# Patient Record
Sex: FEMALE | Race: BLACK OR AFRICAN AMERICAN | NOT HISPANIC OR LATINO | Employment: FULL TIME | URBAN - METROPOLITAN AREA
[De-identification: names, ages, dates, MRNs, and addresses within clinical notes are randomized per-mention and may not be internally consistent; named-entity substitution may affect disease eponyms.]

---

## 2023-05-05 LAB
ALANINE AMINOTRANSFERASE (SGPT) (U/L) IN SER/PLAS: 13 U/L (ref 7–45)
ALBUMIN (G/DL) IN SER/PLAS: 3.9 G/DL (ref 3.4–5)
ALBUMIN (MG/L) IN URINE: <7 MG/L
ALBUMIN/CREATININE (UG/MG) IN URINE: ABNORMAL UG/MG CRT (ref 0–30)
ALKALINE PHOSPHATASE (U/L) IN SER/PLAS: 66 U/L (ref 33–110)
ANION GAP IN SER/PLAS: 16 MMOL/L (ref 10–20)
ASPARTATE AMINOTRANSFERASE (SGOT) (U/L) IN SER/PLAS: 13 U/L (ref 9–39)
BILIRUBIN TOTAL (MG/DL) IN SER/PLAS: 0.4 MG/DL (ref 0–1.2)
CALCIUM (MG/DL) IN SER/PLAS: 9.3 MG/DL (ref 8.6–10.6)
CARBON DIOXIDE, TOTAL (MMOL/L) IN SER/PLAS: 25 MMOL/L (ref 21–32)
CHLORIDE (MMOL/L) IN SER/PLAS: 97 MMOL/L (ref 98–107)
CHOLESTEROL (MG/DL) IN SER/PLAS: 232 MG/DL (ref 0–199)
CHOLESTEROL IN HDL (MG/DL) IN SER/PLAS: 80.1 MG/DL
CHOLESTEROL/HDL RATIO: 2.9
CREATININE (MG/DL) IN SER/PLAS: 0.72 MG/DL (ref 0.5–1.05)
CREATININE (MG/DL) IN URINE: 15 MG/DL (ref 20–320)
ESTIMATED AVERAGE GLUCOSE FOR HBA1C: 309 MG/DL
GFR FEMALE: >90 ML/MIN/1.73M2
GLUCOSE (MG/DL) IN SER/PLAS: 690 MG/DL (ref 74–99)
HEMOGLOBIN A1C/HEMOGLOBIN TOTAL IN BLOOD: 12.4 %
LDL: 135 MG/DL (ref 0–119)
NON HDL CHOLESTEROL: 152 MG/DL (ref 0–149)
POTASSIUM (MMOL/L) IN SER/PLAS: 4.7 MMOL/L (ref 3.5–5.3)
PROTEIN TOTAL: 6.3 G/DL (ref 6.4–8.2)
SODIUM (MMOL/L) IN SER/PLAS: 133 MMOL/L (ref 136–145)
THYROTROPIN (MIU/L) IN SER/PLAS BY DETECTION LIMIT <= 0.05 MIU/L: 2.04 MIU/L (ref 0.44–3.98)
TRIGLYCERIDE (MG/DL) IN SER/PLAS: 87 MG/DL (ref 0–149)
UREA NITROGEN (MG/DL) IN SER/PLAS: 12 MG/DL (ref 6–23)
VLDL: 17 MG/DL (ref 0–40)

## 2023-05-07 LAB
ALBUMIN (MG/L) IN URINE: NORMAL
ALBUMIN/CREATININE (UG/MG) IN URINE: NORMAL
CREATININE (MG/DL) IN URINE: NORMAL

## 2023-05-26 ENCOUNTER — PATIENT OUTREACH (OUTPATIENT)
Dept: CARE COORDINATION | Facility: CLINIC | Age: 23
End: 2023-05-26
Payer: COMMERCIAL

## 2023-05-31 LAB
CHLAMYDIA TRACH., AMPLIFIED: NEGATIVE
N. GONORRHEA, AMPLIFIED: NEGATIVE
TRICHOMONAS VAGINALIS: NEGATIVE

## 2023-07-07 LAB — URINE CULTURE: NORMAL

## 2023-09-12 ENCOUNTER — HOSPITAL ENCOUNTER (OUTPATIENT)
Dept: DATA CONVERSION | Facility: HOSPITAL | Age: 23
End: 2023-09-13
Attending: OBSTETRICS & GYNECOLOGY
Payer: COMMERCIAL

## 2023-09-12 DIAGNOSIS — R10.9 UNSPECIFIED ABDOMINAL PAIN: ICD-10-CM

## 2023-09-12 DIAGNOSIS — Z79.4 LONG TERM (CURRENT) USE OF INSULIN (MULTI): ICD-10-CM

## 2023-09-12 DIAGNOSIS — O99.512 DISEASES OF THE RESPIRATORY SYSTEM COMPLICATING PREGNANCY, SECOND TRIMESTER (HHS-HCC): ICD-10-CM

## 2023-09-12 DIAGNOSIS — O26.892 OTHER SPECIFIED PREGNANCY RELATED CONDITIONS, SECOND TRIMESTER (HHS-HCC): ICD-10-CM

## 2023-09-12 DIAGNOSIS — Z3A.23 23 WEEKS GESTATION OF PREGNANCY (HHS-HCC): ICD-10-CM

## 2023-09-12 DIAGNOSIS — M54.9 DORSALGIA, UNSPECIFIED: ICD-10-CM

## 2023-09-12 DIAGNOSIS — Z34.80 ENCOUNTER FOR SUPERVISION OF OTHER NORMAL PREGNANCY, UNSPECIFIED TRIMESTER (HHS-HCC): ICD-10-CM

## 2023-09-12 DIAGNOSIS — J45.909 UNSPECIFIED ASTHMA, UNCOMPLICATED (HHS-HCC): ICD-10-CM

## 2023-09-12 DIAGNOSIS — O10.912 UNSPECIFIED PRE-EXISTING HYPERTENSION COMPLICATING PREGNANCY, SECOND TRIMESTER (HHS-HCC): ICD-10-CM

## 2023-09-12 DIAGNOSIS — E10.9 TYPE 1 DIABETES MELLITUS WITHOUT COMPLICATIONS (MULTI): ICD-10-CM

## 2023-09-12 DIAGNOSIS — O24.012: ICD-10-CM

## 2023-09-12 LAB
ALANINE AMINOTRANSFERASE (SGPT) (U/L) IN SER/PLAS: 11 U/L (ref 7–45)
ALBUMIN (G/DL) IN SER/PLAS: 3.5 G/DL (ref 3.4–5)
ALKALINE PHOSPHATASE (U/L) IN SER/PLAS: 35 U/L (ref 33–110)
ANION GAP IN SER/PLAS: 14 MMOL/L (ref 10–20)
APPEARANCE, URINE: CLEAR
ASPARTATE AMINOTRANSFERASE (SGOT) (U/L) IN SER/PLAS: 12 U/L (ref 9–39)
BASOPHILS (10*3/UL) IN BLOOD BY AUTOMATED COUNT: 0.03 X10E9/L (ref 0–0.1)
BASOPHILS/100 LEUKOCYTES IN BLOOD BY AUTOMATED COUNT: 0.4 % (ref 0–2)
BETA HYDROXYBUTYRATE (MMOL/L) IN SER/PLAS: 0.27 MMOL/L (ref 0.02–0.27)
BILIRUBIN TOTAL (MG/DL) IN SER/PLAS: 0.3 MG/DL (ref 0–1.2)
BILIRUBIN, URINE: NEGATIVE
BLOOD, URINE: NEGATIVE
CALCIUM (MG/DL) IN SER/PLAS: 8.9 MG/DL (ref 8.6–10.6)
CARBON DIOXIDE, TOTAL (MMOL/L) IN SER/PLAS: 23 MMOL/L (ref 21–32)
CHLORIDE (MMOL/L) IN SER/PLAS: 104 MMOL/L (ref 98–107)
COLOR, URINE: YELLOW
CREATININE (MG/DL) IN SER/PLAS: 0.39 MG/DL (ref 0.5–1.05)
EOSINOPHILS (10*3/UL) IN BLOOD BY AUTOMATED COUNT: 0.13 X10E9/L (ref 0–0.7)
EOSINOPHILS/100 LEUKOCYTES IN BLOOD BY AUTOMATED COUNT: 1.6 % (ref 0–6)
ERYTHROCYTE DISTRIBUTION WIDTH (RATIO) BY AUTOMATED COUNT: 12.5 % (ref 11.5–14.5)
ERYTHROCYTE MEAN CORPUSCULAR HEMOGLOBIN CONCENTRATION (G/DL) BY AUTOMATED: 33.6 G/DL (ref 32–36)
ERYTHROCYTE MEAN CORPUSCULAR VOLUME (FL) BY AUTOMATED COUNT: 90 FL (ref 80–100)
ERYTHROCYTES (10*6/UL) IN BLOOD BY AUTOMATED COUNT: 3.65 X10E12/L (ref 4–5.2)
GFR FEMALE: >90 ML/MIN/1.73M2
GLUCOSE (MG/DL) IN SER/PLAS: 141 MG/DL (ref 74–99)
GLUCOSE, URINE: ABNORMAL MG/DL
HEMATOCRIT (%) IN BLOOD BY AUTOMATED COUNT: 33 % (ref 36–46)
HEMOGLOBIN (G/DL) IN BLOOD: 11.1 G/DL (ref 12–16)
IMMATURE GRANULOCYTES/100 LEUKOCYTES IN BLOOD BY AUTOMATED COUNT: 0.4 % (ref 0–0.9)
KETONES, URINE: ABNORMAL MG/DL
LEUKOCYTE ESTERASE, URINE: NEGATIVE
LEUKOCYTES (10*3/UL) IN BLOOD BY AUTOMATED COUNT: 8.2 X10E9/L (ref 4.4–11.3)
LYMPHOCYTES (10*3/UL) IN BLOOD BY AUTOMATED COUNT: 2.44 X10E9/L (ref 1.2–4.8)
LYMPHOCYTES/100 LEUKOCYTES IN BLOOD BY AUTOMATED COUNT: 29.8 % (ref 13–44)
MONOCYTES (10*3/UL) IN BLOOD BY AUTOMATED COUNT: 0.44 X10E9/L (ref 0.1–1)
MONOCYTES/100 LEUKOCYTES IN BLOOD BY AUTOMATED COUNT: 5.4 % (ref 2–10)
NEUTROPHILS (10*3/UL) IN BLOOD BY AUTOMATED COUNT: 5.13 X10E9/L (ref 1.2–7.7)
NEUTROPHILS/100 LEUKOCYTES IN BLOOD BY AUTOMATED COUNT: 62.4 % (ref 40–80)
NITRITE, URINE: NEGATIVE
NRBC (PER 100 WBCS) BY AUTOMATED COUNT: 0 /100 WBC (ref 0–0)
PATIENT TEMPERATURE: 37 DEGREES C
PH, URINE: 7 (ref 5–8)
PLATELETS (10*3/UL) IN BLOOD AUTOMATED COUNT: 413 X10E9/L (ref 150–450)
POCT ANION GAP, VENOUS: ABNORMAL MMOL/L (ref 10–25)
POCT BASE EXCESS, VENOUS: -10.8 MMOL/L (ref -2–3)
POCT CHLORIDE, VENOUS: 101 MMOL/L (ref 98–107)
POCT GLUCOSE, VENOUS: 143 MG/DL (ref 74–99)
POCT GLUCOSE: 200 MG/DL (ref 74–99)
POCT HCO3, VENOUS: 20.9 MMOL/L (ref 22–26)
POCT HEMATOCRIT, VENOUS: 35 % (ref 36–46)
POCT HEMOGLOBIN, VENOUS: 11.7 G/DL (ref 12–16)
POCT IONIZED CALCIUM, VENOUS: ABNORMAL MMOL/L (ref 1.1–1.33)
POCT LACTATE, VENOUS: 1.4 MMOL/L (ref 0.4–2)
POCT OXY HEMOGLOBIN, VENOUS: 40.5 % (ref 45–75)
POCT PCO2, VENOUS: 79 MMHG (ref 41–51)
POCT PH, VENOUS: 7.03 (ref 7.33–7.43)
POCT PO2, VENOUS: 36 MMHG (ref 35–45)
POCT POTASSIUM, VENOUS: >10 MMOL/L (ref 3.5–5.3)
POCT SO2, VENOUS: 41 % (ref 45–75)
POCT SODIUM, VENOUS: 128 MMOL/L (ref 136–145)
POTASSIUM (MMOL/L) IN SER/PLAS: 3.7 MMOL/L (ref 3.5–5.3)
PROTEIN TOTAL: 6.4 G/DL (ref 6.4–8.2)
PROTEIN, URINE: NEGATIVE MG/DL
SODIUM (MMOL/L) IN SER/PLAS: 137 MMOL/L (ref 136–145)
SPECIFIC GRAVITY, URINE: 1.03 (ref 1–1.03)
UREA NITROGEN (MG/DL) IN SER/PLAS: 4 MG/DL (ref 6–23)
UROBILINOGEN, URINE: <2 MG/DL (ref 0–1.9)

## 2023-09-28 LAB
ALANINE AMINOTRANSFERASE (SGPT) (U/L) IN SER/PLAS: 12 U/L (ref 7–45)
ALBUMIN (G/DL) IN SER/PLAS: 3.7 G/DL (ref 3.4–5)
ALKALINE PHOSPHATASE (U/L) IN SER/PLAS: 41 U/L (ref 33–110)
ANION GAP IN SER/PLAS: 17 MMOL/L (ref 10–20)
ASPARTATE AMINOTRANSFERASE (SGOT) (U/L) IN SER/PLAS: 10 U/L (ref 9–39)
BASOPHILS (10*3/UL) IN BLOOD BY AUTOMATED COUNT: 0.06 X10E9/L (ref 0–0.1)
BASOPHILS/100 LEUKOCYTES IN BLOOD BY AUTOMATED COUNT: 0.7 % (ref 0–2)
BETA HYDROXYBUTYRATE (MMOL/L) IN SER/PLAS: 1.35 MMOL/L (ref 0.02–0.27)
BILIRUBIN TOTAL (MG/DL) IN SER/PLAS: 0.3 MG/DL (ref 0–1.2)
CALCIUM (MG/DL) IN SER/PLAS: 9.2 MG/DL (ref 8.6–10.6)
CARBON DIOXIDE, TOTAL (MMOL/L) IN SER/PLAS: 20 MMOL/L (ref 21–32)
CHLORIDE (MMOL/L) IN SER/PLAS: 102 MMOL/L (ref 98–107)
CREATININE (MG/DL) IN SER/PLAS: 0.44 MG/DL (ref 0.5–1.05)
EOSINOPHILS (10*3/UL) IN BLOOD BY AUTOMATED COUNT: 0.09 X10E9/L (ref 0–0.7)
EOSINOPHILS/100 LEUKOCYTES IN BLOOD BY AUTOMATED COUNT: 1 % (ref 0–6)
ERYTHROCYTE DISTRIBUTION WIDTH (RATIO) BY AUTOMATED COUNT: 12.6 % (ref 11.5–14.5)
ERYTHROCYTE MEAN CORPUSCULAR HEMOGLOBIN CONCENTRATION (G/DL) BY AUTOMATED: 33.1 G/DL (ref 32–36)
ERYTHROCYTE MEAN CORPUSCULAR VOLUME (FL) BY AUTOMATED COUNT: 95 FL (ref 80–100)
ERYTHROCYTES (10*6/UL) IN BLOOD BY AUTOMATED COUNT: 3.72 X10E12/L (ref 4–5.2)
GFR FEMALE: >90 ML/MIN/1.73M2
GLUCOSE (MG/DL) IN SER/PLAS: 201 MG/DL (ref 74–99)
HEMATOCRIT (%) IN BLOOD BY AUTOMATED COUNT: 35.3 % (ref 36–46)
HEMOGLOBIN (G/DL) IN BLOOD: 11.7 G/DL (ref 12–16)
IMMATURE GRANULOCYTES/100 LEUKOCYTES IN BLOOD BY AUTOMATED COUNT: 0.5 % (ref 0–0.9)
LEUKOCYTES (10*3/UL) IN BLOOD BY AUTOMATED COUNT: 8.6 X10E9/L (ref 4.4–11.3)
LYMPHOCYTES (10*3/UL) IN BLOOD BY AUTOMATED COUNT: 2.48 X10E9/L (ref 1.2–4.8)
LYMPHOCYTES/100 LEUKOCYTES IN BLOOD BY AUTOMATED COUNT: 28.8 % (ref 13–44)
MONOCYTES (10*3/UL) IN BLOOD BY AUTOMATED COUNT: 0.4 X10E9/L (ref 0.1–1)
MONOCYTES/100 LEUKOCYTES IN BLOOD BY AUTOMATED COUNT: 4.6 % (ref 2–10)
NEUTROPHILS (10*3/UL) IN BLOOD BY AUTOMATED COUNT: 5.55 X10E9/L (ref 1.2–7.7)
NEUTROPHILS/100 LEUKOCYTES IN BLOOD BY AUTOMATED COUNT: 64.4 % (ref 40–80)
NRBC (PER 100 WBCS) BY AUTOMATED COUNT: 0 /100 WBC (ref 0–0)
PATIENT TEMPERATURE: 37 DEGREES C
PLATELETS (10*3/UL) IN BLOOD AUTOMATED COUNT: 384 X10E9/L (ref 150–450)
POCT ANION GAP, VENOUS: 13 MMOL/L (ref 10–25)
POCT BASE EXCESS, VENOUS: -4.7 MMOL/L (ref -2–3)
POCT CHLORIDE, VENOUS: 103 MMOL/L (ref 98–107)
POCT GLUCOSE, VENOUS: 222 MG/DL (ref 74–99)
POCT GLUCOSE: 176 MG/DL (ref 74–99)
POCT HCO3, VENOUS: 21.1 MMOL/L (ref 22–26)
POCT HEMATOCRIT, VENOUS: 36 % (ref 36–46)
POCT HEMOGLOBIN, VENOUS: 12 G/DL (ref 12–16)
POCT IONIZED CALCIUM, VENOUS: 1.16 MMOL/L (ref 1.1–1.33)
POCT LACTATE, VENOUS: 2.3 MMOL/L (ref 0.4–2)
POCT OXY HEMOGLOBIN, VENOUS: 47.6 % (ref 45–75)
POCT PCO2, VENOUS: 41 MMHG (ref 41–51)
POCT PH, VENOUS: 7.32 (ref 7.33–7.43)
POCT PO2, VENOUS: 35 MMHG (ref 35–45)
POCT POTASSIUM, VENOUS: 3.7 MMOL/L (ref 3.5–5.3)
POCT SO2, VENOUS: 48 % (ref 45–75)
POCT SODIUM, VENOUS: 133 MMOL/L (ref 136–145)
POTASSIUM (MMOL/L) IN SER/PLAS: 3.8 MMOL/L (ref 3.5–5.3)
PROTEIN TOTAL: 6.7 G/DL (ref 6.4–8.2)
SODIUM (MMOL/L) IN SER/PLAS: 135 MMOL/L (ref 136–145)
UREA NITROGEN (MG/DL) IN SER/PLAS: 5 MG/DL (ref 6–23)

## 2023-09-28 PROCEDURE — 84295 ASSAY OF SERUM SODIUM: CPT | Mod: 91

## 2023-09-28 PROCEDURE — 82435 ASSAY OF BLOOD CHLORIDE: CPT | Mod: 91

## 2023-09-28 PROCEDURE — 82947 ASSAY GLUCOSE BLOOD QUANT: CPT | Mod: 91

## 2023-09-28 PROCEDURE — 83605 ASSAY OF LACTIC ACID: CPT

## 2023-09-28 PROCEDURE — 82330 ASSAY OF CALCIUM: CPT

## 2023-09-28 PROCEDURE — 82010 KETONE BODYS QUAN: CPT

## 2023-09-28 PROCEDURE — 85018 HEMOGLOBIN: CPT | Mod: 91

## 2023-09-28 PROCEDURE — 80053 COMPREHEN METABOLIC PANEL: CPT

## 2023-09-28 PROCEDURE — 9990 CHARGE CONVERSION: Mod: 91

## 2023-09-28 PROCEDURE — 85025 COMPLETE CBC W/AUTO DIFF WBC: CPT

## 2023-09-28 PROCEDURE — 82805 BLOOD GASES W/O2 SATURATION: CPT

## 2023-09-28 PROCEDURE — 84132 ASSAY OF SERUM POTASSIUM: CPT | Mod: 91

## 2023-09-29 ENCOUNTER — HOSPITAL ENCOUNTER (OUTPATIENT)
Dept: DATA CONVERSION | Facility: HOSPITAL | Age: 23
Setting detail: OBSERVATION
Discharge: HOME | End: 2023-09-30
Attending: OBSTETRICS & GYNECOLOGY | Admitting: OBSTETRICS & GYNECOLOGY
Payer: COMMERCIAL

## 2023-09-29 VITALS
SYSTOLIC BLOOD PRESSURE: 107 MMHG | HEIGHT: 60 IN | TEMPERATURE: 97.9 F | WEIGHT: 130.07 LBS | OXYGEN SATURATION: 99 % | DIASTOLIC BLOOD PRESSURE: 65 MMHG | HEART RATE: 104 BPM | BODY MASS INDEX: 25.54 KG/M2 | RESPIRATION RATE: 18 BRPM

## 2023-09-29 DIAGNOSIS — R10.9 UNSPECIFIED ABDOMINAL PAIN: ICD-10-CM

## 2023-09-29 DIAGNOSIS — E13.10 OTHER SPECIFIED DIABETES MELLITUS WITH KETOACIDOSIS WITHOUT COMA (MULTI): ICD-10-CM

## 2023-09-29 LAB
ABO GROUP (TYPE) IN BLOOD: NORMAL
ALANINE AMINOTRANSFERASE (SGPT) (U/L) IN SER/PLAS: 8 U/L (ref 7–45)
ALBUMIN (G/DL) IN SER/PLAS: 3.2 G/DL (ref 3.4–5)
ALKALINE PHOSPHATASE (U/L) IN SER/PLAS: 31 U/L (ref 33–110)
ANION GAP IN SER/PLAS: 13 MMOL/L (ref 10–20)
ANTIBODY SCREEN: NORMAL
ASPARTATE AMINOTRANSFERASE (SGOT) (U/L) IN SER/PLAS: 9 U/L (ref 9–39)
BETA HYDROXYBUTYRATE (MMOL/L) IN SER/PLAS: 0.19 MMOL/L (ref 0.02–0.27)
BETA HYDROXYBUTYRATE (MMOL/L) IN SER/PLAS: 0.46 MMOL/L (ref 0.02–0.27)
BILIRUBIN TOTAL (MG/DL) IN SER/PLAS: 0.3 MG/DL (ref 0–1.2)
CALCIUM (MG/DL) IN SER/PLAS: 8.5 MG/DL (ref 8.6–10.6)
CALCIUM (MG/DL) IN SER/PLAS: 8.9 MG/DL (ref 8.6–10.6)
CALCIUM (MG/DL) IN SER/PLAS: 8.9 MG/DL (ref 8.6–10.6)
CARBON DIOXIDE, TOTAL (MMOL/L) IN SER/PLAS: 20 MMOL/L (ref 21–32)
CARBON DIOXIDE, TOTAL (MMOL/L) IN SER/PLAS: 20 MMOL/L (ref 21–32)
CARBON DIOXIDE, TOTAL (MMOL/L) IN SER/PLAS: 22 MMOL/L (ref 21–32)
CHLORIDE (MMOL/L) IN SER/PLAS: 106 MMOL/L (ref 98–107)
CHLORIDE (MMOL/L) IN SER/PLAS: 106 MMOL/L (ref 98–107)
CHLORIDE (MMOL/L) IN SER/PLAS: 107 MMOL/L (ref 98–107)
CREATININE (MG/DL) IN SER/PLAS: 0.31 MG/DL (ref 0.5–1.05)
CREATININE (MG/DL) IN SER/PLAS: 0.33 MG/DL (ref 0.5–1.05)
CREATININE (MG/DL) IN SER/PLAS: 0.37 MG/DL (ref 0.5–1.05)
GFR FEMALE: >90 ML/MIN/1.73M2
GLUCOSE (MG/DL) IN SER/PLAS: 100 MG/DL (ref 74–99)
GLUCOSE (MG/DL) IN SER/PLAS: 88 MG/DL (ref 74–99)
GLUCOSE (MG/DL) IN SER/PLAS: 94 MG/DL (ref 74–99)
LIPASE (U/L) IN SER/PLAS: 14 U/L (ref 9–82)
PATIENT TEMPERATURE: 37 DEGREES C
PATIENT TEMPERATURE: 37 DEGREES C
POCT ANION GAP, VENOUS: 10 MMOL/L (ref 10–25)
POCT BASE EXCESS, VENOUS: -2.6 MMOL/L (ref -2–3)
POCT BASE EXCESS, VENOUS: -3.6 MMOL/L (ref -2–3)
POCT CHLORIDE, VENOUS: 106 MMOL/L (ref 98–107)
POCT GLUCOSE, VENOUS: 99 MG/DL (ref 74–99)
POCT GLUCOSE: 108 MG/DL (ref 74–99)
POCT GLUCOSE: 114 MG/DL (ref 74–99)
POCT GLUCOSE: 115 MG/DL (ref 74–99)
POCT GLUCOSE: 116 MG/DL (ref 74–99)
POCT GLUCOSE: 145 MG/DL (ref 74–99)
POCT GLUCOSE: 91 MG/DL (ref 74–99)
POCT HCO3, VENOUS: 20.4 MMOL/L (ref 22–26)
POCT HCO3, VENOUS: 21.6 MMOL/L (ref 22–26)
POCT HEMATOCRIT, VENOUS: 31 % (ref 36–46)
POCT HEMOGLOBIN, VENOUS: 10.2 G/DL (ref 12–16)
POCT IONIZED CALCIUM, VENOUS: 1.15 MMOL/L (ref 1.1–1.33)
POCT LACTATE, VENOUS: 0.8 MMOL/L (ref 0.4–2)
POCT OXY HEMOGLOBIN, VENOUS: 90.1 % (ref 45–75)
POCT OXY HEMOGLOBIN, VENOUS: 97 % (ref 45–75)
POCT PCO2, VENOUS: 33 MMHG (ref 41–51)
POCT PCO2, VENOUS: 34 MMHG (ref 41–51)
POCT PH, VENOUS: 7.4 (ref 7.33–7.43)
POCT PH, VENOUS: 7.41 (ref 7.33–7.43)
POCT PO2, VENOUS: 108 MMHG (ref 35–45)
POCT PO2, VENOUS: 64 MMHG (ref 35–45)
POCT POTASSIUM, VENOUS: 3.3 MMOL/L (ref 3.5–5.3)
POCT SO2, VENOUS: 92 % (ref 45–75)
POCT SO2, VENOUS: 99 % (ref 45–75)
POCT SODIUM, VENOUS: 134 MMOL/L (ref 136–145)
POTASSIUM (MMOL/L) IN SER/PLAS: 3.6 MMOL/L (ref 3.5–5.3)
POTASSIUM (MMOL/L) IN SER/PLAS: 3.7 MMOL/L (ref 3.5–5.3)
POTASSIUM (MMOL/L) IN SER/PLAS: 3.9 MMOL/L (ref 3.5–5.3)
PROTEIN TOTAL: 5.5 G/DL (ref 6.4–8.2)
RH FACTOR: NORMAL
SODIUM (MMOL/L) IN SER/PLAS: 135 MMOL/L (ref 136–145)
SODIUM (MMOL/L) IN SER/PLAS: 136 MMOL/L (ref 136–145)
SODIUM (MMOL/L) IN SER/PLAS: 137 MMOL/L (ref 136–145)
UREA NITROGEN (MG/DL) IN SER/PLAS: 4 MG/DL (ref 6–23)
UREA NITROGEN (MG/DL) IN SER/PLAS: 5 MG/DL (ref 6–23)
UREA NITROGEN (MG/DL) IN SER/PLAS: 5 MG/DL (ref 6–23)

## 2023-09-29 PROCEDURE — 99215 OFFICE O/P EST HI 40 MIN: CPT

## 2023-09-29 PROCEDURE — 82010 KETONE BODYS QUAN: CPT | Mod: 91

## 2023-09-29 PROCEDURE — 80053 COMPREHEN METABOLIC PANEL: CPT

## 2023-09-29 PROCEDURE — 85025 COMPLETE CBC W/AUTO DIFF WBC: CPT

## 2023-09-29 PROCEDURE — 76816 OB US FOLLOW-UP PER FETUS: CPT

## 2023-09-29 PROCEDURE — 86900 BLOOD TYPING SEROLOGIC ABO: CPT

## 2023-09-29 PROCEDURE — 96361 HYDRATE IV INFUSION ADD-ON: CPT | Mod: 59

## 2023-09-29 PROCEDURE — 84132 ASSAY OF SERUM POTASSIUM: CPT | Mod: 91

## 2023-09-29 PROCEDURE — 96365 THER/PROPH/DIAG IV INF INIT: CPT | Mod: 59

## 2023-09-29 PROCEDURE — 76815 OB US LIMITED FETUS(S): CPT

## 2023-09-29 PROCEDURE — 82435 ASSAY OF BLOOD CHLORIDE: CPT | Mod: 91

## 2023-09-29 PROCEDURE — 82805 BLOOD GASES W/O2 SATURATION: CPT

## 2023-09-29 PROCEDURE — 83690 ASSAY OF LIPASE: CPT

## 2023-09-29 PROCEDURE — 86850 RBC ANTIBODY SCREEN: CPT

## 2023-09-29 PROCEDURE — 9990 CHARGE CONVERSION: Mod: 91

## 2023-09-29 PROCEDURE — 59025 FETAL NON-STRESS TEST: CPT

## 2023-09-29 PROCEDURE — 84295 ASSAY OF SERUM SODIUM: CPT | Mod: 91

## 2023-09-29 PROCEDURE — 85018 HEMOGLOBIN: CPT

## 2023-09-29 PROCEDURE — 36415 COLL VENOUS BLD VENIPUNCTURE: CPT

## 2023-09-29 PROCEDURE — 82947 ASSAY GLUCOSE BLOOD QUANT: CPT | Mod: 91

## 2023-09-29 PROCEDURE — 83605 ASSAY OF LACTIC ACID: CPT

## 2023-09-29 PROCEDURE — 96360 HYDRATION IV INFUSION INIT: CPT

## 2023-09-29 PROCEDURE — 86901 BLOOD TYPING SEROLOGIC RH(D): CPT

## 2023-09-29 PROCEDURE — 82330 ASSAY OF CALCIUM: CPT

## 2023-09-29 RX ORDER — POLYETHYLENE GLYCOL 3350 17 G/17G
17 POWDER, FOR SOLUTION ORAL 2 TIMES DAILY PRN
Status: DISCONTINUED | OUTPATIENT
Start: 2023-09-30 | End: 2023-09-30 | Stop reason: HOSPADM

## 2023-09-29 RX ORDER — DEXTROSE 50 % IN WATER (D50W) INTRAVENOUS SYRINGE
25
Status: DISCONTINUED | OUTPATIENT
Start: 2023-09-30 | End: 2023-09-30 | Stop reason: HOSPADM

## 2023-09-29 RX ORDER — DEXTROSE MONOHYDRATE 100 MG/ML
60 INJECTION, SOLUTION INTRAVENOUS CONTINUOUS
Status: DISCONTINUED | OUTPATIENT
Start: 2023-09-30 | End: 2023-09-30 | Stop reason: HOSPADM

## 2023-09-29 RX ORDER — SODIUM CHLORIDE, SODIUM LACTATE, POTASSIUM CHLORIDE, CALCIUM CHLORIDE 600; 310; 30; 20 MG/100ML; MG/100ML; MG/100ML; MG/100ML
40 INJECTION, SOLUTION INTRAVENOUS CONTINUOUS
Status: DISCONTINUED | OUTPATIENT
Start: 2023-09-30 | End: 2023-09-30 | Stop reason: HOSPADM

## 2023-09-29 RX ORDER — ALBUTEROL SULFATE 90 UG/1
1 AEROSOL, METERED RESPIRATORY (INHALATION) EVERY 4 HOURS PRN
Status: DISCONTINUED | OUTPATIENT
Start: 2023-09-30 | End: 2023-09-30 | Stop reason: HOSPADM

## 2023-09-29 RX ORDER — ADHESIVE BANDAGE
10 BANDAGE TOPICAL EVERY 12 HOURS PRN
Status: DISCONTINUED | OUTPATIENT
Start: 2023-09-30 | End: 2023-09-30 | Stop reason: HOSPADM

## 2023-09-29 RX ORDER — DEXTROSE 40 %
30 GEL (GRAM) ORAL
Status: DISCONTINUED | OUTPATIENT
Start: 2023-09-30 | End: 2023-09-30 | Stop reason: HOSPADM

## 2023-09-29 RX ORDER — POTASSIUM CHLORIDE 14.9 MG/ML
20 INJECTION INTRAVENOUS
Status: ACTIVE | OUTPATIENT
Start: 2023-09-30 | End: 2023-09-30

## 2023-09-29 RX ORDER — DEXTROSE 40 %
15 GEL (GRAM) ORAL
Status: DISCONTINUED | OUTPATIENT
Start: 2023-09-30 | End: 2023-09-30 | Stop reason: HOSPADM

## 2023-09-29 RX ORDER — ALUMINUM HYDROXIDE, MAGNESIUM HYDROXIDE, AND SIMETHICONE 1200; 120; 1200 MG/30ML; MG/30ML; MG/30ML
30 SUSPENSION ORAL EVERY 6 HOURS PRN
Status: DISCONTINUED | OUTPATIENT
Start: 2023-09-30 | End: 2023-09-30 | Stop reason: HOSPADM

## 2023-09-29 RX ORDER — NIFEDIPINE 10 MG/1
10 CAPSULE ORAL ONCE AS NEEDED
Status: DISCONTINUED | OUTPATIENT
Start: 2023-09-30 | End: 2023-09-30 | Stop reason: HOSPADM

## 2023-09-29 RX ORDER — SODIUM CHLORIDE 9 MG/ML
125 INJECTION, SOLUTION INTRAVENOUS CONTINUOUS
Status: DISCONTINUED | OUTPATIENT
Start: 2023-09-30 | End: 2023-09-30 | Stop reason: HOSPADM

## 2023-09-29 RX ORDER — LABETALOL HYDROCHLORIDE 5 MG/ML
20 INJECTION, SOLUTION INTRAVENOUS ONCE AS NEEDED
Status: DISCONTINUED | OUTPATIENT
Start: 2023-09-30 | End: 2023-09-30 | Stop reason: HOSPADM

## 2023-09-29 RX ORDER — METOCLOPRAMIDE HYDROCHLORIDE 5 MG/ML
10 INJECTION INTRAMUSCULAR; INTRAVENOUS EVERY 6 HOURS PRN
Status: DISCONTINUED | OUTPATIENT
Start: 2023-09-30 | End: 2023-09-30 | Stop reason: HOSPADM

## 2023-09-29 RX ORDER — HYDRALAZINE HYDROCHLORIDE 20 MG/ML
5 INJECTION INTRAMUSCULAR; INTRAVENOUS ONCE AS NEEDED
Status: DISCONTINUED | OUTPATIENT
Start: 2023-09-30 | End: 2023-09-30 | Stop reason: HOSPADM

## 2023-09-29 RX ORDER — ONDANSETRON HYDROCHLORIDE 2 MG/ML
4 INJECTION, SOLUTION INTRAVENOUS EVERY 6 HOURS PRN
Status: DISCONTINUED | OUTPATIENT
Start: 2023-09-30 | End: 2023-09-30 | Stop reason: HOSPADM

## 2023-09-30 VITALS
OXYGEN SATURATION: 99 % | BODY MASS INDEX: 24.63 KG/M2 | RESPIRATION RATE: 18 BRPM | SYSTOLIC BLOOD PRESSURE: 105 MMHG | WEIGHT: 125.44 LBS | TEMPERATURE: 98.1 F | DIASTOLIC BLOOD PRESSURE: 68 MMHG | HEIGHT: 60 IN | HEART RATE: 87 BPM

## 2023-09-30 LAB — GLUCOSE, ONE HOUR EXTERNAL: 120 MG/DL

## 2023-09-30 PROCEDURE — 9990 CHARGE CONVERSION

## 2023-09-30 PROCEDURE — 59025 FETAL NON-STRESS TEST: CPT | Performed by: STUDENT IN AN ORGANIZED HEALTH CARE EDUCATION/TRAINING PROGRAM

## 2023-09-30 PROCEDURE — 84132 ASSAY OF SERUM POTASSIUM: CPT | Mod: 91

## 2023-09-30 PROCEDURE — G0378 HOSPITAL OBSERVATION PER HR: HCPCS

## 2023-09-30 PROCEDURE — 86901 BLOOD TYPING SEROLOGIC RH(D): CPT

## 2023-09-30 PROCEDURE — 59025 FETAL NON-STRESS TEST: CPT | Mod: GC | Performed by: STUDENT IN AN ORGANIZED HEALTH CARE EDUCATION/TRAINING PROGRAM

## 2023-09-30 PROCEDURE — 84295 ASSAY OF SERUM SODIUM: CPT | Mod: 91

## 2023-09-30 PROCEDURE — 82805 BLOOD GASES W/O2 SATURATION: CPT | Mod: 91

## 2023-09-30 PROCEDURE — 83605 ASSAY OF LACTIC ACID: CPT

## 2023-09-30 PROCEDURE — 2500000001 HC RX 250 WO HCPCS SELF ADMINISTERED DRUGS (ALT 637 FOR MEDICARE OP): Performed by: OBSTETRICS & GYNECOLOGY

## 2023-09-30 PROCEDURE — 99238 HOSP IP/OBS DSCHRG MGMT 30/<: CPT | Performed by: STUDENT IN AN ORGANIZED HEALTH CARE EDUCATION/TRAINING PROGRAM

## 2023-09-30 PROCEDURE — 80053 COMPREHEN METABOLIC PANEL: CPT

## 2023-09-30 PROCEDURE — 82435 ASSAY OF BLOOD CHLORIDE: CPT | Mod: 91

## 2023-09-30 PROCEDURE — 85018 HEMOGLOBIN: CPT

## 2023-09-30 PROCEDURE — 86850 RBC ANTIBODY SCREEN: CPT

## 2023-09-30 PROCEDURE — 82947 ASSAY GLUCOSE BLOOD QUANT: CPT | Mod: 91

## 2023-09-30 PROCEDURE — 86900 BLOOD TYPING SEROLOGIC ABO: CPT

## 2023-09-30 PROCEDURE — 76816 OB US FOLLOW-UP PER FETUS: CPT

## 2023-09-30 PROCEDURE — 82330 ASSAY OF CALCIUM: CPT

## 2023-09-30 RX ADMIN — ALUMINUM HYDROXIDE, MAGNESIUM HYDROXIDE, AND SIMETHICONE 30 ML: 200; 200; 20 SUSPENSION ORAL at 00:43

## 2023-09-30 RX ADMIN — PRENATAL VIT W/ FE FUMARATE-FA TAB 27-0.8 MG 1 TABLET: 27-0.8 TAB at 08:52

## 2023-09-30 ASSESSMENT — COLUMBIA-SUICIDE SEVERITY RATING SCALE - C-SSRS
2. HAVE YOU ACTUALLY HAD ANY THOUGHTS OF KILLING YOURSELF?: NO
6. HAVE YOU EVER DONE ANYTHING, STARTED TO DO ANYTHING, OR PREPARED TO DO ANYTHING TO END YOUR LIFE?: NO
1. IN THE PAST MONTH, HAVE YOU WISHED YOU WERE DEAD OR WISHED YOU COULD GO TO SLEEP AND NOT WAKE UP?: NO

## 2023-09-30 ASSESSMENT — PAIN - FUNCTIONAL ASSESSMENT: PAIN_FUNCTIONAL_ASSESSMENT: 0-10

## 2023-09-30 ASSESSMENT — PAIN SCALES - GENERAL: PAINLEVEL_OUTOF10: 0 - NO PAIN

## 2023-09-30 NOTE — PROGRESS NOTES
Current Stage:   Stage: Triage     Subjective Data:   Antepartum:  Antepartum:    22 yo G1 at 23.3 wks GA by LMP c/w 7.5 wk us presents with abdominal and back pain.    Pt reports lower abdominal cramping and lower back ache started last night. She denies VB, LOF, vaginal itching, irritation, or odor. She reports good FM.   Pt is T1DM, has CGM and Tandem pump in place. She last ate early this am but has had decreased appetite in setting of constipation. Her last BM earlier today with hard stool.    Pregnancy notable for:  -T1DM diagnosed at age 5, multiple admissions for DKA, last hgbA1c 7.0 % (8/8), previously 12.8% (5/12/23), admitted to [facility] at 6wga and 10w GA for poorly controlled BGs, current regimen - Tandem pump in place Control IQ   - cHTN, on no meds, baseline labs wnl, P:C   -Intermittent asthma, never hospitalized or intubated, albuterol prn   -HSV for third trimester ppx   -GBS bacteriuria, s/p amoxicillin, neg THU   -Anxiety/depression, no meds     OBHx: G1  GynHx: HSV as above, h/o CT and trich, denies abnormal paps  PMHx: as above  SurgHx: none  Meds: insulin as above, PNV, to start sertraline 25mg daily  All: none  Social: denies t/e/i   FHx: reviewed and non-contributory to presenting complaint        Objective Information:    Objective Information:      T   P  R  BP   MAP  SpO2   Value  36.6  76  18  108/70   83  100%  Date/Time 9/12 18:27 9/12 19:42 9/12 18:27 9/12 19:42  9/12 19:42 9/12 19:42  Range  (36.6C - 36.6C )  (76 - 104 )  (18 - 18 )  (107 - 108 )/ (65 - 70 )  (83 - 83 )  (99% - 100% )      Physical Exam:   Constitutional: alert, oriented     Assessment and Plan:   Assessment:    22 yo G1 at 23.3 wks GA by LMP c/w 7.5 wk us presents with abdominal and back pain.    Abdominal pain   SVE c/l/h, no ctx on toco, lower suspicion for labor.  UA notable for 2+ ketones, BG POCT 200, and SG 1.030 c/f dehydration vs starvation ketoacidosis. LR bolus and self administered insulin bolus given ->  repeat POCT low 100's-> 60's  DKA labs unremarkable, lower suspicion for DKA     Constipation  Will send home Miralax     Fetal Wellbeing   NST reactive and AGA     Dispo: patient is stable for discharge home, patient to be scheduled for a follow up appt     D/w Dr. Ngo,  Ana Spann MD, PGY-2     Attestation:   Note Completion:  I am a:  Resident/Fellow   Attending Attestation I saw and evaluated the patient.  I personally obtained the key and critical portions of the history and physical exam or was physically present for key and  critical portions performed by the resident/fellow. I reviewed the resident/fellow?s documentation and discussed the patient with the resident/fellow.  I agree with the resident/fellow?s medical decision making as documented in the note.     I personally evaluated the patient on 12-Sep-2023         Electronic Signatures:  Stacy Ngo)  (Signed 13-Sep-2023 01:01)   Authored: Objective Data, Note Completion   Co-Signer: Current Stage, Subjective Data, Objective Data, Assessment and Plan, Note Completion  Ana Spann (Resident))  (Signed 13-Sep-2023 00:18)   Authored: Current Stage, Subjective Data, Objective Data,  Assessment and Plan, Note Completion      Last Updated: 13-Sep-2023 01:01 by Stacy Ngo)

## 2023-09-30 NOTE — PROGRESS NOTES
Antepartum Progress Note    Assessment/Plan   Maria Isabel Cutler is a 23 y.o.  at 25w2d admitted for diabetic ketoacidosis in the setting of type 1 DM.     DKA, resolved  - Patient with T1DM and presented in DKA in setting of likely viral gastroenteritis  - DKA now resolved, BHB and pH normalized, s/p insulin gtt, now on insulin pump  - Patient now eating, will discontinue IV fluids this AM  - Fasting this   - Currently on insulin pump and euglycemic. Insulin pump settings as below:  - Using tandem pump with control IQ:   --> Basal [id number] 0.90   --> Bolus [id number] 1:11.0--:1:10   --> CF: 1:40, Target:110 DOA 5 hrs  - If continued clinical improvement, possible discharge this evening    cHTN  - Not on meds  - Normotensive, asymptomatic  - HELLP labs neg     FWB  - Tracing currently reactive, AGA  - Continue EFM while on drip    Maternal wellbeing  - Intermittent asthma, never hospitalized or intubated, albuterol prn   - HSV for third trimester ppx   - GBS bacteriuria, s/p amoxicillin, neg THU   - Anxiety/depression, no meds       Active Problems:  There are no active Hospital Problems.    Pregnancy Problems (from 24 to present)       No problems associated with this episode.            Subjective   Feeling well this AM. Tolerating regular diet without nausea or vomiting. Euglycemic. Feeling FM, denies contractions, VB, LOF.    Objective   Allergies:   Patient has no known allergies.    Last Vitals:  Temp Pulse Resp BP MAP Pulse Ox   36.7 °C (98.1 °F) 87 18 105/68   99 %     Vitals Min/Max Last 24 Hours:  Temp  Min: 36.1 °C (97 °F)  Max: 36.7 °C (98.1 °F)  Pulse  Min: 82  Max: 90  Resp  Min: 18  Max: 18  BP  Min: 103/69  Max: 105/68    Intake/Output:     Intake/Output Summary (Last 24 hours) at 2023 1423  Last data filed at 2023 1300  Gross per 24 hour   Intake 120 ml   Output --   Net 120 ml       Physical Exam:  General: no acute distress  HEENT: normocephalic, atraumatic  Heart: warm  and well perfused  Lungs: clear to auscultation bilaterally  Abdomen: gravid  Extremities: moving all extremities  Neuro: awake and conversant  Psych: appropriate mood and affect    NST  140/mod/+accel/-decel    Lab Data:  Lab Results   Component Value Date    WBC 8.6 09/28/2023    HGB 11.7 (L) 09/28/2023    HCT 35.3 (L) 09/28/2023     09/28/2023     Lab Results   Component Value Date    GLUCOSE 88 09/29/2023     09/29/2023    K 3.9 09/29/2023     09/29/2023    CO2 22 09/29/2023    ANIONGAP 13 09/29/2023    BUN 5 (L) 09/29/2023    CREATININE 0.33 (L) 09/29/2023    CALCIUM 8.9 09/29/2023    ALBUMIN 3.2 (L) 09/29/2023    PROT 5.5 (L) 09/29/2023    ALKPHOS 31 (L) 09/29/2023    ALT 8 09/29/2023    AST 9 09/29/2023    BILITOT 0.3 09/29/2023

## 2023-09-30 NOTE — DISCHARGE SUMMARY
"Discharge Summary    Admission Date: 2023  Discharge Date: 2023     Discharge Diagnosis  DKA    Hospital Course   23 y.o.  at 25w2d admitted for diabetic ketoacidosis in the setting of type 1 DM. DKA trigger thought to be due to viral gastroenteritis, patient unable to tolerate PO. She was placed on an insulin gtt, given IV fluids, and electrolytes were repleted. Her DKA resolved and she was transitioned back to her insulin pump at her previous settings. On day of discharge, she was euglycemic, tolerating PO intake without nausea or vomiting, and had follow up within one week of discharge. She was discharged home with close return precautions in place.     - Using tandem pump with control IQ:   --> Basal [id number] 0.90   --> Bolus [id number] 1:11.0--:1:10   --> CF: 1:40, Target:110 DOA 5 hrs    Pertinent Physical Exam At Time of Discharge  General: no acute distress  HEENT: normocephalic, atraumatic  Heart: warm and well perfused  Lungs: clear to auscultation bilaterally  Abdomen: gravid  Extremities: moving all extremities  Neuro: awake and conversant  Psych: appropriate mood and affect       Discharge Meds     Your medication list        CONTINUE taking these medications        Instructions Last Dose Given Next Dose Due   Classic Prenatal 28 mg iron-800 mcg folic acid tablet tablet  Generic drug: prenatal vitamin      TAKE 1 TABLET DAILY.       Ketostix strip  Generic drug: acetone (urine) test      DIP URINE TO CHECK FOR KETONES IF NAUSEA/VOMITING OR IF CONCERN FOR DKA OR DEHYDRATION       TechLITE Pen Needle 32 gauge x \" needle  Generic drug: pen needle, diabetic      USE AS DIRECTED WITH INSULIN USE AS DIRECTED TO INJECT INSULIN 4-6 TIMES DAILY              STOP taking these medications      Lantus Solostar U-100 Insulin 100 unit/mL (3 mL) pen  Generic drug: insulin glargine        Prenatal 27 mg iron-800 mcg folic acid tablet  Generic drug: prenatal vitamin (iron-folic)              "     Complications Requiring Follow-Up  T1DM    Test Results Pending At Discharge  Pending Labs       No current pending labs.            Outpatient Follow-Up  Future Appointments   Date Time Provider Department Center   10/13/2023  9:00 AM Hussain Ocasio MD PhD [id number] [facility]   10/20/2023  9:15 AM [facility] [id number] OBGYNIMG ULTRASOUND 2 [id number] [facility] [place]   11/29/2023  2:00 PM Chito Zafar MD PhD [id number] [facility]           Tanika Block MD

## 2023-10-01 PROCEDURE — 83690 ASSAY OF LIPASE: CPT

## 2023-10-01 PROCEDURE — 9990 CHARGE CONVERSION

## 2023-10-01 PROCEDURE — 82010 KETONE BODYS QUAN: CPT

## 2023-10-02 NOTE — SIGNIFICANT EVENT
Follow-up Phone Call Note:   Interview:  Care Type: Women's Health   Phone Number Call  [phone]   Call Outcome: mailbox is full, unable to leave a message      Date/Time Of Call: 10/2/23 at 1731   Call Back Done By: care coordinator   Callback Complete: yes

## 2023-10-04 ENCOUNTER — HOSPITAL ENCOUNTER (OUTPATIENT)
Facility: HOSPITAL | Age: 23
Setting detail: OBSERVATION
LOS: 1 days | Discharge: HOME | End: 2023-10-06
Attending: OBSTETRICS & GYNECOLOGY | Admitting: OBSTETRICS & GYNECOLOGY
Payer: COMMERCIAL

## 2023-10-04 ENCOUNTER — HOSPITAL ENCOUNTER (EMERGENCY)
Facility: HOSPITAL | Age: 23
Discharge: STILL ADMITTED OR ADMINISTRATIVE | End: 2023-10-04
Payer: COMMERCIAL

## 2023-10-04 DIAGNOSIS — O99.013 ANEMIA AFFECTING PREGNANCY IN THIRD TRIMESTER (HHS-HCC): ICD-10-CM

## 2023-10-04 LAB
GLUCOSE: 151
POC APPEARANCE, URINE: CLEAR
POC BILIRUBIN, URINE: NEGATIVE
POC BLOOD, URINE: NEGATIVE
POC COLOR, URINE: YELLOW
POC GLUCOSE, URINE: ABNORMAL MG/DL
POC KETONES, URINE: NEGATIVE MG/DL
POC LEUKOCYTES, URINE: NEGATIVE
POC NITRITE,URINE: NEGATIVE
POC PH, URINE: 7 PH
POC PROTEIN, URINE: ABNORMAL MG/DL
POC SPECIFIC GRAVITY, URINE: 1.02
POC UROBILINOGEN, URINE: 0.2 EU/DL

## 2023-10-04 PROCEDURE — 86901 BLOOD TYPING SEROLOGIC RH(D): CPT | Performed by: STUDENT IN AN ORGANIZED HEALTH CARE EDUCATION/TRAINING PROGRAM

## 2023-10-04 PROCEDURE — 85610 PROTHROMBIN TIME: CPT | Performed by: STUDENT IN AN ORGANIZED HEALTH CARE EDUCATION/TRAINING PROGRAM

## 2023-10-04 PROCEDURE — 81002 URINALYSIS NONAUTO W/O SCOPE: CPT | Performed by: STUDENT IN AN ORGANIZED HEALTH CARE EDUCATION/TRAINING PROGRAM

## 2023-10-04 PROCEDURE — 99283 EMERGENCY DEPT VISIT LOW MDM: CPT

## 2023-10-04 PROCEDURE — 85027 COMPLETE CBC AUTOMATED: CPT | Performed by: STUDENT IN AN ORGANIZED HEALTH CARE EDUCATION/TRAINING PROGRAM

## 2023-10-04 PROCEDURE — 36415 COLL VENOUS BLD VENIPUNCTURE: CPT | Performed by: STUDENT IN AN ORGANIZED HEALTH CARE EDUCATION/TRAINING PROGRAM

## 2023-10-04 PROCEDURE — 2500000001 HC RX 250 WO HCPCS SELF ADMINISTERED DRUGS (ALT 637 FOR MEDICARE OP): Performed by: STUDENT IN AN ORGANIZED HEALTH CARE EDUCATION/TRAINING PROGRAM

## 2023-10-04 PROCEDURE — 85384 FIBRINOGEN ACTIVITY: CPT | Performed by: STUDENT IN AN ORGANIZED HEALTH CARE EDUCATION/TRAINING PROGRAM

## 2023-10-04 RX ORDER — ONDANSETRON 4 MG/1
4 TABLET, FILM COATED ORAL EVERY 6 HOURS PRN
Status: DISCONTINUED | OUTPATIENT
Start: 2023-10-04 | End: 2023-10-05

## 2023-10-04 RX ORDER — ONDANSETRON HYDROCHLORIDE 2 MG/ML
4 INJECTION, SOLUTION INTRAVENOUS EVERY 6 HOURS PRN
Status: DISCONTINUED | OUTPATIENT
Start: 2023-10-04 | End: 2023-10-05

## 2023-10-04 RX ORDER — CYCLOBENZAPRINE HCL 10 MG
10 TABLET ORAL ONCE
Status: COMPLETED | OUTPATIENT
Start: 2023-10-04 | End: 2023-10-04

## 2023-10-04 RX ORDER — LIDOCAINE HYDROCHLORIDE 10 MG/ML
0.5 INJECTION INFILTRATION; PERINEURAL ONCE AS NEEDED
Status: DISCONTINUED | OUTPATIENT
Start: 2023-10-04 | End: 2023-10-05

## 2023-10-04 RX ORDER — ACETAMINOPHEN 325 MG/1
975 TABLET ORAL ONCE
Status: COMPLETED | OUTPATIENT
Start: 2023-10-04 | End: 2023-10-04

## 2023-10-04 RX ADMIN — CYCLOBENZAPRINE 10 MG: 10 TABLET, FILM COATED ORAL at 23:17

## 2023-10-04 RX ADMIN — ACETAMINOPHEN 975 MG: 325 TABLET, FILM COATED ORAL at 23:16

## 2023-10-04 SDOH — HEALTH STABILITY: MENTAL HEALTH: HAVE YOU USED ANY SUBSTANCES (CANABIS, COCAINE, HEROIN, HALLUCINOGENS, INHALANTS, ETC.) IN THE PAST 12 MONTHS?: NO

## 2023-10-04 SDOH — SOCIAL STABILITY: SOCIAL INSECURITY: ARE YOU OR HAVE YOU BEEN THREATENED OR ABUSED PHYSICALLY, EMOTIONALLY, OR SEXUALLY BY ANYONE?: NO

## 2023-10-04 SDOH — SOCIAL STABILITY: SOCIAL INSECURITY: DOES ANYONE TRY TO KEEP YOU FROM HAVING/CONTACTING OTHER FRIENDS OR DOING THINGS OUTSIDE YOUR HOME?: NO

## 2023-10-04 SDOH — SOCIAL STABILITY: SOCIAL INSECURITY: HAVE YOU HAD THOUGHTS OF HARMING ANYONE ELSE?: NO

## 2023-10-04 SDOH — SOCIAL STABILITY: SOCIAL INSECURITY: HAS ANYONE EVER THREATENED TO HURT YOUR FAMILY OR YOUR PETS?: NO

## 2023-10-04 SDOH — HEALTH STABILITY: MENTAL HEALTH: HAVE YOU USED ANY PRESCRIPTION DRUGS OTHER THAN PRESCRIBED IN THE PAST 12 MONTHS?: NO

## 2023-10-04 SDOH — SOCIAL STABILITY: SOCIAL INSECURITY: PHYSICAL ABUSE: DENIES

## 2023-10-04 SDOH — SOCIAL STABILITY: SOCIAL INSECURITY: ARE THERE ANY APPARENT SIGNS OF INJURIES/BEHAVIORS THAT COULD BE RELATED TO ABUSE/NEGLECT?: NO

## 2023-10-04 SDOH — HEALTH STABILITY: MENTAL HEALTH: SUICIDAL BEHAVIOR (LIFETIME): NO

## 2023-10-04 SDOH — HEALTH STABILITY: MENTAL HEALTH: WISH TO BE DEAD (PAST 1 MONTH): NO

## 2023-10-04 SDOH — SOCIAL STABILITY: SOCIAL INSECURITY: VERBAL ABUSE: DENIES

## 2023-10-04 SDOH — SOCIAL STABILITY: SOCIAL INSECURITY: ABUSE SCREEN: ADULT

## 2023-10-04 SDOH — ECONOMIC STABILITY: HOUSING INSECURITY: DO YOU FEEL UNSAFE GOING BACK TO THE PLACE WHERE YOU ARE LIVING?: NO

## 2023-10-04 SDOH — SOCIAL STABILITY: SOCIAL INSECURITY: DO YOU FEEL ANYONE HAS EXPLOITED OR TAKEN ADVANTAGE OF YOU FINANCIALLY OR OF YOUR PERSONAL PROPERTY?: NO

## 2023-10-04 SDOH — HEALTH STABILITY: MENTAL HEALTH: NON-SPECIFIC ACTIVE SUICIDAL THOUGHTS (PAST 1 MONTH): NO

## 2023-10-04 ASSESSMENT — PATIENT HEALTH QUESTIONNAIRE - PHQ9
2. FEELING DOWN, DEPRESSED OR HOPELESS: NOT AT ALL
1. LITTLE INTEREST OR PLEASURE IN DOING THINGS: NOT AT ALL
SUM OF ALL RESPONSES TO PHQ9 QUESTIONS 1 & 2: 0

## 2023-10-04 ASSESSMENT — LIFESTYLE VARIABLES
HOW MANY STANDARD DRINKS CONTAINING ALCOHOL DO YOU HAVE ON A TYPICAL DAY: PATIENT DOES NOT DRINK
AUDIT-C TOTAL SCORE: 0
HOW OFTEN DO YOU HAVE 6 OR MORE DRINKS ON ONE OCCASION: NEVER
HOW OFTEN DO YOU HAVE A DRINK CONTAINING ALCOHOL: NEVER
AUDIT-C TOTAL SCORE: 0
SKIP TO QUESTIONS 9-10: 1

## 2023-10-04 ASSESSMENT — PAIN SCALES - GENERAL
PAINLEVEL_OUTOF10: 5 - MODERATE PAIN
PAINLEVEL: 8

## 2023-10-05 PROBLEM — F32.A DEPRESSION: Status: ACTIVE | Noted: 2023-10-05

## 2023-10-05 PROBLEM — R83.8 OLIGOCLONAL BANDS IN CEREBROSPINAL FLUID: Status: ACTIVE | Noted: 2023-10-05

## 2023-10-05 PROBLEM — Q15.9: Status: ACTIVE | Noted: 2023-10-05

## 2023-10-05 PROBLEM — O35.BXX0 FETAL CARDIAC ANOMALY AFFECTING PREGNANCY, ANTEPARTUM (HHS-HCC): Status: ACTIVE | Noted: 2023-10-05

## 2023-10-05 PROBLEM — O10.919 CHRONIC HYPERTENSION AFFECTING PREGNANCY (HHS-HCC): Status: ACTIVE | Noted: 2023-10-05

## 2023-10-05 PROBLEM — O24.019: Status: ACTIVE | Noted: 2023-10-05

## 2023-10-05 PROBLEM — S39.91XD: Status: ACTIVE | Noted: 2023-10-05

## 2023-10-05 PROBLEM — B00.9 HERPES SIMPLEX: Status: ACTIVE | Noted: 2023-10-05

## 2023-10-05 PROBLEM — B95.1 GROUP BETA STREP POSITIVE: Status: ACTIVE | Noted: 2023-10-05

## 2023-10-05 LAB
ABO GROUP (TYPE) IN BLOOD: NORMAL
ANTIBODY SCREEN: NORMAL
APTT PPP: 28 SECONDS (ref 27–38)
APTT PPP: 28 SECONDS (ref 27–38)
ERYTHROCYTE [DISTWIDTH] IN BLOOD BY AUTOMATED COUNT: 12.4 % (ref 11.5–14.5)
ERYTHROCYTE [DISTWIDTH] IN BLOOD BY AUTOMATED COUNT: 12.6 % (ref 11.5–14.5)
FIBRINOGEN PPP-MCNC: 267 MG/DL (ref 200–400)
FIBRINOGEN PPP-MCNC: 295 MG/DL (ref 200–400)
GLUCOSE, ONE HOUR EXTERNAL: 155 MG/DL
GLUCOSE, ONE HOUR EXTERNAL: 165 MG/DL
HCT VFR BLD AUTO: 30.2 % (ref 36–46)
HCT VFR BLD AUTO: 30.8 % (ref 36–46)
HGB BLD-MCNC: 10 G/DL (ref 12–16)
HGB BLD-MCNC: 9.9 G/DL (ref 12–16)
INR PPP: 1 (ref 0.9–1.1)
INR PPP: 1 (ref 0.9–1.1)
MCH RBC QN AUTO: 30.2 PG (ref 26–34)
MCH RBC QN AUTO: 30.6 PG (ref 26–34)
MCHC RBC AUTO-ENTMCNC: 32.5 G/DL (ref 32–36)
MCHC RBC AUTO-ENTMCNC: 32.8 G/DL (ref 32–36)
MCV RBC AUTO: 92 FL (ref 80–100)
MCV RBC AUTO: 94 FL (ref 80–100)
NRBC BLD-RTO: 0 /100 WBCS (ref 0–0)
NRBC BLD-RTO: 0 /100 WBCS (ref 0–0)
PLATELET # BLD AUTO: 351 X10*3/UL (ref 150–450)
PLATELET # BLD AUTO: 396 X10*3/UL (ref 150–450)
PMV BLD AUTO: 9.6 FL (ref 7.5–11.5)
PMV BLD AUTO: 9.8 FL (ref 7.5–11.5)
PROTHROMBIN TIME: 11 SECONDS (ref 9.8–12.8)
PROTHROMBIN TIME: 11.5 SECONDS (ref 9.8–12.8)
RBC # BLD AUTO: 3.27 X10*6/UL (ref 4–5.2)
RBC # BLD AUTO: 3.28 X10*6/UL (ref 4–5.2)
RH FACTOR (ANTIGEN D): NORMAL
T PALLIDUM AB SER QL: NONREACTIVE
WBC # BLD AUTO: 6.8 X10*3/UL (ref 4.4–11.3)
WBC # BLD AUTO: 7.1 X10*3/UL (ref 4.4–11.3)

## 2023-10-05 PROCEDURE — 2500000001 HC RX 250 WO HCPCS SELF ADMINISTERED DRUGS (ALT 637 FOR MEDICARE OP): Performed by: STUDENT IN AN ORGANIZED HEALTH CARE EDUCATION/TRAINING PROGRAM

## 2023-10-05 PROCEDURE — 85027 COMPLETE CBC AUTOMATED: CPT | Performed by: STUDENT IN AN ORGANIZED HEALTH CARE EDUCATION/TRAINING PROGRAM

## 2023-10-05 PROCEDURE — 85730 THROMBOPLASTIN TIME PARTIAL: CPT | Performed by: STUDENT IN AN ORGANIZED HEALTH CARE EDUCATION/TRAINING PROGRAM

## 2023-10-05 PROCEDURE — 86780 TREPONEMA PALLIDUM: CPT | Performed by: STUDENT IN AN ORGANIZED HEALTH CARE EDUCATION/TRAINING PROGRAM

## 2023-10-05 PROCEDURE — 36415 COLL VENOUS BLD VENIPUNCTURE: CPT | Performed by: STUDENT IN AN ORGANIZED HEALTH CARE EDUCATION/TRAINING PROGRAM

## 2023-10-05 PROCEDURE — 82728 ASSAY OF FERRITIN: CPT | Performed by: STUDENT IN AN ORGANIZED HEALTH CARE EDUCATION/TRAINING PROGRAM

## 2023-10-05 PROCEDURE — 85384 FIBRINOGEN ACTIVITY: CPT | Performed by: STUDENT IN AN ORGANIZED HEALTH CARE EDUCATION/TRAINING PROGRAM

## 2023-10-05 PROCEDURE — G0378 HOSPITAL OBSERVATION PER HR: HCPCS

## 2023-10-05 PROCEDURE — 99222 1ST HOSP IP/OBS MODERATE 55: CPT | Performed by: STUDENT IN AN ORGANIZED HEALTH CARE EDUCATION/TRAINING PROGRAM

## 2023-10-05 PROCEDURE — 1210000001 HC SEMI-PRIVATE ROOM DAILY

## 2023-10-05 RX ORDER — DEXTROSE 50 % IN WATER (D50W) INTRAVENOUS SYRINGE
25
Status: DISCONTINUED | OUTPATIENT
Start: 2023-10-05 | End: 2023-10-06

## 2023-10-05 RX ORDER — LIDOCAINE HYDROCHLORIDE 10 MG/ML
30 INJECTION INFILTRATION; PERINEURAL ONCE AS NEEDED
Status: DISCONTINUED | OUTPATIENT
Start: 2023-10-05 | End: 2023-10-06 | Stop reason: HOSPADM

## 2023-10-05 RX ORDER — OXYTOCIN 10 [USP'U]/ML
10 INJECTION, SOLUTION INTRAMUSCULAR; INTRAVENOUS ONCE AS NEEDED
Status: DISCONTINUED | OUTPATIENT
Start: 2023-10-05 | End: 2023-10-06 | Stop reason: HOSPADM

## 2023-10-05 RX ORDER — CARBOPROST TROMETHAMINE 250 UG/ML
250 INJECTION, SOLUTION INTRAMUSCULAR ONCE AS NEEDED
Status: DISCONTINUED | OUTPATIENT
Start: 2023-10-05 | End: 2023-10-06 | Stop reason: HOSPADM

## 2023-10-05 RX ORDER — ONDANSETRON HYDROCHLORIDE 2 MG/ML
4 INJECTION, SOLUTION INTRAVENOUS EVERY 6 HOURS PRN
Status: DISCONTINUED | OUTPATIENT
Start: 2023-10-05 | End: 2023-10-06 | Stop reason: HOSPADM

## 2023-10-05 RX ORDER — CYCLOBENZAPRINE HCL 10 MG
10 TABLET ORAL 3 TIMES DAILY PRN
Status: DISCONTINUED | OUTPATIENT
Start: 2023-10-05 | End: 2023-10-06 | Stop reason: HOSPADM

## 2023-10-05 RX ORDER — SODIUM CHLORIDE, SODIUM LACTATE, POTASSIUM CHLORIDE, CALCIUM CHLORIDE 600; 310; 30; 20 MG/100ML; MG/100ML; MG/100ML; MG/100ML
125 INJECTION, SOLUTION INTRAVENOUS CONTINUOUS
Status: DISCONTINUED | OUTPATIENT
Start: 2023-10-05 | End: 2023-10-06 | Stop reason: HOSPADM

## 2023-10-05 RX ORDER — LOPERAMIDE HYDROCHLORIDE 2 MG/1
4 CAPSULE ORAL EVERY 2 HOUR PRN
Status: DISCONTINUED | OUTPATIENT
Start: 2023-10-05 | End: 2023-10-06 | Stop reason: HOSPADM

## 2023-10-05 RX ORDER — INSULIN LISPRO 100 [IU]/ML
3 INJECTION, SOLUTION INTRAVENOUS; SUBCUTANEOUS AS NEEDED
Status: DISCONTINUED | OUTPATIENT
Start: 2023-10-05 | End: 2023-10-06 | Stop reason: HOSPADM

## 2023-10-05 RX ORDER — HYDRALAZINE HYDROCHLORIDE 20 MG/ML
5 INJECTION INTRAMUSCULAR; INTRAVENOUS ONCE AS NEEDED
Status: DISCONTINUED | OUTPATIENT
Start: 2023-10-05 | End: 2023-10-06 | Stop reason: HOSPADM

## 2023-10-05 RX ORDER — LABETALOL HYDROCHLORIDE 5 MG/ML
20 INJECTION, SOLUTION INTRAVENOUS ONCE AS NEEDED
Status: DISCONTINUED | OUTPATIENT
Start: 2023-10-05 | End: 2023-10-06 | Stop reason: HOSPADM

## 2023-10-05 RX ORDER — SIMETHICONE 80 MG
80 TABLET,CHEWABLE ORAL 4 TIMES DAILY PRN
Status: DISCONTINUED | OUTPATIENT
Start: 2023-10-05 | End: 2023-10-06 | Stop reason: HOSPADM

## 2023-10-05 RX ORDER — BISACODYL 10 MG/1
10 SUPPOSITORY RECTAL DAILY PRN
Status: DISCONTINUED | OUTPATIENT
Start: 2023-10-05 | End: 2023-10-06 | Stop reason: HOSPADM

## 2023-10-05 RX ORDER — ACETAMINOPHEN 325 MG/1
975 TABLET ORAL EVERY 6 HOURS PRN
Status: DISCONTINUED | OUTPATIENT
Start: 2023-10-05 | End: 2023-10-06 | Stop reason: HOSPADM

## 2023-10-05 RX ORDER — TRANEXAMIC ACID 100 MG/ML
1000 INJECTION, SOLUTION INTRAVENOUS ONCE AS NEEDED
Status: DISCONTINUED | OUTPATIENT
Start: 2023-10-05 | End: 2023-10-06 | Stop reason: HOSPADM

## 2023-10-05 RX ORDER — DEXTROSE 40 %
15 GEL (GRAM) ORAL
Status: DISCONTINUED | OUTPATIENT
Start: 2023-10-05 | End: 2023-10-06 | Stop reason: SDUPTHER

## 2023-10-05 RX ORDER — ONDANSETRON 4 MG/1
4 TABLET, FILM COATED ORAL EVERY 6 HOURS PRN
Status: DISCONTINUED | OUTPATIENT
Start: 2023-10-05 | End: 2023-10-06 | Stop reason: HOSPADM

## 2023-10-05 RX ORDER — METHYLERGONOVINE MALEATE 0.2 MG/ML
0.2 INJECTION INTRAVENOUS ONCE AS NEEDED
Status: DISCONTINUED | OUTPATIENT
Start: 2023-10-05 | End: 2023-10-06 | Stop reason: HOSPADM

## 2023-10-05 RX ORDER — ADHESIVE BANDAGE
10 BANDAGE TOPICAL
Status: DISCONTINUED | OUTPATIENT
Start: 2023-10-05 | End: 2023-10-06 | Stop reason: HOSPADM

## 2023-10-05 RX ORDER — NIFEDIPINE 10 MG/1
10 CAPSULE ORAL ONCE AS NEEDED
Status: DISCONTINUED | OUTPATIENT
Start: 2023-10-05 | End: 2023-10-06 | Stop reason: HOSPADM

## 2023-10-05 RX ORDER — METOCLOPRAMIDE HYDROCHLORIDE 5 MG/ML
10 INJECTION INTRAMUSCULAR; INTRAVENOUS EVERY 6 HOURS PRN
Status: DISCONTINUED | OUTPATIENT
Start: 2023-10-05 | End: 2023-10-06 | Stop reason: HOSPADM

## 2023-10-05 RX ORDER — TERBUTALINE SULFATE 1 MG/ML
0.25 INJECTION SUBCUTANEOUS ONCE AS NEEDED
Status: DISCONTINUED | OUTPATIENT
Start: 2023-10-05 | End: 2023-10-06 | Stop reason: HOSPADM

## 2023-10-05 RX ORDER — POLYETHYLENE GLYCOL 3350 17 G/17G
17 POWDER, FOR SOLUTION ORAL 2 TIMES DAILY PRN
Status: DISCONTINUED | OUTPATIENT
Start: 2023-10-05 | End: 2023-10-06 | Stop reason: HOSPADM

## 2023-10-05 RX ORDER — OXYTOCIN/0.9 % SODIUM CHLORIDE 30/500 ML
60 PLASTIC BAG, INJECTION (ML) INTRAVENOUS
Status: DISCONTINUED | OUTPATIENT
Start: 2023-10-05 | End: 2023-10-06 | Stop reason: HOSPADM

## 2023-10-05 RX ORDER — METOCLOPRAMIDE 10 MG/1
10 TABLET ORAL EVERY 6 HOURS PRN
Status: DISCONTINUED | OUTPATIENT
Start: 2023-10-05 | End: 2023-10-06 | Stop reason: HOSPADM

## 2023-10-05 RX ORDER — DEXTROSE 40 %
30 GEL (GRAM) ORAL
Status: DISCONTINUED | OUTPATIENT
Start: 2023-10-05 | End: 2023-10-06 | Stop reason: SDUPTHER

## 2023-10-05 RX ORDER — MISOPROSTOL 200 UG/1
800 TABLET ORAL ONCE AS NEEDED
Status: DISCONTINUED | OUTPATIENT
Start: 2023-10-05 | End: 2023-10-06 | Stop reason: HOSPADM

## 2023-10-05 RX ADMIN — PRENATAL VIT W/ FE FUMARATE-FA TAB 27-0.8 MG 1 TABLET: 27-0.8 TAB at 15:30

## 2023-10-05 RX ADMIN — ACETAMINOPHEN 975 MG: 325 TABLET, FILM COATED ORAL at 22:17

## 2023-10-05 RX ADMIN — CYCLOBENZAPRINE 10 MG: 10 TABLET, FILM COATED ORAL at 22:18

## 2023-10-05 ASSESSMENT — PAIN SCALES - GENERAL
PAINLEVEL_OUTOF10: 1
PAINLEVEL_OUTOF10: 0 - NO PAIN
PAINLEVEL_OUTOF10: 0 - NO PAIN
PAINLEVEL_OUTOF10: 3
PAINLEVEL_OUTOF10: 0 - NO PAIN
PAINLEVEL_OUTOF10: 4
PAINLEVEL_OUTOF10: 0 - NO PAIN
PAINLEVEL_OUTOF10: 1
PAINLEVEL_OUTOF10: 0 - NO PAIN
PAINLEVEL_OUTOF10: 1
PAINLEVEL_OUTOF10: 0 - NO PAIN
PAINLEVEL_OUTOF10: 3
PAINLEVEL_OUTOF10: 0 - NO PAIN

## 2023-10-05 ASSESSMENT — PAIN - FUNCTIONAL ASSESSMENT: PAIN_FUNCTIONAL_ASSESSMENT: 0-10

## 2023-10-05 NOTE — NURSING NOTE
0838- Dr. Parra At bedside performing US    0854- Maternal repositioning in bed. ,CFM picking up maternal heart beat RN at bedside assessing FHR.   0906- Rn at bedside adjusting CFM  0930- Blood glucose meter read 109 Dr. Bashir notified  1122- Pt removed from CFM per Dr. Thakur. Plan for BPP and transfer to [facility]4 overnight
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
## 2023-10-05 NOTE — SIGNIFICANT EVENT
Patient reported she had a bp cuff at home during last admission on 9/29.  Patient meets criteria for home monitoring of blood pressure post discharge.  Met with patient to assess for availability of home BP monitor.  Patient stated she owns home BP monitor. Patient educated on importance of continuing to monitor BP at home, recording BP on home monitoring log and s/sx of when to call her provider.  Pt verbalized understanding the above information.

## 2023-10-05 NOTE — CARE PLAN
Problem: Antepartum  Goal: FHR remains reassuring  Outcome: Progressing  Goal: Minimize anxiety/maximize coping  Outcome: Progressing     Problem: Pain - Adult  Goal: Verbalizes/displays adequate comfort level or baseline comfort level  Outcome: Progressing     Problem: Chronic Conditions and Co-morbidities  Goal: Patient's chronic conditions and co-morbidity symptoms are monitored and maintained or improved  Outcome: Progressing     Problem: Fall/Injury  Goal: Not fall by end of shift  Outcome: Progressing  Goal: Be free from injury by end of the shift  Outcome: Progressing  Goal: Pace activities to prevent fatigue by end of the shift  Outcome: Progressing   The patient's goals for the shift include      The patient was stable this shift. Her vital signs remained stable. She is managing her Diabetes with her dexicomp and insulin pump.

## 2023-10-05 NOTE — NURSING NOTE
Dr. Block at bedside. Blood sugar assessed to compare with patient CGM. Blood sugar result via finger stick 151. Blood sugar result via . Per Dr. Mckayla monae to use blood sugar results from patient CGM.
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
## 2023-10-06 ENCOUNTER — APPOINTMENT (OUTPATIENT)
Dept: RADIOLOGY | Facility: HOSPITAL | Age: 23
End: 2023-10-06
Payer: COMMERCIAL

## 2023-10-06 VITALS
RESPIRATION RATE: 16 BRPM | SYSTOLIC BLOOD PRESSURE: 109 MMHG | HEIGHT: 60 IN | HEART RATE: 88 BPM | WEIGHT: 126.76 LBS | DIASTOLIC BLOOD PRESSURE: 70 MMHG | OXYGEN SATURATION: 99 % | TEMPERATURE: 97.9 F | BODY MASS INDEX: 24.89 KG/M2

## 2023-10-06 LAB
APTT PPP: 29 SECONDS (ref 27–38)
ERYTHROCYTE [DISTWIDTH] IN BLOOD BY AUTOMATED COUNT: 12.3 % (ref 11.5–14.5)
FIBRINOGEN PPP-MCNC: 284 MG/DL (ref 200–400)
HCT VFR BLD AUTO: 32.2 % (ref 36–46)
HGB BLD-MCNC: 10.5 G/DL (ref 12–16)
INR PPP: 1 (ref 0.9–1.1)
MCH RBC QN AUTO: 30.8 PG (ref 26–34)
MCHC RBC AUTO-ENTMCNC: 32.6 G/DL (ref 32–36)
MCV RBC AUTO: 94 FL (ref 80–100)
NRBC BLD-RTO: 0 /100 WBCS (ref 0–0)
PLATELET # BLD AUTO: 385 X10*3/UL (ref 150–450)
PMV BLD AUTO: 9.6 FL (ref 7.5–11.5)
PROTHROMBIN TIME: 11.4 SECONDS (ref 9.8–12.8)
RBC # BLD AUTO: 3.41 X10*6/UL (ref 4–5.2)
WBC # BLD AUTO: 6.3 X10*3/UL (ref 4.4–11.3)

## 2023-10-06 PROCEDURE — 76818 FETAL BIOPHYS PROFILE W/NST: CPT

## 2023-10-06 PROCEDURE — 85730 THROMBOPLASTIN TIME PARTIAL: CPT | Performed by: STUDENT IN AN ORGANIZED HEALTH CARE EDUCATION/TRAINING PROGRAM

## 2023-10-06 PROCEDURE — 2500000002 HC RX 250 W HCPCS SELF ADMINISTERED DRUGS (ALT 637 FOR MEDICARE OP, ALT 636 FOR OP/ED): Performed by: STUDENT IN AN ORGANIZED HEALTH CARE EDUCATION/TRAINING PROGRAM

## 2023-10-06 PROCEDURE — G0378 HOSPITAL OBSERVATION PER HR: HCPCS

## 2023-10-06 PROCEDURE — 76818 FETAL BIOPHYS PROFILE W/NST: CPT | Performed by: OBSTETRICS & GYNECOLOGY

## 2023-10-06 PROCEDURE — 99253 IP/OBS CNSLTJ NEW/EST LOW 45: CPT | Performed by: STUDENT IN AN ORGANIZED HEALTH CARE EDUCATION/TRAINING PROGRAM

## 2023-10-06 PROCEDURE — 96372 THER/PROPH/DIAG INJ SC/IM: CPT | Performed by: STUDENT IN AN ORGANIZED HEALTH CARE EDUCATION/TRAINING PROGRAM

## 2023-10-06 PROCEDURE — 2500000001 HC RX 250 WO HCPCS SELF ADMINISTERED DRUGS (ALT 637 FOR MEDICARE OP): Performed by: STUDENT IN AN ORGANIZED HEALTH CARE EDUCATION/TRAINING PROGRAM

## 2023-10-06 PROCEDURE — 36415 COLL VENOUS BLD VENIPUNCTURE: CPT | Performed by: STUDENT IN AN ORGANIZED HEALTH CARE EDUCATION/TRAINING PROGRAM

## 2023-10-06 PROCEDURE — 85384 FIBRINOGEN ACTIVITY: CPT | Performed by: STUDENT IN AN ORGANIZED HEALTH CARE EDUCATION/TRAINING PROGRAM

## 2023-10-06 PROCEDURE — 85027 COMPLETE CBC AUTOMATED: CPT | Performed by: STUDENT IN AN ORGANIZED HEALTH CARE EDUCATION/TRAINING PROGRAM

## 2023-10-06 RX ORDER — DEXTROSE MONOHYDRATE 100 MG/ML
0.3 INJECTION, SOLUTION INTRAVENOUS ONCE AS NEEDED
Status: DISCONTINUED | OUTPATIENT
Start: 2023-10-06 | End: 2023-10-06 | Stop reason: HOSPADM

## 2023-10-06 RX ORDER — DEXTROSE 50 % IN WATER (D50W) INTRAVENOUS SYRINGE
25
Status: DISCONTINUED | OUTPATIENT
Start: 2023-10-06 | End: 2023-10-06 | Stop reason: HOSPADM

## 2023-10-06 RX ORDER — DEXTROSE 40 %
30 GEL (GRAM) ORAL
Status: DISCONTINUED | OUTPATIENT
Start: 2023-10-06 | End: 2023-10-06 | Stop reason: HOSPADM

## 2023-10-06 RX ORDER — INSULIN LISPRO 100 [IU]/ML
0-10 INJECTION, SOLUTION INTRAVENOUS; SUBCUTANEOUS
Status: DISCONTINUED | OUTPATIENT
Start: 2023-10-06 | End: 2023-10-06 | Stop reason: HOSPADM

## 2023-10-06 RX ORDER — DEXTROSE 40 %
15 GEL (GRAM) ORAL
Status: DISCONTINUED | OUTPATIENT
Start: 2023-10-06 | End: 2023-10-06 | Stop reason: HOSPADM

## 2023-10-06 RX ADMIN — INSULIN LISPRO 4 UNITS: 100 INJECTION, SOLUTION INTRAVENOUS; SUBCUTANEOUS at 14:15

## 2023-10-06 RX ADMIN — INSULIN HUMAN 6 UNITS: 100 INJECTION, SUSPENSION SUBCUTANEOUS at 13:03

## 2023-10-06 RX ADMIN — PRENATAL VIT W/ FE FUMARATE-FA TAB 27-0.8 MG 1 TABLET: 27-0.8 TAB at 09:00

## 2023-10-06 ASSESSMENT — PAIN - FUNCTIONAL ASSESSMENT
PAIN_FUNCTIONAL_ASSESSMENT: 0-10
PAIN_FUNCTIONAL_ASSESSMENT: 0-10

## 2023-10-06 ASSESSMENT — PAIN SCALES - GENERAL
PAINLEVEL_OUTOF10: 0 - NO PAIN
PAINLEVEL_OUTOF10: 0 - NO PAIN
PAINLEVEL_OUTOF10: 1
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
## 2023-10-08 PROBLEM — J45.909 ASTHMA AFFECTING PREGNANCY IN THIRD TRIMESTER (HHS-HCC): Status: ACTIVE | Noted: 2023-10-08

## 2023-10-08 PROBLEM — O99.513 ASTHMA AFFECTING PREGNANCY IN THIRD TRIMESTER (HHS-HCC): Status: ACTIVE | Noted: 2023-10-08

## 2023-10-08 PROBLEM — Z3A.27 27 WEEKS GESTATION OF PREGNANCY (HHS-HCC): Status: ACTIVE | Noted: 2023-10-08

## 2023-10-09 PROBLEM — N76.0 BACTERIAL VAGINOSIS: Status: ACTIVE | Noted: 2023-10-09

## 2023-10-09 PROBLEM — R45.851 SUICIDAL IDEATION: Status: ACTIVE | Noted: 2022-12-12

## 2023-10-09 PROBLEM — R52 BURNING PAIN: Status: ACTIVE | Noted: 2023-10-09

## 2023-10-09 PROBLEM — F17.200 NICOTINE USE DISORDER: Status: ACTIVE | Noted: 2022-09-02

## 2023-10-09 PROBLEM — E55.9 VITAMIN D DEFICIENCY: Status: ACTIVE | Noted: 2023-10-09

## 2023-10-09 PROBLEM — R62.52 SHORT STATURE: Status: ACTIVE | Noted: 2023-10-09

## 2023-10-09 PROBLEM — O21.9 VOMITING OF PREGNANCY (HHS-HCC): Status: ACTIVE | Noted: 2023-09-14

## 2023-10-09 PROBLEM — K59.00 CONSTIPATION DURING PREGNANCY IN SECOND TRIMESTER (HHS-HCC): Status: ACTIVE | Noted: 2023-10-09

## 2023-10-09 PROBLEM — N91.2 AMENORRHEA: Status: ACTIVE | Noted: 2023-10-09

## 2023-10-09 PROBLEM — B96.89 BACTERIAL VAGINOSIS: Status: ACTIVE | Noted: 2023-10-09

## 2023-10-09 PROBLEM — O35.9XX0 SUSPECTED FETAL ANOMALY, ANTEPARTUM (HHS-HCC): Status: ACTIVE | Noted: 2023-10-09

## 2023-10-09 PROBLEM — A59.9 TRICHOMONAS INFECTION: Status: ACTIVE | Noted: 2023-10-09

## 2023-10-09 PROBLEM — Z96.41 INSULIN PUMP IN PLACE: Status: ACTIVE | Noted: 2023-09-14

## 2023-10-09 PROBLEM — O26.92: Status: ACTIVE | Noted: 2023-10-09

## 2023-10-09 PROBLEM — N94.6 DYSMENORRHEA: Status: ACTIVE | Noted: 2023-10-09

## 2023-10-09 PROBLEM — N91.5 OLIGOMENORRHEA: Status: ACTIVE | Noted: 2023-10-09

## 2023-10-09 PROBLEM — O99.612 CONSTIPATION DURING PREGNANCY IN SECOND TRIMESTER (HHS-HCC): Status: ACTIVE | Noted: 2023-10-09

## 2023-10-09 PROBLEM — H47.10 OPTIC DISC EDEMA: Status: ACTIVE | Noted: 2023-10-09

## 2023-10-09 PROBLEM — H55.09 DOWNBEAT NYSTAGMUS: Status: ACTIVE | Noted: 2023-10-09

## 2023-10-09 PROBLEM — E44.1 MALNUTRITION OF MILD DEGREE (MULTI): Status: ACTIVE | Noted: 2022-12-12

## 2023-10-09 PROBLEM — O99.810: Status: ACTIVE | Noted: 2023-09-14

## 2023-10-09 RX ORDER — ALBUTEROL SULFATE 90 UG/1
1-2 AEROSOL, METERED RESPIRATORY (INHALATION) EVERY 4 HOURS PRN
COMMUNITY
Start: 2014-09-03

## 2023-10-09 RX ORDER — ACYCLOVIR 400 MG/1
1 TABLET ORAL DAILY
COMMUNITY
Start: 2020-07-02 | End: 2023-11-25 | Stop reason: HOSPADM

## 2023-10-09 RX ORDER — DEXTROMETHORPHAN HYDROBROMIDE, GUAIFENESIN 5; 100 MG/5ML; MG/5ML
650 LIQUID ORAL 3 TIMES DAILY
COMMUNITY
Start: 2023-09-26 | End: 2023-10-16 | Stop reason: HOSPADM

## 2023-10-09 RX ORDER — FLUCONAZOLE 150 MG/1
150 TABLET ORAL ONCE
COMMUNITY
Start: 2022-05-26 | End: 2023-10-16 | Stop reason: HOSPADM

## 2023-10-09 RX ORDER — POTASSIUM CHLORIDE 750 MG/1
10 TABLET, EXTENDED RELEASE ORAL 2 TIMES DAILY
COMMUNITY
Start: 2023-07-24 | End: 2023-10-16 | Stop reason: HOSPADM

## 2023-10-09 RX ORDER — DESOGESTREL AND ETHINYL ESTRADIOL 0.15-0.03
1 KIT ORAL DAILY
COMMUNITY
Start: 2020-10-08 | End: 2023-10-16 | Stop reason: HOSPADM

## 2023-10-09 RX ORDER — IBUPROFEN 800 MG/1
800 TABLET ORAL 3 TIMES DAILY PRN
COMMUNITY
Start: 2022-11-17 | End: 2023-10-16 | Stop reason: HOSPADM

## 2023-10-09 RX ORDER — INSULIN LISPRO 100 [IU]/ML
150 INJECTION, SOLUTION INTRAVENOUS; SUBCUTANEOUS
COMMUNITY
End: 2023-12-06 | Stop reason: HOSPADM

## 2023-10-09 RX ORDER — IBUPROFEN 200 MG
TABLET ORAL
COMMUNITY
Start: 2015-04-16

## 2023-10-09 RX ORDER — ONDANSETRON 4 MG/1
TABLET, ORALLY DISINTEGRATING ORAL
COMMUNITY
Start: 2022-12-19 | End: 2023-10-16 | Stop reason: HOSPADM

## 2023-10-09 RX ORDER — INSULIN LISPRO 100 [IU]/ML
10 INJECTION, SOLUTION INTRAVENOUS; SUBCUTANEOUS
COMMUNITY
Start: 2022-12-12 | End: 2023-10-16 | Stop reason: HOSPADM

## 2023-10-09 RX ORDER — INSULIN GLARGINE 100 [IU]/ML
18 INJECTION, SOLUTION SUBCUTANEOUS NIGHTLY
COMMUNITY
Start: 2020-12-14 | End: 2023-10-16 | Stop reason: HOSPADM

## 2023-10-09 RX ORDER — CHLORHEXIDINE GLUCONATE ORAL RINSE 1.2 MG/ML
15 SOLUTION DENTAL 2 TIMES DAILY
COMMUNITY
Start: 2023-01-16 | End: 2023-10-16 | Stop reason: HOSPADM

## 2023-10-09 RX ORDER — METOCLOPRAMIDE 10 MG/1
10 TABLET ORAL EVERY 6 HOURS PRN
Status: ON HOLD | COMMUNITY
Start: 2023-07-27 | End: 2023-11-04

## 2023-10-09 RX ORDER — INSULIN GLARGINE 100 [IU]/ML
25 INJECTION, SOLUTION SUBCUTANEOUS DAILY
COMMUNITY
Start: 2020-01-27 | End: 2023-10-16 | Stop reason: HOSPADM

## 2023-10-09 RX ORDER — POLYETHYLENE GLYCOL 3350 17 G/17G
17 POWDER, FOR SOLUTION ORAL 2 TIMES DAILY
Status: ON HOLD | COMMUNITY
Start: 2023-09-14 | End: 2023-11-04

## 2023-10-09 RX ORDER — INSULIN GLARGINE 100 [IU]/ML
15 INJECTION, SOLUTION SUBCUTANEOUS EVERY MORNING
COMMUNITY
Start: 2022-10-19 | End: 2023-10-16 | Stop reason: HOSPADM

## 2023-10-09 RX ORDER — BLOOD-GLUCOSE METER
EACH MISCELLANEOUS
Status: ON HOLD | COMMUNITY
End: 2023-12-06 | Stop reason: SDUPTHER

## 2023-10-09 RX ORDER — UBIQUINOL 100 MG
CAPSULE ORAL
COMMUNITY
Start: 2023-05-19

## 2023-10-09 RX ORDER — CAPSAICIN 0 G/G
CREAM TOPICAL 3 TIMES DAILY
Status: ON HOLD | COMMUNITY
Start: 2023-09-27 | End: 2023-11-04

## 2023-10-09 RX ORDER — TRAMADOL HYDROCHLORIDE 50 MG/1
50 TABLET ORAL EVERY 8 HOURS
COMMUNITY
Start: 2023-01-12 | End: 2023-10-16 | Stop reason: HOSPADM

## 2023-10-09 RX ORDER — SERTRALINE HYDROCHLORIDE 50 MG/1
50 TABLET, FILM COATED ORAL
Status: ON HOLD | COMMUNITY
Start: 2022-12-15 | End: 2023-11-04

## 2023-10-09 RX ORDER — DOCUSATE SODIUM 100 MG/1
100 CAPSULE, LIQUID FILLED ORAL 2 TIMES DAILY
Status: ON HOLD | COMMUNITY
Start: 2023-09-19 | End: 2023-11-04

## 2023-10-09 RX ORDER — ALBUTEROL SULFATE 0.83 MG/ML
2.5 SOLUTION RESPIRATORY (INHALATION) EVERY 6 HOURS PRN
COMMUNITY
Start: 2016-12-15

## 2023-10-09 RX ORDER — PEAK FLOW METER
EACH MISCELLANEOUS
COMMUNITY
Start: 2023-05-16

## 2023-10-09 RX ORDER — ACETAMINOPHEN 325 MG/1
1-2 TABLET ORAL EVERY 4 HOURS PRN
Status: ON HOLD | COMMUNITY
Start: 2023-07-07 | End: 2023-10-14 | Stop reason: WASHOUT

## 2023-10-09 RX ORDER — BISACODYL 10 MG/1
SUPPOSITORY RECTAL ONCE
Status: ON HOLD | COMMUNITY
End: 2023-11-04

## 2023-10-09 RX ORDER — NAPROXEN SODIUM 220 MG/1
81 TABLET, FILM COATED ORAL DAILY
COMMUNITY
Start: 2023-07-27 | End: 2023-12-06 | Stop reason: HOSPADM

## 2023-10-09 RX ORDER — INSULIN LISPRO 100 [IU]/ML
5 INJECTION, SOLUTION INTRAVENOUS; SUBCUTANEOUS
COMMUNITY
Start: 2020-01-27 | End: 2023-10-16 | Stop reason: HOSPADM

## 2023-10-09 NOTE — SIGNIFICANT EVENT
Follow-up Phone Call Note:   Interview:  Care Type: Women's Health   Phone Number Call  [phone]   Call Outcome: left a message      Date/Time Of Call: 10/09/2023 at 1305   Call Back Done By: care coordinator   Callback Complete: yes

## 2023-10-10 ENCOUNTER — PHARMACY VISIT (OUTPATIENT)
Dept: PHARMACY | Facility: CLINIC | Age: 23
End: 2023-10-10
Payer: MEDICAID

## 2023-10-10 ENCOUNTER — OFFICE VISIT (OUTPATIENT)
Dept: MATERNAL FETAL MEDICINE | Facility: CLINIC | Age: 23
End: 2023-10-10
Payer: COMMERCIAL

## 2023-10-10 VITALS
HEART RATE: 90 BPM | BODY MASS INDEX: 25 KG/M2 | SYSTOLIC BLOOD PRESSURE: 115 MMHG | DIASTOLIC BLOOD PRESSURE: 71 MMHG | WEIGHT: 128 LBS

## 2023-10-10 DIAGNOSIS — Z3A.27 27 WEEKS GESTATION OF PREGNANCY (HHS-HCC): ICD-10-CM

## 2023-10-10 DIAGNOSIS — B95.1 GROUP BETA STREP POSITIVE: ICD-10-CM

## 2023-10-10 DIAGNOSIS — M54.31 BILATERAL SCIATICA: ICD-10-CM

## 2023-10-10 DIAGNOSIS — O99.013 ANEMIA AFFECTING PREGNANCY IN THIRD TRIMESTER (HHS-HCC): ICD-10-CM

## 2023-10-10 DIAGNOSIS — O24.019: Primary | ICD-10-CM

## 2023-10-10 DIAGNOSIS — M54.32 BILATERAL SCIATICA: ICD-10-CM

## 2023-10-10 DIAGNOSIS — B00.9 HERPES SIMPLEX: ICD-10-CM

## 2023-10-10 DIAGNOSIS — O99.513 ASTHMA AFFECTING PREGNANCY IN THIRD TRIMESTER (HHS-HCC): ICD-10-CM

## 2023-10-10 DIAGNOSIS — R83.8 OLIGOCLONAL BANDS IN CEREBROSPINAL FLUID: ICD-10-CM

## 2023-10-10 DIAGNOSIS — J45.909 ASTHMA AFFECTING PREGNANCY IN THIRD TRIMESTER (HHS-HCC): ICD-10-CM

## 2023-10-10 DIAGNOSIS — O10.919 CHRONIC HYPERTENSION AFFECTING PREGNANCY (HHS-HCC): ICD-10-CM

## 2023-10-10 PROBLEM — O99.612 CONSTIPATION DURING PREGNANCY IN SECOND TRIMESTER (HHS-HCC): Status: RESOLVED | Noted: 2023-10-09 | Resolved: 2023-10-10

## 2023-10-10 PROBLEM — O35.9XX0 SUSPECTED FETAL ANOMALY, ANTEPARTUM (HHS-HCC): Status: RESOLVED | Noted: 2023-10-09 | Resolved: 2023-10-10

## 2023-10-10 PROBLEM — O21.9 VOMITING OF PREGNANCY (HHS-HCC): Status: RESOLVED | Noted: 2023-09-14 | Resolved: 2023-10-10

## 2023-10-10 PROBLEM — O99.810: Status: RESOLVED | Noted: 2023-09-14 | Resolved: 2023-10-10

## 2023-10-10 PROBLEM — O26.92: Status: RESOLVED | Noted: 2023-10-09 | Resolved: 2023-10-10

## 2023-10-10 PROBLEM — K59.00 CONSTIPATION DURING PREGNANCY IN SECOND TRIMESTER (HHS-HCC): Status: RESOLVED | Noted: 2023-10-09 | Resolved: 2023-10-10

## 2023-10-10 LAB — FERRITIN SERPL-MCNC: 13 NG/ML (ref 8–150)

## 2023-10-10 PROCEDURE — 99215 OFFICE O/P EST HI 40 MIN: CPT | Performed by: OBSTETRICS & GYNECOLOGY

## 2023-10-10 PROCEDURE — 99215 OFFICE O/P EST HI 40 MIN: CPT | Mod: GC | Performed by: OBSTETRICS & GYNECOLOGY

## 2023-10-10 PROCEDURE — 3074F SYST BP LT 130 MM HG: CPT | Performed by: OBSTETRICS & GYNECOLOGY

## 2023-10-10 PROCEDURE — 3078F DIAST BP <80 MM HG: CPT | Performed by: OBSTETRICS & GYNECOLOGY

## 2023-10-10 PROCEDURE — 90471 IMMUNIZATION ADMIN: CPT | Performed by: OBSTETRICS & GYNECOLOGY

## 2023-10-10 PROCEDURE — 1036F TOBACCO NON-USER: CPT | Performed by: OBSTETRICS & GYNECOLOGY

## 2023-10-10 PROCEDURE — 3051F HG A1C>EQUAL 7.0%<8.0%: CPT | Performed by: OBSTETRICS & GYNECOLOGY

## 2023-10-10 RX ORDER — FERROUS SULFATE 325(65) MG
65 TABLET ORAL
Qty: 30 TABLET | Refills: 3 | Status: ON HOLD | OUTPATIENT
Start: 2023-10-10 | End: 2023-12-06 | Stop reason: SDUPTHER

## 2023-10-10 ASSESSMENT — PAIN SCALES - GENERAL: PAINLEVEL: 0-NO PAIN

## 2023-10-10 NOTE — PROGRESS NOTES
MFM Follow-up  10/11/2023         SUBJECTIVE    HPI: Maria Isabel Cutler is a 23 y.o.  at 27w3d here for RPNV. Denies contractions, bleeding, or LOF. Reports normal fetal movement. She states that her CGM was  and due to moving addressess has had a lapse in CGM which is on its way.    Pregnancy Problems (from 24 to present)      OBJECTIVE    Visit Vitals  /71   Pulse 90   Wt 58.1 kg (128 lb) Comment: HR 90   LMP 2023   BMI 25.00 kg/m²   OB Status Pregnant   Smoking Status Unknown   BSA 1.57 m²        FHT: 149    ASSESSMENT & PLAN    Maria Isabel Cutler is a 23 y.o.  at 27w3d here for the following concerns we addressed today:    27 weeks gestation of pregnancy  Tdap today, 2nd trimester labs already collected inpatient. Ferritin added on due to hg 9.9 to assess for JESUS and resulted as 13. Will rx iron. 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 She states she received laser eye surgery with improvement in eye sight. For neurology visit 10/13 to discuss oligoclonal bands in CSF.     Type 1 diabetes mellitus complicating pregnancy, antepartum  CGM reviewed ():  vHigh 2%  High 34%  TIR 62%  Low 1%  Vlow <1%    Next growth 10/20.     New Regimen:   Basal  [id number] 0.90  [id number] 1.00  [id number] 0.90    Bolus  [id number] 1:9    CF: 1:40  Target:110  DOA 5 hrs    Bilateral sciatica  PT referral       Orders Placed This Encounter   Procedures    Tdap vaccine, age 7 years and older  (BOOSTRIX)    Ferritin     Standing Status:   Future     Number of Occurrences:   1     Standing Expiration Date:   10/10/2024     Order Specific Question:   Release result to [facility]      Answer:   Immediate [1]    Referral to Physical Therapy     Standing Status:   Future     Standing Expiration Date:   4/10/2024     Referral Priority:   Routine     Referral Type:   Consultation     Referral Reason:   Specialty Services Required     Requested Specialty:   Physical Therapy     Number of Visits Requested:   1        RTC in 2  weeks      Stuart Munoz MD

## 2023-10-10 NOTE — ASSESSMENT & PLAN NOTE
CGM reviewed ():  vHigh 2%  High 34%  TIR 62%  Low 1%  Vlow <1%    Next growth 10/20.     New Regimen:   Basal  [id number] 0.90  [id number] 1.00  [id number] 0.90    Bolus  [id number] 1:9    CF: 1:40  Target:110  DOA 5 hrs

## 2023-10-10 NOTE — ASSESSMENT & PLAN NOTE
Tdap today, 2nd trimester labs already collected inpatient. Ferritin added on due to hg 9.9 to assess for JESUS and resulted as 13. Will rx iron. Next growth US 10/20

## 2023-10-10 NOTE — ASSESSMENT & PLAN NOTE
Reviewed history with patient. She states she received laser eye surgery with improvement in eye sight. For neurology visit 10/13 to discuss oligoclonal bands in CSF.

## 2023-10-11 PROCEDURE — 9990 CHARGE CONVERSION

## 2023-10-12 NOTE — CONSULTS
Service:   Service: Maternal - Fetal Medicine     Consult:  Consult requested by (Attending Name): Faisal   Reason: DKA     History of Present Illness:   Admission Reason: DKA   HPI:    Patient is a 22 yo  at 25w1d admitted for DKA in the setting of T1DM.     Patient presented overnight with complaints into right eye one day duration abdominal pain. States she had vomiting and poor appetite and PO intake over that same timeframe. She had a recent sllight adjustment in her pump settings, but feels her pump  is functioning well. Her blood sugar was in the 200s for the majority o the day prior to presentation. She feels she likely has a GI etiology as the precipitating factor for this episode of DKA, has had similar experiences in the past.     This morning, patient states pain is improved, she is not nauseous. She is tired after not sleeping well overnight and she reports she is hungry and would like to try breakfast. Denies vaginal bleeding, leaking of fluid, contractions, or decreased fetal  movement.    Pregnancy notable for:  - T1DM diagnosed at age 5, multiple admissions for DKA, last hgbA1c 7.0 % (), previously 12.8% (23), admitted to [facility] at 6wga and 10w GA for poorly controlled BGs, current regimen - Tandem pump in place Control IQ   -  cHTN, on no meds, baseline labs wnl, P:C   - Intermittent asthma, never hospitalized or intubated, albuterol prn   - HSV for third trimester ppx   - GBS bacteriuria, s/p amoxicillin, neg THU   - Anxiety/depression, no meds     OBHx: G1  GynHx: HSV as above, h/o CT and trich, denies abnormal paps  PMHx: as above  SurgHx: none  Meds: insulin as above, PNV, bASA  All: none  Social: denies t/e/i   FHx: reviewed and non-contributory to presenting complaint      Review Family/Social History and ROS:   Social History:    Smoking Status: never smoker  (1)     Constitutional: NEGATIVE: Fever, Chills     Eyes: NEGATIVE: Blurry Vision     ENMT: NEGATIVE: Throat Pain      Respiratory: NEGATIVE: Dry Cough     Cardiac: NEGATIVE: Chest Pain     Gastrointestinal: NEGATIVE: Nausea, Vomiting, Abdominal  Pain     Genitourinary: NEGATIVE: Discharge, Dysuria     Musculoskeletal: NEGATIVE: Decreased ROM     Neurological: NEGATIVE: Headache     Psychiatric: NEGATIVE: Mood Changes              Allergies:  ·  No Known Allergies :     Objective:   Physical Exam by System:    Constitutional: No acute distress, sleeping soundly  prior to interaction, sleeping intermittently thoughout interaction, easily arousable and alert and oriented when awake   ENMT: mucous membranes moist, no apparent injury,  no lesions seen   Head/Neck: No visibly enlarged thyroid   Respiratory/Thorax: Non labored breathing on room  air   Cardiovascular: Warm and well perfused   Gastrointestinal: Non distended, non tender   Musculoskeletal: ROM intact   Neurological: Alert and oriented   Psychological: Normal affect   Skin: Warm and dry, no lesions, no rashes     Medications:    Medications:          Continuous Medications       --------------------------------    1. Dextrose 10% in Water Infusion:  500  mL  IntraVenous  <Continuous>    2. Insulin Regular 100 units/ NaCL 0.9% 100 mL Premix Infusion..:  4  units/hr  IntraVenous  <Continuous>    3. Lactated Ringers Infusion:  1000  mL  IntraVenous  <Continuous>    4. Sodium Chloride 0.9% Infusion:  1000  mL  IntraVenous  <Continuous>         Scheduled Medications       --------------------------------    1. Prenatal with Folic Acid:  1  tablet(s)  Oral  Daily         PRN Medications       --------------------------------    1. Albuterol 90 micrograms/ Inhalation MDI:  1  inhalation  Inhalation  Every 4 Hours    2. Dextrose 50% in Water Injectable:  25  gram(s)  IntraVenous Push  Every 20 Minutes    3. Glucagon Injectable:  1  mg  IntraMuscular  Every 20 Minutes    4. Glucose 40% Oral Gel:  15  gram(s)  Oral  Every 15 Minutes    5. Glucose 40% Oral Gel:  30  gram(s)  Oral   Every 15 Minutes    6. hydrALAZINE (APRESOLINE) Injectable:  5  mg  IntraVenous Push  Once    7. Labetalol Injectable:  20  mg  IntraVenous Push  Once    8. Lidocaine 1% Injectable:  0.5  mL  SubCutaneous  Once    9. Magnesium Hydroxide -Al Hydrox -Simethicone Oral Liquid:  30  mL  Oral  Every 6 Hours    10. Magnesium Hydroxide Oral Liquid:  10  mL  Oral  Every 12 Hours    11. Metoclopramide Injectable:  10  mg  IntraVenous Push  Every 6 Hours    12. NIFEdipine (PROCARDIA) Immediate Release:  10  mg  Oral  Once    13. Ondansetron Injectable:  4  mg  IntraVenous Push  Every 6 Hours    14. Polyethylene Glycol:  17  gram(s)  Oral  2 Times a Day    15. Psyllium Packet:  1  packet(s)  Oral  Daily    16. Sodium Chloride 0.9% Injectable Flush:  10  mL  IntraVenous Flush  Every 12 Hours and as Needed         Conditional Medication Orders       --------------------------------    1. Dextrose 10% in Water Infusion:  500  mL  IntraVenous  <Continuous>      Recent Lab Results:    Results:        I have reviewed these laboratory results:    Glucose_POCT  29-Sep-2023 09:36:00      Result Value    Glucose-POCT  108   H     Venous Full Panel  29-Sep-2023 06:52:00      Result Value    pH, Venous  7.41    pCO2, Venous  34   L   pO2, Venous  108   H   SO2, Venous  99   H   Bicarbonate, Calculated, Venous  21.6   L   HGB, Venous  10.2   L   Anion Gap Level, Venous  10    Base Excess, Venous  -2.6   L   Calcium, Ionized Venous  1.15    Chloride Level  106    Glucose Level, Venous  99    HCT CALCULATED, Venous  31.0   L   Lactate Level, Venous  0.8    OXY HGB, Venous  97.0   H   Patient Temperature, Venous  37.0    Potassium Level, Venous  3.3   L   Sodium Level, Venous  134   L     Basic Metabolic Panel  29-Sep-2023 06:39:00      Result Value    Glucose, Serum  100   H   NA  136    K  3.6    CL  107    Bicarbonate, Serum  20   L   Anion Gap, Serum  13    BUN  4   L   CREAT  0.37   L   GFR Female  >90    Calcium, Serum  8.5   L     Beta  Hydroxybutyrate, Serum  29-Sep-2023 06:39:00      Result Value    Beta Hydroxybutyrate, Serum  0.19          Assessment:    Patient is a 22 yo  at 25w1d admitted for diabetic ketoacidosis in the setting of type 1 DM.     DKA  - Venous pH on admission 7.32, repeat this morning 7.41  - BHB 1.35 on admission, normalized this morning 0.19  - Anion gap 13 this morning  - +4 ketones on urine on admit, for repeat  - Currently on insulin drip per protocol, currently euglycemic on LR and D10  - CGM concordant with POCT on admission, recent slight titration in outpatient regimen  - Nausea and intermittent vomiting with little PO intake prior to admission, etiology of DKA likely infectious  - S/p adequate IVF overnight, continue current mIVF  Plan  - Given serum labs have normalized, plan to trial PO this morning  - Keep drip on for trial of breakfast, okay to bolus for meal through drip per patient's baseline settings as above  - If meal tolerated, okay to restart pump at previous settings, plan for overlap with drip of an hour prior to d/c drip    Abdominal pain  - Resolved, denies contractions/cramping    cHTN  - Not on meds  - Normotensive, asymptomatic  - HELLP labs neg     FWB  - Tracing currently reactive, AGA  - Continue EFM while on drip    Maternal wellbeing  - Intermittent asthma, never hospitalized or intubated, albuterol prn   - HSV for third trimester ppx   - GBS bacteriuria, s/p amoxicillin, neg THU   - Anxiety/depression, no meds     IUP  - GBS pos UTI  - Growth US completed this AM, results pending    Dispo: To remain on L&D for trial of PO, if tolerated okay to restart pump and overlap with drip 1 hour prior to drip being d/c    Patient seen and to be discussed with Dr. Fany Jon MD  M Fellow    Consult Status:  Consult Status    (select all that apply): initial  consult complete, will follow     Attestation:   Note Completion:  I am a:  Resident/Fellow   Attending Attestation I  "saw and evaluated the patient.  I personally obtained the key and critical portions of the history and physical exam or was physically present for key and  critical portions performed by the resident/fellow. I reviewed the resident/fellow?s documentation and discussed the patient with the resident/fellow.  I agree with the resident/fellow?s medical decision making as documented in the note.     I personally evaluated the patient on 29-Sep-2023   Comments/ Additional Findings    G1 at 25w1d with T1DM, admitted with DKA apparently triggered by gastrointestinal illness - no localizing signs concerning for other infectious process.  Since admission she has improved with fluid resuscitation and insulin gtt; VBG, anion gap and blood glucose have normalized. She is feeling well enough to trial PO food this morning. If she tolerates PO then will transition off insulin gtt back to her  previous pump settings. If she does not tolerate PO then can continue insulin gtt until her GI symptoms improve. May de-escalate fetal monitoring to daily NST. Continue close monitoring of BG (AC/HS) to determine whether pump settings are appropriate.           Electronic Signatures:  [place] Soares)  (Signed 29-Sep-2023 13:10)   Authored: Note Completion   Co-Signer: History of Present Illness, Review Family/Social History and ROS, Objective, Assessment/Recommendations, Note Completion  Magalie Jon (Fellow))  (Signed 29-Sep-2023 10:56)   Authored: Service, History of Present Illness, Review  Family/Social History and ROS, Allergies, Objective, Assessment/Recommendations, Note Completion      Last Updated: 29-Sep-2023 13:10 by [place] Soares)    References:  1.  Data Referenced From \"History and Physical - OB\" 29-Sep-2023 01:39   "

## 2023-10-13 ENCOUNTER — APPOINTMENT (OUTPATIENT)
Dept: NEUROLOGY | Facility: HOSPITAL | Age: 23
End: 2023-10-13
Payer: COMMERCIAL

## 2023-10-14 ENCOUNTER — HOSPITAL ENCOUNTER (OUTPATIENT)
Facility: HOSPITAL | Age: 23
Setting detail: OBSERVATION
LOS: 1 days | Discharge: HOME | DRG: 831 | End: 2023-10-16
Attending: STUDENT IN AN ORGANIZED HEALTH CARE EDUCATION/TRAINING PROGRAM | Admitting: STUDENT IN AN ORGANIZED HEALTH CARE EDUCATION/TRAINING PROGRAM
Payer: COMMERCIAL

## 2023-10-14 DIAGNOSIS — R83.8 OLIGOCLONAL BANDS IN CEREBROSPINAL FLUID: Primary | ICD-10-CM

## 2023-10-14 PROBLEM — E13.10: Status: ACTIVE | Noted: 2023-10-14

## 2023-10-14 PROBLEM — E11.10 DIABETIC KETOSIS: Status: ACTIVE | Noted: 2023-10-14

## 2023-10-14 LAB
ALBUMIN SERPL BCP-MCNC: 3.3 G/DL (ref 3.4–5)
ALBUMIN SERPL BCP-MCNC: 3.8 G/DL (ref 3.4–5)
ALP SERPL-CCNC: 45 U/L (ref 33–110)
ALP SERPL-CCNC: 48 U/L (ref 33–110)
ALT SERPL W P-5'-P-CCNC: 11 U/L (ref 7–45)
ALT SERPL W P-5'-P-CCNC: 14 U/L (ref 7–45)
ANION GAP BLDV CALCULATED.4IONS-SCNC: 12 MMOL/L (ref 10–25)
ANION GAP BLDV CALCULATED.4IONS-SCNC: 13 MMOL/L (ref 10–25)
ANION GAP SERPL CALC-SCNC: 14 MMOL/L (ref 10–20)
ANION GAP SERPL CALC-SCNC: 18 MMOL/L (ref 10–20)
AST SERPL W P-5'-P-CCNC: 11 U/L (ref 9–39)
AST SERPL W P-5'-P-CCNC: 14 U/L (ref 9–39)
B-OH-BUTYR SERPL-SCNC: 0.37 MMOL/L (ref 0.02–0.27)
B-OH-BUTYR SERPL-SCNC: 2.73 MMOL/L (ref 0.02–0.27)
BASE EXCESS BLDV CALC-SCNC: -1.6 MMOL/L (ref -2–3)
BASE EXCESS BLDV CALC-SCNC: -4.4 MMOL/L (ref -2–3)
BASE EXCESS BLDV CALC-SCNC: -6.8 MMOL/L (ref -2–3)
BASOPHILS # BLD AUTO: 0.04 X10*3/UL (ref 0–0.1)
BASOPHILS NFR BLD AUTO: 0.6 %
BILIRUB SERPL-MCNC: 0.4 MG/DL (ref 0–1.2)
BILIRUB SERPL-MCNC: 0.5 MG/DL (ref 0–1.2)
BODY TEMPERATURE: 37 DEGREES CELSIUS
BUN SERPL-MCNC: 6 MG/DL (ref 6–23)
BUN SERPL-MCNC: 6 MG/DL (ref 6–23)
CA-I BLDV-SCNC: 1 MMOL/L (ref 1.1–1.33)
CA-I BLDV-SCNC: 1.13 MMOL/L (ref 1.1–1.33)
CALCIUM SERPL-MCNC: 8.9 MG/DL (ref 8.6–10.6)
CALCIUM SERPL-MCNC: 9.1 MG/DL (ref 8.6–10.6)
CHLORIDE BLDV-SCNC: 105 MMOL/L (ref 98–107)
CHLORIDE BLDV-SCNC: 105 MMOL/L (ref 98–107)
CHLORIDE SERPL-SCNC: 101 MMOL/L (ref 98–107)
CHLORIDE SERPL-SCNC: 103 MMOL/L (ref 98–107)
CO2 SERPL-SCNC: 20 MMOL/L (ref 21–32)
CO2 SERPL-SCNC: 21 MMOL/L (ref 21–32)
CREAT SERPL-MCNC: 0.35 MG/DL (ref 0.5–1.05)
CREAT SERPL-MCNC: 0.44 MG/DL (ref 0.5–1.05)
EOSINOPHIL # BLD AUTO: 0.2 X10*3/UL (ref 0–0.7)
EOSINOPHIL NFR BLD AUTO: 3 %
ERYTHROCYTE [DISTWIDTH] IN BLOOD BY AUTOMATED COUNT: 12.3 % (ref 11.5–14.5)
ERYTHROCYTE [DISTWIDTH] IN BLOOD BY AUTOMATED COUNT: 12.4 % (ref 11.5–14.5)
ERYTHROCYTE [DISTWIDTH] IN BLOOD BY AUTOMATED COUNT: 12.4 % (ref 11.5–14.5)
GFR SERPL CREATININE-BSD FRML MDRD: >90 ML/MIN/1.73M*2
GFR SERPL CREATININE-BSD FRML MDRD: >90 ML/MIN/1.73M*2
GLUCOSE BLD MANUAL STRIP-MCNC: 115 MG/DL (ref 74–99)
GLUCOSE BLD MANUAL STRIP-MCNC: 146 MG/DL (ref 74–99)
GLUCOSE BLD MANUAL STRIP-MCNC: 178 MG/DL (ref 74–99)
GLUCOSE BLD MANUAL STRIP-MCNC: 187 MG/DL (ref 74–99)
GLUCOSE BLDV-MCNC: 163 MG/DL (ref 74–99)
GLUCOSE BLDV-MCNC: 225 MG/DL (ref 74–99)
GLUCOSE SERPL-MCNC: 173 MG/DL (ref 74–99)
GLUCOSE SERPL-MCNC: 208 MG/DL (ref 74–99)
HCO3 BLDV-SCNC: 17.5 MMOL/L (ref 22–26)
HCO3 BLDV-SCNC: 20.1 MMOL/L (ref 22–26)
HCO3 BLDV-SCNC: 21.7 MMOL/L (ref 22–26)
HCT VFR BLD AUTO: 27.8 % (ref 36–46)
HCT VFR BLD AUTO: 31.2 % (ref 36–46)
HCT VFR BLD AUTO: 36.8 % (ref 36–46)
HCT VFR BLD EST: 32 % (ref 36–46)
HCT VFR BLD EST: 38 % (ref 36–46)
HGB BLD-MCNC: 10.2 G/DL (ref 12–16)
HGB BLD-MCNC: 11.9 G/DL (ref 12–16)
HGB BLD-MCNC: 9.3 G/DL (ref 12–16)
HGB BLDV-MCNC: 10.7 G/DL (ref 12–16)
HGB BLDV-MCNC: 12.8 G/DL (ref 12–16)
IMM GRANULOCYTES # BLD AUTO: 0.03 X10*3/UL (ref 0–0.7)
IMM GRANULOCYTES NFR BLD AUTO: 0.4 % (ref 0–0.9)
INHALED O2 CONCENTRATION: 21 %
LACTATE BLDV-SCNC: 1.5 MMOL/L (ref 0.4–2)
LACTATE BLDV-SCNC: 1.8 MMOL/L (ref 0.4–2)
LYMPHOCYTES # BLD AUTO: 2.22 X10*3/UL (ref 1.2–4.8)
LYMPHOCYTES NFR BLD AUTO: 32.8 %
MCH RBC QN AUTO: 29.7 PG (ref 26–34)
MCH RBC QN AUTO: 30.1 PG (ref 26–34)
MCH RBC QN AUTO: 30.3 PG (ref 26–34)
MCHC RBC AUTO-ENTMCNC: 32.3 G/DL (ref 32–36)
MCHC RBC AUTO-ENTMCNC: 32.7 G/DL (ref 32–36)
MCHC RBC AUTO-ENTMCNC: 33.5 G/DL (ref 32–36)
MCV RBC AUTO: 91 FL (ref 80–100)
MCV RBC AUTO: 91 FL (ref 80–100)
MCV RBC AUTO: 93 FL (ref 80–100)
MONOCYTES # BLD AUTO: 0.52 X10*3/UL (ref 0.1–1)
MONOCYTES NFR BLD AUTO: 7.7 %
NEUTROPHILS # BLD AUTO: 3.76 X10*3/UL (ref 1.2–7.7)
NEUTROPHILS NFR BLD AUTO: 55.5 %
NRBC BLD-RTO: 0 /100 WBCS (ref 0–0)
OSMOLALITY SERPL: 276 MOSM/KG (ref 280–300)
OXYHGB MFR BLDV: 85.8 % (ref 45–75)
OXYHGB MFR BLDV: 90.9 % (ref 45–75)
OXYHGB MFR BLDV: 97.1 % (ref 45–75)
PCO2 BLDV: 31 MM HG (ref 41–51)
PCO2 BLDV: 32 MM HG (ref 41–51)
PCO2 BLDV: 34 MM HG (ref 41–51)
PH BLDV: 7.36 PH (ref 7.33–7.43)
PH BLDV: 7.38 PH (ref 7.33–7.43)
PH BLDV: 7.44 PH (ref 7.33–7.43)
PLATELET # BLD AUTO: 345 X10*3/UL (ref 150–450)
PLATELET # BLD AUTO: 380 X10*3/UL (ref 150–450)
PLATELET # BLD AUTO: 412 X10*3/UL (ref 150–450)
PMV BLD AUTO: 10.2 FL (ref 7.5–11.5)
PMV BLD AUTO: 10.2 FL (ref 7.5–11.5)
PMV BLD AUTO: 9.7 FL (ref 7.5–11.5)
PO2 BLDV: 178 MM HG (ref 35–45)
PO2 BLDV: 62 MM HG (ref 35–45)
PO2 BLDV: 65 MM HG (ref 35–45)
POTASSIUM BLDV-SCNC: 3.2 MMOL/L (ref 3.5–5.3)
POTASSIUM BLDV-SCNC: 3.9 MMOL/L (ref 3.5–5.3)
POTASSIUM SERPL-SCNC: 3.6 MMOL/L (ref 3.5–5.3)
POTASSIUM SERPL-SCNC: 4 MMOL/L (ref 3.5–5.3)
PROT SERPL-MCNC: 5.8 G/DL (ref 6.4–8.2)
PROT SERPL-MCNC: 6.7 G/DL (ref 6.4–8.2)
RBC # BLD AUTO: 3.07 X10*6/UL (ref 4–5.2)
RBC # BLD AUTO: 3.43 X10*6/UL (ref 4–5.2)
RBC # BLD AUTO: 3.96 X10*6/UL (ref 4–5.2)
SAO2 % BLDV: 100 % (ref 45–75)
SAO2 % BLDV: 91 % (ref 45–75)
SAO2 % BLDV: 93 % (ref 45–75)
SODIUM BLDV-SCNC: 131 MMOL/L (ref 136–145)
SODIUM BLDV-SCNC: 135 MMOL/L (ref 136–145)
SODIUM SERPL-SCNC: 134 MMOL/L (ref 136–145)
SODIUM SERPL-SCNC: 135 MMOL/L (ref 136–145)
TEST COMMENT: ABNORMAL
WBC # BLD AUTO: 6.8 X10*3/UL (ref 4.4–11.3)
WBC # BLD AUTO: 7.7 X10*3/UL (ref 4.4–11.3)
WBC # BLD AUTO: 8.9 X10*3/UL (ref 4.4–11.3)

## 2023-10-14 PROCEDURE — 2500000004 HC RX 250 GENERAL PHARMACY W/ HCPCS (ALT 636 FOR OP/ED): Performed by: STUDENT IN AN ORGANIZED HEALTH CARE EDUCATION/TRAINING PROGRAM

## 2023-10-14 PROCEDURE — 99222 1ST HOSP IP/OBS MODERATE 55: CPT

## 2023-10-14 PROCEDURE — 82947 ASSAY GLUCOSE BLOOD QUANT: CPT | Mod: 59,91

## 2023-10-14 PROCEDURE — 85027 COMPLETE CBC AUTOMATED: CPT | Mod: CMCLAB | Performed by: STUDENT IN AN ORGANIZED HEALTH CARE EDUCATION/TRAINING PROGRAM

## 2023-10-14 PROCEDURE — 82010 KETONE BODYS QUAN: CPT | Performed by: STUDENT IN AN ORGANIZED HEALTH CARE EDUCATION/TRAINING PROGRAM

## 2023-10-14 PROCEDURE — 36415 COLL VENOUS BLD VENIPUNCTURE: CPT | Mod: CMCLAB

## 2023-10-14 PROCEDURE — 82947 ASSAY GLUCOSE BLOOD QUANT: CPT | Mod: 59

## 2023-10-14 PROCEDURE — 82330 ASSAY OF CALCIUM: CPT | Performed by: STUDENT IN AN ORGANIZED HEALTH CARE EDUCATION/TRAINING PROGRAM

## 2023-10-14 PROCEDURE — 2500000001 HC RX 250 WO HCPCS SELF ADMINISTERED DRUGS (ALT 637 FOR MEDICARE OP): Performed by: STUDENT IN AN ORGANIZED HEALTH CARE EDUCATION/TRAINING PROGRAM

## 2023-10-14 PROCEDURE — 2580000001 HC RX 258 IV SOLUTIONS

## 2023-10-14 PROCEDURE — 86850 RBC ANTIBODY SCREEN: CPT

## 2023-10-14 PROCEDURE — 84132 ASSAY OF SERUM POTASSIUM: CPT

## 2023-10-14 PROCEDURE — 82010 KETONE BODYS QUAN: CPT

## 2023-10-14 PROCEDURE — 2500000004 HC RX 250 GENERAL PHARMACY W/ HCPCS (ALT 636 FOR OP/ED)

## 2023-10-14 PROCEDURE — 82435 ASSAY OF BLOOD CHLORIDE: CPT | Mod: CMCLAB

## 2023-10-14 PROCEDURE — 85027 COMPLETE CBC AUTOMATED: CPT

## 2023-10-14 PROCEDURE — 82805 BLOOD GASES W/O2 SATURATION: CPT | Mod: CMCLAB

## 2023-10-14 PROCEDURE — G0378 HOSPITAL OBSERVATION PER HR: HCPCS

## 2023-10-14 PROCEDURE — 82947 ASSAY GLUCOSE BLOOD QUANT: CPT

## 2023-10-14 PROCEDURE — 82947 ASSAY GLUCOSE BLOOD QUANT: CPT | Mod: 59,91 | Performed by: STUDENT IN AN ORGANIZED HEALTH CARE EDUCATION/TRAINING PROGRAM

## 2023-10-14 PROCEDURE — 59025 FETAL NON-STRESS TEST: CPT

## 2023-10-14 PROCEDURE — 80053 COMPREHEN METABOLIC PANEL: CPT

## 2023-10-14 PROCEDURE — 93010 ELECTROCARDIOGRAM REPORT: CPT | Performed by: INTERNAL MEDICINE

## 2023-10-14 PROCEDURE — 1120000001 HC OB PRIVATE ROOM DAILY

## 2023-10-14 PROCEDURE — 83930 ASSAY OF BLOOD OSMOLALITY: CPT

## 2023-10-14 PROCEDURE — 85025 COMPLETE CBC W/AUTO DIFF WBC: CPT

## 2023-10-14 PROCEDURE — 82805 BLOOD GASES W/O2 SATURATION: CPT | Mod: 59,CMCLAB | Performed by: STUDENT IN AN ORGANIZED HEALTH CARE EDUCATION/TRAINING PROGRAM

## 2023-10-14 PROCEDURE — 2500000001 HC RX 250 WO HCPCS SELF ADMINISTERED DRUGS (ALT 637 FOR MEDICARE OP)

## 2023-10-14 PROCEDURE — 82805 BLOOD GASES W/O2 SATURATION: CPT | Mod: 59,CMCLAB

## 2023-10-14 PROCEDURE — 82947 ASSAY GLUCOSE BLOOD QUANT: CPT | Mod: CMCLAB

## 2023-10-14 PROCEDURE — 80053 COMPREHEN METABOLIC PANEL: CPT | Mod: 59 | Performed by: STUDENT IN AN ORGANIZED HEALTH CARE EDUCATION/TRAINING PROGRAM

## 2023-10-14 PROCEDURE — 86901 BLOOD TYPING SEROLOGIC RH(D): CPT

## 2023-10-14 PROCEDURE — 99233 SBSQ HOSP IP/OBS HIGH 50: CPT

## 2023-10-14 RX ORDER — SODIUM CHLORIDE 9 MG/ML
25 INJECTION, SOLUTION INTRAVENOUS CONTINUOUS PRN
Status: DISCONTINUED | OUTPATIENT
Start: 2023-10-14 | End: 2023-10-16 | Stop reason: HOSPADM

## 2023-10-14 RX ORDER — ADHESIVE BANDAGE
10 BANDAGE TOPICAL
Status: DISCONTINUED | OUTPATIENT
Start: 2023-10-14 | End: 2023-10-16 | Stop reason: HOSPADM

## 2023-10-14 RX ORDER — FERROUS SULFATE 325(65) MG
65 TABLET ORAL
Status: DISCONTINUED | OUTPATIENT
Start: 2023-10-15 | End: 2023-10-16 | Stop reason: HOSPADM

## 2023-10-14 RX ORDER — ACETAMINOPHEN 325 MG/1
975 TABLET ORAL ONCE
Status: COMPLETED | OUTPATIENT
Start: 2023-10-14 | End: 2023-10-14

## 2023-10-14 RX ORDER — NAPROXEN SODIUM 220 MG/1
81 TABLET, FILM COATED ORAL DAILY
Status: DISCONTINUED | OUTPATIENT
Start: 2023-10-14 | End: 2023-10-16 | Stop reason: HOSPADM

## 2023-10-14 RX ORDER — DEXTROSE 40 %
15 GEL (GRAM) ORAL
Status: DISCONTINUED | OUTPATIENT
Start: 2023-10-14 | End: 2023-10-14

## 2023-10-14 RX ORDER — DEXTROSE 40 %
30 GEL (GRAM) ORAL
Status: DISCONTINUED | OUTPATIENT
Start: 2023-10-14 | End: 2023-10-14

## 2023-10-14 RX ORDER — LABETALOL HYDROCHLORIDE 5 MG/ML
20 INJECTION, SOLUTION INTRAVENOUS ONCE AS NEEDED
Status: DISCONTINUED | OUTPATIENT
Start: 2023-10-14 | End: 2023-10-16 | Stop reason: HOSPADM

## 2023-10-14 RX ORDER — DEXTROSE MONOHYDRATE 100 MG/ML
100 INJECTION, SOLUTION INTRAVENOUS AS NEEDED
Status: DISCONTINUED | OUTPATIENT
Start: 2023-10-14 | End: 2023-10-16 | Stop reason: HOSPADM

## 2023-10-14 RX ORDER — DEXTROSE 50 % IN WATER (D50W) INTRAVENOUS SYRINGE
25
Status: DISCONTINUED | OUTPATIENT
Start: 2023-10-14 | End: 2023-10-16 | Stop reason: HOSPADM

## 2023-10-14 RX ORDER — METOCLOPRAMIDE 10 MG/1
10 TABLET ORAL EVERY 6 HOURS PRN
Status: DISCONTINUED | OUTPATIENT
Start: 2023-10-14 | End: 2023-10-16 | Stop reason: HOSPADM

## 2023-10-14 RX ORDER — HYDRALAZINE HYDROCHLORIDE 20 MG/ML
5 INJECTION INTRAMUSCULAR; INTRAVENOUS ONCE AS NEEDED
Status: DISCONTINUED | OUTPATIENT
Start: 2023-10-14 | End: 2023-10-16 | Stop reason: HOSPADM

## 2023-10-14 RX ORDER — SODIUM CHLORIDE, SODIUM LACTATE, POTASSIUM CHLORIDE, CALCIUM CHLORIDE 600; 310; 30; 20 MG/100ML; MG/100ML; MG/100ML; MG/100ML
125 INJECTION, SOLUTION INTRAVENOUS CONTINUOUS
Status: DISCONTINUED | OUTPATIENT
Start: 2023-10-14 | End: 2023-10-14

## 2023-10-14 RX ORDER — DEXTROSE MONOHYDRATE 100 MG/ML
100 INJECTION, SOLUTION INTRAVENOUS AS NEEDED
Status: DISCONTINUED | OUTPATIENT
Start: 2023-10-14 | End: 2023-10-14

## 2023-10-14 RX ORDER — DEXTROSE 40 %
15 GEL (GRAM) ORAL
Status: DISCONTINUED | OUTPATIENT
Start: 2023-10-14 | End: 2023-10-16 | Stop reason: HOSPADM

## 2023-10-14 RX ORDER — POTASSIUM CHLORIDE 1.5 G/1.58G
20 POWDER, FOR SOLUTION ORAL ONCE
Status: COMPLETED | OUTPATIENT
Start: 2023-10-14 | End: 2023-10-14

## 2023-10-14 RX ORDER — METOCLOPRAMIDE HYDROCHLORIDE 5 MG/ML
10 INJECTION INTRAMUSCULAR; INTRAVENOUS EVERY 6 HOURS PRN
Status: DISCONTINUED | OUTPATIENT
Start: 2023-10-14 | End: 2023-10-16 | Stop reason: HOSPADM

## 2023-10-14 RX ORDER — SODIUM CHLORIDE 9 MG/ML
25 INJECTION, SOLUTION INTRAVENOUS CONTINUOUS PRN
Status: DISCONTINUED | OUTPATIENT
Start: 2023-10-14 | End: 2023-10-14

## 2023-10-14 RX ORDER — DEXTROSE 40 %
30 GEL (GRAM) ORAL
Status: DISCONTINUED | OUTPATIENT
Start: 2023-10-14 | End: 2023-10-16 | Stop reason: HOSPADM

## 2023-10-14 RX ORDER — LIDOCAINE HYDROCHLORIDE 10 MG/ML
0.5 INJECTION INFILTRATION; PERINEURAL ONCE AS NEEDED
Status: DISCONTINUED | OUTPATIENT
Start: 2023-10-14 | End: 2023-10-16 | Stop reason: HOSPADM

## 2023-10-14 RX ORDER — ONDANSETRON HYDROCHLORIDE 2 MG/ML
4 INJECTION, SOLUTION INTRAVENOUS EVERY 6 HOURS PRN
Status: DISCONTINUED | OUTPATIENT
Start: 2023-10-14 | End: 2023-10-16 | Stop reason: HOSPADM

## 2023-10-14 RX ORDER — SIMETHICONE 80 MG
80 TABLET,CHEWABLE ORAL 4 TIMES DAILY PRN
Status: DISCONTINUED | OUTPATIENT
Start: 2023-10-14 | End: 2023-10-16 | Stop reason: HOSPADM

## 2023-10-14 RX ORDER — DEXTROSE MONOHYDRATE 100 MG/ML
50 INJECTION, SOLUTION INTRAVENOUS CONTINUOUS PRN
Status: DISCONTINUED | OUTPATIENT
Start: 2023-10-14 | End: 2023-10-14

## 2023-10-14 RX ORDER — POLYETHYLENE GLYCOL 3350 17 G/17G
17 POWDER, FOR SOLUTION ORAL 2 TIMES DAILY PRN
Status: DISCONTINUED | OUTPATIENT
Start: 2023-10-14 | End: 2023-10-16 | Stop reason: HOSPADM

## 2023-10-14 RX ORDER — NIFEDIPINE 10 MG/1
10 CAPSULE ORAL ONCE AS NEEDED
Status: DISCONTINUED | OUTPATIENT
Start: 2023-10-14 | End: 2023-10-16 | Stop reason: HOSPADM

## 2023-10-14 RX ORDER — BISACODYL 10 MG/1
10 SUPPOSITORY RECTAL DAILY PRN
Status: DISCONTINUED | OUTPATIENT
Start: 2023-10-14 | End: 2023-10-16 | Stop reason: HOSPADM

## 2023-10-14 RX ORDER — DEXTROSE 50 % IN WATER (D50W) INTRAVENOUS SYRINGE
25
Status: DISCONTINUED | OUTPATIENT
Start: 2023-10-14 | End: 2023-10-14

## 2023-10-14 RX ORDER — DEXTROSE, SODIUM CHLORIDE, SODIUM LACTATE, POTASSIUM CHLORIDE, AND CALCIUM CHLORIDE 5; .6; .31; .03; .02 G/100ML; G/100ML; G/100ML; G/100ML; G/100ML
250 INJECTION, SOLUTION INTRAVENOUS CONTINUOUS
Status: DISCONTINUED | OUTPATIENT
Start: 2023-10-14 | End: 2023-10-16 | Stop reason: HOSPADM

## 2023-10-14 RX ORDER — CYCLOBENZAPRINE HCL 10 MG
10 TABLET ORAL ONCE
Status: COMPLETED | OUTPATIENT
Start: 2023-10-14 | End: 2023-10-14

## 2023-10-14 RX ORDER — DEXTROSE MONOHYDRATE 100 MG/ML
50 INJECTION, SOLUTION INTRAVENOUS CONTINUOUS PRN
Status: DISCONTINUED | OUTPATIENT
Start: 2023-10-14 | End: 2023-10-16 | Stop reason: HOSPADM

## 2023-10-14 RX ORDER — ONDANSETRON 4 MG/1
4 TABLET, FILM COATED ORAL EVERY 6 HOURS PRN
Status: DISCONTINUED | OUTPATIENT
Start: 2023-10-14 | End: 2023-10-16 | Stop reason: HOSPADM

## 2023-10-14 RX ORDER — ONDANSETRON HYDROCHLORIDE 2 MG/ML
4 INJECTION, SOLUTION INTRAVENOUS EVERY 4 HOURS PRN
Status: DISCONTINUED | OUTPATIENT
Start: 2023-10-14 | End: 2023-10-14 | Stop reason: SDUPTHER

## 2023-10-14 RX ADMIN — DEXTROSE MONOHYDRATE 50 ML/HR: 100 INJECTION, SOLUTION INTRAVENOUS at 22:50

## 2023-10-14 RX ADMIN — DEXTROSE MONOHYDRATE 50 ML/HR: 100 INJECTION, SOLUTION INTRAVENOUS at 17:21

## 2023-10-14 RX ADMIN — SODIUM CHLORIDE, POTASSIUM CHLORIDE, SODIUM LACTATE AND CALCIUM CHLORIDE 125 ML/HR: 600; 310; 30; 20 INJECTION, SOLUTION INTRAVENOUS at 13:23

## 2023-10-14 RX ADMIN — SODIUM CHLORIDE, SODIUM LACTATE, POTASSIUM CHLORIDE, CALCIUM CHLORIDE AND DEXTROSE MONOHYDRATE 250 ML/HR: 5; 600; 310; 30; 20 INJECTION, SOLUTION INTRAVENOUS at 23:01

## 2023-10-14 RX ADMIN — ACETAMINOPHEN 975 MG: 325 TABLET ORAL at 16:37

## 2023-10-14 RX ADMIN — POTASSIUM CHLORIDE 20 MEQ: 1.5 POWDER, FOR SOLUTION ORAL at 23:45

## 2023-10-14 RX ADMIN — CYCLOBENZAPRINE 10 MG: 10 TABLET, FILM COATED ORAL at 16:37

## 2023-10-14 RX ADMIN — SODIUM CHLORIDE, SODIUM LACTATE, POTASSIUM CHLORIDE, CALCIUM CHLORIDE AND DEXTROSE MONOHYDRATE 250 ML/HR: 5; 600; 310; 30; 20 INJECTION, SOLUTION INTRAVENOUS at 17:06

## 2023-10-14 SDOH — SOCIAL STABILITY: SOCIAL INSECURITY: HAVE YOU HAD THOUGHTS OF HARMING ANYONE ELSE?: NO

## 2023-10-14 SDOH — HEALTH STABILITY: MENTAL HEALTH: HAVE YOU USED ANY PRESCRIPTION DRUGS OTHER THAN PRESCRIBED IN THE PAST 12 MONTHS?: NO

## 2023-10-14 SDOH — HEALTH STABILITY: MENTAL HEALTH: HAVE YOU USED ANY SUBSTANCES (CANABIS, COCAINE, HEROIN, HALLUCINOGENS, INHALANTS, ETC.) IN THE PAST 12 MONTHS?: NO

## 2023-10-14 SDOH — SOCIAL STABILITY: SOCIAL INSECURITY: ABUSE SCREEN: ADULT

## 2023-10-14 SDOH — HEALTH STABILITY: MENTAL HEALTH: WISH TO BE DEAD (PAST 1 MONTH): NO

## 2023-10-14 SDOH — ECONOMIC STABILITY: HOUSING INSECURITY: DO YOU FEEL UNSAFE GOING BACK TO THE PLACE WHERE YOU ARE LIVING?: NO

## 2023-10-14 SDOH — SOCIAL STABILITY: SOCIAL INSECURITY: HAS ANYONE EVER THREATENED TO HURT YOUR FAMILY OR YOUR PETS?: NO

## 2023-10-14 SDOH — SOCIAL STABILITY: SOCIAL INSECURITY: ARE THERE ANY APPARENT SIGNS OF INJURIES/BEHAVIORS THAT COULD BE RELATED TO ABUSE/NEGLECT?: NO

## 2023-10-14 SDOH — HEALTH STABILITY: MENTAL HEALTH: ACTIVE SUICIDAL IDEATION WITH SPECIFIC PLAN AND INTENT (PAST 1 MONTH): NO

## 2023-10-14 SDOH — SOCIAL STABILITY: SOCIAL INSECURITY: PHYSICAL ABUSE: DENIES

## 2023-10-14 SDOH — SOCIAL STABILITY: SOCIAL INSECURITY: ARE YOU OR HAVE YOU BEEN THREATENED OR ABUSED PHYSICALLY, EMOTIONALLY, OR SEXUALLY BY ANYONE?: NO

## 2023-10-14 SDOH — SOCIAL STABILITY: SOCIAL INSECURITY: VERBAL ABUSE: DENIES

## 2023-10-14 SDOH — SOCIAL STABILITY: SOCIAL INSECURITY: DO YOU FEEL ANYONE HAS EXPLOITED OR TAKEN ADVANTAGE OF YOU FINANCIALLY OR OF YOUR PERSONAL PROPERTY?: NO

## 2023-10-14 SDOH — HEALTH STABILITY: MENTAL HEALTH: NON-SPECIFIC ACTIVE SUICIDAL THOUGHTS (PAST 1 MONTH): NO

## 2023-10-14 SDOH — HEALTH STABILITY: MENTAL HEALTH: SUICIDAL BEHAVIOR (LIFETIME): NO

## 2023-10-14 SDOH — HEALTH STABILITY: MENTAL HEALTH: ACTIVE SUICIDAL IDEATION WITH SOME INTENT TO ACT, WITHOUT SPECIFIC PLAN (PAST 1 MONTH): NO

## 2023-10-14 SDOH — SOCIAL STABILITY: SOCIAL INSECURITY: DOES ANYONE TRY TO KEEP YOU FROM HAVING/CONTACTING OTHER FRIENDS OR DOING THINGS OUTSIDE YOUR HOME?: NO

## 2023-10-14 SDOH — HEALTH STABILITY: MENTAL HEALTH: WERE YOU ABLE TO COMPLETE ALL THE BEHAVIORAL HEALTH SCREENINGS?: YES

## 2023-10-14 ASSESSMENT — PAIN SCALES - GENERAL
PAINLEVEL_OUTOF10: 0 - NO PAIN
PAINLEVEL_OUTOF10: 0 - NO PAIN
PAINLEVEL_OUTOF10: 6
PAINLEVEL_OUTOF10: 2
PAINLEVEL_OUTOF10: 6
PAINLEVEL_OUTOF10: 5 - MODERATE PAIN
PAINLEVEL_OUTOF10: 3
PAINLEVEL_OUTOF10: 10 - WORST POSSIBLE PAIN
PAINLEVEL_OUTOF10: 5 - MODERATE PAIN
PAINLEVEL_OUTOF10: 0 - NO PAIN
PAINLEVEL_OUTOF10: 3
PAINLEVEL_OUTOF10: 5 - MODERATE PAIN
PAINLEVEL_OUTOF10: 0 - NO PAIN
PAINLEVEL_OUTOF10: 3

## 2023-10-14 ASSESSMENT — LIFESTYLE VARIABLES
HOW OFTEN DO YOU HAVE 6 OR MORE DRINKS ON ONE OCCASION: NEVER
AUDIT-C TOTAL SCORE: 0
HOW MANY STANDARD DRINKS CONTAINING ALCOHOL DO YOU HAVE ON A TYPICAL DAY: PATIENT DOES NOT DRINK
HOW OFTEN DO YOU HAVE A DRINK CONTAINING ALCOHOL: NEVER
HOW MANY STANDARD DRINKS CONTAINING ALCOHOL DO YOU HAVE ON A TYPICAL DAY: PATIENT DOES NOT DRINK
AUDIT-C TOTAL SCORE: 0
SKIP TO QUESTIONS 9-10: 1

## 2023-10-14 ASSESSMENT — PATIENT HEALTH QUESTIONNAIRE - PHQ9
2. FEELING DOWN, DEPRESSED OR HOPELESS: NOT AT ALL
SUM OF ALL RESPONSES TO PHQ9 QUESTIONS 1 & 2: 0
2. FEELING DOWN, DEPRESSED OR HOPELESS: NOT AT ALL
SUM OF ALL RESPONSES TO PHQ9 QUESTIONS 1 & 2: 0
1. LITTLE INTEREST OR PLEASURE IN DOING THINGS: NOT AT ALL
1. LITTLE INTEREST OR PLEASURE IN DOING THINGS: NOT AT ALL

## 2023-10-14 NOTE — H&P
Obstetrical Admission History and Physical     Maria Isabelmariel Cutler is a 23 y.o. . 28w0d. Admit for Diabetic Ketosis in setting of T1DM    Chief Complaint: Nausea/Vomiting In Pregnancy    Assessment/Plan    Diabetic Ketosis  - T1DM, pump settings in handoff  - Patient forgot to wear pump overnight, woke up feeling nauseous and fatigued   - POCT  on arrival, serum    - LR @125/hr in triage   - CBC, CMP unremarkable   - VBG: pH 7.36, CO2 low, HCO3 low, anion gap wnl   - Beta HBU 2.73  - Patient is non-acidotic, however in diabetic ketotic state   - Admit for BG control / insulin gtt   - Start IVFs and insulin gtt per protocol   - Needs consented by L&D team    cHTN  - not on meds  - baseline HELLP labs wnl   - normotensive on arrival     IUP at 28.0  - NST AGA  - Good fetal movement  - Vertex presentation   - Last growth 10/6: AC 46%ile, EFW 776g, 40%ile     Maternal Well-being  - Vital signs stable and WNL  - Emotional support and reassurance provided  - All questions and concerns addressed      D/w Dr. Larose   L&D team aware of admission.    [facility]     Principal Problem:    Diabetic ketosis (CMS/HCC)      Pregnancy Problems (from 24 to present)       Problem Noted Resolved    Bilateral sciatica 10/10/2023 by Stuart Munoz MD No    Priority:  Medium      Overview Signed 10/10/2023  2:17 PM by Stuart Munoz MD     Intermittent pain shooting down posteriolateral thighs bilaterally         27 weeks gestation of pregnancy 10/8/2023 by Stuart Munoz MD No    Priority:  Medium      Overview Addendum 10/10/2023  2:24 PM by Stuart Munoz MD     [x] PNLs reviewed wnl, rubella nonimmune  [x] Pap ASCUS 2022  [x] NT wnl 2023  [x] cell-free DNA, CF/SMA screening low risk  [x] Anatomy US  [x] 2nd trimester labs, Hg 9.9  [x] tdap  [ ] flu/covid  [/] GBS  [ ] Delivery Planning  [ ] PPBC           Asthma affecting pregnancy in third trimester 10/8/2023 by Stuart Munoz MD No     Priority:  Medium      Overview Signed 10/8/2023  3:29 PM by Stuart Munoz MD     Albuterol PRN         Type 1 diabetes mellitus complicating pregnancy, antepartum 10/5/2023 by Indiana Parra MD No    Priority:  Medium      Overview Addendum 10/11/2023  9:52 AM by Yunior Thakur MD     [x] Baseline HbA1c 12.8 2023  [x] Baseline TSH 2.38 2023  [x] Baseline HELLP Labs wnl, 0.11 2023  [x] bbASA  [x] EKG  wnl  [x] Fetal Echo wnl  [x] Ophthalmology  /podiatry    [ ]  Testing    Serial Growths   EFW 776g, (46%)    Current Regimen changed 10/10/2023  Basal  [id number] 0.90  4689-0886 1.00  7605-9815 0.90     Bolus  7007-7840 1:9     CF: 1:40  Target:110  DOA 5 hrs           Herpes simplex 10/5/2023 by Indiana Parra MD No    Priority:  Medium      Overview Signed 10/5/2023  8:55 AM by Indiana Parra MD     On acyclovir prophylaxis          Chronic hypertension affecting pregnancy 10/5/2023 by Indiana Parra MD No    Priority:  Medium      Overview Addendum 10/8/2023  3:16 PM by Stuart Munoz MD     [x] Baseline HELLP Labs, P:C 0.11 2023  [x] bbASA  [ ]  Testing  [x] Baseline EKG inpatient      Serial Growths    Med Regimen  No meds         Group beta Strep positive 10/5/2023 by Indiana Parra MD No    Priority:  Medium      Overview Signed 10/5/2023  9:00 AM by Indiana Parra MD     GBS UTI in pregnancy          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 Ninfa Monroe during pregnancy in second trimester 10/9/2023 by Melody Osorio 10/10/2023 by Stuart  MD Alexander    Abnormal pregnancy in second trimester 10/9/2023 by Melody Osorio 10/10/2023 by Stuart Munoz MD    Suspected fetal anomaly, antepartum 10/9/2023 by Melody Osorio 10/10/2023 by Stuart Munoz MD    Hyperglycemia in pregnancy 2023 by Melody Osorio 10/10/2023 by Stuart Munoz MD    Vomiting of pregnancy 2023 by Melody Osorio 10/10/2023 by Stuart Munoz MD          Subjective   Maria Isabel is here complaining of n/v, forgot to wear insulin pump overnight. She felt nauseous and fatigued when she awoke this morning, no episodes of emesis. Good fetal movement. Denies vaginal bleeding., Denies contractions., Denies leaking of fluid.         Obstetrical History   OB History    Para Term  AB Living   1 0 0 0 0 0   SAB IAB Ectopic Multiple Live Births                  # Outcome Date GA Lbr Jesu/2nd Weight Sex Delivery Anes PTL Lv   1 Current                Past Medical History  Past Medical History:   Diagnosis Date    Asthma     Chlamydia     Chronic hypertension     CSF oligoclonal bands     Depression     Herpes     Type 1 diabetes mellitus with hyperglycemia, with long-term current use of insulin (CMS/[facility])     Urinary tract infection         Past Surgical History   History reviewed. No pertinent surgical history.    Social History  Social History     Tobacco Use    Smoking status: Former     Types: Cigarettes     Passive exposure: Past    Smokeless tobacco: Never   Substance Use Topics    Alcohol use: Not Currently     Substance and Sexual Activity   Drug Use Not Currently    Types: Marijuana       Allergies  Patient has no known allergies.     Medications  Medications Prior to Admission   Medication Sig Dispense Refill Last Dose    acetaminophen (Tylenol 8 HOUR) 650 mg ER tablet Take 1 tablet (650 mg) by mouth 3 times a day.   10/13/2023 at 1600    acetone, urine, test strip DIP URINE TO CHECK FOR KETONES IF NAUSEA/VOMITING OR IF CONCERN FOR DKA OR DEHYDRATION 100 each 0     acyclovir  (Zovirax) 400 mg tablet Take 1 tablet (400 mg) by mouth once daily.       albuterol 2.5 mg /3 mL (0.083 %) nebulizer solution Inhale 3 mL (2.5 mg) every 6 hours if needed for wheezing.       Alcohol Prep Pads pads, medicated        aspirin 81 mg chewable tablet Chew 1 tablet (81 mg) once daily.       bisacodyl (Dulcolax) 10 mg suppository Insert into the rectum 1 time.       capsaicin  cream (Capzasin-HP) 0.1 % cream cream Apply topically 3 times a day.       chlorhexidine (Peridex) 0.12 % solution Use 15 mL in the mouth or throat 2 times a day.  FOR 30 SECONDS IN THE MORNING AND EVENING       desogestreL-ethinyl estradioL (Apri) 0.15-0.03 mg tablet Take 1 tablet by mouth once daily.       docusate sodium (Colace) 100 mg capsule Take 1 capsule (100 mg) by mouth 2 times a day.       ferrous sulfate (iron) 325 (65 Fe) MG tablet Take 1 tablet (65 mg of iron) by mouth once daily with a meal. 30 tablet 3     fluconazole (Diflucan) 150 mg tablet Take 1 tablet (150 mg) by mouth 1 time.       glucagon (Glucagen) 1 mg injection 1 mg 1 time if needed for low blood sugar - see comments.       glucose 4 gram chewable tablet Chew. USE AS DIRECTED TO TREAT HYPOGLYCEMIA       HumaLOG U-100 Insulin 100 unit/mL injection 1.5 mL (150 Units). VIA INSULIN PUMP DAILY       ibuprofen 800 mg tablet Take 1 tablet (800 mg) by mouth 3 times a day as needed.       insulin glargine (Lantus Solostar U-100 Insulin) 100 unit/mL (3 mL) pen Inject 25 Units under the skin once daily.       insulin glargine (Lantus) 100 unit/mL (3 mL) pen Inject 15 Units under the skin once daily in the morning.       insulin glargine (Lantus) 100 unit/mL injection Inject 18 Units under the skin once daily at bedtime.       insulin lispro (HumaLOG KwikPen Insulin) 100 unit/mL injection Inject 5 Units under the skin 3 times a day before meals. ) as well as sliding scale depending on carb intake       insulin lispro (HumaLOG) 100 unit/mL injection Inject 10 Units under  "the skin 3 times a day before meals. plus sliding scale       metoclopramide (Reglan) 10 mg tablet Take 1 tablet (10 mg) by mouth every 6 hours if needed.       ondansetron ODT (Zofran-ODT) 4 mg disintegrating tablet        OneTouch Verio test strips strip TEST 6-7 TIMES DAILY       Peak Air Peak Flow Meter device        pen needle, diabetic 32 gauge x 5/32\" needle USE AS DIRECTED WITH INSULIN USE AS DIRECTED TO INJECT INSULIN 4-6 TIMES DAILY 200 each 10     polyethylene glycol (Glycolax, Miralax) 17 gram/dose powder Take 17 g by mouth 2 times a day.       potassium chloride CR 10 mEq ER tablet Take 1 tablet (10 mEq) by mouth 2 times a day.       prenatal vitamin 28 mg iron-800 mcg folic acid tablet tablet TAKE 1 TABLET DAILY. 100 tablet 2     sertraline (Zoloft) 50 mg tablet Take 1 tablet (50 mg) by mouth once daily.       traMADol (Ultram) 50 mg tablet Take 1 tablet (50 mg) by mouth every 8 hours.       Ventolin HFA 90 mcg/actuation inhaler Inhale 1-2 puffs every 4 hours if needed.          Objective    Last Vitals  Temp Pulse Resp BP MAP O2 Sat   36.5 °C (97.7 °F) 84 18 100/58   100 %     Physical Examination  GENERAL: Examination reveals a well developed, well nourished, gravid female in no acute distress. She is alert and cooperative.  FHR is  , with Accelerations, and a   tracing.    [place] reading:    The fetus is in a vertex presentation, determined by ultrasound  Current Estimated Fetal Weight 996g established by ultrasound  NEUROLOGICAL: alert, oriented, normal speech, no focal findings or movement disorder noted  PSYCHOLOGICAL: awake and alert; oriented to person, place, and time    Lab Review  Labs in chart were reviewed.  Lab Results   Component Value Date    WBC 8.9 10/14/2023    HGB 11.9 (L) 10/14/2023    HCT 36.8 10/14/2023     10/14/2023     Lab Results   Component Value Date    GLUCOSE 208 (H) 10/14/2023     (L) 10/14/2023    K 4.0 10/14/2023     10/14/2023    CO2 20 (L) 10/14/2023 "    ANIONGAP 18 10/14/2023    BUN 6 10/14/2023    CREATININE 0.44 (L) 10/14/2023    EGFR >90 10/14/2023    CALCIUM 9.1 10/14/2023    ALBUMIN 3.8 10/14/2023    PROT 6.7 10/14/2023    ALKPHOS 48 10/14/2023    ALT 14 10/14/2023    AST 14 10/14/2023    BILITOT 0.5 10/14/2023

## 2023-10-14 NOTE — PROGRESS NOTES
Antepartum Progress Note    Assessment/Plan   Maria Isabel Cutler is a 23 y.o.  at 28w0d. SHIN: 2024, by Last Menstrual Period.     Diabetic Ketosis  - T1DM, pump settings in handoff  - POCT  on arrival, serum  -> currently on Insulin gtt and oral potassium. Titrate per protocol   - D5LR @ 250cc/hr   - CBC, CMP unremarkable   - VBG: pH 7.36, CO2 low, HCO3 low, anion gap wnl   - Beta HBU 2.73, for q2hr labs   - Patient is non-acidotic, however in diabetic ketotic state     cHTN  - not on meds  - baseline HELLP labs wnl   - normotensive on arrival      IUP at 28.0  - NST AGA  - Good fetal movement  - Vertex presentation   - Last growth 10/6: AC 46%ile, EFW 776g, 40%ile      Maternal Well-being  - Vital signs stable and WNL  - Emotional support and reassurance provided  - All questions and concerns addressed    To d/w Dr. Longoria,  Ana Spann MD, PGY-2     Principal Problem:    Diabetic ketosis (CMS/HCC)    Pregnancy Problems (from 24 to present)       Problem Noted Resolved    Bilateral sciatica 10/10/2023 by Stuart Munoz MD No    Priority:  Medium      Overview Signed 10/10/2023  2:17 PM by Stuart Munoz MD     Intermittent pain shooting down posteriolateral thighs bilaterally         27 weeks gestation of pregnancy 10/8/2023 by Stuart Munoz MD No    Priority:  Medium      Overview Addendum 10/10/2023  2:24 PM by Stuart Munoz MD     [x] PNLs reviewed wnl, rubella nonimmune  [x] Pap ASCUS 2022  [x] NT wnl 2023  [x] cell-free DNA, CF/SMA screening low risk  [x] Anatomy US  [x] 2nd trimester labs, Hg 9.9  [x] tdap  [ ] flu/covid  [/] GBS  [ ] Delivery Planning  [ ] PPBC           Asthma affecting pregnancy in third trimester 10/8/2023 by Stuart Munoz MD No    Priority:  Medium      Overview Signed 10/8/2023  3:29 PM by Stuart Munoz MD     Albuterol PRN         Type 1 diabetes mellitus complicating pregnancy, antepartum 10/5/2023 by Indiana Parra MD No    Priority:   Medium      Overview Addendum 10/11/2023  9:52 AM by Yunior Thakur MD     [x] Baseline HbA1c 12.8 2023  [x] Baseline TSH 2.38 2023  [x] Baseline HELLP Labs wnl, 0.11 2023  [x] bbASA  [x] EKG  wnl  [x] Fetal Echo wnl  [x] Ophthalmology  /podiatry    [ ]  Testing    Serial Growths   EFW 776g, (46%)    Current Regimen changed 10/10/2023  Basal  7126-1698 0.90  8880-2041 1.00  3793-3851 0.90     Bolus  7492-2248 1:9     CF: 1:40  Target:110  DOA 5 hrs           Herpes simplex 10/5/2023 by Indiana Parra MD No    Priority:  Medium      Overview Signed 10/5/2023  8:55 AM by Indiana Parra MD     On acyclovir prophylaxis          Chronic hypertension affecting pregnancy 10/5/2023 by Indiana Parra MD No    Priority:  Medium      Overview Addendum 10/8/2023  3:16 PM by Stuart Munoz MD     [x] Baseline HELLP Labs, P:C 0.11 2023  [x] bbASA  [ ]  Testing  [x] Baseline EKG inpatient      Serial Growths    Med Regimen  No meds         Group beta Strep positive 10/5/2023 by Indiana Parra MD No    Priority:  Medium      Overview Signed 10/5/2023  9:00 AM by Indiana Parra MD     GBS UTI in pregnancy          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 Ninfa Monroe during pregnancy in second trimester 10/9/2023 by Melody Osorio 10/10/2023 by Stuart Munoz MD    Abnormal pregnancy in second trimester 10/9/2023 by Melody Osorio 10/10/2023 by Stuart Munoz MD    Suspected fetal anomaly, antepartum 10/9/2023 by Melody Osorio 10/10/2023 by Stuart Munoz MD     "Hyperglycemia in pregnancy 9/14/2023 by Melody Osorio 10/10/2023 by Stuart Munoz MD    Vomiting of pregnancy 9/14/2023 by Melody Osorio 10/10/2023 by Stuart Munoz MD            Subjective   Pt is doing okay. Currently eating dinner. Denies acute changes. Denies ctx, LOF, VB. Feels good fetal movement. Denies feeling jittery, headaches, recent N/V      Objective   Allergies:   Patient has no known allergies.    Last Vitals:  Temp Pulse Resp BP MAP Pulse Ox   36.8 °C (98.2 °F) 88 16 115/69 86 mmHg 98 %     Vitals Min/Max Last 24 Hours:  Temp  Min: 36.5 °C (97.7 °F)  Max: 36.8 °C (98.2 °F)  Pulse  Min: 84  Max: 102  Resp  Min: 16  Max: 18  BP  Min: 100/58  Max: 115/69  MAP  Min: 86 mmHg  Max: 86 mmHg    Intake/Output:   No intake or output data in the 24 hours ending 10/14/23 1843    Physical Exam:  GENERAL: Examination reveals a well developed, well nourished, gravid female in no acute distress. She is alert and cooperative.  LUNGS: breathing well on room air   ABDOMEN: gravid, no tenderness endorsed   FHR is 140, moderate variability, +accel, -decel  [place] reading: uterine irritability   NEUROLOGICAL: alert, oriented, normal speech, no focal findings or movement disorder noted  PSYCHOLOGICAL: awake and alert; oriented to person, place, and time    Lab Data:  Lab Results   Component Value Date    WBC 7.7 10/14/2023    HGB 10.2 (L) 10/14/2023    HCT 31.2 (L) 10/14/2023     10/14/2023     Lab Results   Component Value Date    GLUCOSE 173 (H) 10/14/2023     (L) 10/14/2023    K 3.6 10/14/2023     10/14/2023    CO2 21 10/14/2023    ANIONGAP 14 10/14/2023    BUN 6 10/14/2023    CREATININE 0.35 (L) 10/14/2023    EGFR >90 10/14/2023    CALCIUM 8.9 10/14/2023    ALBUMIN 3.3 (L) 10/14/2023    PROT 5.8 (L) 10/14/2023    ALKPHOS 45 10/14/2023    ALT 11 10/14/2023    AST 11 10/14/2023    BILITOT 0.4 10/14/2023     No results found for: \"LACTATE\"  "

## 2023-10-15 LAB
ABO GROUP (TYPE) IN BLOOD: NORMAL
ALBUMIN SERPL BCP-MCNC: 2.9 G/DL (ref 3.4–5)
ALBUMIN SERPL BCP-MCNC: 3.1 G/DL (ref 3.4–5)
ALP SERPL-CCNC: 36 U/L (ref 33–110)
ALP SERPL-CCNC: 38 U/L (ref 33–110)
ALT SERPL W P-5'-P-CCNC: 11 U/L (ref 7–45)
ALT SERPL W P-5'-P-CCNC: 9 U/L (ref 7–45)
ANION GAP SERPL CALC-SCNC: 12 MMOL/L (ref 10–20)
ANION GAP SERPL CALC-SCNC: 12 MMOL/L (ref 10–20)
ANTIBODY SCREEN: NORMAL
AST SERPL W P-5'-P-CCNC: 9 U/L (ref 9–39)
AST SERPL W P-5'-P-CCNC: 9 U/L (ref 9–39)
B-OH-BUTYR SERPL-SCNC: 0.07 MMOL/L (ref 0.02–0.27)
B-OH-BUTYR SERPL-SCNC: 0.1 MMOL/L (ref 0.02–0.27)
BASE EXCESS BLDV CALC-SCNC: -3.8 MMOL/L (ref -2–3)
BASOPHILS # BLD AUTO: 0.03 X10*3/UL (ref 0–0.1)
BASOPHILS NFR BLD AUTO: 0.5 %
BILIRUB SERPL-MCNC: 0.3 MG/DL (ref 0–1.2)
BILIRUB SERPL-MCNC: 0.3 MG/DL (ref 0–1.2)
BILIRUBIN, POC: NORMAL
BLOOD URINE, POC: NEGATIVE
BODY TEMPERATURE: 37 DEGREES CELSIUS
BUN SERPL-MCNC: 6 MG/DL (ref 6–23)
BUN SERPL-MCNC: 6 MG/DL (ref 6–23)
CALCIUM SERPL-MCNC: 8.6 MG/DL (ref 8.6–10.6)
CALCIUM SERPL-MCNC: 8.6 MG/DL (ref 8.6–10.6)
CHLORIDE SERPL-SCNC: 105 MMOL/L (ref 98–107)
CHLORIDE SERPL-SCNC: 107 MMOL/L (ref 98–107)
CLARITY, POC: CLEAR
CO2 SERPL-SCNC: 21 MMOL/L (ref 21–32)
CO2 SERPL-SCNC: 22 MMOL/L (ref 21–32)
COLOR, POC: YELLOW
CREAT SERPL-MCNC: 0.39 MG/DL (ref 0.5–1.05)
CREAT SERPL-MCNC: 0.39 MG/DL (ref 0.5–1.05)
EOSINOPHIL # BLD AUTO: 0.23 X10*3/UL (ref 0–0.7)
EOSINOPHIL NFR BLD AUTO: 3.8 %
ERYTHROCYTE [DISTWIDTH] IN BLOOD BY AUTOMATED COUNT: 12.7 % (ref 11.5–14.5)
GFR SERPL CREATININE-BSD FRML MDRD: >90 ML/MIN/1.73M*2
GFR SERPL CREATININE-BSD FRML MDRD: >90 ML/MIN/1.73M*2
GLUCOSE BLD MANUAL STRIP-MCNC: 100 MG/DL (ref 74–99)
GLUCOSE BLD MANUAL STRIP-MCNC: 104 MG/DL (ref 74–99)
GLUCOSE BLD MANUAL STRIP-MCNC: 109 MG/DL (ref 74–99)
GLUCOSE BLD MANUAL STRIP-MCNC: 113 MG/DL (ref 74–99)
GLUCOSE BLD MANUAL STRIP-MCNC: 127 MG/DL (ref 74–99)
GLUCOSE BLD MANUAL STRIP-MCNC: 127 MG/DL (ref 74–99)
GLUCOSE BLD MANUAL STRIP-MCNC: 128 MG/DL (ref 74–99)
GLUCOSE BLD MANUAL STRIP-MCNC: 147 MG/DL (ref 74–99)
GLUCOSE BLD MANUAL STRIP-MCNC: 152 MG/DL (ref 74–99)
GLUCOSE BLD MANUAL STRIP-MCNC: 152 MG/DL (ref 74–99)
GLUCOSE BLD MANUAL STRIP-MCNC: 158 MG/DL (ref 74–99)
GLUCOSE BLD MANUAL STRIP-MCNC: 168 MG/DL (ref 74–99)
GLUCOSE BLD MANUAL STRIP-MCNC: 174 MG/DL (ref 74–99)
GLUCOSE BLD MANUAL STRIP-MCNC: 179 MG/DL (ref 74–99)
GLUCOSE BLD MANUAL STRIP-MCNC: 234 MG/DL (ref 74–99)
GLUCOSE BLD MANUAL STRIP-MCNC: 57 MG/DL (ref 74–99)
GLUCOSE BLD MANUAL STRIP-MCNC: 80 MG/DL (ref 74–99)
GLUCOSE BLD MANUAL STRIP-MCNC: 89 MG/DL (ref 74–99)
GLUCOSE SERPL-MCNC: 136 MG/DL (ref 74–99)
GLUCOSE SERPL-MCNC: 95 MG/DL (ref 74–99)
GLUCOSE URINE, POC: NORMAL
HCO3 BLDV-SCNC: 20.7 MMOL/L (ref 22–26)
HCT VFR BLD AUTO: 28.1 % (ref 36–46)
HGB BLD-MCNC: 9.3 G/DL (ref 12–16)
IMM GRANULOCYTES # BLD AUTO: 0.01 X10*3/UL (ref 0–0.7)
IMM GRANULOCYTES NFR BLD AUTO: 0.2 % (ref 0–0.9)
INHALED O2 CONCENTRATION: 21 %
KETONES, POC: POSITIVE
LEUKOCYTE EST, POC: NEGATIVE
LYMPHOCYTES # BLD AUTO: 2.25 X10*3/UL (ref 1.2–4.8)
LYMPHOCYTES NFR BLD AUTO: 37.6 %
MCH RBC QN AUTO: 30.6 PG (ref 26–34)
MCHC RBC AUTO-ENTMCNC: 33.1 G/DL (ref 32–36)
MCV RBC AUTO: 92 FL (ref 80–100)
MONOCYTES # BLD AUTO: 0.52 X10*3/UL (ref 0.1–1)
MONOCYTES NFR BLD AUTO: 8.7 %
NEUTROPHILS # BLD AUTO: 2.95 X10*3/UL (ref 1.2–7.7)
NEUTROPHILS NFR BLD AUTO: 49.2 %
NITRITE, POC: NEGATIVE
NRBC BLD-RTO: 0 /100 WBCS (ref 0–0)
OXYHGB MFR BLDV: 98 % (ref 45–75)
PCO2 BLDV: 35 MM HG (ref 41–51)
PH BLDV: 7.38 PH (ref 7.33–7.43)
PH, POC: 7
PLATELET # BLD AUTO: 331 X10*3/UL (ref 150–450)
PMV BLD AUTO: 9.4 FL (ref 7.5–11.5)
PO2 BLDV: 148 MM HG (ref 35–45)
POC APPEARANCE OF BODY FLUID: NORMAL
POTASSIUM SERPL-SCNC: 3.7 MMOL/L (ref 3.5–5.3)
POTASSIUM SERPL-SCNC: 3.8 MMOL/L (ref 3.5–5.3)
PROT SERPL-MCNC: 5 G/DL (ref 6.4–8.2)
PROT SERPL-MCNC: 5.2 G/DL (ref 6.4–8.2)
RBC # BLD AUTO: 3.04 X10*6/UL (ref 4–5.2)
RH FACTOR (ANTIGEN D): NORMAL
SAO2 % BLDV: 100 % (ref 45–75)
SODIUM SERPL-SCNC: 135 MMOL/L (ref 136–145)
SODIUM SERPL-SCNC: 136 MMOL/L (ref 136–145)
SPECIFIC GRAVITY, POC: 1.02
TEST COMMENT: ABNORMAL
URINE PROTEIN, POC: NEGATIVE
UROBILINOGEN, POC: 2
WBC # BLD AUTO: 6 X10*3/UL (ref 4.4–11.3)

## 2023-10-15 PROCEDURE — 85025 COMPLETE CBC W/AUTO DIFF WBC: CPT

## 2023-10-15 PROCEDURE — 2500000004 HC RX 250 GENERAL PHARMACY W/ HCPCS (ALT 636 FOR OP/ED): Performed by: STUDENT IN AN ORGANIZED HEALTH CARE EDUCATION/TRAINING PROGRAM

## 2023-10-15 PROCEDURE — 2500000004 HC RX 250 GENERAL PHARMACY W/ HCPCS (ALT 636 FOR OP/ED)

## 2023-10-15 PROCEDURE — 84075 ASSAY ALKALINE PHOSPHATASE: CPT | Mod: CMCLAB

## 2023-10-15 PROCEDURE — 2500000001 HC RX 250 WO HCPCS SELF ADMINISTERED DRUGS (ALT 637 FOR MEDICARE OP)

## 2023-10-15 PROCEDURE — 1210000001 HC SEMI-PRIVATE ROOM DAILY

## 2023-10-15 PROCEDURE — G0378 HOSPITAL OBSERVATION PER HR: HCPCS

## 2023-10-15 PROCEDURE — 2500000001 HC RX 250 WO HCPCS SELF ADMINISTERED DRUGS (ALT 637 FOR MEDICARE OP): Performed by: STUDENT IN AN ORGANIZED HEALTH CARE EDUCATION/TRAINING PROGRAM

## 2023-10-15 PROCEDURE — 82805 BLOOD GASES W/O2 SATURATION: CPT | Mod: CMCLAB

## 2023-10-15 PROCEDURE — 36415 COLL VENOUS BLD VENIPUNCTURE: CPT | Mod: CMCLAB | Performed by: STUDENT IN AN ORGANIZED HEALTH CARE EDUCATION/TRAINING PROGRAM

## 2023-10-15 PROCEDURE — 82947 ASSAY GLUCOSE BLOOD QUANT: CPT | Mod: 59,91

## 2023-10-15 PROCEDURE — 82010 KETONE BODYS QUAN: CPT

## 2023-10-15 PROCEDURE — 36415 COLL VENOUS BLD VENIPUNCTURE: CPT | Mod: CMCLAB

## 2023-10-15 PROCEDURE — 82947 ASSAY GLUCOSE BLOOD QUANT: CPT | Mod: CMCLAB

## 2023-10-15 RX ORDER — CYCLOBENZAPRINE HCL 10 MG
10 TABLET ORAL 3 TIMES DAILY PRN
Status: DISCONTINUED | OUTPATIENT
Start: 2023-10-15 | End: 2023-10-16 | Stop reason: HOSPADM

## 2023-10-15 RX ORDER — ACETAMINOPHEN 325 MG/1
975 TABLET ORAL ONCE
Status: COMPLETED | OUTPATIENT
Start: 2023-10-15 | End: 2023-10-15

## 2023-10-15 RX ORDER — POTASSIUM CHLORIDE 1.5 G/1.58G
20 POWDER, FOR SOLUTION ORAL ONCE
Status: COMPLETED | OUTPATIENT
Start: 2023-10-15 | End: 2023-10-15

## 2023-10-15 RX ORDER — DEXTROSE MONOHYDRATE 100 MG/ML
0.3 INJECTION, SOLUTION INTRAVENOUS ONCE AS NEEDED
Status: DISCONTINUED | OUTPATIENT
Start: 2023-10-15 | End: 2023-10-16 | Stop reason: HOSPADM

## 2023-10-15 RX ORDER — FAMOTIDINE 10 MG/ML
20 INJECTION INTRAVENOUS EVERY 12 HOURS SCHEDULED
Status: DISCONTINUED | OUTPATIENT
Start: 2023-10-15 | End: 2023-10-15

## 2023-10-15 RX ORDER — ACETAMINOPHEN 325 MG/1
975 TABLET ORAL EVERY 6 HOURS PRN
Status: DISCONTINUED | OUTPATIENT
Start: 2023-10-15 | End: 2023-10-16 | Stop reason: HOSPADM

## 2023-10-15 RX ORDER — FAMOTIDINE 10 MG/ML
20 INJECTION INTRAVENOUS ONCE
Status: COMPLETED | OUTPATIENT
Start: 2023-10-15 | End: 2023-10-15

## 2023-10-15 RX ORDER — DEXTROSE 50 % IN WATER (D50W) INTRAVENOUS SYRINGE
25
Status: DISCONTINUED | OUTPATIENT
Start: 2023-10-15 | End: 2023-10-16 | Stop reason: HOSPADM

## 2023-10-15 RX ADMIN — FAMOTIDINE 20 MG: 10 INJECTION INTRAVENOUS at 03:45

## 2023-10-15 RX ADMIN — PRENATAL VIT W/ FE FUMARATE-FA TAB 27-0.8 MG 1 TABLET: 27-0.8 TAB at 09:00

## 2023-10-15 RX ADMIN — FERROUS SULFATE TAB 325 MG (65 MG ELEMENTAL FE) 65 MG OF IRON: 325 (65 FE) TAB at 08:54

## 2023-10-15 RX ADMIN — CYCLOBENZAPRINE 10 MG: 10 TABLET, FILM COATED ORAL at 18:33

## 2023-10-15 RX ADMIN — ACETAMINOPHEN 975 MG: 325 TABLET ORAL at 21:51

## 2023-10-15 RX ADMIN — POTASSIUM CHLORIDE 20 MEQ: 1.5 POWDER, FOR SOLUTION ORAL at 08:54

## 2023-10-15 RX ADMIN — SODIUM CHLORIDE, SODIUM LACTATE, POTASSIUM CHLORIDE, CALCIUM CHLORIDE AND DEXTROSE MONOHYDRATE 250 ML/HR: 5; 600; 310; 30; 20 INJECTION, SOLUTION INTRAVENOUS at 03:58

## 2023-10-15 RX ADMIN — ASPIRIN 81 MG CHEWABLE TABLET 81 MG: 81 TABLET CHEWABLE at 08:54

## 2023-10-15 RX ADMIN — CYCLOBENZAPRINE 10 MG: 10 TABLET, FILM COATED ORAL at 09:26

## 2023-10-15 RX ADMIN — ACETAMINOPHEN 975 MG: 325 TABLET ORAL at 03:44

## 2023-10-15 ASSESSMENT — PAIN DESCRIPTION - DESCRIPTORS
DESCRIPTORS: CRAMPING
DESCRIPTORS: ACHING

## 2023-10-15 ASSESSMENT — PAIN SCALES - GENERAL
PAINLEVEL_OUTOF10: 0 - NO PAIN
PAINLEVEL_OUTOF10: 2
PAINLEVEL_OUTOF10: 3
PAINLEVEL_OUTOF10: 5 - MODERATE PAIN
PAINLEVEL_OUTOF10: 0 - NO PAIN
PAINLEVEL_OUTOF10: 0 - NO PAIN
PAINLEVEL_OUTOF10: 2

## 2023-10-15 ASSESSMENT — PAIN - FUNCTIONAL ASSESSMENT
PAIN_FUNCTIONAL_ASSESSMENT: 0-10

## 2023-10-15 ASSESSMENT — ACTIVITIES OF DAILY LIVING (ADL): LACK_OF_TRANSPORTATION: NO

## 2023-10-15 NOTE — PROGRESS NOTES
Antepartum Progress Note    Assessment/Plan   Maria Isabel Cutler is a 23 y.o.  at 28w1d. SHIN: 2024, by Last Menstrual Period.     Diabetic Ketosis- resolved   - T1DM, had pump off overnight unintentionally before presentation. pump settings in handoff  - POCT  on arrival, serum  -> s/p Insulin gtt and PO K, transitioned back to pump this am with home settings   - CBC, CMP unremarkable   - VBG: pH 7.36, CO2 low, HCO3 low, anion gap wnl   - Beta HBU 2.73, >> 0.07      cHTN  - not on meds  - baseline HELLP labs wnl   - normotensive on arrival      Anemia   - hgb 9.3 on admission  - on PO Fe, rx'ed 10/10     Asthma  - albuterol prn     HSV  -cont home acyclovir     Oligoclonal bands in CSF  - NSH to neuroimmunology apt 10/13 with Dr. Ninfa heath to reschedule for discharge     IUP at 28.1   - NST AGA  - Good fetal movement  - Vertex presentation   - Last growth 10/6: AC 46%ile, EFW 776g, 40%ile     Pt dw Dr Conor Parra MD  OB/GYN PGY3     Principal Problem:    Diabetic ketosis (CMS/HCC)    Pregnancy Problems (from 24 to present)       Problem Noted Resolved    Bilateral sciatica 10/10/2023 by Stuart Munoz MD No    Priority:  Medium      Overview Signed 10/10/2023  2:17 PM by Stuart Munoz MD     Intermittent pain shooting down posteriolateral thighs bilaterally         27 weeks gestation of pregnancy 10/8/2023 by Stuart Munoz MD No    Priority:  Medium      Overview Addendum 10/10/2023  2:24 PM by Stuart Munoz MD     [x] PNLs reviewed wnl, rubella nonimmune  [x] Pap ASCUS 2022  [x] NT wnl 2023  [x] cell-free DNA, CF/SMA screening low risk  [x] Anatomy US  [x] 2nd trimester labs, Hg 9.9  [x] tdap  [ ] flu/covid  [/] GBS  [ ] Delivery Planning  [ ] PPBC           Asthma affecting pregnancy in third trimester 10/8/2023 by Stuart Munoz MD No    Priority:  Medium      Overview Signed 10/8/2023  3:29 PM by Stuart Munoz MD     Albuterol PRN         Type 1 diabetes  mellitus complicating pregnancy, antepartum 10/5/2023 by Indiana Parra MD No    Priority:  Medium      Overview Addendum 10/11/2023  9:52 AM by Yunior Thakur MD     [x] Baseline HbA1c 12.8 2023  [x] Baseline TSH 2.38 2023  [x] Baseline HELLP Labs wnl, 0.11 2023  [x] bbASA  [x] EKG  wnl  [x] Fetal Echo wnl  [x] Ophthalmology  /podiatry    [ ]  Testing    Serial Growths   EFW 776g, (46%)    Current Regimen changed 10/10/2023  Basal  9738-8988 0.90  3904-3858 1.00  4560-6509 0.90     Bolus  6047-0336 1:9     CF: 1:40  Target:110  DOA 5 hrs           Herpes simplex 10/5/2023 by Indiana Parra MD No    Priority:  Medium      Overview Signed 10/5/2023  8:55 AM by Indiana Parra MD     On acyclovir prophylaxis          Chronic hypertension affecting pregnancy 10/5/2023 by Indiana Parra MD No    Priority:  Medium      Overview Addendum 10/8/2023  3:16 PM by Stuart Munoz MD     [x] Baseline HELLP Labs, P:C 0.11 2023  [x] bbASA  [ ]  Testing  [x] Baseline EKG inpatient      Serial Growths    Med Regimen  No meds         Group beta Strep positive 10/5/2023 by Indiana Parra MD No    Priority:  Medium      Overview Signed 10/5/2023  9:00 AM by Indiana Parra MD     GBS UTI in pregnancy          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 Ninfa Monroe during pregnancy in second trimester 10/9/2023 by Melody Osorio 10/10/2023 by Stuart Munoz MD    Abnormal pregnancy in second trimester 10/9/2023 by Melody Osorio 10/10/2023 by Stuart Munoz MD    Suspected  fetal anomaly, antepartum 10/9/2023 by Melody Osorio 10/10/2023 by Stuart Munoz MD    Hyperglycemia in pregnancy 9/14/2023 by Melody Osorio 10/10/2023 by Stuart Munoz MD    Vomiting of pregnancy 9/14/2023 by Melody Osorio 10/10/2023 by Stuart Munoz MD            Subjective   Feeling some mild cramping but otherwise no complaints, good fetal movement, no bleeding or loss of fluid     Objective   Allergies:   Patient has no known allergies.    Last Vitals:  Temp Pulse Resp BP MAP Pulse Ox   36.6 °C (97.9 °F) 90 16 103/63 69 mmHg 100 %     Vitals Min/Max Last 24 Hours:  Temp  Min: 36.5 °C (97.7 °F)  Max: 36.8 °C (98.2 °F)  Pulse  Min: 84  Max: 102  Resp  Min: 16  Max: 18  BP  Min: 97/54  Max: 115/75  MAP  Min: 69 mmHg  Max: 86 mmHg    Intake/Output:     Intake/Output Summary (Last 24 hours) at 10/15/2023 1002  Last data filed at 10/15/2023 0630  Gross per 24 hour   Intake 3745.91 ml   Output 350 ml   Net 3395.91 ml       Physical Exam:  GENERAL: Examination reveals a well developed, well nourished, gravid female in no acute distress. She is alert and cooperative.  HEENT: PERRLA. External ears normal. Nose normal, no erythema or discharge. Mouth and throat clear.  ABDOMEN: soft, gravid, nontender, nondistended, no abnormal masses, no epigastric pain  FHR is 140 BPM, with moderate variability Accelerations, no decels, and an AGA tracing   [place] reading:  quiet   SKIN: normal coloration and turgor, no rashes  NEUROLOGICAL: alert, oriented, normal speech, no focal findings or movement disorder noted  PSYCHOLOGICAL: awake and alert; oriented to person, place, and time    Lab Data:  Labs in chart were reviewed.

## 2023-10-15 NOTE — SIGNIFICANT EVENT
Beta hydroxybutyrate remains wnl, last 0.07. Glucose POCT 109. Plan to restart Insulin pump, overlap for 1hr while still on insulin gtt. Will recheck glucose POCT in 1hr. Potassium most recently 3.8, will give another 20mEq Potassium replacement     Ana Spann MD, Pgy-2

## 2023-10-15 NOTE — CARE PLAN
Problem: Diabetes  Goal: Achieve decreasing blood glucose levels by end of shift  Outcome: Progressing  Goal: Increase stability of blood glucose readings by end of shift  Outcome: Progressing  Goal: Decrease in ketones present in urine by end of shift  Outcome: Progressing  Goal: Maintain electrolyte levels within acceptable range throughout shift  Outcome: Progressing  Goal: Maintain glucose levels >70mg/dl to <250mg/dl throughout shift  Outcome: Progressing  Goal: No changes in neurological exam by end of shift  Outcome: Progressing  Goal: Learn about and adhere to nutrition recommendations by end of shift  Outcome: Progressing  Goal: Vital signs within normal range for age by end of shift  Outcome: Progressing  Goal: Increase self care and/or family involovement by end of shift  Outcome: Progressing  Goal: Receive DSME education by end of shift  Outcome: Progressing   The patient's goals for the shift include      The clinical goals for the shift include blood sugars will be less than 150 throughout shift    Pt is resting in bed throughout shift. Blood glucose has been steadily decreasing. No new orders at this time.

## 2023-10-16 VITALS
WEIGHT: 124.56 LBS | TEMPERATURE: 98.1 F | BODY MASS INDEX: 24.45 KG/M2 | SYSTOLIC BLOOD PRESSURE: 108 MMHG | DIASTOLIC BLOOD PRESSURE: 70 MMHG | HEIGHT: 60 IN | RESPIRATION RATE: 18 BRPM | OXYGEN SATURATION: 98 % | HEART RATE: 99 BPM

## 2023-10-16 LAB
ALBUMIN SERPL BCP-MCNC: 3 G/DL (ref 3.4–5)
ALP SERPL-CCNC: 42 U/L (ref 33–110)
ALT SERPL W P-5'-P-CCNC: 9 U/L (ref 7–45)
ANION GAP SERPL CALC-SCNC: 14 MMOL/L (ref 10–20)
AST SERPL W P-5'-P-CCNC: 8 U/L (ref 9–39)
ATRIAL RATE: 75 BPM
BILIRUB SERPL-MCNC: 0.2 MG/DL (ref 0–1.2)
BUN SERPL-MCNC: 5 MG/DL (ref 6–23)
CALCIUM SERPL-MCNC: 8.6 MG/DL (ref 8.6–10.6)
CHLORIDE SERPL-SCNC: 107 MMOL/L (ref 98–107)
CO2 SERPL-SCNC: 22 MMOL/L (ref 21–32)
CREAT SERPL-MCNC: 0.38 MG/DL (ref 0.5–1.05)
ERYTHROCYTE [DISTWIDTH] IN BLOOD BY AUTOMATED COUNT: 12.7 % (ref 11.5–14.5)
GFR SERPL CREATININE-BSD FRML MDRD: >90 ML/MIN/1.73M*2
GLUCOSE BLD MANUAL STRIP-MCNC: 107 MG/DL (ref 74–99)
GLUCOSE BLD MANUAL STRIP-MCNC: 64 MG/DL (ref 74–99)
GLUCOSE BLD MANUAL STRIP-MCNC: 75 MG/DL (ref 74–99)
GLUCOSE SERPL-MCNC: 78 MG/DL (ref 74–99)
HCT VFR BLD AUTO: 30.9 % (ref 36–46)
HGB BLD-MCNC: 10.2 G/DL (ref 12–16)
MCH RBC QN AUTO: 30.4 PG (ref 26–34)
MCHC RBC AUTO-ENTMCNC: 33 G/DL (ref 32–36)
MCV RBC AUTO: 92 FL (ref 80–100)
NRBC BLD-RTO: 0 /100 WBCS (ref 0–0)
P AXIS: 55 DEGREES
P OFFSET: 183 MS
P ONSET: 141 MS
PLATELET # BLD AUTO: 352 X10*3/UL (ref 150–450)
PMV BLD AUTO: 9.4 FL (ref 7.5–11.5)
POTASSIUM SERPL-SCNC: 3.8 MMOL/L (ref 3.5–5.3)
PR INTERVAL: 160 MS
PROT SERPL-MCNC: 5.3 G/DL (ref 6.4–8.2)
Q ONSET: 221 MS
QRS COUNT: 12 BEATS
QRS DURATION: 74 MS
QT INTERVAL: 372 MS
QTC CALCULATION(BAZETT): 415 MS
QTC FREDERICIA: 400 MS
R AXIS: 4 DEGREES
RBC # BLD AUTO: 3.35 X10*6/UL (ref 4–5.2)
SODIUM SERPL-SCNC: 139 MMOL/L (ref 136–145)
T AXIS: 30 DEGREES
T OFFSET: 407 MS
VENTRICULAR RATE: 75 BPM
WBC # BLD AUTO: 7.2 X10*3/UL (ref 4.4–11.3)

## 2023-10-16 PROCEDURE — 85027 COMPLETE CBC AUTOMATED: CPT | Performed by: STUDENT IN AN ORGANIZED HEALTH CARE EDUCATION/TRAINING PROGRAM

## 2023-10-16 PROCEDURE — 59025 FETAL NON-STRESS TEST: CPT | Mod: GC | Performed by: STUDENT IN AN ORGANIZED HEALTH CARE EDUCATION/TRAINING PROGRAM

## 2023-10-16 PROCEDURE — 80053 COMPREHEN METABOLIC PANEL: CPT | Performed by: STUDENT IN AN ORGANIZED HEALTH CARE EDUCATION/TRAINING PROGRAM

## 2023-10-16 PROCEDURE — 99238 HOSP IP/OBS DSCHRG MGMT 30/<: CPT | Performed by: STUDENT IN AN ORGANIZED HEALTH CARE EDUCATION/TRAINING PROGRAM

## 2023-10-16 PROCEDURE — 2500000001 HC RX 250 WO HCPCS SELF ADMINISTERED DRUGS (ALT 637 FOR MEDICARE OP)

## 2023-10-16 PROCEDURE — 59025 FETAL NON-STRESS TEST: CPT | Performed by: STUDENT IN AN ORGANIZED HEALTH CARE EDUCATION/TRAINING PROGRAM

## 2023-10-16 PROCEDURE — 36415 COLL VENOUS BLD VENIPUNCTURE: CPT | Performed by: STUDENT IN AN ORGANIZED HEALTH CARE EDUCATION/TRAINING PROGRAM

## 2023-10-16 PROCEDURE — 36415 COLL VENOUS BLD VENIPUNCTURE: CPT | Mod: CMCLAB | Performed by: STUDENT IN AN ORGANIZED HEALTH CARE EDUCATION/TRAINING PROGRAM

## 2023-10-16 PROCEDURE — 82947 ASSAY GLUCOSE BLOOD QUANT: CPT

## 2023-10-16 PROCEDURE — G0378 HOSPITAL OBSERVATION PER HR: HCPCS

## 2023-10-16 RX ADMIN — ASPIRIN 81 MG CHEWABLE TABLET 81 MG: 81 TABLET CHEWABLE at 08:39

## 2023-10-16 RX ADMIN — PRENATAL VIT W/ FE FUMARATE-FA TAB 27-0.8 MG 1 TABLET: 27-0.8 TAB at 09:00

## 2023-10-16 RX ADMIN — FERROUS SULFATE TAB 325 MG (65 MG ELEMENTAL FE) 65 MG OF IRON: 325 (65 FE) TAB at 08:39

## 2023-10-16 ASSESSMENT — PAIN SCALES - GENERAL: PAINLEVEL_OUTOF10: 0 - NO PAIN

## 2023-10-16 ASSESSMENT — PAIN - FUNCTIONAL ASSESSMENT: PAIN_FUNCTIONAL_ASSESSMENT: 0-10

## 2023-10-16 NOTE — HOSPITAL COURSE
Patient presented on 10/14 for nausea/vomiting after not wearing insulin pump overnight. She was found to be in ketosis without acidosis. Her labs improved with fluids and insulin gtt, and by hospital day 3, her symptoms had resoled and her BG were within goal.     She was discharged on her pump with her same pump settings as below:   New Regimen as of 10/10:   Basal  [id number] 0.90  2714-2632 1.00  7778-7694 0.90    She has follow up scheduled for 10/19 with M. A follow up appointment with neurology was also requested for evaluation of monoclonal bands in CSF during prior admission, as patient missed her appt on 10/13.

## 2023-10-16 NOTE — NURSING NOTE
Pt was instructed to tell RN or UAP when BG would be due 1 hour after meal. Pt did not inform staff when BG was due.

## 2023-10-16 NOTE — PROGRESS NOTES
Antepartum Progress Note    Assessment/Plan   Maria Isabel Cutler is a 23 y.o.  at 28w2d. HSIN: 2024, by Last Menstrual Period.     Diabetic Ketosis- resolved   - T1DM, had pump off overnight unintentionally before presentation. pump settings in handoff  - CBC, CMP unremarkable   - VBG: pH 7.36, CO2 low, HCO3 low, anion gap wnl   - Beta HBU 2.73, >> 0.07   - POCT  on arrival, s/p Insulin gtt and PO K, transitioned back to pump 10/15 am with home settings   Pump Settings-  New Regimen as of 10/10:   Basal  3550-6857 0.90  9371-7741 1.00  7825-0843 0.90     Bolus  [id number] 1:9     CF: 1:40  Target:110  DOA 5 hrs    cHTN  - not on meds  - baseline HELLP labs wnl   - normotensive on arrival      Anemia   - hgb 9.3 on admission  - on PO Fe, rx'ed 10/10     Asthma  - albuterol prn     HSV  -cont home acyclovir     Oligoclonal bands in CSF  - NSH to neuroimmunology apt 10/13 with Dr. Ninfa heath to reschedule for discharge     IUP at 28.1   - NST AGA  - Good fetal movement  - Vertex presentation   - Last growth 10/6: AC 46%ile, EFW 776g, 40%ile     Pt dw Blaze  Stuart Munoz MD  OB/GYN PGY3     Principal Problem:    Diabetic ketosis (CMS/HCC)    Subjective   no complaints, good fetal movement, no bleeding or loss of fluid, no n/v, no f/c.     Objective   Allergies:   Patient has no known allergies.    Last Vitals:  Temp Pulse Resp BP MAP Pulse Ox   37 °C (98.6 °F) 99 14 98/60 73 mmHg 98 %     Vitals Min/Max Last 24 Hours:  Temp  Min: 36.5 °C (97.7 °F)  Max: 37.1 °C (98.8 °F)  Pulse  Min: 86  Max: 105  Resp  Min: 14  Max: 19  BP  Min: 98/60  Max: 119/76  MAP  Min: 73 mmHg  Max: 73 mmHg    Intake/Output:   No intake or output data in the 24 hours ending 10/16/23 0718      Physical Exam:  GENERAL: Examination reveals a well developed, well nourished, gravid female in no acute distress. She is alert and cooperative.  HEENT: PERRLA. External ears normal. Nose normal, no erythema or discharge. Mouth and  throat clear.  ABDOMEN: soft, gravid, nontender, nondistended, no abnormal masses, no epigastric pain  FHR is 145, with moderate variability accels, no decels, and an AGA tracing   [place] reading:  quiet   SKIN: normal coloration and turgor, no rashes  NEUROLOGICAL: alert, oriented, normal speech, no focal findings or movement disorder noted  PSYCHOLOGICAL: awake and alert; oriented to person, place, and time    Lab Data:  Labs in chart were reviewed.

## 2023-10-16 NOTE — DISCHARGE SUMMARY
Discharge Summary    Admission Date: 10/14/2023  Discharge Date: 10/16    Discharge Diagnosis  Diabetic ketosis (CMS/[facility])    Hospital Course  Patient presented on 10/14 for nausea/vomiting after not wearing insulin pump overnight. She was found to be in ketosis without acidosis. Her labs improved with fluids and insulin gtt, and by hospital day 3, her symptoms had resoled and her BG were within goal.     She was discharged on her pump with her same pump settings as below:   New Regimen as of 10/10:   Basal  4880-4985 0.90  3728-6925 1.00  5512-2801 0.90    She has follow up scheduled for 10/19 with [facility]. A follow up appointment with neurology was also requested for evaluation of monoclonal bands in CSF during prior admission, as patient missed her appt on 10/13.       Pertinent Physical Exam At Time of Discharge  General: Well appearing, alert  HEENT: normocephalic, EOMI, clear sclera  Cardio: Warm and well perfused  Resp: breathing comfortably on room air  Abd: gravid  Neuro: grossly intact, no focal deficits  Extremities: full ROM, no calf tenderness  Psych: A&O x3, appropriate mood and affect    Discharge Meds     Your medication list        CHANGE how you take these medications        Instructions Last Dose Given Next Dose Due   HumaLOG U-100 Insulin 100 unit/mL injection  Generic drug: insulin lispro  What changed: Another medication with the same name was removed. Continue taking this medication, and follow the directions you see here.                  CONTINUE taking these medications        Instructions Last Dose Given Next Dose Due   acyclovir 400 mg tablet  Commonly known as: Zovirax           Ventolin HFA 90 mcg/actuation inhaler  Generic drug: albuterol           albuterol 2.5 mg /3 mL (0.083 %) nebulizer solution           Alcohol Prep Pads pads, medicated  Generic drug: alcohol swabs           aspirin 81 mg chewable tablet           bisacodyl 10 mg suppository  Commonly known as: Dulcolax          "  capsaicin  cream 0.1 % cream cream  Commonly known as: Capzasin-HP           Classic Prenatal 28 mg iron-800 mcg folic acid tablet tablet  Generic drug: prenatal vitamin      TAKE 1 TABLET DAILY.       docusate sodium 100 mg capsule  Commonly known as: Colace           ferrous sulfate 325 (65 Fe) MG tablet  Commonly known as: iron      Take 1 tablet (65 mg of iron) by mouth once daily with a meal.       glucagon 1 mg injection  Commonly known as: Glucagen           glucose 4 gram chewable tablet           Ketostix strip  Generic drug: acetone (urine) test      DIP URINE TO CHECK FOR KETONES IF NAUSEA/VOMITING OR IF CONCERN FOR DKA OR DEHYDRATION       metoclopramide 10 mg tablet  Commonly known as: Reglan           OneTouch Verio test strips strip  Generic drug: blood sugar diagnostic           Peak Air Peak Flow Meter device  Generic drug: peak flow meter           polyethylene glycol 17 gram/dose powder  Commonly known as: Glycolax, Miralax           sertraline 50 mg tablet  Commonly known as: Zoloft           TechLITE Pen Needle 32 gauge x 5/32\" needle  Generic drug: pen needle, diabetic      USE AS DIRECTED WITH INSULIN USE AS DIRECTED TO INJECT INSULIN 4-6 TIMES DAILY              STOP taking these medications      acetaminophen 650 mg ER tablet  Commonly known as: Tylenol 8 HOUR        Apri 0.15-0.03 mg tablet  Generic drug: desogestreL-ethinyl estradioL        chlorhexidine 0.12 % solution  Commonly known as: Peridex        fluconazole 150 mg tablet  Commonly known as: Diflucan        ibuprofen 800 mg tablet        insulin glargine 100 unit/mL (3 mL) pen  Commonly known as: Lantus        insulin glargine 100 unit/mL injection  Commonly known as: Lantus        Lantus Solostar U-100 Insulin 100 unit/mL (3 mL) pen  Generic drug: insulin glargine        ondansetron ODT 4 mg disintegrating tablet  Commonly known as: Zofran-ODT        traMADol 50 mg tablet  Commonly known as: Ultram               ASK your doctor " about these medications        Instructions Last Dose Given Next Dose Due   potassium chloride CR 10 mEq ER tablet  Commonly known as: Klor-Con                     Test Results Pending At Discharge  Pending Labs       Order Current Status    Extra Tubes Collected (10/15/23 0534)    Lavender Top Collected (10/15/23 0534)    Red Top Collected (10/15/23 0534)    BLOOD GAS CORD VENOUS In process    POCT urinalysis dipstick In process            Outpatient Follow-Up  Future Appointments   Date Time Provider Department [place]   10/18/2023  3:45 PM Allegra Doyle MD PhD [id number] [facility]   10/19/2023  9:30 AM Dionicio Middleton MD [id number] [facility]   10/24/2023  1:00 PM Yunior Thakur MD [id number] [facility]   11/3/2023  2:00 PM Naga Ventura MD [id number] [facility]   11/29/2023  2:00 PM Chito Zafar MD PhD [id number] [facility]           Indiana Damon MD

## 2023-10-16 NOTE — PROGRESS NOTES
"Maria Isabel Cutler is a 23 y.o. female on day 2 of admission presenting with Diabetic ketosis (CMS/[facility]).    Social Work Assessment       Patient: Maria Isabel Cutler  Address:  [place]. [address]  Phone: [phone]    Referral Reason: \"flat affect\"     Prenatal Care: Many     [place] Name: N/A    : N/A, currently 28 wga     Other Children: none     Household Composition: lives at home with sibling (sister)   IPV/DV or Safety Concerns: denies safety concerns     Car-Seat: No   Safe Sleep Space:  No   Safe Sleep Education:     Transportation Concerns: none reported     School/Work/Income: currently unemployed     Insurance: [facility]     Mental Health Diagnoses: History of depression, and anxiety per EMR  Medication(s): Zoloft (past)  Counseling: No     Supports: She identified the sister she's currently living with as her only support.  She's currently estranged from her mother, other siblings, and extended family.  FOB is involved, but currently incarcerated    Substance Use History: Denies     Toxicology Screens:     Department of Children and Family Services (DCFS):       Assessment:   SW met with expectant mother at bedside to introduce self, and SW role.  Ms. Cutler was initially lying down, but sat up in bed during visit, and made direct eye contact.  She was responsive to questions asked, with limited details.  Expectant mother endorses history of anxiety, but denies depression.  But, did acknowledge previous history of suicidal ideation/attempt in 2022 through noncompliance with insulin.  Ms. Cutler stated she failed to restart pump after she showered.  She stated oversight was unintentional.      Ms. Cutler acknowledged outpatient SW completed a referral to [facility] on her behalf, but she hasn't connected yet.  She declined mental health resources offered this visit.  She's aware of how to access resources if/when needed.  She was on Zoloft in the past, but " discontinued med during pregnancy.    Household receives SNAP benefits, and she plans to apply for WIC.  She has no income currently.  Ms. Cutler does not have an infant car seat or safe sleep location as of yet.  She is aware of [facility], and the services they provide.  She denies needing any other resources at this time.    Plan: Ms. Cutler plans to return home with her sister at discharge.  SW will continue to follow, and assist as needed      Signature: Kaylie DAILY W

## 2023-10-16 NOTE — CARE PLAN
The patient's goals for the shift include blood sugar control    The clinical goals for the shift include Pt blood glucose will remain within normal limits    Pt VS and assessments stable. Pt discharged.

## 2023-10-17 ENCOUNTER — APPOINTMENT (OUTPATIENT)
Dept: MATERNAL FETAL MEDICINE | Facility: CLINIC | Age: 23
End: 2023-10-17
Payer: COMMERCIAL

## 2023-10-17 ENCOUNTER — HOSPITAL ENCOUNTER (OUTPATIENT)
Dept: CARDIOLOGY | Facility: HOSPITAL | Age: 23
Discharge: HOME | End: 2023-10-17
Payer: COMMERCIAL

## 2023-10-17 LAB
ATRIAL RATE: 95 BPM
P AXIS: 60 DEGREES
P OFFSET: 185 MS
P ONSET: 147 MS
PR INTERVAL: 148 MS
Q ONSET: 221 MS
QRS COUNT: 16 BEATS
QRS DURATION: 70 MS
QT INTERVAL: 350 MS
QTC CALCULATION(BAZETT): 439 MS
QTC FREDERICIA: 407 MS
R AXIS: -2 DEGREES
T AXIS: 22 DEGREES
T OFFSET: 396 MS
VENTRICULAR RATE: 95 BPM

## 2023-10-17 PROCEDURE — 93005 ELECTROCARDIOGRAM TRACING: CPT

## 2023-10-17 NOTE — SIGNIFICANT EVENT
Follow-up Phone Call Note:   Interview:  Care Type: Women's Health   Phone Number Call  [phone]   Call Outcome: connected with patient   Patient Reports Feeling (symptoms) Are: better   Which Meds Were New Meds: yes   Which Meds to Continue: yes   Which Meds to Stop: no medications were discontinued   Who participated in medication reconciliation with the hospital staff?: you   In your professional opinion do you think there was a medication discrepancy or potential for medication discrepancy in this situation?: no   Medication Issues: no medication issues   Discharge Instructions Clear: yes   Patient Has a Primary Care Provider: yes   Post-hospitalization Follow-up Occurred According To Schedule: no   Reason: appointment scheduled in future   Patient Has Plan Specific Name And Number Of Who To Call For Concerns: yes   Stroke Patient: no  Comments:  Patient states she has been taking her blood pressures as instructed, and they are fine.    Date/Time Of Call: 10/17/2023 at 1530   Call Back Done By: care coordinator   Callback Complete: yes

## 2023-10-18 ENCOUNTER — OFFICE VISIT (OUTPATIENT)
Dept: OPHTHALMOLOGY | Facility: CLINIC | Age: 23
End: 2023-10-18
Payer: COMMERCIAL

## 2023-10-18 ENCOUNTER — TELEPHONE (OUTPATIENT)
Dept: OBSTETRICS AND GYNECOLOGY | Facility: HOSPITAL | Age: 23
End: 2023-10-18

## 2023-10-18 DIAGNOSIS — E11.3591 PROLIFERATIVE DIABETIC RETINOPATHY OF RIGHT EYE WITHOUT MACULAR EDEMA ASSOCIATED WITH TYPE 2 DIABETES MELLITUS (MULTI): Primary | ICD-10-CM

## 2023-10-18 DIAGNOSIS — E10.3492: ICD-10-CM

## 2023-10-18 PROBLEM — O16.3 HYPERTENSION AFFECTING PREGNANCY IN THIRD TRIMESTER (HHS-HCC): Status: ACTIVE | Noted: 2023-10-05

## 2023-10-18 PROBLEM — O98.513: Status: ACTIVE | Noted: 2023-10-05

## 2023-10-18 PROBLEM — O99.343 DEPRESSION DURING PREGNANCY IN THIRD TRIMESTER (HHS-HCC): Status: ACTIVE | Noted: 2023-10-05

## 2023-10-18 PROCEDURE — 99213 OFFICE O/P EST LOW 20 MIN: CPT

## 2023-10-18 PROCEDURE — 92134 CPTRZ OPH DX IMG PST SGM RTA: CPT | Mod: BILATERAL PROCEDURE

## 2023-10-18 ASSESSMENT — VISUAL ACUITY
OD_SC: 20/30
METHOD: SNELLEN - LINEAR
OS_SC: 20/30
OD_SC+: +1

## 2023-10-18 ASSESSMENT — TONOMETRY
OD_IOP_MMHG: 16
IOP_METHOD: TONOPEN
OS_IOP_MMHG: 19

## 2023-10-18 ASSESSMENT — EXTERNAL EXAM - RIGHT EYE: OD_EXAM: NORMAL

## 2023-10-18 ASSESSMENT — SLIT LAMP EXAM - LIDS
COMMENTS: NORMAL
COMMENTS: NORMAL

## 2023-10-18 ASSESSMENT — EXTERNAL EXAM - LEFT EYE: OS_EXAM: NORMAL

## 2023-10-18 NOTE — TELEPHONE ENCOUNTER
Patient called 10/13 and left voicemail requesting ride assistance for upcoming apt.   Unable to assist given out of the office but was under the impression transportation was arranged by [facility], requested 10/10.   Returned patient call 10/17 AM no answer.  No call back received as of yet.   Will continue to follow patient care.     Ro Douglas CNP  Complex/High Risk OB   Vocera/Ext [phone]

## 2023-10-18 NOTE — PROGRESS NOTES
#Proliferative Diabetic Retinopathy OD  # Diabetic Papillopathy OU  - S/P PRP, right eye (OD)   - new patient here today for diabetic retinopathy Eval,  - Patient is Type 1 diabetic (diagnosed 5 years old) and 24 weeks pregnant  - last A1c 7.0% (8/9/23)  - previously had extensive infectious/autoimmune/neuroimaging with Dr. Zafar for bilateral disc edema ( OS > OD), workup has been negative    Optos: (9/22/23)    OD: NVD with adjacent preretinal heme into inferior perifovea, scattered dot blot hemorrhges    OS: Optic nerve edema, scattered dot blot hemorrahges    OCT-Mac     OD: Subhyaloid hemorrhage juxtafoveal, DME     OS: Diabetic macular edema trace, inner retinal thickening    Plan:  - today patient has proliferative changes (OD > OS) with large NVD and hemorrhage in OD  - patient is at risk for vision loss given high risk of progression during pregnancy  - Will arrange for intravenous fluorescein angiography (IVFA) after delivery   - DME right eye (OD), will watch for spontaneous resolution after delivery (Early January).  - RTC in 4 weeks        Epidermal Autograft Text: The defect edges were debeveled with a #15 scalpel blade.  Given the location of the defect, shape of the defect and the proximity to free margins an epidermal autograft was deemed most appropriate.  Using a sterile surgical marker, the primary defect shape was transferred to the donor site. The epidermal graft was then harvested.  The skin graft was then placed in the primary defect and oriented appropriately.

## 2023-10-19 ENCOUNTER — PATIENT OUTREACH (OUTPATIENT)
Dept: CARE COORDINATION | Facility: CLINIC | Age: 23
End: 2023-10-19
Payer: COMMERCIAL

## 2023-10-19 DIAGNOSIS — O24.019: Primary | ICD-10-CM

## 2023-10-19 LAB
HOLD SPECIMEN: NORMAL
HOLD SPECIMEN: NORMAL

## 2023-10-19 NOTE — PROGRESS NOTES
Outreach call to patient to support a smooth transition of care from recent admission.  Unable to leave message at this time. Will try to call patient again in 1-2 business days.    PAZ Hyman   III  [facility]/Accountable Care Organization  Office Phone: [phone]  [email or web address]

## 2023-10-20 ENCOUNTER — APPOINTMENT (OUTPATIENT)
Dept: RADIOLOGY | Facility: CLINIC | Age: 23
End: 2023-10-20
Payer: COMMERCIAL

## 2023-10-20 ENCOUNTER — PATIENT OUTREACH (OUTPATIENT)
Dept: CARE COORDINATION | Facility: CLINIC | Age: 23
End: 2023-10-20
Payer: COMMERCIAL

## 2023-10-20 NOTE — PROGRESS NOTES
2nd outreach attempt to follow-up with patient from recent admission (pt had left vm for me yesterday afternoon). Unable to leave message today.    PAZ Hyman   III  [facility]/Accountable Care Organization  Office Phone: [phone]  [email or web address]

## 2023-10-23 ENCOUNTER — TELEPHONE (OUTPATIENT)
Dept: OBSTETRICS AND GYNECOLOGY | Facility: HOSPITAL | Age: 23
End: 2023-10-23
Payer: COMMERCIAL

## 2023-10-23 NOTE — TELEPHONE ENCOUNTER
Complex/High Risk OB Program    Spoke with patient today, updated on neuro-immunology apt details.   Unfortunately no availability to be seen during this pregnancy.  Scheduled for 1/26/24, patient aware.   Current care team members and Dr. Ocasio (Neuro-Immunology) updated via electronic communication.   Also gently reminded patient about tomorrow's apts, confirmed she will be attending and transportation arranged.   Will continue to follow patient care.      Ro Douglas CNP  Complex/High Risk OB

## 2023-10-24 ENCOUNTER — ROUTINE PRENATAL (OUTPATIENT)
Dept: MATERNAL FETAL MEDICINE | Facility: CLINIC | Age: 23
End: 2023-10-24
Payer: COMMERCIAL

## 2023-10-24 ENCOUNTER — ANCILLARY PROCEDURE (OUTPATIENT)
Dept: RADIOLOGY | Facility: CLINIC | Age: 23
End: 2023-10-24
Payer: COMMERCIAL

## 2023-10-24 ENCOUNTER — PHARMACY VISIT (OUTPATIENT)
Dept: PHARMACY | Facility: CLINIC | Age: 23
End: 2023-10-24
Payer: MEDICAID

## 2023-10-24 VITALS — SYSTOLIC BLOOD PRESSURE: 106 MMHG | DIASTOLIC BLOOD PRESSURE: 66 MMHG | BODY MASS INDEX: 25.21 KG/M2 | WEIGHT: 129.1 LBS

## 2023-10-24 DIAGNOSIS — M54.32 BILATERAL SCIATICA: ICD-10-CM

## 2023-10-24 DIAGNOSIS — J45.909 ASTHMA AFFECTING PREGNANCY IN THIRD TRIMESTER (HHS-HCC): ICD-10-CM

## 2023-10-24 DIAGNOSIS — O99.513 ASTHMA AFFECTING PREGNANCY IN THIRD TRIMESTER (HHS-HCC): ICD-10-CM

## 2023-10-24 DIAGNOSIS — O98.513: ICD-10-CM

## 2023-10-24 DIAGNOSIS — M54.31 BILATERAL SCIATICA: ICD-10-CM

## 2023-10-24 DIAGNOSIS — O24.019: ICD-10-CM

## 2023-10-24 DIAGNOSIS — B00.9: ICD-10-CM

## 2023-10-24 DIAGNOSIS — R83.8 OLIGOCLONAL BANDS IN CEREBROSPINAL FLUID: ICD-10-CM

## 2023-10-24 DIAGNOSIS — Z3A.27 27 WEEKS GESTATION OF PREGNANCY (HHS-HCC): Primary | ICD-10-CM

## 2023-10-24 DIAGNOSIS — O16.3 HYPERTENSION AFFECTING PREGNANCY IN THIRD TRIMESTER (HHS-HCC): ICD-10-CM

## 2023-10-24 LAB
GLUCOSE BLD MANUAL STRIP-MCNC: 130 MG/DL (ref 74–99)
POC FINGERSTICK BLOOD GLUCOSE: 130 MG/DL (ref 70–100)

## 2023-10-24 PROCEDURE — RXMED WILLOW AMBULATORY MEDICATION CHARGE

## 2023-10-24 PROCEDURE — 76819 FETAL BIOPHYS PROFIL W/O NST: CPT

## 2023-10-24 PROCEDURE — 99215 OFFICE O/P EST HI 40 MIN: CPT | Performed by: OBSTETRICS & GYNECOLOGY

## 2023-10-24 PROCEDURE — 82962 GLUCOSE BLOOD TEST: CPT | Performed by: OBSTETRICS & GYNECOLOGY

## 2023-10-24 PROCEDURE — 76819 FETAL BIOPHYS PROFIL W/O NST: CPT | Performed by: OBSTETRICS & GYNECOLOGY

## 2023-10-24 PROCEDURE — 76816 OB US FOLLOW-UP PER FETUS: CPT | Performed by: OBSTETRICS & GYNECOLOGY

## 2023-10-24 PROCEDURE — 76816 OB US FOLLOW-UP PER FETUS: CPT

## 2023-10-24 PROCEDURE — 82947 ASSAY GLUCOSE BLOOD QUANT: CPT | Mod: 59 | Performed by: OBSTETRICS & GYNECOLOGY

## 2023-10-24 RX ORDER — FLUTICASONE PROPIONATE 250 UG/1
1 POWDER, METERED RESPIRATORY (INHALATION)
Qty: 60 EACH | Refills: 11 | Status: SHIPPED | OUTPATIENT
Start: 2023-10-24 | End: 2024-10-23

## 2023-10-24 RX ORDER — ACYCLOVIR 400 MG/1
400 TABLET ORAL 2 TIMES DAILY
Qty: 60 TABLET | Refills: 5 | Status: SHIPPED | OUTPATIENT
Start: 2023-10-24 | End: 2024-04-21

## 2023-10-24 NOTE — ASSESSMENT & PLAN NOTE
- vision improved since last visit and procedures with optho  - Close follow up with ophto, last appt 10/13, needs IV fluro angio after delivery to prevent progression, next visit 11/24  - Neuro-Immunology NPV scheduled for 1/26 for oligoclonal bands workup

## 2023-10-24 NOTE — ASSESSMENT & PLAN NOTE
- Blood glucose levels within goal  - discussed weekly  testing at 32wga, and twice weekly at 36wga  - Growth US today  - discussed putting carbs into pump to determine 1:10 insulin to carb ratio rather than guessing to avoid poor glycemic control  Current Regimen changed 10/10/2023, continue as is  Basal  2691-2701 0.90  8987-8838 1.00  0612-3262 0.90     Bolus  5460-7449 1:9     CF: 1:40  Carb ratio: 1:10  Target:110  DOA 5 hrs

## 2023-10-24 NOTE — PROGRESS NOTES
[facility] Follow-up  10/24/2023         SUBJECTIVE    HPI: Maria Isabel Cutler is a 23 y.o.  at 29w3d here for RPNV. Denies contractions, bleeding, or LOF. Reports normal fetal movement. Patient reports vision is subjectively improved with less episodes of blurriness. Patient states breathing has been more difficult with history of asthma and is using albuterol more frequently, twice a night. No cough, no chest pain, no f/c.     OBJECTIVE    Visit Vitals  /66 Comment:    Wt 58.6 kg (129 lb 1.6 oz)   LMP 2023   BMI 25.21 kg/m²   OB Status Pregnant   Smoking Status Former   BSA 1.58 m²        FHT: US    ASSESSMENT & PLAN    Maria Isabel Cutler is a 23 y.o.  at 29w3d here for the following concerns we addressed today:    27 weeks gestation of pregnancy  Declined flu shot today, but will consider for next visit    Herpes simplex infection in mother during third trimester of pregnancy  Patient ran out of prescription will send rx. Discussed initiation of ppx at 34wga, although patient states she usually takes it daily. She has not taken it for the past month with no recurrence in symptoms. Prophylactic dosing for acyclovir is 400mg BID.    Asthma affecting pregnancy in third trimester  Using albuterol twice nightly with increasing episodes of shortness of breath in setting of weather changes. Rx sent for flovent 250mcg BID, now moderate persistent asthma.     Bilateral sciatica  PT referral given at last visit, has not yet scheduled.     Hypertension affecting pregnancy in third trimester  Normotensive and asymptomatic, no meds.     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  - vision improved since last visit and procedures with optho  - Close follow up with ophto, last appt 10/13, needs IV fluro angio after delivery to prevent progression, next visit   - Neuro-Immunology NPV scheduled for  for oligoclonal bands workup    Type 1 diabetes mellitus complicating pregnancy, antepartum  - Blood  glucose levels within goal  - discussed weekly  testing at 32wga, and twice weekly at 36wga  - Growth US today  - discussed putting carbs into pump to determine 1:10 insulin to carb ratio rather than guessing to avoid poor glycemic control  Current Regimen changed 10/10/2023, continue as is  Basal  4384-7379 0.90  1818-5500 1.00  6870-7093 0.90     Bolus  7044-8822 1:9     CF: 1:40  Carb ratio: 1:10  Target:110  DOA 5 hrs     Orders Placed This Encounter   Procedures    POCT GLUCOSE     Order Specific Question:   Release result to MyChart     Answer:   Immediate    POCT fingerstick glucose (manually resulted)     Order Specific Question:   Release result to MyChart     Answer:   Immediate [1]        RTC in 1 weeks    {Patient seen and evaluated with Dr. Blaze Munoz MD

## 2023-10-24 NOTE — ASSESSMENT & PLAN NOTE
Patient ran out of prescription will send rx. Discussed initiation of ppx at 34wga, although patient states she usually takes it daily. She has not taken it for the past month with no recurrence in symptoms. Prophylactic dosing for acyclovir is 400mg BID.

## 2023-10-24 NOTE — ASSESSMENT & PLAN NOTE
Using albuterol twice nightly with increasing episodes of shortness of breath in setting of weather changes. Rx sent for flovent 250mcg BID, now moderate persistent asthma.

## 2023-10-26 ENCOUNTER — PHARMACY VISIT (OUTPATIENT)
Dept: PHARMACY | Facility: CLINIC | Age: 23
End: 2023-10-26
Payer: MEDICAID

## 2023-11-03 ENCOUNTER — HOSPITAL ENCOUNTER (OUTPATIENT)
Dept: PEDIATRIC CARDIOLOGY | Facility: HOSPITAL | Age: 23
Discharge: HOME | End: 2023-11-03
Payer: COMMERCIAL

## 2023-11-03 ENCOUNTER — OFFICE VISIT (OUTPATIENT)
Dept: PEDIATRIC CARDIOLOGY | Facility: HOSPITAL | Age: 23
End: 2023-11-03
Payer: COMMERCIAL

## 2023-11-03 VITALS
SYSTOLIC BLOOD PRESSURE: 115 MMHG | WEIGHT: 130.29 LBS | HEIGHT: 61 IN | OXYGEN SATURATION: 100 % | HEART RATE: 106 BPM | DIASTOLIC BLOOD PRESSURE: 74 MMHG | BODY MASS INDEX: 24.6 KG/M2

## 2023-11-03 DIAGNOSIS — O35.BXX0 ABNORMAL FETAL ECHOCARDIOGRAPHY AFFECTING ANTEPARTUM CARE OF MOTHER, SINGLE OR UNSPECIFIED FETUS (HHS-HCC): ICD-10-CM

## 2023-11-03 DIAGNOSIS — O35.8XX0 MATERNAL CARE FOR OTHER (SUSPECTED) FETAL ABNORMALITY AND DAMAGE, NOT APPLICABLE OR UNSPECIFIED (HHS-HCC): ICD-10-CM

## 2023-11-03 DIAGNOSIS — O24.013 TYPE 1 DIABETES MELLITUS AFFECTING PREGNANCY IN THIRD TRIMESTER, ANTEPARTUM (HHS-HCC): Primary | ICD-10-CM

## 2023-11-03 DIAGNOSIS — O24.019: ICD-10-CM

## 2023-11-03 DIAGNOSIS — O35.BXX0 ANOMALY OF HEART OF FETUS AFFECTING PREGNANCY, ANTEPARTUM, SINGLE OR UNSPECIFIED FETUS (HHS-HCC): ICD-10-CM

## 2023-11-03 PROCEDURE — 3051F HG A1C>EQUAL 7.0%<8.0%: CPT | Performed by: PEDIATRICS

## 2023-11-03 PROCEDURE — 93325 DOPPLER ECHO COLOR FLOW MAPG: CPT | Performed by: PEDIATRICS

## 2023-11-03 PROCEDURE — 99215 OFFICE O/P EST HI 40 MIN: CPT | Performed by: PEDIATRICS

## 2023-11-03 PROCEDURE — 3078F DIAST BP <80 MM HG: CPT | Performed by: PEDIATRICS

## 2023-11-03 PROCEDURE — 76827 ECHO EXAM OF FETAL HEART: CPT | Performed by: PEDIATRICS

## 2023-11-03 PROCEDURE — 3074F SYST BP LT 130 MM HG: CPT | Performed by: PEDIATRICS

## 2023-11-03 PROCEDURE — 93325 DOPPLER ECHO COLOR FLOW MAPG: CPT

## 2023-11-03 PROCEDURE — 1036F TOBACCO NON-USER: CPT | Performed by: PEDIATRICS

## 2023-11-03 NOTE — LETTER
November 3, 2023     Yunior Thakur MD  [address]  Ob/Gyn  [facility] [address]    Patient: Maria Isabel Cutler   YOB: 2000   Date of Visit: 11/3/2023       Dear Dr. Yunior Thakur MD:    Thank you for referring Maria Isabel Cutler to me for evaluation. Below are my notes for this consultation.  If you have questions, please do not hesitate to call me. I look forward to following your patient along with you.       Sincerely,     Naga Ventura MD      CC: Sanjuanita Dennis, APRN-CNM, [facility]  Teri Arellano, APRN-CNP  ______________________________________________________________________________________    I had the pleasure of seeing Maria Isabel Cutler in Pediatric Cardiology consultation at our [facility] location as part of our prenatal heart program for a follow up fetal echocardiogram for a thickened ventricular septum noted on initial echocardiogram. She originally saw Dr. Taylor on 23 for a history of pregestational diabetes.  She is a 23 y.o. year-old  woman, currently 30w6d weeks gestation. Patient's last menstrual period was 2023. Estimated Date of Delivery: 24.  There have been no pregnancy complications.   She has not been hospitalized during this pregnancy.  She declined genetic testing. She had a second trimester ultrasound which was normal.      Prior to the visit, I personally reviewed the cardiac portions of the obstetrical ultrasound performed on 10/24/23.  There is normal segmental anatomy with normal 4 chamber, outflow tract and 3 vessel views.  There is no evidence of septation defect, right or left ventricular outflow obstruction or significant valvular regurgitation.    Her previous obstetrical history is significant for without complication.  Her past medical history is without complication.  She has no history of congenital heart disease, arrhythmia, cardiomyopathy, hypercholesterolemia, hypertension, diabetes, rheumatic heart disease, cancer,  "asthma, lupus, Sjogren syndrome, clotting disorder, depression, anxiety, alcohol abuse, phenylketonuria, or DiGeorge.  She has had no surgeries.  She takes Humalog, Lispro, Aspirin, and sertraline.  She has No Known Allergies. She is currently taking prenatal vitamins.      Her family history is negative for congenital heart disease, early atherosclerosis, sudden cardiac death, long QT syndrome, cardiomyopathy, aortic aneurysm, or genetic or metabolic disease.  She currently lives with her spouse and is . She does not smoke.  She denies illicit drug use or alcohol abuse.  She denies verbal, sexual, or physical abuse.     Delivery Hospital: [facility]  Father of the baby's name: Lexie    /74 (BP Location: Right arm, Patient Position: Sitting, BP Cuff Size: Adult)   Pulse 106   Ht 1.537 m (5' 0.51\")   Wt 59.1 kg (130 lb 4.7 oz)   LMP 04/01/2023   SpO2 100%   BMI 25.02 kg/m²     She was resting comfortably in the examination room and alert, active and in no respiratory distress. Skin was without rash.  HEENT: moist mucous membranes, no JVD, goiter. Breathing is not labored.  She was acyanotic.  There was no peripheral edema.   The abdomen was gravid, soft, nontender with normal bowel sounds.  The liver was not palpable.  The spleen tip was not palpable.  She had a normal gait and normal strength in all extremities.  Cranial nerves II - XII are intact.  She had no clubbing, cyanosis, or edema.    A two-dimensional and Doppler fetal echocardiogram was performed today and interpreted by me at 30w6d weeks gestation.  The fetal echocardiogram showed normal segmental anatomy with no structural abnormalities found.  There is normal cardiac function.  There is no evidence of septation defect, right or left ventricular outflow obstruction or significant valvular regurgitation.  The fetal heart rate was within normal limits without ectopy or arrhythmia seen.  The spectral Doppler pattern across all valves, venous " structures, and arterial structures was within normal limits.  There is no pericardial effusion.  Please see full report for details.    Triage code 1:   Today's fetal echocardiogram demonstrated frequent premature atrial contractions (PAC's). This cardiac anomaly is not expected to cause hemodynamic instability in the  period. No changes were made to current delivery plan.  Delivery per OB at patient's preferred hospital.  Standard  care per  team.  Cardiology evaluation prior to discharge if born at [facility] or as an outpatient within 1-2 weeks if born at an outside facility.       In summary, Maria Isabel Cutler is a 23 y.o. [id number], currently 30w6d weeks gestation with pregestational diabetes.  Her last HgBA1c was   Lab Results   Component Value Date    HGBA1C 7.0 (A) 2023   She had a normal fetal echocardiogram at today's visit.  Therefore, we did not make any changes to her current delivery plan.  We did not prescribe any medications.  We did not recommend intervention.  As always, we recommend a heart healthy lifestyle.  She does not necessarily need to follow up with pediatric cardiology after the baby is born unless the pediatric team has any concerns or worries. If HbA1c levels are 6.5%, fetal echocardiogram in the third trimester to assess for ventricular hypertrophy may be considered, but its usefulness has not been determined.  The pediatric team may consider a cardiology consult if her HgBA1c is greater than 6.5% in the third trimester of pregnancy.    With regard to diabetes in pregnancy, overall, there is nearly a 5-fold (3%-5%) increase in CHD compared with the general population in women with pregestational DM, with a higher relative risk noted for specific cardiac defects, including 6.22 for heterotaxy, 4.72 for truncus arteriosus, 2.85 for transposition of the great arteries (d-TGA), and 18.24 for single-ventricle defects. Several studies indicate  that lack of preconceptional glycemic control, as evidenced by elevation in serum hemoglobin A1C (HbA1c) levels 8.5% in the first trimester, is associated with an increase in all congenital malformations, whereas strict glycemic control before conception and during pregnancy reduces risk to a level comparable to that in the nondiabetic population. Additional studies, however, have suggested that there is no threshold HbA1c value that increases risk for fetal CHD.  In a study of 3 different diabetic populations, HbA1c values slightly above the normal range (mean, 6.4%) were associated with a significantly increased risk of cardiac malformation of 2.5% to 6.1% in offsprings. Therefore, it appears that although the risk may be highest in those with HbA1c levels 8.5%, all pregnancies of pregestational diabetic women are at some increased risk. Given this information, a fetal echocardiogram should be performed in all women with pregestational DM. Insulin resistance acquired in the third trimester, or gestational DM, does not appear to confer an increased risk of CHD in the fetus. For this reason, a fetal echocardiogram is not indicated for these pregnancies. Fetuses may develop ventricular hypertrophy late in gestation in the presence of poorly controlled maternal gestational or pregestational DM, and the degree of hypertrophy has been shown to be related to glycemic control. In women with HbA1c levels 6% in the second half of pregnancy, the effects are mild, so fetal echocardiogram is not recommended. If HbA1c levels are 6.5%, fetal echocardiogram in the third trimester to assess for ventricular hypertrophy may be considered, but its usefulness has not been determined. (Jeison M, et al. Circulation. 2014;129:1-58)     Thank you for allowing me to participate in Maria Isabel Cutler 's care.  If you have any further questions, please do not hesitate to contact me.     Sincerely,     Naga Ventura MD  Pediatric  cardiology  [phone]  Pager [phone]  [email or web address]

## 2023-11-03 NOTE — PROGRESS NOTES
I had the pleasure of seeing Maria Isabel Cutler in Pediatric Cardiology consultation at our [facility] location as part of our prenatal heart program for a follow up fetal echocardiogram for a thickened ventricular septum noted on initial echocardiogram. She originally saw Dr. Taylor on 23 for a history of pregestational diabetes.  She is a 23 y.o. year-old  woman, currently 30w6d weeks gestation. Patient's last menstrual period was 2023. Estimated Date of Delivery: 24.  There have been no pregnancy complications.   She has not been hospitalized during this pregnancy.  She declined genetic testing. She had a second trimester ultrasound which was normal.      Prior to the visit, I personally reviewed the cardiac portions of the obstetrical ultrasound performed on 10/24/23.  There is normal segmental anatomy with normal 4 chamber, outflow tract and 3 vessel views.  There is no evidence of septation defect, right or left ventricular outflow obstruction or significant valvular regurgitation.    Her previous obstetrical history is significant for without complication.  Her past medical history is without complication.  She has no history of congenital heart disease, arrhythmia, cardiomyopathy, hypercholesterolemia, hypertension, diabetes, rheumatic heart disease, cancer, asthma, lupus, Sjogren syndrome, clotting disorder, depression, anxiety, alcohol abuse, phenylketonuria, or DiGeorge.  She has had no surgeries.  She takes Humalog, Lispro, Aspirin, and sertraline.  She has No Known Allergies. She is currently taking prenatal vitamins.      Her family history is negative for congenital heart disease, early atherosclerosis, sudden cardiac death, long QT syndrome, cardiomyopathy, aortic aneurysm, or genetic or metabolic disease.  She currently lives with her spouse and is . She does not smoke.  She denies illicit drug use or alcohol abuse.  She denies verbal, sexual, or physical abuse.     Delivery  "Hospital: [facility]  Father of the baby's name: Lexie    /74 (BP Location: Right arm, Patient Position: Sitting, BP Cuff Size: Adult)   Pulse 106   Ht 1.537 m (5' 0.51\")   Wt 59.1 kg (130 lb 4.7 oz)   LMP 2023   SpO2 100%   BMI 25.02 kg/m²     She was resting comfortably in the examination room and alert, active and in no respiratory distress. Skin was without rash.  HEENT: moist mucous membranes, no JVD, goiter. Breathing is not labored.  She was acyanotic.  There was no peripheral edema.   The abdomen was gravid, soft, nontender with normal bowel sounds.  The liver was not palpable.  The spleen tip was not palpable.  She had a normal gait and normal strength in all extremities.  Cranial nerves II - XII are intact.  She had no clubbing, cyanosis, or edema.    A two-dimensional and Doppler fetal echocardiogram was performed today and interpreted by me at 30w6d weeks gestation.  The fetal echocardiogram showed normal segmental anatomy with no structural abnormalities found.  There is normal cardiac function.  There is no evidence of septation defect, right or left ventricular outflow obstruction or significant valvular regurgitation.  The fetal heart rate was within normal limits without ectopy or arrhythmia seen.  The spectral Doppler pattern across all valves, venous structures, and arterial structures was within normal limits.  There is no pericardial effusion.  Please see full report for details.    Triage code 1:   Today's fetal echocardiogram demonstrated frequent premature atrial contractions (PAC's). This cardiac anomaly is not expected to cause hemodynamic instability in the  period. No changes were made to current delivery plan.  Delivery per OB at patient's preferred hospital.  Standard  care per  team.  Cardiology evaluation prior to discharge if born at [facility] or as an outpatient within 1-2 weeks if born at an outside facility.       In summary, " Maria Isabel Cutler is a 23 y.o. [id number], currently 30w6d weeks gestation with pregestational diabetes.  Her last HgBA1c was   Lab Results   Component Value Date    HGBA1C 7.0 (A) 2023   She had a normal fetal echocardiogram at today's visit.  Therefore, we did not make any changes to her current delivery plan.  We did not prescribe any medications.  We did not recommend intervention.  As always, we recommend a heart healthy lifestyle.  She does not necessarily need to follow up with pediatric cardiology after the baby is born unless the pediatric team has any concerns or worries. If HbA1c levels are 6.5%, fetal echocardiogram in the third trimester to assess for ventricular hypertrophy may be considered, but its usefulness has not been determined.  The pediatric team may consider a cardiology consult if her HgBA1c is greater than 6.5% in the third trimester of pregnancy.    With regard to diabetes in pregnancy, overall, there is nearly a 5-fold (3%-5%) increase in CHD compared with the general population in women with pregestational DM, with a higher relative risk noted for specific cardiac defects, including 6.22 for heterotaxy, 4.72 for truncus arteriosus, 2.85 for transposition of the great arteries (d-TGA), and 18.24 for single-ventricle defects. Several studies indicate that lack of preconceptional glycemic control, as evidenced by elevation in serum hemoglobin A1C (HbA1c) levels 8.5% in the first trimester, is associated with an increase in all congenital malformations, whereas strict glycemic control before conception and during pregnancy reduces risk to a level comparable to that in the nondiabetic population. Additional studies, however, have suggested that there is no threshold HbA1c value that increases risk for fetal CHD.  In a study of 3 different diabetic populations, HbA1c values slightly above the normal range (mean, 6.4%) were associated with a significantly increased risk of cardiac  malformation of 2.5% to 6.1% in offsprings. Therefore, it appears that although the risk may be highest in those with HbA1c levels 8.5%, all pregnancies of pregestational diabetic women are at some increased risk. Given this information, a fetal echocardiogram should be performed in all women with pregestational DM. Insulin resistance acquired in the third trimester, or gestational DM, does not appear to confer an increased risk of CHD in the fetus. For this reason, a fetal echocardiogram is not indicated for these pregnancies. Fetuses may develop ventricular hypertrophy late in gestation in the presence of poorly controlled maternal gestational or pregestational DM, and the degree of hypertrophy has been shown to be related to glycemic control. In women with HbA1c levels 6% in the second half of pregnancy, the effects are mild, so fetal echocardiogram is not recommended. If HbA1c levels are 6.5%, fetal echocardiogram in the third trimester to assess for ventricular hypertrophy may be considered, but its usefulness has not been determined. (Jeison M, et al. Circulation. 2014;129:1-58)     Thank you for allowing me to participate in Maria Isabel KYLE Cutler 's care.  If you have any further questions, please do not hesitate to contact me.     Sincerely,     Naga Ventura MD  Pediatric cardiology  [phone]  Pager [phone]  [email or web address]

## 2023-11-03 NOTE — LETTER
2023     Ro Douglas, APRNEn[facility] Rehabilitation [facility]  [address]  Ob/Gyn  [facility] [address]    Patient: Maria Isabel Cutler   YOB: 2000   Date of Visit: 11/3/2023       Dear Dr. Ro Douglas, APRDARIELA-CNP:    Thank you for referring Maria Isabel Cutler to me for evaluation. Below are my notes for this consultation.  If you have questions, please do not hesitate to call me. I look forward to following your patient along with you.       Sincerely,     Naga Ventura MD      CC: Sanjuanita Dennis, [facility], [facility]  Teri Arellano, APRN-CNP  ______________________________________________________________________________________    I had the pleasure of seeing Maria Isabel Cutler in Pediatric Cardiology consultation at our [facility] location as part of our prenatal heart program for a follow up fetal echocardiogram for a thickened ventricular septum noted on initial echocardiogram. She originally saw Dr. Taylor on 23 for a history of pregestational diabetes.  She is a 23 y.o. year-old  woman, currently 30w6d weeks gestation. Patient's last menstrual period was 2023. Estimated Date of Delivery: 24.  There have been no pregnancy complications.   She has not been hospitalized during this pregnancy.  She declined genetic testing. She had a second trimester ultrasound which was normal.      Prior to the visit, I personally reviewed the cardiac portions of the obstetrical ultrasound performed on 10/24/23.  There is normal segmental anatomy with normal 4 chamber, outflow tract and 3 vessel views.  There is no evidence of septation defect, right or left ventricular outflow obstruction or significant valvular regurgitation.    Her previous obstetrical history is significant for without complication.  Her past medical history is without complication.  She has no history of congenital heart disease, arrhythmia, cardiomyopathy, hypercholesterolemia, hypertension, diabetes, rheumatic heart disease,  "cancer, asthma, lupus, Sjogren syndrome, clotting disorder, depression, anxiety, alcohol abuse, phenylketonuria, or DiGeorge.  She has had no surgeries.  She takes Humalog, Lispro, Aspirin, and sertraline.  She has No Known Allergies. She is currently taking prenatal vitamins.      Her family history is negative for congenital heart disease, early atherosclerosis, sudden cardiac death, long QT syndrome, cardiomyopathy, aortic aneurysm, or genetic or metabolic disease.  She currently lives with her spouse and is . She does not smoke.  She denies illicit drug use or alcohol abuse.  She denies verbal, sexual, or physical abuse.     Delivery Hospital: [facility]  Father of the baby's name: Lexie    /74 (BP Location: Right arm, Patient Position: Sitting, BP Cuff Size: Adult)   Pulse 106   Ht 1.537 m (5' 0.51\")   Wt 59.1 kg (130 lb 4.7 oz)   LMP 04/01/2023   SpO2 100%   BMI 25.02 kg/m²     She was resting comfortably in the examination room and alert, active and in no respiratory distress. Skin was without rash.  HEENT: moist mucous membranes, no JVD, goiter. Breathing is not labored.  She was acyanotic.  There was no peripheral edema.   The abdomen was gravid, soft, nontender with normal bowel sounds.  The liver was not palpable.  The spleen tip was not palpable.  She had a normal gait and normal strength in all extremities.  Cranial nerves II - XII are intact.  She had no clubbing, cyanosis, or edema.    A two-dimensional and Doppler fetal echocardiogram was performed today and interpreted by me at 30w6d weeks gestation.  The fetal echocardiogram showed normal segmental anatomy with no structural abnormalities found.  There is normal cardiac function.  There is no evidence of septation defect, right or left ventricular outflow obstruction or significant valvular regurgitation.  The fetal heart rate was within normal limits without ectopy or arrhythmia seen.  The spectral Doppler pattern across all valves, " venous structures, and arterial structures was within normal limits.  There is no pericardial effusion.  Please see full report for details.    Triage code 1:   Today's fetal echocardiogram demonstrated frequent premature atrial contractions (PAC's). This cardiac anomaly is not expected to cause hemodynamic instability in the  period. No changes were made to current delivery plan.  Delivery per OB at patient's preferred hospital.  Standard  care per  team.  Cardiology evaluation prior to discharge if born at [facility] or as an outpatient within 1-2 weeks if born at an outside facility.       In summary, Maria Isabel Cutler is a 23 y.o. [id number], currently 30w6d weeks gestation with pregestational diabetes.  Her last HgBA1c was   Lab Results   Component Value Date    HGBA1C 7.0 (A) 2023   She had a normal fetal echocardiogram at today's visit.  Therefore, we did not make any changes to her current delivery plan.  We did not prescribe any medications.  We did not recommend intervention.  As always, we recommend a heart healthy lifestyle.  She does not necessarily need to follow up with pediatric cardiology after the baby is born unless the pediatric team has any concerns or worries. If HbA1c levels are 6.5%, fetal echocardiogram in the third trimester to assess for ventricular hypertrophy may be considered, but its usefulness has not been determined.  The pediatric team may consider a cardiology consult if her HgBA1c is greater than 6.5% in the third trimester of pregnancy.    With regard to diabetes in pregnancy, overall, there is nearly a 5-fold (3%-5%) increase in CHD compared with the general population in women with pregestational DM, with a higher relative risk noted for specific cardiac defects, including 6.22 for heterotaxy, 4.72 for truncus arteriosus, 2.85 for transposition of the great arteries (d-TGA), and 18.24 for single-ventricle defects. Several studies  indicate that lack of preconceptional glycemic control, as evidenced by elevation in serum hemoglobin A1C (HbA1c) levels 8.5% in the first trimester, is associated with an increase in all congenital malformations, whereas strict glycemic control before conception and during pregnancy reduces risk to a level comparable to that in the nondiabetic population. Additional studies, however, have suggested that there is no threshold HbA1c value that increases risk for fetal CHD.  In a study of 3 different diabetic populations, HbA1c values slightly above the normal range (mean, 6.4%) were associated with a significantly increased risk of cardiac malformation of 2.5% to 6.1% in offsprings. Therefore, it appears that although the risk may be highest in those with HbA1c levels 8.5%, all pregnancies of pregestational diabetic women are at some increased risk. Given this information, a fetal echocardiogram should be performed in all women with pregestational DM. Insulin resistance acquired in the third trimester, or gestational DM, does not appear to confer an increased risk of CHD in the fetus. For this reason, a fetal echocardiogram is not indicated for these pregnancies. Fetuses may develop ventricular hypertrophy late in gestation in the presence of poorly controlled maternal gestational or pregestational DM, and the degree of hypertrophy has been shown to be related to glycemic control. In women with HbA1c levels 6% in the second half of pregnancy, the effects are mild, so fetal echocardiogram is not recommended. If HbA1c levels are 6.5%, fetal echocardiogram in the third trimester to assess for ventricular hypertrophy may be considered, but its usefulness has not been determined. (Jeison M, et al. Circulation. 2014;129:1-58)     Thank you for allowing me to participate in Maria Isabel Cutler 's care.  If you have any further questions, please do not hesitate to contact me.     Sincerely,     Naga Ventura MD  Pediatric  cardiology  [phone]  Pager [phone]  [email or web address]

## 2023-11-03 NOTE — PATIENT INSTRUCTIONS
Your fetus has skipped heart beats.  We call these premature atrial contractions, or PACs for short.  They are common and happen in almost 1-3% of pregnancies.  The fetus can tolerate this and is not in any danger.  We do not treat fetal PACs.  There is a small risk that the fetus will have too fast of a heart beat.  We call this tachycardia.  Your OB will “listen” to the fetal heart beat with the Doppler wand at your routine visits.  They will send you back for a repeat visit if they have a concern.  If your pediatrician hears PACs after delivery, they can order an ECG and schedule a referral with a pediatric cardiologist.  There is no need to change your delivery plan at this time.      The baby's heart is built normally.  The function of the heart is normal.  You can deliver at the hospital of your choosing.  The baby does not need to see a cardiologist after birth unless the pediatric team has any concerns.   I recommend that you discuss appropriate level of aerobic activity with your OB.  I recommend a heart healthy lifestyle including routine aerobic activity (30 minutes; five days per week), a heart healthy diet and avoiding smoking.      Diabetes in pregnancy is associated with an increase risk of heart defects.  The cause of this is unknown.  The risk appears higher if your hemoglobin A1c is higher than 8.5% prior to becoming pregnant, however, heart defects can be seen even with good control of your sugar levels.  Ideally, your hemoglobin A1c should be less than 6% prior to and during pregnancy.  Later in pregnancy, having a hemoglobin A1c greater than 6% can also be associated with thickening of the heart muscle between the pumping chambers.  Sometimes, we will either do a repeat fetal echocardiogram or an echocardiogram on your baby if your hemoglobin A1c is high during your third trimester of pregnancy.    Sometimes it is not possible to see all the heart structures because of the position or size  of the fetus. This does not mean they are not there, but may mean that for technical reasons they cannot be assessed. Sometimes this information may not be important; while in some cases it means that definite answers are not possible. The fetal heart doctor will discuss this with you if necessary, and repeat studies are frequently performed later in the pregnancy.     Certain congenital heart abnormalities are also hard to detect by fetal echocardiograms, but often these are simple abnormalities. We do not mention this to concern you, but rather that you understand that there are technical limitations to such studies. It remains important that your pediatrician provides a normal careful medical examination of the baby (including the heart) takes place after birth, and if there were any suspicious cardiac findings, that these are evaluated in the usual way irrespective of the findings at fetal study. Certain communications between the two sides of the circulation are normally present in all developing babies and normally close after birth. We are not able to tell in advance whether this will occur, however there is only a tiny chance that they will not. Persistence of these structures is generally not a difficult problem to deal with.     We direct our attention only to the heart, where we have special expertise. This is not the same as your general obstetric ultrasound scan. Other ultrasound information about the fetus can be obtained from an obstetric ultrasonographer or your obstetrician.

## 2023-11-04 ENCOUNTER — HOSPITAL ENCOUNTER (OUTPATIENT)
Facility: HOSPITAL | Age: 23
Discharge: HOME | End: 2023-11-04
Attending: STUDENT IN AN ORGANIZED HEALTH CARE EDUCATION/TRAINING PROGRAM | Admitting: STUDENT IN AN ORGANIZED HEALTH CARE EDUCATION/TRAINING PROGRAM
Payer: COMMERCIAL

## 2023-11-04 VITALS
OXYGEN SATURATION: 99 % | TEMPERATURE: 97.9 F | SYSTOLIC BLOOD PRESSURE: 122 MMHG | HEIGHT: 60 IN | WEIGHT: 130.95 LBS | HEART RATE: 82 BPM | BODY MASS INDEX: 25.71 KG/M2 | DIASTOLIC BLOOD PRESSURE: 76 MMHG | RESPIRATION RATE: 16 BRPM

## 2023-11-04 LAB
ALBUMIN SERPL BCP-MCNC: 3.6 G/DL (ref 3.4–5)
ALP SERPL-CCNC: 57 U/L (ref 33–110)
ALT SERPL W P-5'-P-CCNC: 12 U/L (ref 7–45)
ANION GAP BLDV CALCULATED.4IONS-SCNC: 10 MMOL/L (ref 10–25)
ANION GAP SERPL CALC-SCNC: 14 MMOL/L (ref 10–20)
AST SERPL W P-5'-P-CCNC: 10 U/L (ref 9–39)
B-OH-BUTYR SERPL-SCNC: 0.14 MMOL/L (ref 0.02–0.27)
BASE EXCESS BLDV CALC-SCNC: -3.6 MMOL/L (ref -2–3)
BILIRUB SERPL-MCNC: 0.2 MG/DL (ref 0–1.2)
BILIRUBIN, POC: NEGATIVE
BLOOD URINE, POC: NEGATIVE
BODY TEMPERATURE: 37 DEGREES CELSIUS
BUN SERPL-MCNC: 6 MG/DL (ref 6–23)
CA-I BLDV-SCNC: 1.07 MMOL/L (ref 1.1–1.33)
CALCIUM SERPL-MCNC: 9 MG/DL (ref 8.6–10.6)
CHLORIDE BLDV-SCNC: 109 MMOL/L (ref 98–107)
CHLORIDE SERPL-SCNC: 107 MMOL/L (ref 98–107)
CLARITY, POC: CLEAR
CO2 SERPL-SCNC: 22 MMOL/L (ref 21–32)
COLOR, POC: YELLOW
CREAT SERPL-MCNC: 0.36 MG/DL (ref 0.5–1.05)
ERYTHROCYTE [DISTWIDTH] IN BLOOD BY AUTOMATED COUNT: 12.6 % (ref 11.5–14.5)
GFR SERPL CREATININE-BSD FRML MDRD: >90 ML/MIN/1.73M*2
GLUCOSE BLD MANUAL STRIP-MCNC: 110 MG/DL (ref 74–99)
GLUCOSE BLDV-MCNC: 115 MG/DL (ref 74–99)
GLUCOSE SERPL-MCNC: 116 MG/DL (ref 74–99)
GLUCOSE URINE, POC: NORMAL
HCO3 BLDV-SCNC: 22.7 MMOL/L (ref 22–26)
HCT VFR BLD AUTO: 33.3 % (ref 36–46)
HCT VFR BLD EST: 33 % (ref 36–46)
HGB BLD-MCNC: 10.7 G/DL (ref 12–16)
HGB BLDV-MCNC: 10.9 G/DL (ref 12–16)
INHALED O2 CONCENTRATION: 21 %
KETONES, POC: POSITIVE
LACTATE BLDV-SCNC: 1.4 MMOL/L (ref 0.4–2)
LEUKOCYTE EST, POC: NEGATIVE
MCH RBC QN AUTO: 29.8 PG (ref 26–34)
MCHC RBC AUTO-ENTMCNC: 32.1 G/DL (ref 32–36)
MCV RBC AUTO: 93 FL (ref 80–100)
NITRITE, POC: NEGATIVE
NRBC BLD-RTO: 0 /100 WBCS (ref 0–0)
OXYHGB MFR BLDV: 97.5 % (ref 45–75)
PCO2 BLDV: 45 MM HG (ref 41–51)
PH BLDV: 7.31 PH (ref 7.33–7.43)
PH, POC: 6.5
PLATELET # BLD AUTO: 421 X10*3/UL (ref 150–450)
PO2 BLDV: 112 MM HG (ref 35–45)
POC APPEARANCE OF BODY FLUID: NORMAL
POTASSIUM BLDV-SCNC: 4 MMOL/L (ref 3.5–5.3)
POTASSIUM SERPL-SCNC: 4 MMOL/L (ref 3.5–5.3)
PROT SERPL-MCNC: 6.3 G/DL (ref 6.4–8.2)
RBC # BLD AUTO: 3.59 X10*6/UL (ref 4–5.2)
SAO2 % BLDV: 99 % (ref 45–75)
SODIUM BLDV-SCNC: 138 MMOL/L (ref 136–145)
SODIUM SERPL-SCNC: 139 MMOL/L (ref 136–145)
SPECIFIC GRAVITY, POC: 1.03
URINE PROTEIN, POC: NORMAL
UROBILINOGEN, POC: 1
WBC # BLD AUTO: 7.9 X10*3/UL (ref 4.4–11.3)

## 2023-11-04 PROCEDURE — 85027 COMPLETE CBC AUTOMATED: CPT | Performed by: STUDENT IN AN ORGANIZED HEALTH CARE EDUCATION/TRAINING PROGRAM

## 2023-11-04 PROCEDURE — 82947 ASSAY GLUCOSE BLOOD QUANT: CPT | Mod: 91

## 2023-11-04 PROCEDURE — 36415 COLL VENOUS BLD VENIPUNCTURE: CPT | Performed by: STUDENT IN AN ORGANIZED HEALTH CARE EDUCATION/TRAINING PROGRAM

## 2023-11-04 PROCEDURE — 82010 KETONE BODYS QUAN: CPT | Performed by: STUDENT IN AN ORGANIZED HEALTH CARE EDUCATION/TRAINING PROGRAM

## 2023-11-04 PROCEDURE — 2500000004 HC RX 250 GENERAL PHARMACY W/ HCPCS (ALT 636 FOR OP/ED): Performed by: STUDENT IN AN ORGANIZED HEALTH CARE EDUCATION/TRAINING PROGRAM

## 2023-11-04 PROCEDURE — 84075 ASSAY ALKALINE PHOSPHATASE: CPT | Performed by: STUDENT IN AN ORGANIZED HEALTH CARE EDUCATION/TRAINING PROGRAM

## 2023-11-04 PROCEDURE — 83605 ASSAY OF LACTIC ACID: CPT | Performed by: STUDENT IN AN ORGANIZED HEALTH CARE EDUCATION/TRAINING PROGRAM

## 2023-11-04 PROCEDURE — 84132 ASSAY OF SERUM POTASSIUM: CPT | Performed by: STUDENT IN AN ORGANIZED HEALTH CARE EDUCATION/TRAINING PROGRAM

## 2023-11-04 PROCEDURE — 82805 BLOOD GASES W/O2 SATURATION: CPT | Performed by: STUDENT IN AN ORGANIZED HEALTH CARE EDUCATION/TRAINING PROGRAM

## 2023-11-04 PROCEDURE — 85018 HEMOGLOBIN: CPT | Performed by: STUDENT IN AN ORGANIZED HEALTH CARE EDUCATION/TRAINING PROGRAM

## 2023-11-04 PROCEDURE — 99213 OFFICE O/P EST LOW 20 MIN: CPT | Performed by: STUDENT IN AN ORGANIZED HEALTH CARE EDUCATION/TRAINING PROGRAM

## 2023-11-04 PROCEDURE — 99215 OFFICE O/P EST HI 40 MIN: CPT | Mod: 25

## 2023-11-04 PROCEDURE — 84295 ASSAY OF SERUM SODIUM: CPT | Performed by: STUDENT IN AN ORGANIZED HEALTH CARE EDUCATION/TRAINING PROGRAM

## 2023-11-04 RX ORDER — ONDANSETRON HYDROCHLORIDE 2 MG/ML
4 INJECTION, SOLUTION INTRAVENOUS EVERY 6 HOURS PRN
Status: DISCONTINUED | OUTPATIENT
Start: 2023-11-04 | End: 2023-11-04 | Stop reason: HOSPADM

## 2023-11-04 RX ORDER — ONDANSETRON 4 MG/1
4 TABLET, FILM COATED ORAL EVERY 6 HOURS PRN
Status: DISCONTINUED | OUTPATIENT
Start: 2023-11-04 | End: 2023-11-04 | Stop reason: HOSPADM

## 2023-11-04 RX ORDER — NIFEDIPINE 10 MG/1
10 CAPSULE ORAL ONCE AS NEEDED
Status: DISCONTINUED | OUTPATIENT
Start: 2023-11-04 | End: 2023-11-04 | Stop reason: HOSPADM

## 2023-11-04 RX ORDER — ACETAMINOPHEN 325 MG/1
975 TABLET ORAL ONCE
Status: COMPLETED | OUTPATIENT
Start: 2023-11-04 | End: 2023-11-04

## 2023-11-04 RX ORDER — LIDOCAINE HYDROCHLORIDE 10 MG/ML
0.5 INJECTION INFILTRATION; PERINEURAL ONCE AS NEEDED
Status: DISCONTINUED | OUTPATIENT
Start: 2023-11-04 | End: 2023-11-04 | Stop reason: HOSPADM

## 2023-11-04 RX ORDER — LABETALOL HYDROCHLORIDE 5 MG/ML
20 INJECTION, SOLUTION INTRAVENOUS ONCE AS NEEDED
Status: DISCONTINUED | OUTPATIENT
Start: 2023-11-04 | End: 2023-11-04 | Stop reason: HOSPADM

## 2023-11-04 RX ORDER — HYDRALAZINE HYDROCHLORIDE 20 MG/ML
5 INJECTION INTRAMUSCULAR; INTRAVENOUS ONCE AS NEEDED
Status: DISCONTINUED | OUTPATIENT
Start: 2023-11-04 | End: 2023-11-04 | Stop reason: HOSPADM

## 2023-11-04 RX ADMIN — ACETAMINOPHEN 975 MG: 325 TABLET ORAL at 03:27

## 2023-11-04 RX ADMIN — SODIUM CHLORIDE, POTASSIUM CHLORIDE, SODIUM LACTATE AND CALCIUM CHLORIDE 2000 ML: 600; 310; 30; 20 INJECTION, SOLUTION INTRAVENOUS at 03:34

## 2023-11-04 SDOH — HEALTH STABILITY: MENTAL HEALTH: WISH TO BE DEAD (PAST 1 MONTH): NO

## 2023-11-04 SDOH — HEALTH STABILITY: MENTAL HEALTH: HAVE YOU USED ANY PRESCRIPTION DRUGS OTHER THAN PRESCRIBED IN THE PAST 12 MONTHS?: NO

## 2023-11-04 SDOH — SOCIAL STABILITY: SOCIAL INSECURITY: VERBAL ABUSE: DENIES

## 2023-11-04 SDOH — HEALTH STABILITY: MENTAL HEALTH: NON-SPECIFIC ACTIVE SUICIDAL THOUGHTS (PAST 1 MONTH): NO

## 2023-11-04 SDOH — HEALTH STABILITY: MENTAL HEALTH: SUICIDAL BEHAVIOR (LIFETIME): NO

## 2023-11-04 SDOH — SOCIAL STABILITY: SOCIAL INSECURITY: HAS ANYONE EVER THREATENED TO HURT YOUR FAMILY OR YOUR PETS?: NO

## 2023-11-04 SDOH — HEALTH STABILITY: MENTAL HEALTH: ACTIVE SUICIDAL IDEATION WITH SOME INTENT TO ACT, WITHOUT SPECIFIC PLAN (PAST 1 MONTH): NO

## 2023-11-04 SDOH — SOCIAL STABILITY: SOCIAL INSECURITY: ABUSE SCREEN: ADULT

## 2023-11-04 SDOH — HEALTH STABILITY: MENTAL HEALTH: ACTIVE SUICIDAL IDEATION WITH SPECIFIC PLAN AND INTENT (PAST 1 MONTH): NO

## 2023-11-04 SDOH — SOCIAL STABILITY: SOCIAL INSECURITY: ARE THERE ANY APPARENT SIGNS OF INJURIES/BEHAVIORS THAT COULD BE RELATED TO ABUSE/NEGLECT?: NO

## 2023-11-04 SDOH — HEALTH STABILITY: MENTAL HEALTH: HAVE YOU USED ANY SUBSTANCES (CANABIS, COCAINE, HEROIN, HALLUCINOGENS, INHALANTS, ETC.) IN THE PAST 12 MONTHS?: NO

## 2023-11-04 SDOH — SOCIAL STABILITY: SOCIAL INSECURITY: DOES ANYONE TRY TO KEEP YOU FROM HAVING/CONTACTING OTHER FRIENDS OR DOING THINGS OUTSIDE YOUR HOME?: NO

## 2023-11-04 SDOH — SOCIAL STABILITY: SOCIAL INSECURITY: ARE YOU OR HAVE YOU BEEN THREATENED OR ABUSED PHYSICALLY, EMOTIONALLY, OR SEXUALLY BY ANYONE?: NO

## 2023-11-04 SDOH — SOCIAL STABILITY: SOCIAL INSECURITY: DO YOU FEEL ANYONE HAS EXPLOITED OR TAKEN ADVANTAGE OF YOU FINANCIALLY OR OF YOUR PERSONAL PROPERTY?: NO

## 2023-11-04 SDOH — SOCIAL STABILITY: SOCIAL INSECURITY: HAVE YOU HAD THOUGHTS OF HARMING ANYONE ELSE?: NO

## 2023-11-04 SDOH — SOCIAL STABILITY: SOCIAL INSECURITY: PHYSICAL ABUSE: DENIES

## 2023-11-04 SDOH — HEALTH STABILITY: MENTAL HEALTH: WERE YOU ABLE TO COMPLETE ALL THE BEHAVIORAL HEALTH SCREENINGS?: YES

## 2023-11-04 SDOH — ECONOMIC STABILITY: HOUSING INSECURITY: DO YOU FEEL UNSAFE GOING BACK TO THE PLACE WHERE YOU ARE LIVING?: NO

## 2023-11-04 ASSESSMENT — LIFESTYLE VARIABLES
HOW OFTEN DO YOU HAVE 6 OR MORE DRINKS ON ONE OCCASION: NEVER
AUDIT-C TOTAL SCORE: 0
AUDIT-C TOTAL SCORE: 0
HOW MANY STANDARD DRINKS CONTAINING ALCOHOL DO YOU HAVE ON A TYPICAL DAY: PATIENT DOES NOT DRINK
HOW OFTEN DO YOU HAVE A DRINK CONTAINING ALCOHOL: NEVER
SKIP TO QUESTIONS 9-10: 1

## 2023-11-04 ASSESSMENT — PAIN SCALES - GENERAL
PAINLEVEL_OUTOF10: 10 - WORST POSSIBLE PAIN
PAINLEVEL: 8
PAINLEVEL_OUTOF10: 8

## 2023-11-04 ASSESSMENT — PATIENT HEALTH QUESTIONNAIRE - PHQ9
2. FEELING DOWN, DEPRESSED OR HOPELESS: NOT AT ALL
SUM OF ALL RESPONSES TO PHQ9 QUESTIONS 1 & 2: 0
1. LITTLE INTEREST OR PLEASURE IN DOING THINGS: NOT AT ALL

## 2023-11-04 NOTE — H&P
Obstetrical Admission History and Physical - Triage    Assessment/Plan   24 yo G1 @ 31.0 wga presents for abdominal pain.    Abdominal pain  - Likely round ligament pain  - SVE cl/l/h    r/o DKA  - VBG, BHB, CBC, CMP wnl  - BG normalized with bolus insulin  - s/p 2 L LR    Dispo: discharge home with routine OB follow-up    Discussed with Dr. Swati Bashir MD  Labor and Delivery    Subjective   24 yo G1 @ 31.0 wga presents for abdominal pain. Reports pain sharp and shoots in right side of vagina with changes in position. Denies contractions, vaginal bleeding, decreased fetal movement, leakage of fluid.    Of note, pt T1DM with insulin pump. POCT glucose 211 in ED. Pt reports starting bolus with pump and glucose decreased to 110. Denies nausea/vomiting. Pt reports this does not feel like her usual DKA episodes.    Obstetrical History   OB History    Para Term  AB Living   1 0 0 0 0 0   SAB IAB Ectopic Multiple Live Births                  # Outcome Date GA Lbr Jesu/2nd Weight Sex Delivery Anes PTL Lv   1 Current                Past Medical History  Past Medical History:   Diagnosis Date    Asthma     Chlamydia     Chronic hypertension     CSF oligoclonal bands     Depression     Herpes     Type 1 diabetes mellitus with hyperglycemia, with long-term current use of insulin (CMS/[facility])     Urinary tract infection         Past Surgical History   No past surgical history on file.    Social History  Social History     Tobacco Use    Smoking status: Former     Types: Cigarettes     Passive exposure: Past    Smokeless tobacco: Never   Substance Use Topics    Alcohol use: Not Currently     Substance and Sexual Activity   Drug Use Not Currently    Types: Marijuana       Allergies  Patient has no known allergies.     Medications  Medications Prior to Admission   Medication Sig Dispense Refill Last Dose    acetone, urine, test strip DIP URINE TO CHECK FOR KETONES IF NAUSEA/VOMITING OR IF CONCERN FOR  "DKA OR DEHYDRATION 100 each 0     acyclovir (Zovirax) 400 mg tablet Take 1 tablet (400 mg) by mouth once daily.   Unknown    acyclovir (Zovirax) 400 mg tablet Take 1 tablet (400 mg) by mouth 2 times a day. 60 tablet 5 Unknown    albuterol 2.5 mg /3 mL (0.083 %) nebulizer solution Inhale 3 mL (2.5 mg) every 6 hours if needed for wheezing.   11/3/2023    Alcohol Prep Pads pads, medicated        aspirin 81 mg chewable tablet Chew 1 tablet (81 mg) once daily.   11/3/2023    blood-glucose sensor device APPLY NEW DEVICE EVERY 10 DAYS 3 each 0 11/4/2023    ferrous sulfate (iron) 325 (65 Fe) MG tablet Take 1 tablet (65 mg of iron) by mouth once daily with a meal. 30 tablet 3     fluticasone (Flovent Diskus) 250 mcg/actuation diskus inhaler Inhale 1 puff 2 times a day. Rinse mouth with water after use to reduce aftertaste and incidence of candidiasis. Do not swallow. 60 each 11     glucagon (Glucagen) 1 mg injection 1 mg 1 time if needed for low blood sugar - see comments.       glucose 4 gram chewable tablet Chew. USE AS DIRECTED TO TREAT HYPOGLYCEMIA       HumaLOG U-100 Insulin 100 unit/mL injection 1.5 mL (150 Units). VIA INSULIN PUMP DAILY   11/4/2023    OneTouch Verio test strips strip TEST 6-7 TIMES DAILY   11/4/2023    Peak Air Peak Flow Meter device        pen needle, diabetic 32 gauge x 5/32\" needle USE AS DIRECTED WITH INSULIN USE AS DIRECTED TO INJECT INSULIN 4-6 TIMES DAILY 200 each 10 11/4/2023    prenatal vitamin 28 mg iron-800 mcg folic acid tablet tablet TAKE 1 TABLET DAILY. 100 tablet 2 11/2/2023    Ventolin HFA 90 mcg/actuation inhaler Inhale 1-2 puffs every 4 hours if needed.   11/3/2023       Objective    Last Vitals  Temp Pulse Resp BP MAP O2 Sat   36.4 °C (97.5 °F) 82 18 122/76   99 %     Physical Examination  GENERAL: Examination reveals a well developed, well nourished, gravid female in no acute distress. She is alert and cooperative.  HEENT: External ears normal. Nose normal, no erythema or " discharge. Mouth and throat clear.  NECK: supple, no significant adenopathy  LUNGS: unlabored breathing  ABDOMEN: gravid  EXTREMITIES: no limitation in range of motion  SKIN: warm and dry  NEUROLOGICAL: alert, oriented, normal speech, no focal findings or movement disorder noted  PSYCHOLOGICAL: awake and alert; oriented to person, place, and time    SVE cl/l/h    Fetal Assessment  Movement: Present  Mode: External US  Baseline Fetal Heart Rate (bpm): 140 bpm   Fetal Decelerations: No  Contraction Frequency: 3-4    Lab Review  Labs in chart were reviewed.

## 2023-11-07 PROBLEM — Z3A.31 31 WEEKS GESTATION OF PREGNANCY (HHS-HCC): Status: ACTIVE | Noted: 2023-10-08

## 2023-11-10 ENCOUNTER — PATIENT OUTREACH (OUTPATIENT)
Dept: CARE COORDINATION | Facility: CLINIC | Age: 23
End: 2023-11-10
Payer: COMMERCIAL

## 2023-11-10 NOTE — PROGRESS NOTES
3rd outreach call to patient to support a smooth transition of care from recent admission.  Left voicemail message for patient with my contact information.    PAZ Hyman   III  [facility]  Office Phone: [phone]  [email or web address]

## 2023-11-13 ENCOUNTER — HOSPITAL ENCOUNTER (OUTPATIENT)
Facility: HOSPITAL | Age: 23
Setting detail: OBSERVATION
LOS: 1 days | Discharge: HOME | End: 2023-11-14
Attending: EMERGENCY MEDICINE | Admitting: EMERGENCY MEDICINE
Payer: COMMERCIAL

## 2023-11-13 ENCOUNTER — PHARMACY VISIT (OUTPATIENT)
Dept: PHARMACY | Facility: CLINIC | Age: 23
End: 2023-11-13
Payer: MEDICAID

## 2023-11-13 ENCOUNTER — APPOINTMENT (OUTPATIENT)
Dept: OPHTHALMOLOGY | Facility: CLINIC | Age: 23
End: 2023-11-13
Payer: COMMERCIAL

## 2023-11-13 DIAGNOSIS — R73.9 HYPERGLYCEMIA: Primary | ICD-10-CM

## 2023-11-13 PROBLEM — E10.10 DIABETIC KETOACIDOSIS WITHOUT COMA ASSOCIATED WITH TYPE 1 DIABETES MELLITUS (MULTI): Status: ACTIVE | Noted: 2023-11-13

## 2023-11-13 PROBLEM — Z3A.32 32 WEEKS GESTATION OF PREGNANCY (HHS-HCC): Status: ACTIVE | Noted: 2023-10-08

## 2023-11-13 LAB
ALBUMIN SERPL BCP-MCNC: 3.9 G/DL (ref 3.4–5)
ALP SERPL-CCNC: 63 U/L (ref 33–110)
ALT SERPL W P-5'-P-CCNC: 13 U/L (ref 7–45)
ANION GAP BLDV CALCULATED.4IONS-SCNC: 29 MMOL/L (ref 10–25)
ANION GAP SERPL CALC-SCNC: 28 MMOL/L (ref 10–20)
ANION GAP SERPL CALC-SCNC: 30 MMOL/L (ref 10–20)
APPEARANCE UR: CLEAR
AST SERPL W P-5'-P-CCNC: 20 U/L (ref 9–39)
B-OH-BUTYR SERPL-SCNC: 7.75 MMOL/L (ref 0.02–0.27)
BASE EXCESS BLDV CALC-SCNC: -14.4 MMOL/L (ref -2–3)
BASOPHILS # BLD AUTO: 0.12 X10*3/UL (ref 0–0.1)
BASOPHILS NFR BLD AUTO: 1 %
BILIRUB SERPL-MCNC: 0.5 MG/DL (ref 0–1.2)
BILIRUB UR STRIP.AUTO-MCNC: NEGATIVE MG/DL
BODY TEMPERATURE: 37 DEGREES CELSIUS
BUN SERPL-MCNC: 14 MG/DL (ref 6–23)
BUN SERPL-MCNC: 14 MG/DL (ref 6–23)
CA-I BLDV-SCNC: 1.18 MMOL/L (ref 1.1–1.33)
CALCIUM SERPL-MCNC: 8.4 MG/DL (ref 8.6–10.6)
CALCIUM SERPL-MCNC: 8.8 MG/DL (ref 8.6–10.6)
CHLORIDE BLDV-SCNC: 94 MMOL/L (ref 98–107)
CHLORIDE SERPL-SCNC: 102 MMOL/L (ref 98–107)
CHLORIDE SERPL-SCNC: 93 MMOL/L (ref 98–107)
CO2 SERPL-SCNC: 10 MMOL/L (ref 21–32)
CO2 SERPL-SCNC: 12 MMOL/L (ref 21–32)
COLOR UR: COLORLESS
CREAT SERPL-MCNC: 0.77 MG/DL (ref 0.5–1.05)
CREAT SERPL-MCNC: 0.89 MG/DL (ref 0.5–1.05)
EOSINOPHIL # BLD AUTO: 0.03 X10*3/UL (ref 0–0.7)
EOSINOPHIL NFR BLD AUTO: 0.2 %
ERYTHROCYTE [DISTWIDTH] IN BLOOD BY AUTOMATED COUNT: 11.9 % (ref 11.5–14.5)
GFR SERPL CREATININE-BSD FRML MDRD: >90 ML/MIN/1.73M*2
GFR SERPL CREATININE-BSD FRML MDRD: >90 ML/MIN/1.73M*2
GLUCOSE BLD MANUAL STRIP-MCNC: 189 MG/DL (ref 74–99)
GLUCOSE BLD MANUAL STRIP-MCNC: 241 MG/DL (ref 74–99)
GLUCOSE BLD MANUAL STRIP-MCNC: 248 MG/DL (ref 74–99)
GLUCOSE BLD MANUAL STRIP-MCNC: 305 MG/DL (ref 74–99)
GLUCOSE BLDV-MCNC: >685 MG/DL (ref 74–99)
GLUCOSE SERPL-MCNC: 471 MG/DL (ref 74–99)
GLUCOSE SERPL-MCNC: 655 MG/DL (ref 74–99)
GLUCOSE UR STRIP.AUTO-MCNC: ABNORMAL MG/DL
HCO3 BLDV-SCNC: 11 MMOL/L (ref 22–26)
HCT VFR BLD AUTO: 42.9 % (ref 36–46)
HCT VFR BLD EST: 44 % (ref 36–46)
HGB BLD-MCNC: 13.8 G/DL (ref 12–16)
HGB BLDV-MCNC: 14.7 G/DL (ref 12–16)
HOLD SPECIMEN: NORMAL
IMM GRANULOCYTES # BLD AUTO: 0.06 X10*3/UL (ref 0–0.7)
IMM GRANULOCYTES NFR BLD AUTO: 0.5 % (ref 0–0.9)
KETONES UR STRIP.AUTO-MCNC: ABNORMAL MG/DL
LACTATE BLDV-SCNC: 1.9 MMOL/L (ref 0.4–2)
LEUKOCYTE ESTERASE UR QL STRIP.AUTO: NEGATIVE
LYMPHOCYTES # BLD AUTO: 2.13 X10*3/UL (ref 1.2–4.8)
LYMPHOCYTES NFR BLD AUTO: 17.6 %
MAGNESIUM SERPL-MCNC: 2.01 MG/DL (ref 1.6–2.4)
MCH RBC QN AUTO: 29.6 PG (ref 26–34)
MCHC RBC AUTO-ENTMCNC: 32.2 G/DL (ref 32–36)
MCV RBC AUTO: 92 FL (ref 80–100)
MONOCYTES # BLD AUTO: 0.44 X10*3/UL (ref 0.1–1)
MONOCYTES NFR BLD AUTO: 3.6 %
NEUTROPHILS # BLD AUTO: 9.32 X10*3/UL (ref 1.2–7.7)
NEUTROPHILS NFR BLD AUTO: 77.1 %
NITRITE UR QL STRIP.AUTO: NEGATIVE
NRBC BLD-RTO: 0 /100 WBCS (ref 0–0)
OXYHGB MFR BLDV: 88.9 % (ref 45–75)
PCO2 BLDV: 25 MM HG (ref 41–51)
PH BLDV: 7.25 PH (ref 7.33–7.43)
PH UR STRIP.AUTO: 5 [PH]
PLATELET # BLD AUTO: 587 X10*3/UL (ref 150–450)
PO2 BLDV: 64 MM HG (ref 35–45)
POTASSIUM BLDV-SCNC: 5.4 MMOL/L (ref 3.5–5.3)
POTASSIUM SERPL-SCNC: 4.4 MMOL/L (ref 3.5–5.3)
POTASSIUM SERPL-SCNC: 5.5 MMOL/L (ref 3.5–5.3)
PROT SERPL-MCNC: 6.8 G/DL (ref 6.4–8.2)
PROT UR STRIP.AUTO-MCNC: NEGATIVE MG/DL
RBC # BLD AUTO: 4.66 X10*6/UL (ref 4–5.2)
RBC # UR STRIP.AUTO: NEGATIVE /UL
SAO2 % BLDV: 91 % (ref 45–75)
SODIUM BLDV-SCNC: 129 MMOL/L (ref 136–145)
SODIUM SERPL-SCNC: 129 MMOL/L (ref 136–145)
SODIUM SERPL-SCNC: 136 MMOL/L (ref 136–145)
SP GR UR STRIP.AUTO: 1.02
UROBILINOGEN UR STRIP.AUTO-MCNC: <2 MG/DL
WBC # BLD AUTO: 12.1 X10*3/UL (ref 4.4–11.3)

## 2023-11-13 PROCEDURE — 82435 ASSAY OF BLOOD CHLORIDE: CPT

## 2023-11-13 PROCEDURE — 82435 ASSAY OF BLOOD CHLORIDE: CPT | Performed by: EMERGENCY MEDICINE

## 2023-11-13 PROCEDURE — 85025 COMPLETE CBC W/AUTO DIFF WBC: CPT | Performed by: EMERGENCY MEDICINE

## 2023-11-13 PROCEDURE — 87636 SARSCOV2 & INF A&B AMP PRB: CPT | Performed by: EMERGENCY MEDICINE

## 2023-11-13 PROCEDURE — 96374 THER/PROPH/DIAG INJ IV PUSH: CPT

## 2023-11-13 PROCEDURE — 2500000004 HC RX 250 GENERAL PHARMACY W/ HCPCS (ALT 636 FOR OP/ED): Mod: SE

## 2023-11-13 PROCEDURE — 82947 ASSAY GLUCOSE BLOOD QUANT: CPT

## 2023-11-13 PROCEDURE — 80048 BASIC METABOLIC PNL TOTAL CA: CPT | Mod: 59,CCI | Performed by: EMERGENCY MEDICINE

## 2023-11-13 PROCEDURE — 99285 EMERGENCY DEPT VISIT HI MDM: CPT | Performed by: EMERGENCY MEDICINE

## 2023-11-13 PROCEDURE — 99285 EMERGENCY DEPT VISIT HI MDM: CPT | Mod: 25 | Performed by: EMERGENCY MEDICINE

## 2023-11-13 PROCEDURE — 96366 THER/PROPH/DIAG IV INF ADDON: CPT

## 2023-11-13 PROCEDURE — 81003 URINALYSIS AUTO W/O SCOPE: CPT | Performed by: STUDENT IN AN ORGANIZED HEALTH CARE EDUCATION/TRAINING PROGRAM

## 2023-11-13 PROCEDURE — 36415 COLL VENOUS BLD VENIPUNCTURE: CPT | Performed by: EMERGENCY MEDICINE

## 2023-11-13 PROCEDURE — 83735 ASSAY OF MAGNESIUM: CPT | Performed by: EMERGENCY MEDICINE

## 2023-11-13 PROCEDURE — 96365 THER/PROPH/DIAG IV INF INIT: CPT

## 2023-11-13 PROCEDURE — 96375 TX/PRO/DX INJ NEW DRUG ADDON: CPT

## 2023-11-13 PROCEDURE — 2500000004 HC RX 250 GENERAL PHARMACY W/ HCPCS (ALT 636 FOR OP/ED): Mod: SE | Performed by: STUDENT IN AN ORGANIZED HEALTH CARE EDUCATION/TRAINING PROGRAM

## 2023-11-13 PROCEDURE — 2060000001 HC INTERMEDIATE ICU ROOM DAILY

## 2023-11-13 PROCEDURE — 81025 URINE PREGNANCY TEST: CPT | Performed by: PHYSICIAN ASSISTANT

## 2023-11-13 PROCEDURE — 82947 ASSAY GLUCOSE BLOOD QUANT: CPT | Performed by: STUDENT IN AN ORGANIZED HEALTH CARE EDUCATION/TRAINING PROGRAM

## 2023-11-13 PROCEDURE — 82010 KETONE BODYS QUAN: CPT | Performed by: EMERGENCY MEDICINE

## 2023-11-13 PROCEDURE — 96361 HYDRATE IV INFUSION ADD-ON: CPT

## 2023-11-13 RX ORDER — DEXTROSE MONOHYDRATE 100 MG/ML
150 INJECTION, SOLUTION INTRAVENOUS CONTINUOUS
Status: DISCONTINUED | OUTPATIENT
Start: 2023-11-13 | End: 2023-11-13 | Stop reason: WASHOUT

## 2023-11-13 RX ORDER — ACETAMINOPHEN 325 MG/1
975 TABLET ORAL ONCE
Status: COMPLETED | OUTPATIENT
Start: 2023-11-13 | End: 2023-11-13

## 2023-11-13 RX ORDER — DEXTROSE 50 % IN WATER (D50W) INTRAVENOUS SYRINGE
50
Status: DISCONTINUED | OUTPATIENT
Start: 2023-11-13 | End: 2023-11-14 | Stop reason: HOSPADM

## 2023-11-13 RX ORDER — ONDANSETRON HYDROCHLORIDE 2 MG/ML
INJECTION, SOLUTION INTRAVENOUS
Status: COMPLETED
Start: 2023-11-13 | End: 2023-11-13

## 2023-11-13 RX ORDER — DEXTROSE MONOHYDRATE AND SODIUM CHLORIDE 5; .45 G/100ML; G/100ML
150 INJECTION, SOLUTION INTRAVENOUS CONTINUOUS
Status: DISCONTINUED | OUTPATIENT
Start: 2023-11-13 | End: 2023-11-14

## 2023-11-13 RX ORDER — ONDANSETRON HYDROCHLORIDE 2 MG/ML
4 INJECTION, SOLUTION INTRAVENOUS ONCE
Status: COMPLETED | OUTPATIENT
Start: 2023-11-13 | End: 2023-11-13

## 2023-11-13 RX ORDER — SODIUM CHLORIDE, SODIUM LACTATE, POTASSIUM CHLORIDE, CALCIUM CHLORIDE 600; 310; 30; 20 MG/100ML; MG/100ML; MG/100ML; MG/100ML
250 INJECTION, SOLUTION INTRAVENOUS CONTINUOUS
Status: DISCONTINUED | OUTPATIENT
Start: 2023-11-13 | End: 2023-11-14 | Stop reason: HOSPADM

## 2023-11-13 RX ADMIN — SODIUM CHLORIDE 1000 ML: 9 INJECTION, SOLUTION INTRAVENOUS at 19:18

## 2023-11-13 RX ADMIN — ONDANSETRON HYDROCHLORIDE 4 MG: 2 INJECTION, SOLUTION INTRAVENOUS at 18:35

## 2023-11-13 RX ADMIN — SODIUM CHLORIDE, POTASSIUM CHLORIDE, SODIUM LACTATE AND CALCIUM CHLORIDE 250 ML/HR: 600; 310; 30; 20 INJECTION, SOLUTION INTRAVENOUS at 21:00

## 2023-11-13 RX ADMIN — DEXTROSE AND SODIUM CHLORIDE 150 ML/HR: 5; 450 INJECTION, SOLUTION INTRAVENOUS at 21:39

## 2023-11-13 RX ADMIN — SODIUM CHLORIDE, POTASSIUM CHLORIDE, SODIUM LACTATE AND CALCIUM CHLORIDE 1000 ML: 600; 310; 30; 20 INJECTION, SOLUTION INTRAVENOUS at 18:32

## 2023-11-13 RX ADMIN — INSULIN HUMAN 8 UNITS/HR: 1 INJECTION, SOLUTION INTRAVENOUS at 19:21

## 2023-11-13 RX ADMIN — ACETAMINOPHEN 975 MG: 325 TABLET ORAL at 19:30

## 2023-11-13 RX ADMIN — ONDANSETRON 4 MG: 2 INJECTION, SOLUTION INTRAMUSCULAR; INTRAVENOUS at 18:35

## 2023-11-13 RX ADMIN — INSULIN HUMAN 4 UNITS/HR: 1 INJECTION, SOLUTION INTRAVENOUS at 21:15

## 2023-11-13 ASSESSMENT — PAIN - FUNCTIONAL ASSESSMENT: PAIN_FUNCTIONAL_ASSESSMENT: 0-10

## 2023-11-13 ASSESSMENT — LIFESTYLE VARIABLES
EVER HAD A DRINK FIRST THING IN THE MORNING TO STEADY YOUR NERVES TO GET RID OF A HANGOVER: NO
EVER FELT BAD OR GUILTY ABOUT YOUR DRINKING: NO
HAVE YOU EVER FELT YOU SHOULD CUT DOWN ON YOUR DRINKING: NO
HAVE PEOPLE ANNOYED YOU BY CRITICIZING YOUR DRINKING: NO
REASON UNABLE TO ASSESS: YES

## 2023-11-13 ASSESSMENT — PAIN SCALES - GENERAL
PAINLEVEL_OUTOF10: 7
PAINLEVEL_OUTOF10: 0 - NO PAIN

## 2023-11-13 NOTE — ED PROVIDER NOTES
HPI   Chief Complaint   Patient presents with    Hyperglycemia       23-year-old female presented to the emergency department for evaluation of nausea, vomiting, abdominal pain.  She also reports hyperglycemia at home.  She reports that she forgot to take her Lantus yesterday evening.  Normally takes 22 units nightly.  Denies any fever, chills, dysuria.  She denies any hematemesis, bilious emesis.  She denies any diarrhea or constipation.  Does endorse polyuria and polydipsia throughout the day today.  States her last episode of DKA was 3 years ago.                            No data recorded                Patient History   Past Medical History:   Diagnosis Date    21 weeks gestation of pregnancy 09/13/2019    21 weeks gestation of pregnancy     No past surgical history on file.  No family history on file.  Social History     Tobacco Use    Smoking status: Not on file    Smokeless tobacco: Not on file   Substance Use Topics    Alcohol use: Not on file    Drug use: Not on file       Physical Exam   ED Triage Vitals   Temp Heart Rate Resp BP   11/13/23 1704 11/13/23 1701 11/13/23 1701 11/13/23 1701   36.7 °C (98 °F) (!) 121 16 102/59      SpO2 Temp src Heart Rate Source Patient Position   11/13/23 1701 -- -- --   100 %         BP Location FiO2 (%)     -- --             Physical Exam  Vitals and nursing note reviewed.   Constitutional:       General: She is not in acute distress.     Appearance: She is well-developed. She is not ill-appearing.   HENT:      Head: Normocephalic and atraumatic.      Nose: No congestion or rhinorrhea.      Mouth/Throat:      Mouth: Mucous membranes are moist.      Pharynx: No oropharyngeal exudate or posterior oropharyngeal erythema.   Eyes:      Conjunctiva/sclera: Conjunctivae normal.   Cardiovascular:      Rate and Rhythm: Regular rhythm. Tachycardia present.      Pulses: Normal pulses.      Heart sounds: No murmur heard.     No gallop.   Pulmonary:      Effort: Pulmonary effort is  normal. No respiratory distress.      Breath sounds: Normal breath sounds. No stridor. No wheezing, rhonchi or rales.   Abdominal:      General: Bowel sounds are normal. There is no distension.      Palpations: Abdomen is soft.      Tenderness: There is no abdominal tenderness. There is no guarding or rebound.   Musculoskeletal:         General: No swelling.      Cervical back: Neck supple.   Skin:     General: Skin is warm and dry.      Capillary Refill: Capillary refill takes less than 2 seconds.      Findings: No rash.   Neurological:      General: No focal deficit present.      Mental Status: She is alert and oriented to person, place, and time.      Cranial Nerves: No cranial nerve deficit.      Sensory: No sensory deficit.      Gait: Gait normal.   Psychiatric:         Mood and Affect: Mood normal.         Behavior: Behavior normal.         Thought Content: Thought content normal.         ED Course & MDM   ED Course as of 11/13/23 2254 Mon Nov 13, 2023 1833 Made aware at this time patient's blood gas which I reviewed.  Spoke to charge nurse will find room in the emergency department for the patient.  1 L LR, EKG, Zofran ordered for the patient. [TL]   2101 Repeat BMP reveals hyperglycemia of 471 and bicarb of 10.  This is obtained approximately 1 hour after patient been started on insulin drip.  Nursing instructed to obtain repeat at midnight. [TL]   2202 Decrease insulin infusion after discussion with ED pharmacist.  Patient noted to have point-of-care glucose of 248 on last recheck and patient switch to D5 half-normal saline.  Patient accepted by Dr. Daugherty to the ICU stepdown unit. [TL]      ED Course User Index  [TL] Kemar Parra DO         Diagnoses as of 11/13/23 2254   Diabetic ketoacidosis without coma associated with type 1 diabetes mellitus (CMS/[facility])       Medical Decision Making  Overall well-appearing 23-year-old female present to the emergency [facility] for evaluation of hyperglycemia, nausea,  vomiting abdominal pain.  Venous blood gas obtained out in triage to confirm diagnosis of DKA.  Patient immediately brought back to hallway bed and communication with charge nurse that patient should obtain the first open available ED room.  Patient is in DKA with potassium greater than 5.  Based on this insulin infusion to be started, 1 L IV fluid LR to be bolused and Zofran given for supportive care.  Abdomen is soft and nontender and I do not suspect intra-abdominal surgical pathology at this time.  Patient is tachycardic and will be started on  cc/h maintenance infusion following IV fluid bolusing.  Electrolyte/metabolic panel.  Every 4 hours to be obtained.  Every hour point-of-care glucose to be obtained.  Serum ketones, urinalysis, pregnancy test and based laboratory studies to be obtained.        Procedure  Procedures     Keamr Parra DO  Resident  11/13/23 0764

## 2023-11-13 NOTE — Clinical Note
Charline Tovar was seen and treated in our emergency department on 11/13/2023.  She may return to work on 11/15/2023.       If you have any questions or concerns, please don't hesitate to call.      Asa Chou PA-C

## 2023-11-13 NOTE — Clinical Note
Is the patient on isolation?: No  Do you expect the patient to require telemetry (informational-only for bed management): Yes  Do you expect the patient to require observation or inpt? (informational-only for bed management): Inpatient

## 2023-11-13 NOTE — ED TRIAGE NOTES
Patient had high BS and she has been vomiting at home since 2am. Patient takes insulin as prescribed. She is concerned she is in DKA

## 2023-11-14 ENCOUNTER — CLINICAL SUPPORT (OUTPATIENT)
Dept: EMERGENCY MEDICINE | Facility: HOSPITAL | Age: 23
End: 2023-11-14
Payer: COMMERCIAL

## 2023-11-14 VITALS
WEIGHT: 130.07 LBS | HEART RATE: 106 BPM | DIASTOLIC BLOOD PRESSURE: 68 MMHG | SYSTOLIC BLOOD PRESSURE: 103 MMHG | RESPIRATION RATE: 16 BRPM | OXYGEN SATURATION: 100 % | BODY MASS INDEX: 26.22 KG/M2 | HEIGHT: 59 IN | TEMPERATURE: 97.9 F

## 2023-11-14 PROBLEM — R73.9 HYPERGLYCEMIA: Status: ACTIVE | Noted: 2023-11-14

## 2023-11-14 LAB
ANION GAP BLDV CALCULATED.4IONS-SCNC: 12 MMOL/L (ref 10–25)
ANION GAP BLDV CALCULATED.4IONS-SCNC: 8 MMOL/L (ref 10–25)
ANION GAP SERPL CALC-SCNC: 13 MMOL/L (ref 10–20)
ANION GAP SERPL CALC-SCNC: 17 MMOL/L (ref 10–20)
ATRIAL RATE: 117 BPM
BASE EXCESS BLDV CALC-SCNC: -2.7 MMOL/L (ref -2–3)
BASE EXCESS BLDV CALC-SCNC: -7.1 MMOL/L (ref -2–3)
BODY TEMPERATURE: 37 DEGREES CELSIUS
BODY TEMPERATURE: 37 DEGREES CELSIUS
BUN SERPL-MCNC: 11 MG/DL (ref 6–23)
BUN SERPL-MCNC: 9 MG/DL (ref 6–23)
CA-I BLDV-SCNC: 1.16 MMOL/L (ref 1.1–1.33)
CA-I BLDV-SCNC: 1.17 MMOL/L (ref 1.1–1.33)
CALCIUM SERPL-MCNC: 7.7 MG/DL (ref 8.6–10.6)
CALCIUM SERPL-MCNC: 8 MG/DL (ref 8.6–10.6)
CHLORIDE BLDV-SCNC: 108 MMOL/L (ref 98–107)
CHLORIDE BLDV-SCNC: 108 MMOL/L (ref 98–107)
CHLORIDE SERPL-SCNC: 105 MMOL/L (ref 98–107)
CHLORIDE SERPL-SCNC: 108 MMOL/L (ref 98–107)
CO2 SERPL-SCNC: 18 MMOL/L (ref 21–32)
CO2 SERPL-SCNC: 19 MMOL/L (ref 21–32)
CREAT SERPL-MCNC: 0.58 MG/DL (ref 0.5–1.05)
CREAT SERPL-MCNC: 0.6 MG/DL (ref 0.5–1.05)
FLUAV RNA RESP QL NAA+PROBE: NOT DETECTED
FLUBV RNA RESP QL NAA+PROBE: NOT DETECTED
GFR SERPL CREATININE-BSD FRML MDRD: >90 ML/MIN/1.73M*2
GFR SERPL CREATININE-BSD FRML MDRD: >90 ML/MIN/1.73M*2
GLUCOSE BLD MANUAL STRIP-MCNC: 166 MG/DL (ref 74–99)
GLUCOSE BLD MANUAL STRIP-MCNC: 168 MG/DL (ref 74–99)
GLUCOSE BLD MANUAL STRIP-MCNC: 229 MG/DL (ref 74–99)
GLUCOSE BLD MANUAL STRIP-MCNC: 245 MG/DL (ref 74–99)
GLUCOSE BLD MANUAL STRIP-MCNC: 260 MG/DL (ref 74–99)
GLUCOSE BLD MANUAL STRIP-MCNC: 302 MG/DL (ref 74–99)
GLUCOSE BLD MANUAL STRIP-MCNC: 307 MG/DL (ref 74–99)
GLUCOSE BLDV-MCNC: 108 MG/DL (ref 74–99)
GLUCOSE BLDV-MCNC: 161 MG/DL (ref 74–99)
GLUCOSE SERPL-MCNC: 159 MG/DL (ref 74–99)
GLUCOSE SERPL-MCNC: 178 MG/DL (ref 74–99)
HCG UR QL IA.RAPID: NEGATIVE
HCO3 BLDV-SCNC: 18.7 MMOL/L (ref 22–26)
HCO3 BLDV-SCNC: 22.6 MMOL/L (ref 22–26)
HCT VFR BLD EST: 37 % (ref 36–46)
HCT VFR BLD EST: 38 % (ref 36–46)
HGB BLDV-MCNC: 12.4 G/DL (ref 12–16)
HGB BLDV-MCNC: 12.6 G/DL (ref 12–16)
HOLD SPECIMEN: NORMAL
INHALED O2 CONCENTRATION: 21 %
INHALED O2 CONCENTRATION: 21 %
LACTATE BLDV-SCNC: 0.5 MMOL/L (ref 0.4–2)
LACTATE BLDV-SCNC: 0.8 MMOL/L (ref 0.4–2)
OXYHGB MFR BLDV: 92.5 % (ref 45–75)
OXYHGB MFR BLDV: 93.1 % (ref 45–75)
P AXIS: 77 DEGREES
P OFFSET: 206 MS
P ONSET: 159 MS
PCO2 BLDV: 38 MM HG (ref 41–51)
PCO2 BLDV: 40 MM HG (ref 41–51)
PH BLDV: 7.3 PH (ref 7.33–7.43)
PH BLDV: 7.36 PH (ref 7.33–7.43)
PO2 BLDV: 68 MM HG (ref 35–45)
PO2 BLDV: 68 MM HG (ref 35–45)
POTASSIUM BLDV-SCNC: 4.2 MMOL/L (ref 3.5–5.3)
POTASSIUM BLDV-SCNC: 4.2 MMOL/L (ref 3.5–5.3)
POTASSIUM SERPL-SCNC: 4.1 MMOL/L (ref 3.5–5.3)
POTASSIUM SERPL-SCNC: 4.7 MMOL/L (ref 3.5–5.3)
PR INTERVAL: 126 MS
PREGNANCY TEST URINE, POC: NEGATIVE
Q ONSET: 222 MS
QRS COUNT: 19 BEATS
QRS DURATION: 64 MS
QT INTERVAL: 306 MS
QTC CALCULATION(BAZETT): 426 MS
QTC FREDERICIA: 382 MS
R AXIS: 25 DEGREES
SAO2 % BLDV: 95 % (ref 45–75)
SAO2 % BLDV: 95 % (ref 45–75)
SARS-COV-2 RNA RESP QL NAA+PROBE: NOT DETECTED
SODIUM BLDV-SCNC: 134 MMOL/L (ref 136–145)
SODIUM BLDV-SCNC: 134 MMOL/L (ref 136–145)
SODIUM SERPL-SCNC: 135 MMOL/L (ref 136–145)
SODIUM SERPL-SCNC: 136 MMOL/L (ref 136–145)
T AXIS: 43 DEGREES
T OFFSET: 375 MS
VENTRICULAR RATE: 117 BPM

## 2023-11-14 PROCEDURE — 82947 ASSAY GLUCOSE BLOOD QUANT: CPT

## 2023-11-14 PROCEDURE — 93005 ELECTROCARDIOGRAM TRACING: CPT

## 2023-11-14 PROCEDURE — 36415 COLL VENOUS BLD VENIPUNCTURE: CPT | Performed by: EMERGENCY MEDICINE

## 2023-11-14 PROCEDURE — 82805 BLOOD GASES W/O2 SATURATION: CPT | Performed by: EMERGENCY MEDICINE

## 2023-11-14 PROCEDURE — 80048 BASIC METABOLIC PNL TOTAL CA: CPT | Performed by: STUDENT IN AN ORGANIZED HEALTH CARE EDUCATION/TRAINING PROGRAM

## 2023-11-14 PROCEDURE — 2500000002 HC RX 250 W HCPCS SELF ADMINISTERED DRUGS (ALT 637 FOR MEDICARE OP, ALT 636 FOR OP/ED)

## 2023-11-14 PROCEDURE — 85018 HEMOGLOBIN: CPT | Performed by: EMERGENCY MEDICINE

## 2023-11-14 PROCEDURE — G0378 HOSPITAL OBSERVATION PER HR: HCPCS

## 2023-11-14 PROCEDURE — 82947 ASSAY GLUCOSE BLOOD QUANT: CPT | Mod: 59

## 2023-11-14 PROCEDURE — 2500000002 HC RX 250 W HCPCS SELF ADMINISTERED DRUGS (ALT 637 FOR MEDICARE OP, ALT 636 FOR OP/ED): Mod: SE | Performed by: PHYSICIAN ASSISTANT

## 2023-11-14 RX ORDER — INSULIN GLARGINE 100 [IU]/ML
22 INJECTION, SOLUTION SUBCUTANEOUS NIGHTLY
Status: DISCONTINUED | OUTPATIENT
Start: 2023-11-14 | End: 2023-11-14 | Stop reason: HOSPADM

## 2023-11-14 RX ORDER — INSULIN LISPRO 100 [IU]/ML
INJECTION, SOLUTION INTRAVENOUS; SUBCUTANEOUS
COMMUNITY
End: 2024-03-28 | Stop reason: SDUPTHER

## 2023-11-14 RX ORDER — INSULIN GLARGINE 100 [IU]/ML
10 INJECTION, SOLUTION SUBCUTANEOUS ONCE
Status: COMPLETED | OUTPATIENT
Start: 2023-11-14 | End: 2023-11-14

## 2023-11-14 RX ORDER — INSULIN LISPRO 100 [IU]/ML
4 INJECTION, SOLUTION INTRAVENOUS; SUBCUTANEOUS ONCE
Status: COMPLETED | OUTPATIENT
Start: 2023-11-14 | End: 2023-11-14

## 2023-11-14 RX ORDER — INSULIN GLARGINE 100 [IU]/ML
INJECTION, SOLUTION SUBCUTANEOUS
COMMUNITY
End: 2024-03-28 | Stop reason: SDUPTHER

## 2023-11-14 RX ORDER — INSULIN LISPRO 100 [IU]/ML
0-10 INJECTION, SOLUTION INTRAVENOUS; SUBCUTANEOUS
Status: DISCONTINUED | OUTPATIENT
Start: 2023-11-14 | End: 2023-11-14 | Stop reason: HOSPADM

## 2023-11-14 RX ORDER — INSULIN LISPRO 100 [IU]/ML
0-5 INJECTION, SOLUTION INTRAVENOUS; SUBCUTANEOUS
Status: DISCONTINUED | OUTPATIENT
Start: 2023-11-14 | End: 2023-11-14

## 2023-11-14 RX ORDER — DEXTROSE 50 % IN WATER (D50W) INTRAVENOUS SYRINGE
25
Status: DISCONTINUED | OUTPATIENT
Start: 2023-11-14 | End: 2023-11-14 | Stop reason: HOSPADM

## 2023-11-14 RX ORDER — DEXTROSE MONOHYDRATE 100 MG/ML
0.3 INJECTION, SOLUTION INTRAVENOUS ONCE AS NEEDED
Status: DISCONTINUED | OUTPATIENT
Start: 2023-11-14 | End: 2023-11-14 | Stop reason: HOSPADM

## 2023-11-14 RX ADMIN — INSULIN LISPRO 4 UNITS: 100 INJECTION, SOLUTION INTRAVENOUS; SUBCUTANEOUS at 13:51

## 2023-11-14 RX ADMIN — INSULIN LISPRO 4 UNITS: 100 INJECTION, SOLUTION INTRAVENOUS; SUBCUTANEOUS at 11:53

## 2023-11-14 RX ADMIN — INSULIN LISPRO 4 UNITS: 100 INJECTION, SOLUTION INTRAVENOUS; SUBCUTANEOUS at 07:26

## 2023-11-14 RX ADMIN — INSULIN GLARGINE 10 UNITS: 100 INJECTION, SOLUTION SUBCUTANEOUS at 02:40

## 2023-11-14 ASSESSMENT — PAIN SCALES - GENERAL: PAINLEVEL_OUTOF10: 0 - NO PAIN

## 2023-11-14 NOTE — PROGRESS NOTES
"Charline Tovar is a 23 y.o. female on day 1 of admission presenting with Diabetic ketoacidosis without coma associated with type 1 diabetes mellitus (CMS/HCC).    Subjective   I received sign out from off going team. Pleas refer to previous notes for details. In short, patient was admitted and found to be in DKA. Her gap has been closed and she is to be transitioned to an insulin sliding scale and is to be admitted to medicine.     Patient is well-appearing and nontoxic.  She is tolerating p.o. without difficulty.  Patient is no longer requiring higher level of care and I feel that she can be admitted for observation.  Considering this, I feel that she is stable for CDU.  I spoke with the CDU team and she has been excepted for admission.  No additional complaints at this time.  She is medically stable at this time.       Objective     Physical Exam  General/Constitutional: No apparent distress. Well-nourished and well developed.  Head: Normocephalic, Atraumatic.   Eyes: EOMI.  Vascular: No edema, swelling or tenderness, except as noted in detailed exam.  Respiratory: Non-labored breathing.  Integumentary: No impressive skin lesions present, except as noted in detailed exam.  Neurological: Alert and oriented.  Psychological:  Normal mood and affect.  Musculoskeletal: Normal, except as noted in detailed exam.    Last Recorded Vitals  Blood pressure 98/51, pulse 89, temperature 36.6 °C (97.9 °F), temperature source Oral, resp. rate 18, height 1.499 m (4' 11\"), weight 59 kg (130 lb 1.1 oz), SpO2 99 %.  Intake/Output last 3 Shifts:  I/O last 3 completed shifts:  In: 2016.1 (34.2 mL/kg) [I.V.:16.1 (0.3 mL/kg); IV Piggyback:2000]  Out: - (0 mL/kg)   Weight: 59 kg     Relevant Results                             Assessment/Plan   Principal Problem:    Diabetic ketoacidosis without coma associated with type 1 diabetes mellitus (CMS/HCC)               Lucius Rhodes, DO      "

## 2023-11-14 NOTE — H&P
CDU  History & Physical    Date of Placement in CDU: 11/14  Time Placed in Observation: 1000    Patient History  Mrs. Tovar is a 23-year-old female with history of IDDM 2 who presented to the emergency department with complaints of dizziness, polyuria, nausea, vomiting.  States she is compliant with her short acting lispro however missed her long-acting Lantus last night.  She states she did not have a good explanation for missing her Lantus.  That she was admitted for DKA once in the past however generally is good with her insulins.  States she carb counts and is generally good with her diet. The acute evaluation included laboratory work. With initial signs of DKA with blood sugar at 655, anion gap of 30, pH 7.25.  She was started on insulin drip and fluid resuscitation.  She was to be admitted to MICU.  Blood sugar did incrementally improve as well as pH and closing of her anion gap.  Her insulin drip was discontinued and disposition was changed for CDU for observation.    Upon admission to the Clinical Decision Unit, states there is no precipitous including infectious or pain related symptoms stating she feels it is related to missing her long-acting lesson last night.  States she no longer feels nauseous or dizzy and feels eager to eat.    Past Medical History: Reviewed, see EMR and HPI  Past Surgical History: Reviewed, see EMR  Social History: Denies regular alcohol tobacco or drug use      Medications  Reviewed, see EMR      Review of Systems  Denies any fevers, chills, night sweats rigors, abdominal pain, current nausea or vomiting, chest pain, headache, lightheadedness, current dizziness, syncope, near syncope,      Physical Exam:   GENERAL.: Vitals noted. No distress.  Normocephalic atraumatic.   EYES:  PERRLA, EOMs intact  OROPHARYNX:  No erythema or exudate.  Mucosa moist.  NECK: Supple. No adenopathy.  CARDIAC: Regular rate rhythm. No murmur noted.  PULMONARY: Equal breath sounds bilaterally.  No  wheezes rales or rhonchi  ABDOMEN: Soft, nontender, nonsurgical. No guarding. Normoactive bowel sounds. No bruits, no masses  EXTREMITIES: Full ROM, no pitting edema,   SKIN: Intact, warm and dry  NEURO: Alert and oriented x 3, speech is clear, no obvious deficits noted.       Diagnostic Evaluation  Diagnostic studies and ED interventions germane to this period of clinical observation will include:   Regular blood sugar checks, diabetic diet, continue her regular insulin regimen    Impression and Plan  In summary, Mrs. Tovar is admitted to the [facility] Clinical Decision Unit for hyperglycemia. Dr. Rhodes is the CDU admission attending.    This patient has been risk-stratified based on available history, physical exam, and related study findings. Admission to the observation status for further diagnosis/treatment/monitoring of this patient is warranted clinically. This extended period of observation is specifically required to determine the need for hospitalization.     The goals of this admission based on the patient´s clinical problem list are:  1) hyperglycemia  - Presented in DKA over DKA resolved currently with hyperglycemia requiring observation  - POC BG as needed as well as with meals  - Diabetic diet  - Moderate sliding scale insulin  - Continue her nightly Lantus if she is still in CDU    When met, appropriate disposition will be arranged  Asa Chou PA-C

## 2023-11-14 NOTE — DISCHARGE INSTRUCTIONS
Follow up with your PCP/endocrinologist   It is important to stay on top of your insulin  Maintain an adequate Diabetic Diet  Follow up with OB/GYN at [phone]

## 2023-11-14 NOTE — DISCHARGE SUMMARY
CDU   Disposition Note    Date of Placement in CDU: 11/14  Time of Disposition: 1620    Subjective  Mrs. Tovar has undergone comprehensive diagnostic evaluation and therapeutic management in accordance with the CDU guidelines for hyperglycemia. Based on her clinical response and diagnostic information during this period of observation, it has been determined that the patient will be discharged home.    ED/CDU course:  Mrs. Tovar is a 23-year-old female with history of IDDM 2 who presented to the emergency department with complaints of dizziness, polyuria, nausea, vomiting.  States she is compliant with her short acting lispro however missed her long-acting Lantus last night.  She states she did not have a good explanation for missing her Lantus.  That she was admitted for DKA once in the past however generally is good with her insulins.  States she carb counts and is generally good with her diet. The acute evaluation included laboratory work. With initial signs of DKA with blood sugar at 655, anion gap of 30, pH 7.25.  She was started on insulin drip and fluid resuscitation.  She was to be admitted to MICU.  Blood sugar did incrementally improve as well as pH and closing of her anion gap.  Her insulin drip was discontinued and disposition was changed for CDU for observation.  Throughout stay in CDU she maintained adequate blood sugar.  Tolerated diet well without any difficulty.  She had no return of nausea.  She states she does have adequate supplies of her long-acting and short acting insulin at home.  She is recommended to follow-up as soon as possible with her PCP/endocrinologist.  She is given the phone number for OB/GYN per her request for her abnormal uterine bleeding.  Return precautions were reviewed.      Physical Exam:   GENERAL.: Vitals noted. No distress.  Normocephalic atraumatic.   EYES:  PERRLA, EOMs intact  OROPHARYNX:  No erythema or exudate.  Mucosa moist.  NECK: Supple. No  adenopathy.  CARDIAC: Regular rate rhythm. No murmur noted.  PULMONARY: Equal breath sounds bilaterally.  No wheezes rales or rhonchi  ABDOMEN: Soft, nontender, nonsurgical. No guarding. Normoactive bowel sounds. No bruits, no masses  EXTREMITIES: Full ROM, no pitting edema,   SKIN: Intact, warm and dry  NEURO: Alert and oriented x 3, speech is clear, no obvious deficits noted.       Diagnostic Evaluation  Diagnostic studies germane to this period of clinical observation include:   1) point-of-care blood sugar with maintaining adequate level.    Impression and Plan  Mrs. Tovar has been cared for according to the standard [facility] Clinical Decision Unit observation protocol for hyperglycemia. This extended period of observation was specifically required to determine the need for hospitalization. Prior to discharge from observation, the final physical exam is documented above.     Significant events during the course of observation based on the goals of the clinical problem list include:   1) hyperglycemia  - Initially presented to ED in DKA, DKA resolved with closure of anion gap and resolution of acidosis with insulin infusion and fluid resuscitation  - Currently symptomatic with maintaining adequate blood sugars  - Discharged with instructions to administer her long-acting and short acting insulin as previously prescribed    Based on the patient's condition and test results, the patient will be discharged home    Total length of observation was 6 hours. Dr. Alaniz is the CDU disposition attending.    The patient will follow up with:  1) her PCP, endocrinologist  2) OB/GYN per her request  As appropriate, please see the Exit Care module for comprehensive discharge instructions.

## 2023-11-14 NOTE — PROGRESS NOTES
Pharmacy Medication History Review    Charline Tovar is a 23 y.o. female admitted for Diabetic ketoacidosis without coma associated with type 1 diabetes mellitus (CMS/[facility]). Pharmacy reviewed the patient's [id number] medications and allergies for accuracy.    The list below reflects the updated PTA list. Comments regarding how patient may be taking medications differently can be found in the Admit Orders Activity  Prior to Admission Medications   Prescriptions Last Dose Informant Patient Reported?   Lantus Solostar U-100 Insulin 100 unit/mL (3 mL) pen patient says 22 units Self Yes   Sig: Give 20 units daily as directed   insulin lispro (HumaLOG) 100 unit/mL injection  Self Yes   Sig: INJECT UP TO 50 UNITS SUBCUTANEOUSLY DAILY PER SLIDING SCALE      Facility-Administered Medications: None        The list below reflects the updated allergy list. Please review each documented allergy for additional clarification and justification.  Allergies  Reviewed by Babita Marroquin RN on 11/13/2023   No Known Allergies         Patient accepts M2B at discharge.     Sources used to complete the med history include out patient fill history, OARRS, and patient interview and ov note from [facility] ed visit 12/16/22.     Below are additional concerns with the patient's PTA list.      Anirudh Alonzo [facility]  Transitions of Care Clinical Pharmacist  Please reach out via Epic Chat for questions, if no response call  English Helper or Fliqz  [facility]

## 2023-11-15 ENCOUNTER — TELEPHONE (OUTPATIENT)
Dept: MATERNAL FETAL MEDICINE | Facility: CLINIC | Age: 23
End: 2023-11-15
Payer: COMMERCIAL

## 2023-11-15 NOTE — TELEPHONE ENCOUNTER
Called patient, identified by name and   Rescheduled M appointment for    Patient reports missing appointments due to bilateral sciatica pain, patient attempted to scheduled with PT but was unable to get an appointment last month, encouraged patient to still get scheduled with PT. RN phone number given for any other questions or concerns.     DEMETRA Lawrence RN

## 2023-11-16 ENCOUNTER — OFFICE VISIT (OUTPATIENT)
Dept: OPHTHALMOLOGY | Facility: CLINIC | Age: 23
End: 2023-11-16
Payer: COMMERCIAL

## 2023-11-16 DIAGNOSIS — R83.8 OLIGOCLONAL BANDS IN CEREBROSPINAL FLUID: ICD-10-CM

## 2023-11-16 DIAGNOSIS — H47.10 OPTIC DISC EDEMA: Primary | ICD-10-CM

## 2023-11-16 PROCEDURE — 1036F TOBACCO NON-USER: CPT | Performed by: PSYCHIATRY & NEUROLOGY

## 2023-11-16 PROCEDURE — 92133 CPTRZD OPH DX IMG PST SGM ON: CPT | Performed by: PSYCHIATRY & NEUROLOGY

## 2023-11-16 PROCEDURE — 99214 OFFICE O/P EST MOD 30 MIN: CPT | Performed by: PSYCHIATRY & NEUROLOGY

## 2023-11-16 PROCEDURE — 3051F HG A1C>EQUAL 7.0%<8.0%: CPT | Performed by: PSYCHIATRY & NEUROLOGY

## 2023-11-16 PROCEDURE — 92083 EXTENDED VISUAL FIELD XM: CPT | Performed by: PSYCHIATRY & NEUROLOGY

## 2023-11-16 ASSESSMENT — CUP TO DISC RATIO
OS_RATIO: OBSCURED
OD_RATIO: OBSCURED

## 2023-11-16 ASSESSMENT — ENCOUNTER SYMPTOMS
RESPIRATORY NEGATIVE: 0
CONSTITUTIONAL NEGATIVE: 0
ALLERGIC/IMMUNOLOGIC NEGATIVE: 0
ENDOCRINE NEGATIVE: 0
GASTROINTESTINAL NEGATIVE: 0
NEUROLOGICAL NEGATIVE: 0
PSYCHIATRIC NEGATIVE: 0
HEMATOLOGIC/LYMPHATIC NEGATIVE: 0
CARDIOVASCULAR NEGATIVE: 0
EYES NEGATIVE: 1
MUSCULOSKELETAL NEGATIVE: 0

## 2023-11-16 ASSESSMENT — TONOMETRY
OS_IOP_MMHG: 16
IOP_METHOD: TONOPEN
OD_IOP_MMHG: 15

## 2023-11-16 ASSESSMENT — VISUAL ACUITY
OS_SC: 20/30-2
OD_SC: 20/30-2
METHOD: SNELLEN - LINEAR

## 2023-11-16 ASSESSMENT — SLIT LAMP EXAM - LIDS
COMMENTS: NORMAL
COMMENTS: NORMAL

## 2023-11-16 ASSESSMENT — EXTERNAL EXAM - RIGHT EYE: OD_EXAM: NORMAL

## 2023-11-16 ASSESSMENT — EXTERNAL EXAM - LEFT EYE: OS_EXAM: NORMAL

## 2023-11-16 NOTE — PROGRESS NOTES
Assessment and Plan    08/24/2023 MRI brain without contrast, MRI orbits without contrast & MRV head, which I personally reviewed previously, show no lesion.  Prior head imaging.    08/26/2023 lumbar puncture opening pressure 13 cm water, RBC 2, WBC 1, protein 34 & glucose 51. Oligoclonal bands POSITIVE 3 bands. IgG studies with lower serum IgG & albumin, but elevated IgG index.    10/05/2023 syphilis non-reactive (NR).  08/26/2023 B12 339. T-SPOT TB, NMO/AQP4 ab, MOG ab, Lyme DNA negative. RPR NR.  08/24/2023 thiamine wnl.  08/08/2023 syphilis NR.    11/16/2023 OCT RNFL  & . (Cirrus) (worsened edema)  Subsequent OCT macula studies.  09/06/2023 OCT RNFL OD 58 with S, I & borderline T thinning & OS 60 with S, I & borderline N thinning.  OCT macula OD cystic inner retinal changes with loss of foveal contour 423 & OS inner retinal cystic changes with loss of foveal contour 448.    11/16/2023 HVF 24-2 OD fovea 32, poor positioning with low blind spot MD -8.51 & OS fovea 36, poor positioning with low blind spot MD -16.51.  09/06/2023 HVF 24-2 OD fovea <0, wnl MD -2.55 & OS fovea 33, generalized reudction MD -6.42.    This 23 year-old woman with a history of DM1, asthma presents in follow up for evaluation of vision loss with optic disc edema and nystagmus.    Her examination again shows optic disc edema along with changes of diabetic retinopathy. 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 I recommend continued efforts and blood sugar control and follow up with retina as well as neuro-immunology referral.    The downbeat nystagmus is also likely related to autoimmunity of DM1, but I will look at magnesium as well. I still do not recommend repeat MRI given that prior MRI was negative and gadolinium is contraindicated in pregnancy.    Plan    Check magnesium, folate & JIM-65 antibodies.  Retina referral.  Neuro-immunology referral.    Follow up in  1-2 months with HVF & OCT. (dilated 09/06/2023)

## 2023-11-20 ENCOUNTER — APPOINTMENT (OUTPATIENT)
Dept: OBSTETRICS AND GYNECOLOGY | Facility: CLINIC | Age: 23
End: 2023-11-20
Payer: COMMERCIAL

## 2023-11-20 ENCOUNTER — HOSPITAL ENCOUNTER (INPATIENT)
Facility: HOSPITAL | Age: 23
LOS: 5 days | Discharge: HOME | End: 2023-11-25
Attending: STUDENT IN AN ORGANIZED HEALTH CARE EDUCATION/TRAINING PROGRAM | Admitting: STUDENT IN AN ORGANIZED HEALTH CARE EDUCATION/TRAINING PROGRAM
Payer: COMMERCIAL

## 2023-11-20 ENCOUNTER — TELEPHONE (OUTPATIENT)
Dept: OBSTETRICS AND GYNECOLOGY | Facility: HOSPITAL | Age: 23
End: 2023-11-20
Payer: COMMERCIAL

## 2023-11-20 ENCOUNTER — HOSPITAL ENCOUNTER (OUTPATIENT)
Dept: CARDIOLOGY | Facility: HOSPITAL | Age: 23
Discharge: HOME | End: 2023-11-20
Payer: COMMERCIAL

## 2023-11-20 DIAGNOSIS — Z3A.33 33 WEEKS GESTATION OF PREGNANCY (HHS-HCC): ICD-10-CM

## 2023-11-20 DIAGNOSIS — O24.019: Primary | ICD-10-CM

## 2023-11-20 LAB
ALBUMIN SERPL BCP-MCNC: 4.3 G/DL (ref 3.4–5)
ALP SERPL-CCNC: 81 U/L (ref 33–110)
ALT SERPL W P-5'-P-CCNC: 15 U/L (ref 7–45)
ANION GAP BLDV CALCULATED.4IONS-SCNC: 18 MMOL/L (ref 10–25)
ANION GAP SERPL CALC-SCNC: 21 MMOL/L (ref 10–20)
APTT PPP: 25 SECONDS (ref 27–38)
AST SERPL W P-5'-P-CCNC: 22 U/L (ref 9–39)
B-OH-BUTYR SERPL-SCNC: 3.23 MMOL/L (ref 0.02–0.27)
BASE EXCESS BLDV CALC-SCNC: -6.6 MMOL/L (ref -2–3)
BILIRUB SERPL-MCNC: 0.7 MG/DL (ref 0–1.2)
BILIRUBIN, POC: NEGATIVE
BLOOD URINE, POC: NEGATIVE
BNP SERPL-MCNC: 9 PG/ML (ref 0–99)
BODY TEMPERATURE: 37 DEGREES CELSIUS
BUN SERPL-MCNC: 6 MG/DL (ref 6–23)
CA-I BLDV-SCNC: 1.19 MMOL/L (ref 1.1–1.33)
CALCIUM SERPL-MCNC: 9.9 MG/DL (ref 8.6–10.6)
CARDIAC TROPONIN I PNL SERPL HS: <3 NG/L (ref 0–34)
CHLORIDE BLDV-SCNC: 102 MMOL/L (ref 98–107)
CHLORIDE SERPL-SCNC: 99 MMOL/L (ref 98–107)
CLARITY, POC: CLEAR
CO2 SERPL-SCNC: 19 MMOL/L (ref 21–32)
COLOR, POC: YELLOW
CREAT SERPL-MCNC: 0.45 MG/DL (ref 0.5–1.05)
CREAT UR-MCNC: 53.6 MG/DL (ref 20–320)
ERYTHROCYTE [DISTWIDTH] IN BLOOD BY AUTOMATED COUNT: 12.8 % (ref 11.5–14.5)
FIBRINOGEN PPP-MCNC: 346 MG/DL (ref 200–400)
GFR SERPL CREATININE-BSD FRML MDRD: >90 ML/MIN/1.73M*2
GLUCOSE BLDV-MCNC: 240 MG/DL (ref 74–99)
GLUCOSE SERPL-MCNC: 217 MG/DL (ref 74–99)
GLUCOSE URINE, POC: NORMAL
HCO3 BLDV-SCNC: 18.5 MMOL/L (ref 22–26)
HCT VFR BLD AUTO: 37.9 % (ref 36–46)
HCT VFR BLD EST: 40 % (ref 36–46)
HGB BLD-MCNC: 12.2 G/DL (ref 12–16)
HGB BLDV-MCNC: 13.4 G/DL (ref 12–16)
INHALED O2 CONCENTRATION: 21 %
INR PPP: 1 (ref 0.9–1.1)
KETONES, POC: POSITIVE
LACTATE BLDV-SCNC: 1.8 MMOL/L (ref 0.4–2)
LDH SERPL L TO P-CCNC: 250 U/L (ref 84–246)
LEUKOCYTE EST, POC: NEGATIVE
MCH RBC QN AUTO: 30.4 PG (ref 26–34)
MCHC RBC AUTO-ENTMCNC: 32.2 G/DL (ref 32–36)
MCV RBC AUTO: 95 FL (ref 80–100)
NITRITE, POC: NEGATIVE
NRBC BLD-RTO: 0 /100 WBCS (ref 0–0)
OXYHGB MFR BLDV: 40 % (ref 45–75)
PCO2 BLDV: 35 MM HG (ref 41–51)
PH BLDV: 7.33 PH (ref 7.33–7.43)
PH, POC: 6
PLATELET # BLD AUTO: 406 X10*3/UL (ref 150–450)
PO2 BLDV: 29 MM HG (ref 35–45)
POC APPEARANCE OF BODY FLUID: NORMAL
POTASSIUM BLDV-SCNC: 4.2 MMOL/L (ref 3.5–5.3)
POTASSIUM SERPL-SCNC: 5 MMOL/L (ref 3.5–5.3)
PROT SERPL-MCNC: 7.6 G/DL (ref 6.4–8.2)
PROT UR-ACNC: 13 MG/DL (ref 5–24)
PROT/CREAT UR: 0.24 MG/MG CREAT (ref 0–0.17)
PROTHROMBIN TIME: 10.8 SECONDS (ref 9.8–12.8)
RBC # BLD AUTO: 4.01 X10*6/UL (ref 4–5.2)
SAO2 % BLDV: 41 % (ref 45–75)
SODIUM BLDV-SCNC: 134 MMOL/L (ref 136–145)
SODIUM SERPL-SCNC: 134 MMOL/L (ref 136–145)
SPECIFIC GRAVITY, POC: 1.02
URATE SERPL-MCNC: 2.8 MG/DL (ref 2.3–6.7)
URINE PROTEIN, POC: NEGATIVE
UROBILINOGEN, POC: 0.2
WBC # BLD AUTO: 7.6 X10*3/UL (ref 4.4–11.3)

## 2023-11-20 PROCEDURE — 84132 ASSAY OF SERUM POTASSIUM: CPT

## 2023-11-20 PROCEDURE — 2500000004 HC RX 250 GENERAL PHARMACY W/ HCPCS (ALT 636 FOR OP/ED)

## 2023-11-20 PROCEDURE — 84100 ASSAY OF PHOSPHORUS: CPT | Performed by: STUDENT IN AN ORGANIZED HEALTH CARE EDUCATION/TRAINING PROGRAM

## 2023-11-20 PROCEDURE — 84550 ASSAY OF BLOOD/URIC ACID: CPT

## 2023-11-20 PROCEDURE — 36415 COLL VENOUS BLD VENIPUNCTURE: CPT

## 2023-11-20 PROCEDURE — 93010 ELECTROCARDIOGRAM REPORT: CPT | Performed by: INTERNAL MEDICINE

## 2023-11-20 PROCEDURE — 2500000001 HC RX 250 WO HCPCS SELF ADMINISTERED DRUGS (ALT 637 FOR MEDICARE OP)

## 2023-11-20 PROCEDURE — 85027 COMPLETE CBC AUTOMATED: CPT

## 2023-11-20 PROCEDURE — 87081 CULTURE SCREEN ONLY: CPT

## 2023-11-20 PROCEDURE — 82805 BLOOD GASES W/O2 SATURATION: CPT

## 2023-11-20 PROCEDURE — 85610 PROTHROMBIN TIME: CPT

## 2023-11-20 PROCEDURE — 1210000001 HC SEMI-PRIVATE ROOM DAILY

## 2023-11-20 PROCEDURE — 83880 ASSAY OF NATRIURETIC PEPTIDE: CPT

## 2023-11-20 PROCEDURE — 2500000005 HC RX 250 GENERAL PHARMACY W/O HCPCS

## 2023-11-20 PROCEDURE — 84484 ASSAY OF TROPONIN QUANT: CPT

## 2023-11-20 PROCEDURE — 82570 ASSAY OF URINE CREATININE: CPT

## 2023-11-20 PROCEDURE — 83735 ASSAY OF MAGNESIUM: CPT | Performed by: STUDENT IN AN ORGANIZED HEALTH CARE EDUCATION/TRAINING PROGRAM

## 2023-11-20 PROCEDURE — 2500000002 HC RX 250 W HCPCS SELF ADMINISTERED DRUGS (ALT 637 FOR MEDICARE OP, ALT 636 FOR OP/ED)

## 2023-11-20 PROCEDURE — 93005 ELECTROCARDIOGRAM TRACING: CPT

## 2023-11-20 PROCEDURE — 83615 LACTATE (LD) (LDH) ENZYME: CPT

## 2023-11-20 PROCEDURE — 85384 FIBRINOGEN ACTIVITY: CPT

## 2023-11-20 PROCEDURE — 82010 KETONE BODYS QUAN: CPT | Performed by: STUDENT IN AN ORGANIZED HEALTH CARE EDUCATION/TRAINING PROGRAM

## 2023-11-20 RX ORDER — LIDOCAINE HYDROCHLORIDE 10 MG/ML
0.5 INJECTION INFILTRATION; PERINEURAL ONCE AS NEEDED
Status: DISCONTINUED | OUTPATIENT
Start: 2023-11-20 | End: 2023-11-21

## 2023-11-20 RX ORDER — ACETAMINOPHEN 325 MG/1
975 TABLET ORAL ONCE
Status: COMPLETED | OUTPATIENT
Start: 2023-11-20 | End: 2023-11-20

## 2023-11-20 RX ORDER — DEXTROSE MONOHYDRATE 100 MG/ML
0.3 INJECTION, SOLUTION INTRAVENOUS ONCE AS NEEDED
Status: DISCONTINUED | OUTPATIENT
Start: 2023-11-20 | End: 2023-11-21

## 2023-11-20 RX ORDER — DEXTROSE 50 % IN WATER (D50W) INTRAVENOUS SYRINGE
25
Status: DISCONTINUED | OUTPATIENT
Start: 2023-11-20 | End: 2023-11-21

## 2023-11-20 RX ORDER — INSULIN LISPRO 100 [IU]/ML
6 INJECTION, SOLUTION INTRAVENOUS; SUBCUTANEOUS ONCE
Status: COMPLETED | OUTPATIENT
Start: 2023-11-20 | End: 2023-11-20

## 2023-11-20 RX ORDER — ONDANSETRON 4 MG/1
4 TABLET, FILM COATED ORAL EVERY 6 HOURS PRN
Status: DISCONTINUED | OUTPATIENT
Start: 2023-11-20 | End: 2023-11-21

## 2023-11-20 RX ORDER — HYDRALAZINE HYDROCHLORIDE 20 MG/ML
5 INJECTION INTRAMUSCULAR; INTRAVENOUS ONCE AS NEEDED
Status: DISCONTINUED | OUTPATIENT
Start: 2023-11-20 | End: 2023-11-21

## 2023-11-20 RX ORDER — NIFEDIPINE 10 MG/1
10 CAPSULE ORAL ONCE AS NEEDED
Status: DISCONTINUED | OUTPATIENT
Start: 2023-11-20 | End: 2023-11-21

## 2023-11-20 RX ORDER — ONDANSETRON HYDROCHLORIDE 2 MG/ML
4 INJECTION, SOLUTION INTRAVENOUS EVERY 6 HOURS PRN
Status: DISCONTINUED | OUTPATIENT
Start: 2023-11-20 | End: 2023-11-21

## 2023-11-20 RX ORDER — CYCLOBENZAPRINE HCL 10 MG
10 TABLET ORAL ONCE
Status: COMPLETED | OUTPATIENT
Start: 2023-11-20 | End: 2023-11-20

## 2023-11-20 RX ADMIN — DIPHENHYDRAMINE HYDROCHLORIDE AND LIDOCAINE HYDROCHLORIDE AND ALUMINUM HYDROXIDE AND MAGNESIUM HYDRO 10 ML: KIT at 22:46

## 2023-11-20 RX ADMIN — ACETAMINOPHEN 975 MG: 325 TABLET ORAL at 22:44

## 2023-11-20 RX ADMIN — CYCLOBENZAPRINE 10 MG: 10 TABLET, FILM COATED ORAL at 22:46

## 2023-11-20 RX ADMIN — SODIUM CHLORIDE, POTASSIUM CHLORIDE, SODIUM LACTATE AND CALCIUM CHLORIDE 1000 ML: 600; 310; 30; 20 INJECTION, SOLUTION INTRAVENOUS at 22:31

## 2023-11-20 RX ADMIN — INSULIN LISPRO 6 UNITS: 100 INJECTION, SOLUTION INTRAVENOUS; SUBCUTANEOUS at 23:44

## 2023-11-20 ASSESSMENT — PAIN DESCRIPTION - LOCATION: LOCATION: UMBILICUS

## 2023-11-20 NOTE — TELEPHONE ENCOUNTER
Complex/High Risk OB Program    Pt called complaining of worsening blurry vision, RUQ pain, and back pain x 1 wk.   Pt has hx of cHTN, home BP cuff is not accessible at this time, but office Blood Pressures have been normotensive on no meds.   Encouraged to present to triage for further evaluation, pt agrees.   Triage MARLENY notified.   Will continue to follow patient care.     Ro Douglas CNP  Complex/High Risk OB   Felix/Dain/Ext [phone]

## 2023-11-21 ENCOUNTER — APPOINTMENT (OUTPATIENT)
Dept: MATERNAL FETAL MEDICINE | Facility: CLINIC | Age: 23
End: 2023-11-21
Payer: COMMERCIAL

## 2023-11-21 ENCOUNTER — APPOINTMENT (OUTPATIENT)
Dept: RADIOLOGY | Facility: HOSPITAL | Age: 23
End: 2023-11-21
Payer: COMMERCIAL

## 2023-11-21 LAB
ABO GROUP (TYPE) IN BLOOD: NORMAL
ALBUMIN SERPL BCP-MCNC: 3.1 G/DL (ref 3.4–5)
ALBUMIN SERPL BCP-MCNC: 3.2 G/DL (ref 3.4–5)
ALBUMIN SERPL BCP-MCNC: 3.3 G/DL (ref 3.4–5)
ALBUMIN SERPL BCP-MCNC: 3.5 G/DL (ref 3.4–5)
ALP SERPL-CCNC: 63 U/L (ref 33–110)
ALP SERPL-CCNC: 63 U/L (ref 33–110)
ALP SERPL-CCNC: 64 U/L (ref 33–110)
ALP SERPL-CCNC: 69 U/L (ref 33–110)
ALT SERPL W P-5'-P-CCNC: 10 U/L (ref 7–45)
ALT SERPL W P-5'-P-CCNC: 11 U/L (ref 7–45)
ALT SERPL W P-5'-P-CCNC: 12 U/L (ref 7–45)
ALT SERPL W P-5'-P-CCNC: 9 U/L (ref 7–45)
ANION GAP BLDV CALCULATED.4IONS-SCNC: 11 MMOL/L (ref 10–25)
ANION GAP BLDV CALCULATED.4IONS-SCNC: 15 MMOL/L (ref 10–25)
ANION GAP SERPL CALC-SCNC: 14 MMOL/L (ref 10–20)
ANION GAP SERPL CALC-SCNC: 15 MMOL/L (ref 10–20)
ANION GAP SERPL CALC-SCNC: 17 MMOL/L (ref 10–20)
ANION GAP SERPL CALC-SCNC: 19 MMOL/L (ref 10–20)
ANTIBODY SCREEN: NORMAL
AST SERPL W P-5'-P-CCNC: 12 U/L (ref 9–39)
AST SERPL W P-5'-P-CCNC: 12 U/L (ref 9–39)
AST SERPL W P-5'-P-CCNC: 22 U/L (ref 9–39)
AST SERPL W P-5'-P-CCNC: 9 U/L (ref 9–39)
B-OH-BUTYR SERPL-SCNC: 1.06 MMOL/L (ref 0.02–0.27)
B-OH-BUTYR SERPL-SCNC: 1.23 MMOL/L (ref 0.02–0.27)
B-OH-BUTYR SERPL-SCNC: 1.54 MMOL/L (ref 0.02–0.27)
B-OH-BUTYR SERPL-SCNC: 3.47 MMOL/L (ref 0.02–0.27)
BASE EXCESS BLDV CALC-SCNC: -4.8 MMOL/L (ref -2–3)
BASE EXCESS BLDV CALC-SCNC: -5.5 MMOL/L (ref -2–3)
BASE EXCESS BLDV CALC-SCNC: -6.3 MMOL/L (ref -2–3)
BILIRUB SERPL-MCNC: 0.4 MG/DL (ref 0–1.2)
BILIRUB SERPL-MCNC: 0.4 MG/DL (ref 0–1.2)
BILIRUB SERPL-MCNC: 0.5 MG/DL (ref 0–1.2)
BILIRUB SERPL-MCNC: 0.5 MG/DL (ref 0–1.2)
BODY TEMPERATURE: 37 DEGREES CELSIUS
BUN SERPL-MCNC: 6 MG/DL (ref 6–23)
BUN SERPL-MCNC: 6 MG/DL (ref 6–23)
BUN SERPL-MCNC: 7 MG/DL (ref 6–23)
BUN SERPL-MCNC: 7 MG/DL (ref 6–23)
CA-I BLDV-SCNC: 1.19 MMOL/L (ref 1.1–1.33)
CA-I BLDV-SCNC: 1.22 MMOL/L (ref 1.1–1.33)
CALCIUM SERPL-MCNC: 8.5 MG/DL (ref 8.6–10.6)
CALCIUM SERPL-MCNC: 8.8 MG/DL (ref 8.6–10.6)
CHLORIDE BLDV-SCNC: 105 MMOL/L (ref 98–107)
CHLORIDE BLDV-SCNC: 106 MMOL/L (ref 98–107)
CHLORIDE SERPL-SCNC: 104 MMOL/L (ref 98–107)
CHLORIDE SERPL-SCNC: 105 MMOL/L (ref 98–107)
CHLORIDE SERPL-SCNC: 106 MMOL/L (ref 98–107)
CHLORIDE SERPL-SCNC: 106 MMOL/L (ref 98–107)
CO2 SERPL-SCNC: 17 MMOL/L (ref 21–32)
CO2 SERPL-SCNC: 18 MMOL/L (ref 21–32)
CO2 SERPL-SCNC: 18 MMOL/L (ref 21–32)
CO2 SERPL-SCNC: 19 MMOL/L (ref 21–32)
CREAT SERPL-MCNC: 0.34 MG/DL (ref 0.5–1.05)
CREAT SERPL-MCNC: 0.35 MG/DL (ref 0.5–1.05)
CREAT SERPL-MCNC: 0.36 MG/DL (ref 0.5–1.05)
CREAT SERPL-MCNC: 0.5 MG/DL (ref 0.5–1.05)
GFR SERPL CREATININE-BSD FRML MDRD: >90 ML/MIN/1.73M*2
GLUCOSE BLD MANUAL STRIP-MCNC: 121 MG/DL (ref 74–99)
GLUCOSE BLD MANUAL STRIP-MCNC: 133 MG/DL (ref 74–99)
GLUCOSE BLD MANUAL STRIP-MCNC: 134 MG/DL (ref 74–99)
GLUCOSE BLD MANUAL STRIP-MCNC: 135 MG/DL (ref 74–99)
GLUCOSE BLD MANUAL STRIP-MCNC: 139 MG/DL (ref 74–99)
GLUCOSE BLD MANUAL STRIP-MCNC: 143 MG/DL (ref 74–99)
GLUCOSE BLD MANUAL STRIP-MCNC: 143 MG/DL (ref 74–99)
GLUCOSE BLD MANUAL STRIP-MCNC: 144 MG/DL (ref 74–99)
GLUCOSE BLD MANUAL STRIP-MCNC: 158 MG/DL (ref 74–99)
GLUCOSE BLD MANUAL STRIP-MCNC: 161 MG/DL (ref 74–99)
GLUCOSE BLD MANUAL STRIP-MCNC: 164 MG/DL (ref 74–99)
GLUCOSE BLD MANUAL STRIP-MCNC: 165 MG/DL (ref 74–99)
GLUCOSE BLD MANUAL STRIP-MCNC: 170 MG/DL (ref 74–99)
GLUCOSE BLD MANUAL STRIP-MCNC: 175 MG/DL (ref 74–99)
GLUCOSE BLD MANUAL STRIP-MCNC: 178 MG/DL (ref 74–99)
GLUCOSE BLD MANUAL STRIP-MCNC: 218 MG/DL (ref 74–99)
GLUCOSE BLDV-MCNC: 134 MG/DL (ref 74–99)
GLUCOSE BLDV-MCNC: 154 MG/DL (ref 74–99)
GLUCOSE SERPL-MCNC: 149 MG/DL (ref 74–99)
GLUCOSE SERPL-MCNC: 155 MG/DL (ref 74–99)
GLUCOSE SERPL-MCNC: 170 MG/DL (ref 74–99)
GLUCOSE SERPL-MCNC: 181 MG/DL (ref 74–99)
HCO3 BLDV-SCNC: 17.3 MMOL/L (ref 22–26)
HCO3 BLDV-SCNC: 18 MMOL/L (ref 22–26)
HCO3 BLDV-SCNC: 19.4 MMOL/L (ref 22–26)
HCT VFR BLD EST: 32 % (ref 36–46)
HCT VFR BLD EST: 33 % (ref 36–46)
HGB BLDV-MCNC: 10.8 G/DL (ref 12–16)
HGB BLDV-MCNC: 11 G/DL (ref 12–16)
HOLD SPECIMEN: NORMAL
INHALED O2 CONCENTRATION: 21 %
KETONES, POC: POSITIVE
LACTATE BLDV-SCNC: 1 MMOL/L (ref 0.4–2)
LACTATE BLDV-SCNC: 1.1 MMOL/L (ref 0.4–2)
MAGNESIUM SERPL-MCNC: 1.76 MG/DL (ref 1.6–2.4)
OXYHGB MFR BLDV: 89.3 % (ref 45–75)
OXYHGB MFR BLDV: 96.2 % (ref 45–75)
OXYHGB MFR BLDV: 96.7 % (ref 45–75)
PCO2 BLDV: 28 MM HG (ref 41–51)
PCO2 BLDV: 29 MM HG (ref 41–51)
PCO2 BLDV: 32 MM HG (ref 41–51)
PH BLDV: 7.39 PH (ref 7.33–7.43)
PH BLDV: 7.4 PH (ref 7.33–7.43)
PH BLDV: 7.4 PH (ref 7.33–7.43)
PHOSPHATE SERPL-MCNC: 3.5 MG/DL (ref 2.5–4.9)
PO2 BLDV: 113 MM HG (ref 35–45)
PO2 BLDV: 62 MM HG (ref 35–45)
PO2 BLDV: 85 MM HG (ref 35–45)
POTASSIUM BLDV-SCNC: 3.7 MMOL/L (ref 3.5–5.3)
POTASSIUM BLDV-SCNC: 3.8 MMOL/L (ref 3.5–5.3)
POTASSIUM SERPL-SCNC: 3.5 MMOL/L (ref 3.5–5.3)
POTASSIUM SERPL-SCNC: 3.9 MMOL/L (ref 3.5–5.3)
POTASSIUM SERPL-SCNC: 3.9 MMOL/L (ref 3.5–5.3)
POTASSIUM SERPL-SCNC: 4.2 MMOL/L (ref 3.5–5.3)
PROT SERPL-MCNC: 5.4 G/DL (ref 6.4–8.2)
PROT SERPL-MCNC: 5.7 G/DL (ref 6.4–8.2)
PROT SERPL-MCNC: 6.1 G/DL (ref 6.4–8.2)
PROT SERPL-MCNC: 6.3 G/DL (ref 6.4–8.2)
RH FACTOR (ANTIGEN D): NORMAL
SAO2 % BLDV: 91 % (ref 45–75)
SAO2 % BLDV: 98 % (ref 45–75)
SAO2 % BLDV: 99 % (ref 45–75)
SODIUM BLDV-SCNC: 132 MMOL/L (ref 136–145)
SODIUM BLDV-SCNC: 135 MMOL/L (ref 136–145)
SODIUM SERPL-SCNC: 134 MMOL/L (ref 136–145)
SODIUM SERPL-SCNC: 135 MMOL/L (ref 136–145)
SODIUM SERPL-SCNC: 136 MMOL/L (ref 136–145)
SODIUM SERPL-SCNC: 137 MMOL/L (ref 136–145)
TEST COMMENT: ABNORMAL

## 2023-11-21 PROCEDURE — 2500000001 HC RX 250 WO HCPCS SELF ADMINISTERED DRUGS (ALT 637 FOR MEDICARE OP): Performed by: STUDENT IN AN ORGANIZED HEALTH CARE EDUCATION/TRAINING PROGRAM

## 2023-11-21 PROCEDURE — 2500000002 HC RX 250 W HCPCS SELF ADMINISTERED DRUGS (ALT 637 FOR MEDICARE OP, ALT 636 FOR OP/ED): Performed by: STUDENT IN AN ORGANIZED HEALTH CARE EDUCATION/TRAINING PROGRAM

## 2023-11-21 PROCEDURE — 1210000001 HC SEMI-PRIVATE ROOM DAILY

## 2023-11-21 PROCEDURE — 82947 ASSAY GLUCOSE BLOOD QUANT: CPT

## 2023-11-21 PROCEDURE — 2500000001 HC RX 250 WO HCPCS SELF ADMINISTERED DRUGS (ALT 637 FOR MEDICARE OP)

## 2023-11-21 PROCEDURE — 82805 BLOOD GASES W/O2 SATURATION: CPT | Performed by: STUDENT IN AN ORGANIZED HEALTH CARE EDUCATION/TRAINING PROGRAM

## 2023-11-21 PROCEDURE — 2500000004 HC RX 250 GENERAL PHARMACY W/ HCPCS (ALT 636 FOR OP/ED): Performed by: STUDENT IN AN ORGANIZED HEALTH CARE EDUCATION/TRAINING PROGRAM

## 2023-11-21 PROCEDURE — 99199 UNLISTED SPECIAL SVC PX/RPRT: CPT

## 2023-11-21 PROCEDURE — 84295 ASSAY OF SERUM SODIUM: CPT | Performed by: STUDENT IN AN ORGANIZED HEALTH CARE EDUCATION/TRAINING PROGRAM

## 2023-11-21 PROCEDURE — 84132 ASSAY OF SERUM POTASSIUM: CPT | Performed by: STUDENT IN AN ORGANIZED HEALTH CARE EDUCATION/TRAINING PROGRAM

## 2023-11-21 PROCEDURE — 82010 KETONE BODYS QUAN: CPT

## 2023-11-21 PROCEDURE — 86850 RBC ANTIBODY SCREEN: CPT | Performed by: STUDENT IN AN ORGANIZED HEALTH CARE EDUCATION/TRAINING PROGRAM

## 2023-11-21 PROCEDURE — 36415 COLL VENOUS BLD VENIPUNCTURE: CPT | Performed by: STUDENT IN AN ORGANIZED HEALTH CARE EDUCATION/TRAINING PROGRAM

## 2023-11-21 PROCEDURE — 84295 ASSAY OF SERUM SODIUM: CPT

## 2023-11-21 PROCEDURE — 81003 URINALYSIS AUTO W/O SCOPE: CPT | Performed by: STUDENT IN AN ORGANIZED HEALTH CARE EDUCATION/TRAINING PROGRAM

## 2023-11-21 PROCEDURE — 76819 FETAL BIOPHYS PROFIL W/O NST: CPT | Performed by: STUDENT IN AN ORGANIZED HEALTH CARE EDUCATION/TRAINING PROGRAM

## 2023-11-21 PROCEDURE — 99232 SBSQ HOSP IP/OBS MODERATE 35: CPT | Performed by: STUDENT IN AN ORGANIZED HEALTH CARE EDUCATION/TRAINING PROGRAM

## 2023-11-21 PROCEDURE — 82010 KETONE BODYS QUAN: CPT | Performed by: STUDENT IN AN ORGANIZED HEALTH CARE EDUCATION/TRAINING PROGRAM

## 2023-11-21 PROCEDURE — 99215 OFFICE O/P EST HI 40 MIN: CPT | Mod: 25

## 2023-11-21 PROCEDURE — 82435 ASSAY OF BLOOD CHLORIDE: CPT | Performed by: STUDENT IN AN ORGANIZED HEALTH CARE EDUCATION/TRAINING PROGRAM

## 2023-11-21 PROCEDURE — 76816 OB US FOLLOW-UP PER FETUS: CPT

## 2023-11-21 PROCEDURE — 76818 FETAL BIOPHYS PROFILE W/NST: CPT

## 2023-11-21 PROCEDURE — 76816 OB US FOLLOW-UP PER FETUS: CPT | Performed by: STUDENT IN AN ORGANIZED HEALTH CARE EDUCATION/TRAINING PROGRAM

## 2023-11-21 RX ORDER — DEXTROSE 40 %
15 GEL (GRAM) ORAL
Status: DISCONTINUED | OUTPATIENT
Start: 2023-11-21 | End: 2023-11-22

## 2023-11-21 RX ORDER — POTASSIUM CHLORIDE 20 MEQ/1
40 TABLET, EXTENDED RELEASE ORAL ONCE
Status: COMPLETED | OUTPATIENT
Start: 2023-11-21 | End: 2023-11-21

## 2023-11-21 RX ORDER — ALBUTEROL SULFATE 0.83 MG/ML
2.5 SOLUTION RESPIRATORY (INHALATION) EVERY 6 HOURS PRN
Status: DISCONTINUED | OUTPATIENT
Start: 2023-11-21 | End: 2023-11-25 | Stop reason: HOSPADM

## 2023-11-21 RX ORDER — METOCLOPRAMIDE HYDROCHLORIDE 5 MG/ML
10 INJECTION INTRAMUSCULAR; INTRAVENOUS EVERY 6 HOURS PRN
Status: DISCONTINUED | OUTPATIENT
Start: 2023-11-21 | End: 2023-11-25 | Stop reason: HOSPADM

## 2023-11-21 RX ORDER — DEXTROSE MONOHYDRATE 100 MG/ML
0.3 INJECTION, SOLUTION INTRAVENOUS ONCE AS NEEDED
Status: DISCONTINUED | OUTPATIENT
Start: 2023-11-21 | End: 2023-11-21

## 2023-11-21 RX ORDER — LABETALOL HYDROCHLORIDE 5 MG/ML
20 INJECTION, SOLUTION INTRAVENOUS ONCE AS NEEDED
Status: DISCONTINUED | OUTPATIENT
Start: 2023-11-21 | End: 2023-11-21

## 2023-11-21 RX ORDER — NIFEDIPINE 10 MG/1
10 CAPSULE ORAL ONCE AS NEEDED
Status: DISCONTINUED | OUTPATIENT
Start: 2023-11-21 | End: 2023-11-25 | Stop reason: HOSPADM

## 2023-11-21 RX ORDER — POTASSIUM CHLORIDE 1.5 G/1.58G
20 POWDER, FOR SOLUTION ORAL ONCE
Status: COMPLETED | OUTPATIENT
Start: 2023-11-21 | End: 2023-11-21

## 2023-11-21 RX ORDER — NIFEDIPINE 10 MG/1
10 CAPSULE ORAL ONCE AS NEEDED
Status: DISCONTINUED | OUTPATIENT
Start: 2023-11-21 | End: 2023-11-21

## 2023-11-21 RX ORDER — HYDRALAZINE HYDROCHLORIDE 20 MG/ML
5 INJECTION INTRAMUSCULAR; INTRAVENOUS ONCE AS NEEDED
Status: DISCONTINUED | OUTPATIENT
Start: 2023-11-21 | End: 2023-11-25 | Stop reason: HOSPADM

## 2023-11-21 RX ORDER — ONDANSETRON HYDROCHLORIDE 2 MG/ML
4 INJECTION, SOLUTION INTRAVENOUS EVERY 6 HOURS PRN
Status: DISCONTINUED | OUTPATIENT
Start: 2023-11-21 | End: 2023-11-25 | Stop reason: HOSPADM

## 2023-11-21 RX ORDER — OXYTOCIN 10 [USP'U]/ML
10 INJECTION, SOLUTION INTRAMUSCULAR; INTRAVENOUS ONCE AS NEEDED
Status: DISCONTINUED | OUTPATIENT
Start: 2023-11-21 | End: 2023-11-21

## 2023-11-21 RX ORDER — DEXTROSE 50 % IN WATER (D50W) INTRAVENOUS SYRINGE
50
Status: DISCONTINUED | OUTPATIENT
Start: 2023-11-20 | End: 2023-11-21

## 2023-11-21 RX ORDER — POLYETHYLENE GLYCOL 3350 17 G/17G
17 POWDER, FOR SOLUTION ORAL 2 TIMES DAILY PRN
Status: DISCONTINUED | OUTPATIENT
Start: 2023-11-21 | End: 2023-11-25 | Stop reason: HOSPADM

## 2023-11-21 RX ORDER — CYCLOBENZAPRINE HCL 10 MG
10 TABLET ORAL ONCE
Status: COMPLETED | OUTPATIENT
Start: 2023-11-21 | End: 2023-11-21

## 2023-11-21 RX ORDER — SIMETHICONE 80 MG
80 TABLET,CHEWABLE ORAL 4 TIMES DAILY PRN
Status: DISCONTINUED | OUTPATIENT
Start: 2023-11-21 | End: 2023-11-25 | Stop reason: HOSPADM

## 2023-11-21 RX ORDER — DEXTROSE MONOHYDRATE 100 MG/ML
0.3 INJECTION, SOLUTION INTRAVENOUS ONCE AS NEEDED
Status: DISCONTINUED | OUTPATIENT
Start: 2023-11-21 | End: 2023-11-22

## 2023-11-21 RX ORDER — LIDOCAINE HYDROCHLORIDE 10 MG/ML
0.5 INJECTION INFILTRATION; PERINEURAL ONCE AS NEEDED
Status: DISCONTINUED | OUTPATIENT
Start: 2023-11-21 | End: 2023-11-25 | Stop reason: HOSPADM

## 2023-11-21 RX ORDER — INSULIN LISPRO 100 [IU]/ML
4 INJECTION, SOLUTION INTRAVENOUS; SUBCUTANEOUS ONCE
Status: COMPLETED | OUTPATIENT
Start: 2023-11-21 | End: 2023-11-21

## 2023-11-21 RX ORDER — ONDANSETRON 4 MG/1
4 TABLET, FILM COATED ORAL EVERY 6 HOURS PRN
Status: DISCONTINUED | OUTPATIENT
Start: 2023-11-21 | End: 2023-11-21

## 2023-11-21 RX ORDER — DEXTROSE MONOHYDRATE 100 MG/ML
150 INJECTION, SOLUTION INTRAVENOUS CONTINUOUS
Status: DISCONTINUED | OUTPATIENT
Start: 2023-11-21 | End: 2023-11-21

## 2023-11-21 RX ORDER — METHYLERGONOVINE MALEATE 0.2 MG/ML
0.2 INJECTION INTRAVENOUS ONCE AS NEEDED
Status: DISCONTINUED | OUTPATIENT
Start: 2023-11-21 | End: 2023-11-21

## 2023-11-21 RX ORDER — INSULIN LISPRO 100 [IU]/ML
3 INJECTION, SOLUTION INTRAVENOUS; SUBCUTANEOUS AS NEEDED
Status: DISCONTINUED | OUTPATIENT
Start: 2023-11-21 | End: 2023-11-22

## 2023-11-21 RX ORDER — ACETAMINOPHEN 325 MG/1
975 TABLET ORAL ONCE
Status: COMPLETED | OUTPATIENT
Start: 2023-11-21 | End: 2023-11-21

## 2023-11-21 RX ORDER — TRANEXAMIC ACID 100 MG/ML
1000 INJECTION, SOLUTION INTRAVENOUS ONCE AS NEEDED
Status: DISCONTINUED | OUTPATIENT
Start: 2023-11-21 | End: 2023-11-21

## 2023-11-21 RX ORDER — DEXTROSE MONOHYDRATE AND SODIUM CHLORIDE 5; .9 G/100ML; G/100ML
150 INJECTION, SOLUTION INTRAVENOUS CONTINUOUS
Status: DISCONTINUED | OUTPATIENT
Start: 2023-11-21 | End: 2023-11-21

## 2023-11-21 RX ORDER — ONDANSETRON 4 MG/1
4 TABLET, FILM COATED ORAL EVERY 6 HOURS PRN
Status: DISCONTINUED | OUTPATIENT
Start: 2023-11-21 | End: 2023-11-25 | Stop reason: HOSPADM

## 2023-11-21 RX ORDER — METOCLOPRAMIDE 10 MG/1
10 TABLET ORAL EVERY 6 HOURS PRN
Status: DISCONTINUED | OUTPATIENT
Start: 2023-11-21 | End: 2023-11-21

## 2023-11-21 RX ORDER — BISACODYL 10 MG/1
10 SUPPOSITORY RECTAL DAILY PRN
Status: DISCONTINUED | OUTPATIENT
Start: 2023-11-21 | End: 2023-11-25 | Stop reason: HOSPADM

## 2023-11-21 RX ORDER — SODIUM CHLORIDE, SODIUM LACTATE, POTASSIUM CHLORIDE, CALCIUM CHLORIDE 600; 310; 30; 20 MG/100ML; MG/100ML; MG/100ML; MG/100ML
125 INJECTION, SOLUTION INTRAVENOUS CONTINUOUS
Status: DISCONTINUED | OUTPATIENT
Start: 2023-11-21 | End: 2023-11-21

## 2023-11-21 RX ORDER — TERBUTALINE SULFATE 1 MG/ML
0.25 INJECTION SUBCUTANEOUS ONCE AS NEEDED
Status: DISCONTINUED | OUTPATIENT
Start: 2023-11-21 | End: 2023-11-21

## 2023-11-21 RX ORDER — ONDANSETRON HYDROCHLORIDE 2 MG/ML
4 INJECTION, SOLUTION INTRAVENOUS EVERY 6 HOURS PRN
Status: DISCONTINUED | OUTPATIENT
Start: 2023-11-21 | End: 2023-11-21

## 2023-11-21 RX ORDER — LOPERAMIDE HYDROCHLORIDE 2 MG/1
4 CAPSULE ORAL EVERY 2 HOUR PRN
Status: DISCONTINUED | OUTPATIENT
Start: 2023-11-21 | End: 2023-11-21

## 2023-11-21 RX ORDER — DEXTROSE 50 % IN WATER (D50W) INTRAVENOUS SYRINGE
25
Status: DISCONTINUED | OUTPATIENT
Start: 2023-11-21 | End: 2023-11-22

## 2023-11-21 RX ORDER — METOCLOPRAMIDE 10 MG/1
10 TABLET ORAL EVERY 6 HOURS PRN
Status: DISCONTINUED | OUTPATIENT
Start: 2023-11-21 | End: 2023-11-25 | Stop reason: HOSPADM

## 2023-11-21 RX ORDER — POTASSIUM CHLORIDE 14.9 MG/ML
20 INJECTION INTRAVENOUS ONCE
Status: DISCONTINUED | OUTPATIENT
Start: 2023-11-21 | End: 2023-11-21

## 2023-11-21 RX ORDER — ADHESIVE BANDAGE
10 BANDAGE TOPICAL
Status: DISCONTINUED | OUTPATIENT
Start: 2023-11-21 | End: 2023-11-25 | Stop reason: HOSPADM

## 2023-11-21 RX ORDER — VALACYCLOVIR HYDROCHLORIDE 500 MG/1
500 TABLET, FILM COATED ORAL 2 TIMES DAILY
Status: DISCONTINUED | OUTPATIENT
Start: 2023-11-21 | End: 2023-11-25 | Stop reason: HOSPADM

## 2023-11-21 RX ORDER — DEXTROSE 50 % IN WATER (D50W) INTRAVENOUS SYRINGE
25
Status: DISCONTINUED | OUTPATIENT
Start: 2023-11-21 | End: 2023-11-21

## 2023-11-21 RX ORDER — HYDRALAZINE HYDROCHLORIDE 20 MG/ML
5 INJECTION INTRAMUSCULAR; INTRAVENOUS ONCE AS NEEDED
Status: DISCONTINUED | OUTPATIENT
Start: 2023-11-21 | End: 2023-11-21

## 2023-11-21 RX ORDER — METOCLOPRAMIDE HYDROCHLORIDE 5 MG/ML
10 INJECTION INTRAMUSCULAR; INTRAVENOUS EVERY 6 HOURS PRN
Status: DISCONTINUED | OUTPATIENT
Start: 2023-11-21 | End: 2023-11-21

## 2023-11-21 RX ORDER — OXYTOCIN/0.9 % SODIUM CHLORIDE 30/500 ML
60 PLASTIC BAG, INJECTION (ML) INTRAVENOUS ONCE AS NEEDED
Status: DISCONTINUED | OUTPATIENT
Start: 2023-11-21 | End: 2023-11-21

## 2023-11-21 RX ORDER — MISOPROSTOL 200 UG/1
800 TABLET ORAL ONCE AS NEEDED
Status: DISCONTINUED | OUTPATIENT
Start: 2023-11-21 | End: 2023-11-21

## 2023-11-21 RX ORDER — LIDOCAINE HYDROCHLORIDE 10 MG/ML
30 INJECTION INFILTRATION; PERINEURAL ONCE AS NEEDED
Status: DISCONTINUED | OUTPATIENT
Start: 2023-11-21 | End: 2023-11-21

## 2023-11-21 RX ORDER — LABETALOL HYDROCHLORIDE 5 MG/ML
20 INJECTION, SOLUTION INTRAVENOUS ONCE AS NEEDED
Status: DISCONTINUED | OUTPATIENT
Start: 2023-11-21 | End: 2023-11-25 | Stop reason: HOSPADM

## 2023-11-21 RX ORDER — ACYCLOVIR 400 MG/1
400 TABLET ORAL 2 TIMES DAILY
Status: DISCONTINUED | OUTPATIENT
Start: 2023-11-21 | End: 2023-11-21

## 2023-11-21 RX ORDER — CARBOPROST TROMETHAMINE 250 UG/ML
250 INJECTION, SOLUTION INTRAMUSCULAR ONCE AS NEEDED
Status: DISCONTINUED | OUTPATIENT
Start: 2023-11-21 | End: 2023-11-21

## 2023-11-21 RX ORDER — DEXTROSE 40 %
30 GEL (GRAM) ORAL
Status: DISCONTINUED | OUTPATIENT
Start: 2023-11-21 | End: 2023-11-22

## 2023-11-21 RX ADMIN — INSULIN LISPRO 4 UNITS: 100 INJECTION, SOLUTION INTRAVENOUS; SUBCUTANEOUS at 18:30

## 2023-11-21 RX ADMIN — DEXTROSE AND SODIUM CHLORIDE 150 ML/HR: 5; 900 INJECTION, SOLUTION INTRAVENOUS at 10:06

## 2023-11-21 RX ADMIN — VALACYCLOVIR 500 MG: 500 TABLET, FILM COATED ORAL at 20:58

## 2023-11-21 RX ADMIN — DEXTROSE MONOHYDRATE 150 ML/HR: 100 INJECTION, SOLUTION INTRAVENOUS at 02:52

## 2023-11-21 RX ADMIN — SODIUM CHLORIDE, POTASSIUM CHLORIDE, SODIUM LACTATE AND CALCIUM CHLORIDE 1000 ML: 600; 310; 30; 20 INJECTION, SOLUTION INTRAVENOUS at 00:55

## 2023-11-21 RX ADMIN — ACETAMINOPHEN 975 MG: 325 TABLET ORAL at 11:15

## 2023-11-21 RX ADMIN — CYCLOBENZAPRINE 10 MG: 10 TABLET, FILM COATED ORAL at 11:15

## 2023-11-21 RX ADMIN — INSULIN HUMAN 3 UNITS/HR: 1 INJECTION, SOLUTION INTRAVENOUS at 02:52

## 2023-11-21 RX ADMIN — POTASSIUM CHLORIDE 40 MEQ: 1500 TABLET, EXTENDED RELEASE ORAL at 06:10

## 2023-11-21 RX ADMIN — SODIUM CHLORIDE, POTASSIUM CHLORIDE, SODIUM LACTATE AND CALCIUM CHLORIDE 500 ML: 600; 310; 30; 20 INJECTION, SOLUTION INTRAVENOUS at 02:20

## 2023-11-21 RX ADMIN — DEXTROSE AND SODIUM CHLORIDE 150 ML/HR: 5; 900 INJECTION, SOLUTION INTRAVENOUS at 01:27

## 2023-11-21 RX ADMIN — ACYCLOVIR 400 MG: 400 TABLET ORAL at 09:08

## 2023-11-21 RX ADMIN — POTASSIUM CHLORIDE 20 MEQ: 1.5 POWDER, FOR SOLUTION ORAL at 06:10

## 2023-11-21 RX ADMIN — DEXTROSE MONOHYDRATE 150 ML/HR: 100 INJECTION, SOLUTION INTRAVENOUS at 07:47

## 2023-11-21 RX ADMIN — DEXTROSE MONOHYDRATE 150 ML/HR: 100 INJECTION, SOLUTION INTRAVENOUS at 09:10

## 2023-11-21 SDOH — HEALTH STABILITY: MENTAL HEALTH: ACTIVE SUICIDAL IDEATION WITH SPECIFIC PLAN AND INTENT (PAST 1 MONTH): NO

## 2023-11-21 SDOH — HEALTH STABILITY: MENTAL HEALTH: WERE YOU ABLE TO COMPLETE ALL THE BEHAVIORAL HEALTH SCREENINGS?: YES

## 2023-11-21 SDOH — SOCIAL STABILITY: SOCIAL INSECURITY: DO YOU FEEL ANYONE HAS EXPLOITED OR TAKEN ADVANTAGE OF YOU FINANCIALLY OR OF YOUR PERSONAL PROPERTY?: NO

## 2023-11-21 SDOH — HEALTH STABILITY: MENTAL HEALTH: SUICIDAL BEHAVIOR (LIFETIME): NO

## 2023-11-21 SDOH — SOCIAL STABILITY: SOCIAL INSECURITY: ABUSE SCREEN: ADULT

## 2023-11-21 SDOH — SOCIAL STABILITY: SOCIAL INSECURITY: DOES ANYONE TRY TO KEEP YOU FROM HAVING/CONTACTING OTHER FRIENDS OR DOING THINGS OUTSIDE YOUR HOME?: NO

## 2023-11-21 SDOH — SOCIAL STABILITY: SOCIAL INSECURITY: HAVE YOU HAD THOUGHTS OF HARMING ANYONE ELSE?: NO

## 2023-11-21 SDOH — HEALTH STABILITY: MENTAL HEALTH: WISH TO BE DEAD (PAST 1 MONTH): NO

## 2023-11-21 SDOH — SOCIAL STABILITY: SOCIAL INSECURITY: ARE THERE ANY APPARENT SIGNS OF INJURIES/BEHAVIORS THAT COULD BE RELATED TO ABUSE/NEGLECT?: NO

## 2023-11-21 SDOH — SOCIAL STABILITY: SOCIAL INSECURITY: ARE YOU OR HAVE YOU BEEN THREATENED OR ABUSED PHYSICALLY, EMOTIONALLY, OR SEXUALLY BY ANYONE?: NO

## 2023-11-21 SDOH — SOCIAL STABILITY: SOCIAL INSECURITY: PHYSICAL ABUSE: DENIES

## 2023-11-21 SDOH — SOCIAL STABILITY: SOCIAL INSECURITY: HAS ANYONE EVER THREATENED TO HURT YOUR FAMILY OR YOUR PETS?: NO

## 2023-11-21 SDOH — HEALTH STABILITY: MENTAL HEALTH: NON-SPECIFIC ACTIVE SUICIDAL THOUGHTS (PAST 1 MONTH): NO

## 2023-11-21 SDOH — HEALTH STABILITY: MENTAL HEALTH: ACTIVE SUICIDAL IDEATION WITH SOME INTENT TO ACT, WITHOUT SPECIFIC PLAN (PAST 1 MONTH): NO

## 2023-11-21 SDOH — SOCIAL STABILITY: SOCIAL INSECURITY: VERBAL ABUSE: DENIES

## 2023-11-21 SDOH — ECONOMIC STABILITY: HOUSING INSECURITY: DO YOU FEEL UNSAFE GOING BACK TO THE PLACE WHERE YOU ARE LIVING?: NO

## 2023-11-21 ASSESSMENT — PAIN SCALES - GENERAL
PAINLEVEL_OUTOF10: 0 - NO PAIN
PAINLEVEL_OUTOF10: 0 - NO PAIN
PAINLEVEL_OUTOF10: 5 - MODERATE PAIN
PAINLEVEL_OUTOF10: 10 - WORST POSSIBLE PAIN
PAINLEVEL_OUTOF10: 0 - NO PAIN
PAINLEVEL_OUTOF10: 0 - NO PAIN
PAINLEVEL_OUTOF10: 3
PAINLEVEL_OUTOF10: 10 - WORST POSSIBLE PAIN
PAINLEVEL_OUTOF10: 0 - NO PAIN
PAINLEVEL_OUTOF10: 5 - MODERATE PAIN
PAINLEVEL_OUTOF10: 0 - NO PAIN
PAINLEVEL_OUTOF10: 0 - NO PAIN
PAINLEVEL_OUTOF10: 2
PAINLEVEL_OUTOF10: 0 - NO PAIN

## 2023-11-21 ASSESSMENT — PAIN - FUNCTIONAL ASSESSMENT
PAIN_FUNCTIONAL_ASSESSMENT: 0-10

## 2023-11-21 ASSESSMENT — PAIN DESCRIPTION - DESCRIPTORS
DESCRIPTORS: SHARP
DESCRIPTORS: TIGHTNESS

## 2023-11-21 ASSESSMENT — LIFESTYLE VARIABLES
SKIP TO QUESTIONS 9-10: 1
HOW MANY STANDARD DRINKS CONTAINING ALCOHOL DO YOU HAVE ON A TYPICAL DAY: PATIENT DOES NOT DRINK
AUDIT-C TOTAL SCORE: 0
AUDIT-C TOTAL SCORE: 0
HOW OFTEN DO YOU HAVE 6 OR MORE DRINKS ON ONE OCCASION: NEVER
HOW OFTEN DO YOU HAVE A DRINK CONTAINING ALCOHOL: NEVER

## 2023-11-21 ASSESSMENT — ACTIVITIES OF DAILY LIVING (ADL): LACK_OF_TRANSPORTATION: NO

## 2023-11-21 NOTE — INDIVIDUALIZED OVERALL PLAN OF CARE NOTE
House officer to bedside to reassess patient. She now reports near resolution of her contractions. Abdominal pain completely resolved. Denies nausea, but still with little appetite. Not interested in having cervix rechecked now that cx resolved.     FHT baseline 140s, mod yessenia, + accels, - decels  [place] irregular cx, q10 mins    R/o PTL  - Low concern for PTL at this time given cervix close on admission, and no longer feeling cx   - CEFM while managing DKA, cat 1 currently, cx less frequent on toco     DKA  - Concern for DKA based on admission BG of 230, BHB 3.47, AG 16, pH 7.33   - Admission labs notable for:     - pH 7.33 --> 7.40      - K+ 5.0 -> 4.2 -> 20meq K repleted      - BHB 3.23 -> 3.47      - calculated anion gap: 16 -> 15   - repeat CMP, BHB, VBG to be repeated at 0400, continue to trend q2-4 hrs until DKA resolved   - continue insulin gtt, currently at 3u/hr   - Received 2L LR bolus -> transitioned to D5LR @150cc/hr for BG>150, or D10 water @150cc/hr for BG<150. Once DKA resolves, plan to switch to LR and D5LR.   - antiemetics PRN   - strict I/Os   - last A1c 7.0 (8/9/23)   - was using insulin pump prior to admission (patient removed in triage), last settings as below. Plan for MFM consult in AM.   Basal  6505-0329 0.90  7058-6164 1.00  7453-3194 0.90     Bolus  [id number] 1:9     CF: 1:40  Carb ratio: 1:9  Target:110  DOA 5 hrs     Marni De La Cruz MD PGY-4  Obstetrics and Gynecology

## 2023-11-21 NOTE — CONSULTS
Obstetrical Consult    Reason for Consult: Concern for DKA     Assessment/Plan    Maria Isabel Cutler is a 22 yo G1 at 33w3d admitted with resolving DKA.  She has been on an insulin drip overnight with improvement in BHB and pH.     Subjective   Wants to sleep this morning.  Does not want to be disturbed, but denies contractions, N/V.     Obstetrical History   G1    Past Medical History:  T1DM  Essential HTN  HSV  GBS+  Asthma  Depression  +Oligoclonal bands in CSF      Past Surgical History   None documented in chart    Social History  Past tobacco use  Otherwise denies contributory       Allergies  Patient has no known allergies.     Medications  Medications Prior to Admission   Medication Sig Dispense Refill Last Dose    acetone, urine, test strip DIP URINE TO CHECK FOR KETONES IF NAUSEA/VOMITING OR IF CONCERN FOR DKA OR DEHYDRATION 100 each 0     acyclovir (Zovirax) 400 mg tablet Take 1 tablet (400 mg) by mouth once daily.   Past Week    acyclovir (Zovirax) 400 mg tablet Take 1 tablet (400 mg) by mouth 2 times a day. 60 tablet 5 Unknown    albuterol 2.5 mg /3 mL (0.083 %) nebulizer solution Inhale 3 mL (2.5 mg) every 6 hours if needed for wheezing.   11/20/2023    Alcohol Prep Pads pads, medicated        aspirin 81 mg chewable tablet Chew 1 tablet (81 mg) once daily.   11/20/2023    blood-glucose sensor device APPLY NEW DEVICE EVERY 10 DAYS 3 each 0     ferrous sulfate (iron) 325 (65 Fe) MG tablet Take 1 tablet (65 mg of iron) by mouth once daily with a meal. 30 tablet 3 11/20/2023    fluticasone (Flovent Diskus) 250 mcg/actuation diskus inhaler Inhale 1 puff 2 times a day. Rinse mouth with water after use to reduce aftertaste and incidence of candidiasis. Do not swallow. 60 each 11 Unknown    glucagon (Glucagen) 1 mg injection 1 mg 1 time if needed for low blood sugar - see comments.       glucose 4 gram chewable tablet Chew. USE AS DIRECTED TO TREAT HYPOGLYCEMIA       HumaLOG U-100 Insulin 100 unit/mL injection  "1.5 mL (150 Units). VIA INSULIN PUMP DAILY   2023    OneTouch Verio test strips strip TEST 6-7 TIMES DAILY       Peak Air Peak Flow Meter device        pen needle, diabetic 32 gauge x \" needle USE AS DIRECTED WITH INSULIN USE AS DIRECTED TO INJECT INSULIN 4-6 TIMES DAILY 200 each 10     prenatal vitamin 28 mg iron-800 mcg folic acid tablet tablet TAKE 1 TABLET DAILY. 100 tablet 2 2023    Ventolin HFA 90 mcg/actuation inhaler Inhale 1-2 puffs every 4 hours if needed.   2023       Objective    Last Vitals  Temp Pulse Resp BP MAP O2 Sat   37 °C (98.6 °F) 96 16 105/59   98 %     Physical Examination  In no acute distress, sleeping  Unlabored breathing & speech  Soft, NT, ND, gravid  No LE edema     Lab Review  Most recent   pH 7.40  BHB 1.23    Maria Isabel Cutler is a 22 yo G1 at 33w3d admitted with resolving DKA.  She has been on an insulin drip overnight with improvement in BHB and pH-- improving     T1DM, resolved DKA  -Admission , BHB peak 3.47, AG 16, pH 7.33  -Insulin gtt initiated on admission with appropriate fluid resuscitation, continue mIVF  -Most recent K, 3.5, will continue repletion  -Continue repeating labs q4 hours until normalization of BHB  -Patient not yet desiring to eat  -Insulin pump settings prior to admission:   Basal  1832-7828 0.90  7880-5129 1.00  2686-3515 0.90    Bolus  [id number] 1:9    CF: 1:40  Carb ratio: 1:9  Target:110    --Will need adjustments as she transitions back to her pump     SIUP at 33w3d  -Last growth & BPP performed 10/24, no growth performed on admission  -For SSUS this morning   -GBS+ for PCN in labor  -Known fetal cardiac anomaly--> VS slightly thickened needs, non urgent peds cards prior to discharge ( discharge)  -Needs  testing scheduled as outpatient     Depression with recent passive SI  -Mood appropriate this morning  -No medications currently  -Reportedly following with psych, will need to ensure followup prior to " discharge    cHTN  -Normotensive  -Not on medications  - testing & growth per above    Asthma  -Home flovent ordered  -Albuterol PRN, currently asymptomatic    +Oligoclonal bands  -Diagnosed after admission for blurry vision  -S/p optho (dx macular disorder, for retinal procedure)  -Neuro immunology follow up as scheduled     HSV  -On valtrex  -Asymptomatic    Contraception  -Needs to be addressed prior to discharge    To be d/w Dr. Fany Hurt MD

## 2023-11-21 NOTE — H&P
"TRIAGE History and Physical    Assessment/Plan    Maria Isabel Cutler is a 23 y.o.  at 33w2d presenting with abdominal pain.     R/o PTL   - increased pelvic pressure and contractions began 1 hr ago   - cervix: closed/thick --> recheck  - toco: initially orlin q1 minute, 500cc IVF bolus given  - Abruptions labs pending given painful contractions w/o cervical change  - GBS collected   - tylenol, flexeril given for pain     Chest Pain  - describes as retro-sternal and on right sided ribs radiating to back x1 wk   - non-reproducible on exam  - VSS  - EKG pending  - BMX given   - BNP/trops pending  - HELLP labs pending   - C/f HELLP based on right sided rib pain vs cardiac etiology     cHTN  - not on medication  - normotensive on arrival      T1DM  -  in ED  - DKA labs pending     IUP at 33w2d   - NST, not yet completed  - Good fetal movement    Maternal Well-being  - Vital signs stable and WNL  - Emotional support and reassurance provided  - All questions and concerns addressed     D/w Madyson Lacy MD. Signed out to night team.  Angie Russo PA-C     Attending attestation:   I saw and evaluated the patient. I personally obtained the key and critical portions of the history and physical exam or was physically present for key and critical portions performed by the resident/MARLENY. I reviewed the resident/MARLENY's documentation and discussed the patient with the resident/MARLENY. I agree with the resident/MARLENY's medical decision making as documented in the note.     Madyson Lacy MD  OBGYN Attending     Subjective   Maria Isabel is here complaining of contractions and chest pain. Good fetal movement. Denies vaginal bleeding., Denies leaking of fluid.    She reports right sided rib pain that radiates to her right back x1 week. She says that pain is constant. She reports as of 1 hour ago she started having constant painful contractions. She reports her blood sugars have been \"good\" recently but was 230 in " the ED on arrival. She ate prior to coming in but admits to being dehydrated. She has had headaches and worsened blurred vision intermittently but denies headache at time of arrival to triage.     Pregnancy Problems (from 23 to present)       Problem Noted Resolved    Bilateral sciatica 10/10/2023 by Stuart Munoz MD No    Priority:  Medium      Overview Signed 10/10/2023  2:17 PM by Stuart Munoz MD     Intermittent pain shooting down posteriolateral thighs bilaterally         33 weeks gestation of pregnancy 10/8/2023 by Stuart Munoz MD No    Priority:  Medium      Overview Addendum 10/10/2023  2:24 PM by Stuart Munoz MD     [x] PNLs reviewed wnl, rubella nonimmune  [x] Pap ASCUS 2022  [x] NT wnl 2023  [x] cell-free DNA, CF/SMA screening low risk  [x] Anatomy US  [x] 2nd trimester labs, Hg 9.9  [x] tdap  [ ] flu/covid  [/] GBS  [ ] Delivery Planning  [ ] PPBC           Asthma affecting pregnancy in third trimester 10/8/2023 by Stuart Munoz MD No    Priority:  Medium      Overview Addendum 10/24/2023  2:08 PM by Stuart Munoz MD     Albuterol PRN  10/24 Regimen: Flovent 250mcg BID puff, Albuterol PRN         Type 1 diabetes mellitus complicating pregnancy, antepartum 10/5/2023 by Indiana Parra MD No    Priority:  Medium      Overview Addendum 2023  8:04 PM by Angeline Boudreaux MD     [x] Baseline HbA1c 12.8 2023  [x] Baseline TSH 2.38 2023  [x] Baseline HELLP Labs wnl, 0.11 2023  [x] bbASA  [x] EKG  wnl  [x] Fetal Echo wnl  [x] Ophthalmology  /podiatry    [ ]  Testing 2x weekly until del    Serial Growths  10/24 29.0 wga EFW 1294g 42%, AC 48%    Current Regimen changed 10/10/2023  Basal  6265-3102 0.90  5274-0539 1.00  2568-1333 0.90     Bolus  7764-7051 1:9     CF: 1:40  Carb ratio: 1:10  Target:110  DOA 5 hrs           Herpes simplex infection in mother during third trimester of pregnancy 10/5/2023 by Indiana Parra MD No    Priority:  Medium      Overview  Signed 10/5/2023  8:55 AM by Indiana Parra MD     On acyclovir prophylaxis          Chronic hypertension affecting pregnancy 10/5/2023 by Indiana Parra MD No    Priority:  Medium      Overview Addendum 10/8/2023  3:16 PM by Stuart Munoz MD     [x] Baseline HELLP Labs, P:C 0.11 2023  [x] bbASA  [ ]  Testing  [x] Baseline EKG inpatient      Serial Growths    Med Regimen  No meds         Group beta Strep positive 10/5/2023 by Indiana Parra MD No    Priority:  Medium      Overview Signed 10/5/2023  9:00 AM by Indiana Parra MD     GBS UTI in pregnancy          Depression during pregnancy in third trimester 10/5/2023 by Indiana Parra MD No    Priority:  Medium      Fetal cardiac anomaly affecting pregnancy, antepartum 10/5/2023 by Indiana Parra MD No    Priority:  Medium      Overview Addendum 2023  6:00 PM by Luz Maria Abebe MD     [facility] Plan of Care: fetal ventricular septum slightly thickened --> code:1: non-urgent peds cardiology consult prior to discharge or within 1-2 weeks if delivered at an outside hospital  No structural abnormalities on repeat fetal echo --> peds cards recommendation for routine delivery and  care with non-urgent peds cardiology consult prior to discharge or within 1-2 weeks if delivered at an outside hospital         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 10/5/2023 by Indiana Parra MD No    Priority:  Medium      Overview Addendum 10/24/2023  2:09 PM by Stuart Munoz MD     - Admitted for blurry vision on , d/c ed with improvement, oligoclonal bands seen on LP, neg MRI head  - New optic disc edema, diabetic edema    - Close follow up with ophto, last appt 10/13, needs IV fluro angio after delivery to prevent progression, next visit   - Neuro-Immunology NPV scheduled for          Ophthalmologic abnormality 10/5/2023 by Indiana Parra MD No    Priority:  Medium      Overview Signed 10/5/2023  9:14 AM by Indiana Parra MD     Diabetic  macular edema, both eyes - for panretinal photocoagulation with Optho          Constipation during pregnancy in second trimester 10/9/2023 by Melody Osorio 10/10/2023 by Stuart Munoz MD    Priority:  Medium      Abnormal pregnancy in second trimester 10/9/2023 by Melody Osorio 10/10/2023 by Stuart Munoz MD    Priority:  Medium      Suspected fetal anomaly, antepartum 10/9/2023 by Melody Osorio 10/10/2023 by Stuart Munoz MD    Priority:  Medium      Hyperglycemia in pregnancy 2023 by Melody Osorio 10/10/2023 by Stuart Munoz MD    Priority:  Medium      Vomiting of pregnancy 2023 by Melody Osorio 10/10/2023 by Stuart Munoz MD    Priority:  Medium              Obstetrical History   OB History    Para Term  AB Living   1 0 0 0 0 0   SAB IAB Ectopic Multiple Live Births                  # Outcome Date GA Lbr Jesu/2nd Weight Sex Delivery Anes PTL Lv   1 Current                Past Medical History  Past Medical History:   Diagnosis Date    Asthma     Chlamydia     Chronic hypertension     CSF oligoclonal bands     Depression     Herpes     Type 1 diabetes mellitus with hyperglycemia, with long-term current use of insulin (CMS/[facility])     Urinary tract infection         Past Surgical History   No past surgical history on file.    Social History  Social History     Tobacco Use    Smoking status: Former     Types: Cigarettes     Passive exposure: Past    Smokeless tobacco: Never   Substance Use Topics    Alcohol use: Not Currently     Substance and Sexual Activity   Drug Use Not Currently    Types: Marijuana       Allergies  Patient has no allergy information on record.     Medications  Medications Prior to Admission   Medication Sig Dispense Refill Last Dose    acetone, urine, test strip DIP URINE TO CHECK FOR KETONES IF NAUSEA/VOMITING OR IF CONCERN FOR DKA OR DEHYDRATION 100 each 0     acyclovir (Zovirax) 400 mg tablet Take 1 tablet (400 mg) by mouth once daily.       acyclovir (Zovirax) 400 mg  "tablet Take 1 tablet (400 mg) by mouth 2 times a day. 60 tablet 5     albuterol 2.5 mg /3 mL (0.083 %) nebulizer solution Inhale 3 mL (2.5 mg) every 6 hours if needed for wheezing.       Alcohol Prep Pads pads, medicated        aspirin 81 mg chewable tablet Chew 1 tablet (81 mg) once daily.       blood-glucose sensor device APPLY NEW DEVICE EVERY 10 DAYS 3 each 0     ferrous sulfate (iron) 325 (65 Fe) MG tablet Take 1 tablet (65 mg of iron) by mouth once daily with a meal. 30 tablet 3     fluticasone (Flovent Diskus) 250 mcg/actuation diskus inhaler Inhale 1 puff 2 times a day. Rinse mouth with water after use to reduce aftertaste and incidence of candidiasis. Do not swallow. 60 each 11     glucagon (Glucagen) 1 mg injection 1 mg 1 time if needed for low blood sugar - see comments.       glucose 4 gram chewable tablet Chew. USE AS DIRECTED TO TREAT HYPOGLYCEMIA       HumaLOG U-100 Insulin 100 unit/mL injection 1.5 mL (150 Units). VIA INSULIN PUMP DAILY       OneTouch Verio test strips strip TEST 6-7 TIMES DAILY       Peak Air Peak Flow Meter device        pen needle, diabetic 32 gauge x 5/32\" needle USE AS DIRECTED WITH INSULIN USE AS DIRECTED TO INJECT INSULIN 4-6 TIMES DAILY 200 each 10     prenatal vitamin 28 mg iron-800 mcg folic acid tablet tablet TAKE 1 TABLET DAILY. 100 tablet 2     Ventolin HFA 90 mcg/actuation inhaler Inhale 1-2 puffs every 4 hours if needed.          Objective    Last Vitals  Temp Pulse Resp BP MAP O2 Sat   37.2 °C (99 °F) (!) 111 16 133/75   100 %     Physical Examination  GENERAL: Examination reveals a well developed, well nourished, gravid female  who is having painful contractions on arrival . She is alert and cooperative.  ABDOMEN: FHT present, contractions palpating moderately, sternal and RUQ pain non-reproducible   FHR is 150 baseline, NST not completed prior to signing patient out   [place] reading:  ctx q1 min  CERVIX: closed and thick  EXTREMITIES: no redness or tenderness in the " calves or thighs, no edema  SKIN: normal coloration and turgor, no rashes  NEUROLOGICAL: alert, oriented, normal speech, no focal findings or movement disorder noted  PSYCHOLOGICAL: awake and alert; oriented to person, place, and time    Lab Review  Labs in chart were reviewed.

## 2023-11-21 NOTE — INDIVIDUALIZED OVERALL PLAN OF CARE NOTE
TRANSFER TO [facility]    SUBJECTIVE  Patient doing well with no HA, no vision changes, no RUQ pain. She has no CP, no SOB. She has no vb, no lof, no ctx, and feels good FM.    OBJECTIVE  Visit Vitals  /71   Pulse 83   Temp 37 °C (98.6 °F)   Resp 16        Cervical Exam  Dilation: Fingertip  Effacement (%): 0  Fetal Station: -3  Presentation: Vertex  Method: Manual  OB Examiner: Alexander JHA  Fetal Assessment  Movement: Present  Mode: External US  Doppler/Fetoscope Rate: 140 BPM  Baseline Fetal Heart Rate (bpm): 145 bpm  Baseline Classification: Normal  Variability: Moderate (Between 6 and 25 BPM)  Pattern: Accelerations  FHR Category: Category I  Multiple Births: No   Fetal Decelerations: No  Contraction Frequency: 4-4.5  Abdomen: nontender to palpation along uterus, no guarding, no rebound    A&P    R/o Labor  - Cervical exam fingertip, patient comfortable  - contractions spaced out  - continue to monitor  - okay for transfer to [facility]    T1DM, DKA resolved  - Admitted with hyperglycemia & metabolic acidosis with BG of 230, BHB 3.47, AG 10, pH 7.33   - Now with resolution of acidosis and improving BHB - pH 7.39/bicar 18, BHB 1.06  - Tolerating PO, transitioned to home basal pump settings  - strict I/Os   - last A1c 7.0 (8/9/23)      Basal  [id number] 0.90  4219-0504 1.00  2195-0543 0.90     Bolus  [id number] 1:9     CF: 1:40  Carb ratio: 1:9  Target:110  DOA 5 hrs     cHTN  - normotensive, asx  - no meds    Asthma  - albuterol PRN, flovent continued, asx    +Oligoclonal bands  -Diagnosed after admission for blurry vision  -S/p optho (dx macular disorder, for retinal procedure)  -Neuro immunology follow up as scheduled      HSV  -On valtrex  -Asymptomatic     Amine Alexander  Seen and discussed with Dr. Larose

## 2023-11-21 NOTE — H&P
Obstetrical Admission History and Physical    Chief Complaint: Headache and Abdominal Pain (Severe pain and pressure)    Assessment/Plan    23y G1 at 33.3wga by LMP c/w 8w US with T1DM presenting to triage for abdominal pain, admitted for management of DKA.     DKA  - Concern for DKA based on admission BG of 230, BHB 3.47, AG 16, pH 7.33   - Will admit to labor and delivery for insulin drip and aggressive IVF resuscitation  - NPO until resolution of DKA  - Admission labs notable for:     - pH 7.33 --> 7.40     - K+ 5.0 -> 4.2 -> 20meq K repleted      - BHB 3.23 -> 3.47      - calculated anion gap: 16 -> 15   - will continue to trend labs (CMP, BHB, VBG) every 2-4 hours  - initially given 6u lispro on admission for . Given concern for DKA, insulin GTT started. will titrate based on hourly blood sugar monitoring  - Received 2L LR bolus initially -> transitioned to D5LR @150cc/hr for BG>150, or D10 water @150cc/hr for BG<150. Once DKA resolves, plan to switch to LR and D5LR.   - Continue to monitor potassium every 2 hours and will continue potassium supplementation for K+ 3.3-5.3  - strict I/Os   - last A1c 7.0 (8/9/23)   - was using insulin pump prior to admission (patient removed in triage), last settings as below. Plan for MFM consult in AM.   Basal  5646-3776 0.90  2024-4120 1.00  9658-5080 0.90     Bolus  2206-3250 1:9     CF: 1:40  Carb ratio: 1:9  Target:110  DOA 5 hrs     R/o PTL  - Initially orlin uncomfortably, cx q1 min on toco  - Cervix closed  - CBC, coags, fibrinogen wnl   - Contractions now resolved     cHTN  - no meds  - normotensive    Fetal well being  - CEFM, cat 1 currently     IUP at 33w   - GBS bacteruria, for PCN in labor  - cephalic 11/21  - T&S collected 11/21  - last growth 10/24 EFW 1294g (42%), plan for SSUS in AM     D/w Dr. Regino De La Cruz MD PGY-4  Obstetrics and Gynecology     Principal Problem:    Labor and delivery indication for care or intervention       Pregnancy Problems (from 24 to present)       Problem Noted Resolved    Labor and delivery indication for care or intervention 2023 by Marni De La Cruz MD No    Priority:  Medium      Bilateral sciatica 10/10/2023 by Stuart Munoz MD No    Priority:  Medium      Overview Signed 10/10/2023  2:17 PM by Stuart Munoz MD     Intermittent pain shooting down posteriolateral thighs bilaterally         33 weeks gestation of pregnancy 10/8/2023 by Stuart Munoz MD No    Priority:  Medium      Overview Addendum 10/10/2023  2:24 PM by Stuart Munoz MD     [x] PNLs reviewed wnl, rubella nonimmune  [x] Pap ASCUS 2022  [x] NT wnl 2023  [x] cell-free DNA, CF/SMA screening low risk  [x] Anatomy US  [x] 2nd trimester labs, Hg 9.9  [x] tdap  [ ] flu/covid  [/] GBS  [ ] Delivery Planning  [ ] PPBC           Asthma affecting pregnancy in third trimester 10/8/2023 by Stuart Munoz MD No    Priority:  Medium      Overview Addendum 10/24/2023  2:08 PM by Stuart uMnoz MD     Albuterol PRN  10/24 Regimen: Flovent 250mcg BID puff, Albuterol PRN         Type 1 diabetes mellitus complicating pregnancy, antepartum 10/5/2023 by Indiana Parra MD No    Priority:  Medium      Overview Addendum 2023  8:04 PM by Angeline Boudreaux MD     [x] Baseline HbA1c 12.8 2023  [x] Baseline TSH 2.38 2023  [x] Baseline HELLP Labs wnl, 0.11 2023  [x] bbASA  [x] EKG  wnl  [x] Fetal Echo wnl  [x] Ophthalmology  /podiatry    [ ]  Testing 2x weekly until del    Serial Growths  10/24 29.0 wga EFW 1294g 42%, AC 48%    Current Regimen changed 10/10/2023  Basal  7502-8525 0.90  8187-6946 1.00  1945-0763 0.90     Bolus  6968-4398 1:9     CF: 1:40  Carb ratio: 1:10  Target:110  DOA 5 hrs           Herpes simplex infection in mother during third trimester of pregnancy 10/5/2023 by Indiana Parra MD No    Priority:  Medium      Overview Signed 10/5/2023  8:55 AM by Indiana Parra MD     On acyclovir prophylaxis           Chronic hypertension affecting pregnancy 10/5/2023 by Indiana Parra MD No    Priority:  Medium      Overview Addendum 10/8/2023  3:16 PM by Stuart Munoz MD     [x] Baseline HELLP Labs, P:C 0.11 2023  [x] bbASA  [ ]  Testing  [x] Baseline EKG inpatient      Serial Growths    Med Regimen  No meds         Group beta Strep positive 10/5/2023 by Indiana Parra MD No    Priority:  Medium      Overview Signed 10/5/2023  9:00 AM by Indiana Parra MD     GBS UTI in pregnancy          Depression during pregnancy in third trimester 10/5/2023 by Indiana Parra MD No    Priority:  Medium      Fetal cardiac anomaly affecting pregnancy, antepartum 10/5/2023 by Indiana Parra MD No    Priority:  Medium      Overview Addendum 2023  6:00 PM by Luz Maria Abebe MD     [facility] Plan of Care: fetal ventricular septum slightly thickened --> code:1: non-urgent peds cardiology consult prior to discharge or within 1-2 weeks if delivered at an outside hospital  No structural abnormalities on repeat fetal echo --> peds cards recommendation for routine delivery and  care with non-urgent peds cardiology consult prior to discharge or within 1-2 weeks if delivered at an outside hospital         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 10/5/2023 by Indiana Parra MD No    Priority:  Medium      Overview Addendum 10/24/2023  2:09 PM by Stuart Munoz MD     - Admitted for blurry vision on , d/c ed with improvement, oligoclonal bands seen on LP, neg MRI head  - New optic disc edema, diabetic edema    - Close follow up with ophto, last appt 10/13, needs IV fluro angio after delivery to prevent progression, next visit   - Neuro-Immunology NPV scheduled for          Ophthalmologic abnormality 10/5/2023 by Indiana Parra MD No    Priority:  Medium      Overview Signed 10/5/2023  9:14 AM by Indiana Parra MD     Diabetic macular edema, both eyes - for panretinal photocoagulation with Optho           Constipation during pregnancy in second trimester 10/9/2023 by Melody Osorio 10/10/2023 by Stuart Munoz MD    Abnormal pregnancy in second trimester 10/9/2023 by Melody Osorio 10/10/2023 by Stuart Munoz MD    Suspected fetal anomaly, antepartum 10/9/2023 by Melody Osorio 10/10/2023 by Stuart Munoz MD    Hyperglycemia in pregnancy 2023 by Melody Osorio 10/10/2023 by Stuart Munoz MD    Vomiting of pregnancy 2023 by Melody Osorio 10/10/2023 by Stuart Munoz MD          The patient's pregnancy complications have been discussed with the patient in detail.  Admit to inpatient status. I anticipate that this patient will require a stay exceeding 2 midnights.  Active management of the pregnancy complications.     Subjective   23y G1 at 33.3wga by LMP c/w 8w US with T1DM presenting to triage for abdominal pain and elevated blood sugars.     Reports feeling painful contractions every minute over past few hours, very intense abdominal pain. Denies VB, LOF. Good FM. Also with elevated blood sugar to 230 in the ED. Endorses nausea + vomiting over the past few days. Threw up when she tried to eat yesterday. Nausea now improved but had no appetite to eat today. Tandem pump in place, patient self removed in triage after hearing her sugars were high.     Pregnancy c/b:  - T1DM diagnosed at age 5, multiple admissions for DKA, last hgbA1c 7.0 % (2023), previously 12.8% (23), admitted to [facility] at 6wga and 10w GA for poorly controlled BGs, current regimen - Tandem pump in place  - cHTN, on no meds, baseline labs wnl, P:C   - Intermittent asthma, never hospitalized or intubated, albuterol prn   - HSV for third trimester ppx   - GBS bacteriuria, s/p amoxicillin, neg THU   - Anxiety/depression, no meds        Obstetrical History   OB History    Para Term  AB Living   1 0 0 0 0 0   SAB IAB Ectopic Multiple Live Births                  # Outcome Date GA Lbr Jesu/2nd Weight Sex Delivery Anes PTL Lv   1 Current    "             Past Medical History  Past Medical History:   Diagnosis Date    Asthma     Chlamydia     Chronic hypertension     CSF oligoclonal bands     Depression     Herpes     Type 1 diabetes mellitus with hyperglycemia, with long-term current use of insulin (CMS/[facility])     Urinary tract infection         Past Surgical History   No past surgical history on file.    Medications  Medications Prior to Admission   Medication Sig Dispense Refill Last Dose    acetone, urine, test strip DIP URINE TO CHECK FOR KETONES IF NAUSEA/VOMITING OR IF CONCERN FOR DKA OR DEHYDRATION 100 each 0     acyclovir (Zovirax) 400 mg tablet Take 1 tablet (400 mg) by mouth once daily.   Past Week    acyclovir (Zovirax) 400 mg tablet Take 1 tablet (400 mg) by mouth 2 times a day. 60 tablet 5 Unknown    albuterol 2.5 mg /3 mL (0.083 %) nebulizer solution Inhale 3 mL (2.5 mg) every 6 hours if needed for wheezing.   11/20/2023    Alcohol Prep Pads pads, medicated        aspirin 81 mg chewable tablet Chew 1 tablet (81 mg) once daily.   11/20/2023    blood-glucose sensor device APPLY NEW DEVICE EVERY 10 DAYS 3 each 0     ferrous sulfate (iron) 325 (65 Fe) MG tablet Take 1 tablet (65 mg of iron) by mouth once daily with a meal. 30 tablet 3 11/20/2023    fluticasone (Flovent Diskus) 250 mcg/actuation diskus inhaler Inhale 1 puff 2 times a day. Rinse mouth with water after use to reduce aftertaste and incidence of candidiasis. Do not swallow. 60 each 11 Unknown    glucagon (Glucagen) 1 mg injection 1 mg 1 time if needed for low blood sugar - see comments.       glucose 4 gram chewable tablet Chew. USE AS DIRECTED TO TREAT HYPOGLYCEMIA       HumaLOG U-100 Insulin 100 unit/mL injection 1.5 mL (150 Units). VIA INSULIN PUMP DAILY   11/20/2023    OneTouch Verio test strips strip TEST 6-7 TIMES DAILY       Peak Air Peak Flow Meter device        pen needle, diabetic 32 gauge x 5/32\" needle USE AS DIRECTED WITH INSULIN USE AS DIRECTED TO INJECT INSULIN 4-6 " TIMES DAILY 200 each 10     prenatal vitamin 28 mg iron-800 mcg folic acid tablet tablet TAKE 1 TABLET DAILY. 100 tablet 2 11/19/2023    Ventolin HFA 90 mcg/actuation inhaler Inhale 1-2 puffs every 4 hours if needed.   11/20/2023       Allergies  Patient has no known allergies.    FamHx  Family History   Problem Relation Name Age of Onset    Hypertension Maternal Grandmother      Asthma Child         Social History  Social History     Tobacco Use    Smoking status: Former     Types: Cigarettes     Passive exposure: Past    Smokeless tobacco: Never   Substance Use Topics    Alcohol use: Not Currently     Substance and Sexual Activity   Drug Use Not Currently    Types: Marijuana       Objective    Last Vitals  Temp Pulse Resp BP MAP O2 Sat   37.2 °C (99 °F) 99 22 131/74   100 %     Physical Examination  GENERAL: Examination reveals a well developed, well nourished, gravid female in no acute distress. She is alert and cooperative.  HEENT: PERRLA. External ears normal. Nose normal, no erythema or discharge. Mouth and throat clear.  LUNGS: clear to auscultation bilaterally  HEART:  wwp  ABDOMEN:  gravid, nontender   FHR is 140 BPM, with Accelerations, Variable decelerations, and a   tracing.    The fetus is in a vertex presentation, determined by ultrasound  CERVIX: Closed cm dilated, 0 % effaced, -3 station; MEMBRANES are Intact  SKIN: normal coloration and turgor, no rashes  NEUROLOGICAL: alert, oriented, normal speech, no focal findings or movement disorder noted  PSYCHOLOGICAL: awake and alert; oriented to person, place, and time    Lab Review  Lab Results   Component Value Date    WBC 7.6 11/20/2023    HGB 12.2 11/20/2023    HCT 37.9 11/20/2023     11/20/2023     Lab Results   Component Value Date    GLUCOSE 155 (H) 11/21/2023     11/21/2023    K 4.2 11/21/2023     11/21/2023    CO2 17 (L) 11/21/2023    ANIONGAP 19 11/21/2023    BUN 7 11/21/2023    CREATININE 0.50 11/21/2023    EGFR >90 11/21/2023     CALCIUM 8.8 11/21/2023    ALBUMIN 3.5 11/21/2023    PROT 6.3 (L) 11/21/2023    ALKPHOS 63 11/21/2023    ALT 12 11/21/2023    AST 22 11/21/2023    BILITOT 0.5 11/21/2023     Lab Results   Component Value Date    APTT 25 (L) 11/20/2023    PROTIME 10.8 11/20/2023    INR 1.0 11/20/2023     Lab Results   Component Value Date    FIBRINOGEN 346 11/20/2023       Fetal assessment   FHT baseline 140s, mod yessenia, + accels, - decels  [place] q1-2 mins

## 2023-11-21 NOTE — PROGRESS NOTES
Obstetrical Admission History and Physical     Chief Complaint: Headache and Abdominal Pain (Severe pain and pressure)        Assessment/Plan   23y G1 at 33.3wga by LMP c/w 8w US with T1DM presenting to triage for abdominal pain, admitted for management of DKA, overall improving.     DKA  - Admitted with hyperglycemia & metabolic acidosis with BG of 230, BHB 3.47, AG 10, pH 7.33   - Now with resolution of acidosis and improving BHB - pH 7.39/bicar 18, BHB 1.06  - Tolerating PO, transitioned to home basal pump settings  - strict I/Os   - last A1c 7.0 (8/9/23)   - MFM consulted, will monitor BG following insulin gtt being turned off and anticipate transfer to anterpartum shortly thereafter    Basal  8119-6689 0.90  9846-5302 1.00  1954-1431 0.90     Bolus  1989-2305 1:9     CF: 1:40  Carb ratio: 1:9  Target:110  DOA 5 hrs      cHTN  - no meds  - normotensive    Asthma  -Home flovent ordered  -Albuterol PRN, currently asymptomatic     +Oligoclonal bands  -Diagnosed after admission for blurry vision  -S/p optho (dx macular disorder, for retinal procedure)  -Neuro immunology follow up as scheduled      HSV  -On valtrex  -Asymptomatic     Fetal well being  - CEFM, cat 1 currently      IUP at 33w   - GBS bacteruria, for PCN in labor  - cephalic 11/21  - T&S collected 11/21  - last growth 10/24 EFW 1294g (42%), plan for SSUS this AM     D/w Dr. Kenji Nixon MD     Subjective   23y G1 at 33.3wga by LMP c/w 8w US with T1DM presenting to triage for abdominal pain and elevated blood sugars.      Reports feeling painful contractions every minute over past few hours, very intense abdominal pain. Denies VB, LOF. Good FM. Also with elevated blood sugar to 230 in the ED. Endorses nausea + vomiting over the past few days. Threw up when she tried to eat yesterday. Nausea now improved but had no appetite to eat today. Tandem pump in place, patient self removed in triage after hearing her sugars were high.       Pregnancy c/b:  - T1DM diagnosed at age 5, multiple admissions for DKA, last hgbA1c 7.0 % (2023), previously 12.8% (23), admitted to [facility] at 6wga and 10w GA for poorly controlled BGs, current regimen - Tandem pump in place  - cHTN, on no meds, baseline labs wnl, P:C   - Intermittent asthma, never hospitalized or intubated, albuterol prn   - HSV for third trimester ppx   - GBS bacteriuria, s/p amoxicillin, neg THU   - Anxiety/depression, no meds            Obstetrical History                    OB History    Para Term  AB Living   1 0 0 0 0 0   SAB IAB Ectopic Multiple Live Births                         # Outcome Date GA Lbr Jesu/2nd Weight Sex Delivery Anes PTL Lv   1 Current                           Past Medical History  Medical History        Past Medical History:   Diagnosis Date    Asthma      Chlamydia      Chronic hypertension      CSF oligoclonal bands      Depression      Herpes      Type 1 diabetes mellitus with hyperglycemia, with long-term current use of insulin (CMS/[facility])      Urinary tract infection              Past Surgical History   Surgical History   No past surgical history on file.        Medications  Prescriptions Prior to Admission           Medications Prior to Admission   Medication Sig Dispense Refill Last Dose    acetone, urine, test strip DIP URINE TO CHECK FOR KETONES IF NAUSEA/VOMITING OR IF CONCERN FOR DKA OR DEHYDRATION 100 each 0      acyclovir (Zovirax) 400 mg tablet Take 1 tablet (400 mg) by mouth once daily.     Past Week    acyclovir (Zovirax) 400 mg tablet Take 1 tablet (400 mg) by mouth 2 times a day. 60 tablet 5 Unknown    albuterol 2.5 mg /3 mL (0.083 %) nebulizer solution Inhale 3 mL (2.5 mg) every 6 hours if needed for wheezing.     2023    Alcohol Prep Pads pads, medicated            aspirin 81 mg chewable tablet Chew 1 tablet (81 mg) once daily.     2023    blood-glucose sensor device APPLY NEW DEVICE EVERY 10 DAYS 3 each 0      ferrous  "sulfate (iron) 325 (65 Fe) MG tablet Take 1 tablet (65 mg of iron) by mouth once daily with a meal. 30 tablet 3 11/20/2023    fluticasone (Flovent Diskus) 250 mcg/actuation diskus inhaler Inhale 1 puff 2 times a day. Rinse mouth with water after use to reduce aftertaste and incidence of candidiasis. Do not swallow. 60 each 11 Unknown    glucagon (Glucagen) 1 mg injection 1 mg 1 time if needed for low blood sugar - see comments.          glucose 4 gram chewable tablet Chew. USE AS DIRECTED TO TREAT HYPOGLYCEMIA          HumaLOG U-100 Insulin 100 unit/mL injection 1.5 mL (150 Units). VIA INSULIN PUMP DAILY     11/20/2023    OneTouch Verio test strips strip TEST 6-7 TIMES DAILY          Peak Air Peak Flow Meter device            pen needle, diabetic 32 gauge x 5/32\" needle USE AS DIRECTED WITH INSULIN USE AS DIRECTED TO INJECT INSULIN 4-6 TIMES DAILY 200 each 10      prenatal vitamin 28 mg iron-800 mcg folic acid tablet tablet TAKE 1 TABLET DAILY. 100 tablet 2 11/19/2023    Ventolin HFA 90 mcg/actuation inhaler Inhale 1-2 puffs every 4 hours if needed.     11/20/2023            Allergies  Patient has no known allergies.     FamHx  Family History          Family History   Problem Relation Name Age of Onset    Hypertension Maternal Grandmother        Asthma Child                Social History  Social History            Tobacco Use    Smoking status: Former       Types: Cigarettes       Passive exposure: Past    Smokeless tobacco: Never   Substance Use Topics    Alcohol use: Not Currently           Substance and Sexual Activity   Drug Use Not Currently    Types: Marijuana            Objective   Last Vitals  Visit Vitals  /72   Pulse 90   Temp 36.8 °C (98.2 °F)   Resp 16      Physical Examination  GENERAL: Examination reveals a well developed, well nourished, gravid female in no acute distress. She is alert and cooperative.  HEENT: PERRLA. External ears normal. Nose normal, no erythema or discharge. Mouth and throat " clear.  LUNGS: clear to auscultation bilaterally  HEART:  wwp  ABDOMEN:  gravid, nontender   SKIN: normal coloration and turgor, no rashes  NEUROLOGICAL: alert, oriented, normal speech, no focal findings or movement disorder noted  PSYCHOLOGICAL: awake and alert; oriented to person, place, and time     Lab Review  Lab Results   Component Value Date    WBC 7.6 11/20/2023    HGB 12.2 11/20/2023    HCT 37.9 11/20/2023     11/20/2023     Lab Results   Component Value Date    GLUCOSE 170 (H) 11/21/2023     (L) 11/21/2023    K 3.9 11/21/2023     11/21/2023    CO2 18 (L) 11/21/2023    ANIONGAP 15 11/21/2023    BUN 6 11/21/2023    CREATININE 0.36 (L) 11/21/2023    EGFR >90 11/21/2023    CALCIUM 8.8 11/21/2023    ALBUMIN 3.3 (L) 11/21/2023    PROT 6.1 (L) 11/21/2023    ALKPHOS 69 11/21/2023    ALT 11 11/21/2023    AST 12 11/21/2023    BILITOT 0.5 11/21/2023     BHB 1.06   pH 7.39, pCO2 32, bicarb 18     Fetal assessment   FHT baseline 135, mod yessenia, + accels, - decels  flat

## 2023-11-22 ENCOUNTER — PHARMACY VISIT (OUTPATIENT)
Dept: PHARMACY | Facility: CLINIC | Age: 23
End: 2023-11-22
Payer: MEDICAID

## 2023-11-22 LAB
ALBUMIN SERPL BCP-MCNC: 2.9 G/DL (ref 3.4–5)
ALBUMIN SERPL BCP-MCNC: 3.1 G/DL (ref 3.4–5)
ALBUMIN SERPL BCP-MCNC: 3.2 G/DL (ref 3.4–5)
ALBUMIN SERPL BCP-MCNC: 3.2 G/DL (ref 3.4–5)
ALBUMIN SERPL BCP-MCNC: 3.4 G/DL (ref 3.4–5)
ALP SERPL-CCNC: 62 U/L (ref 33–110)
ALP SERPL-CCNC: 63 U/L (ref 33–110)
ALP SERPL-CCNC: 64 U/L (ref 33–110)
ALP SERPL-CCNC: 65 U/L (ref 33–110)
ALP SERPL-CCNC: 69 U/L (ref 33–110)
ALT SERPL W P-5'-P-CCNC: 10 U/L (ref 7–45)
ALT SERPL W P-5'-P-CCNC: 9 U/L (ref 7–45)
ALT SERPL W P-5'-P-CCNC: 9 U/L (ref 7–45)
ANION GAP BLDV CALCULATED.4IONS-SCNC: 11 MMOL/L (ref 10–25)
ANION GAP BLDV CALCULATED.4IONS-SCNC: 13 MMOL/L (ref 10–25)
ANION GAP SERPL CALC-SCNC: 13 MMOL/L (ref 10–20)
ANION GAP SERPL CALC-SCNC: 13 MMOL/L (ref 10–20)
ANION GAP SERPL CALC-SCNC: 15 MMOL/L (ref 10–20)
ANION GAP SERPL CALC-SCNC: 17 MMOL/L (ref 10–20)
ANION GAP SERPL CALC-SCNC: 21 MMOL/L (ref 10–20)
AST SERPL W P-5'-P-CCNC: 10 U/L (ref 9–39)
AST SERPL W P-5'-P-CCNC: 11 U/L (ref 9–39)
AST SERPL W P-5'-P-CCNC: 14 U/L (ref 9–39)
AST SERPL W P-5'-P-CCNC: 7 U/L (ref 9–39)
AST SERPL W P-5'-P-CCNC: 8 U/L (ref 9–39)
B-OH-BUTYR SERPL-SCNC: 0.15 MMOL/L (ref 0.02–0.27)
B-OH-BUTYR SERPL-SCNC: 0.16 MMOL/L (ref 0.02–0.27)
B-OH-BUTYR SERPL-SCNC: 0.45 MMOL/L (ref 0.02–0.27)
B-OH-BUTYR SERPL-SCNC: 2.17 MMOL/L (ref 0.02–0.27)
B-OH-BUTYR SERPL-SCNC: 2.19 MMOL/L (ref 0.02–0.27)
B-OH-BUTYR SERPL-SCNC: 3.97 MMOL/L (ref 0.02–0.27)
BASE EXCESS BLDV CALC-SCNC: -4.5 MMOL/L (ref -2–3)
BASE EXCESS BLDV CALC-SCNC: -5.1 MMOL/L (ref -2–3)
BASE EXCESS BLDV CALC-SCNC: -5.2 MMOL/L (ref -2–3)
BASE EXCESS BLDV CALC-SCNC: -7.4 MMOL/L (ref -2–3)
BASE EXCESS BLDV CALC-SCNC: -9.4 MMOL/L (ref -2–3)
BILIRUB SERPL-MCNC: 0.3 MG/DL (ref 0–1.2)
BILIRUB SERPL-MCNC: 0.4 MG/DL (ref 0–1.2)
BODY TEMPERATURE: 37 DEGREES CELSIUS
BODY TEMPERATURE: ABNORMAL
BUN SERPL-MCNC: 4 MG/DL (ref 6–23)
BUN SERPL-MCNC: 5 MG/DL (ref 6–23)
BUN SERPL-MCNC: 5 MG/DL (ref 6–23)
BUN SERPL-MCNC: 6 MG/DL (ref 6–23)
BUN SERPL-MCNC: 6 MG/DL (ref 6–23)
CA-I BLDV-SCNC: 1.22 MMOL/L (ref 1.1–1.33)
CA-I BLDV-SCNC: 1.24 MMOL/L (ref 1.1–1.33)
CALCIUM SERPL-MCNC: 8.5 MG/DL (ref 8.6–10.6)
CALCIUM SERPL-MCNC: 8.7 MG/DL (ref 8.6–10.6)
CALCIUM SERPL-MCNC: 8.8 MG/DL (ref 8.6–10.6)
CALCIUM SERPL-MCNC: 8.8 MG/DL (ref 8.6–10.6)
CALCIUM SERPL-MCNC: 8.9 MG/DL (ref 8.6–10.6)
CHLORIDE BLDV-SCNC: 107 MMOL/L (ref 98–107)
CHLORIDE BLDV-SCNC: 108 MMOL/L (ref 98–107)
CHLORIDE SERPL-SCNC: 104 MMOL/L (ref 98–107)
CHLORIDE SERPL-SCNC: 106 MMOL/L (ref 98–107)
CHLORIDE SERPL-SCNC: 107 MMOL/L (ref 98–107)
CHLORIDE SERPL-SCNC: 108 MMOL/L (ref 98–107)
CHLORIDE SERPL-SCNC: 108 MMOL/L (ref 98–107)
CO2 SERPL-SCNC: 15 MMOL/L (ref 21–32)
CO2 SERPL-SCNC: 17 MMOL/L (ref 21–32)
CO2 SERPL-SCNC: 19 MMOL/L (ref 21–32)
CO2 SERPL-SCNC: 20 MMOL/L (ref 21–32)
CO2 SERPL-SCNC: 20 MMOL/L (ref 21–32)
CREAT SERPL-MCNC: 0.34 MG/DL (ref 0.5–1.05)
CREAT SERPL-MCNC: 0.38 MG/DL (ref 0.5–1.05)
CREAT SERPL-MCNC: 0.39 MG/DL (ref 0.5–1.05)
CREAT SERPL-MCNC: 0.39 MG/DL (ref 0.5–1.05)
CREAT SERPL-MCNC: 0.42 MG/DL (ref 0.5–1.05)
ERYTHROCYTE [DISTWIDTH] IN BLOOD BY AUTOMATED COUNT: 12.5 % (ref 11.5–14.5)
GFR SERPL CREATININE-BSD FRML MDRD: >90 ML/MIN/1.73M*2
GLUCOSE BLD MANUAL STRIP-MCNC: 113 MG/DL (ref 74–99)
GLUCOSE BLD MANUAL STRIP-MCNC: 138 MG/DL (ref 74–99)
GLUCOSE BLD MANUAL STRIP-MCNC: 139 MG/DL (ref 74–99)
GLUCOSE BLD MANUAL STRIP-MCNC: 139 MG/DL (ref 74–99)
GLUCOSE BLD MANUAL STRIP-MCNC: 144 MG/DL (ref 74–99)
GLUCOSE BLD MANUAL STRIP-MCNC: 149 MG/DL (ref 74–99)
GLUCOSE BLD MANUAL STRIP-MCNC: 152 MG/DL (ref 74–99)
GLUCOSE BLD MANUAL STRIP-MCNC: 154 MG/DL (ref 74–99)
GLUCOSE BLD MANUAL STRIP-MCNC: 157 MG/DL (ref 74–99)
GLUCOSE BLD MANUAL STRIP-MCNC: 162 MG/DL (ref 74–99)
GLUCOSE BLD MANUAL STRIP-MCNC: 165 MG/DL (ref 74–99)
GLUCOSE BLD MANUAL STRIP-MCNC: 170 MG/DL (ref 74–99)
GLUCOSE BLD MANUAL STRIP-MCNC: 175 MG/DL (ref 74–99)
GLUCOSE BLD MANUAL STRIP-MCNC: 182 MG/DL (ref 74–99)
GLUCOSE BLD MANUAL STRIP-MCNC: 186 MG/DL (ref 74–99)
GLUCOSE BLD MANUAL STRIP-MCNC: 190 MG/DL (ref 74–99)
GLUCOSE BLD MANUAL STRIP-MCNC: 192 MG/DL (ref 74–99)
GLUCOSE BLD MANUAL STRIP-MCNC: 198 MG/DL (ref 74–99)
GLUCOSE BLD MANUAL STRIP-MCNC: 199 MG/DL (ref 74–99)
GLUCOSE BLD MANUAL STRIP-MCNC: 210 MG/DL (ref 74–99)
GLUCOSE BLD MANUAL STRIP-MCNC: 217 MG/DL (ref 74–99)
GLUCOSE BLD MANUAL STRIP-MCNC: 257 MG/DL (ref 74–99)
GLUCOSE BLD MANUAL STRIP-MCNC: 265 MG/DL (ref 74–99)
GLUCOSE BLD MANUAL STRIP-MCNC: 95 MG/DL (ref 74–99)
GLUCOSE BLDV-MCNC: 159 MG/DL (ref 74–99)
GLUCOSE BLDV-MCNC: 163 MG/DL (ref 74–99)
GLUCOSE SERPL-MCNC: 152 MG/DL (ref 74–99)
GLUCOSE SERPL-MCNC: 154 MG/DL (ref 74–99)
GLUCOSE SERPL-MCNC: 159 MG/DL (ref 74–99)
GLUCOSE SERPL-MCNC: 173 MG/DL (ref 74–99)
GLUCOSE SERPL-MCNC: 246 MG/DL (ref 74–99)
GP B STREP GENITAL QL CULT: ABNORMAL
HCO3 BLDV-SCNC: 15.1 MMOL/L (ref 22–26)
HCO3 BLDV-SCNC: 18 MMOL/L (ref 22–26)
HCO3 BLDV-SCNC: 18.9 MMOL/L (ref 22–26)
HCO3 BLDV-SCNC: 19.9 MMOL/L (ref 22–26)
HCO3 BLDV-SCNC: 20.3 MMOL/L (ref 22–26)
HCT VFR BLD AUTO: 31 % (ref 36–46)
HCT VFR BLD EST: 31 % (ref 36–46)
HCT VFR BLD EST: 35 % (ref 36–46)
HGB BLD-MCNC: 10 G/DL (ref 12–16)
HGB BLDV-MCNC: 10.2 G/DL (ref 12–16)
HGB BLDV-MCNC: 11.7 G/DL (ref 12–16)
INHALED O2 CONCENTRATION: 21 %
KETONES, POC: POSITIVE
LACTATE BLDV-SCNC: 1.5 MMOL/L (ref 0.4–2)
LACTATE BLDV-SCNC: 1.9 MMOL/L (ref 0.4–2)
LACTATE BLDV-SCNC: 2.8 MMOL/L (ref 0.4–2)
MAGNESIUM SERPL-MCNC: 1.43 MG/DL (ref 1.6–2.4)
MCH RBC QN AUTO: 30.9 PG (ref 26–34)
MCHC RBC AUTO-ENTMCNC: 32.3 G/DL (ref 32–36)
MCV RBC AUTO: 96 FL (ref 80–100)
NRBC BLD-RTO: 0 /100 WBCS (ref 0–0)
OXYHGB MFR BLDV: 53 % (ref 45–75)
OXYHGB MFR BLDV: 57.1 % (ref 45–75)
OXYHGB MFR BLDV: 72.9 % (ref 45–75)
OXYHGB MFR BLDV: 96.9 % (ref 45–75)
OXYHGB MFR BLDV: 97 % (ref 45–75)
PCO2 BLDV: 28 MM HG (ref 41–51)
PCO2 BLDV: 32 MM HG (ref 41–51)
PCO2 BLDV: 35 MM HG (ref 41–51)
PCO2 BLDV: 36 MM HG (ref 41–51)
PCO2 BLDV: 36 MM HG (ref 41–51)
PH BLDV: 7.32 PH (ref 7.33–7.43)
PH BLDV: 7.34 PH (ref 7.33–7.43)
PH BLDV: 7.35 PH (ref 7.33–7.43)
PH BLDV: 7.36 PH (ref 7.33–7.43)
PH BLDV: 7.38 PH (ref 7.33–7.43)
PHOSPHATE SERPL-MCNC: 3.1 MG/DL (ref 2.5–4.9)
PLATELET # BLD AUTO: 334 X10*3/UL (ref 150–450)
PO2 BLDV: 38 MM HG (ref 35–45)
PO2 BLDV: 41 MM HG (ref 35–45)
PO2 BLDV: 48 MM HG (ref 35–45)
PO2 BLDV: 95 MM HG (ref 35–45)
PO2 BLDV: 99 MM HG (ref 35–45)
POTASSIUM BLDV-SCNC: 3.6 MMOL/L (ref 3.5–5.3)
POTASSIUM BLDV-SCNC: 3.6 MMOL/L (ref 3.5–5.3)
POTASSIUM SERPL-SCNC: 3.8 MMOL/L (ref 3.5–5.3)
POTASSIUM SERPL-SCNC: 3.9 MMOL/L (ref 3.5–5.3)
POTASSIUM SERPL-SCNC: 4 MMOL/L (ref 3.5–5.3)
PROT SERPL-MCNC: 5.2 G/DL (ref 6.4–8.2)
PROT SERPL-MCNC: 5.5 G/DL (ref 6.4–8.2)
PROT SERPL-MCNC: 5.6 G/DL (ref 6.4–8.2)
PROT SERPL-MCNC: 5.8 G/DL (ref 6.4–8.2)
PROT SERPL-MCNC: 6 G/DL (ref 6.4–8.2)
RBC # BLD AUTO: 3.24 X10*6/UL (ref 4–5.2)
SAO2 % BLDV: 54 % (ref 45–75)
SAO2 % BLDV: 58 % (ref 45–75)
SAO2 % BLDV: 75 % (ref 45–75)
SAO2 % BLDV: 99 % (ref 45–75)
SAO2 % BLDV: 99 % (ref 45–75)
SODIUM BLDV-SCNC: 134 MMOL/L (ref 136–145)
SODIUM BLDV-SCNC: 135 MMOL/L (ref 136–145)
SODIUM SERPL-SCNC: 136 MMOL/L (ref 136–145)
SODIUM SERPL-SCNC: 136 MMOL/L (ref 136–145)
SODIUM SERPL-SCNC: 137 MMOL/L (ref 136–145)
TEST COMMENT: ABNORMAL
TEST COMMENT: ABNORMAL
WBC # BLD AUTO: 7.3 X10*3/UL (ref 4.4–11.3)

## 2023-11-22 PROCEDURE — 82010 KETONE BODYS QUAN: CPT

## 2023-11-22 PROCEDURE — 2500000004 HC RX 250 GENERAL PHARMACY W/ HCPCS (ALT 636 FOR OP/ED): Performed by: STUDENT IN AN ORGANIZED HEALTH CARE EDUCATION/TRAINING PROGRAM

## 2023-11-22 PROCEDURE — 83605 ASSAY OF LACTIC ACID: CPT

## 2023-11-22 PROCEDURE — 2500000004 HC RX 250 GENERAL PHARMACY W/ HCPCS (ALT 636 FOR OP/ED)

## 2023-11-22 PROCEDURE — 82805 BLOOD GASES W/O2 SATURATION: CPT

## 2023-11-22 PROCEDURE — 84100 ASSAY OF PHOSPHORUS: CPT

## 2023-11-22 PROCEDURE — 80053 COMPREHEN METABOLIC PANEL: CPT

## 2023-11-22 PROCEDURE — RXMED WILLOW AMBULATORY MEDICATION CHARGE

## 2023-11-22 PROCEDURE — 36415 COLL VENOUS BLD VENIPUNCTURE: CPT

## 2023-11-22 PROCEDURE — 84132 ASSAY OF SERUM POTASSIUM: CPT

## 2023-11-22 PROCEDURE — 2500000001 HC RX 250 WO HCPCS SELF ADMINISTERED DRUGS (ALT 637 FOR MEDICARE OP): Performed by: STUDENT IN AN ORGANIZED HEALTH CARE EDUCATION/TRAINING PROGRAM

## 2023-11-22 PROCEDURE — 59025 FETAL NON-STRESS TEST: CPT | Mod: GC | Performed by: STUDENT IN AN ORGANIZED HEALTH CARE EDUCATION/TRAINING PROGRAM

## 2023-11-22 PROCEDURE — 80053 COMPREHEN METABOLIC PANEL: CPT | Performed by: STUDENT IN AN ORGANIZED HEALTH CARE EDUCATION/TRAINING PROGRAM

## 2023-11-22 PROCEDURE — 1100000001 HC PRIVATE ROOM DAILY

## 2023-11-22 PROCEDURE — 84132 ASSAY OF SERUM POTASSIUM: CPT | Performed by: STUDENT IN AN ORGANIZED HEALTH CARE EDUCATION/TRAINING PROGRAM

## 2023-11-22 PROCEDURE — 82010 KETONE BODYS QUAN: CPT | Performed by: STUDENT IN AN ORGANIZED HEALTH CARE EDUCATION/TRAINING PROGRAM

## 2023-11-22 PROCEDURE — 83735 ASSAY OF MAGNESIUM: CPT

## 2023-11-22 PROCEDURE — 82947 ASSAY GLUCOSE BLOOD QUANT: CPT

## 2023-11-22 PROCEDURE — 82805 BLOOD GASES W/O2 SATURATION: CPT | Performed by: STUDENT IN AN ORGANIZED HEALTH CARE EDUCATION/TRAINING PROGRAM

## 2023-11-22 PROCEDURE — 82435 ASSAY OF BLOOD CHLORIDE: CPT

## 2023-11-22 PROCEDURE — 99233 SBSQ HOSP IP/OBS HIGH 50: CPT

## 2023-11-22 PROCEDURE — 85027 COMPLETE CBC AUTOMATED: CPT

## 2023-11-22 RX ORDER — DEXTROSE 40 %
15 GEL (GRAM) ORAL
Status: DISCONTINUED | OUTPATIENT
Start: 2023-11-22 | End: 2023-11-25 | Stop reason: HOSPADM

## 2023-11-22 RX ORDER — DEXTROSE 40 %
15 GEL (GRAM) ORAL
Status: DISCONTINUED | OUTPATIENT
Start: 2023-11-22 | End: 2023-11-22

## 2023-11-22 RX ORDER — BLOOD-GLUCOSE SENSOR
1 EACH MISCELLANEOUS CONTINUOUS
Qty: 3 EACH | Refills: 0 | Status: SHIPPED | OUTPATIENT
Start: 2023-11-22

## 2023-11-22 RX ORDER — SODIUM CHLORIDE 9 MG/ML
25 INJECTION, SOLUTION INTRAVENOUS CONTINUOUS PRN
Status: DISCONTINUED | OUTPATIENT
Start: 2023-11-22 | End: 2023-11-23

## 2023-11-22 RX ORDER — SODIUM CHLORIDE 9 MG/ML
25 INJECTION, SOLUTION INTRAVENOUS CONTINUOUS PRN
Status: DISCONTINUED | OUTPATIENT
Start: 2023-11-22 | End: 2023-11-22

## 2023-11-22 RX ORDER — DEXTROSE MONOHYDRATE 100 MG/ML
100 INJECTION, SOLUTION INTRAVENOUS AS NEEDED
Status: DISCONTINUED | OUTPATIENT
Start: 2023-11-22 | End: 2023-11-22

## 2023-11-22 RX ORDER — DEXTROSE MONOHYDRATE 100 MG/ML
50 INJECTION, SOLUTION INTRAVENOUS CONTINUOUS PRN
Status: DISCONTINUED | OUTPATIENT
Start: 2023-11-22 | End: 2023-11-23

## 2023-11-22 RX ORDER — DEXTROSE MONOHYDRATE 100 MG/ML
150 INJECTION, SOLUTION INTRAVENOUS CONTINUOUS
Status: DISCONTINUED | OUTPATIENT
Start: 2023-11-22 | End: 2023-11-22

## 2023-11-22 RX ORDER — DEXTROSE MONOHYDRATE 100 MG/ML
50 INJECTION, SOLUTION INTRAVENOUS CONTINUOUS PRN
Status: DISCONTINUED | OUTPATIENT
Start: 2023-11-22 | End: 2023-11-22

## 2023-11-22 RX ORDER — DEXTROSE 50 % IN WATER (D50W) INTRAVENOUS SYRINGE
25
Status: DISCONTINUED | OUTPATIENT
Start: 2023-11-22 | End: 2023-11-22

## 2023-11-22 RX ORDER — INSULIN LISPRO 100 [IU]/ML
3 INJECTION, SOLUTION INTRAVENOUS; SUBCUTANEOUS ONCE
Status: DISCONTINUED | OUTPATIENT
Start: 2023-11-22 | End: 2023-11-22

## 2023-11-22 RX ORDER — DEXTROSE MONOHYDRATE AND SODIUM CHLORIDE 5; .9 G/100ML; G/100ML
150 INJECTION, SOLUTION INTRAVENOUS CONTINUOUS
Status: DISCONTINUED | OUTPATIENT
Start: 2023-11-22 | End: 2023-11-22

## 2023-11-22 RX ORDER — POTASSIUM CHLORIDE 20 MEQ/1
40 TABLET, EXTENDED RELEASE ORAL ONCE
Status: COMPLETED | OUTPATIENT
Start: 2023-11-22 | End: 2023-11-22

## 2023-11-22 RX ORDER — SODIUM CHLORIDE, SODIUM LACTATE, POTASSIUM CHLORIDE, CALCIUM CHLORIDE 600; 310; 30; 20 MG/100ML; MG/100ML; MG/100ML; MG/100ML
125 INJECTION, SOLUTION INTRAVENOUS CONTINUOUS
Status: DISCONTINUED | OUTPATIENT
Start: 2023-11-23 | End: 2023-11-24

## 2023-11-22 RX ORDER — DEXTROSE 50 % IN WATER (D50W) INTRAVENOUS SYRINGE
50
Status: DISCONTINUED | OUTPATIENT
Start: 2023-11-22 | End: 2023-11-22

## 2023-11-22 RX ORDER — DEXTROSE 40 %
30 GEL (GRAM) ORAL
Status: DISCONTINUED | OUTPATIENT
Start: 2023-11-22 | End: 2023-11-25 | Stop reason: HOSPADM

## 2023-11-22 RX ORDER — DEXTROSE MONOHYDRATE 100 MG/ML
100 INJECTION, SOLUTION INTRAVENOUS AS NEEDED
Status: DISCONTINUED | OUTPATIENT
Start: 2023-11-22 | End: 2023-11-23

## 2023-11-22 RX ORDER — DEXTROSE 50 % IN WATER (D50W) INTRAVENOUS SYRINGE
25
Status: DISCONTINUED | OUTPATIENT
Start: 2023-11-22 | End: 2023-11-25 | Stop reason: HOSPADM

## 2023-11-22 RX ORDER — DEXTROSE 40 %
30 GEL (GRAM) ORAL
Status: DISCONTINUED | OUTPATIENT
Start: 2023-11-22 | End: 2023-11-22

## 2023-11-22 RX ADMIN — POTASSIUM CHLORIDE 40 MEQ: 1500 TABLET, EXTENDED RELEASE ORAL at 04:17

## 2023-11-22 RX ADMIN — DEXTROSE MONOHYDRATE 50 ML/HR: 100 INJECTION, SOLUTION INTRAVENOUS at 23:47

## 2023-11-22 RX ADMIN — VALACYCLOVIR 500 MG: 500 TABLET, FILM COATED ORAL at 22:58

## 2023-11-22 RX ADMIN — INSULIN HUMAN 1.2 UNITS/HR: 1 INJECTION, SOLUTION INTRAVENOUS at 19:59

## 2023-11-22 RX ADMIN — DEXTROSE AND SODIUM CHLORIDE 150 ML/HR: 5; 900 INJECTION, SOLUTION INTRAVENOUS at 18:40

## 2023-11-22 RX ADMIN — DEXTROSE MONOHYDRATE 150 ML/HR: 100 INJECTION, SOLUTION INTRAVENOUS at 05:29

## 2023-11-22 RX ADMIN — PRENATAL VIT W/ FE FUMARATE-FA TAB 27-0.8 MG 1 TABLET: 27-0.8 TAB at 09:52

## 2023-11-22 RX ADMIN — INSULIN HUMAN 6 UNITS/HR: 1 INJECTION, SOLUTION INTRAVENOUS at 04:03

## 2023-11-22 RX ADMIN — DEXTROSE MONOHYDRATE 150 ML/HR: 100 INJECTION, SOLUTION INTRAVENOUS at 17:37

## 2023-11-22 RX ADMIN — DEXTROSE AND SODIUM CHLORIDE 150 ML/HR: 5; 900 INJECTION, SOLUTION INTRAVENOUS at 14:32

## 2023-11-22 RX ADMIN — INSULIN HUMAN 2 UNITS/HR: 1 INJECTION, SOLUTION INTRAVENOUS at 20:36

## 2023-11-22 RX ADMIN — SODIUM CHLORIDE, POTASSIUM CHLORIDE, SODIUM LACTATE AND CALCIUM CHLORIDE 2000 ML: 600; 310; 30; 20 INJECTION, SOLUTION INTRAVENOUS at 03:58

## 2023-11-22 RX ADMIN — VALACYCLOVIR 500 MG: 500 TABLET, FILM COATED ORAL at 09:52

## 2023-11-22 RX ADMIN — DEXTROSE AND SODIUM CHLORIDE 150 ML/HR: 5; 900 INJECTION, SOLUTION INTRAVENOUS at 04:08

## 2023-11-22 ASSESSMENT — PAIN SCALES - GENERAL

## 2023-11-22 NOTE — PROGRESS NOTES
ANTEPARTUM PROGRESS NOTE   11/22/2023, 7:37 AM     SUBJECTIVE: Pt brought down to L&D for DKA. Feeling sleepy this AM. Denies VB, LOF, CTX. Good FM.     OBJECTIVE:   /72   Pulse 91   Temp 36.6 °C (97.9 °F) (Temporal)   Resp 18   Ht 1.524 m (5')   Wt 60.9 kg (134 lb 4.2 oz)   LMP 04/01/2023   SpO2 99%   BMI 26.22 kg/m²    Temp  Min: 36.6 °C (97.9 °F)  Max: 37.1 °C (98.8 °F)  Pulse  Min: 80  Max: 108  BP  Min: 103/65  Max: 123/70    General:  AAOx3, No acute distress  Cardiovascular: Warm and well perfused  Respiratory: Normal respiratory effort   Abdominal:  Soft, gravid, non-tender, no rebound or guarding      NST: Baseline 130, accelerations +, no decelerations, mod variability   [place]: no CTXs     Labs:   Lab Results   Component Value Date    WBC 7.3 11/22/2023    HGB 10.0 (L) 11/22/2023    HCT 31.0 (L) 11/22/2023     11/22/2023     Lab Results   Component Value Date    GLUCOSE 152 (H) 11/22/2023     11/22/2023    K 3.8 11/22/2023     (H) 11/22/2023    CO2 19 (L) 11/22/2023    ANIONGAP 13 11/22/2023    BUN 5 (L) 11/22/2023    CREATININE 0.34 (L) 11/22/2023    EGFR >90 11/22/2023    CALCIUM 8.5 (L) 11/22/2023    ALBUMIN 2.9 (L) 11/22/2023    PROT 5.2 (L) 11/22/2023    ALKPHOS 62 11/22/2023    ALT 9 11/22/2023    AST 7 (L) 11/22/2023    BILITOT 0.3 11/22/2023     Beta-Hydroxybutyrate   Date/Time Value Ref Range Status   11/22/2023 06:52 AM 0.45 (H) 0.02 - 0.27 mmol/L Final   11/22/2023 04:38 AM 2.19 (H) 0.02 - 0.27 mmol/L Final   11/22/2023 04:38 AM 2.17 (H) 0.02 - 0.27 mmol/L Final   11/22/2023 01:35 AM 3.97 (H) 0.02 - 0.27 mmol/L Final   11/21/2023 09:19 PM 1.54 (H) 0.02 - 0.27 mmol/L Final     POCT pH, Venous   Date/Time Value Ref Range Status   11/22/2023 06:52 AM 7.38 7.33 - 7.43 pH Final   11/22/2023 04:38 AM 7.32 (L) 7.33 - 7.43 pH Final   11/22/2023 01:35 AM 7.34 7.33 - 7.43 pH Final   11/21/2023 08:20 AM 7.39 7.33 - 7.43 pH Final   11/21/2023 04:23 AM 7.40 7.33 - 7.43 pH  Final     ASSESSMENT AND PLAN:     23 y.o.  at 33w4d with T1DM and DKA.     T1DM, resolving  -Admission , BHB peak 3.47, AG 16, pH 7.33 -> resolved on , however overnight patient's insulin pump ran our of insulin  - overnight labs: BG of 246, BHB 3.97, AG 17, pH 7.34   - restarted on insulin gtt with appropriate fluid resuscitation, continue mIVF  -Most recent K, 3.8  -Continue repeating labs q4 hours until normalization of BHB  -Patient not yet desiring to eat  -Insulin pump settings to transition to:   Basal  0473-0018 0.90  9041-5049 1.00  6940-3298 0.90     Bolus  6750-0247 1:9     CF: 1:40  Carb ratio: 1:9  Target:110     --Will need adjustments as she transitions back to her pump      IUP  -Last growth & BPP performed 10/24  -GBS+ for PCN in labor  -Known fetal cardiac anomaly--> VS slightly thickened needs, non urgent peds cards prior to discharge ( discharge)  -Needs  testing scheduled as outpatient      Depression with recent passive SI  -Mood appropriate this morning  -No medications currently  -Reportedly following with psych, will need to ensure followup prior to discharge     cHTN  -Normotensive  -Not on medications  - testing & growth per above     Asthma  -Home flovent ordered  -Albuterol PRN, currently asymptomatic     +Oligoclonal bands  -Diagnosed after admission for blurry vision  -S/p optho (dx macular disorder, for retinal procedure)  -Neuro immunology follow up as scheduled      HSV  -On valtrex  -Asymptomatic     Contraception  -Needs to be addressed prior to disch     Dispo: cEFM on mac2 while on insulin gtt until DKA resolves and transitioned back to insulin pump    Pt seen and discussed with MFM Attending, Dr. Soares.     MD TRACEY Bird  Pager [phone]     Principal Problem:    Labor and delivery indication for care or intervention

## 2023-11-22 NOTE — SIGNIFICANT EVENT
Hyperglycemia , DKA   (Late entry due to patient care)    22yo  at 33.4wga currently admitted s/p DKA stabilization, transferred to Mac 4 at shift change  ~0500 following transition to insulin pump. Repeat DKA labs ordered for 2100 by day team to continue trending CMP, BHB.     Labs reviewed around midnight this evening and elevated BG noted to be documented at 18:00 218 --> 19:56  178, other labs at 21:19 notable for BHB 1.54, AG 13. Nursing contacted to recheck blood sugar with POCT to evaluate for if additional coverage needed. POCT on recheck 257 (00:51).     Met with nursing to better understand hyperglycemia trend, was informed that patient has not had insulin connected to pump since it ran out earlier in the night. Uncertain at what time patient's insulin pump ran out, but per nursing, believes insulin has been out since around the time pt arrived to the floor . MD team not made aware via page or Epic chat about insulin running out. Nursing reports pt's insulin had just arrived to floor from pharmacy. Instructed nursing to connect pt's pump ASAP and draw repeat CMP, BHB, VBG. Pt self-administered 3.25 units through pump per ISF. (Conversation held around 0100)    Lab results significant as below, concern for DKA based on BG of 246, BHB 3.97, AG 17, pH 7.34.  Pt transferred to Mac 2 for insulin gtt in s/o DKA.     DKA   - suspicious for DKA based on BG of 246, BHB 3.97, AG 17, pH 7.34  - NPO until resolution of DKA   - labs notable for:    - pH 7.34   - K 3.9 --> 40 mEq repleted PO    - BHB 3.97   - calculated gap: 17  - trend labs q2-4 hrs , next due at 0400  - administer 2L LR bolus on arrival to Mac 2, will transition to D5LR @150cc/hr for BG >150, D10W@150cc/hr for BG <150.  - pump disconnected, POCT after transfer 175  - will start insulin gtt at 6 units/hr  - for POCT checks q1hr   - continue to monitor K q2hrs, continue supplementation for K+ 3.3-5.3  - strict Is/Os  - pump settings as below,  will transition once DKA resolves     Basal  6874-3120 0.90  4678-8329 1.00  9529-7887 0.90     Bolus  7059-6497 1:9     CF: 1:40  Carb ratio: 1:9  Target:110  DOA 5 hrs        Latest Reference Range & Units 11/22/23 01:35   GLUCOSE 74 - 99 mg/dL 246 (H)   SODIUM 136 - 145 mmol/L 136   POTASSIUM 3.5 - 5.3 mmol/L 3.9   CHLORIDE 98 - 107 mmol/L 104   Bicarbonate 21 - 32 mmol/L 15 (L)   Anion Gap 10 - 20 mmol/L 21 (H)   Blood Urea Nitrogen 6 - 23 mg/dL 6   Creatinine 0.50 - 1.05 mg/dL 0.38 (L)   EGFR >60 mL/min/1.73m*2 >90   Calcium 8.6 - 10.6 mg/dL 8.9   Albumin 3.4 - 5.0 g/dL 3.4   Alkaline Phosphatase 33 - 110 U/L 69   ALT 7 - 45 U/L 10   AST 9 - 39 U/L 10   Bilirubin Total 0.0 - 1.2 mg/dL 0.4   Total Protein 6.4 - 8.2 g/dL 6.0 (L)   Beta-Hydroxybutyrate 0.02 - 0.27 mmol/L 3.97 (H)

## 2023-11-22 NOTE — CARE PLAN
Problem: Antepartum  Goal: Maintain pregnancy as long as maternal and/or fetal condition is stable  11/22/2023 0636 by Indiana Guevara RN  Outcome: Progressing  11/22/2023 0635 by Indiana Guevara RN  Outcome: Progressing  Goal: Avoid/minimize constipation  11/22/2023 0636 by Indiana Guevara RN  Outcome: Progressing  11/22/2023 0635 by Indiana Guevara RN  Outcome: Progressing  Goal: No decrease in circulation/VTE  11/22/2023 0636 by Indiana Guevara, RN  Outcome: Progressing  11/22/2023 0635 by Indiana Guevara RN  Outcome: Progressing  Goal: FHR remains reassuring  11/22/2023 0636 by Indiana Guevara, RN  Outcome: Progressing  11/22/2023 0635 by Indiana Guevara RN  Outcome: Progressing  Goal: Minimize anxiety/maximize coping  11/22/2023 0636 by Indiana Guevara RN  Outcome: Progressing  11/22/2023 0635 by Indiana Guevara RN  Outcome: Progressing   The patient's goals for the shift include maintain BS    The clinical goals for the shift include maintain BS    Patient down from Mac 4, now on insulin gtt. Labs and blood sugars trending in right direction. VS stable and FHR category I. To maintain insulin gtt until DKA resolves.

## 2023-11-22 NOTE — PROGRESS NOTES
Antepartum Progress Note    Assessment/Plan   Maria Isabel Cutler is a 23 y.o.  at 33w4d. SHIN: 2024, by Last Menstrual Period.     23 y.o.  at 33w4d with T1DM and DKA.      T1DM, resolving  -Admission , BHB peak 3.47, AG 16, pH 7.33 -> resolved on , however overnight patient's insulin pump ran our of insulin  - overnight labs: BG of 246, BHB 3.97, AG 17, pH 7.34   - This AM: -160s, BHB 0.45, AG 9, pH 7.38  - restarted on insulin gtt with appropriate fluid resuscitation, continue mIVF  - Most recent K, 3.8  - Continue repeating labs q4 hours until normalization of BHB with plan to transition to insulin pump when appropriate       IUP  - CEFM on L&D, FHT cat 1  -Last growth & BPP performed 10/24  -GBS+ for PCN in labor  -Known fetal cardiac anomaly--> VS slightly thickened needs, non urgent peds cards prior to discharge ( discharge)  -Needs  testing scheduled as outpatient      cHTN  -Normotensive  -Not on medications  - testing & growth per above     Asthma  -Home flovent ordered  -Albuterol PRN, currently asymptomatic     +Oligoclonal bands  -Diagnosed after admission for blurry vision  -S/p optho (dx macular disorder, for retinal procedure)  -Neuro immunology follow up as scheduled      HSV  -On valtrex  -Asymptomatic     Contraception  -Needs to be addressed prior to disch      Dispo: cEFM on mac2 while on insulin gtt until DKA resolves and transitioned back to insulin pump    Seen and discussed with Dr. Marlene Mcintosh MD PGY-2       Principal Problem:    Labor and delivery indication for care or intervention    Pregnancy Problems (from 24 to present)       Problem Noted Resolved    Labor and delivery indication for care or intervention 2023 by Marni De La Cruz MD No    Priority:  Medium      Bilateral sciatica 10/10/2023 by Stuart Munoz MD No    Priority:  Medium      Overview Signed 10/10/2023  2:17 PM by Stuart Munoz MD      Intermittent pain shooting down posteriolateral thighs bilaterally         33 weeks gestation of pregnancy 10/8/2023 by Stuart Munoz MD No    Priority:  Medium      Overview Addendum 10/10/2023  2:24 PM by Stuart Munoz MD     [x] PNLs reviewed wnl, rubella nonimmune  [x] Pap ASCUS 2022  [x] NT wnl 2023  [x] cell-free DNA, CF/SMA screening low risk  [x] Anatomy US  [x] 2nd trimester labs, Hg 9.9  [x] tdap  [ ] flu/covid  [/] GBS  [ ] Delivery Planning  [ ] PPBC           Asthma affecting pregnancy in third trimester 10/8/2023 by Stuart Munoz MD No    Priority:  Medium      Overview Addendum 10/24/2023  2:08 PM by Stuart Munoz MD     Albuterol PRN  10/24 Regimen: Flovent 250mcg BID puff, Albuterol PRN         Type 1 diabetes mellitus complicating pregnancy, antepartum 10/5/2023 by Indiana Parra MD No    Priority:  Medium      Overview Addendum 2023  8:04 PM by Angeline Boudreaux MD     [x] Baseline HbA1c 12.8 2023  [x] Baseline TSH 2.38 2023  [x] Baseline HELLP Labs wnl, 0.11 2023  [x] bbASA  [x] EKG  wnl  [x] Fetal Echo wnl  [x] Ophthalmology  /podiatry    [ ]  Testing 2x weekly until del    Serial Growths  10/24 29.0 wga EFW 1294g 42%, AC 48%    Current Regimen changed 10/10/2023  Basal  3675-2387 0.90  2969-9374 1.00  9374-4862 0.90     Bolus  7512-7642 1:9     CF: 1:40  Carb ratio: 1:10  Target:110  DOA 5 hrs           Herpes simplex infection in mother during third trimester of pregnancy 10/5/2023 by Indiana Parra MD No    Priority:  Medium      Overview Signed 10/5/2023  8:55 AM by Indiana Parra MD     On acyclovir prophylaxis          Chronic hypertension affecting pregnancy 10/5/2023 by Indiana Parra MD No    Priority:  Medium      Overview Addendum 10/8/2023  3:16 PM by Stuart Munoz MD     [x] Baseline HELLP Labs, P:C 0.11 2023  [x] bbASA  [ ]  Testing  [x] Baseline EKG inpatient      Serial Growths    Med Regimen  No meds         Group  beta Strep positive 10/5/2023 by Indiana Parra MD No    Priority:  Medium      Overview Signed 10/5/2023  9:00 AM by Indiana Parra MD     GBS UTI in pregnancy          Depression during pregnancy in third trimester 10/5/2023 by Indiana Parra MD No    Priority:  Medium      Fetal cardiac anomaly affecting pregnancy, antepartum 10/5/2023 by Indiana Parra MD No    Priority:  Medium      Overview Addendum 2023  6:00 PM by Luz Maria Abebe MD     [facility] Plan of Care: fetal ventricular septum slightly thickened --> code:1: non-urgent peds cardiology consult prior to discharge or within 1-2 weeks if delivered at an outside hospital  No structural abnormalities on repeat fetal echo --> peds cards recommendation for routine delivery and  care with non-urgent peds cardiology consult prior to discharge or within 1-2 weeks if delivered at an outside hospital         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 10/5/2023 by Indiana Parra MD No    Priority:  Medium      Overview Addendum 10/24/2023  2:09 PM by Stuart Munoz MD     - Admitted for blurry vision on , d/c ed with improvement, oligoclonal bands seen on LP, neg MRI head  - New optic disc edema, diabetic edema    - Close follow up with ophto, last appt 10/13, needs IV fluro angio after delivery to prevent progression, next visit   - Neuro-Immunology NPV scheduled for          Ophthalmologic abnormality 10/5/2023 by Indiana Parra MD No    Priority:  Medium      Overview Signed 10/5/2023  9:14 AM by Indiana Parra MD     Diabetic macular edema, both eyes - for panretinal photocoagulation with Optho          Constipation during pregnancy in second trimester 10/9/2023 by Melody Osorio 10/10/2023 by Stuart Munoz MD    Abnormal pregnancy in second trimester 10/9/2023 by Melody Osorio 10/10/2023 by Stuart Munoz MD    Suspected fetal anomaly, antepartum 10/9/2023 by Melody Osorio 10/10/2023 by Stuart Munoz MD    Hyperglycemia in pregnancy  9/14/2023 by Melody Osorio 10/10/2023 by Stuart Munoz MD    Vomiting of pregnancy 9/14/2023 by Melody Osorio 10/10/2023 by Stuart Munoz MD            Subjective   Patient denies any nausea or vomiting. Good fetal movement, denies any ctx, vaginal bleeding or LOF.    Objective   Allergies:   Patient has no known allergies.    Last Vitals:  Temp Pulse Resp BP MAP Pulse Ox   36.5 °C (97.7 °F) 81 18 108/71   99 %     Vitals Min/Max Last 24 Hours:  Temp  Min: 36.5 °C (97.7 °F)  Max: 37.1 °C (98.8 °F)  Pulse  Min: 80  Max: 108  Resp  Min: 16  Max: 18  BP  Min: 103/65  Max: 123/70    Intake/Output:     Intake/Output Summary (Last 24 hours) at 11/22/2023 0851  Last data filed at 11/22/2023 0628  Gross per 24 hour   Intake 2570.71 ml   Output --   Net 2570.71 ml       Physical Exam:  General: no acute distress  HEENT: normocephalic, atraumatic  Heart: warm and well perfused  Lungs: breathing comfortably on room air  Abdomen: gravid, soft, nontender to palpation  Extremities: moving all extremities  Neuro: awake and conversant  Psych: appropriate mood and affect    Baseline Fetal Heart Rate (bpm): 135 bpm  Baseline Classification: Normal  Variability: Moderate (Between 6 and 25 BPM)  Pattern: Accelerations  FHR Category: Category I  Multiple Births: No   Fetal Decelerations: No  Contraction Frequency: none            Lab Data:  Lab Results   Component Value Date    GLUCOSE 152 (H) 11/22/2023     11/22/2023    K 3.8 11/22/2023     (H) 11/22/2023    CO2 19 (L) 11/22/2023    ANIONGAP 13 11/22/2023    BUN 5 (L) 11/22/2023    CREATININE 0.34 (L) 11/22/2023    EGFR >90 11/22/2023    CALCIUM 8.5 (L) 11/22/2023    ALBUMIN 2.9 (L) 11/22/2023    PROT 5.2 (L) 11/22/2023    ALKPHOS 62 11/22/2023    ALT 9 11/22/2023    AST 7 (L) 11/22/2023    BILITOT 0.3 11/22/2023     .  Beta-Hydroxybutyrate   Date/Time Value Ref Range Status   11/22/2023 06:52 AM 0.45 (H) 0.02 - 0.27 mmol/L Final   11/22/2023 04:38 AM 2.19 (H) 0.02 - 0.27  mmol/L Final   11/22/2023 04:38 AM 2.17 (H) 0.02 - 0.27 mmol/L Final

## 2023-11-22 NOTE — HOSPITAL COURSE
Maria Isabel Cutler is a 24 yo G1 at 33.6 wga who presented for abdominal pain on 11/21. She has a history of T1DM. She was admitted for management of DKA after admission BG in the 200s, elevated BHB to 3.47, an anion gap of 16, and pH of 7.33. On presentation, she reported painful contractions every few minutes for over an hour, and intense abdominal pain. Her cervix was closed on admission. She endorsed nausea and vomiting for a few days prior to presentation. Tandem pump was in place, and patient self removed in triage after hearing her sugars were high. She was started on an insulin gtt, then transferred to the antepartum service on her insulin pump with pre-and post-prandial BG checks. Overnight, her pump ran out of insulin and she went into DKA on Hospital day with a BG of 257. BHB 3.97, pH 7.34, AG 17. Her pump was started, bolused with 3.25 unit, and she was transferred to L&D for insulin gtt.     Her DKA subsequently resolved after fluid resuscitation and insulin gtt. Her time on insulin gtt was prolonged despite resolution of her DKA due to difficulties obtaining pump supplies, however she was successfully transitioned back to her pump on 11/23.     Pump settings at discharge:  Basal  9314-9024 0.90  6548-0612 1.00  4778-7593 0.90     Bolus  2885-8270 1:9     CF: 1:40  Carb ratio: 1:9  Target:110  DOA 5 hrs     Her cervix was checked on day of discharge for some additional cramping, and it remained closed. She was discharged on 11/25 with [facility] follow up to be scheduled.

## 2023-11-22 NOTE — PROGRESS NOTES
Antepartum Progress Note    Assessment/Plan   Maria Isabel Cutler is a 23 y.o.  at 33w4d. SHIN: 2024, by Last Menstrual Period.     23 y.o.  at 33w4d with T1DM admitted for DKA     T1DM, resolving DKA  -DKA diagnosed on admission -> resolved on , however overnight  patient's insulin pump ran our of insulin -> labs subsequently notable for BHB 3.97, AG 17, pH 7.34  - currently on insulin gtt with appropriate fluid resuscitation, continue mIVF  - most recent labs with mild acidosis, ketosis resolved, and AG closed.   - most recent , pt recently transitioned off D10 LR to D5, continue to monitor.   - Will plan to repeat labs at 10pm given stability   - CGM replaced, however pt missing sensor. Plan to transition off insulin gtt and back to pump once patient has sensor.      IUP  - CEFM on L&D, FHT cat 1  -Last growth & BPP performed 10/24, EFW 1294g (42%), AC (48%); SSUS done  read pending  -GBS+ for PCN in labor  -Known fetal cardiac anomaly--> VS slightly thickened needs, non urgent peds cards prior to discharge ( discharge)  -Needs  testing scheduled as outpatient      cHTN  -Normotensive  -Not on medications     Asthma  -Home flovent ordered  -Albuterol PRN, currently asymptomatic     +Oligoclonal bands  -Diagnosed after admission for blurry vision  -S/p optho (dx macular disorder, for retinal procedure)  -Neuro immunology follow up scheduled outpatient      HSV  -On valtrex  -Asymptomatic     Contraception  -Needs to be addressed prior to disch      Dispo: cEFM on mac2 while on insulin gtt until transitioned back to insulin pump    Discussed with Dr. Sammie Damon MD PGY-2       Principal Problem:    Labor and delivery indication for care or intervention    Pregnancy Problems (from 24 to present)       Problem Noted Resolved    Labor and delivery indication for care or intervention 2023 by Marni De La Cruz MD No    Priority:  Medium       Bilateral sciatica 10/10/2023 by Stuart Munoz MD No    Priority:  Medium      Overview Signed 10/10/2023  2:17 PM by Stuart Munoz MD     Intermittent pain shooting down posteriolateral thighs bilaterally         33 weeks gestation of pregnancy 10/8/2023 by Stuart Munoz MD No    Priority:  Medium      Overview Addendum 10/10/2023  2:24 PM by Stuart Munoz MD     [x] PNLs reviewed wnl, rubella nonimmune  [x] Pap ASCUS 2022  [x] NT wnl 2023  [x] cell-free DNA, CF/SMA screening low risk  [x] Anatomy US  [x] 2nd trimester labs, Hg 9.9  [x] tdap  [ ] flu/covid  [/] GBS  [ ] Delivery Planning  [ ] PPBC           Asthma affecting pregnancy in third trimester 10/8/2023 by Stuart Munoz MD No    Priority:  Medium      Overview Addendum 10/24/2023  2:08 PM by Stuart Munoz MD     Albuterol PRN  10/24 Regimen: Flovent 250mcg BID puff, Albuterol PRN         Type 1 diabetes mellitus complicating pregnancy, antepartum 10/5/2023 by Indiana Parra MD No    Priority:  Medium      Overview Addendum 2023  8:04 PM by Angeline Boudreaux MD     [x] Baseline HbA1c 12.8 2023  [x] Baseline TSH 2.38 2023  [x] Baseline HELLP Labs wnl, 0.11 2023  [x] bbASA  [x] EKG  wnl  [x] Fetal Echo wnl  [x] Ophthalmology  /podiatry    [ ]  Testing 2x weekly until del    Serial Growths  10/24 29.0 wga EFW 1294g 42%, AC 48%    Current Regimen changed 10/10/2023  Basal  7491-7793 0.90  6197-4577 1.00  9667-4151 0.90     Bolus  6207-8520 1:9     CF: 1:40  Carb ratio: 1:10  Target:110  DOA 5 hrs           Herpes simplex infection in mother during third trimester of pregnancy 10/5/2023 by Indiana Parra MD No    Priority:  Medium      Overview Signed 10/5/2023  8:55 AM by Indiana Parra MD     On acyclovir prophylaxis          Chronic hypertension affecting pregnancy 10/5/2023 by Indiana Parra MD No    Priority:  Medium      Overview Addendum 10/8/2023  3:16 PM by Stuart Munoz MD     [x] Baseline HELLP Labs,  P:C 0.11 2023  [x] bbASA  [ ]  Testing  [x] Baseline EKG inpatient      Serial Growths    Med Regimen  No meds         Group beta Strep positive 10/5/2023 by Indiana Parra MD No    Priority:  Medium      Overview Signed 10/5/2023  9:00 AM by Indiana Parra MD     GBS UTI in pregnancy          Depression during pregnancy in third trimester 10/5/2023 by Indiana Parra MD No    Priority:  Medium      Fetal cardiac anomaly affecting pregnancy, antepartum 10/5/2023 by Indiana Parra MD No    Priority:  Medium      Overview Addendum 2023  6:00 PM by Luz Maria Abebe MD     [facility] Plan of Care: fetal ventricular septum slightly thickened --> code:1: non-urgent peds cardiology consult prior to discharge or within 1-2 weeks if delivered at an outside hospital  No structural abnormalities on repeat fetal echo --> peds cards recommendation for routine delivery and  care with non-urgent peds cardiology consult prior to discharge or within 1-2 weeks if delivered at an outside hospital         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 10/5/2023 by Indiana Parra MD No    Priority:  Medium      Overview Addendum 10/24/2023  2:09 PM by Stuart Munoz MD     - Admitted for blurry vision on , d/c ed with improvement, oligoclonal bands seen on LP, neg MRI head  - New optic disc edema, diabetic edema    - Close follow up with ophto, last appt 10/13, needs IV fluro angio after delivery to prevent progression, next visit   - Neuro-Immunology NPV scheduled for          Ophthalmologic abnormality 10/5/2023 by Indiana Parra MD No    Priority:  Medium      Overview Signed 10/5/2023  9:14 AM by Indiana Parra MD     Diabetic macular edema, both eyes - for panretinal photocoagulation with Optho          Constipation during pregnancy in second trimester 10/9/2023 by Melody Osorio 10/10/2023 by Stuart Munoz MD    Abnormal pregnancy in second trimester 10/9/2023 by Melody Osorio 10/10/2023 by Stuart  MD Alexander    Suspected fetal anomaly, antepartum 10/9/2023 by Melody Osorio 10/10/2023 by Stuart Munoz MD    Hyperglycemia in pregnancy 9/14/2023 by Melody Osorio 10/10/2023 by Stuart Munoz MD    Vomiting of pregnancy 9/14/2023 by Melody Osorio 10/10/2023 by Stuart Munoz MD            Subjective   Patient denies any nausea or vomiting. About to eat dinner. Feeling some intermittent ctx, declines SVE at this time.  Good fetal movement, denies vaginal bleeding or LOF.     Objective   Allergies:   Patient has no known allergies.    Last Vitals:  Temp Pulse Resp BP MAP Pulse Ox   36.7 °C (98.1 °F) 85 16 113/74   100 %     Vitals Min/Max Last 24 Hours:  Temp  Min: 36.5 °C (97.7 °F)  Max: 36.7 °C (98.1 °F)  Pulse  Min: 81  Max: 108  Resp  Min: 15  Max: 18  BP  Min: 103/65  Max: 135/82    Intake/Output:     Intake/Output Summary (Last 24 hours) at 11/22/2023 1814  Last data filed at 11/22/2023 1740  Gross per 24 hour   Intake 3939.16 ml   Output 800 ml   Net 3139.16 ml       Physical Exam:  General: no acute distress  HEENT: normocephalic, atraumatic  Heart: warm and well perfused  Lungs: breathing comfortably on room air  Abdomen: gravid, soft, nontender to palpation  Extremities: moving all extremities  Neuro: awake and conversant  Psych: appropriate mood and affect    Baseline Fetal Heart Rate (bpm): discontinous with patient sitting up, overall baseline 135 bpm  Baseline Classification: Normal  Variability: Moderate (Between 6 and 25 BPM)  Pattern: Accelerations  FHR Category: Category I  Multiple Births: No   Fetal Decelerations: No  Contraction Frequency: uterine irritability      Lab Data:  Lab Results   Component Value Date    GLUCOSE 173 (H) 11/22/2023     11/22/2023    K 3.8 11/22/2023     11/22/2023    CO2 20 (L) 11/22/2023    ANIONGAP 15 11/22/2023    BUN 4 (L) 11/22/2023    CREATININE 0.39 (L) 11/22/2023    EGFR >90 11/22/2023    CALCIUM 8.8 11/22/2023    ALBUMIN 3.2 (L) 11/22/2023    PROT 5.6  (L) 11/22/2023    ALKPHOS 63 11/22/2023    ALT 10 11/22/2023    AST 11 11/22/2023    BILITOT 0.3 11/22/2023     .  Beta-Hydroxybutyrate   Date/Time Value Ref Range Status   11/22/2023 03:59 PM 0.15 0.02 - 0.27 mmol/L Final   11/22/2023 06:52 AM 0.45 (H) 0.02 - 0.27 mmol/L Final

## 2023-11-22 NOTE — CARE PLAN
The patient's goals for the shift include maintain BS    The clinical goals for the shift include maintain blood sugars    Over the shift, the patient made progress toward the following goals. Report given to Heidi STEVENS.    Problem: Antepartum  Goal: Maintain pregnancy as long as maternal and/or fetal condition is stable  Outcome: Progressing  Goal: Avoid/minimize constipation  Outcome: Progressing  Goal: No decrease in circulation/VTE  Outcome: Progressing  Goal: FHR remains reassuring  Outcome: Progressing  Goal: Minimize anxiety/maximize coping  Outcome: Progressing     Problem: Safety - Adult  Goal: Free from fall injury  Outcome: Progressing     Problem: Diabetes  Goal: Achieve decreasing blood glucose levels by end of shift  Outcome: Progressing  Goal: Increase stability of blood glucose readings by end of shift  Outcome: Progressing  Goal: Decrease in ketones present in urine by end of shift  Outcome: Progressing  Goal: Maintain electrolyte levels within acceptable range throughout shift  Outcome: Progressing  Goal: Maintain glucose levels >70mg/dl to <250mg/dl throughout shift  Outcome: Progressing

## 2023-11-23 LAB
ALBUMIN SERPL BCP-MCNC: 3.1 G/DL (ref 3.4–5)
ALP SERPL-CCNC: 65 U/L (ref 33–110)
ALT SERPL W P-5'-P-CCNC: 8 U/L (ref 7–45)
ANION GAP SERPL CALC-SCNC: 11 MMOL/L (ref 10–20)
AST SERPL W P-5'-P-CCNC: 8 U/L (ref 9–39)
B-OH-BUTYR SERPL-SCNC: 0.15 MMOL/L (ref 0.02–0.27)
BASE EXCESS BLDV CALC-SCNC: -3.9 MMOL/L (ref -2–3)
BILIRUB SERPL-MCNC: 0.2 MG/DL (ref 0–1.2)
BODY TEMPERATURE: 37 DEGREES CELSIUS
BUN SERPL-MCNC: 5 MG/DL (ref 6–23)
CALCIUM SERPL-MCNC: 8.5 MG/DL (ref 8.6–10.6)
CHLORIDE SERPL-SCNC: 108 MMOL/L (ref 98–107)
CO2 SERPL-SCNC: 22 MMOL/L (ref 21–32)
CREAT SERPL-MCNC: 0.34 MG/DL (ref 0.5–1.05)
GFR SERPL CREATININE-BSD FRML MDRD: >90 ML/MIN/1.73M*2
GLUCOSE BLD MANUAL STRIP-MCNC: 101 MG/DL (ref 74–99)
GLUCOSE BLD MANUAL STRIP-MCNC: 102 MG/DL (ref 74–99)
GLUCOSE BLD MANUAL STRIP-MCNC: 104 MG/DL (ref 74–99)
GLUCOSE BLD MANUAL STRIP-MCNC: 109 MG/DL (ref 74–99)
GLUCOSE BLD MANUAL STRIP-MCNC: 113 MG/DL (ref 74–99)
GLUCOSE BLD MANUAL STRIP-MCNC: 121 MG/DL (ref 74–99)
GLUCOSE BLD MANUAL STRIP-MCNC: 130 MG/DL (ref 74–99)
GLUCOSE BLD MANUAL STRIP-MCNC: 131 MG/DL (ref 74–99)
GLUCOSE BLD MANUAL STRIP-MCNC: 168 MG/DL (ref 74–99)
GLUCOSE BLD MANUAL STRIP-MCNC: 63 MG/DL (ref 74–99)
GLUCOSE BLD MANUAL STRIP-MCNC: 69 MG/DL (ref 74–99)
GLUCOSE BLD MANUAL STRIP-MCNC: 72 MG/DL (ref 74–99)
GLUCOSE BLD MANUAL STRIP-MCNC: 73 MG/DL (ref 74–99)
GLUCOSE BLD MANUAL STRIP-MCNC: 77 MG/DL (ref 74–99)
GLUCOSE BLD MANUAL STRIP-MCNC: 80 MG/DL (ref 74–99)
GLUCOSE BLD MANUAL STRIP-MCNC: 80 MG/DL (ref 74–99)
GLUCOSE BLD MANUAL STRIP-MCNC: 91 MG/DL (ref 74–99)
GLUCOSE BLD MANUAL STRIP-MCNC: 92 MG/DL (ref 74–99)
GLUCOSE BLD MANUAL STRIP-MCNC: 92 MG/DL (ref 74–99)
GLUCOSE BLD MANUAL STRIP-MCNC: 95 MG/DL (ref 74–99)
GLUCOSE BLD MANUAL STRIP-MCNC: 97 MG/DL (ref 74–99)
GLUCOSE BLD MANUAL STRIP-MCNC: 99 MG/DL (ref 74–99)
GLUCOSE BLD MANUAL STRIP-MCNC: 99 MG/DL (ref 74–99)
GLUCOSE SERPL-MCNC: 68 MG/DL (ref 74–99)
HCO3 BLDV-SCNC: 20.8 MMOL/L (ref 22–26)
INHALED O2 CONCENTRATION: 21 %
OXYHGB MFR BLDV: 84 % (ref 45–75)
PCO2 BLDV: 36 MM HG (ref 41–51)
PH BLDV: 7.37 PH (ref 7.33–7.43)
PO2 BLDV: 56 MM HG (ref 35–45)
POTASSIUM SERPL-SCNC: 3.4 MMOL/L (ref 3.5–5.3)
PROT SERPL-MCNC: 5.6 G/DL (ref 6.4–8.2)
SAO2 % BLDV: 86 % (ref 45–75)
SODIUM SERPL-SCNC: 138 MMOL/L (ref 136–145)
TEST COMMENT: ABNORMAL

## 2023-11-23 PROCEDURE — 2500000004 HC RX 250 GENERAL PHARMACY W/ HCPCS (ALT 636 FOR OP/ED)

## 2023-11-23 PROCEDURE — 82947 ASSAY GLUCOSE BLOOD QUANT: CPT

## 2023-11-23 PROCEDURE — 2500000004 HC RX 250 GENERAL PHARMACY W/ HCPCS (ALT 636 FOR OP/ED): Performed by: STUDENT IN AN ORGANIZED HEALTH CARE EDUCATION/TRAINING PROGRAM

## 2023-11-23 PROCEDURE — 59025 FETAL NON-STRESS TEST: CPT | Mod: GC

## 2023-11-23 PROCEDURE — 2500000001 HC RX 250 WO HCPCS SELF ADMINISTERED DRUGS (ALT 637 FOR MEDICARE OP): Performed by: STUDENT IN AN ORGANIZED HEALTH CARE EDUCATION/TRAINING PROGRAM

## 2023-11-23 PROCEDURE — 82805 BLOOD GASES W/O2 SATURATION: CPT

## 2023-11-23 PROCEDURE — 2500000001 HC RX 250 WO HCPCS SELF ADMINISTERED DRUGS (ALT 637 FOR MEDICARE OP)

## 2023-11-23 PROCEDURE — 1100000001 HC PRIVATE ROOM DAILY

## 2023-11-23 PROCEDURE — 83735 ASSAY OF MAGNESIUM: CPT

## 2023-11-23 PROCEDURE — 84100 ASSAY OF PHOSPHORUS: CPT

## 2023-11-23 PROCEDURE — 36415 COLL VENOUS BLD VENIPUNCTURE: CPT

## 2023-11-23 PROCEDURE — 2500000005 HC RX 250 GENERAL PHARMACY W/O HCPCS

## 2023-11-23 PROCEDURE — 82010 KETONE BODYS QUAN: CPT

## 2023-11-23 PROCEDURE — 80053 COMPREHEN METABOLIC PANEL: CPT

## 2023-11-23 RX ORDER — SODIUM CHLORIDE 9 MG/ML
25 INJECTION, SOLUTION INTRAVENOUS CONTINUOUS PRN
Status: DISCONTINUED | OUTPATIENT
Start: 2023-11-23 | End: 2023-11-24

## 2023-11-23 RX ORDER — LIDOCAINE 560 MG/1
1 PATCH PERCUTANEOUS; TOPICAL; TRANSDERMAL DAILY
Status: DISCONTINUED | OUTPATIENT
Start: 2023-11-23 | End: 2023-11-25 | Stop reason: HOSPADM

## 2023-11-23 RX ORDER — DEXTROSE MONOHYDRATE 100 MG/ML
50 INJECTION, SOLUTION INTRAVENOUS CONTINUOUS PRN
Status: DISCONTINUED | OUTPATIENT
Start: 2023-11-23 | End: 2023-11-24

## 2023-11-23 RX ORDER — DEXTROSE MONOHYDRATE 100 MG/ML
100 INJECTION, SOLUTION INTRAVENOUS AS NEEDED
Status: DISCONTINUED | OUTPATIENT
Start: 2023-11-23 | End: 2023-11-24

## 2023-11-23 RX ORDER — DEXTROSE 50 % IN WATER (D50W) INTRAVENOUS SYRINGE
25
Status: DISCONTINUED | OUTPATIENT
Start: 2023-11-23 | End: 2023-11-23

## 2023-11-23 RX ORDER — DEXTROSE 40 %
15 GEL (GRAM) ORAL
Status: DISCONTINUED | OUTPATIENT
Start: 2023-11-23 | End: 2023-11-23

## 2023-11-23 RX ORDER — POTASSIUM CHLORIDE 14.9 MG/ML
20 INJECTION INTRAVENOUS ONCE
Status: DISCONTINUED | OUTPATIENT
Start: 2023-11-23 | End: 2023-11-23

## 2023-11-23 RX ORDER — POTASSIUM CHLORIDE 20 MEQ/1
20 TABLET, EXTENDED RELEASE ORAL ONCE
Status: COMPLETED | OUTPATIENT
Start: 2023-11-23 | End: 2023-11-23

## 2023-11-23 RX ORDER — CYCLOBENZAPRINE HCL 10 MG
10 TABLET ORAL ONCE
Status: COMPLETED | OUTPATIENT
Start: 2023-11-23 | End: 2023-11-23

## 2023-11-23 RX ORDER — ACETAMINOPHEN 325 MG/1
975 TABLET ORAL EVERY 6 HOURS PRN
Status: DISCONTINUED | OUTPATIENT
Start: 2023-11-23 | End: 2023-11-25 | Stop reason: HOSPADM

## 2023-11-23 RX ORDER — DEXTROSE 40 %
30 GEL (GRAM) ORAL
Status: DISCONTINUED | OUTPATIENT
Start: 2023-11-23 | End: 2023-11-23

## 2023-11-23 RX ADMIN — INSULIN HUMAN 1 UNITS/HR: 1 INJECTION, SOLUTION INTRAVENOUS at 18:37

## 2023-11-23 RX ADMIN — INSULIN HUMAN 2.3 UNITS/HR: 1 INJECTION, SOLUTION INTRAVENOUS at 16:39

## 2023-11-23 RX ADMIN — ACETAMINOPHEN 975 MG: 325 TABLET ORAL at 05:06

## 2023-11-23 RX ADMIN — INSULIN HUMAN 2 UNITS/HR: 1 INJECTION, SOLUTION INTRAVENOUS at 17:32

## 2023-11-23 RX ADMIN — SODIUM CHLORIDE, POTASSIUM CHLORIDE, SODIUM LACTATE AND CALCIUM CHLORIDE 500 ML: 600; 310; 30; 20 INJECTION, SOLUTION INTRAVENOUS at 00:20

## 2023-11-23 RX ADMIN — INSULIN HUMAN 0.7 UNITS/HR: 1 INJECTION, SOLUTION INTRAVENOUS at 04:30

## 2023-11-23 RX ADMIN — LIDOCAINE 1 PATCH: 4 PATCH TOPICAL at 05:27

## 2023-11-23 RX ADMIN — INSULIN HUMAN 0.8 UNITS/HR: 1 INJECTION, SOLUTION INTRAVENOUS at 13:35

## 2023-11-23 RX ADMIN — INSULIN HUMAN 1.3 UNITS/HR: 1 INJECTION, SOLUTION INTRAVENOUS at 14:56

## 2023-11-23 RX ADMIN — CYCLOBENZAPRINE 10 MG: 10 TABLET, FILM COATED ORAL at 05:28

## 2023-11-23 RX ADMIN — DEXTROSE MONOHYDRATE 50 ML/HR: 100 INJECTION, SOLUTION INTRAVENOUS at 14:30

## 2023-11-23 RX ADMIN — SODIUM CHLORIDE, POTASSIUM CHLORIDE, SODIUM LACTATE AND CALCIUM CHLORIDE 125 ML/HR: 600; 310; 30; 20 INJECTION, SOLUTION INTRAVENOUS at 05:25

## 2023-11-23 RX ADMIN — POTASSIUM CHLORIDE 20 MEQ: 1500 TABLET, EXTENDED RELEASE ORAL at 05:07

## 2023-11-23 RX ADMIN — VALACYCLOVIR 500 MG: 500 TABLET, FILM COATED ORAL at 08:46

## 2023-11-23 RX ADMIN — INSULIN HUMAN 2.1 UNITS/HR: 1 INJECTION, SOLUTION INTRAVENOUS at 01:34

## 2023-11-23 RX ADMIN — INSULIN HUMAN 1.8 UNITS/HR: 1 INJECTION, SOLUTION INTRAVENOUS at 15:38

## 2023-11-23 RX ADMIN — PRENATAL VIT W/ FE FUMARATE-FA TAB 27-0.8 MG 1 TABLET: 27-0.8 TAB at 08:45

## 2023-11-23 RX ADMIN — INSULIN HUMAN 2.4 UNITS/HR: 1 INJECTION, SOLUTION INTRAVENOUS at 00:42

## 2023-11-23 RX ADMIN — VALACYCLOVIR 500 MG: 500 TABLET, FILM COATED ORAL at 20:55

## 2023-11-23 RX ADMIN — INSULIN HUMAN 1.5 UNITS/HR: 1 INJECTION, SOLUTION INTRAVENOUS at 18:31

## 2023-11-23 RX ADMIN — INSULIN HUMAN 1.1 UNITS/HR: 1 INJECTION, SOLUTION INTRAVENOUS at 12:35

## 2023-11-23 RX ADMIN — DEXTROSE MONOHYDRATE 100 ML: 100 INJECTION, SOLUTION INTRAVENOUS at 02:30

## 2023-11-23 ASSESSMENT — PAIN SCALES - GENERAL
PAINLEVEL_OUTOF10: 0 - NO PAIN
PAINLEVEL_OUTOF10: 10 - WORST POSSIBLE PAIN
PAINLEVEL_OUTOF10: 0 - NO PAIN

## 2023-11-23 NOTE — CARE PLAN
The patient's goals for the shift include maintain BS    The clinical goals for the shift include maintain blood sugars      Problem: Antepartum  Goal: Maintain pregnancy as long as maternal and/or fetal condition is stable  Outcome: Progressing  Goal: Avoid/minimize constipation  Outcome: Progressing  Goal: No decrease in circulation/VTE  Outcome: Progressing  Goal: FHR remains reassuring  Outcome: Progressing  Goal: Minimize anxiety/maximize coping  Outcome: Progressing     Problem: Safety - Adult  Goal: Free from fall injury  Outcome: Progressing     Problem: Diabetes  Goal: Achieve decreasing blood glucose levels by end of shift  Outcome: Progressing  Goal: Increase stability of blood glucose readings by end of shift  Outcome: Progressing  Goal: Decrease in ketones present in urine by end of shift  Outcome: Progressing  Goal: Maintain electrolyte levels within acceptable range throughout shift  Outcome: Progressing  Goal: Maintain glucose levels >70mg/dl to <250mg/dl throughout shift  Outcome: Progressing

## 2023-11-23 NOTE — SIGNIFICANT EVENT
Multiple conversations throughout the day with patient about ETA of sister with CGM censors. Patient has made multiple phone calls to family without success. Last phone call with mom stating that she will bring censors by the end of the day. Currently on insulin gtt. BGs ranging from  throughout the day. Tolerating PO w/o subsequent N/V. No complaints at this time.     D/w Dr. Fany Jones MD

## 2023-11-23 NOTE — PROGRESS NOTES
Reviewed blood glucoses and insulin drip settings. Change insulin drip to 1U per hour given current hypoglycemia.    Sherlyn Lockett MD

## 2023-11-23 NOTE — PROGRESS NOTES
Antepartum Progress Note    Assessment/Plan   Maria Isabel Cutler is a 23 y.o.  at 33w5d. SHIN: 2024, by Last Menstrual Period.     23 y.o.  at 33w5d with T1DM admitted for DKA     T1DM, DKA resolved  - DKA diagnosed on admission -> resolved on , however patient's insulin pump ran our of insulin on evening of  -> labs subsequently notable for BHB 3.97, AG 17, pH 7.34. DKA now resolved. Lats BHB 0.15, anion gap closed and pH 7.37  - currently on insulin gtt with fluids per protocol    - most recent BG 77, insulin gtt at 0.4 units/hr  - CGM replaced, however pt missing sensor. Plan to transition off insulin gtt and back to pump once patient has sensor.      IUP  - CEFM on L&D, FHT cat 1  - Last growth & BPP performed 10/24, EFW 1294g (42%), AC (48%); SSUS done  read pending  - GBS+ for PCN in labor  - Known fetal cardiac anomaly--> VS slightly thickened needs, non urgent peds cards prior to discharge ( discharge)  - Needs  testing scheduled as outpatient      cHTN  - Normotensive  - Not on medications     Asthma  - Home flovent ordered  - Albuterol PRN, currently asymptomatic     +Oligoclonal bands  - Diagnosed after admission for blurry vision  - S/p optho (dx macular disorder, for retinal procedure)  - Neuro immunology follow up scheduled outpatient      HSV  - On valtrex  -Asymptomatic     Contraception  -Needs to be addressed prior to disch      Dispo: cEFM on mac2 while on insulin gtt until transitioned back to insulin pump    Discussed with Dr. Fany Jones MD PGY-2       Principal Problem:    Labor and delivery indication for care or intervention    Subjective   Patient denies any nausea or vomiting. Planning to eat breakfast. Feeling tired. Good fetal movement, denies vaginal bleeding, ctxs and LOF.     Objective   Allergies:   Patient has no known allergies.    Last Vitals:  Temp Pulse Resp BP MAP Pulse Ox   36.9 °C (98.4 °F) 93 16 118/76    100 %     Vitals Min/Max Last 24 Hours:  Temp  Min: 36.4 °C (97.5 °F)  Max: 37.1 °C (98.8 °F)  Pulse  Min: 78  Max: 112  Resp  Min: 16  Max: 18  BP  Min: 107/74  Max: 119/77    Intake/Output:     Intake/Output Summary (Last 24 hours) at 11/23/2023 1755  Last data filed at 11/23/2023 1400  Gross per 24 hour   Intake --   Output 1900 ml   Net -1900 ml       Physical Exam:  General: no acute distress  HEENT: normocephalic, atraumatic  Heart: warm and well perfused  Lungs: breathing comfortably on room air  Abdomen: gravid, soft, nontender to palpation  Extremities: moving all extremities  Neuro: awake and conversant  Psych: appropriate mood and affect    Baseline Fetal Heart Rate (bpm): discontinous with patient sitting up, overall baseline 135 bpm  Baseline Classification: Normal  Variability: Moderate (Between 6 and 25 BPM)  Pattern: Accelerations  FHR Category: Category I  Multiple Births: No   Fetal Decelerations: No  Contraction Frequency: No Contractions      Lab Data:  Lab Results   Component Value Date    GLUCOSE 68 (L) 11/23/2023     11/23/2023    K 3.4 (L) 11/23/2023     (H) 11/23/2023    CO2 22 11/23/2023    ANIONGAP 11 11/23/2023    BUN 5 (L) 11/23/2023    CREATININE 0.34 (L) 11/23/2023    EGFR >90 11/23/2023    CALCIUM 8.5 (L) 11/23/2023    ALBUMIN 3.1 (L) 11/23/2023    PROT 5.6 (L) 11/23/2023    ALKPHOS 65 11/23/2023    ALT 8 11/23/2023    AST 8 (L) 11/23/2023    BILITOT 0.2 11/23/2023     .  Beta-Hydroxybutyrate   Date/Time Value Ref Range Status   11/23/2023 03:39 AM 0.15 0.02 - 0.27 mmol/L Final   11/22/2023 10:45 PM 0.16 0.02 - 0.27 mmol/L Final     Comment:     MILD HEMOLYSIS DETECTED. The result may be falsely elevated due to hemolysis or other interferents. Clinical correlation is recommended. Repeat testing may be considered.

## 2023-11-23 NOTE — CARE PLAN
Hourly blood glucose performed on patient. Blood glucose remains stable. To continue insulin infusion driven by parameter orders.

## 2023-11-24 ENCOUNTER — PHARMACY VISIT (OUTPATIENT)
Dept: PHARMACY | Facility: CLINIC | Age: 23
End: 2023-11-24
Payer: MEDICAID

## 2023-11-24 ENCOUNTER — APPOINTMENT (OUTPATIENT)
Dept: OPHTHALMOLOGY | Facility: CLINIC | Age: 23
End: 2023-11-24
Payer: COMMERCIAL

## 2023-11-24 LAB
ABO GROUP (TYPE) IN BLOOD: NORMAL
ANTIBODY SCREEN: NORMAL
GLUCOSE BLD MANUAL STRIP-MCNC: 105 MG/DL (ref 74–99)
GLUCOSE BLD MANUAL STRIP-MCNC: 105 MG/DL (ref 74–99)
GLUCOSE BLD MANUAL STRIP-MCNC: 119 MG/DL (ref 74–99)
GLUCOSE BLD MANUAL STRIP-MCNC: 122 MG/DL (ref 74–99)
GLUCOSE BLD MANUAL STRIP-MCNC: 124 MG/DL (ref 74–99)
GLUCOSE BLD MANUAL STRIP-MCNC: 161 MG/DL (ref 74–99)
GLUCOSE BLD MANUAL STRIP-MCNC: 85 MG/DL (ref 74–99)
GLUCOSE BLD MANUAL STRIP-MCNC: 94 MG/DL (ref 74–99)
POCT GLUCOSE: 119 MG/DL (ref 74–99)
RH FACTOR (ANTIGEN D): NORMAL

## 2023-11-24 PROCEDURE — 82947 ASSAY GLUCOSE BLOOD QUANT: CPT

## 2023-11-24 PROCEDURE — 2500000001 HC RX 250 WO HCPCS SELF ADMINISTERED DRUGS (ALT 637 FOR MEDICARE OP)

## 2023-11-24 PROCEDURE — 1210000001 HC SEMI-PRIVATE ROOM DAILY

## 2023-11-24 PROCEDURE — 86901 BLOOD TYPING SEROLOGIC RH(D): CPT

## 2023-11-24 PROCEDURE — 2500000004 HC RX 250 GENERAL PHARMACY W/ HCPCS (ALT 636 FOR OP/ED)

## 2023-11-24 PROCEDURE — 86900 BLOOD TYPING SEROLOGIC ABO: CPT

## 2023-11-24 PROCEDURE — 36415 COLL VENOUS BLD VENIPUNCTURE: CPT

## 2023-11-24 PROCEDURE — 2500000005 HC RX 250 GENERAL PHARMACY W/O HCPCS

## 2023-11-24 PROCEDURE — 59025 FETAL NON-STRESS TEST: CPT | Mod: GC

## 2023-11-24 PROCEDURE — 2500000001 HC RX 250 WO HCPCS SELF ADMINISTERED DRUGS (ALT 637 FOR MEDICARE OP): Performed by: STUDENT IN AN ORGANIZED HEALTH CARE EDUCATION/TRAINING PROGRAM

## 2023-11-24 RX ORDER — PANTOPRAZOLE SODIUM 40 MG/1
40 TABLET, DELAYED RELEASE ORAL ONCE
Status: COMPLETED | OUTPATIENT
Start: 2023-11-24 | End: 2023-11-24

## 2023-11-24 RX ORDER — CYCLOBENZAPRINE HCL 10 MG
10 TABLET ORAL ONCE
Status: COMPLETED | OUTPATIENT
Start: 2023-11-24 | End: 2023-11-24

## 2023-11-24 RX ORDER — FAMOTIDINE 20 MG/1
40 TABLET, FILM COATED ORAL DAILY
Status: DISCONTINUED | OUTPATIENT
Start: 2023-11-24 | End: 2023-11-25 | Stop reason: HOSPADM

## 2023-11-24 RX ORDER — FAMOTIDINE 20 MG/1
20 TABLET, FILM COATED ORAL 2 TIMES DAILY
Qty: 30 TABLET | Refills: 3 | Status: SHIPPED | OUTPATIENT
Start: 2023-11-24 | End: 2023-12-15 | Stop reason: WASHOUT

## 2023-11-24 RX ADMIN — LIDOCAINE 1 PATCH: 4 PATCH TOPICAL at 09:06

## 2023-11-24 RX ADMIN — CYCLOBENZAPRINE 10 MG: 10 TABLET, FILM COATED ORAL at 23:31

## 2023-11-24 RX ADMIN — VALACYCLOVIR 500 MG: 500 TABLET, FILM COATED ORAL at 20:28

## 2023-11-24 RX ADMIN — SODIUM CHLORIDE, SODIUM LACTATE, POTASSIUM CHLORIDE, AND CALCIUM CHLORIDE 500 ML: 600; 310; 30; 20 INJECTION, SOLUTION INTRAVENOUS at 21:40

## 2023-11-24 RX ADMIN — ACETAMINOPHEN 975 MG: 325 TABLET ORAL at 20:29

## 2023-11-24 RX ADMIN — VALACYCLOVIR 500 MG: 500 TABLET, FILM COATED ORAL at 09:06

## 2023-11-24 RX ADMIN — PRENATAL VIT W/ FE FUMARATE-FA TAB 27-0.8 MG 1 TABLET: 27-0.8 TAB at 09:06

## 2023-11-24 RX ADMIN — ACETAMINOPHEN 975 MG: 325 TABLET ORAL at 12:43

## 2023-11-24 RX ADMIN — FAMOTIDINE 40 MG: 20 TABLET, FILM COATED ORAL at 14:51

## 2023-11-24 RX ADMIN — PANTOPRAZOLE SODIUM 40 MG: 40 TABLET, DELAYED RELEASE ORAL at 17:53

## 2023-11-24 RX ADMIN — CYCLOBENZAPRINE 10 MG: 10 TABLET, FILM COATED ORAL at 13:34

## 2023-11-24 ASSESSMENT — PAIN SCALES - GENERAL
PAINLEVEL_OUTOF10: 4
PAINLEVEL_OUTOF10: 3
PAINLEVEL_OUTOF10: 3
PAINLEVEL_OUTOF10: 0 - NO PAIN
PAINLEVEL_OUTOF10: 6
PAINLEVEL_OUTOF10: 0 - NO PAIN
PAINLEVEL_OUTOF10: 6
PAINLEVEL_OUTOF10: 4
PAINLEVEL_OUTOF10: 6
PAINLEVEL_OUTOF10: 0 - NO PAIN
PAINLEVEL_OUTOF10: 0 - NO PAIN

## 2023-11-24 ASSESSMENT — PAIN DESCRIPTION - DESCRIPTORS
DESCRIPTORS: ACHING;DISCOMFORT
DESCRIPTORS: CRAMPING
DESCRIPTORS: CRAMPING

## 2023-11-24 ASSESSMENT — PAIN - FUNCTIONAL ASSESSMENT
PAIN_FUNCTIONAL_ASSESSMENT: 0-10
PAIN_FUNCTIONAL_ASSESSMENT: 0-10

## 2023-11-24 NOTE — CARE PLAN
The patient's goals for the shift include Maintain BG levels    The clinical goals for the shift include Continue managing bg levels and insulin drip      Problem: Antepartum  Goal: Maintain pregnancy as long as maternal and/or fetal condition is stable  Outcome: Progressing  Goal: Avoid/minimize constipation  Outcome: Progressing  Goal: No decrease in circulation/VTE  Outcome: Progressing  Goal: FHR remains reassuring  Outcome: Progressing  Goal: Minimize anxiety/maximize coping  Outcome: Progressing     Problem:  Labor/Prolonged Premature Rupture of Membranes  Goal: Fewer then 4-6 ct per hour  Outcome: Progressing     Problem: Safety - Adult  Goal: Free from fall injury  Outcome: Progressing     Problem: Discharge Planning  Goal: Discharge to home or other facility with appropriate resources  Outcome: Progressing     Problem: Chronic Conditions and Co-morbidities  Goal: Patient's chronic conditions and co-morbidity symptoms are monitored and maintained or improved  Outcome: Progressing     Problem: Diabetes  Goal: Achieve decreasing blood glucose levels by end of shift  Outcome: Progressing  Goal: Increase stability of blood glucose readings by end of shift  Outcome: Progressing  Goal: Decrease in ketones present in urine by end of shift  Outcome: Progressing  Goal: Maintain electrolyte levels within acceptable range throughout shift  Outcome: Progressing  Goal: Maintain glucose levels >70mg/dl to <250mg/dl throughout shift  Outcome: Progressing

## 2023-11-24 NOTE — PROGRESS NOTES
Antepartum Progress Note    Assessment/Plan   Maria Isabel Cutler is a 23 y.o.  at 33w6d. SHIN: 2024, by Last Menstrual Period.     23 y.o.  at 33w6d with T1DM admitted for DKA, now resolved      T1DM, DKA resolved  - DKA diagnosed on admission -> resolved on , however patient's insulin pump ran our of insulin on evening of  -> labs subsequently notable for BHB 3.97, AG 17, pH 7.34. DKA now resolved. Last BHB 0.15, anion gap closed and pH 7.37  - Fasting  this morning, patient denying N/V and tolerating PO intake   - CGM replaced and pump restarted with previous settings as follows:   Basal  8003-7626 0.90  9268-0825 1.00  1854-4225 0.90  Bolus  7501-8626 1:9     CF: 1:40  Carb ratio: 1:9  Target:110  DOA 5 hrs      IUP  - CEFM on L&D, FHT cat 1 on L&D prior to transfer to [facility]   - Last growth & BPP performed 10/24, EFW 1294g (42%), AC (48%); SSUS done  read pending  - GBS+ for PCN in labor  - Known fetal cardiac anomaly--> VS slightly thickened needs, non urgent peds cards prior to discharge ( discharge)     cHTN  - Normotensive  - Not on medications     Asthma  - Home flovent ordered  - Albuterol PRN, currently asymptomatic     +Oligoclonal bands  - Diagnosed after admission for blurry vision  - S/p optho (dx macular disorder, for retinal procedure)  - Neuro immunology follow up scheduled outpatient      HSV  - On valtrex  - Asymptomatic     Contraception  - Needs to be addressed prior to disch      Dispo: continue inpatient management for surveillance of BGs while back on pump     Discussed with Dr. Fany Jones MD PGY-2  [facility]   Pager [phone]     Principal Problem:    Labor and delivery indication for care or intervention    Subjective   Patient denies any nausea or vomiting. Planning to eat breakfast. Feeling tired. Good fetal movement, denies vaginal bleeding, ctxs and LOF.     Objective   Allergies:   Patient has no known allergies.    Last  Vitals:  Temp Pulse Resp BP MAP Pulse Ox   37 °C (98.6 °F) 88 18 (!) 142/83   99 %     Vitals Min/Max Last 24 Hours:  Temp  Min: 36.6 °C (97.9 °F)  Max: 37.1 °C (98.8 °F)  Pulse  Min: 74  Max: 112  Resp  Min: 16  Max: 18  BP  Min: 108/71  Max: 146/86    Intake/Output:     Intake/Output Summary (Last 24 hours) at 11/24/2023 1243  Last data filed at 11/24/2023 0134  Gross per 24 hour   Intake 45.63 ml   Output 300 ml   Net -254.37 ml       Physical Exam:  General: no acute distress  HEENT: normocephalic, atraumatic  Heart: warm and well perfused  Lungs: breathing comfortably on room air  Abdomen: gravid, soft, nontender to palpation  Extremities: moving all extremities  Neuro: awake and conversant  Psych: appropriate mood and affect    Baseline Fetal Heart Rate (bpm): overall baseline 135 bpm  Baseline Classification: Normal  Variability: Moderate (Between 6 and 25 BPM)  Pattern: Accelerations  FHR Category: Category I  Multiple Births: No   Fetal Decelerations: No  Contraction Frequency: denies contractions      Lab Data:  Lab Results   Component Value Date    GLUCOSE 68 (L) 11/23/2023     11/23/2023    K 3.4 (L) 11/23/2023     (H) 11/23/2023    CO2 22 11/23/2023    ANIONGAP 11 11/23/2023    BUN 5 (L) 11/23/2023    CREATININE 0.34 (L) 11/23/2023    EGFR >90 11/23/2023    CALCIUM 8.5 (L) 11/23/2023    ALBUMIN 3.1 (L) 11/23/2023    PROT 5.6 (L) 11/23/2023    ALKPHOS 65 11/23/2023    ALT 8 11/23/2023    AST 8 (L) 11/23/2023    BILITOT 0.2 11/23/2023

## 2023-11-24 NOTE — SIGNIFICANT EVENT
Sensor and pump now synced.   Patient pump attached and turned on.   Will continue insulin gtt and pump together for 1 hr and titrate appropriately, then dc gtt if BG remain well controlled    Indiana Damon MD

## 2023-11-24 NOTE — CARE PLAN
The patient's goals for the shift include maintain BS    The clinical goals for the shift include Maintain adequate blood sugar while on insulin drip    Over the shift, the patient did not make progress toward the following goals. Barriers to progression include ***. Recommendations to address these barriers include ***.

## 2023-11-24 NOTE — PROGRESS NOTES
Social Work Brief Note     Patient: Maria Isabel Cutler     Reason for Visit: Support     Assessment: SW met with expectant mother at bedside to reintroduce self, and SW role.  This SW is familiar with this patient from previous admission.  Ms. Cutler stated she's doing well, and has no unmet needs at this time.  She stated she hasn't purchased many baby items up until this point.  But, denies needing any assist from this LSW.  SW will continue to follow, and assist as needed.    Plan:  Ms. Cutler is clear from SW perspective       Signature:  [facility]f [facility]    no

## 2023-11-25 VITALS
DIASTOLIC BLOOD PRESSURE: 79 MMHG | BODY MASS INDEX: 26.36 KG/M2 | SYSTOLIC BLOOD PRESSURE: 120 MMHG | TEMPERATURE: 98.6 F | OXYGEN SATURATION: 99 % | WEIGHT: 134.26 LBS | HEART RATE: 106 BPM | HEIGHT: 60 IN | RESPIRATION RATE: 18 BRPM

## 2023-11-25 PROCEDURE — 2500000001 HC RX 250 WO HCPCS SELF ADMINISTERED DRUGS (ALT 637 FOR MEDICARE OP)

## 2023-11-25 PROCEDURE — 2500000004 HC RX 250 GENERAL PHARMACY W/ HCPCS (ALT 636 FOR OP/ED)

## 2023-11-25 PROCEDURE — 2500000001 HC RX 250 WO HCPCS SELF ADMINISTERED DRUGS (ALT 637 FOR MEDICARE OP): Performed by: STUDENT IN AN ORGANIZED HEALTH CARE EDUCATION/TRAINING PROGRAM

## 2023-11-25 RX ORDER — ACETAMINOPHEN 325 MG/1
650 TABLET ORAL EVERY 6 HOURS PRN
Qty: 90 TABLET | Refills: 1 | Status: SHIPPED | OUTPATIENT
Start: 2023-11-25 | End: 2023-12-06 | Stop reason: HOSPADM

## 2023-11-25 RX ADMIN — FAMOTIDINE 40 MG: 20 TABLET, FILM COATED ORAL at 08:52

## 2023-11-25 RX ADMIN — VALACYCLOVIR 500 MG: 500 TABLET, FILM COATED ORAL at 08:52

## 2023-11-25 RX ADMIN — PRENATAL VIT W/ FE FUMARATE-FA TAB 27-0.8 MG 1 TABLET: 27-0.8 TAB at 08:52

## 2023-11-25 ASSESSMENT — PAIN - FUNCTIONAL ASSESSMENT: PAIN_FUNCTIONAL_ASSESSMENT: 0-10

## 2023-11-25 ASSESSMENT — PAIN SCALES - GENERAL
PAINLEVEL_OUTOF10: 0 - NO PAIN
PAINLEVEL_OUTOF10: 0 - NO PAIN

## 2023-11-25 NOTE — SIGNIFICANT EVENT
23 y.o.  at 34w0d with T1DM admitted for DKA, now resolved. Now pending discharge.     NST for non-reactive NST. Bedside BPP completed and found to be . MERVAT 11cm.     D/w Dr. Fany Jones MD

## 2023-11-25 NOTE — DISCHARGE SUMMARY
Discharge Summary    Admission Date: 11/20/2023  Discharge Date: 11/25    Discharge Diagnosis  Labor and delivery indication for care or intervention    Hospital Course  Maria Isabel Cutler is a 24 yo G1 at 33.6 wga who presented for abdominal pain on 11/21. She has a history of T1DM. She was admitted for management of DKA after admission BG in the 200s, elevated BHB to 3.47, an anion gap of 16, and pH of 7.33. On presentation, she reported painful contractions every few minutes for over an hour, and intense abdominal pain. Her cervix was closed on admission. She endorsed nausea and vomiting for a few days prior to presentation. Tandem pump was in place, and patient self removed in triage after hearing her sugars were high. She was started on an insulin gtt, then transferred to the antepartum service on her insulin pump with pre-and post-prandial BG checks. Overnight, her pump ran out of insulin and she went into DKA on Hospital day with a BG of 257. BHB 3.97, pH 7.34, AG 17. Her pump was started, bolused with 3.25 unit, and she was transferred to L&D for insulin gtt.     Her DKA subsequently resolved after fluid resuscitation and insulin gtt. Her time on insulin gtt was prolonged despite resolution of her DKA due to difficulties obtaining pump supplies, however she was successfully transitioned back to her pump on 11/23.     Pump settings at discharge:  Basal  6796-3981 0.90  5640-7167 1.00  3060-6376 0.90     Bolus  4929-4651 1:9     CF: 1:40  Carb ratio: 1:9  Target:110  DOA 5 hrs     Her cervix was checked on day of discharge for some additional cramping, and it remained closed. She was discharged on 11/25 with [facility] follow up to be scheduled.         Pertinent Physical Exam At Time of Discharge  General: Well appearing, alert  HEENT: normocephalic, EOMI, clear sclera  Cardio: Warm and well perfused  Resp: breathing comfortably on room air  Abd: soft, nontender, nondistended  Cervix: closed/30/high  Neuro: grossly  intact, no focal deficits  Extremities: full ROM, no calf tenderness  Psych: A&O x3, appropriate mood and affect      Discharge Meds     Your medication list        START taking these medications        Instructions Last Dose Given Next Dose Due   famotidine 20 mg tablet  Commonly known as: Pepcid      Take 1 tablet (20 mg) by mouth 2 times a day.              CHANGE how you take these medications        Instructions Last Dose Given Next Dose Due   acyclovir 400 mg tablet  Commonly known as: Zovirax  What changed: Another medication with the same name was removed. Continue taking this medication, and follow the directions you see here.      Take 1 tablet (400 mg) by mouth 2 times a day.       Dexcom G6 Sensor device  Generic drug: blood-glucose sensor  What changed: Another medication with the same name was added. Make sure you understand how and when to take each.      APPLY NEW DEVICE EVERY 10 DAYS       Dexcom G6 Sensor device  Generic drug: blood-glucose sensor  What changed: You were already taking a medication with the same name, and this prescription was added. Make sure you understand how and when to take each.      1 each continuously.              CONTINUE taking these medications        Instructions Last Dose Given Next Dose Due   Ventolin HFA 90 mcg/actuation inhaler  Generic drug: albuterol           albuterol 2.5 mg /3 mL (0.083 %) nebulizer solution           Alcohol Prep Pads pads, medicated  Generic drug: alcohol swabs           aspirin 81 mg chewable tablet           Classic Prenatal 28 mg iron-800 mcg folic acid tablet tablet  Generic drug: prenatal vitamin      TAKE 1 TABLET DAILY.       ferrous sulfate (325 mg ferrous sulfate) tablet  Commonly known as: iron      Take 1 tablet (65 mg of iron) by mouth once daily with a meal.       fluticasone 250 mcg/actuation diskus inhaler  Commonly known as: Flovent Diskus      Inhale 1 puff 2 times a day. Rinse mouth with water after use to reduce aftertaste  "and incidence of candidiasis. Do not swallow.       glucagon 1 mg injection  Commonly known as: Glucagen           glucose 4 gram chewable tablet           HumaLOG U-100 Insulin 100 unit/mL injection  Generic drug: insulin lispro           Ketostix strip  Generic drug: acetone (urine) test      DIP URINE TO CHECK FOR KETONES IF NAUSEA/VOMITING OR IF CONCERN FOR DKA OR DEHYDRATION       OneTouch Verio test strips strip  Generic drug: blood sugar diagnostic           Peak Air Peak Flow Meter device  Generic drug: peak flow meter           TechLITE Pen Needle 32 gauge x 5/32\" needle  Generic drug: pen needle, diabetic      USE AS DIRECTED WITH INSULIN USE AS DIRECTED TO INJECT INSULIN 4-6 TIMES DAILY                 Where to Get Your Medications        These medications were sent to [facility]  [address] [place] [address]      Hours: 8:30 AM to 5 PM Mon-Fri Phone: [phone]   Dexcom G6 Sensor device  famotidine 20 mg tablet        Test Results Pending At Discharge  Pending Labs       Order Current Status    POCT Ketone, urine manually resulted In process    POCT urinalysis dipstick manually resulted In process            Outpatient Follow-Up  Future Appointments   Date Time Provider Department Center   11/29/2023  2:00 PM Chito Zafar MD PhD [id number] [facility]   12/18/2023  9:00 AM Allegra Doyle MD PhD [id number] [facility]   1/18/2024  9:30 AM Chito Zafar MD PhD [id number] [facility]   1/26/2024 10:00 AM Hussain Ocasio MD PhD [id number] [facility]           Samanta Jones MD   "

## 2023-11-27 ENCOUNTER — TELEPHONE (OUTPATIENT)
Dept: OBSTETRICS AND GYNECOLOGY | Facility: HOSPITAL | Age: 23
End: 2023-11-27
Payer: COMMERCIAL

## 2023-11-27 ENCOUNTER — HOSPITAL ENCOUNTER (INPATIENT)
Facility: HOSPITAL | Age: 23
LOS: 9 days | Discharge: HOME | End: 2023-12-06
Attending: OBSTETRICS & GYNECOLOGY | Admitting: OBSTETRICS & GYNECOLOGY
Payer: COMMERCIAL

## 2023-11-27 DIAGNOSIS — G89.18 POST-OPERATIVE PAIN: ICD-10-CM

## 2023-11-27 DIAGNOSIS — Z98.891 STATUS POST CESAREAN DELIVERY: Primary | ICD-10-CM

## 2023-11-27 DIAGNOSIS — E10.10 DM (DIABETES MELLITUS) TYPE 1, UNCONTROLLED, WITH KETOACIDOSIS (MULTI): ICD-10-CM

## 2023-11-27 DIAGNOSIS — O10.919 CHRONIC HYPERTENSION AFFECTING PREGNANCY (HHS-HCC): ICD-10-CM

## 2023-11-27 DIAGNOSIS — O99.013 ANEMIA AFFECTING PREGNANCY IN THIRD TRIMESTER (HHS-HCC): ICD-10-CM

## 2023-11-27 DIAGNOSIS — O24.019: ICD-10-CM

## 2023-11-27 LAB
ALBUMIN SERPL BCP-MCNC: 3 G/DL (ref 3.4–5)
ALBUMIN SERPL BCP-MCNC: 3.8 G/DL (ref 3.4–5)
ALP SERPL-CCNC: 75 U/L (ref 33–110)
ALP SERPL-CCNC: 92 U/L (ref 33–110)
ALT SERPL W P-5'-P-CCNC: 15 U/L (ref 7–45)
ALT SERPL W P-5'-P-CCNC: 15 U/L (ref 7–45)
ALT SERPL W P-5'-P-CCNC: 16 U/L (ref 7–45)
ANION GAP SERPL CALC-SCNC: 16 MMOL/L (ref 10–20)
ANION GAP SERPL CALC-SCNC: 20 MMOL/L (ref 10–20)
ANION GAP SERPL CALC-SCNC: 22 MMOL/L (ref 10–20)
APPARATUS: ABNORMAL
AST SERPL W P-5'-P-CCNC: 19 U/L (ref 9–39)
AST SERPL W P-5'-P-CCNC: 20 U/L (ref 9–39)
AST SERPL W P-5'-P-CCNC: 22 U/L (ref 9–39)
ATRIAL RATE: 99 BPM
B-OH-BUTYR SERPL-SCNC: 2.86 MMOL/L (ref 0.02–0.27)
B-OH-BUTYR SERPL-SCNC: 4.14 MMOL/L (ref 0.02–0.27)
B-OH-BUTYR SERPL-SCNC: 4.34 MMOL/L (ref 0.02–0.27)
BASE EXCESS BLDV CALC-SCNC: -10.2 MMOL/L (ref -2–3)
BASE EXCESS BLDV CALC-SCNC: -10.6 MMOL/L (ref -2–3)
BASE EXCESS BLDV CALC-SCNC: -6.7 MMOL/L (ref -2–3)
BASE EXCESS BLDV CALC-SCNC: -8.4 MMOL/L (ref -2–3)
BASOPHILS # BLD AUTO: 0.06 X10*3/UL (ref 0–0.1)
BASOPHILS NFR BLD AUTO: 0.6 %
BILIRUB SERPL-MCNC: 0.5 MG/DL (ref 0–1.2)
BILIRUB SERPL-MCNC: 0.6 MG/DL (ref 0–1.2)
BILIRUBIN, POC: NEGATIVE
BLOOD URINE, POC: NEGATIVE
BODY TEMPERATURE: 37 DEGREES CELSIUS
BUN SERPL-MCNC: 5 MG/DL (ref 6–23)
CALCIUM SERPL-MCNC: 8.8 MG/DL (ref 8.6–10.6)
CALCIUM SERPL-MCNC: 9 MG/DL (ref 8.6–10.6)
CALCIUM SERPL-MCNC: 9.6 MG/DL (ref 8.6–10.6)
CHLORIDE SERPL-SCNC: 100 MMOL/L (ref 98–107)
CHLORIDE SERPL-SCNC: 102 MMOL/L (ref 98–107)
CHLORIDE SERPL-SCNC: 103 MMOL/L (ref 98–107)
CLARITY, POC: CLEAR
CO2 SERPL-SCNC: 15 MMOL/L (ref 21–32)
CO2 SERPL-SCNC: 16 MMOL/L (ref 21–32)
CO2 SERPL-SCNC: 18 MMOL/L (ref 21–32)
COLOR, POC: YELLOW
CREAT SERPL-MCNC: 0.42 MG/DL (ref 0.5–1.05)
CREAT SERPL-MCNC: 0.45 MG/DL (ref 0.5–1.05)
CREAT SERPL-MCNC: 0.45 MG/DL (ref 0.5–1.05)
EOSINOPHIL # BLD AUTO: 0.03 X10*3/UL (ref 0–0.7)
EOSINOPHIL NFR BLD AUTO: 0.3 %
ERYTHROCYTE [DISTWIDTH] IN BLOOD BY AUTOMATED COUNT: 12.4 % (ref 11.5–14.5)
GFR SERPL CREATININE-BSD FRML MDRD: >90 ML/MIN/1.73M*2
GLUCOSE BLD MANUAL STRIP-MCNC: 135 MG/DL (ref 74–99)
GLUCOSE BLD MANUAL STRIP-MCNC: 139 MG/DL (ref 74–99)
GLUCOSE BLD MANUAL STRIP-MCNC: 142 MG/DL (ref 74–99)
GLUCOSE BLD MANUAL STRIP-MCNC: 151 MG/DL (ref 74–99)
GLUCOSE BLD MANUAL STRIP-MCNC: 157 MG/DL (ref 74–99)
GLUCOSE BLD MANUAL STRIP-MCNC: 173 MG/DL (ref 74–99)
GLUCOSE BLD MANUAL STRIP-MCNC: 175 MG/DL (ref 74–99)
GLUCOSE BLD MANUAL STRIP-MCNC: 185 MG/DL (ref 74–99)
GLUCOSE BLD MANUAL STRIP-MCNC: 186 MG/DL (ref 74–99)
GLUCOSE BLD MANUAL STRIP-MCNC: 190 MG/DL (ref 74–99)
GLUCOSE SERPL-MCNC: 139 MG/DL (ref 74–99)
GLUCOSE SERPL-MCNC: 147 MG/DL (ref 74–99)
GLUCOSE SERPL-MCNC: 193 MG/DL (ref 74–99)
GLUCOSE URINE, POC: NORMAL
HCO3 BLDV-SCNC: 15 MMOL/L (ref 22–26)
HCO3 BLDV-SCNC: 15.5 MMOL/L (ref 22–26)
HCO3 BLDV-SCNC: 17.2 MMOL/L (ref 22–26)
HCO3 BLDV-SCNC: 17.3 MMOL/L (ref 22–26)
HCT VFR BLD AUTO: 39.6 % (ref 36–46)
HGB BLD-MCNC: 12.6 G/DL (ref 12–16)
IMM GRANULOCYTES # BLD AUTO: 0.05 X10*3/UL (ref 0–0.7)
IMM GRANULOCYTES NFR BLD AUTO: 0.5 % (ref 0–0.9)
INHALED O2 CONCENTRATION: 21 %
KETONES, POC: POSITIVE
LEUKOCYTE EST, POC: NEGATIVE
LYMPHOCYTES # BLD AUTO: 1.46 X10*3/UL (ref 1.2–4.8)
LYMPHOCYTES NFR BLD AUTO: 15.2 %
MAGNESIUM SERPL-MCNC: 1.32 MG/DL (ref 1.6–2.4)
MCH RBC QN AUTO: 29.5 PG (ref 26–34)
MCHC RBC AUTO-ENTMCNC: 31.8 G/DL (ref 32–36)
MCV RBC AUTO: 93 FL (ref 80–100)
MONOCYTES # BLD AUTO: 0.39 X10*3/UL (ref 0.1–1)
MONOCYTES NFR BLD AUTO: 4.1 %
NEUTROPHILS # BLD AUTO: 7.63 X10*3/UL (ref 1.2–7.7)
NEUTROPHILS NFR BLD AUTO: 79.3 %
NITRITE, POC: NEGATIVE
NRBC BLD-RTO: 0 /100 WBCS (ref 0–0)
OXYHGB MFR BLDV: 62 % (ref 45–75)
OXYHGB MFR BLDV: 62.4 % (ref 45–75)
OXYHGB MFR BLDV: 63.5 % (ref 45–75)
OXYHGB MFR BLDV: 91.9 % (ref 45–75)
P AXIS: 74 DEGREES
P OFFSET: 202 MS
P ONSET: 157 MS
PCO2 BLDV: 30 MM HG (ref 41–51)
PCO2 BLDV: 32 MM HG (ref 41–51)
PCO2 BLDV: 33 MM HG (ref 41–51)
PCO2 BLDV: 35 MM HG (ref 41–51)
PH BLDV: 7.28 PH (ref 7.33–7.43)
PH BLDV: 7.28 PH (ref 7.33–7.43)
PH BLDV: 7.3 PH (ref 7.33–7.43)
PH BLDV: 7.37 PH (ref 7.33–7.43)
PH, POC: 6
PHOSPHATE SERPL-MCNC: 3.1 MG/DL (ref 2.5–4.9)
PLATELET # BLD AUTO: 373 X10*3/UL (ref 150–450)
PO2 BLDV: 43 MM HG (ref 35–45)
PO2 BLDV: 68 MM HG (ref 35–45)
POTASSIUM SERPL-SCNC: 4 MMOL/L (ref 3.5–5.3)
POTASSIUM SERPL-SCNC: 4.1 MMOL/L (ref 3.5–5.3)
POTASSIUM SERPL-SCNC: 4.3 MMOL/L (ref 3.5–5.3)
PR INTERVAL: 136 MS
PROT SERPL-MCNC: 5.5 G/DL (ref 6.4–8.2)
PROT SERPL-MCNC: 7 G/DL (ref 6.4–8.2)
Q ONSET: 225 MS
QRS COUNT: 17 BEATS
QRS DURATION: 68 MS
QT INTERVAL: 354 MS
QTC CALCULATION(BAZETT): 454 MS
QTC FREDERICIA: 418 MS
R AXIS: -12 DEGREES
RBC # BLD AUTO: 4.27 X10*6/UL (ref 4–5.2)
SAO2 % BLDV: 63 % (ref 45–75)
SAO2 % BLDV: 63 % (ref 45–75)
SAO2 % BLDV: 64 % (ref 45–75)
SAO2 % BLDV: 94 % (ref 45–75)
SODIUM SERPL-SCNC: 133 MMOL/L (ref 136–145)
SODIUM SERPL-SCNC: 133 MMOL/L (ref 136–145)
SODIUM SERPL-SCNC: 134 MMOL/L (ref 136–145)
SPECIFIC GRAVITY, POC: 1.02
T AXIS: 36 DEGREES
T OFFSET: 402 MS
TEST COMMENT: ABNORMAL
URINE PROTEIN, POC: NEGATIVE
UROBILINOGEN, POC: 0.2
VENTRICULAR RATE: 99 BPM
WBC # BLD AUTO: 9.6 X10*3/UL (ref 4.4–11.3)

## 2023-11-27 PROCEDURE — 2500000004 HC RX 250 GENERAL PHARMACY W/ HCPCS (ALT 636 FOR OP/ED)

## 2023-11-27 PROCEDURE — 80048 BASIC METABOLIC PNL TOTAL CA: CPT | Mod: CCI

## 2023-11-27 PROCEDURE — 82374 ASSAY BLOOD CARBON DIOXIDE: CPT

## 2023-11-27 PROCEDURE — 99232 SBSQ HOSP IP/OBS MODERATE 35: CPT

## 2023-11-27 PROCEDURE — 82010 KETONE BODYS QUAN: CPT

## 2023-11-27 PROCEDURE — 36415 COLL VENOUS BLD VENIPUNCTURE: CPT

## 2023-11-27 PROCEDURE — 80053 COMPREHEN METABOLIC PANEL: CPT

## 2023-11-27 PROCEDURE — 99285 EMERGENCY DEPT VISIT HI MDM: CPT

## 2023-11-27 PROCEDURE — 84450 TRANSFERASE (AST) (SGOT): CPT | Performed by: STUDENT IN AN ORGANIZED HEALTH CARE EDUCATION/TRAINING PROGRAM

## 2023-11-27 PROCEDURE — 80053 COMPREHEN METABOLIC PANEL: CPT | Performed by: STUDENT IN AN ORGANIZED HEALTH CARE EDUCATION/TRAINING PROGRAM

## 2023-11-27 PROCEDURE — 81002 URINALYSIS NONAUTO W/O SCOPE: CPT

## 2023-11-27 PROCEDURE — 82805 BLOOD GASES W/O2 SATURATION: CPT | Performed by: STUDENT IN AN ORGANIZED HEALTH CARE EDUCATION/TRAINING PROGRAM

## 2023-11-27 PROCEDURE — 2500000002 HC RX 250 W HCPCS SELF ADMINISTERED DRUGS (ALT 637 FOR MEDICARE OP, ALT 636 FOR OP/ED)

## 2023-11-27 PROCEDURE — 82947 ASSAY GLUCOSE BLOOD QUANT: CPT

## 2023-11-27 PROCEDURE — 2780000003 HC OR 278 NO HCPCS

## 2023-11-27 PROCEDURE — 82805 BLOOD GASES W/O2 SATURATION: CPT

## 2023-11-27 PROCEDURE — 85025 COMPLETE CBC W/AUTO DIFF WBC: CPT

## 2023-11-27 PROCEDURE — C1751 CATH, INF, PER/CENT/MIDLINE: HCPCS

## 2023-11-27 PROCEDURE — 84460 ALANINE AMINO (ALT) (SGPT): CPT | Performed by: STUDENT IN AN ORGANIZED HEALTH CARE EDUCATION/TRAINING PROGRAM

## 2023-11-27 PROCEDURE — 82010 KETONE BODYS QUAN: CPT | Performed by: STUDENT IN AN ORGANIZED HEALTH CARE EDUCATION/TRAINING PROGRAM

## 2023-11-27 PROCEDURE — 1120000001 HC OB PRIVATE ROOM DAILY

## 2023-11-27 PROCEDURE — 2500000004 HC RX 250 GENERAL PHARMACY W/ HCPCS (ALT 636 FOR OP/ED): Performed by: STUDENT IN AN ORGANIZED HEALTH CARE EDUCATION/TRAINING PROGRAM

## 2023-11-27 RX ORDER — ONDANSETRON HYDROCHLORIDE 2 MG/ML
4 INJECTION, SOLUTION INTRAVENOUS EVERY 6 HOURS PRN
Status: DISCONTINUED | OUTPATIENT
Start: 2023-11-27 | End: 2023-12-01

## 2023-11-27 RX ORDER — DEXTROSE 50 % IN WATER (D50W) INTRAVENOUS SYRINGE
50
Status: DISCONTINUED | OUTPATIENT
Start: 2023-11-27 | End: 2023-12-01

## 2023-11-27 RX ORDER — POTASSIUM CHLORIDE 14.9 MG/ML
20 INJECTION INTRAVENOUS ONCE
Status: DISCONTINUED | OUTPATIENT
Start: 2023-11-27 | End: 2023-11-27

## 2023-11-27 RX ORDER — LIDOCAINE HYDROCHLORIDE 10 MG/ML
5 INJECTION INFILTRATION; PERINEURAL ONCE
Status: DISCONTINUED | OUTPATIENT
Start: 2023-11-27 | End: 2023-12-01

## 2023-11-27 RX ORDER — DEXTROSE MONOHYDRATE 100 MG/ML
150 INJECTION, SOLUTION INTRAVENOUS CONTINUOUS
Status: DISCONTINUED | OUTPATIENT
Start: 2023-11-27 | End: 2023-11-28

## 2023-11-27 RX ORDER — ONDANSETRON HYDROCHLORIDE 2 MG/ML
4 INJECTION, SOLUTION INTRAVENOUS EVERY 6 HOURS PRN
Status: DISCONTINUED | OUTPATIENT
Start: 2023-11-27 | End: 2023-11-27

## 2023-11-27 RX ORDER — INSULIN LISPRO 100 [IU]/ML
5 INJECTION, SOLUTION INTRAVENOUS; SUBCUTANEOUS ONCE
Status: COMPLETED | OUTPATIENT
Start: 2023-11-27 | End: 2023-11-27

## 2023-11-27 RX ORDER — METOCLOPRAMIDE HYDROCHLORIDE 5 MG/ML
10 INJECTION INTRAMUSCULAR; INTRAVENOUS EVERY 6 HOURS PRN
Status: DISCONTINUED | OUTPATIENT
Start: 2023-11-27 | End: 2023-12-02

## 2023-11-27 RX ORDER — ACETAMINOPHEN 325 MG/1
975 TABLET ORAL ONCE
Status: COMPLETED | OUTPATIENT
Start: 2023-11-27 | End: 2023-11-27

## 2023-11-27 RX ORDER — SODIUM CHLORIDE 450 MG/100ML
250 INJECTION, SOLUTION INTRAVENOUS CONTINUOUS
Status: DISCONTINUED | OUTPATIENT
Start: 2023-11-27 | End: 2023-11-28

## 2023-11-27 RX ORDER — OXYTOCIN/0.9 % SODIUM CHLORIDE 30/500 ML
2-30 PLASTIC BAG, INJECTION (ML) INTRAVENOUS CONTINUOUS
Status: DISCONTINUED | OUTPATIENT
Start: 2023-11-27 | End: 2023-11-27

## 2023-11-27 RX ORDER — POTASSIUM CHLORIDE 20 MEQ/1
20 TABLET, EXTENDED RELEASE ORAL ONCE
Status: COMPLETED | OUTPATIENT
Start: 2023-11-27 | End: 2023-11-27

## 2023-11-27 RX ORDER — DEXTROSE MONOHYDRATE AND SODIUM CHLORIDE 5; .45 G/100ML; G/100ML
150 INJECTION, SOLUTION INTRAVENOUS CONTINUOUS
Status: DISCONTINUED | OUTPATIENT
Start: 2023-11-27 | End: 2023-11-28

## 2023-11-27 RX ORDER — SODIUM CHLORIDE, SODIUM LACTATE, POTASSIUM CHLORIDE, CALCIUM CHLORIDE 600; 310; 30; 20 MG/100ML; MG/100ML; MG/100ML; MG/100ML
1000 INJECTION, SOLUTION INTRAVENOUS ONCE
Status: COMPLETED | OUTPATIENT
Start: 2023-11-27 | End: 2023-11-27

## 2023-11-27 RX ADMIN — INSULIN LISPRO 5 UNITS: 100 INJECTION, SOLUTION INTRAVENOUS; SUBCUTANEOUS at 17:39

## 2023-11-27 RX ADMIN — ACETAMINOPHEN 975 MG: 325 TABLET ORAL at 17:24

## 2023-11-27 RX ADMIN — SODIUM CHLORIDE, POTASSIUM CHLORIDE, SODIUM LACTATE AND CALCIUM CHLORIDE 1000 ML: 600; 310; 30; 20 INJECTION, SOLUTION INTRAVENOUS at 20:35

## 2023-11-27 RX ADMIN — DEXTROSE MONOHYDRATE 150 ML/HR: 100 INJECTION, SOLUTION INTRAVENOUS at 23:17

## 2023-11-27 RX ADMIN — INSULIN HUMAN 3 UNITS/HR: 1 INJECTION, SOLUTION INTRAVENOUS at 19:34

## 2023-11-27 RX ADMIN — METOCLOPRAMIDE 10 MG: 5 INJECTION, SOLUTION INTRAMUSCULAR; INTRAVENOUS at 17:50

## 2023-11-27 RX ADMIN — SODIUM CHLORIDE, POTASSIUM CHLORIDE, SODIUM LACTATE AND CALCIUM CHLORIDE 1000 ML: 600; 310; 30; 20 INJECTION, SOLUTION INTRAVENOUS at 16:48

## 2023-11-27 RX ADMIN — DEXTROSE MONOHYDRATE 150 ML/HR: 100 INJECTION, SOLUTION INTRAVENOUS at 19:43

## 2023-11-27 RX ADMIN — POTASSIUM CHLORIDE 20 MEQ: 1500 TABLET, EXTENDED RELEASE ORAL at 20:34

## 2023-11-27 RX ADMIN — ONDANSETRON 4 MG: 2 INJECTION INTRAMUSCULAR; INTRAVENOUS at 16:52

## 2023-11-27 SDOH — HEALTH STABILITY: MENTAL HEALTH: WERE YOU ABLE TO COMPLETE ALL THE BEHAVIORAL HEALTH SCREENINGS?: YES

## 2023-11-27 SDOH — HEALTH STABILITY: MENTAL HEALTH: NON-SPECIFIC ACTIVE SUICIDAL THOUGHTS (PAST 1 MONTH): NO

## 2023-11-27 SDOH — HEALTH STABILITY: MENTAL HEALTH: ACTIVE SUICIDAL IDEATION WITH SOME INTENT TO ACT, WITHOUT SPECIFIC PLAN (PAST 1 MONTH): NO

## 2023-11-27 SDOH — SOCIAL STABILITY: SOCIAL INSECURITY: ARE YOU OR HAVE YOU BEEN THREATENED OR ABUSED PHYSICALLY, EMOTIONALLY, OR SEXUALLY BY ANYONE?: NO

## 2023-11-27 SDOH — SOCIAL STABILITY: SOCIAL INSECURITY: PHYSICAL ABUSE: DENIES

## 2023-11-27 SDOH — HEALTH STABILITY: MENTAL HEALTH: WISH TO BE DEAD (PAST 1 MONTH): NO

## 2023-11-27 SDOH — HEALTH STABILITY: MENTAL HEALTH: HAVE YOU USED ANY PRESCRIPTION DRUGS OTHER THAN PRESCRIBED IN THE PAST 12 MONTHS?: NO

## 2023-11-27 SDOH — ECONOMIC STABILITY: FOOD INSECURITY: WITHIN THE PAST 12 MONTHS, YOU WORRIED THAT YOUR FOOD WOULD RUN OUT BEFORE YOU GOT MONEY TO BUY MORE.: NEVER TRUE

## 2023-11-27 SDOH — HEALTH STABILITY: MENTAL HEALTH: HAVE YOU USED ANY SUBSTANCES (CANABIS, COCAINE, HEROIN, HALLUCINOGENS, INHALANTS, ETC.) IN THE PAST 12 MONTHS?: NO

## 2023-11-27 SDOH — SOCIAL STABILITY: SOCIAL INSECURITY: HAVE YOU HAD THOUGHTS OF HARMING ANYONE ELSE?: NO

## 2023-11-27 SDOH — ECONOMIC STABILITY: FOOD INSECURITY: WITHIN THE PAST 12 MONTHS, THE FOOD YOU BOUGHT JUST DIDN'T LAST AND YOU DIDN'T HAVE MONEY TO GET MORE.: NEVER TRUE

## 2023-11-27 SDOH — HEALTH STABILITY: MENTAL HEALTH: STRENGTHS (MUST CHOOSE TWO): SUPPORT FROM FAMILY;SUPPORT FROM FRIENDS

## 2023-11-27 SDOH — HEALTH STABILITY: MENTAL HEALTH: SUICIDAL BEHAVIOR (LIFETIME): NO

## 2023-11-27 SDOH — SOCIAL STABILITY: SOCIAL INSECURITY: VERBAL ABUSE: DENIES

## 2023-11-27 SDOH — ECONOMIC STABILITY: HOUSING INSECURITY: DO YOU FEEL UNSAFE GOING BACK TO THE PLACE WHERE YOU ARE LIVING?: NO

## 2023-11-27 SDOH — SOCIAL STABILITY: SOCIAL INSECURITY: ABUSE SCREEN: ADULT

## 2023-11-27 SDOH — SOCIAL STABILITY: SOCIAL INSECURITY: DO YOU FEEL ANYONE HAS EXPLOITED OR TAKEN ADVANTAGE OF YOU FINANCIALLY OR OF YOUR PERSONAL PROPERTY?: NO

## 2023-11-27 SDOH — SOCIAL STABILITY: SOCIAL INSECURITY: DOES ANYONE TRY TO KEEP YOU FROM HAVING/CONTACTING OTHER FRIENDS OR DOING THINGS OUTSIDE YOUR HOME?: NO

## 2023-11-27 SDOH — SOCIAL STABILITY: SOCIAL INSECURITY: HAS ANYONE EVER THREATENED TO HURT YOUR FAMILY OR YOUR PETS?: NO

## 2023-11-27 SDOH — HEALTH STABILITY: MENTAL HEALTH: ACTIVE SUICIDAL IDEATION WITH SPECIFIC PLAN AND INTENT (PAST 1 MONTH): NO

## 2023-11-27 SDOH — SOCIAL STABILITY: SOCIAL INSECURITY: ARE THERE ANY APPARENT SIGNS OF INJURIES/BEHAVIORS THAT COULD BE RELATED TO ABUSE/NEGLECT?: NO

## 2023-11-27 ASSESSMENT — PAIN SCALES - GENERAL
PAINLEVEL_OUTOF10: 0 - NO PAIN
PAINLEVEL_OUTOF10: 2
PAINLEVEL_OUTOF10: 0 - NO PAIN
PAINLEVEL_OUTOF10: 10 - WORST POSSIBLE PAIN
PAINLEVEL_OUTOF10: 0 - NO PAIN
PAINLEVEL_OUTOF10: 0 - NO PAIN
PAINLEVEL_OUTOF10: 8
PAINLEVEL_OUTOF10: 0 - NO PAIN
PAINLEVEL_OUTOF10: 2
PAINLEVEL_OUTOF10: 0 - NO PAIN
PAINLEVEL_OUTOF10: 0 - NO PAIN

## 2023-11-27 ASSESSMENT — LIFESTYLE VARIABLES
AUDIT-C TOTAL SCORE: 0
SKIP TO QUESTIONS 9-10: 1
HOW OFTEN DO YOU HAVE 6 OR MORE DRINKS ON ONE OCCASION: NEVER
HOW MANY STANDARD DRINKS CONTAINING ALCOHOL DO YOU HAVE ON A TYPICAL DAY: PATIENT DOES NOT DRINK
HOW OFTEN DO YOU HAVE A DRINK CONTAINING ALCOHOL: NEVER
AUDIT-C TOTAL SCORE: 0

## 2023-11-27 ASSESSMENT — SOCIAL DETERMINANTS OF HEALTH (SDOH)
WITHIN THE LAST YEAR, HAVE YOU BEEN KICKED, HIT, SLAPPED, OR OTHERWISE PHYSICALLY HURT BY YOUR PARTNER OR EX-PARTNER?: NO
WITHIN THE LAST YEAR, HAVE YOU BEEN AFRAID OF YOUR PARTNER OR EX-PARTNER?: NO
WITHIN THE LAST YEAR, HAVE TO BEEN RAPED OR FORCED TO HAVE ANY KIND OF SEXUAL ACTIVITY BY YOUR PARTNER OR EX-PARTNER?: NO
WITHIN THE LAST YEAR, HAVE YOU BEEN HUMILIATED OR EMOTIONALLY ABUSED IN OTHER WAYS BY YOUR PARTNER OR EX-PARTNER?: NO

## 2023-11-27 ASSESSMENT — ACTIVITIES OF DAILY LIVING (ADL): LACK_OF_TRANSPORTATION: NO

## 2023-11-27 ASSESSMENT — PAIN DESCRIPTION - LOCATION: LOCATION: ABDOMEN

## 2023-11-27 NOTE — PROGRESS NOTES
Antepartum Progress Note    Assessment/Plan   Maria Isabel Cutler is a 23 y.o.  at 34w2d. SHIN: 2024, by Last Menstrual Period presenting for DKA     T1DM, DKA  -DKA diagnosed by hyperglycemia, anion gap metabolic acidosis, and ketonemia  - unclear inciting event, pt concerned her sensor was not working appropriately, will readdress once stabilized and transitioning back to pump  -Admission labs   - --> 190   -Bicarb 16   -AG 18   -BHB 4.14   -Na 134   -K 4.3   -Venous pH 7.28  -CBC with diff unremarkable  - will plan to trend labs (BMP, BHB, ABG) every 2 hours; midline placed by IV team due to difficult access and need for frequent blood draws  - 5u lispro given on arrival due to difficult access  - will initiate insulin gtt @3U/hr, POCT 151 after midline placement   - for POCT q1hr   - for maintenance fluids @250cc/hr   - for D5-0.45% NS if BG >150   - for D10W if BG <150 and AG still open   - continue potassium supplementation for K+ 3.3-5.3, 20mEQ given on arrival   - strict Is/Os  - pump settings on prior discharge, plan to transition once DKA resolved   Basal  5895-8539 0.90  5110-1367 1.00  7705-9593 0.90     Bolus  5478-1305 1:9     CF: 1:40  Carb ratio: 1:9  Target:110  DOA 5 hrs     R/o PTL   - abdominal pain with contractions on toco   - declining SVE, will readdress pending stability   - SVE closed on prior admission    cHTN   - no current meds, normotensive     Fetal wellbeing  - fetal status nonreassuring in setting of maternal acidemia on arrival, now improving s/p maternal resuscitation  - CEFM     Seen and d/w Dr. Brannon-Danial Clarke MD  PGY2, Obstetrics and Gynecology       Principal Problem:    DM (diabetes mellitus) type 1, uncontrolled, with ketoacidosis (CMS/HCC)    Pregnancy Problems (from 23 to present)       Problem Noted Resolved    Bilateral sciatica 10/10/2023 by Stuart Munoz MD No     Intermittent pain shooting down posteriolateral thighs bilaterally         33  weeks gestation of pregnancy 10/8/2023 by Stuart Munoz MD No     [x] PNLs reviewed wnl, rubella nonimmune  [x] Pap ASCUS 2022  [x] NT wnl 2023  [x] cell-free DNA, CF/SMA screening low risk  [x] Anatomy US  [x] 2nd trimester labs, Hg 9.9  [x] tdap  [ ] flu/covid  [/] GBS  [ ] Delivery Planning  [ ] PPBC           Asthma affecting pregnancy in third trimester 10/8/2023 by Stuart Munoz MD No     Albuterol PRN  10/24 Regimen: Flovent 250mcg BID puff, Albuterol PRN         Type 1 diabetes mellitus complicating pregnancy, antepartum 10/5/2023 by Indiana Parra MD No     [x] Baseline HbA1c 12.8 2023  [x] Baseline TSH 2.38 2023  [x] Baseline HELLP Labs wnl, 0.11 2023  [x] bbASA  [x] EKG  wnl  [x] Fetal Echo wnl  [x] Ophthalmology  /podiatry    [ ]  Testing 2x weekly until del    Serial Growths  10/24 29.0 wga EFW 1294g 42%, AC 48%    Current Regimen (as of )  Basal  4336-3473 0.90  2359-7710 1.00  3130-8963 0.90     Bolus  8579-8021 1:9     CF: 1:40  Carb ratio: 1:9  Target:110  DOA 5 hrs          Herpes simplex infection in mother during third trimester of pregnancy 10/5/2023 by Indiana Parra MD No     On acyclovir prophylaxis          Chronic hypertension affecting pregnancy 10/5/2023 by Indiana Parra MD No     [x] Baseline HELLP Labs, P:C 0.11 2023  [x] bbASA  [ ]  Testing  [x] Baseline EKG inpatient      Serial Growths    Med Regimen-No meds         Group beta Strep positive 10/5/2023 by Indiana Parra MD No     GBS UTI in pregnancy          Depression during pregnancy in third trimester 10/5/2023 by Indiana Parra MD No    Fetal cardiac anomaly affecting pregnancy, antepartum 10/5/2023 by Indiana Parra MD No     MFM Plan of Care: fetal ventricular septum slightly thickened --> code:1: non-urgent peds cardiology consult prior to discharge or within 1-2 weeks if delivered at an outside hospital  No structural abnormalities on repeat fetal echo --> peds cards  recommendation for routine delivery and  care with non-urgent peds cardiology consult prior to discharge or within 1-2 weeks if delivered at an outside hospital         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 10/5/2023 by Indiana Parra MD No     - Admitted for blurry vision on , d/c ed with improvement, oligoclonal bands seen on LP, neg MRI head  - New optic disc edema, diabetic edema    - Close follow up with ophto, last appt 10/13, needs IV fluro angio after delivery to prevent progression, next visit   - Neuro-Immunology NPV scheduled for          Ophthalmologic abnormality 10/5/2023 by Indiana Parra MD No     Diabetic macular edema, both eyes - for panretinal photocoagulation with Optho                  Subjective   Pt endorsing continued abdominal pain, intermittent, declines exam. Reports she thinks her sensor was working abnormally at home, noticed blood glucose levels increasing despite trying to treat them.     Objective   Allergies:   Patient has no known allergies.    Last Vitals:  Temp Pulse Resp BP MAP Pulse Ox   37.1 °C (98.8 °F) 97 16 121/70   100 %     Vitals Min/Max Last 24 Hours:  Temp  Min: 36.8 °C (98.2 °F)  Max: 37.1 °C (98.8 °F)  Pulse  Min: 92  Max: 116  Resp  Min: 16  Max: 16  BP  Min: 121/70  Max: 121/70    Intake/Output:     Intake/Output Summary (Last 24 hours) at 2023 1846  Last data filed at 2023 1748  Gross per 24 hour   Intake --   Output 300 ml   Net -300 ml       Physical Exam:  General: intermittently uncomfortable with contractions, otherwise comfortable in between  HEENT: normocephalic, atraumatic  Heart: warm and well perfused  Lungs: no increased WOB  Abdomen: gravid  Extremities: moving all extremities  Neuro: awake and conversant  Psych: appropriate mood and affect     Lab Data:  Lab Results   Component Value Date    WBC 9.6 2023    HGB 12.6 2023    HCT 39.6 2023     2023     Lab Results   Component Value  Date    GLUCOSE 193 (H) 11/27/2023     (L) 11/27/2023    K 4.3 11/27/2023     11/27/2023    CO2 16 (L) 11/27/2023    ANIONGAP 22 (H) 11/27/2023    BUN 5 (L) 11/27/2023    CREATININE 0.45 (L) 11/27/2023    EGFR >90 11/27/2023    CALCIUM 9.6 11/27/2023    ALBUMIN 3.8 11/27/2023    PROT 7.0 11/27/2023    ALKPHOS 92 11/27/2023    ALT 15 11/27/2023    AST 20 11/27/2023    BILITOT 0.6 11/27/2023

## 2023-11-27 NOTE — PROCEDURES
Vascular Access Team Procedure Note     Visit Date: 11/27/2023      Patient Name: Maria Isabel Cutler         MRN: [id number]             Procedure:Midline Insertion    Pre-Procedure Checklist:  Emergent Line Insertion: No  Type of Line to be Placed: Midline  Consent Obtained: N/A  Emergency Medication Necessary: No  Patient Identified with 2 Independent Identifiers: Yes  Review of Allergies, Anticoagulation, Relevant Labs, ECG/Telemetry: Yes  Risks/Benefits/Alternatives Discussed with Patient/POA/Legal Representative: Yes  Stop Sign on Door: Yes  Time Out Performed: Yes  Catheter Exchange: No    Positioning Checklist:  All People, Including Patient, in the Room with Cap and Mask: Yes  Fluoroscopy Used to Identify Vessel and Guide Insertion: No   Sterile Cover Used: Yes  Full Barrier Precautions Followed (Mask, Cap, Gown, Gloves): Yes  Hands Washed: Yes  Monitors Attached with Sound Alarms On: No  Full Body Sterile Drape (Head-to-Toe) Used to Cover Patient: Yes  Trendelenburg Position (For IJ and Subclavian): No  CHG Skin Prep Used and Allowed to Air Dry to Skin Procedure: Yes    Procedure Checklist:  Blood Aspirated From All Lumens, All Ports Subsequently Flushed: Yes  Catheter Caps Placed on All Lumens; Lumens Clamped: Yes  Maintain Guidewire Control Throughout, Ensuring Guidewire Removal: Yes  Maintain Sterile Field Throughout Insertion: Yes  Catheter Secured: Yes  Confirmatory Test of Venous Placement: Non-Pulsatile Blood    Post Procedure Checklist:  Date and Time Written on Dressing: Yes  Sharp and Wire Count and Safe Disposal of all Sharps/Wires: Yes  Sterile Dressing Applied Per Protocol: Yes  X-ray Ordered or ECG Image: N/A    Midline Insertion Details:  Size (Fr): 3  Lumen Type: Single  Catheter to Vein Ratio Less Than 50%: Yes  Total Length (cm): 12  External Length (cm): 0  Orientation: Right  Location: Brachial  Site Prep: Chlorohexidine; Usual sterile procedure followed  Local Anesthetic:  Injectable/Subcutaneous  Indication: Access  Insertion Team Members in the Room: Nurse, LPN  Initial Extremity Circumference (cm): 24  Insertion Attempts: 1  Patient Tolerance: Tolerated Well, Age Appropriate  Comfort Measures: Subcutaneous anesthetic; Verbal  Procedure Location: Bedside  Safety Measures: Patient specific safety measures addressed with RN  Estimated Blood Loss (mL): 1  Vessel Fully Compressible Proximally and Distally to Insertion Site: Yes  Brisk Blood Return Obtained and Line Draws Easily: Yes  Tip Location: Right axilla  Line Confirmation: non-pulsatile blood return; ultrasound  Lot #: [id number]  : Bard  PICC Line Exp Date: 10/31/2024  Securement: Stat Lock  Post Procedure Checklist: Handoff with RN; Obtain all new IV tubing prior to use; Bed at lowest level and wheels locked; Line discharge information at bedside.  Additional Details: Line was inserted using Modified Seldinger's Technique.   Placed by: RODNEY Momin RN Neil Jordan, RN  11/27/2023  6:42 PM

## 2023-11-27 NOTE — H&P
Obstetrical Admission History and Physical-Triage     Subjective    Maria Isabel Cutler is a 23 y.o.  34w2d who presents to L&D for lower abdominal pain. She reports she developed pelvic pressure last night. This morning she developed lower midline abdominal pain that is describes as campy in nature. One episode of non-bloody and non-bilious vomiting this morning. She denies vaginal bleeding, vaginal discharge, fevers, chills, dysuria, or cough. She has not taken off her inulin pump but believes her pump/CGM is not working appropriately. Patients current pregnancy is complicated by HSV, cHTN, GBS +, fetal thickened intraventricular septum, and T1DM.     Chief Complaint: Abdominal Pain and Decreased Fetal Movement    Assessment/Plan      T1DM, DKA    -POCT glucose 175 in triage    -VBG 7.28, CO2 33 , BHB 4.14, K+ 4.3    -UA: + ketones and 2+ glucose    -No evidence of infectious trigger, probable pump/cgm malfunction     -Start potassium, fluids, and insulin drip per unit protocol    R/o PTL     - Increased pelvic pressure + abd pain since this am     -Cervical exam: Patient declined    cHTN    -No home medication     -Normotensive today    Maternal well-being     -VSS and WNL     -Questions and concerns addressed     Fetal well-being    -NST non reassuring on arrival likely due to maternal acidemia     -CEFM       Disposition: Admit to L&D for further management     Pt staffed and discussed with OBGYN attending Dr. Levy Nino, DO   Emergency Medicine PGY-1        Active Problems:  There are no active Hospital Problems.      Pregnancy Problems (from 24 to present)       Problem Noted Resolved    Labor and delivery indication for care or intervention 2023 by Marni De La Cruz MD No    Priority:  Medium      Bilateral sciatica 10/10/2023 by Stuart Munoz MD No    Priority:  Medium      Overview Signed 10/10/2023  2:17 PM by Stuart Munoz MD     Intermittent pain shooting down  posteriolateral thighs bilaterally         33 weeks gestation of pregnancy 10/8/2023 by Stuart Munoz MD No    Priority:  Medium      Overview Addendum 10/10/2023  2:24 PM by Stuart Munoz MD     [x] PNLs reviewed wnl, rubella nonimmune  [x] Pap ASCUS 2022  [x] NT wnl 2023  [x] cell-free DNA, CF/SMA screening low risk  [x] Anatomy US  [x] 2nd trimester labs, Hg 9.9  [x] tdap  [ ] flu/covid  [/] GBS  [ ] Delivery Planning  [ ] PPBC           Asthma affecting pregnancy in third trimester 10/8/2023 by Stuart Munoz MD No    Priority:  Medium      Overview Addendum 10/24/2023  2:08 PM by Stuart Munoz MD     Albuterol PRN  10/24 Regimen: Flovent 250mcg BID puff, Albuterol PRN         Type 1 diabetes mellitus complicating pregnancy, antepartum 10/5/2023 by [place] Parra MD No    Priority:  Medium      Overview Addendum 2023  8:04 PM by Angeline Boudreaux MD     [x] Baseline HbA1c 12.8 2023  [x] Baseline TSH 2.38 2023  [x] Baseline HELLP Labs wnl, 0.11 2023  [x] bbASA  [x] EKG  wnl  [x] Fetal Echo wnl  [x] Ophthalmology  /podiatry    [ ]  Testing 2x weekly until del    Serial Growths  10/24 29.0 wga EFW 1294g 42%, AC 48%    Current Regimen changed 10/10/2023  Basal  5392-6491 0.90  1051-1289 1.00  5685-7265 0.90     Bolus  4600-0898 1:9     CF: 1:40  Carb ratio: 1:10  Target:110  DOA 5 hrs           Herpes simplex infection in mother during third trimester of pregnancy 10/5/2023 by Indiana Parra MD No    Priority:  Medium      Overview Signed 10/5/2023  8:55 AM by Indiana Parra MD     On acyclovir prophylaxis          Chronic hypertension affecting pregnancy 10/5/2023 by Indiana Parra MD No    Priority:  Medium      Overview Addendum 10/8/2023  3:16 PM by Stuart Munoz MD     [x] Baseline HELLP Labs, P:C 0.11 2023  [x] bbASA  [ ]  Testing  [x] Baseline EKG inpatient      Serial Growths    Med Regimen  No meds         Group beta Strep positive 10/5/2023 by  Indiana Parra MD No    Priority:  Medium      Overview Signed 10/5/2023  9:00 AM by Indiana Parra MD     GBS UTI in pregnancy          Depression during pregnancy in third trimester 10/5/2023 by Indiana Parra MD No    Priority:  Medium      Fetal cardiac anomaly affecting pregnancy, antepartum 10/5/2023 by Indiana Parra MD No    Priority:  Medium      Overview Addendum 2023  6:00 PM by Luz Maria Abebe MD     [facility] Plan of Care: fetal ventricular septum slightly thickened --> code:1: non-urgent peds cardiology consult prior to discharge or within 1-2 weeks if delivered at an outside hospital  No structural abnormalities on repeat fetal echo --> peds cards recommendation for routine delivery and  care with non-urgent peds cardiology consult prior to discharge or within 1-2 weeks if delivered at an outside hospital         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 10/5/2023 by Indiana Parra MD No    Priority:  Medium      Overview Addendum 10/24/2023  2:09 PM by Stuart Munoz MD     - Admitted for blurry vision on , d/c ed with improvement, oligoclonal bands seen on LP, neg MRI head  - New optic disc edema, diabetic edema    - Close follow up with ophto, last appt 10/13, needs IV fluro angio after delivery to prevent progression, next visit   - Neuro-Immunology NPV scheduled for          Ophthalmologic abnormality 10/5/2023 by Indiana Parra MD No    Priority:  Medium      Overview Signed 10/5/2023  9:14 AM by [place] Parra MD     Diabetic macular edema, both eyes - for panretinal photocoagulation with Optho          Constipation during pregnancy in second trimester 10/9/2023 by Melody Osorio 10/10/2023 by Stuart Munoz MD    Abnormal pregnancy in second trimester 10/9/2023 by Melody Osorio 10/10/2023 by Stuart Munoz MD    Suspected fetal anomaly, antepartum 10/9/2023 by Melody Osorio 10/10/2023 by Stuart Munoz MD    Hyperglycemia in pregnancy 2023 by Melody Osorio 10/10/2023  by Stuart Munoz MD    Vomiting of pregnancy 2023 by Melody Osorio 10/10/2023 by Stuart Munoz MD               Obstetrical History   OB History    Para Term  AB Living   1 0 0 0 0 0   SAB IAB Ectopic Multiple Live Births                  # Outcome Date GA Lbr Jesu/2nd Weight Sex Delivery Anes PTL Lv   1 Current                Past Medical History  Past Medical History:   Diagnosis Date    Asthma     Chlamydia     Chronic hypertension     CSF oligoclonal bands     Depression     Herpes     Type 1 diabetes mellitus with hyperglycemia, with long-term current use of insulin (CMS/[facility])     Urinary tract infection         Past Surgical History   No past surgical history on file.    Social History  Social History     Tobacco Use    Smoking status: Former     Types: Cigarettes     Passive exposure: Past    Smokeless tobacco: Never   Substance Use Topics    Alcohol use: Not Currently     Substance and Sexual Activity   Drug Use Not Currently    Types: Marijuana       Allergies  Patient has no known allergies.     Medications  Medications Prior to Admission   Medication Sig Dispense Refill Last Dose    acetaminophen (Tylenol) 325 mg tablet Take 2 tablets (650 mg) by mouth every 6 hours if needed for mild pain (1 - 3). 90 tablet 1     acetone, urine, test strip DIP URINE TO CHECK FOR KETONES IF NAUSEA/VOMITING OR IF CONCERN FOR DKA OR DEHYDRATION 100 each 0     acyclovir (Zovirax) 400 mg tablet Take 1 tablet (400 mg) by mouth 2 times a day. 60 tablet 5     albuterol 2.5 mg /3 mL (0.083 %) nebulizer solution Inhale 3 mL (2.5 mg) every 6 hours if needed for wheezing.       Alcohol Prep Pads pads, medicated        aspirin 81 mg chewable tablet Chew 1 tablet (81 mg) once daily.       blood-glucose sensor (Dexcom G6 Sensor) device 1 each continuously. 3 each 0     blood-glucose sensor device APPLY NEW DEVICE EVERY 10 DAYS 3 each 0     famotidine (Pepcid) 20 mg tablet Take 1 tablet (20 mg) by mouth 2 times a day.  "30 tablet 3     ferrous sulfate (iron) 325 (65 Fe) MG tablet Take 1 tablet (65 mg of iron) by mouth once daily with a meal. 30 tablet 3     fluticasone (Flovent Diskus) 250 mcg/actuation diskus inhaler Inhale 1 puff 2 times a day. Rinse mouth with water after use to reduce aftertaste and incidence of candidiasis. Do not swallow. 60 each 11     glucagon (Glucagen) 1 mg injection 1 mg 1 time if needed for low blood sugar - see comments.       glucose 4 gram chewable tablet Chew. USE AS DIRECTED TO TREAT HYPOGLYCEMIA       HumaLOG U-100 Insulin 100 unit/mL injection 1.5 mL (150 Units). VIA INSULIN PUMP DAILY       OneTouch Verio test strips strip TEST 6-7 TIMES DAILY       Peak Air Peak Flow Meter device        pen needle, diabetic 32 gauge x 5/32\" needle USE AS DIRECTED WITH INSULIN USE AS DIRECTED TO INJECT INSULIN 4-6 TIMES DAILY 200 each 10     prenatal vitamin 28 mg iron-800 mcg folic acid tablet tablet TAKE 1 TABLET DAILY. 100 tablet 2     Ventolin HFA 90 mcg/actuation inhaler Inhale 1-2 puffs every 4 hours if needed.          Objective    Last Vitals  Temp Pulse Resp BP MAP O2 Sat   36.8 °C (98.2 °F) 96 16 121/70   100 %     Physical Examination  GENERAL: Examination reveals a well developed, well nourished, gravid female in no acute distress. She is alert and cooperative.  HEENT: Dry mucous membranes  LUNGS: clear to auscultation bilaterally  HEART: regular rate and rhythm, S1, S2 normal, no murmur, click, rub or gallop  EXTREMITIES: no redness or tenderness in the calves or thighs, no edema  SKIN: normal coloration and turgor, no rashes    Lab Review  Lab Results   Component Value Date    WBC 9.6 11/27/2023    HGB 12.6 11/27/2023    HCT 39.6 11/27/2023     11/27/2023     No results found for: \"GRPBSTREP\"  Lab Results   Component Value Date    GLUCOSE 193 (H) 11/27/2023     (L) 11/27/2023    K 4.3 11/27/2023     11/27/2023    CO2 16 (L) 11/27/2023    ANIONGAP 22 (H) 11/27/2023    BUN 5 (L) " 11/27/2023    CREATININE 0.45 (L) 11/27/2023    EGFR >90 11/27/2023    CALCIUM 9.6 11/27/2023    ALBUMIN 3.8 11/27/2023    PROT 7.0 11/27/2023    ALKPHOS 92 11/27/2023    ALT 15 11/27/2023    AST 20 11/27/2023    BILITOT 0.6 11/27/2023

## 2023-11-27 NOTE — TELEPHONE ENCOUNTER
Complex/High Risk OB Program    Pt called this AM with concerns her insulin pump isn't working correctly.   Reports persistent vomiting overnight, is concerned she may be in DKA again.   Plans to report to triage for further evaluation.   Triage APPs, made aware.   Diabetic APRN-CNS and RN made aware also, patient has apt with diabetic clinic tomorrow if no admission required today.   Will continue to follow patient care.     Ro Douglas CNP  Complex/High Risk OB   Ext [phone]

## 2023-11-27 NOTE — SIGNIFICANT EVENT
Follow-up Phone Call Note:   Interview:  Care Type: Women's Health   Phone Number Call  [phone]   Call Outcome: Left Message          Date/Time Of Call: 11/27/23 [id number]   Call Back Done By: care coordinator   Callback Complete:  Yes

## 2023-11-28 ENCOUNTER — APPOINTMENT (OUTPATIENT)
Dept: OBSTETRICS AND GYNECOLOGY | Facility: CLINIC | Age: 23
End: 2023-11-28
Payer: COMMERCIAL

## 2023-11-28 PROBLEM — Z3A.34 34 WEEKS GESTATION OF PREGNANCY (HHS-HCC): Status: ACTIVE | Noted: 2023-10-08

## 2023-11-28 LAB
ALBUMIN SERPL BCP-MCNC: 2.9 G/DL (ref 3.4–5)
ALBUMIN SERPL BCP-MCNC: 3 G/DL (ref 3.4–5)
ALBUMIN SERPL BCP-MCNC: 3 G/DL (ref 3.4–5)
ALBUMIN SERPL BCP-MCNC: 3.2 G/DL (ref 3.4–5)
ALBUMIN SERPL BCP-MCNC: 3.4 G/DL (ref 3.4–5)
ALP SERPL-CCNC: 64 U/L (ref 33–110)
ALP SERPL-CCNC: 65 U/L (ref 33–110)
ALP SERPL-CCNC: 70 U/L (ref 33–110)
ALP SERPL-CCNC: 72 U/L (ref 33–110)
ALP SERPL-CCNC: 75 U/L (ref 33–110)
ALP SERPL-CCNC: 77 U/L (ref 33–110)
ALP SERPL-CCNC: 78 U/L (ref 33–110)
ALT SERPL W P-5'-P-CCNC: 12 U/L (ref 7–45)
ALT SERPL W P-5'-P-CCNC: 12 U/L (ref 7–45)
ALT SERPL W P-5'-P-CCNC: 13 U/L (ref 7–45)
ALT SERPL W P-5'-P-CCNC: 14 U/L (ref 7–45)
ALT SERPL W P-5'-P-CCNC: 15 U/L (ref 7–45)
ANION GAP BLDV CALCULATED.4IONS-SCNC: 11 MMOL/L (ref 10–25)
ANION GAP SERPL CALC-SCNC: 11 MMOL/L (ref 10–20)
ANION GAP SERPL CALC-SCNC: 11 MMOL/L (ref 10–20)
ANION GAP SERPL CALC-SCNC: 12 MMOL/L (ref 10–20)
ANION GAP SERPL CALC-SCNC: 15 MMOL/L (ref 10–20)
ANION GAP SERPL CALC-SCNC: 15 MMOL/L (ref 10–20)
ANION GAP SERPL CALC-SCNC: 16 MMOL/L (ref 10–20)
ANION GAP SERPL CALC-SCNC: 17 MMOL/L (ref 10–20)
AST SERPL W P-5'-P-CCNC: 13 U/L (ref 9–39)
AST SERPL W P-5'-P-CCNC: 15 U/L (ref 9–39)
AST SERPL W P-5'-P-CCNC: 16 U/L (ref 9–39)
AST SERPL W P-5'-P-CCNC: 16 U/L (ref 9–39)
AST SERPL W P-5'-P-CCNC: 17 U/L (ref 9–39)
AST SERPL W P-5'-P-CCNC: 18 U/L (ref 9–39)
AST SERPL W P-5'-P-CCNC: 20 U/L (ref 9–39)
B-OH-BUTYR SERPL-SCNC: 0.5 MMOL/L (ref 0.02–0.27)
B-OH-BUTYR SERPL-SCNC: 0.57 MMOL/L (ref 0.02–0.27)
B-OH-BUTYR SERPL-SCNC: 0.58 MMOL/L (ref 0.02–0.27)
B-OH-BUTYR SERPL-SCNC: 0.64 MMOL/L (ref 0.02–0.27)
B-OH-BUTYR SERPL-SCNC: 1.09 MMOL/L (ref 0.02–0.27)
B-OH-BUTYR SERPL-SCNC: 1.14 MMOL/L (ref 0.02–0.27)
B-OH-BUTYR SERPL-SCNC: 1.46 MMOL/L (ref 0.02–0.27)
B-OH-BUTYR SERPL-SCNC: 1.76 MMOL/L (ref 0.02–0.27)
BASE EXCESS BLDV CALC-SCNC: -4.7 MMOL/L (ref -2–3)
BASE EXCESS BLDV CALC-SCNC: -5 MMOL/L (ref -2–3)
BASE EXCESS BLDV CALC-SCNC: -5.1 MMOL/L (ref -2–3)
BASE EXCESS BLDV CALC-SCNC: -5.2 MMOL/L (ref -2–3)
BASE EXCESS BLDV CALC-SCNC: -5.3 MMOL/L (ref -2–3)
BASE EXCESS BLDV CALC-SCNC: -5.3 MMOL/L (ref -2–3)
BASE EXCESS BLDV CALC-SCNC: -5.6 MMOL/L (ref -2–3)
BILIRUB SERPL-MCNC: 0.4 MG/DL (ref 0–1.2)
BILIRUB SERPL-MCNC: 0.5 MG/DL (ref 0–1.2)
BODY TEMPERATURE: 37 DEGREES CELSIUS
BUN SERPL-MCNC: 3 MG/DL (ref 6–23)
BUN SERPL-MCNC: 4 MG/DL (ref 6–23)
CA-I BLDV-SCNC: 1.19 MMOL/L (ref 1.1–1.33)
CALCIUM SERPL-MCNC: 8.4 MG/DL (ref 8.6–10.6)
CALCIUM SERPL-MCNC: 8.5 MG/DL (ref 8.6–10.6)
CALCIUM SERPL-MCNC: 8.5 MG/DL (ref 8.6–10.6)
CALCIUM SERPL-MCNC: 8.6 MG/DL (ref 8.6–10.6)
CALCIUM SERPL-MCNC: 8.8 MG/DL (ref 8.6–10.6)
CHLORIDE BLDV-SCNC: 105 MMOL/L (ref 98–107)
CHLORIDE SERPL-SCNC: 104 MMOL/L (ref 98–107)
CHLORIDE SERPL-SCNC: 105 MMOL/L (ref 98–107)
CHLORIDE SERPL-SCNC: 105 MMOL/L (ref 98–107)
CHLORIDE SERPL-SCNC: 106 MMOL/L (ref 98–107)
CHLORIDE SERPL-SCNC: 106 MMOL/L (ref 98–107)
CO2 SERPL-SCNC: 16 MMOL/L (ref 21–32)
CO2 SERPL-SCNC: 19 MMOL/L (ref 21–32)
CO2 SERPL-SCNC: 19 MMOL/L (ref 21–32)
CO2 SERPL-SCNC: 20 MMOL/L (ref 21–32)
CREAT SERPL-MCNC: 0.33 MG/DL (ref 0.5–1.05)
CREAT SERPL-MCNC: 0.35 MG/DL (ref 0.5–1.05)
CREAT SERPL-MCNC: 0.36 MG/DL (ref 0.5–1.05)
CREAT SERPL-MCNC: 0.39 MG/DL (ref 0.5–1.05)
CREAT SERPL-MCNC: 0.39 MG/DL (ref 0.5–1.05)
CREAT SERPL-MCNC: 0.48 MG/DL (ref 0.5–1.05)
CREAT SERPL-MCNC: 0.5 MG/DL (ref 0.5–1.05)
ERYTHROCYTE [DISTWIDTH] IN BLOOD BY AUTOMATED COUNT: 12.5 % (ref 11.5–14.5)
GFR SERPL CREATININE-BSD FRML MDRD: >90 ML/MIN/1.73M*2
GLUCOSE BLD MANUAL STRIP-MCNC: 129 MG/DL (ref 74–99)
GLUCOSE BLD MANUAL STRIP-MCNC: 137 MG/DL (ref 74–99)
GLUCOSE BLD MANUAL STRIP-MCNC: 146 MG/DL (ref 74–99)
GLUCOSE BLD MANUAL STRIP-MCNC: 153 MG/DL (ref 74–99)
GLUCOSE BLD MANUAL STRIP-MCNC: 156 MG/DL (ref 74–99)
GLUCOSE BLD MANUAL STRIP-MCNC: 157 MG/DL (ref 74–99)
GLUCOSE BLD MANUAL STRIP-MCNC: 158 MG/DL (ref 74–99)
GLUCOSE BLD MANUAL STRIP-MCNC: 160 MG/DL (ref 74–99)
GLUCOSE BLD MANUAL STRIP-MCNC: 163 MG/DL (ref 74–99)
GLUCOSE BLD MANUAL STRIP-MCNC: 163 MG/DL (ref 74–99)
GLUCOSE BLD MANUAL STRIP-MCNC: 166 MG/DL (ref 74–99)
GLUCOSE BLD MANUAL STRIP-MCNC: 171 MG/DL (ref 74–99)
GLUCOSE BLD MANUAL STRIP-MCNC: 178 MG/DL (ref 74–99)
GLUCOSE BLD MANUAL STRIP-MCNC: 195 MG/DL (ref 74–99)
GLUCOSE BLD MANUAL STRIP-MCNC: 204 MG/DL (ref 74–99)
GLUCOSE BLD MANUAL STRIP-MCNC: 210 MG/DL (ref 74–99)
GLUCOSE BLD MANUAL STRIP-MCNC: 221 MG/DL (ref 74–99)
GLUCOSE BLD MANUAL STRIP-MCNC: 227 MG/DL (ref 74–99)
GLUCOSE BLD MANUAL STRIP-MCNC: 233 MG/DL (ref 74–99)
GLUCOSE BLD MANUAL STRIP-MCNC: 235 MG/DL (ref 74–99)
GLUCOSE BLD MANUAL STRIP-MCNC: 236 MG/DL (ref 74–99)
GLUCOSE BLD MANUAL STRIP-MCNC: 243 MG/DL (ref 74–99)
GLUCOSE BLD MANUAL STRIP-MCNC: 247 MG/DL (ref 74–99)
GLUCOSE BLDV-MCNC: 264 MG/DL (ref 74–99)
GLUCOSE SERPL-MCNC: 150 MG/DL (ref 74–99)
GLUCOSE SERPL-MCNC: 156 MG/DL (ref 74–99)
GLUCOSE SERPL-MCNC: 168 MG/DL (ref 74–99)
GLUCOSE SERPL-MCNC: 175 MG/DL (ref 74–99)
GLUCOSE SERPL-MCNC: 176 MG/DL (ref 74–99)
GLUCOSE SERPL-MCNC: 245 MG/DL (ref 74–99)
GLUCOSE SERPL-MCNC: 275 MG/DL (ref 74–99)
HCO3 BLDV-SCNC: 18.2 MMOL/L (ref 22–26)
HCO3 BLDV-SCNC: 18.8 MMOL/L (ref 22–26)
HCO3 BLDV-SCNC: 18.9 MMOL/L (ref 22–26)
HCO3 BLDV-SCNC: 19.1 MMOL/L (ref 22–26)
HCO3 BLDV-SCNC: 19.1 MMOL/L (ref 22–26)
HCO3 BLDV-SCNC: 19.5 MMOL/L (ref 22–26)
HCO3 BLDV-SCNC: 19.8 MMOL/L (ref 22–26)
HCT VFR BLD AUTO: 31.8 % (ref 36–46)
HCT VFR BLD EST: 32 % (ref 36–46)
HGB BLD-MCNC: 10.6 G/DL (ref 12–16)
HGB BLDV-MCNC: 10.6 G/DL (ref 12–16)
INHALED O2 CONCENTRATION: 100 %
INHALED O2 CONCENTRATION: 21 %
LACTATE BLDV-SCNC: 1.2 MMOL/L (ref 0.4–2)
MCH RBC QN AUTO: 30.1 PG (ref 26–34)
MCHC RBC AUTO-ENTMCNC: 33.3 G/DL (ref 32–36)
MCV RBC AUTO: 90 FL (ref 80–100)
NRBC BLD-RTO: 0 /100 WBCS (ref 0–0)
OXYHGB MFR BLDV: 61.1 % (ref 45–75)
OXYHGB MFR BLDV: 71.4 % (ref 45–75)
OXYHGB MFR BLDV: 92.1 % (ref 45–75)
OXYHGB MFR BLDV: 96.6 % (ref 45–75)
OXYHGB MFR BLDV: 96.8 % (ref 45–75)
OXYHGB MFR BLDV: 97.6 % (ref 45–75)
OXYHGB MFR BLDV: 97.7 % (ref 45–75)
PCO2 BLDV: 30 MM HG (ref 41–51)
PCO2 BLDV: 31 MM HG (ref 41–51)
PCO2 BLDV: 32 MM HG (ref 41–51)
PCO2 BLDV: 33 MM HG (ref 41–51)
PCO2 BLDV: 35 MM HG (ref 41–51)
PH BLDV: 7.36 PH (ref 7.33–7.43)
PH BLDV: 7.37 PH (ref 7.33–7.43)
PH BLDV: 7.37 PH (ref 7.33–7.43)
PH BLDV: 7.38 PH (ref 7.33–7.43)
PH BLDV: 7.38 PH (ref 7.33–7.43)
PH BLDV: 7.39 PH (ref 7.33–7.43)
PH BLDV: 7.39 PH (ref 7.33–7.43)
PLATELET # BLD AUTO: 353 X10*3/UL (ref 150–450)
PO2 BLDV: 102 MM HG (ref 35–45)
PO2 BLDV: 109 MM HG (ref 35–45)
PO2 BLDV: 40 MM HG (ref 35–45)
PO2 BLDV: 46 MM HG (ref 35–45)
PO2 BLDV: 65 MM HG (ref 35–45)
PO2 BLDV: 87 MM HG (ref 35–45)
PO2 BLDV: 90 MM HG (ref 35–45)
POTASSIUM BLDV-SCNC: 3.7 MMOL/L (ref 3.5–5.3)
POTASSIUM SERPL-SCNC: 3.8 MMOL/L (ref 3.5–5.3)
POTASSIUM SERPL-SCNC: 3.8 MMOL/L (ref 3.5–5.3)
POTASSIUM SERPL-SCNC: 3.9 MMOL/L (ref 3.5–5.3)
POTASSIUM SERPL-SCNC: 4 MMOL/L (ref 3.5–5.3)
POTASSIUM SERPL-SCNC: 4.1 MMOL/L (ref 3.5–5.3)
POTASSIUM SERPL-SCNC: 4.1 MMOL/L (ref 3.5–5.3)
POTASSIUM SERPL-SCNC: 4.3 MMOL/L (ref 3.5–5.3)
PROT SERPL-MCNC: 5 G/DL (ref 6.4–8.2)
PROT SERPL-MCNC: 5 G/DL (ref 6.4–8.2)
PROT SERPL-MCNC: 5.1 G/DL (ref 6.4–8.2)
PROT SERPL-MCNC: 5.1 G/DL (ref 6.4–8.2)
PROT SERPL-MCNC: 5.4 G/DL (ref 6.4–8.2)
PROT SERPL-MCNC: 5.5 G/DL (ref 6.4–8.2)
PROT SERPL-MCNC: 5.6 G/DL (ref 6.4–8.2)
RBC # BLD AUTO: 3.52 X10*6/UL (ref 4–5.2)
SAO2 % BLDV: 100 % (ref 45–75)
SAO2 % BLDV: 100 % (ref 45–75)
SAO2 % BLDV: 62 % (ref 45–75)
SAO2 % BLDV: 73 % (ref 45–75)
SAO2 % BLDV: 95 % (ref 45–75)
SAO2 % BLDV: 99 % (ref 45–75)
SAO2 % BLDV: 99 % (ref 45–75)
SODIUM BLDV-SCNC: 132 MMOL/L (ref 136–145)
SODIUM SERPL-SCNC: 131 MMOL/L (ref 136–145)
SODIUM SERPL-SCNC: 133 MMOL/L (ref 136–145)
SODIUM SERPL-SCNC: 134 MMOL/L (ref 136–145)
SODIUM SERPL-SCNC: 134 MMOL/L (ref 136–145)
SODIUM SERPL-SCNC: 135 MMOL/L (ref 136–145)
TEST COMMENT: ABNORMAL
WBC # BLD AUTO: 7 X10*3/UL (ref 4.4–11.3)

## 2023-11-28 PROCEDURE — 80053 COMPREHEN METABOLIC PANEL: CPT

## 2023-11-28 PROCEDURE — 2500000004 HC RX 250 GENERAL PHARMACY W/ HCPCS (ALT 636 FOR OP/ED)

## 2023-11-28 PROCEDURE — 99199 UNLISTED SPECIAL SVC PX/RPRT: CPT | Performed by: OBSTETRICS & GYNECOLOGY

## 2023-11-28 PROCEDURE — 84075 ASSAY ALKALINE PHOSPHATASE: CPT

## 2023-11-28 PROCEDURE — 85027 COMPLETE CBC AUTOMATED: CPT | Performed by: STUDENT IN AN ORGANIZED HEALTH CARE EDUCATION/TRAINING PROGRAM

## 2023-11-28 PROCEDURE — 82010 KETONE BODYS QUAN: CPT

## 2023-11-28 PROCEDURE — 84132 ASSAY OF SERUM POTASSIUM: CPT

## 2023-11-28 PROCEDURE — 99222 1ST HOSP IP/OBS MODERATE 55: CPT | Performed by: OBSTETRICS & GYNECOLOGY

## 2023-11-28 PROCEDURE — 2500000004 HC RX 250 GENERAL PHARMACY W/ HCPCS (ALT 636 FOR OP/ED): Performed by: STUDENT IN AN ORGANIZED HEALTH CARE EDUCATION/TRAINING PROGRAM

## 2023-11-28 PROCEDURE — 2500000001 HC RX 250 WO HCPCS SELF ADMINISTERED DRUGS (ALT 637 FOR MEDICARE OP): Performed by: STUDENT IN AN ORGANIZED HEALTH CARE EDUCATION/TRAINING PROGRAM

## 2023-11-28 PROCEDURE — 1120000001 HC OB PRIVATE ROOM DAILY

## 2023-11-28 PROCEDURE — 82805 BLOOD GASES W/O2 SATURATION: CPT | Performed by: STUDENT IN AN ORGANIZED HEALTH CARE EDUCATION/TRAINING PROGRAM

## 2023-11-28 PROCEDURE — 84132 ASSAY OF SERUM POTASSIUM: CPT | Performed by: STUDENT IN AN ORGANIZED HEALTH CARE EDUCATION/TRAINING PROGRAM

## 2023-11-28 PROCEDURE — 82947 ASSAY GLUCOSE BLOOD QUANT: CPT

## 2023-11-28 PROCEDURE — 82010 KETONE BODYS QUAN: CPT | Performed by: STUDENT IN AN ORGANIZED HEALTH CARE EDUCATION/TRAINING PROGRAM

## 2023-11-28 PROCEDURE — 82805 BLOOD GASES W/O2 SATURATION: CPT

## 2023-11-28 PROCEDURE — 3E0P7VZ INTRODUCTION OF HORMONE INTO FEMALE REPRODUCTIVE, VIA NATURAL OR ARTIFICIAL OPENING: ICD-10-PCS | Performed by: OBSTETRICS & GYNECOLOGY

## 2023-11-28 RX ORDER — MAGNESIUM SULFATE HEPTAHYDRATE 40 MG/ML
2 INJECTION, SOLUTION INTRAVENOUS CONTINUOUS
Status: DISCONTINUED | OUTPATIENT
Start: 2023-11-28 | End: 2023-12-06 | Stop reason: HOSPADM

## 2023-11-28 RX ORDER — DEXTROSE MONOHYDRATE AND SODIUM CHLORIDE 5; .9 G/100ML; G/100ML
150 INJECTION, SOLUTION INTRAVENOUS CONTINUOUS
Status: DISCONTINUED | OUTPATIENT
Start: 2023-11-28 | End: 2023-11-28

## 2023-11-28 RX ORDER — CALCIUM GLUCONATE 98 MG/ML
1 INJECTION, SOLUTION INTRAVENOUS ONCE AS NEEDED
Status: DISCONTINUED | OUTPATIENT
Start: 2023-11-28 | End: 2023-12-06 | Stop reason: HOSPADM

## 2023-11-28 RX ORDER — INSULIN LISPRO 100 [IU]/ML
3 INJECTION, SOLUTION INTRAVENOUS; SUBCUTANEOUS AS NEEDED
Status: DISCONTINUED | OUTPATIENT
Start: 2023-11-28 | End: 2023-11-29

## 2023-11-28 RX ORDER — ACETAMINOPHEN 325 MG/1
650 TABLET ORAL EVERY 6 HOURS PRN
Status: CANCELLED | OUTPATIENT
Start: 2023-11-28

## 2023-11-28 RX ORDER — SODIUM CHLORIDE 9 MG/ML
25 INJECTION, SOLUTION INTRAVENOUS CONTINUOUS PRN
Status: DISCONTINUED | OUTPATIENT
Start: 2023-11-28 | End: 2023-11-29

## 2023-11-28 RX ORDER — DEXTROSE MONOHYDRATE 50 MG/ML
150 INJECTION, SOLUTION INTRAVENOUS CONTINUOUS
Status: DISCONTINUED | OUTPATIENT
Start: 2023-11-28 | End: 2023-11-28

## 2023-11-28 RX ORDER — DEXTROSE MONOHYDRATE 100 MG/ML
50 INJECTION, SOLUTION INTRAVENOUS CONTINUOUS PRN
Status: DISCONTINUED | OUTPATIENT
Start: 2023-11-28 | End: 2023-11-29

## 2023-11-28 RX ORDER — ACETAMINOPHEN 325 MG/1
975 TABLET ORAL EVERY 6 HOURS PRN
Status: DISCONTINUED | OUTPATIENT
Start: 2023-11-28 | End: 2023-12-01

## 2023-11-28 RX ORDER — NIFEDIPINE 30 MG/1
30 TABLET, FILM COATED, EXTENDED RELEASE ORAL DAILY
Status: DISCONTINUED | OUTPATIENT
Start: 2023-11-28 | End: 2023-12-06 | Stop reason: HOSPADM

## 2023-11-28 RX ORDER — CYCLOBENZAPRINE HCL 10 MG
10 TABLET ORAL ONCE
Status: COMPLETED | OUTPATIENT
Start: 2023-11-28 | End: 2023-11-28

## 2023-11-28 RX ORDER — MORPHINE SULFATE 2 MG/ML
2 INJECTION, SOLUTION INTRAMUSCULAR; INTRAVENOUS ONCE
Status: COMPLETED | OUTPATIENT
Start: 2023-11-28 | End: 2023-11-28

## 2023-11-28 RX ORDER — DEXTROSE MONOHYDRATE 100 MG/ML
100 INJECTION, SOLUTION INTRAVENOUS AS NEEDED
Status: DISCONTINUED | OUTPATIENT
Start: 2023-11-28 | End: 2023-11-29

## 2023-11-28 RX ORDER — PENICILLIN G 3000000 [IU]/50ML
3 INJECTION, SOLUTION INTRAVENOUS EVERY 4 HOURS
Status: DISCONTINUED | OUTPATIENT
Start: 2023-11-29 | End: 2023-12-01

## 2023-11-28 RX ORDER — DEXTROSE 40 %
15 GEL (GRAM) ORAL
Status: DISCONTINUED | OUTPATIENT
Start: 2023-11-28 | End: 2023-11-29

## 2023-11-28 RX ADMIN — NIFEDIPINE 30 MG: 30 TABLET, FILM COATED, EXTENDED RELEASE ORAL at 21:09

## 2023-11-28 RX ADMIN — ACETAMINOPHEN 975 MG: 325 TABLET ORAL at 17:18

## 2023-11-28 RX ADMIN — MISOPROSTOL 25 MCG: 100 TABLET ORAL at 23:35

## 2023-11-28 RX ADMIN — PENICILLIN G POTASSIUM 5 MILLION UNITS: 5000000 INJECTION, POWDER, FOR SOLUTION INTRAMUSCULAR; INTRAVENOUS at 22:50

## 2023-11-28 RX ADMIN — DEXTROSE AND SODIUM CHLORIDE 150 ML/HR: 5; 900 INJECTION, SOLUTION INTRAVENOUS at 12:17

## 2023-11-28 RX ADMIN — DEXTROSE MONOHYDRATE 150 ML/HR: 50 INJECTION, SOLUTION INTRAVENOUS at 00:38

## 2023-11-28 RX ADMIN — CYCLOBENZAPRINE 10 MG: 10 TABLET, FILM COATED ORAL at 17:19

## 2023-11-28 RX ADMIN — MORPHINE SULFATE 2 MG: 2 INJECTION, SOLUTION INTRAMUSCULAR; INTRAVENOUS at 22:45

## 2023-11-28 RX ADMIN — MAGNESIUM SULFATE IN WATER 2 G/HR: 20 INJECTION, SOLUTION INTRAVENOUS at 22:20

## 2023-11-28 RX ADMIN — DEXTROSE AND SODIUM CHLORIDE 150 ML/HR: 5; 900 INJECTION, SOLUTION INTRAVENOUS at 17:26

## 2023-11-28 ASSESSMENT — PAIN SCALES - GENERAL
PAINLEVEL_OUTOF10: 0 - NO PAIN
PAINLEVEL_OUTOF10: 1
PAINLEVEL_OUTOF10: 0 - NO PAIN
PAINLEVEL_OUTOF10: 5 - MODERATE PAIN
PAINLEVEL_OUTOF10: 0 - NO PAIN
PAINLEVEL_OUTOF10: 5 - MODERATE PAIN
PAINLEVEL_OUTOF10: 0 - NO PAIN
PAINLEVEL_OUTOF10: 5 - MODERATE PAIN
PAINLEVEL_OUTOF10: 0 - NO PAIN
PAINLEVEL_OUTOF10: 3

## 2023-11-28 ASSESSMENT — PAIN DESCRIPTION - DESCRIPTORS: DESCRIPTORS: ACHING

## 2023-11-28 ASSESSMENT — PAIN - FUNCTIONAL ASSESSMENT: PAIN_FUNCTIONAL_ASSESSMENT: 0-10

## 2023-11-28 ASSESSMENT — PAIN DESCRIPTION - ORIENTATION: ORIENTATION: RIGHT;UPPER

## 2023-11-28 ASSESSMENT — PAIN DESCRIPTION - LOCATION: LOCATION: ABDOMEN

## 2023-11-28 NOTE — CONSULTS
MFM CONSULT NOTE   11/28/2023, 2:43 PM     SUBJECTIVE:   Patient admitted yesterday with reoccurrence of DKA, suspected pump malfunction.      This AM, patient reporting intermittently having an appetite. Denies current nausea/vomiting, one episode of vomiting yesterday per H&P. Denies other localizing symptoms. States she was attempting to bolus herself through her pump as her CGM was reading increasing levels, but suspected there was a malfunction so called for an ambulance.     Pregnancy complicated by:  -T1DM, frequent DKA admissions, last admitted 11/20-11/25, no known chronic diabetic complications  - cHTN, no medications    OBJECTIVE:   /81   Pulse 107   Temp 36.6 °C (97.9 °F) (Temporal)   Resp 18   Ht 1.524 m (5')   Wt 60.4 kg (133 lb 2.5 oz)   LMP 04/01/2023   SpO2 100%   BMI 26.01 kg/m²    Temp  Min: 36.5 °C (97.7 °F)  Max: 37.1 °C (98.8 °F)  Pulse  Min: 77  Max: 125  BP  Min: 109/67  Max: 171/92    General:  AAOx3, No acute distress  Cardiovascular: Warm and well perfused  Respiratory: Normal respiratory effort   Abdominal:  Soft, gravid, non-tender, no rebound or guarding, no palpable contractions   Back: No gross deformities  Extremities: Warm, well perfused, trace edema, no calf tenderness   Pelvic: deferred, declined on admission    NST: Baseline 130, accelerations +, - decelerations, mod variability   [place]: irregular contractions, patient not feeling     Labs:   Component      Latest Ref Rng 11/27/2023 11/28/2023   Beta-Hydroxybutyrate      0.02 - 0.27 mmol/L 1.76 (H)  1.46 (H)    Beta-Hydroxybutyrate       2.86 (H)  0.58 (H)    Beta-Hydroxybutyrate       4.34 (H)  0.50 (H)    Beta-Hydroxybutyrate       4.14 (H)  0.57 (H)    Beta-Hydroxybutyrate        0.64 (H)       Component      Latest Ref Rng 11/27/2023 11/28/2023   POCT pH, Venous      7.33 - 7.43 pH 7.37  7.39    POCT pH, Venous       7.30 (L)  7.38    POCT pH, Venous       7.28 (L)  7.37    POCT pH, Venous       7.28 (L)  7.38     POCT pH, Venous        7.39       ASSESSMENT AND PLAN:     23 y.o.  at 34w3d with T1DM admitted with DKA, suspected secondary to pump malfunctioning.    T1DM, diabetic ketoacidosis  - Beta hydroxybuterate trend as above, AG 15  - Remains on insulin drip, intermittent has an appetite  - Acidotic on admission as above, most recent venous pH 7.39  - Potassium 3.8 at last draw, replete PRN  - mIVF at 150cc/hr, currently receiving D5 NS  - Dispo: PO challenge prior to re-starting pump. Patient desires to take a nap prior to trial of PO intake, plan to re-start pump and trouble shoot likely mechanical issue if patient tolerates PO, will also plan for 1 hour overlap between pump and drip.    Rule out  labor  - Pelvic pressure on admission, now asymptomatic  - Declined cervical exam on admission, will re-discuss if symptoms reoccur    cHTN  - No home medications, normotensive, asymptomatic  - Continue to monitor    IUP  - Tracing AGA, currently on CEFM, okay to discontinue this    Dispo: Continue monitoring on L&D until PO tolerated, pump restarted, and able to discontinue insulin drip    Pt seen and discussed with MFM Attending, .     Magalie Jon MD   MFM Felloww  Pager [phone]     Principal Problem:    DM (diabetes mellitus) type 1, uncontrolled, with ketoacidosis (CMS/HCC)

## 2023-11-28 NOTE — PROGRESS NOTES
Antepartum Progress Note    Assessment/Plan   Maria Isabel Cutler is a 23 y.o.  at 34w3d SHIN: 2024, by Last Menstrual Period presenting for DKA     T1DM, DKA  -DKA diagnosed by hyperglycemia, anion gap metabolic acidosis, and ketonemia  - unclear inciting event, pt concerned her sensor was not working appropriately, will readdress once stabilized and transitioning back to pump  - On admission labs as follows:   - --> 190    -Bicarb 16    -AG 18    -BHB 4.14   -Na 134   -K 4.3   -Venous pH 7.28  -Most recent labs: pH 7.38, BHB 0.58, anion gap 8  - continue to trend labs (BMP, BHB, ABG) q4 hours since anion gap now closed; midline placed by IV team due to difficult access and need for frequent blood draws  - Continue insulin gtt  - for POCT q1hr   - for maintenance fluids @250cc/hr   - for D5-0.45% NS if BG >150   - for D10W if BG <150 and AG still open   - continue potassium supplementation for K+ 3.3-5.3, 20mEQ given on arrival   - strict Is/Os  - pump settings on prior discharge, plan to transition once DKA resolved   Basal  3943-6346 0.90  2248-6762 1.00  2994-4456 0.90     Bolus  5647-4967 1:9     CF: 1:40  Carb ratio: 1:9  Target:110  DOA 5 hrs     R/o PTL   - abdominal pain with contractions on toco on admission  - declining SVE on admission, however this AM patient reports abdominal pain improved and denies contractions -> will defer SVE at this time  - SVE closed on prior admission    cHTN   - no current meds, normotensive     Fetal wellbeing  - FHT cat 1 currently  - CEFM     Dispo: Continue CEFM and insulin gtt on L&D, will plan for transition to insulin pump once DKA resolved and insulin pump settings established    d/w Dr. Shawna Mcintosh MD  PGY2, Obstetrics and Gynecology       Principal Problem:    DM (diabetes mellitus) type 1, uncontrolled, with ketoacidosis (CMS/HCC)    Pregnancy Problems (from 24 to present)       Problem Noted Resolved    Bilateral sciatica 10/10/2023  by Stuart Munoz MD No     Intermittent pain shooting down posteriolateral thighs bilaterally         33 weeks gestation of pregnancy 10/8/2023 by Stuart Munoz MD No     [x] PNLs reviewed wnl, rubella nonimmune  [x] Pap ASCUS 2022  [x] NT wnl 2023  [x] cell-free DNA, CF/SMA screening low risk  [x] Anatomy US  [x] 2nd trimester labs, Hg 9.9  [x] tdap  [ ] flu/covid  [/] GBS  [ ] Delivery Planning  [ ] PPBC           Asthma affecting pregnancy in third trimester 10/8/2023 by Stuart Munoz MD No     Albuterol PRN  10/24 Regimen: Flovent 250mcg BID puff, Albuterol PRN         Type 1 diabetes mellitus complicating pregnancy, antepartum 10/5/2023 by Indiana Parra MD No     [x] Baseline HbA1c 12.8 2023  [x] Baseline TSH 2.38 2023  [x] Baseline HELLP Labs wnl, 0.11 2023  [x] bbASA  [x] EKG  wnl  [x] Fetal Echo wnl  [x] Ophthalmology  /podiatry    [ ]  Testing 2x weekly until del    Serial Growths  10/24 29.0 wga EFW 1294g 42%, AC 48%    Current Regimen (as of )  Basal  0608-2084 0.90  7127-9832 1.00  7681-8392 0.90     Bolus  7214-6543 1:9     CF: 1:40  Carb ratio: 1:9  Target:110  DOA 5 hrs          Herpes simplex infection in mother during third trimester of pregnancy 10/5/2023 by Indiana Parra MD No     On acyclovir prophylaxis          Chronic hypertension affecting pregnancy 10/5/2023 by Indiana Parra MD No     [x] Baseline HELLP Labs, P:C 0.11 2023  [x] bbASA  [ ]  Testing  [x] Baseline EKG inpatient      Serial Growths    Med Regimen-No meds         Group beta Strep positive 10/5/2023 by Indiana Parra MD No     GBS UTI in pregnancy          Depression during pregnancy in third trimester 10/5/2023 by Indiana Parra MD No    Fetal cardiac anomaly affecting pregnancy, antepartum 10/5/2023 by Indiana Parra MD No     M Plan of Care: fetal ventricular septum slightly thickened --> code:1: non-urgent peds cardiology consult prior to discharge or within 1-2  weeks if delivered at an outside hospital  No structural abnormalities on repeat fetal echo --> peds cards recommendation for routine delivery and  care with non-urgent peds cardiology consult prior to discharge or within 1-2 weeks if delivered at an outside hospital         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 Good fetal movement. Denies nausea or vomiting.    Objective   Allergies:   Patient has no known allergies.    Last Vitals:  Temp Pulse Resp BP MAP Pulse Ox   36.9 °C (98.4 °F) 84 16 121/74   99 %     Vitals Min/Max Last 24 Hours:  Temp  Min: 36.5 °C (97.7 °F)  Max: 37.1 °C (98.8 °F)  Pulse  Min: 77  Max: 125  Resp  Min: 16  Max: 16  BP  Min: 109/67  Max: 171/92    Intake/Output:     Intake/Output Summary (Last 24 hours) at 2023 0900  Last data filed at 2023 0638  Gross per 24 hour   Intake 1930.78 ml   Output 2550 ml   Net -619.22 ml         Physical Exam:  General: no acute distress  HEENT: normocephalic, atraumatic  Heart: warm and well perfused  Lungs: no increased WOB  Abdomen: gravid, soft, non-tender to palpation  Extremities: moving all extremities  Neuro: awake and conversant  Psych: appropriate mood and affect     Fetal Assessment  Movement: Present  Mode: External US  Baseline Fetal Heart Rate (bpm): 135 bpm  Baseline Classification: Normal  Variability: Moderate (Between 6 and 25  BPM)  Pattern: Accelerations  FHR Category: Category I   Fetal Decelerations: No  Contraction Frequency: 6-9        Lab Data:  Lab Results   Component Value Date    WBC 9.6 11/27/2023    HGB 12.6 11/27/2023    HCT 39.6 11/27/2023     11/27/2023     Lab Results   Component Value Date    GLUCOSE 168 (H) 11/28/2023     (L) 11/28/2023    K 4.1 11/28/2023     11/28/2023    CO2 20 (L) 11/28/2023    ANIONGAP 12 11/28/2023    BUN 3 (L) 11/28/2023    CREATININE 0.36 (L) 11/28/2023    EGFR >90 11/28/2023    CALCIUM 8.4 (L) 11/28/2023    ALBUMIN 2.9 (L) 11/28/2023    PROT 5.0 (L) 11/28/2023    ALKPHOS 70 11/28/2023    ALT 12 11/28/2023    AST 16 11/28/2023    BILITOT 0.5 11/28/2023

## 2023-11-29 ENCOUNTER — ANESTHESIA EVENT (OUTPATIENT)
Dept: OBSTETRICS AND GYNECOLOGY | Facility: HOSPITAL | Age: 23
End: 2023-11-29
Payer: COMMERCIAL

## 2023-11-29 ENCOUNTER — ANESTHESIA (OUTPATIENT)
Dept: OBSTETRICS AND GYNECOLOGY | Facility: HOSPITAL | Age: 23
End: 2023-11-29
Payer: COMMERCIAL

## 2023-11-29 PROBLEM — Z98.890 HISTORY OF GENERAL ANESTHESIA: Status: ACTIVE | Noted: 2023-11-29

## 2023-11-29 PROBLEM — R56.9 SEIZURES (MULTI): Status: ACTIVE | Noted: 2023-11-29

## 2023-11-29 LAB
ABO GROUP (TYPE) IN BLOOD: NORMAL
ALBUMIN SERPL BCP-MCNC: 2.9 G/DL (ref 3.4–5)
ALBUMIN SERPL BCP-MCNC: 3.4 G/DL (ref 3.4–5)
ALP SERPL-CCNC: 72 U/L (ref 33–110)
ALP SERPL-CCNC: 76 U/L (ref 33–110)
ALT SERPL W P-5'-P-CCNC: 13 U/L (ref 7–45)
ALT SERPL W P-5'-P-CCNC: 16 U/L (ref 7–45)
ANION GAP SERPL CALC-SCNC: 11 MMOL/L (ref 10–20)
ANION GAP SERPL CALC-SCNC: 15 MMOL/L (ref 10–20)
ANTIBODY SCREEN: NORMAL
AST SERPL W P-5'-P-CCNC: 16 U/L (ref 9–39)
AST SERPL W P-5'-P-CCNC: 20 U/L (ref 9–39)
B-OH-BUTYR SERPL-SCNC: 0.13 MMOL/L (ref 0.02–0.27)
BASE EXCESS BLDV CALC-SCNC: -4.8 MMOL/L (ref -2–3)
BILIRUB SERPL-MCNC: 0.3 MG/DL (ref 0–1.2)
BILIRUB SERPL-MCNC: 0.4 MG/DL (ref 0–1.2)
BODY TEMPERATURE: 37 DEGREES CELSIUS
BUN SERPL-MCNC: 2 MG/DL (ref 6–23)
BUN SERPL-MCNC: 3 MG/DL (ref 6–23)
CALCIUM SERPL-MCNC: 7.5 MG/DL (ref 8.6–10.6)
CALCIUM SERPL-MCNC: 8.1 MG/DL (ref 8.6–10.6)
CHLORIDE SERPL-SCNC: 106 MMOL/L (ref 98–107)
CHLORIDE SERPL-SCNC: 109 MMOL/L (ref 98–107)
CO2 SERPL-SCNC: 17 MMOL/L (ref 21–32)
CO2 SERPL-SCNC: 21 MMOL/L (ref 21–32)
CREAT SERPL-MCNC: 0.36 MG/DL (ref 0.5–1.05)
CREAT SERPL-MCNC: 0.47 MG/DL (ref 0.5–1.05)
ERYTHROCYTE [DISTWIDTH] IN BLOOD BY AUTOMATED COUNT: 12.4 % (ref 11.5–14.5)
ERYTHROCYTE [DISTWIDTH] IN BLOOD BY AUTOMATED COUNT: 12.6 % (ref 11.5–14.5)
GFR SERPL CREATININE-BSD FRML MDRD: >90 ML/MIN/1.73M*2
GFR SERPL CREATININE-BSD FRML MDRD: >90 ML/MIN/1.73M*2
GLUCOSE BLD MANUAL STRIP-MCNC: 105 MG/DL (ref 74–99)
GLUCOSE BLD MANUAL STRIP-MCNC: 107 MG/DL (ref 74–99)
GLUCOSE BLD MANUAL STRIP-MCNC: 109 MG/DL (ref 74–99)
GLUCOSE BLD MANUAL STRIP-MCNC: 113 MG/DL (ref 74–99)
GLUCOSE BLD MANUAL STRIP-MCNC: 143 MG/DL (ref 74–99)
GLUCOSE BLD MANUAL STRIP-MCNC: 57 MG/DL (ref 74–99)
GLUCOSE BLD MANUAL STRIP-MCNC: 59 MG/DL (ref 74–99)
GLUCOSE BLD MANUAL STRIP-MCNC: 71 MG/DL (ref 74–99)
GLUCOSE BLD MANUAL STRIP-MCNC: 73 MG/DL (ref 74–99)
GLUCOSE BLD MANUAL STRIP-MCNC: 74 MG/DL (ref 74–99)
GLUCOSE BLD MANUAL STRIP-MCNC: 78 MG/DL (ref 74–99)
GLUCOSE BLD MANUAL STRIP-MCNC: 78 MG/DL (ref 74–99)
GLUCOSE BLD MANUAL STRIP-MCNC: 80 MG/DL (ref 74–99)
GLUCOSE BLD MANUAL STRIP-MCNC: 85 MG/DL (ref 74–99)
GLUCOSE BLD MANUAL STRIP-MCNC: 86 MG/DL (ref 74–99)
GLUCOSE BLD MANUAL STRIP-MCNC: 87 MG/DL (ref 74–99)
GLUCOSE BLD MANUAL STRIP-MCNC: 89 MG/DL (ref 74–99)
GLUCOSE BLD MANUAL STRIP-MCNC: 91 MG/DL (ref 74–99)
GLUCOSE BLD MANUAL STRIP-MCNC: 92 MG/DL (ref 74–99)
GLUCOSE BLD MANUAL STRIP-MCNC: 94 MG/DL (ref 74–99)
GLUCOSE BLD MANUAL STRIP-MCNC: 95 MG/DL (ref 74–99)
GLUCOSE BLD MANUAL STRIP-MCNC: 96 MG/DL (ref 74–99)
GLUCOSE BLD MANUAL STRIP-MCNC: 97 MG/DL (ref 74–99)
GLUCOSE BLD MANUAL STRIP-MCNC: 98 MG/DL (ref 74–99)
GLUCOSE SERPL-MCNC: 64 MG/DL (ref 74–99)
GLUCOSE SERPL-MCNC: 95 MG/DL (ref 74–99)
HCO3 BLDV-SCNC: 20.5 MMOL/L (ref 22–26)
HCT VFR BLD AUTO: 29.4 % (ref 36–46)
HCT VFR BLD AUTO: 35.4 % (ref 36–46)
HGB BLD-MCNC: 10 G/DL (ref 12–16)
HGB BLD-MCNC: 10.9 G/DL (ref 12–16)
INHALED O2 CONCENTRATION: 100 %
MAGNESIUM SERPL-MCNC: 3.95 MG/DL (ref 1.6–2.4)
MCH RBC QN AUTO: 30.4 PG (ref 26–34)
MCH RBC QN AUTO: 30.9 PG (ref 26–34)
MCHC RBC AUTO-ENTMCNC: 30.8 G/DL (ref 32–36)
MCHC RBC AUTO-ENTMCNC: 34 G/DL (ref 32–36)
MCV RBC AUTO: 91 FL (ref 80–100)
MCV RBC AUTO: 99 FL (ref 80–100)
NRBC BLD-RTO: 0 /100 WBCS (ref 0–0)
NRBC BLD-RTO: 0 /100 WBCS (ref 0–0)
OXYHGB MFR BLDV: 79.4 % (ref 45–75)
PCO2 BLDV: 38 MM HG (ref 41–51)
PH BLDV: 7.34 PH (ref 7.33–7.43)
PLATELET # BLD AUTO: 257 X10*3/UL (ref 150–450)
PLATELET # BLD AUTO: 377 X10*3/UL (ref 150–450)
PO2 BLDV: 53 MM HG (ref 35–45)
POTASSIUM SERPL-SCNC: 3.4 MMOL/L (ref 3.5–5.3)
POTASSIUM SERPL-SCNC: 4.1 MMOL/L (ref 3.5–5.3)
PROT SERPL-MCNC: 5.3 G/DL (ref 6.4–8.2)
PROT SERPL-MCNC: 5.9 G/DL (ref 6.4–8.2)
RBC # BLD AUTO: 3.24 X10*6/UL (ref 4–5.2)
RBC # BLD AUTO: 3.59 X10*6/UL (ref 4–5.2)
RH FACTOR (ANTIGEN D): NORMAL
SAO2 % BLDV: 81 % (ref 45–75)
SODIUM SERPL-SCNC: 135 MMOL/L (ref 136–145)
SODIUM SERPL-SCNC: 137 MMOL/L (ref 136–145)
TEST COMMENT: ABNORMAL
WBC # BLD AUTO: 7.9 X10*3/UL (ref 4.4–11.3)
WBC # BLD AUTO: 9.1 X10*3/UL (ref 4.4–11.3)

## 2023-11-29 PROCEDURE — 2500000004 HC RX 250 GENERAL PHARMACY W/ HCPCS (ALT 636 FOR OP/ED): Performed by: STUDENT IN AN ORGANIZED HEALTH CARE EDUCATION/TRAINING PROGRAM

## 2023-11-29 PROCEDURE — 82010 KETONE BODYS QUAN: CPT

## 2023-11-29 PROCEDURE — 2500000001 HC RX 250 WO HCPCS SELF ADMINISTERED DRUGS (ALT 637 FOR MEDICARE OP): Performed by: STUDENT IN AN ORGANIZED HEALTH CARE EDUCATION/TRAINING PROGRAM

## 2023-11-29 PROCEDURE — 2500000005 HC RX 250 GENERAL PHARMACY W/O HCPCS

## 2023-11-29 PROCEDURE — 86920 COMPATIBILITY TEST SPIN: CPT

## 2023-11-29 PROCEDURE — 82805 BLOOD GASES W/O2 SATURATION: CPT

## 2023-11-29 PROCEDURE — 86901 BLOOD TYPING SEROLOGIC RH(D): CPT | Performed by: STUDENT IN AN ORGANIZED HEALTH CARE EDUCATION/TRAINING PROGRAM

## 2023-11-29 PROCEDURE — 84075 ASSAY ALKALINE PHOSPHATASE: CPT

## 2023-11-29 PROCEDURE — 85027 COMPLETE CBC AUTOMATED: CPT

## 2023-11-29 PROCEDURE — 1120000001 HC OB PRIVATE ROOM DAILY

## 2023-11-29 PROCEDURE — 85027 COMPLETE CBC AUTOMATED: CPT | Performed by: STUDENT IN AN ORGANIZED HEALTH CARE EDUCATION/TRAINING PROGRAM

## 2023-11-29 PROCEDURE — 2500000004 HC RX 250 GENERAL PHARMACY W/ HCPCS (ALT 636 FOR OP/ED)

## 2023-11-29 PROCEDURE — 83735 ASSAY OF MAGNESIUM: CPT | Performed by: STUDENT IN AN ORGANIZED HEALTH CARE EDUCATION/TRAINING PROGRAM

## 2023-11-29 PROCEDURE — 80053 COMPREHEN METABOLIC PANEL: CPT | Performed by: STUDENT IN AN ORGANIZED HEALTH CARE EDUCATION/TRAINING PROGRAM

## 2023-11-29 PROCEDURE — 82947 ASSAY GLUCOSE BLOOD QUANT: CPT

## 2023-11-29 RX ORDER — DEXTROSE 40 %
30 GEL (GRAM) ORAL
Status: DISCONTINUED | OUTPATIENT
Start: 2023-11-29 | End: 2023-12-01

## 2023-11-29 RX ORDER — DEXTROSE 40 %
15 GEL (GRAM) ORAL
Status: DISCONTINUED | OUTPATIENT
Start: 2023-11-29 | End: 2023-12-01

## 2023-11-29 RX ORDER — DEXTROSE 50 % IN WATER (D50W) INTRAVENOUS SYRINGE
25
Status: DISCONTINUED | OUTPATIENT
Start: 2023-11-29 | End: 2023-12-01

## 2023-11-29 RX ORDER — OXYTOCIN/0.9 % SODIUM CHLORIDE 30/500 ML
2-30 PLASTIC BAG, INJECTION (ML) INTRAVENOUS CONTINUOUS
Status: DISCONTINUED | OUTPATIENT
Start: 2023-11-29 | End: 2023-12-01

## 2023-11-29 RX ORDER — FENTANYL/BUPIVACAINE/NS/PF 2MCG/ML-.1
PLASTIC BAG, INJECTION (ML) INJECTION AS NEEDED
Status: DISCONTINUED | OUTPATIENT
Start: 2023-11-29 | End: 2023-12-01

## 2023-11-29 RX ORDER — DEXTROSE MONOHYDRATE 100 MG/ML
100 INJECTION, SOLUTION INTRAVENOUS AS NEEDED
Status: DISCONTINUED | OUTPATIENT
Start: 2023-11-29 | End: 2023-12-01

## 2023-11-29 RX ORDER — FENTANYL/BUPIVACAINE/NS/PF 2MCG/ML-.1
PLASTIC BAG, INJECTION (ML) INJECTION CONTINUOUS PRN
Status: DISCONTINUED | OUTPATIENT
Start: 2023-11-29 | End: 2023-12-01

## 2023-11-29 RX ORDER — SODIUM CHLORIDE, SODIUM LACTATE, POTASSIUM CHLORIDE, CALCIUM CHLORIDE 600; 310; 30; 20 MG/100ML; MG/100ML; MG/100ML; MG/100ML
75 INJECTION, SOLUTION INTRAVENOUS CONTINUOUS
Status: DISCONTINUED | OUTPATIENT
Start: 2023-11-29 | End: 2023-12-04

## 2023-11-29 RX ORDER — SODIUM CHLORIDE, SODIUM LACTATE, POTASSIUM CHLORIDE, CALCIUM CHLORIDE 600; 310; 30; 20 MG/100ML; MG/100ML; MG/100ML; MG/100ML
125 INJECTION, SOLUTION INTRAVENOUS CONTINUOUS
Status: CANCELLED | OUTPATIENT
Start: 2023-11-29

## 2023-11-29 RX ORDER — SODIUM CHLORIDE 9 MG/ML
25 INJECTION, SOLUTION INTRAVENOUS CONTINUOUS PRN
Status: DISCONTINUED | OUTPATIENT
Start: 2023-11-29 | End: 2023-12-01

## 2023-11-29 RX ORDER — DEXTROSE MONOHYDRATE 100 MG/ML
50 INJECTION, SOLUTION INTRAVENOUS CONTINUOUS PRN
Status: DISCONTINUED | OUTPATIENT
Start: 2023-11-29 | End: 2023-12-01

## 2023-11-29 RX ADMIN — INSULIN HUMAN 0.5 UNITS/HR: 1 INJECTION, SOLUTION INTRAVENOUS at 20:34

## 2023-11-29 RX ADMIN — MAGNESIUM SULFATE IN WATER 2 G/HR: 20 INJECTION, SOLUTION INTRAVENOUS at 18:28

## 2023-11-29 RX ADMIN — INSULIN HUMAN 0.1 UNITS/HR: 1 INJECTION, SOLUTION INTRAVENOUS at 21:35

## 2023-11-29 RX ADMIN — DEXTROSE MONOHYDRATE 50 ML/HR: 100 INJECTION, SOLUTION INTRAVENOUS at 05:48

## 2023-11-29 RX ADMIN — MISOPROSTOL 25 MCG: 100 TABLET ORAL at 08:14

## 2023-11-29 RX ADMIN — INSULIN HUMAN 0.7 UNITS/HR: 1 INJECTION, SOLUTION INTRAVENOUS at 15:31

## 2023-11-29 RX ADMIN — PENICILLIN G 3 MILLION UNITS: 3000000 INJECTION, SOLUTION INTRAVENOUS at 15:58

## 2023-11-29 RX ADMIN — NIFEDIPINE 30 MG: 30 TABLET, FILM COATED, EXTENDED RELEASE ORAL at 09:41

## 2023-11-29 RX ADMIN — PENICILLIN G 3 MILLION UNITS: 3000000 INJECTION, SOLUTION INTRAVENOUS at 06:46

## 2023-11-29 RX ADMIN — Medication 3 ML: at 07:21

## 2023-11-29 RX ADMIN — Medication 12 ML/HR: at 19:59

## 2023-11-29 RX ADMIN — PENICILLIN G 3 MILLION UNITS: 3000000 INJECTION, SOLUTION INTRAVENOUS at 11:13

## 2023-11-29 RX ADMIN — PENICILLIN G 3 MILLION UNITS: 3000000 INJECTION, SOLUTION INTRAVENOUS at 20:37

## 2023-11-29 RX ADMIN — MAGNESIUM SULFATE IN WATER 2 G/HR: 20 INJECTION, SOLUTION INTRAVENOUS at 04:34

## 2023-11-29 RX ADMIN — MISOPROSTOL 25 MCG: 100 TABLET ORAL at 03:38

## 2023-11-29 RX ADMIN — Medication 12 ML/HR: at 07:21

## 2023-11-29 RX ADMIN — Medication 5 ML: at 07:17

## 2023-11-29 RX ADMIN — INSULIN HUMAN 0 UNITS/HR: 1 INJECTION, SOLUTION INTRAVENOUS at 22:37

## 2023-11-29 RX ADMIN — Medication 2 MILLI-UNITS/MIN: at 13:44

## 2023-11-29 RX ADMIN — SODIUM CHLORIDE, POTASSIUM CHLORIDE, SODIUM LACTATE AND CALCIUM CHLORIDE 75 ML/HR: 600; 310; 30; 20 INJECTION, SOLUTION INTRAVENOUS at 20:28

## 2023-11-29 RX ADMIN — SODIUM CHLORIDE, POTASSIUM CHLORIDE, SODIUM LACTATE AND CALCIUM CHLORIDE 75 ML/HR: 600; 310; 30; 20 INJECTION, SOLUTION INTRAVENOUS at 07:00

## 2023-11-29 RX ADMIN — DEXTROSE MONOHYDRATE 50 ML/HR: 100 INJECTION, SOLUTION INTRAVENOUS at 15:31

## 2023-11-29 RX ADMIN — PENICILLIN G 3 MILLION UNITS: 3000000 INJECTION, SOLUTION INTRAVENOUS at 02:43

## 2023-11-29 SDOH — HEALTH STABILITY: MENTAL HEALTH: CURRENT SMOKER: 0

## 2023-11-29 ASSESSMENT — PAIN SCALES - GENERAL
PAINLEVEL_OUTOF10: 0 - NO PAIN

## 2023-11-29 NOTE — PROGRESS NOTES
Antepartum Progress Note    Assessment/Plan   Maria Isabel Cutler is a 23 y.o.  at 34w3d. SHIN: 2024, by Last Menstrual Period admitted for new DKA, now with siPEC w/ SF      cHTN, now siPEC w/ SF   - new severe range Bps > 4 hrs apart since arrival  - discussed with MFM suspicion for siPEC w/ SF given pt previously was well controlled with normotensive Bps without medications, now with multiple mild range Bps and non-sustained severes. Discussed with MFM and agrees that pt now meets criteria and would recommend IOL. See significant event note for details. Discussed plan with pt and in agreement to move forward with IOL.   - not previously on meds - will initiate nifedipine 30mg   - asymptomatic  - HELLP labs neg, repeat set pending.   - initiate Mg gtt    T1DM, DKA  -DKA diagnosed on admission by hyperglycemia, anion gap metabolic acidosis, and ketonemia on arrival. Resolved overnight while on insulin gtt but now with increasing BG and increasing BHB in setting of PO intake   - transitioned to pump this afternoon, will transition back to gtt in setting of new IOL   - Most recent labs over last 12 hrs -    - -240   - BHB 0.58 --> 1.46 --> 1.14    - Calculated AG 13   - Bicarb 20, stable   - Venous pH 7.36, stable   - Na 135, stable    - K 3.9, stable   - will plan to trend labs (BMP, BHB, ABG) every 4 hours while still hyperglycemic ; midline in place  - for POCT q1hr, concordant with CGM   - for maintenance fluids @150cc/hr pending BG as below:    NS if BG >160    D10W if BG <160   - strict Is/Os    IOL   - rescanned - cephalic  - cervix unfavorable - for ripening with CRB and cytotec, placed at 2330  - for pitocin and AROM when appropriate   - discussed risks and benefits of vaginal delivery including bleeding, infection, injury to surrounding structures. Pt expressed understanding, strongly desires vaginal delivery.     Maternal wellbeing  - T&S, CBC on arrival, Hgb 10.6  - h/o asthma - will avoid  hemabate     Fetal wellbeing  - NICU aware of decision to move toward delivery  - FHT Cat 2 in s/o morphine, for CEFM   - GBS bacteriuria, for PCN intrapartum  - thickened fetal ventricular septum - for  cards eval   - maternal HSV, on acyclovir - SSE neg for lesions    Seen and d/w Dr. Cherise Clarke MD  PGY2, Obstetrics and Gynecology     Principal Problem:    DM (diabetes mellitus) type 1, uncontrolled, with ketoacidosis (CMS/HCC)    Pregnancy Problems (from 24 to present)       Problem Noted Resolved    Bilateral sciatica 10/10/2023 by Stuart Munoz MD No    Priority:  Medium      Overview Signed 10/10/2023  2:17 PM by Stuart Munoz MD     Intermittent pain shooting down posteriolateral thighs bilaterally         34 weeks gestation of pregnancy 10/8/2023 by Stuart uMnoz MD No    Priority:  Medium      Overview Addendum 10/10/2023  2:24 PM by Stuart Munoz MD     [x] PNLs reviewed wnl, rubella nonimmune  [x] Pap ASCUS 2022  [x] NT wnl 2023  [x] cell-free DNA, CF/SMA screening low risk  [x] Anatomy US  [x] 2nd trimester labs, Hg 9.9  [x] tdap  [ ] flu/covid  [/] GBS  [ ] Delivery Planning  [ ] PPBC           Asthma affecting pregnancy in third trimester 10/8/2023 by Stuart Munoz MD No    Priority:  Medium      Overview Addendum 10/24/2023  2:08 PM by Stuart Munoz MD     Albuterol PRN  10/24 Regimen: Flovent 250mcg BID puff, Albuterol PRN         Type 1 diabetes mellitus complicating pregnancy, antepartum 10/5/2023 by Indiana Parra MD No    Priority:  Medium      Overview Addendum 2023  8:04 PM by Angeline Boudreaux MD     [x] Baseline HbA1c 12.8 2023  [x] Baseline TSH 2.38 2023  [x] Baseline HELLP Labs wnl, 0.11 2023  [x] bbASA  [x] EKG  wnl  [x] Fetal Echo wnl  [x] Ophthalmology  /podiatry    [ ]  Testing 2x weekly until del    Serial Growths  10/24 29.0 wga EFW 1294g 42%, AC 48%    Current Regimen changed 10/10/2023  Basal  3295-4971  0.90  0301-6589 1.00  0045-1998 0.90     Bolus  1765-3724 1:9     CF: 1:40  Carb ratio: 1:10  Target:110  DOA 5 hrs           Herpes simplex infection in mother during third trimester of pregnancy 10/5/2023 by Indiana Parra MD No    Priority:  Medium      Overview Signed 10/5/2023  8:55 AM by Indiana Parra MD     On acyclovir prophylaxis          Chronic hypertension affecting pregnancy 10/5/2023 by Indiana Parra MD No    Priority:  Medium      Overview Addendum 10/8/2023  3:16 PM by Stuart Munoz MD     [x] Baseline HELLP Labs, P:C 0.11 2023  [x] bbASA  [ ]  Testing  [x] Baseline EKG inpatient      Serial Growths    Med Regimen  No meds         Group beta Strep positive 10/5/2023 by Indiana Parra MD No    Priority:  Medium      Overview Signed 10/5/2023  9:00 AM by Indiana Parra MD     GBS UTI in pregnancy          Depression during pregnancy in third trimester 10/5/2023 by Indiana Parra MD No    Priority:  Medium      Fetal cardiac anomaly affecting pregnancy, antepartum 10/5/2023 by Indiana Parra MD No    Priority:  Medium      Overview Addendum 2023  6:00 PM by Luz Maria Abebe MD     [facility] Plan of Care: fetal ventricular septum slightly thickened --> code:1: non-urgent peds cardiology consult prior to discharge or within 1-2 weeks if delivered at an outside hospital  No structural abnormalities on repeat fetal echo --> peds cards recommendation for routine delivery and  care with non-urgent peds cardiology consult prior to discharge or within 1-2 weeks if delivered at an outside hospital         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 10/5/2023 by Indiana Parra MD No    Priority:  Medium      Overview Addendum 10/24/2023  2:09 PM by Stuart Munoz MD     - Admitted for blurry vision on , d/c ed with improvement, oligoclonal bands seen on LP, neg MRI head  - New optic disc edema, diabetic edema    - Close follow up with ophto, last appt 10/13, needs IV fluro angio  after delivery to prevent progression, next visit 11/24  - Neuro-Immunology NPV scheduled for 1/26         Ophthalmologic abnormality 10/5/2023 by Indiana Parra MD No    Priority:  Medium      Overview Signed 10/5/2023  9:14 AM by [place] Parra MD     Diabetic macular edema, both eyes - for panretinal photocoagulation with Optho                  Subjective   Pt at bedside with dinner. Feeling occasional abdominal pain, denies HA, VA changes, CP, SOB, RUQ pain    Objective   Allergies:   Patient has no known allergies.    Last Vitals:  Temp Pulse Resp BP MAP Pulse Ox   36.6 °C (97.9 °F) 81 17 (!) 157/92   100 %     Vitals Min/Max Last 24 Hours:  Temp  Min: 36.5 °C (97.7 °F)  Max: 37.1 °C (98.8 °F)  Pulse  Min: 75  Max: 125  Resp  Min: 16  Max: 18  BP  Min: 109/67  Max: 171/92    Intake/Output:     Intake/Output Summary (Last 24 hours) at 11/28/2023 1923  Last data filed at 11/28/2023 1840  Gross per 24 hour   Intake 1930.78 ml   Output 4250 ml   Net -2319.22 ml       Physical Exam:  General: no acute distress  HEENT: normocephalic, atraumatic  Heart: warm and well perfused  Lungs: no increased WOB  Abdomen: gravid  SVE closed/long/high  /minimal to mod/+accel/-decel   [place] quiet   Extremities: moving all extremities  Neuro: awake and conversant  Psych: appropriate mood and affect     Patient was placed in dorsal lithotomy position, a speculum was placed, and a cervical ripening balloon was guided through the external and internal cervical os with a ring forceps. The balloon was inflated with 60cc of normal saline. Pt tolerated the procedure well.     Lab Data:  Lab Results   Component Value Date    WBC 7.0 11/28/2023    HGB 10.6 (L) 11/28/2023    HCT 31.8 (L) 11/28/2023     11/28/2023     Lab Results   Component Value Date    GLUCOSE 245 (H) 11/28/2023     (L) 11/28/2023    K 3.9 11/28/2023     11/28/2023    CO2 20 (L) 11/28/2023    ANIONGAP 15 11/28/2023    BUN 3 (L) 11/28/2023     CREATININE 0.48 (L) 11/28/2023    EGFR >90 11/28/2023    CALCIUM 8.8 11/28/2023    ALBUMIN 3.2 (L) 11/28/2023    PROT 5.5 (L) 11/28/2023    ALKPHOS 78 11/28/2023    ALT 14 11/28/2023    AST 18 11/28/2023    BILITOT 0.5 11/28/2023

## 2023-11-29 NOTE — PROGRESS NOTES
Maria Isabel Cutler is a 23 y.o. female on day 1 of admission presenting with DM (diabetes mellitus) type 1, uncontrolled, with ketoacidosis (CMS/HCC).    Subjective   ***       Objective     Physical Exam    Last Recorded Vitals  Blood pressure 134/86, pulse (!) 122, temperature 36.6 °C (97.9 °F), temperature source Temporal, resp. rate 18, height 1.524 m (5'), weight 60.4 kg, last menstrual period 04/01/2023, SpO2 99 %.  Intake/Output last 3 Shifts:  I/O last 3 completed shifts:  In: 1930.8 (32 mL/kg) [I.V.:1930.8 (32 mL/kg)]  Out: 4550 (75.3 mL/kg) [Urine:4450 (2 mL/kg/hr); Emesis/NG output:100]  Weight: 60.4 kg     Relevant Results  {If you would like to pull in Medications, type .meds     If you would like to pull in Lab results for the last 24 hours, type .[id number]    If you would like to pull in Imaging results, type .imgrslt :99}    {Link to Stroke Scoring tools - Link :99}      IV team notified after Mag bolus signed off at 2015 was not working. The IV pump was changed. IV team verified that line is flushing. IV channels replaced and the Mag was started. Mag Bolus started at 2200.                     Assessment/Plan   Principal Problem:    DM (diabetes mellitus) type 1, uncontrolled, with ketoacidosis (CMS/HCC)    ***     {This patient does not have an ACP note on file for this encounter, please fill one out - Advance Care Planning Activity :99}      Rosina Villarreal RN

## 2023-11-29 NOTE — CARE PLAN
Problem: Pain - Adult  Goal: Verbalizes/displays adequate comfort level or baseline comfort level  Outcome: Progressing     Problem: Safety - Adult  Goal: Free from fall injury  Outcome: Progressing     Problem: Chronic Conditions and Co-morbidities  Goal: Patient's chronic conditions and co-morbidity symptoms are monitored and maintained or improved  Outcome: Progressing     Problem: Diabetes  Goal: Achieve decreasing blood glucose levels by end of shift  Outcome: Progressing  Goal: Increase stability of blood glucose readings by end of shift  Outcome: Progressing  Goal: Decrease in ketones present in urine by end of shift  Outcome: Progressing  Goal: Maintain electrolyte levels within acceptable range throughout shift  Outcome: Progressing  Goal: Maintain glucose levels >70mg/dl to <250mg/dl throughout shift  Outcome: Progressing  Goal: No changes in neurological exam by end of shift  Outcome: Progressing  Goal: Learn about and adhere to nutrition recommendations by end of shift  Outcome: Progressing  Goal: Vital signs within normal range for age by end of shift  Outcome: Progressing  Goal: Increase self care and/or family involovement by end of shift  Outcome: Progressing  Goal: Receive DSME education by end of shift  Outcome: Progressing     Problem: Vaginal Birth or  Section  Goal: Fetal and maternal status remain reassuring during the birth process  Outcome: Progressing  Goal: Prevention of malpresentation/labor dystocia through delivery  Outcome: Progressing  Goal: Demonstrates labor coping techniques through delivery  Outcome: Progressing  Goal: Minimal s/sx of HDP and BP<160/110  Outcome: Progressing     Problem: Vaginal Birth or  Section  Goal: Tolerate CRB for IOL placement maintenance until dislodgement/removal 12hrs after placement  Outcome: Met   The patient's goals for the shift include  safety for mom and baby     The clinical goals for the shift include blood glucose WNL    Pt has  achieved decreasing and stable blood glucose readings. She is being managed with an insulin drip. Pt is also on magnesium. Mag was paused to assess a serum magnesium level due to decreased DTRs. Level was less than therapeutic and Mag is infusing currently. Pt and baby have remained stable through the shift. RN will continue to monitor.

## 2023-11-29 NOTE — SIGNIFICANT EVENT
Cervical Exam  Dilation: 3.5  Effacement (%): 50  Fetal Station: -4  Method: Manual  OB Examiner: Hussain JHA  Fetal Assessment  Movement: Present  Mode: External US  Baseline Fetal Heart Rate (bpm): 135 bpm  Baseline Classification: Normal  Variability: Moderate (Between 6 and 25 BPM)  Pattern: Accelerations  Pattern Observations: Patient off monitor d/t bed pan and difficutly tracing with wireless monitor. Changed to external at 0527  FHR Category: Category I   Fetal Decelerations: No  Contraction Frequency: 1.5-5      Pit at 4, will place in throne and reattempt for AROM when fetal head better applied  siPEC w SF, continue nifed 30, normotensive, continue Mg  T1DM, continue insulin gtt, BG within goal    Vickie Nixon MD

## 2023-11-29 NOTE — SIGNIFICANT EVENT
Dr. Lacy called for IV fluid order and to place next cyto.  Dr. Lacy going to OR. This cnm placed order and cyto. CRB firmly in place.  nasima Cormier

## 2023-11-29 NOTE — SIGNIFICANT EVENT
Recommendation for IOL    House officer notified of severe range blood pressures. Patient has known cHTN, though majority normotensive throughout this pregnancy. Vitals from this admission reviewed and notable for 2 severe range Bps last night which were less than 4hrs apart. Now, with severe range Bps >4hrs apart and significantly elevated beyond patient's baseline Bps, concern for siPEC with SF. Patient denies HA, vision changes, CP, SOB, or RUQ pain. Discussed blood pressures with Dr. Zuluaga, agrees that patient now meets criteria for siPEC with SF and would recommend IOL at this time. Discussed diagnosis and recommendation for IOL with patient, who expressed good understanding and agrees to IOL.     Also reviewed and discussed blood sugar trend with Dr. Zuluaga. Patient presented in DKA yesterday, was managed on insulin gtt last night and DKA resolved. Throughout the day today, blood sugars increased to 200-240s, BHB now increasing (0.5 -> 1.14 at last check), AG 13. Insulin pump was restarted during the day though insulin gtt was also kept on due to elevated blood sugars. I discussed blood sugar trend with Dr. Zuluaga, recommend proceeding with IOL now and optimizing sugars during ripening process.     Plan:    siPEC with SF  - Diagnosed by severe range Bps >4hr apart, significant elevation from pt's baseline   - Start IV Mg  - Start nifed 30mg daily  - HELLP labs wnl x1, repeat pending   - Asx     T1DM  - In DKA on admission yesterday, resolved overnight on insulin gtt, now with increasing blood sugars throughout the day and increasing BHB  - Had restarted insulin pump, but now given that IOL is recommended will transition back to insulin gtt. Will titrate per intrapartum insulin gtt protocol.   - repeat DKA labs (BHB, VBG, CMP) ordered, plan to trend q2-4hrs until blood sugars improve and gap cleared     IOL  - plan to rescan for presentation prior to starting IOL, cephalic yesterday  - likely for CRB + cytotec  -  NICU aware   - morphine/epidural per patient request     D/w Dr. Zuluaga and Dr. Cherise De La Cruz MD PGY-4  Obstetrics and Gynecology

## 2023-11-29 NOTE — PROGRESS NOTES
Intrapartum Progress Note    Assessment/Plan   Maria Isabel Cutler is a 23 y.o.  at 34w4d. SHIN: 2024, by Last Menstrual Period admitted for new DKA, now with siPEC w/ SF      IOL   - s/p ripening  - pitocin started, titrate per protocol  - SVE remains ballotable, discussed position changes with patient. Plan for AROM when appropriate   - rescanned - cephalic   - discussed risks and benefits of vaginal delivery including bleeding, infection, injury to surrounding structures. Pt expressed understanding, strongly desires vaginal delivery.      cHTN, now siPEC w/ SF   - new severe range Bps > 4 hrs apart since arrival  - nifedipine 30mg initiated, now normotensive   - asymptomatic  - HELLP labs neg x2  - continue Mg gtt     T1DM; DKA resolved   - DKA on admission, now resolved with closed gap, normoglycemia, BHB wnl   - on insulin gtt, BG 70s-90s.   - for POCT q1hr, concordant with CGM   - for maintenance fluids @150cc/hr pending BG as below:               NS if BG >160               D10W if BG <160   - strict Is/Os  - will maintain on drip postpartum until MFM recs in AM given previous issues with patient's pump and hyperglycemic/DKA episodes      Maternal wellbeing  - T&S, CBC on arrival, Hgb 10.6  - h/o asthma - will avoid hemabate  - epidural infusing     Fetal wellbeing  - NICU aware of decision to move toward delivery  - FHT Cat 1, CEFM   - GBS bacteriuria, continue PCN intrapartum  - thickened fetal ventricular septum - for  cards eval   - maternal HSV, on acyclovir - SSE neg for lesions    Seen and d/w Dr. Buzz Clarke MD  PGY2, Obstetrics and Gynecology     Principal Problem:    DM (diabetes mellitus) type 1, uncontrolled, with ketoacidosis (CMS/HCC)  Active Problems:    Seizures (CMS/HCC)    Pregnancy Problems (from 23 to present)       Problem Noted Resolved    Labor and delivery indication for care or intervention 2023 by Marni De La Cruz MD No    Priority:  Medium       Bilateral sciatica 10/10/2023 by Stuart Munoz MD No    Priority:  Medium      Overview Signed 10/10/2023  2:17 PM by Stuart Munoz MD     Intermittent pain shooting down posteriolateral thighs bilaterally         34 weeks gestation of pregnancy 10/8/2023 by Stuart Munoz MD No    Priority:  Medium      Overview Addendum 10/10/2023  2:24 PM by Stuart Munoz MD     [x] PNLs reviewed wnl, rubella nonimmune  [x] Pap ASCUS 2022  [x] NT wnl 2023  [x] cell-free DNA, CF/SMA screening low risk  [x] Anatomy US  [x] 2nd trimester labs, Hg 9.9  [x] tdap  [ ] flu/covid  [/] GBS  [ ] Delivery Planning  [ ] PPBC           Asthma affecting pregnancy in third trimester 10/8/2023 by Stuart Munoz MD No    Priority:  Medium      Overview Addendum 10/24/2023  2:08 PM by Stuart Munoz MD     Albuterol PRN  10/24 Regimen: Flovent 250mcg BID puff, Albuterol PRN         Type 1 diabetes mellitus complicating pregnancy, antepartum 10/5/2023 by Indiana Parra MD No    Priority:  Medium      Overview Addendum 2023  8:04 PM by Angeline Boudreaux MD     [x] Baseline HbA1c 12.8 2023  [x] Baseline TSH 2.38 2023  [x] Baseline HELLP Labs wnl, 0.11 2023  [x] bbASA  [x] EKG  wnl  [x] Fetal Echo wnl  [x] Ophthalmology  /podiatry    [ ]  Testing 2x weekly until del    Serial Growths  10/24 29.0 wga EFW 1294g 42%, AC 48%    Current Regimen changed 10/10/2023  Basal  7747-3193 0.90  6955-9060 1.00  0361-0665 0.90     Bolus  1761-8295 1:9     CF: 1:40  Carb ratio: 1:10  Target:110  DOA 5 hrs           Herpes simplex infection in mother during third trimester of pregnancy 10/5/2023 by Indiana Parra MD No    Priority:  Medium      Overview Signed 10/5/2023  8:55 AM by Indiana Parra MD     On acyclovir prophylaxis          Chronic hypertension affecting pregnancy 10/5/2023 by Indiana Parra MD No    Priority:  Medium      Overview Addendum 10/8/2023  3:16 PM by Stuart Munoz MD     [x] Baseline HELLP Labs,  P:C 0.11 2023  [x] bbASA  [ ]  Testing  [x] Baseline EKG inpatient      Serial Growths    Med Regimen  No meds         Group beta Strep positive 10/5/2023 by Indiana Parra MD No    Priority:  Medium      Overview Signed 10/5/2023  9:00 AM by Indiana Parra MD     GBS UTI in pregnancy          Depression during pregnancy in third trimester 10/5/2023 by Inidana Parra MD No    Priority:  Medium      Fetal cardiac anomaly affecting pregnancy, antepartum 10/5/2023 by Indiana Parra MD No    Priority:  Medium      Overview Addendum 2023  6:00 PM by Luz Maria Abebe MD     [facility] Plan of Care: fetal ventricular septum slightly thickened --> code:1: non-urgent peds cardiology consult prior to discharge or within 1-2 weeks if delivered at an outside hospital  No structural abnormalities on repeat fetal echo --> peds cards recommendation for routine delivery and  care with non-urgent peds cardiology consult prior to discharge or within 1-2 weeks if delivered at an outside hospital         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 10/5/2023 by Indiana Parra MD No    Priority:  Medium      Overview Addendum 10/24/2023  2:09 PM by Stuart Munoz MD     - Admitted for blurry vision on , d/c ed with improvement, oligoclonal bands seen on LP, neg MRI head  - New optic disc edema, diabetic edema    - Close follow up with ophto, last appt 10/13, needs IV fluro angio after delivery to prevent progression, next visit   - Neuro-Immunology NPV scheduled for          Ophthalmologic abnormality 10/5/2023 by Indiana Parra MD No    Priority:  Medium      Overview Signed 10/5/2023  9:14 AM by Indiana Parra MD     Diabetic macular edema, both eyes - for panretinal photocoagulation with Optho          Constipation during pregnancy in second trimester 10/9/2023 by Melody Osorio 10/10/2023 by Stuart Munoz MD    Abnormal pregnancy in second trimester 10/9/2023 by Melody Osorio 10/10/2023 by Stuart  MD Alexander    Suspected fetal anomaly, antepartum 10/9/2023 by Melody Osorio 10/10/2023 by Stuart Munoz MD    Hyperglycemia in pregnancy 9/14/2023 by Melody Osorio 10/10/2023 by Stuart Munoz MD    Vomiting of pregnancy 9/14/2023 by Melody Osoiro 10/10/2023 by Stuart Munoz MD            Subjective   Pt feeling comfortable with epidural.     Objective   Last Vitals:  Temp Pulse Resp BP MAP Pulse Ox   36.7 °C (98.1 °F) 102 16 114/66   100 %     Vitals Min/Max Last 24 Hours:  Temp  Min: 36.2 °C (97.2 °F)  Max: 36.7 °C (98.1 °F)  Pulse  Min: 75  Max: 137  Resp  Min: 14  Max: 20  BP  Min: 106/59  Max: 172/103    Intake/Output:    Intake/Output Summary (Last 24 hours) at 11/29/2023 1828  Last data filed at 11/29/2023 1729  Gross per 24 hour   Intake 3639.01 ml   Output 2280 ml   Net 1359.01 ml       Physical Examination:  General: no acute distress  HEENT: normocephalic, atraumatic  Heart: warm and well perfused  Lungs: no increased WOB  Abdomen: gravid  SVE 3/40/-4, no fetal part palpated  /mod/+accel/-decel   [place] q2-3  BSUS cephalic   Extremities: moving all extremities  Neuro: awake and conversant  Psych: appropriate mood and affect     Lab Review:  Lab Results   Component Value Date    WBC 9.1 11/29/2023    HGB 10.0 (L) 11/29/2023    HCT 29.4 (L) 11/29/2023     11/29/2023     Lab Results   Component Value Date    GLUCOSE 95 11/29/2023     (L) 11/29/2023    K 3.4 (L) 11/29/2023     11/29/2023    CO2 21 11/29/2023    ANIONGAP 11 11/29/2023    BUN 2 (L) 11/29/2023    CREATININE 0.36 (L) 11/29/2023    EGFR >90 11/29/2023    CALCIUM 7.5 (L) 11/29/2023    ALBUMIN 2.9 (L) 11/29/2023    PROT 5.3 (L) 11/29/2023    ALKPHOS 72 11/29/2023    ALT 13 11/29/2023    AST 16 11/29/2023    BILITOT 0.3 11/29/2023

## 2023-11-29 NOTE — ANESTHESIA PROCEDURE NOTES
Epidural Block    Patient location during procedure: OB  Start time: 11/29/2023 7:00 AM  Reason for block: labor analgesia  Staffing  Performed: CRNA   Authorized by: MAURY Fajardo    Performed by: MAURY Fajardo    Preanesthetic Checklist  Completed: patient identified, IV checked, risks and benefits discussed, surgical consent, pre-op evaluation, timeout performed and sterile techniques followed  Block Timeout  RN/Licensed healthcare professional reads aloud to the Anesthesia provider and entire team: Patient identity, procedure with side and site, patient position, and as applicable the availability of implants/special equipment/special requirements.  Patient on coagulant treatment: no  Timeout performed at: 11/29/2023 7:02 AM  Block Placement  Patient position: sitting  Prep: ChloraPrep  Sterility prep: cap, drape, gloves, mask and hand  Sedation level: no sedation  Patient monitoring: blood pressure and heart rate  Approach: midline  Local numbing: lidocaine 1% to skin and subcutaneous tissues  Vertebral space: lumbar  Lumbar location: L3-L4  Epidural  Loss of resistance technique: saline  Guidance: landmark technique        Needle  Needle type: Tuohy   Needle gauge: 17  Needle length: 10 cm  Needle insertion depth: 4 cm  Catheter type: multi-orifice  Catheter at skin depth: 9.5 cm  Catheter securement method: clear occlusive dressing    Test dose: lidocaine 1.5% with epinephrine 1-to-200,000  Test dose given at 11/29/2023 7:15 AM  Test dose: lidocaine 1.5% with epinephrine 1-to-200,000  Test dose result: no positive test dose            Assessment  Sensory level: T9 to sharp. bilateral  Block outcome: patient comfortable  Number of attempts: 1  Events: no positive test dose  Procedure assessment: patient tolerated procedure well with no immediate complications

## 2023-11-29 NOTE — ANESTHESIA PREPROCEDURE EVALUATION
Patient: Maria Isabel Cutler    Evaluation Method: In-person visit    Procedure Information    Date: 11/29/23  Procedure: Labor Analgesia         Relevant Problems   Cardiovascular   (+) Chronic hypertension affecting pregnancy      Endocrine   (+) DM (diabetes mellitus) type 1, uncontrolled, with ketoacidosis (CMS/HCC)   (+) Type 1 diabetes mellitus complicating pregnancy, antepartum      GI (within normal limits)      /Renal (within normal limits)      Neuro/Psych   (+) Bilateral sciatica   (+) Depression during pregnancy in third trimester   (+) Seizures (CMS/HCC) (Childhood, unknown last seizure and exact etiology unknown )      Pulmonary   (+) Asthma affecting pregnancy in third trimester      Hematology (within normal limits)      Musculoskeletal (within normal limits)      Eyes, Ears, Nose, and Throat (within normal limits)      Infectious Disease   (+) Herpes simplex infection in mother during third trimester of pregnancy       Clinical information reviewed:    Allergies  Meds               NPO Detail:  No data recorded     OB/Gyn Evaluation    Present Pregnancy    Patient is pregnant now.   Obstetric History                Physical Exam    Airway  Mallampati: II  TM distance: >3 FB  Neck ROM: full     Cardiovascular    Dental    Pulmonary    Abdominal            Anesthesia Plan    ASA 3     epidural     The patient is not a current smoker.    Anesthetic plan and risks discussed with patient.  Use of blood products discussed with patient who.    Plan discussed with resident.

## 2023-11-30 LAB
GLUCOSE BLD MANUAL STRIP-MCNC: 100 MG/DL (ref 74–99)
GLUCOSE BLD MANUAL STRIP-MCNC: 102 MG/DL (ref 74–99)
GLUCOSE BLD MANUAL STRIP-MCNC: 104 MG/DL (ref 74–99)
GLUCOSE BLD MANUAL STRIP-MCNC: 107 MG/DL (ref 74–99)
GLUCOSE BLD MANUAL STRIP-MCNC: 108 MG/DL (ref 74–99)
GLUCOSE BLD MANUAL STRIP-MCNC: 109 MG/DL (ref 74–99)
GLUCOSE BLD MANUAL STRIP-MCNC: 110 MG/DL (ref 74–99)
GLUCOSE BLD MANUAL STRIP-MCNC: 114 MG/DL (ref 74–99)
GLUCOSE BLD MANUAL STRIP-MCNC: 114 MG/DL (ref 74–99)
GLUCOSE BLD MANUAL STRIP-MCNC: 115 MG/DL (ref 74–99)
GLUCOSE BLD MANUAL STRIP-MCNC: 120 MG/DL (ref 74–99)
GLUCOSE BLD MANUAL STRIP-MCNC: 73 MG/DL (ref 74–99)
GLUCOSE BLD MANUAL STRIP-MCNC: 74 MG/DL (ref 74–99)
GLUCOSE BLD MANUAL STRIP-MCNC: 80 MG/DL (ref 74–99)
GLUCOSE BLD MANUAL STRIP-MCNC: 80 MG/DL (ref 74–99)
GLUCOSE BLD MANUAL STRIP-MCNC: 86 MG/DL (ref 74–99)
GLUCOSE BLD MANUAL STRIP-MCNC: 88 MG/DL (ref 74–99)
GLUCOSE BLD MANUAL STRIP-MCNC: 89 MG/DL (ref 74–99)
GLUCOSE BLD MANUAL STRIP-MCNC: 92 MG/DL (ref 74–99)
GLUCOSE BLD MANUAL STRIP-MCNC: 97 MG/DL (ref 74–99)
GLUCOSE BLD MANUAL STRIP-MCNC: 98 MG/DL (ref 74–99)
GLUCOSE BLD MANUAL STRIP-MCNC: 98 MG/DL (ref 74–99)
GLUCOSE BLD MANUAL STRIP-MCNC: 99 MG/DL (ref 74–99)

## 2023-11-30 PROCEDURE — 10907ZC DRAINAGE OF AMNIOTIC FLUID, THERAPEUTIC FROM PRODUCTS OF CONCEPTION, VIA NATURAL OR ARTIFICIAL OPENING: ICD-10-PCS | Performed by: OBSTETRICS & GYNECOLOGY

## 2023-11-30 PROCEDURE — 2500000004 HC RX 250 GENERAL PHARMACY W/ HCPCS (ALT 636 FOR OP/ED): Performed by: STUDENT IN AN ORGANIZED HEALTH CARE EDUCATION/TRAINING PROGRAM

## 2023-11-30 PROCEDURE — 82947 ASSAY GLUCOSE BLOOD QUANT: CPT

## 2023-11-30 PROCEDURE — 2500000004 HC RX 250 GENERAL PHARMACY W/ HCPCS (ALT 636 FOR OP/ED)

## 2023-11-30 PROCEDURE — 2500000005 HC RX 250 GENERAL PHARMACY W/O HCPCS

## 2023-11-30 PROCEDURE — 1120000001 HC OB PRIVATE ROOM DAILY

## 2023-11-30 RX ADMIN — INSULIN HUMAN 0.3 UNITS/HR: 1 INJECTION, SOLUTION INTRAVENOUS at 15:04

## 2023-11-30 RX ADMIN — PENICILLIN G 3 MILLION UNITS: 3000000 INJECTION, SOLUTION INTRAVENOUS at 00:35

## 2023-11-30 RX ADMIN — INSULIN HUMAN 0.9 UNITS/HR: 1 INJECTION, SOLUTION INTRAVENOUS at 05:35

## 2023-11-30 RX ADMIN — INSULIN HUMAN 1.1 UNITS/HR: 1 INJECTION, SOLUTION INTRAVENOUS at 09:55

## 2023-11-30 RX ADMIN — PENICILLIN G 3 MILLION UNITS: 3000000 INJECTION, SOLUTION INTRAVENOUS at 12:36

## 2023-11-30 RX ADMIN — INSULIN HUMAN 0.5 UNITS/HR: 1 INJECTION, SOLUTION INTRAVENOUS at 07:43

## 2023-11-30 RX ADMIN — INSULIN HUMAN 0.7 UNITS/HR: 1 INJECTION, SOLUTION INTRAVENOUS at 14:10

## 2023-11-30 RX ADMIN — INSULIN HUMAN 0.7 UNITS/HR: 1 INJECTION, SOLUTION INTRAVENOUS at 02:34

## 2023-11-30 RX ADMIN — PENICILLIN G 3 MILLION UNITS: 3000000 INJECTION, SOLUTION INTRAVENOUS at 20:34

## 2023-11-30 RX ADMIN — PENICILLIN G 3 MILLION UNITS: 3000000 INJECTION, SOLUTION INTRAVENOUS at 08:40

## 2023-11-30 RX ADMIN — INSULIN HUMAN 0.5 UNITS/HR: 1 INJECTION, SOLUTION INTRAVENOUS at 08:34

## 2023-11-30 RX ADMIN — DEXTROSE MONOHYDRATE 50 ML/HR: 100 INJECTION, SOLUTION INTRAVENOUS at 22:07

## 2023-11-30 RX ADMIN — MAGNESIUM SULFATE IN WATER 2 G/HR: 20 INJECTION, SOLUTION INTRAVENOUS at 14:08

## 2023-11-30 RX ADMIN — INSULIN HUMAN 0.7 UNITS/HR: 1 INJECTION, SOLUTION INTRAVENOUS at 16:58

## 2023-11-30 RX ADMIN — DEXTROSE MONOHYDRATE 50 ML/HR: 100 INJECTION, SOLUTION INTRAVENOUS at 11:05

## 2023-11-30 RX ADMIN — INSULIN HUMAN 0.1 UNITS/HR: 1 INJECTION, SOLUTION INTRAVENOUS at 21:08

## 2023-11-30 RX ADMIN — INSULIN HUMAN 0.7 UNITS/HR: 1 INJECTION, SOLUTION INTRAVENOUS at 22:02

## 2023-11-30 RX ADMIN — DEXTROSE MONOHYDRATE 50 ML/HR: 100 INJECTION, SOLUTION INTRAVENOUS at 00:35

## 2023-11-30 RX ADMIN — INSULIN HUMAN 0.7 UNITS/HR: 1 INJECTION, SOLUTION INTRAVENOUS at 11:04

## 2023-11-30 RX ADMIN — PENICILLIN G 3 MILLION UNITS: 3000000 INJECTION, SOLUTION INTRAVENOUS at 16:48

## 2023-11-30 RX ADMIN — Medication 14 ML/HR: at 08:44

## 2023-11-30 RX ADMIN — SODIUM CHLORIDE, POTASSIUM CHLORIDE, SODIUM LACTATE AND CALCIUM CHLORIDE 75 ML/HR: 600; 310; 30; 20 INJECTION, SOLUTION INTRAVENOUS at 11:07

## 2023-11-30 RX ADMIN — PENICILLIN G 3 MILLION UNITS: 3000000 INJECTION, SOLUTION INTRAVENOUS at 04:35

## 2023-11-30 RX ADMIN — NIFEDIPINE 30 MG: 30 TABLET, FILM COATED, EXTENDED RELEASE ORAL at 08:41

## 2023-11-30 RX ADMIN — MAGNESIUM SULFATE IN WATER 2 G/HR: 20 INJECTION, SOLUTION INTRAVENOUS at 03:35

## 2023-11-30 RX ADMIN — INSULIN HUMAN 0.1 UNITS/HR: 1 INJECTION, SOLUTION INTRAVENOUS at 16:06

## 2023-11-30 RX ADMIN — INSULIN HUMAN 0.3 UNITS/HR: 1 INJECTION, SOLUTION INTRAVENOUS at 23:05

## 2023-11-30 ASSESSMENT — PAIN SCALES - GENERAL
PAINLEVEL_OUTOF10: 0 - NO PAIN
PAINLEVEL_OUTOF10: 2
PAINLEVEL_OUTOF10: 0 - NO PAIN

## 2023-11-30 NOTE — INDIVIDUALIZED OVERALL PLAN OF CARE NOTE
SUBJECTIVE  Patient doing well with epidural, magnesium, insulin, and pitocin infusing. Feeling intermittent contractions.     OBJECTIVE  Visit Vitals  /69   Pulse 97   Temp 36.6 °C (97.9 °F) (Temporal)   Resp 18        Cervical Exam  Dilation: 3  Effacement (%): 50  Fetal Station: -3  Presentation: Vertex  Method: Manual  OB Examiner: MD Munoz  Fetal Assessment  Movement: Present  Mode: External US  Baseline Fetal Heart Rate (bpm): 130 bpm  Baseline Classification: Normal  Variability: Minimal (Less than 5 BPM)  Pattern: Accelerations  Pattern Observations: Patient off monitor d/t bed pan and difficutly tracing with wireless monitor. Changed to external at 0527  FHR Category: Category II   Fetal Decelerations: No  Contraction Frequency: 1-3    A&P    IOL  - Continue to monitor for cervical change  - continue pitocin  - s/p AROM    Labor course  2330 closed/long/high, CRB, cyto #1  0330 cyto #2   0815 cyto#3  1100 CRB out  1530 3.5/50/-4  1830 3.5/50/-4, no presenting part palpated. Rescanned, cephalic   0400 3.5/50/-3, ballotable  0630 pit off  0830 pit on  1015 3.5/50/-3, AROM clear  1500 pit off  1545 pit on    T1DM; DKA resolved   - BG 80, Insulin drip currently at 0.3, algorithm for insulin gtt, states to go down by 0.4, will remain at 0.1, continue D10W    - DKA on admission, now resolved with closed gap, normoglycemia, BHB wnl   - on insulin gtt, BG 80s-100s.   - for POCT q1hr, concordant with CGM   - for maintenance fluids @150cc/hr pending BG as below:               NS if BG >160               D10W if BG <160   - strict Is/Os  - Postpartum Insulin regimen per M    cHTN, now siPEC w/ SF   - new severe range Bps > 4 hrs apart since arrival  - nifedipine 30mg initiated, now normotensive to mild range  - asymptomatic  - HELLP labs neg x2  - continue Mg gtt    Fetal wellbeing  - NICU aware of decision to move toward delivery  - FHT Cat 1, CEFM   - GBS bacteriuria, continue PCN intrapartum  - thickened  fetal ventricular septum - for  cards eval   - maternal HSV, on acyclovir - SSE neg for lesions    Stuatr Munoz MD

## 2023-11-30 NOTE — CONSULTS
" Consult Note    Maria Isabel Cutler is a 23 y.o. at 34w5d who presented on  with DKA and SiSPEC with SF, with pregnancy complicated by T1DM with baseline A1c 12.8%, cHTN (no home meds), HSV, fetal thickened Intraventricular septum. I met with Mom in her L&D room. The patient has no history of  children. Betamethasone was not administered due to T1DM and GA >34 wks.    She is expecting a baby boy, Jitendra. We discussed length of stay, and respiratory morbidity of a  birth.     Other issues of prematurity discussed included:   - Delivery room interventions including need for a team for baby, and providing breathing assessment and support  - Respiratory distress syndrome and the need for potential CPAP or intubation for underdeveloped lungs  - Nutrition. I encouraged Maria Isabel Cutler to breastfeed which she is planning on doing. We discussed pumping immediately after delivery and every 3 hours to provide milk during the hospital stay. Mother does not consent to use of donor human milk and we discussed the use of  formula, to which she agrees.  - Length of stay - at least several weeks until milestones are met and Jitendra is safe for discharge home.  - Immaturity of PO feeding and need for NG supplementation      Mom was appropriately concerned about her child \"Jitendra\" and listened attentively throughout the consultation. The biggest concerns were length of stay. Time was given to ask questions.    Thank you very much for this consultation. We will be available to answer questions and we will be present at delivery.     Reviewed and approved by NUHA BOOTHE on 23 at 6:27 AM.      "

## 2023-11-30 NOTE — INDIVIDUALIZED OVERALL PLAN OF CARE NOTE
Blood sugar 73. Ok to continue insulin drip at 0.1units/hr.     Marni De La Cruz MD PGY-4  Obstetrics and Gynecology

## 2023-11-30 NOTE — INDIVIDUALIZED OVERALL PLAN OF CARE NOTE
T1DM Insulin Drip Update    T1DM; DKA resolved   - BG 80, Insulin drip currently at 0.3, algorithm for insulin gtt, states to go down by 0.4, will remain at 0.1, continue D10W   - DKA on admission, now resolved with closed gap, normoglycemia, BHB wnl   - on insulin gtt, BG 70s-90s.   - for POCT q1hr, concordant with CGM   - for maintenance fluids @150cc/hr pending BG as below:               NS if BG >160               D10W if BG <160   - strict Is/Os  - will maintain on drip postpartum until MFM recs in AM given previous issues with patient's pump and hyperglycemic/DKA episodes     Amine Alexander, PGY3  Discussed with Dr. Simons

## 2023-11-30 NOTE — INDIVIDUALIZED OVERALL PLAN OF CARE NOTE
Labor Check    Subjective: HO to room to discuss possible AROM.     Objective:  Vitals: /78   Pulse 83   Temp 36.6 °C (97.9 °F) (Temporal)   Resp 18   Ht 1.524 m (5')   Wt 60.4 kg (133 lb 2.5 oz)   LMP 04/01/2023   SpO2 98%   BMI 26.01 kg/m²   SVE: 3.5/40/-3, head palpated, ballotable  FHT: 130/mod/+accel/-decel   [place]: q2min    Latent labor , unable to AROM at this time. Head with more descent since last exam.  Pitocin at 4, unable to uptitrate in setting of tachysystole.   Continue position changes   CEFM, currently Category I  Epidural infusing    Neela Clarke MD  PGY2, Obstetrics and Gynecology

## 2023-11-30 NOTE — PROGRESS NOTES
Intrapartum Progress Note    Assessment/Plan   Maria Isabel Cutler is a 23 y.o.  at 34w5d. SHIN: 2024, by Last Menstrual Period admitted for new DKA, now with siPEC w/ SF      IOL   - s/p ripening  - pitocin started, titrate per protocol, s/p AROM, clear  - continue to monitor for cervical change, epidural infusing    cHTN, now siPEC w/ SF   - new severe range Bps > 4 hrs apart since arrival  - nifedipine 30mg initiated, now normotensive   - asymptomatic  - HELLP labs neg x2  - continue Mg gtt     T1DM; DKA resolved   - DKA on admission, now resolved with closed gap, normoglycemia, BHB wnl   - on insulin gtt, BG 70s-90s.   - for POCT q1hr, concordant with CGM   - for maintenance fluids @150cc/hr pending BG as below:               NS if BG >160               D10W if BG <160   - strict Is/Os  - will maintain on drip postpartum until MFM recs in AM given previous issues with patient's pump and hyperglycemic/DKA episodes      Maternal wellbeing  - T&S, CBC on arrival, Hgb 10.6  - h/o asthma - will avoid hemabate  - epidural infusing     Fetal wellbeing  - NICU aware of decision to move toward delivery  - FHT Cat 1, CEFM   - GBS bacteriuria, continue PCN intrapartum  - thickened fetal ventricular septum - for  cards eval   - maternal HSV, on acyclovir - SSE neg for lesions    Amine Sahmoud, PGY3  Seen and discussed with Dr. Simons    Principal Problem:    DM (diabetes mellitus) type 1, uncontrolled, with ketoacidosis (CMS/HCC)  Active Problems:    Seizures (CMS/HCC)      Subjective   Pt feeling comfortable with epidural.     Objective   Last Vitals:  Temp Pulse Resp BP MAP Pulse Ox   36.3 °C (97.3 °F) 84 18 134/89   100 %     Vitals Min/Max Last 24 Hours:  Temp  Min: 36.1 °C (97 °F)  Max: 36.7 °C (98.1 °F)  Pulse  Min: 72  Max: 105  Resp  Min: 16  Max: 18  BP  Min: 106/59  Max: 134/89    Intake/Output:    Intake/Output Summary (Last 24 hours) at 2023 1133  Last data filed at 2023 1030  Gross  per 24 hour   Intake 4388.14 ml   Output 3850 ml   Net 538.14 ml       Physical Examination:  General: no acute distress  HEENT: normocephalic, atraumatic  Heart: warm and well perfused  Lungs: no increased WOB  Abdomen: gravid  SVE 3.5/50/-3  /mod/+accel/-decel   [place] q2-3  BSUS cephalic   Extremities: moving all extremities  Neuro: awake and conversant  Psych: appropriate mood and affect     Lab Review:  Lab Results   Component Value Date    WBC 9.1 11/29/2023    HGB 10.0 (L) 11/29/2023    HCT 29.4 (L) 11/29/2023     11/29/2023     Lab Results   Component Value Date    GLUCOSE 95 11/29/2023     (L) 11/29/2023    K 3.4 (L) 11/29/2023     11/29/2023    CO2 21 11/29/2023    ANIONGAP 11 11/29/2023    BUN 2 (L) 11/29/2023    CREATININE 0.36 (L) 11/29/2023    EGFR >90 11/29/2023    CALCIUM 7.5 (L) 11/29/2023    ALBUMIN 2.9 (L) 11/29/2023    PROT 5.3 (L) 11/29/2023    ALKPHOS 72 11/29/2023    ALT 13 11/29/2023    AST 16 11/29/2023    BILITOT 0.3 11/29/2023

## 2023-12-01 LAB
BASE EXCESS BLDCOA CALC-SCNC: -0.3 MMOL/L (ref -10.8–-0.5)
BASE EXCESS BLDCOV CALC-SCNC: -2 MMOL/L (ref -8.1–-0.5)
BODY TEMPERATURE: 37 DEGREES CELSIUS
BODY TEMPERATURE: 37 DEGREES CELSIUS
GLUCOSE BLD MANUAL STRIP-MCNC: 102 MG/DL (ref 74–99)
GLUCOSE BLD MANUAL STRIP-MCNC: 103 MG/DL (ref 74–99)
GLUCOSE BLD MANUAL STRIP-MCNC: 110 MG/DL (ref 74–99)
GLUCOSE BLD MANUAL STRIP-MCNC: 116 MG/DL (ref 74–99)
GLUCOSE BLD MANUAL STRIP-MCNC: 126 MG/DL (ref 74–99)
GLUCOSE BLD MANUAL STRIP-MCNC: 144 MG/DL (ref 74–99)
GLUCOSE BLD MANUAL STRIP-MCNC: 192 MG/DL (ref 74–99)
GLUCOSE BLD MANUAL STRIP-MCNC: 56 MG/DL (ref 74–99)
GLUCOSE BLD MANUAL STRIP-MCNC: 72 MG/DL (ref 74–99)
GLUCOSE BLD MANUAL STRIP-MCNC: 72 MG/DL (ref 74–99)
GLUCOSE BLD MANUAL STRIP-MCNC: 81 MG/DL (ref 74–99)
GLUCOSE BLD MANUAL STRIP-MCNC: 81 MG/DL (ref 74–99)
GLUCOSE BLD MANUAL STRIP-MCNC: 93 MG/DL (ref 74–99)
GLUCOSE BLD MANUAL STRIP-MCNC: 95 MG/DL (ref 74–99)
HCO3 BLDCOA-SCNC: 27.4 MMOL/L (ref 15–29)
HCO3 BLDCOV-SCNC: 23.7 MMOL/L (ref 16–26)
INHALED O2 CONCENTRATION: 21 %
INHALED O2 CONCENTRATION: 21 %
OXYHGB MFR BLDCOA: 37.9 % (ref 94–98)
OXYHGB MFR BLDCOV: 81.6 % (ref 94–98)
PCO2 BLDCOA: 57 MM HG (ref 31–75)
PCO2 BLDCOV: 43 MM HG (ref 22–53)
PH BLDCOA: 7.29 PH (ref 7.08–7.39)
PH BLDCOV: 7.35 PH (ref 7.19–7.47)
PO2 BLDCOA: 27 MM HG (ref 5–31)
PO2 BLDCOV: 47 MM HG (ref 13–37)
SAO2 % BLDCOA: 38 % (ref 3–69)
SAO2 % BLDCOV: 84 % (ref 16–84)

## 2023-12-01 PROCEDURE — 2500000001 HC RX 250 WO HCPCS SELF ADMINISTERED DRUGS (ALT 637 FOR MEDICARE OP): Performed by: ANESTHESIOLOGIST ASSISTANT

## 2023-12-01 PROCEDURE — 2500000004 HC RX 250 GENERAL PHARMACY W/ HCPCS (ALT 636 FOR OP/ED)

## 2023-12-01 PROCEDURE — 2500000005 HC RX 250 GENERAL PHARMACY W/O HCPCS: Performed by: ANESTHESIOLOGIST ASSISTANT

## 2023-12-01 PROCEDURE — 82947 ASSAY GLUCOSE BLOOD QUANT: CPT

## 2023-12-01 PROCEDURE — 2500000004 HC RX 250 GENERAL PHARMACY W/ HCPCS (ALT 636 FOR OP/ED): Performed by: STUDENT IN AN ORGANIZED HEALTH CARE EDUCATION/TRAINING PROGRAM

## 2023-12-01 PROCEDURE — 99199 UNLISTED SPECIAL SVC PX/RPRT: CPT

## 2023-12-01 PROCEDURE — 99233 SBSQ HOSP IP/OBS HIGH 50: CPT | Performed by: STUDENT IN AN ORGANIZED HEALTH CARE EDUCATION/TRAINING PROGRAM

## 2023-12-01 PROCEDURE — 59514 CESAREAN DELIVERY ONLY: CPT

## 2023-12-01 PROCEDURE — 59514 CESAREAN DELIVERY ONLY: CPT | Performed by: OBSTETRICS & GYNECOLOGY

## 2023-12-01 PROCEDURE — 82805 BLOOD GASES W/O2 SATURATION: CPT

## 2023-12-01 PROCEDURE — 3700000014 HC AN EPIDURAL BLOCK CHARGE: Performed by: OBSTETRICS & GYNECOLOGY

## 2023-12-01 PROCEDURE — 2500000004 HC RX 250 GENERAL PHARMACY W/ HCPCS (ALT 636 FOR OP/ED): Performed by: ANESTHESIOLOGIST ASSISTANT

## 2023-12-01 PROCEDURE — 2500000002 HC RX 250 W HCPCS SELF ADMINISTERED DRUGS (ALT 637 FOR MEDICARE OP, ALT 636 FOR OP/ED): Performed by: STUDENT IN AN ORGANIZED HEALTH CARE EDUCATION/TRAINING PROGRAM

## 2023-12-01 PROCEDURE — 1100000001 HC PRIVATE ROOM DAILY

## 2023-12-01 PROCEDURE — 2720000007 HC OR 272 NO HCPCS: Performed by: OBSTETRICS & GYNECOLOGY

## 2023-12-01 PROCEDURE — 88307 TISSUE EXAM BY PATHOLOGIST: CPT | Mod: TC,SUR

## 2023-12-01 PROCEDURE — 3700000018 HC OB ANESTHESIA C-SECTION: Performed by: OBSTETRICS & GYNECOLOGY

## 2023-12-01 PROCEDURE — 94760 N-INVAS EAR/PLS OXIMETRY 1: CPT

## 2023-12-01 PROCEDURE — 7100000016 HC LABOR RECOVERY PER HOUR: Performed by: OBSTETRICS & GYNECOLOGY

## 2023-12-01 PROCEDURE — 88307 TISSUE EXAM BY PATHOLOGIST: CPT | Performed by: PATHOLOGY

## 2023-12-01 RX ORDER — INSULIN LISPRO 100 [IU]/ML
2 INJECTION, SOLUTION INTRAVENOUS; SUBCUTANEOUS
Status: DISCONTINUED | OUTPATIENT
Start: 2023-12-01 | End: 2023-12-06 | Stop reason: HOSPADM

## 2023-12-01 RX ORDER — DEXTROSE 40 %
15 GEL (GRAM) ORAL
Status: DISCONTINUED | OUTPATIENT
Start: 2023-12-01 | End: 2023-12-06 | Stop reason: HOSPADM

## 2023-12-01 RX ORDER — HYDROMORPHONE HYDROCHLORIDE 1 MG/ML
0.2 INJECTION, SOLUTION INTRAMUSCULAR; INTRAVENOUS; SUBCUTANEOUS EVERY 5 MIN PRN
Status: DISCONTINUED | OUTPATIENT
Start: 2023-12-01 | End: 2023-12-06 | Stop reason: HOSPADM

## 2023-12-01 RX ORDER — FAMOTIDINE 10 MG/ML
INJECTION INTRAVENOUS AS NEEDED
Status: DISCONTINUED | OUTPATIENT
Start: 2023-12-01 | End: 2023-12-01

## 2023-12-01 RX ORDER — DEXTROSE 40 %
15 GEL (GRAM) ORAL
Status: DISCONTINUED | OUTPATIENT
Start: 2023-12-01 | End: 2023-12-01

## 2023-12-01 RX ORDER — LOPERAMIDE HYDROCHLORIDE 2 MG/1
4 CAPSULE ORAL EVERY 2 HOUR PRN
Status: DISCONTINUED | OUTPATIENT
Start: 2023-12-01 | End: 2023-12-06 | Stop reason: HOSPADM

## 2023-12-01 RX ORDER — ACETAMINOPHEN 120 MG/1
SUPPOSITORY RECTAL AS NEEDED
Status: DISCONTINUED | OUTPATIENT
Start: 2023-12-01 | End: 2023-12-01

## 2023-12-01 RX ORDER — HYDRALAZINE HYDROCHLORIDE 20 MG/ML
5 INJECTION INTRAMUSCULAR; INTRAVENOUS ONCE AS NEEDED
Status: DISCONTINUED | OUTPATIENT
Start: 2023-12-01 | End: 2023-12-06 | Stop reason: HOSPADM

## 2023-12-01 RX ORDER — DEXTROSE 50 % IN WATER (D50W) INTRAVENOUS SYRINGE
25
Status: DISCONTINUED | OUTPATIENT
Start: 2023-12-01 | End: 2023-12-06 | Stop reason: HOSPADM

## 2023-12-01 RX ORDER — ACETAMINOPHEN 325 MG/1
975 TABLET ORAL EVERY 6 HOURS
Status: DISCONTINUED | OUTPATIENT
Start: 2023-12-01 | End: 2023-12-06 | Stop reason: HOSPADM

## 2023-12-01 RX ORDER — ENOXAPARIN SODIUM 100 MG/ML
40 INJECTION SUBCUTANEOUS EVERY 24 HOURS
Status: DISCONTINUED | OUTPATIENT
Start: 2023-12-01 | End: 2023-12-06 | Stop reason: HOSPADM

## 2023-12-01 RX ORDER — FENTANYL CITRATE 50 UG/ML
INJECTION, SOLUTION INTRAMUSCULAR; INTRAVENOUS AS NEEDED
Status: DISCONTINUED | OUTPATIENT
Start: 2023-12-01 | End: 2023-12-01

## 2023-12-01 RX ORDER — ALBUTEROL SULFATE 90 UG/1
1-2 AEROSOL, METERED RESPIRATORY (INHALATION) EVERY 4 HOURS PRN
Status: DISCONTINUED | OUTPATIENT
Start: 2023-12-01 | End: 2023-12-06 | Stop reason: HOSPADM

## 2023-12-01 RX ORDER — CEFAZOLIN 1 G/1
INJECTION, POWDER, FOR SOLUTION INTRAVENOUS AS NEEDED
Status: DISCONTINUED | OUTPATIENT
Start: 2023-12-01 | End: 2023-12-01

## 2023-12-01 RX ORDER — LABETALOL HYDROCHLORIDE 5 MG/ML
20 INJECTION, SOLUTION INTRAVENOUS ONCE AS NEEDED
Status: DISCONTINUED | OUTPATIENT
Start: 2023-12-01 | End: 2023-12-06 | Stop reason: HOSPADM

## 2023-12-01 RX ORDER — SIMETHICONE 80 MG
80 TABLET,CHEWABLE ORAL 4 TIMES DAILY PRN
Status: DISCONTINUED | OUTPATIENT
Start: 2023-12-01 | End: 2023-12-06 | Stop reason: HOSPADM

## 2023-12-01 RX ORDER — NALBUPHINE HYDROCHLORIDE 10 MG/ML
5 INJECTION, SOLUTION INTRAMUSCULAR; INTRAVENOUS; SUBCUTANEOUS
Status: DISCONTINUED | OUTPATIENT
Start: 2023-12-01 | End: 2023-12-01 | Stop reason: ALTCHOICE

## 2023-12-01 RX ORDER — CARBOPROST TROMETHAMINE 250 UG/ML
250 INJECTION, SOLUTION INTRAMUSCULAR ONCE AS NEEDED
Status: DISCONTINUED | OUTPATIENT
Start: 2023-12-01 | End: 2023-12-06 | Stop reason: HOSPADM

## 2023-12-01 RX ORDER — DIPHENHYDRAMINE HCL 25 MG
25 CAPSULE ORAL EVERY 4 HOURS PRN
Status: DISCONTINUED | OUTPATIENT
Start: 2023-12-01 | End: 2023-12-06 | Stop reason: HOSPADM

## 2023-12-01 RX ORDER — LIDOCAINE HYDROCHLORIDE 20 MG/ML
INJECTION, SOLUTION INFILTRATION; PERINEURAL AS NEEDED
Status: DISCONTINUED | OUTPATIENT
Start: 2023-12-01 | End: 2023-12-01

## 2023-12-01 RX ORDER — MISOPROSTOL 200 UG/1
800 TABLET ORAL ONCE AS NEEDED
Status: DISCONTINUED | OUTPATIENT
Start: 2023-12-01 | End: 2023-12-05

## 2023-12-01 RX ORDER — ADHESIVE BANDAGE
10 BANDAGE TOPICAL
Status: DISCONTINUED | OUTPATIENT
Start: 2023-12-01 | End: 2023-12-06 | Stop reason: HOSPADM

## 2023-12-01 RX ORDER — INSULIN LISPRO 100 [IU]/ML
0-10 INJECTION, SOLUTION INTRAVENOUS; SUBCUTANEOUS
Status: DISCONTINUED | OUTPATIENT
Start: 2023-12-01 | End: 2023-12-06 | Stop reason: HOSPADM

## 2023-12-01 RX ORDER — PHENYLEPHRINE HCL IN 0.9% NACL 1 MG/10 ML
SYRINGE (ML) INTRAVENOUS AS NEEDED
Status: DISCONTINUED | OUTPATIENT
Start: 2023-12-01 | End: 2023-12-01

## 2023-12-01 RX ORDER — MISOPROSTOL 200 UG/1
TABLET ORAL
Status: COMPLETED
Start: 2023-12-01 | End: 2023-12-01

## 2023-12-01 RX ORDER — INSULIN GLARGINE 100 [IU]/ML
12 INJECTION, SOLUTION SUBCUTANEOUS
Status: DISCONTINUED | OUTPATIENT
Start: 2023-12-01 | End: 2023-12-03

## 2023-12-01 RX ORDER — OXYCODONE HYDROCHLORIDE 5 MG/1
10 TABLET ORAL EVERY 4 HOURS PRN
Status: DISCONTINUED | OUTPATIENT
Start: 2023-12-02 | End: 2023-12-06 | Stop reason: HOSPADM

## 2023-12-01 RX ORDER — POLYETHYLENE GLYCOL 3350 17 G/17G
17 POWDER, FOR SOLUTION ORAL 2 TIMES DAILY PRN
Status: DISCONTINUED | OUTPATIENT
Start: 2023-12-01 | End: 2023-12-06 | Stop reason: HOSPADM

## 2023-12-01 RX ORDER — MORPHINE SULFATE 0.5 MG/ML
INJECTION, SOLUTION EPIDURAL; INTRATHECAL; INTRAVENOUS AS NEEDED
Status: DISCONTINUED | OUTPATIENT
Start: 2023-12-01 | End: 2023-12-01

## 2023-12-01 RX ORDER — OXYTOCIN 10 [USP'U]/ML
10 INJECTION, SOLUTION INTRAMUSCULAR; INTRAVENOUS ONCE AS NEEDED
Status: DISCONTINUED | OUTPATIENT
Start: 2023-12-01 | End: 2023-12-06 | Stop reason: HOSPADM

## 2023-12-01 RX ORDER — OXYTOCIN/0.9 % SODIUM CHLORIDE 30/500 ML
60 PLASTIC BAG, INJECTION (ML) INTRAVENOUS ONCE AS NEEDED
Status: DISCONTINUED | OUTPATIENT
Start: 2023-12-01 | End: 2023-12-06 | Stop reason: HOSPADM

## 2023-12-01 RX ORDER — IBUPROFEN 600 MG/1
600 TABLET ORAL EVERY 6 HOURS
Status: DISCONTINUED | OUTPATIENT
Start: 2023-12-02 | End: 2023-12-06 | Stop reason: HOSPADM

## 2023-12-01 RX ORDER — NALOXONE HYDROCHLORIDE 0.4 MG/ML
0.1 INJECTION, SOLUTION INTRAMUSCULAR; INTRAVENOUS; SUBCUTANEOUS EVERY 5 MIN PRN
Status: DISCONTINUED | OUTPATIENT
Start: 2023-12-01 | End: 2023-12-06 | Stop reason: HOSPADM

## 2023-12-01 RX ORDER — OXYTOCIN 10 [USP'U]/ML
10 INJECTION, SOLUTION INTRAMUSCULAR; INTRAVENOUS ONCE AS NEEDED
Status: DISCONTINUED | OUTPATIENT
Start: 2023-12-01 | End: 2023-12-01

## 2023-12-01 RX ORDER — ONDANSETRON 4 MG/1
4 TABLET, FILM COATED ORAL EVERY 6 HOURS PRN
Status: DISCONTINUED | OUTPATIENT
Start: 2023-12-01 | End: 2023-12-06 | Stop reason: HOSPADM

## 2023-12-01 RX ORDER — KETOROLAC TROMETHAMINE 30 MG/ML
INJECTION, SOLUTION INTRAMUSCULAR; INTRAVENOUS AS NEEDED
Status: DISCONTINUED | OUTPATIENT
Start: 2023-12-01 | End: 2023-12-01

## 2023-12-01 RX ORDER — LIDOCAINE 560 MG/1
1 PATCH PERCUTANEOUS; TOPICAL; TRANSDERMAL
Status: DISCONTINUED | OUTPATIENT
Start: 2023-12-01 | End: 2023-12-06 | Stop reason: HOSPADM

## 2023-12-01 RX ORDER — ONDANSETRON HYDROCHLORIDE 2 MG/ML
4 INJECTION, SOLUTION INTRAVENOUS EVERY 6 HOURS PRN
Status: DISCONTINUED | OUTPATIENT
Start: 2023-12-01 | End: 2023-12-06 | Stop reason: HOSPADM

## 2023-12-01 RX ORDER — TRANEXAMIC ACID 100 MG/ML
1000 INJECTION, SOLUTION INTRAVENOUS ONCE AS NEEDED
Status: DISCONTINUED | OUTPATIENT
Start: 2023-12-01 | End: 2023-12-06 | Stop reason: HOSPADM

## 2023-12-01 RX ORDER — DIPHENHYDRAMINE HYDROCHLORIDE 50 MG/ML
25 INJECTION INTRAMUSCULAR; INTRAVENOUS EVERY 4 HOURS PRN
Status: DISCONTINUED | OUTPATIENT
Start: 2023-12-01 | End: 2023-12-06 | Stop reason: HOSPADM

## 2023-12-01 RX ORDER — NIFEDIPINE 10 MG/1
10 CAPSULE ORAL ONCE AS NEEDED
Status: DISCONTINUED | OUTPATIENT
Start: 2023-12-01 | End: 2023-12-06 | Stop reason: HOSPADM

## 2023-12-01 RX ORDER — OXYCODONE HYDROCHLORIDE 5 MG/1
5 TABLET ORAL EVERY 4 HOURS PRN
Status: DISCONTINUED | OUTPATIENT
Start: 2023-12-02 | End: 2023-12-06 | Stop reason: HOSPADM

## 2023-12-01 RX ORDER — KETOROLAC TROMETHAMINE 30 MG/ML
30 INJECTION, SOLUTION INTRAMUSCULAR; INTRAVENOUS EVERY 6 HOURS
Status: COMPLETED | OUTPATIENT
Start: 2023-12-01 | End: 2023-12-01

## 2023-12-01 RX ORDER — BISACODYL 10 MG/1
10 SUPPOSITORY RECTAL DAILY PRN
Status: DISCONTINUED | OUTPATIENT
Start: 2023-12-01 | End: 2023-12-06 | Stop reason: HOSPADM

## 2023-12-01 RX ORDER — METHYLERGONOVINE MALEATE 0.2 MG/ML
0.2 INJECTION INTRAVENOUS ONCE AS NEEDED
Status: DISCONTINUED | OUTPATIENT
Start: 2023-12-01 | End: 2023-12-05

## 2023-12-01 RX ORDER — LIDOCAINE HCL/EPINEPHRINE/PF 2%-1:200K
VIAL (ML) INJECTION AS NEEDED
Status: DISCONTINUED | OUTPATIENT
Start: 2023-12-01 | End: 2023-12-01

## 2023-12-01 RX ADMIN — MAGNESIUM SULFATE IN WATER 2 G/HR: 20 INJECTION, SOLUTION INTRAVENOUS at 00:58

## 2023-12-01 RX ADMIN — INSULIN HUMAN 0.9 UNITS/HR: 1 INJECTION, SOLUTION INTRAVENOUS at 05:49

## 2023-12-01 RX ADMIN — LIDOCAINE HYDROCHLORIDE,EPINEPHRINE BITARTRATE 5 ML: 20; .005 INJECTION, SOLUTION EPIDURAL; INFILTRATION; INTRACAUDAL; PERINEURAL at 04:12

## 2023-12-01 RX ADMIN — INSULIN HUMAN 0.7 UNITS/HR: 1 INJECTION, SOLUTION INTRAVENOUS at 01:03

## 2023-12-01 RX ADMIN — INSULIN LISPRO 2 UNITS: 100 INJECTION, SOLUTION INTRAVENOUS; SUBCUTANEOUS at 18:41

## 2023-12-01 RX ADMIN — INSULIN HUMAN 0.5 UNITS/HR: 1 INJECTION, SOLUTION INTRAVENOUS at 08:36

## 2023-12-01 RX ADMIN — DEXMEDETOMIDINE HYDROCHLORIDE 8 MCG: 100 INJECTION, SOLUTION INTRAVENOUS at 04:49

## 2023-12-01 RX ADMIN — Medication 80 MCG: at 04:11

## 2023-12-01 RX ADMIN — FENTANYL CITRATE 50 MCG: 50 INJECTION, SOLUTION INTRAMUSCULAR; INTRAVENOUS at 05:18

## 2023-12-01 RX ADMIN — ACETAMINOPHEN 975 MG: 325 TABLET ORAL at 11:28

## 2023-12-01 RX ADMIN — SODIUM CHLORIDE, SODIUM LACTATE, POTASSIUM CHLORIDE, AND CALCIUM CHLORIDE: 600; 310; 30; 20 INJECTION, SOLUTION INTRAVENOUS at 04:00

## 2023-12-01 RX ADMIN — OXYTOCIN 600 MILLI-UNITS/MIN: 10 INJECTION, SOLUTION INTRAMUSCULAR; INTRAVENOUS at 04:33

## 2023-12-01 RX ADMIN — INSULIN GLARGINE 12 UNITS: 100 INJECTION, SOLUTION SUBCUTANEOUS at 09:47

## 2023-12-01 RX ADMIN — DEXMEDETOMIDINE HYDROCHLORIDE 8 MCG: 100 INJECTION, SOLUTION INTRAVENOUS at 04:20

## 2023-12-01 RX ADMIN — INSULIN HUMAN 0.1 UNITS/HR: 1 INJECTION, SOLUTION INTRAVENOUS at 03:16

## 2023-12-01 RX ADMIN — DEXMEDETOMIDINE HYDROCHLORIDE 4 MCG: 100 INJECTION, SOLUTION INTRAVENOUS at 04:55

## 2023-12-01 RX ADMIN — DEXMEDETOMIDINE HYDROCHLORIDE 8 MCG: 100 INJECTION, SOLUTION INTRAVENOUS at 04:37

## 2023-12-01 RX ADMIN — FENTANYL CITRATE 100 MCG: 50 INJECTION, SOLUTION INTRAMUSCULAR; INTRAVENOUS at 04:17

## 2023-12-01 RX ADMIN — INSULIN HUMAN 0.3 UNITS/HR: 1 INJECTION, SOLUTION INTRAVENOUS at 02:13

## 2023-12-01 RX ADMIN — NIFEDIPINE 30 MG: 30 TABLET, FILM COATED, EXTENDED RELEASE ORAL at 09:48

## 2023-12-01 RX ADMIN — LIDOCAINE HYDROCHLORIDE,EPINEPHRINE BITARTRATE 5 ML: 20; .005 INJECTION, SOLUTION EPIDURAL; INFILTRATION; INTRACAUDAL; PERINEURAL at 04:04

## 2023-12-01 RX ADMIN — KETOROLAC TROMETHAMINE 30 MG: 30 INJECTION, SOLUTION INTRAMUSCULAR at 04:50

## 2023-12-01 RX ADMIN — Medication 80 MCG: at 04:20

## 2023-12-01 RX ADMIN — OXYTOCIN 600 MILLI-UNITS/MIN: 10 INJECTION, SOLUTION INTRAMUSCULAR; INTRAVENOUS at 04:16

## 2023-12-01 RX ADMIN — KETOROLAC TROMETHAMINE 30 MG: 30 INJECTION, SOLUTION INTRAMUSCULAR; INTRAVENOUS at 23:37

## 2023-12-01 RX ADMIN — SODIUM CHLORIDE 25 ML/HR: 9 INJECTION, SOLUTION INTRAVENOUS at 05:50

## 2023-12-01 RX ADMIN — LIDOCAINE HYDROCHLORIDE,EPINEPHRINE BITARTRATE 5 ML: 20; .005 INJECTION, SOLUTION EPIDURAL; INFILTRATION; INTRACAUDAL; PERINEURAL at 04:08

## 2023-12-01 RX ADMIN — LIDOCAINE HYDROCHLORIDE,EPINEPHRINE BITARTRATE 5 ML: 20; .005 INJECTION, SOLUTION EPIDURAL; INFILTRATION; INTRACAUDAL; PERINEURAL at 04:14

## 2023-12-01 RX ADMIN — LIDOCAINE HYDROCHLORIDE 5 ML: 20 INJECTION, SOLUTION INFILTRATION; PERINEURAL at 04:53

## 2023-12-01 RX ADMIN — KETOROLAC TROMETHAMINE 30 MG: 30 INJECTION, SOLUTION INTRAMUSCULAR; INTRAVENOUS at 11:28

## 2023-12-01 RX ADMIN — DEXMEDETOMIDINE HYDROCHLORIDE 8 MCG: 100 INJECTION, SOLUTION INTRAVENOUS at 04:43

## 2023-12-01 RX ADMIN — Medication 120 MCG: at 04:43

## 2023-12-01 RX ADMIN — AZITHROMYCIN 500 MG: 500 INJECTION, POWDER, LYOPHILIZED, FOR SOLUTION INTRAVENOUS at 04:05

## 2023-12-01 RX ADMIN — MISOPROSTOL 800 MCG: 200 TABLET ORAL at 04:38

## 2023-12-01 RX ADMIN — DEXMEDETOMIDINE HYDROCHLORIDE 8 MCG: 100 INJECTION, SOLUTION INTRAVENOUS at 04:15

## 2023-12-01 RX ADMIN — SODIUM CHLORIDE, POTASSIUM CHLORIDE, SODIUM LACTATE AND CALCIUM CHLORIDE 75 ML/HR: 600; 310; 30; 20 INJECTION, SOLUTION INTRAVENOUS at 00:34

## 2023-12-01 RX ADMIN — PENICILLIN G 3 MILLION UNITS: 3000000 INJECTION, SOLUTION INTRAVENOUS at 00:39

## 2023-12-01 RX ADMIN — DEXMEDETOMIDINE HYDROCHLORIDE 8 MCG: 100 INJECTION, SOLUTION INTRAVENOUS at 04:11

## 2023-12-01 RX ADMIN — MAGNESIUM SULFATE IN WATER 2 G/HR: 20 INJECTION, SOLUTION INTRAVENOUS at 11:27

## 2023-12-01 RX ADMIN — CEFAZOLIN 2 G: 330 INJECTION, POWDER, FOR SOLUTION INTRAMUSCULAR; INTRAVENOUS at 04:10

## 2023-12-01 RX ADMIN — DEXTROSE MONOHYDRATE 50 ML/HR: 100 INJECTION, SOLUTION INTRAVENOUS at 06:51

## 2023-12-01 RX ADMIN — MORPHINE SULFATE 2 MG: 0.5 INJECTION EPIDURAL; INTRATHECAL; INTRAVENOUS at 04:50

## 2023-12-01 RX ADMIN — DEXMEDETOMIDINE HYDROCHLORIDE 8 MCG: 100 INJECTION, SOLUTION INTRAVENOUS at 04:18

## 2023-12-01 RX ADMIN — FAMOTIDINE 20 MG: 10 INJECTION INTRAVENOUS at 04:37

## 2023-12-01 RX ADMIN — MAGNESIUM SULFATE IN WATER 2 G/HR: 20 INJECTION, SOLUTION INTRAVENOUS at 22:40

## 2023-12-01 RX ADMIN — ONDANSETRON 4 MG: 2 INJECTION INTRAMUSCULAR; INTRAVENOUS at 04:07

## 2023-12-01 RX ADMIN — Medication 80 MCG: at 04:15

## 2023-12-01 RX ADMIN — ACETAMINOPHEN 975 MG: 325 TABLET ORAL at 23:37

## 2023-12-01 RX ADMIN — ACETAMINOPHEN 650 MG: 120 SUPPOSITORY RECTAL at 05:25

## 2023-12-01 RX ADMIN — KETOROLAC TROMETHAMINE 30 MG: 30 INJECTION, SOLUTION INTRAMUSCULAR; INTRAVENOUS at 17:33

## 2023-12-01 RX ADMIN — HYDROMORPHONE HYDROCHLORIDE 0.2 MG: 1 INJECTION, SOLUTION INTRAMUSCULAR; INTRAVENOUS; SUBCUTANEOUS at 06:08

## 2023-12-01 RX ADMIN — Medication 120 MCG: at 04:04

## 2023-12-01 RX ADMIN — ACETAMINOPHEN 975 MG: 325 TABLET ORAL at 17:33

## 2023-12-01 RX ADMIN — Medication 80 MCG: at 04:18

## 2023-12-01 ASSESSMENT — PAIN - FUNCTIONAL ASSESSMENT: PAIN_FUNCTIONAL_ASSESSMENT: 0-10

## 2023-12-01 ASSESSMENT — PAIN SCALES - GENERAL
PAINLEVEL_OUTOF10: 0 - NO PAIN
PAINLEVEL_OUTOF10: 4
PAINLEVEL_OUTOF10: 0 - NO PAIN
PAINLEVEL_OUTOF10: 10 - WORST POSSIBLE PAIN
PAINLEVEL_OUTOF10: 0 - NO PAIN
PAINLEVEL_OUTOF10: 10 - WORST POSSIBLE PAIN

## 2023-12-01 ASSESSMENT — PAIN DESCRIPTION - DESCRIPTORS
DESCRIPTORS: ACHING
DESCRIPTORS: BURNING

## 2023-12-01 NOTE — PROGRESS NOTES
Dr. Bass reports that is okay for her to get the hepatitis-A hepatitis-B vaccine.  I have not heard from Dr. Ceja.   Postpartum Progress Note    Assessment/Plan   Maria Isabel Cutler is a 23 y.o., , who delivered at 34w6d gestation and is now postpartum day 0, pCS for failed IOL    cHTN, now siPEC w/ SF   - new severe range Bps > 4 hrs apart since arrival  - nifedipine 30mg initiated, now normotensive to mild range, last severe 619 nonsustained, will consider uptitration if persists  - asymptomatic  - HELLP labs neg x2  - continue Mg gtt for 24 h pp     T1DM; DKA resolved   - DKA on admission, now resolved with closed gap, normoglycemia, BHB wnl   - strict Is/Os  - patient reports pump is broken, per MFM will start long acting Lantus 12 once a day and lispro 2 TID, will overlap long acting with insulin gtt. Fasting, pre and post prandials, with preprandial sliding scale    Asthma  - albuterol PRN    HSV  - on acyclovir - SSE neg for lesions     Post op Care  - pain control with duramorph, tylenol, toradol. Transition to oral medications today.  - continue IVF until tolerating adequate diet   - starting Hgb 10.0, EBL 1300, follow up pod 1 cbc   - buccal cytotec/IU pit given intraop  - regular diet  - aguirre in due to Mg    Dispo  - Anticipate DC on PPD#3 pending no complications  - Plan for incision check in 2 weeks and PPV in 4-6 weeks with primary OB    Stuart Munoz, PGY3  seen and discussed with Dr. Clovis Barrow      Principal Problem:    DM (diabetes mellitus) type 1, uncontrolled, with ketoacidosis (CMS/HCC)  Active Problems:    Seizures (CMS/HCC)    Pregnancy Problems (from 24 to present)       Problem Noted Resolved    Labor and delivery indication for care or intervention 2023 by Marni De La Cruz MD No    Priority:  Medium      Bilateral sciatica 10/10/2023 by Stuart Munoz MD No    Priority:  Medium      Overview Signed 10/10/2023  2:17 PM by Stuart Munoz MD     Intermittent pain shooting down posteriolateral thighs bilaterally         34 weeks gestation of pregnancy 10/8/2023 by Stuart Munoz MD No     Priority:  Medium      Overview Addendum 10/10/2023  2:24 PM by Stuart Munoz MD     [x] PNLs reviewed wnl, rubella nonimmune  [x] Pap ASCUS 2022  [x] NT wnl 2023  [x] cell-free DNA, CF/SMA screening low risk  [x] Anatomy US  [x] 2nd trimester labs, Hg 9.9  [x] tdap  [ ] flu/covid  [/] GBS  [ ] Delivery Planning  [ ] PPBC           Asthma affecting pregnancy in third trimester 10/8/2023 by Stuart Munoz MD No    Priority:  Medium      Overview Addendum 10/24/2023  2:08 PM by Stuart Munoz MD     Albuterol PRN  10/24 Regimen: Flovent 250mcg BID puff, Albuterol PRN         Type 1 diabetes mellitus complicating pregnancy, antepartum 10/5/2023 by Indiana Parra MD No    Priority:  Medium      Overview Addendum 2023  8:04 PM by Angeline Boudreaux MD     [x] Baseline HbA1c 12.8 2023  [x] Baseline TSH 2.38 2023  [x] Baseline HELLP Labs wnl, 0.11 2023  [x] bbASA  [x] EKG  wnl  [x] Fetal Echo wnl  [x] Ophthalmology  /podiatry    [ ]  Testing 2x weekly until del    Serial Growths  10/24 29.0 wga EFW 1294g 42%, AC 48%    Current Regimen changed 10/10/2023  Basal  0453-8993 0.90  7956-7838 1.00  1701-3212 0.90     Bolus  7925-1417 1:9     CF: 1:40  Carb ratio: 1:10  Target:110  DOA 5 hrs           Herpes simplex infection in mother during third trimester of pregnancy 10/5/2023 by Indiana Parra MD No    Priority:  Medium      Overview Signed 10/5/2023  8:55 AM by Indiana Parra MD     On acyclovir prophylaxis          Chronic hypertension affecting pregnancy 10/5/2023 by Indiana Parra MD No    Priority:  Medium      Overview Addendum 10/8/2023  3:16 PM by Stuart Munoz MD     [x] Baseline HELLP Labs, P:C 0.11 2023  [x] bbASA  [ ]  Testing  [x] Baseline EKG inpatient      Serial Growths    Med Regimen  No meds         Group beta Strep positive 10/5/2023 by Indiana Parra MD No    Priority:  Medium      Overview Signed 10/5/2023  9:00 AM by Indiana Parra MD     GBS  UTI in pregnancy          Depression during pregnancy in third trimester 10/5/2023 by Indiana Parra MD No    Priority:  Medium      Fetal cardiac anomaly affecting pregnancy, antepartum 10/5/2023 by Indiana Parra MD No    Priority:  Medium      Overview Addendum 2023  6:00 PM by Luz Maria Abebe MD     [facility] Plan of Care: fetal ventricular septum slightly thickened --> code:1: non-urgent peds cardiology consult prior to discharge or within 1-2 weeks if delivered at an outside hospital  No structural abnormalities on repeat fetal echo --> peds cards recommendation for routine delivery and  care with non-urgent peds cardiology consult prior to discharge or within 1-2 weeks if delivered at an outside hospital         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 10/5/2023 by Indiana Parra MD No    Priority:  Medium      Overview Addendum 10/24/2023  2:09 PM by Stuart Munoz MD     - Admitted for blurry vision on , d/c ed with improvement, oligoclonal bands seen on LP, neg MRI head  - New optic disc edema, diabetic edema    - Close follow up with ophto, last appt 10/13, needs IV fluro angio after delivery to prevent progression, next visit   - Neuro-Immunology NPV scheduled for          Ophthalmologic abnormality 10/5/2023 by Indiana Parra MD No    Priority:  Medium      Overview Signed 10/5/2023  9:14 AM by Indiana Parra MD     Diabetic macular edema, both eyes - for panretinal photocoagulation with Optho          Constipation during pregnancy in second trimester 10/9/2023 by Melody Osorio 10/10/2023 by Stuart Munoz MD    Priority:  Medium      Abnormal pregnancy in second trimester 10/9/2023 by Melody Osorio 10/10/2023 by Stuart Munoz MD    Priority:  Medium      Suspected fetal anomaly, antepartum 10/9/2023 by Melody Osorio 10/10/2023 by Stuart Munoz MD    Priority:  Medium      Hyperglycemia in pregnancy 2023 by Melody Osorio 10/10/2023 by Stuart Munoz MD    Priority:  Medium       Vomiting of pregnancy 9/14/2023 by Melody Osorio 10/10/2023 by Stuart Munoz MD    Priority:  Medium              Subjective   Patient is doing well. Pain is managed with medications. Tolerating PO w/o nausea or vomiting. Denies fevers/chills, SOB, chest pain. She has no HA, no spots in vision, no RUQ pain.      Objective   Allergies:   Patient has no known allergies.         Last Vitals:  Temp Pulse Resp BP MAP Pulse Ox   36.3 °C (97.3 °F) 85 18 109/64   98 %     Vitals Min/Max Last 24 Hours:  Temp  Min: 36 °C (96.8 °F)  Max: 37.2 °C (99 °F)  Pulse  Min: 74  Max: 151  Resp  Min: 16  Max: 18  BP  Min: 106/61  Max: 169/97    Intake/Output:     Intake/Output Summary (Last 24 hours) at 12/1/2023 0928  Last data filed at 12/1/2023 0742  Gross per 24 hour   Intake 4151.37 ml   Output 6764 ml   Net -2612.63 ml       Physical Exam:  General: Examination reveals a well developed, well nourished, female, in no acute distress. She is alert and cooperative.  Lungs: clear to auscultation bilaterally.  Cardiac: S1, S2 normal.  Abdomen: appropriately tender to palpation.  Incision: covered with bandage CDI.  Fundus: firm and below umbilicus.  Psychological: awake and alert; oriented to person, place, and time.

## 2023-12-01 NOTE — PROGRESS NOTES
Intrapartum Progress Note    Assessment/Plan   Maria Isabel Cutler is a 23 y.o.  at 34w5d. SHIN: 2024, by Last Menstrual Period, admitted for T1DM and DKA, now undergoing IOL in s/o siPEC w/ SF .     IOL   - latent labor  - s/p AROM at 1000  - continue to titrate pitocin per protocol   - continue to monitor for cervical change, epidural infusing     cHTN, now siPEC w/ SF   - new severe range Bps > 4 hrs apart since arrival  - nifedipine 30mg initiated, now normotensive   - asymptomatic  - HELLP labs neg x2  - continue Mg gtt     T1DM; DKA resolved   - DKA on admission, now resolved with closed gap, normoglycemia, BHB wnl   - on insulin gtt, BG 70s-90s.   - for POCT q1hr, concordant with CGM   - for maintenance fluids @150cc/hr pending BG as below:               NS if BG >160               D10W if BG <160   - strict Is/Os  - will maintain on drip postpartum until MFM recs in AM given previous issues with patient's pump and hyperglycemic/DKA episodes      Maternal wellbeing  - T&S, CBC on arrival, Hgb 10.6  - h/o asthma - will avoid hemabate  - epidural infusing      Fetal wellbeing  - NICU aware of decision to move toward delivery  - FHT Cat 1, CEFM   - GBS bacteriuria, continue PCN intrapartum  - thickened fetal ventricular septum - for  cards eval   - maternal HSV, on acyclovir - SSE neg for lesions    Principal Problem:    DM (diabetes mellitus) type 1, uncontrolled, with ketoacidosis (CMS/HCC)  Active Problems:    Seizures (CMS/HCC)    Pregnancy Problems (from 24 to present)       Problem Noted Resolved    Labor and delivery indication for care or intervention 2023 by Marni De La Cruz MD No    Priority:  Medium      Bilateral sciatica 10/10/2023 by Stuart Munoz MD No    Priority:  Medium      Overview Signed 10/10/2023  2:17 PM by Stuart Munoz MD     Intermittent pain shooting down posteriolateral thighs bilaterally         34 weeks gestation of pregnancy 10/8/2023 by Stuart  MD Alexander No    Priority:  Medium      Overview Addendum 10/10/2023  2:24 PM by Stuart Munoz MD     [x] PNLs reviewed wnl, rubella nonimmune  [x] Pap ASCUS 2022  [x] NT wnl 2023  [x] cell-free DNA, CF/SMA screening low risk  [x] Anatomy US  [x] 2nd trimester labs, Hg 9.9  [x] tdap  [ ] flu/covid  [/] GBS  [ ] Delivery Planning  [ ] PPBC           Asthma affecting pregnancy in third trimester 10/8/2023 by Stuart Munoz MD No    Priority:  Medium      Overview Addendum 10/24/2023  2:08 PM by Stuart Munoz MD     Albuterol PRN  10/24 Regimen: Flovent 250mcg BID puff, Albuterol PRN         Type 1 diabetes mellitus complicating pregnancy, antepartum 10/5/2023 by Indiana Parra MD No    Priority:  Medium      Overview Addendum 2023  8:04 PM by Angeline Boudreaux MD     [x] Baseline HbA1c 12.8 2023  [x] Baseline TSH 2.38 2023  [x] Baseline HELLP Labs wnl, 0.11 2023  [x] bbASA  [x] EKG  wnl  [x] Fetal Echo wnl  [x] Ophthalmology  /podiatry    [ ]  Testing 2x weekly until del    Serial Growths  10/24 29.0 wga EFW 1294g 42%, AC 48%    Current Regimen changed 10/10/2023  Basal  9709-4405 0.90  2150-6036 1.00  2081-5957 0.90     Bolus  1514-6335 1:9     CF: 1:40  Carb ratio: 1:10  Target:110  DOA 5 hrs           Herpes simplex infection in mother during third trimester of pregnancy 10/5/2023 by Indiana Parra MD No    Priority:  Medium      Overview Signed 10/5/2023  8:55 AM by Indiana Parra MD     On acyclovir prophylaxis          Chronic hypertension affecting pregnancy 10/5/2023 by Indiana Parra MD No    Priority:  Medium      Overview Addendum 10/8/2023  3:16 PM by Stuart Munoz MD     [x] Baseline HELLP Labs, P:C 0.11 2023  [x] bbASA  [ ]  Testing  [x] Baseline EKG inpatient      Serial Growths    Med Regimen  No meds         Group beta Strep positive 10/5/2023 by Indiana Parra MD No    Priority:  Medium      Overview Signed 10/5/2023  9:00 AM by Indiana ORTIZ  MD Aida     GBS UTI in pregnancy          Depression during pregnancy in third trimester 10/5/2023 by Indiana Parra MD No    Priority:  Medium      Fetal cardiac anomaly affecting pregnancy, antepartum 10/5/2023 by Indiana Parra MD No    Priority:  Medium      Overview Addendum 2023  6:00 PM by Luz Maria Abebe MD     [facility] Plan of Care: fetal ventricular septum slightly thickened --> code:1: non-urgent peds cardiology consult prior to discharge or within 1-2 weeks if delivered at an outside hospital  No structural abnormalities on repeat fetal echo --> peds cards recommendation for routine delivery and  care with non-urgent peds cardiology consult prior to discharge or within 1-2 weeks if delivered at an outside hospital         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 10/5/2023 by Indiana Parra MD No    Priority:  Medium      Overview Addendum 10/24/2023  2:09 PM by Stuart Munoz MD     - Admitted for blurry vision on , d/c ed with improvement, oligoclonal bands seen on LP, neg MRI head  - New optic disc edema, diabetic edema    - Close follow up with ophto, last appt 10/13, needs IV fluro angio after delivery to prevent progression, next visit   - Neuro-Immunology NPV scheduled for          Ophthalmologic abnormality 10/5/2023 by Indiana Parra MD No    Priority:  Medium      Overview Signed 10/5/2023  9:14 AM by Indiana Parra MD     Diabetic macular edema, both eyes - for panretinal photocoagulation with Optho          Constipation during pregnancy in second trimester 10/9/2023 by Melody Osorio 10/10/2023 by Stuart Munoz MD    Abnormal pregnancy in second trimester 10/9/2023 by Melody Osorio 10/10/2023 by Stuart Munoz MD    Suspected fetal anomaly, antepartum 10/9/2023 by Melody Osorio 10/10/2023 by Stuart Munoz MD    Hyperglycemia in pregnancy 2023 by Melody Osorio 10/10/2023 by Stuart Munoz MD    Vomiting of pregnancy 2023 by Melody Osorio 10/10/2023 by Stuart  MD Alexander            Subjective   Pt feeling comfortable, squatting with squat bar and peanut ball.     Objective   Last Vitals:  Temp Pulse Resp BP MAP Pulse Ox   36.5 °C (97.7 °F) 89 18 114/69   99 %     Vitals Min/Max Last 24 Hours:  Temp  Min: 36 °C (96.8 °F)  Max: 36.6 °C (97.9 °F)  Pulse  Min: 72  Max: 103  Resp  Min: 16  Max: 18  BP  Min: 109/68  Max: 163/65    Intake/Output:    Intake/Output Summary (Last 24 hours) at 11/30/2023 1942  Last data filed at 11/30/2023 1830  Gross per 24 hour   Intake 4191.67 ml   Output 4470 ml   Net -278.33 ml       Physical Examination:  General: no acute distress  HEENT: normocephalic, atraumatic  Heart: warm and well perfused  Lungs: no increased WOB  Abdomen: gravid  Extremities: moving all extremities  Neuro: awake and conversant  Psych: appropriate mood and affect     Lab Review:  Lab Results   Component Value Date    WBC 9.1 11/29/2023    HGB 10.0 (L) 11/29/2023    HCT 29.4 (L) 11/29/2023     11/29/2023     Lab Results   Component Value Date    GLUCOSE 95 11/29/2023     (L) 11/29/2023    K 3.4 (L) 11/29/2023     11/29/2023    CO2 21 11/29/2023    ANIONGAP 11 11/29/2023    BUN 2 (L) 11/29/2023    CREATININE 0.36 (L) 11/29/2023    EGFR >90 11/29/2023    CALCIUM 7.5 (L) 11/29/2023    ALBUMIN 2.9 (L) 11/29/2023    PROT 5.3 (L) 11/29/2023    ALKPHOS 72 11/29/2023    ALT 13 11/29/2023    AST 16 11/29/2023    BILITOT 0.3 11/29/2023

## 2023-12-01 NOTE — INDIVIDUALIZED OVERALL PLAN OF CARE NOTE
Labor Check    Subjective: Pt feeling nervous about exam, reports feeling a little more pressure     Objective:  Vitals: /86   Pulse 91   Temp 37.2 °C (99 °F) (Temporal)   Resp 16   Ht 1.524 m (5')   Wt 60.4 kg (133 lb 2.5 oz)   LMP 2023   SpO2 99%   BMI 26.01 kg/m²   SVE: 3.5/50/-3  FHT: 140/mod/+accel/ late decels  [place]: q2-3    Discussed with patient that exam is unchanged from prior exam and she is still not in active labor. Discussed that greater than 96% of women who go on to have a vaginal delivery will have reached active labor by 12-18 hours of pitocin and rupture of membranes. Given that she has undergone 17 hours of pit/ROM and is still in latent labor, we discussed the lower likelihood of a vaginal delivery, and increasing risks of continued labor induction, including infection and hemorrhage. Also discussed that in setting of having to turn off pitocin for fetal intolerance of contractions, would not recommend further trial of labor due to risk of fetal distress. Discussed recommendation for  at that time and r/b of , including infection, bleeding, damage to surrounding structures. Answered all questions about delivery and recovery. Pt agreeable to proceeding with . Tracing improved with resolution of decels with pitocin off, will plan for non-scheduled, non-urgent  for failed IOL. Anesthesiology, charge nurse notified. T&C x1U, starting Hgb 10.     D/w Dr. Jonah Clarke MD  PGY2, Obstetrics and Gynecology

## 2023-12-01 NOTE — PROGRESS NOTES
Postpartum Progress Note    Assessment/Plan   Maria Isabel Cutler is a 23 y.o., now  POD#0 s/p PCS, who delivered at 34w6d gestation and is now postpartum day 0 .  PPD0  - recovering well, continue routine pp care  - Hgb 10.0 () --> EBL 1300 ml will follow up POD#1 CBC tomorrow, no s/sx anemia  - tolerating clears, adat  - aguirre in place, good UOP  - desires to breastfeed, encourage to work with lactation and pump  - declines BC at this time     cHTN, now siPEC w/ SF   - new severe range Bps > 4 hrs apart since arrival  - nifedipine 30mg initiated, now normotensive to mild range, last severe 619 nonsustained, will consider uptitration if persists  - asymptomatic  - HELLP labs neg x2  - continue Mg gtt     T1DM; DKA resolved   - DKA on admission, now resolved with closed gap, normoglycemia, BHB wnl   - on insulin gtt, BG 70s-90s.   - for POCT q1hr, concordant with CGM   - for maintenance fluids @150cc/hr pending BG as below:               NS if BG >160               D10W if BG <160   - strict Is/Os  - will maintain on drip postpartum until tolerates meals  - patient reports pump is broken, will consider switch to long acting insulin and lispro with meals pending new pump replacement, start long acting Lantus 12 once a day and lispro 2 TID     To see and d/w Dr. Margareth Longoria MD     Principal Problem:    DM (diabetes mellitus) type 1, uncontrolled, with ketoacidosis (CMS/HCC)  Active Problems:    Seizures (CMS/HCC)    Pregnancy Problems (from 24 to present)       Problem Noted Resolved    Labor and delivery indication for care or intervention 2023 by Marni De La Cruz MD No    Priority:  Medium      Bilateral sciatica 10/10/2023 by Stuart Munoz MD No    Priority:  Medium      Overview Signed 10/10/2023  2:17 PM by Stuart Munoz MD     Intermittent pain shooting down posteriolateral thighs bilaterally         34 weeks gestation of pregnancy 10/8/2023 by Stuart Munoz MD No     Priority:  Medium      Overview Addendum 10/10/2023  2:24 PM by Stuart Munoz MD     [x] PNLs reviewed wnl, rubella nonimmune  [x] Pap ASCUS 2022  [x] NT wnl 2023  [x] cell-free DNA, CF/SMA screening low risk  [x] Anatomy US  [x] 2nd trimester labs, Hg 9.9  [x] tdap  [ ] flu/covid  [/] GBS  [ ] Delivery Planning  [ ] PPBC           Asthma affecting pregnancy in third trimester 10/8/2023 by Stuart Munoz MD No    Priority:  Medium      Overview Addendum 10/24/2023  2:08 PM by Stuart Munoz MD     Albuterol PRN  10/24 Regimen: Flovent 250mcg BID puff, Albuterol PRN         Type 1 diabetes mellitus complicating pregnancy, antepartum 10/5/2023 by Indiana Parra MD No    Priority:  Medium      Overview Addendum 2023  8:04 PM by Angeline Boudreaux MD     [x] Baseline HbA1c 12.8 2023  [x] Baseline TSH 2.38 2023  [x] Baseline HELLP Labs wnl, 0.11 2023  [x] bbASA  [x] EKG  wnl  [x] Fetal Echo wnl  [x] Ophthalmology  /podiatry    [ ]  Testing 2x weekly until del    Serial Growths  10/24 29.0 wga EFW 1294g 42%, AC 48%    Current Regimen changed 10/10/2023  Basal  5762-5163 0.90  3119-5826 1.00  4725-0294 0.90     Bolus  5703-1315 1:9     CF: 1:40  Carb ratio: 1:10  Target:110  DOA 5 hrs           Herpes simplex infection in mother during third trimester of pregnancy 10/5/2023 by Indiana Parra MD No    Priority:  Medium      Overview Signed 10/5/2023  8:55 AM by Indiana Parra MD     On acyclovir prophylaxis          Chronic hypertension affecting pregnancy 10/5/2023 by Indiana Parra MD No    Priority:  Medium      Overview Addendum 10/8/2023  3:16 PM by Stuart Munoz MD     [x] Baseline HELLP Labs, P:C 0.11 2023  [x] bbASA  [ ]  Testing  [x] Baseline EKG inpatient      Serial Growths    Med Regimen  No meds         Group beta Strep positive 10/5/2023 by Indiana Parra MD No    Priority:  Medium      Overview Signed 10/5/2023  9:00 AM by Indiana Parra MD     GBS UTI  in pregnancy          Depression during pregnancy in third trimester 10/5/2023 by Indiana Parra MD No    Priority:  Medium      Fetal cardiac anomaly affecting pregnancy, antepartum 10/5/2023 by Indiana Parra MD No    Priority:  Medium      Overview Addendum 2023  6:00 PM by Luz Maria Abebe MD     [facility] Plan of Care: fetal ventricular septum slightly thickened --> code:1: non-urgent peds cardiology consult prior to discharge or within 1-2 weeks if delivered at an outside hospital  No structural abnormalities on repeat fetal echo --> peds cards recommendation for routine delivery and  care with non-urgent peds cardiology consult prior to discharge or within 1-2 weeks if delivered at an outside hospital         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 10/5/2023 by Indiana Parra MD No    Priority:  Medium      Overview Addendum 10/24/2023  2:09 PM by Stuart Munoz MD     - Admitted for blurry vision on , d/c ed with improvement, oligoclonal bands seen on LP, neg MRI head  - New optic disc edema, diabetic edema    - Close follow up with ophto, last appt 10/13, needs IV fluro angio after delivery to prevent progression, next visit   - Neuro-Immunology NPV scheduled for          Ophthalmologic abnormality 10/5/2023 by Indiana Parra MD No    Priority:  Medium      Overview Signed 10/5/2023  9:14 AM by Indiana Parra MD     Diabetic macular edema, both eyes - for panretinal photocoagulation with Optho          Constipation during pregnancy in second trimester 10/9/2023 by Melody Osorio 10/10/2023 by Stuart Munoz MD    Priority:  Medium      Abnormal pregnancy in second trimester 10/9/2023 by Melody Osorio 10/10/2023 by Stuart Munoz MD    Priority:  Medium      Suspected fetal anomaly, antepartum 10/9/2023 by Melody Osorio 10/10/2023 by Stuart Munoz MD    Priority:  Medium      Hyperglycemia in pregnancy 2023 by Melody Osorio 10/10/2023 by Stuart Munoz MD    Priority:  Medium       Vomiting of pregnancy 9/14/2023 by Melody Osorio 10/10/2023 by Stuart Munoz MD    Priority:  Medium            Hospital course: diabetes mellitus  uterine atony treated with massage, pitocin, and misoprostol  Severe preeclampsia  B+ blood type.  Rhogam is not indicated    Subjective   Her pain is well controlled with current medications  She is not passing flatus  She is not yet ambulating  She is not yet tried anything other than clears at this time, will try breakfast this am  She reports  she wants to try breastfeeding, has not yet tried pumping  She denies emotional concerns today   Her plan for contraception is none     Patient overall feels well, pain well controlled with current regimen    Objective   Allergies:   Patient has no known allergies.         Last Vitals:  Temp Pulse Resp BP MAP Pulse Ox   36.3 °C (97.3 °F) 100 18 (!) 127/91   99 %     Vitals Min/Max Last 24 Hours:  Temp  Min: 36 °C (96.8 °F)  Max: 37.2 °C (99 °F)  Pulse  Min: 74  Max: 151  Resp  Min: 16  Max: 18  BP  Min: 106/61  Max: 169/97    Intake/Output:     Intake/Output Summary (Last 24 hours) at 12/1/2023 0751  Last data filed at 12/1/2023 0634  Gross per 24 hour   Intake 4140.19 ml   Output 6564 ml   Net -2423.81 ml       Physical Exam:  General: Examination reveals a well developed, well nourished, female, in no acute distress. She is alert and cooperative.  Neck: supple, no significant adenopathy.  Lungs: clear to auscultation bilaterally.  Cardiac: regular rate and rhythm, S1, S2 normal, no murmur, click, rub or gallop.  Abdomen: soft, nondistended, fundus firm below the umbilicus.  Incision: dressing intact no strikethrough.  Fundus: firm.  Extremities: no redness or tenderness in the calves or thighs, no edema.  Neurological: DTRs normal and symmetrical.  Psychological: awake and alert; oriented to person, place, and time.    Lab Data:  Lab Results   Component Value Date    WBC 9.1 11/29/2023    HGB 10.0 (L) 11/29/2023    HCT  29.4 (L) 11/29/2023     11/29/2023     Lab Results   Component Value Date    GLUCOSE 95 11/29/2023     (L) 11/29/2023    K 3.4 (L) 11/29/2023     11/29/2023    CO2 21 11/29/2023    ANIONGAP 11 11/29/2023    BUN 2 (L) 11/29/2023    CREATININE 0.36 (L) 11/29/2023    EGFR >90 11/29/2023    CALCIUM 7.5 (L) 11/29/2023    ALBUMIN 2.9 (L) 11/29/2023    PROT 5.3 (L) 11/29/2023    ALKPHOS 72 11/29/2023    ALT 13 11/29/2023    AST 16 11/29/2023    BILITOT 0.3 11/29/2023     BG POCT all <200

## 2023-12-01 NOTE — L&D DELIVERY NOTE
OB Delivery Note  2023  Maria Isabel Cutler  23 y.o.         Gestational Age: 34w6d  /Para:   Quantitative Blood Loss: Admission to Discharge: 1300 mL (2023  1:53 PM - 2023  8:46 AM)    Date: 2023 - 2023  OR Location: [facility] OB    Name: Maria Isabel Cutler, : 2000, Age: 23 y.o., MRN: [id number], Sex: female    Diagnosis  * No Diagnosis Codes entered * * No Diagnosis Codes entered *     Procedures    * OBGYN Delivery  Section    Surgeons      * Kim Mckenzie - Primary    Resident/Fellow/Other Assistant:  Surgeon(s) and Role:    Procedure Summary  Anesthesia: * No anesthesia type entered *  ASA: ASA status not filed in the log.  Anesthesia Staff:   No anesthesia staff entered.  Estimated Blood Loss: 1300mL  Intra-op Medications:   Medication Name Total Dose   fentaNYL (PF)-BUPivacaine - Epi 1.25 mcg/mL- 0.044 % epidural infusion 263.57 mL   lactated Ringer's infusion 3,502.08 mL   oxytocin (Pitocin) infusion in sodium chloride 0.9% 30 units/500 mL 6,352 kandy-units              Anesthesia Record               Intraprocedure I/O Totals          Intake    Oxytocin Drip 354.00 mL    The total shown is the total volume documented since Anesthesia Start was filed.    lactated Ringer's infusion 1000.00 mL    fentaNYL (PF)-BUPivacaine - Epi 1.25 mcg/mL- 0.044 % epidural infusion 355.37 mL    azithromycin 500 mg/250 mL 250.00 mL    Total Intake 1959.37 mL       Output    Urine 575 mL    Total Blood Loss - Surgical Delivery (mL) 725 mL    Total Output 1300 mL       Net    Net Volume 659.37 mL       Other (could not be determined as input or output)    Surgical Delivery Estimated Blood Loss (mL) 725          Specimen: No specimens collected     Staff:   Scrub Person: Ashley Jacobson     Decision for : 22yo  at 34.6wga undergoing IOL for siPEC w/ SF and poorly controlled T1DM, s/p 16 hrs of AROM + pitocin, SVE unchanged at 3/50/-3. Pitocin turned off for late decelerations.  Decision for C/S for failed IOL     Pre op diagnosis:      1. IUP at 34.6 weeks   2. siPEC w/ SF  3. T1DM   4. Failed IOL     Post op diagnosis: Same    Findings: Normal appearing gravid uterus, fallopian tubes, and ovaries. Amniotic fluid clear, male infant in cephalic presentation    Procedure: Pt was taken to the OR where epidural anesthesia was re-dosed.  She was then placed in the dorsal supine position with a left lateral tilt. A aguirre catheter was placed, SCD's were applied, and a vaginal prep was performed. A pre-procedure time out was performed.  The pt was given prophylactic dose of IV antibiotics.  She was then prepped and draped in the usual sterile fashion. A Pfannenstiel skin incision was made with the scalpel through the skin and subcutaneous fat to the underlying fascial layer.  The fascia was incised in the midline with the scalpel and the incision was extended bilaterally. The superior aspect of the incision was grasped, tented up with Kocher clamps and the rectus muscle was dissected off sharply. The muscles were divided in the midline, the peritoneum was then identified and entered bluntly and incision extended superiorly and inferiorly taking care to avoid underlying viscera. The abdominal cavity was noted to have limited room laterally. The rectus muscles were further divided laterally with electrocautery in order to make space for delivery. The bladder blade was inserted.       A low transverse uterine incision was made with the scalpel, the uterine cavity was entered, and the hysterotomy was extended bilaterally with cephalocaudal stretch.  The infant was delivered atraumatically, the cord was clamped and cut and infant was handed off to awaiting nursing.  A cord segment and blood sample were collected.  The placenta was then expressed.  The uterus was exteriorized and cleared of all clot and debris. The uterine incision was repaired using a running locked stitch of 0-Vicryl. A second layer  of the same suture was placed in an imbricating fashion. Poor uterine tone was noted, and the decision was made to administer buccal cytotec and intrauterine pitocin with improvement in tone noted. Additional figure-of-eight stitches of the same suture were placed along the hysterotomy at areas of bleeding. Electrosurgery was used to cauterize small bleeders.  Good hemostasis was noted.    The uterus was the placed back inside the pelvis, the gutters were cleared of all clots and debris.  The hysterotomy was again evaluated and found to be hemostatic, the underside of the fascia and bladder and the rectus muscles were also found to be hemostatic.  The fascia was closed using a running stitch of 0-PDS.  The subcutaneous layer was irrigated, small bleeders were cauterized, and the subcutaneous layer was reapproximated using a running stitch of 2-0 Monocryl. The skin was closed with 4-0 Vicryl.  All counts were correct, the patient tolerated the procedure well.  Dr. Mckenzie was present for the entire procedure.  The patient and infant were taken back to the recovery room in stable condition.    Neela Clarke MD  Obstetrics and Gynecology     Chelly Cutler [[id number]]      Labor Events    Rupture date/time: 2023 1040  Rupture type: Artificial  Fluid color: Clear  Fluid odor: None       Labor Event Times    Labor onset date/time: 2023 1344  Decision date/time (emergent ): 2023 0348       Labor Length    3rd stage: 0h 01m       Placenta    Placenta delivery date/time: 2023 0433  Placenta removal: Manual removal  Placenta appearance: Intact  Placenta disposition: pathology       Cord    Vessels: 3 vessels  Complications: None  Delayed cord clamping?: Yes  Cord clamped date/time: 2023 0432  Cord blood disposition: Lab  Gases sent?: Yes  Stem cell collection (by provider): No       Anesthesia    Method: Epidural       Operative Delivery    Forceps attempted?: No  Vacuum extractor  attempted?: No       Shoulder Dystocia    Shoulder dystocia present?: No       [place] Delivery    Birth date/time: 2022 04:32:00  Delivery type: , Low Transverse       Resuscitation    Method: Suctioning, Continuous positive airway pressure (CPAP)       Apgars    Living status: Living  Apgar Component Scores:  1 min.:  5 min.:  10 min.:  15 min.:  20 min.:    Skin color:  0  1       Heart rate:  2  2       Reflex irritability:  2  2       Muscle tone:  2  2       Respiratory effort:  2  2       Total:  8  9       Apgars assigned by: MD ANGEL       Delivery Providers    Delivering clinician: Kim Mckenzie MD   Provider Role    Teresa Underwood RN Delivery Nurse     Nursery Nurse    Neela Clarke MD Resident    Renee Ramon RN Delivery Nurse                 Neela Clarke MD

## 2023-12-01 NOTE — INDIVIDUALIZED OVERALL PLAN OF CARE NOTE
Labor Check    Subjective: Feeling less numb, in good spirits. Continuing different position changes in bed    Objective:  Vitals: /69   Pulse 83   Temp 36.5 °C (97.7 °F) (Tympanic)   Resp 18   Ht 1.524 m (5')   Wt 60.4 kg (133 lb 2.5 oz)   LMP 04/01/2023   SpO2 99%   BMI 26.01 kg/m²   SVE: 3/50/-3  FHT: 130/mod/+accel/-decel   [place]: 2-5 min, irregular    Latent labor   S/p AROM  Continue pitocin per protocol  CEFM, currently Category I  Epidural infusing    Neela Clarke MD  PGY2, Obstetrics and Gynecology

## 2023-12-02 LAB
BLOOD EXPIRATION DATE: NORMAL
DISPENSE STATUS: NORMAL
ERYTHROCYTE [DISTWIDTH] IN BLOOD BY AUTOMATED COUNT: 12.2 % (ref 11.5–14.5)
GLUCOSE BLD MANUAL STRIP-MCNC: 105 MG/DL (ref 74–99)
GLUCOSE BLD MANUAL STRIP-MCNC: 125 MG/DL (ref 74–99)
GLUCOSE BLD MANUAL STRIP-MCNC: 140 MG/DL (ref 74–99)
GLUCOSE BLD MANUAL STRIP-MCNC: 38 MG/DL (ref 74–99)
GLUCOSE BLD MANUAL STRIP-MCNC: 72 MG/DL (ref 74–99)
GLUCOSE BLD MANUAL STRIP-MCNC: 90 MG/DL (ref 74–99)
HCT VFR BLD AUTO: 26.4 % (ref 36–46)
HGB BLD-MCNC: 8.8 G/DL (ref 12–16)
MCH RBC QN AUTO: 30.4 PG (ref 26–34)
MCHC RBC AUTO-ENTMCNC: 33.3 G/DL (ref 32–36)
MCV RBC AUTO: 91 FL (ref 80–100)
NRBC BLD-RTO: 0 /100 WBCS (ref 0–0)
PLATELET # BLD AUTO: 338 X10*3/UL (ref 150–450)
PRODUCT BLOOD TYPE: 5100
PRODUCT CODE: NORMAL
RBC # BLD AUTO: 2.89 X10*6/UL (ref 4–5.2)
UNIT ABO: NORMAL
UNIT NUMBER: NORMAL
UNIT RH: NORMAL
UNIT VOLUME: 350
WBC # BLD AUTO: 6.9 X10*3/UL (ref 4.4–11.3)
XM INTEP: NORMAL

## 2023-12-02 PROCEDURE — 99231 SBSQ HOSP IP/OBS SF/LOW 25: CPT

## 2023-12-02 PROCEDURE — 1210000001 HC SEMI-PRIVATE ROOM DAILY

## 2023-12-02 PROCEDURE — 94640 AIRWAY INHALATION TREATMENT: CPT

## 2023-12-02 PROCEDURE — 2500000002 HC RX 250 W HCPCS SELF ADMINISTERED DRUGS (ALT 637 FOR MEDICARE OP, ALT 636 FOR OP/ED)

## 2023-12-02 PROCEDURE — 2500000001 HC RX 250 WO HCPCS SELF ADMINISTERED DRUGS (ALT 637 FOR MEDICARE OP)

## 2023-12-02 PROCEDURE — 2500000004 HC RX 250 GENERAL PHARMACY W/ HCPCS (ALT 636 FOR OP/ED)

## 2023-12-02 PROCEDURE — 85027 COMPLETE CBC AUTOMATED: CPT

## 2023-12-02 PROCEDURE — 99199 UNLISTED SPECIAL SVC PX/RPRT: CPT

## 2023-12-02 PROCEDURE — 59050 FETAL MONITOR W/REPORT: CPT

## 2023-12-02 PROCEDURE — 7100000016 HC LABOR RECOVERY PER HOUR

## 2023-12-02 PROCEDURE — 82947 ASSAY GLUCOSE BLOOD QUANT: CPT

## 2023-12-02 PROCEDURE — 7210000002 HC LABOR PER HOUR

## 2023-12-02 PROCEDURE — 2500000002 HC RX 250 W HCPCS SELF ADMINISTERED DRUGS (ALT 637 FOR MEDICARE OP, ALT 636 FOR OP/ED): Performed by: STUDENT IN AN ORGANIZED HEALTH CARE EDUCATION/TRAINING PROGRAM

## 2023-12-02 RX ADMIN — INSULIN LISPRO 2 UNITS: 100 INJECTION, SOLUTION INTRAVENOUS; SUBCUTANEOUS at 06:57

## 2023-12-02 RX ADMIN — SIMETHICONE 80 MG: 80 TABLET, CHEWABLE ORAL at 21:34

## 2023-12-02 RX ADMIN — NIFEDIPINE 30 MG: 30 TABLET, FILM COATED, EXTENDED RELEASE ORAL at 12:04

## 2023-12-02 RX ADMIN — SODIUM CHLORIDE, POTASSIUM CHLORIDE, SODIUM LACTATE AND CALCIUM CHLORIDE 75 ML/HR: 600; 310; 30; 20 INJECTION, SOLUTION INTRAVENOUS at 01:00

## 2023-12-02 RX ADMIN — OXYCODONE HYDROCHLORIDE 10 MG: 5 TABLET ORAL at 21:28

## 2023-12-02 RX ADMIN — MAGNESIUM HYDROXIDE 10 ML: 400 SUSPENSION ORAL at 21:34

## 2023-12-02 RX ADMIN — ACETAMINOPHEN 975 MG: 325 TABLET ORAL at 06:14

## 2023-12-02 RX ADMIN — OXYCODONE HYDROCHLORIDE 10 MG: 5 TABLET ORAL at 08:42

## 2023-12-02 RX ADMIN — OXYCODONE HYDROCHLORIDE 5 MG: 5 TABLET ORAL at 15:10

## 2023-12-02 RX ADMIN — ALBUTEROL SULFATE 2 PUFF: 90 AEROSOL, METERED RESPIRATORY (INHALATION) at 16:03

## 2023-12-02 RX ADMIN — IBUPROFEN 600 MG: 600 TABLET, FILM COATED ORAL at 23:59

## 2023-12-02 RX ADMIN — ACETAMINOPHEN 975 MG: 325 TABLET ORAL at 18:05

## 2023-12-02 RX ADMIN — IBUPROFEN 600 MG: 600 TABLET, FILM COATED ORAL at 06:14

## 2023-12-02 RX ADMIN — IBUPROFEN 600 MG: 600 TABLET, FILM COATED ORAL at 12:03

## 2023-12-02 RX ADMIN — INSULIN LISPRO 2 UNITS: 100 INJECTION, SOLUTION INTRAVENOUS; SUBCUTANEOUS at 12:17

## 2023-12-02 RX ADMIN — IBUPROFEN 600 MG: 600 TABLET, FILM COATED ORAL at 17:32

## 2023-12-02 RX ADMIN — ACETAMINOPHEN 975 MG: 325 TABLET ORAL at 23:59

## 2023-12-02 RX ADMIN — ACETAMINOPHEN 975 MG: 325 TABLET ORAL at 12:04

## 2023-12-02 RX ADMIN — ALBUTEROL SULFATE 2 PUFF: 90 AEROSOL, METERED RESPIRATORY (INHALATION) at 21:35

## 2023-12-02 RX ADMIN — INSULIN GLARGINE 12 UNITS: 100 INJECTION, SOLUTION SUBCUTANEOUS at 06:55

## 2023-12-02 ASSESSMENT — PAIN SCALES - GENERAL
PAINLEVEL_OUTOF10: 5 - MODERATE PAIN
PAINLEVEL_OUTOF10: 9
PAINLEVEL_OUTOF10: 4
PAINLEVEL_OUTOF10: 6
PAIN_LEVEL: 0
PAINLEVEL_OUTOF10: 0 - NO PAIN
PAINLEVEL_OUTOF10: 0 - NO PAIN
PAINLEVEL_OUTOF10: 2
PAINLEVEL_OUTOF10: 10 - WORST POSSIBLE PAIN

## 2023-12-02 ASSESSMENT — PAIN DESCRIPTION - DESCRIPTORS
DESCRIPTORS: ACHING;PRESSURE;SORE
DESCRIPTORS: ACHING;PRESSURE;SORE

## 2023-12-02 NOTE — INDIVIDUALIZED OVERALL PLAN OF CARE NOTE
Transfer note    Mag turned off after 24hrs postpartum. Pt asymptomatic. Denies abdominal pain. Vaginal bleeding remains minimal. Stable for transfer off L&D.    D/w Dr. Prior Inna Little MD PGY-2

## 2023-12-02 NOTE — INDIVIDUALIZED OVERALL PLAN OF CARE NOTE
Update    S) Patient resting    O)    Blood Pressures         12/1/2023  2123 12/1/2023  2241 12/1/2023  2341 12/2/2023  0040 12/2/2023  0140    BP: 137/84 133/86 129/81 119/76 113/71             Result Value Ref Range    POCT Glucose 102 (H) 74 - 99 mg/dL   POCT GLUCOSE   Result Value Ref Range    POCT Glucose 116 (H) 74 - 99 mg/dL   POCT GLUCOSE   Result Value Ref Range    POCT Glucose 144 (H) 74 - 99 mg/dL       A/P)    - siPEC w/ SF: normotensive over last several hours, cont mag for 24 hrs PP  - T1DM: Lantus 12 and lispro 2/2/2 with acceptable BG control currently, will cont to monitor  - Postoperatively: stable after [place] removal    D/w Dr. Swati Little MD PGY-2

## 2023-12-02 NOTE — ANESTHESIA POSTPROCEDURE EVALUATION
Patient: Maria Isabel Cutler    Procedure Summary       Date: 23 Room / Location: [facility]    Anesthesia Start: 700 Anesthesia Stop: 23    Procedure: OBGYN Delivery  Section (Abdomen) Diagnosis:       (Failure to Progress)      (Fetal Intolerance of Labor)    Surgeons: Kim Mckenzie MD Responsible Provider: Tommy Carter MD    Anesthesia Type: epidural ASA Status: 3            Anesthesia Type: epidural    Vitals Value Taken Time   /91 23   Temp 36.3 °C (97.3 °F) 23   Pulse 80 23   Resp 18 23   SpO2 100 % 23       Anesthesia Post Evaluation    Patient location during evaluation: floor  Patient participation: complete - patient participated  Level of consciousness: awake  Pain score: 0  Pain management: adequate  Airway patency: patent  Cardiovascular status: acceptable and hemodynamically stable  Respiratory status: acceptable and room air  Hydration status: acceptable  Postoperative Nausea and Vomiting: none  Comments: Neuraxial site assessed. No visible redness or swelling or drainage. Patient able to ambulate and move all extremities without difficulty. Able to void. No complaints of nausea/vomiting. Tolerating PO intake well. No s/sx of PDPH.         No notable events documented.

## 2023-12-02 NOTE — PROGRESS NOTES
Maria Isabel Cutler is a 23 y.o., , who had a , Low Transverse  delivery on 2023  at 34w6d and is now POD1.    She had Neuraxial Anesthesia without immediate complications noted.       Pain well controlled    Vitals:    23 0449   BP: 125/86   Pulse: 76   Resp: 18   Temp: 36.1 °C (97 °F)   SpO2: 97%       Neuraxial site assessed. No visible redness or swelling or drainage. Patient able to ambulate and move all extremities without difficulty. Able to void. No complaints of nausea/vomiting. Tolerating PO intake well. No s/sx of PDPH.     Anesthesia will sign off     Tamy Hill MD

## 2023-12-02 NOTE — PROGRESS NOTES
ANTEPARTUM PROGRESS NOTE   2023, 5:13 AM     SUBJECTIVE: Pt transferred from L&D after PP Mag turned off around 0430. Denies HA, vision changes, CP, SOB, RUQ pain, abdominal pain. Is tolerating PO diet. Breastfeeding.    OBJECTIVE:   /86 (Patient Position: Lying)   Pulse 76   Temp 36.1 °C (97 °F) (Temporal)   Resp 18   Ht 1.524 m (5')   Wt 60.4 kg (133 lb 2.5 oz)   LMP 2023   SpO2 97%   Breastfeeding Yes   BMI 26.01 kg/m²    Temp  Min: 36.1 °C (97 °F)  Max: 37.1 °C (98.8 °F)  Pulse  Min: 70  Max: 151  BP  Min: 92/53  Max: 169/97  MAP  Min: 80 mmHg  Max: 80 mmHg    General: Examination reveals a well developed, well nourished, female, in no acute distress. She is alert and cooperative.  Lungs: normal resp effort on RA  Abdomen: uterine fundus firm, nontender. Incision with dressing in place, some strikethrough but overall clean and dry  Psychological: awake and alert; oriented to person, place, and time.    Labs:   Results for orders placed or performed during the hospital encounter of 23 (from the past 24 hour(s))   POCT GLUCOSE   Result Value Ref Range    POCT Glucose 192 (H) 74 - 99 mg/dL   POCT GLUCOSE   Result Value Ref Range    POCT Glucose 126 (H) 74 - 99 mg/dL   POCT GLUCOSE   Result Value Ref Range    POCT Glucose 110 (H) 74 - 99 mg/dL   POCT GLUCOSE   Result Value Ref Range    POCT Glucose 95 74 - 99 mg/dL   POCT GLUCOSE   Result Value Ref Range    POCT Glucose 81 74 - 99 mg/dL   POCT GLUCOSE   Result Value Ref Range    POCT Glucose 72 (L) 74 - 99 mg/dL   POCT GLUCOSE   Result Value Ref Range    POCT Glucose 56 (L) 74 - 99 mg/dL   POCT GLUCOSE   Result Value Ref Range    POCT Glucose 102 (H) 74 - 99 mg/dL   POCT GLUCOSE   Result Value Ref Range    POCT Glucose 116 (H) 74 - 99 mg/dL   POCT GLUCOSE   Result Value Ref Range    POCT Glucose 144 (H) 74 - 99 mg/dL         ASSESSMENT AND PLAN:     23 y.o., , who delivered at 34w6d gestation via pCS .     cHTN, now [facility]  w/ SF   - new severe range Bps > 4 hrs apart since arrival  - nifedipine 30mg initiated, now normotensive to mild range, last severe 0619 nonsustained, will consider uptitration if persists  - asymptomatic  - HELLP labs neg x2  - s/p Mg gtt for 24 h pp     T1DM; DKA resolved   - DKA on admission, now resolved with closed gap, normoglycemia, BHB wnl   - strict Is/Os  - postpartum regimen: Lantus 12 once a day and lispro 2 TID. Fasting, pre and post prandials, with preprandial sliding scale  - Overall acceptable control of BG on this regimen so far, will cont to monitor     Asthma  - albuterol PRN     HSV  - on acyclovir   - SSE neg for lesions      Post op Care  - pain control with duramorph, tylenol, toradol. Transitioned to oral medications.  - starting Hgb 10.0, EBL 1300, follow up pod 1 cbc   - buccal cytotec/IU pit given intraop  - regular diet  - aguirre removed after mag, f/up void     Dispo  - Anticipate DC on PPD#3 pending no complications  - Plan for incision check in 2 weeks and PPV in 4-6 weeks with primary OB    To be discussed with MFM Attending, Dr. Zuluaga.   Inna Little MD PGY2  MFM  Pager [phone]     Principal Problem:    DM (diabetes mellitus) type 1, uncontrolled, with ketoacidosis (CMS/HCC)  Active Problems:    Seizures (CMS/HCC)

## 2023-12-02 NOTE — CARE PLAN
The patient's goals for the shift include Visit baby, pain control    The clinical goals for the shift include blood sugars WDL    Pt's blood sugar was 38 prior to her meal @ 4:28 pm.  Pt was given juice and food. On recheck @ 4.51pm Pt's blood sugar was 72. At 6:03 Pt's blood sugar was 105. Message was sent to MD.   Problem: Pain - Adult  Goal: Verbalizes/displays adequate comfort level or baseline comfort level  Outcome: Progressing     Problem: Safety - Adult  Goal: Free from fall injury  Outcome: Progressing     Problem: Diabetes  Goal: Achieve decreasing blood glucose levels by end of shift  Outcome: Progressing  Goal: Increase stability of blood glucose readings by end of shift  Outcome: Progressing     Problem: Postpartum  Goal: Experiences normal postpartum course  Outcome: Progressing  Goal: Appropriate maternal -  bonding  Outcome: Progressing  Goal: Establish and maintain infant feeding pattern for adequate nutrition  Outcome: Progressing  Goal: Incisions, wounds, or drain sites healing without S/S of infection  Outcome: Progressing  Goal: No s/sx infection  Outcome: Progressing  Goal: No s/sx of hemorrhage  Outcome: Progressing  Goal: Minimal s/sx of HDP and BP<160/110  Outcome: Progressing     Problem:  Recovery Care  Goal: Verbalizes understanding of post-op instructions  Outcome: Progressing  Goal: Manages discomfort  Outcome: Progressing  Goal: Dressing intact until removed with any drainage marked  Outcome: Progressing  Goal: Patient vital signs are stable  Outcome: Progressing  Goal: Urine output is 0.5 mL/kg/hr or more  Outcome: Progressing  Goal: Fundus firm at midline  Outcome: Progressing

## 2023-12-02 NOTE — PROGRESS NOTES
Postpartum Progress Note    Assessment/Plan   Maria Isabel Cutler is a 23 y.o., , who delivered at 34w6d gestation and is now postpartum day 0.    cHTN, now siPEC w/ SF   - new severe range Bps > 4 hrs apart since arrival  - nifedipine 30mg initiated, now normotensive to mild range, last severe 619 nonsustained, will consider uptitration if persists  - asymptomatic  - HELLP labs neg x2  - continue Mg gtt for 24 h pp     T1DM; DKA resolved   - DKA on admission, now resolved with closed gap, normoglycemia, BHB wnl   - strict Is/Os  - postpartum regimen: Lantus 12 once a day and lispro 2 TID. Fasting, pre and post prandials, with preprandial sliding scale     Asthma  - albuterol PRN     HSV  - on acyclovir   - SSE neg for lesions      Post op Care  - pain control with duramorph, tylenol, toradol. Transition to oral medications today.  - continue IVF until tolerating adequate diet   - starting Hgb 10.0, EBL 1300, follow up pod 1 cbc   - buccal cytotec/IU pit given intraop  - regular diet  - aguirre in due to Mg     Dispo  - Anticipate DC on PPD#3 pending no complications  - Plan for incision check in 2 weeks and PPV in 4-6 weeks with primary OB    Pt seen and d/w Dr. Swati Little MD PGY-2      Principal Problem:    DM (diabetes mellitus) type 1, uncontrolled, with ketoacidosis (CMS/HCC)  Active Problems:    Seizures (CMS/HCC)    Subjective     Patient resting comfortably in bed. Denies HA, scotoma, RUQ pain, SOB, chest pain. Tolerating regular diet. Breastfeeding/pumping. Overall feeling well.    Objective   Allergies:   Patient has no known allergies.         Last Vitals:  Temp Pulse Resp BP MAP Pulse Ox   36.7 °C (98.1 °F) 94 19 124/76 80 mmHg 100 %     Vitals Min/Max Last 24 Hours:  Temp  Min: 36.3 °C (97.3 °F)  Max: 37.2 °C (99 °F)  Pulse  Min: 73  Max: 151  Resp  Min: 16  Max: 19  BP  Min: 106/61  Max: 169/97  MAP  Min: 80 mmHg  Max: 80 mmHg    Intake/Output:     Intake/Output Summary (Last 24 hours)  at 12/1/2023 2027  Last data filed at 12/1/2023 1944  Gross per 24 hour   Intake 3599.52 ml   Output 6980 ml   Net -3380.48 ml       Physical Exam:  General: Examination reveals a well developed, well nourished, female, in no acute distress. She is alert and cooperative.  Lungs: normal resp effort on RA  Abdomen: uterine fundus firm, nontender. Incision with dressing in place, some strikethrough but overall clean and dry  Psychological: awake and alert; oriented to person, place, and time.    Lab Data:  Results for orders placed or performed during the hospital encounter of 11/27/23 (from the past 24 hour(s))   POCT GLUCOSE   Result Value Ref Range    POCT Glucose 74 74 - 99 mg/dL   POCT GLUCOSE   Result Value Ref Range    POCT Glucose 102 (H) 74 - 99 mg/dL   POCT GLUCOSE   Result Value Ref Range    POCT Glucose 89 74 - 99 mg/dL   POCT GLUCOSE   Result Value Ref Range    POCT Glucose 72 (L) 74 - 99 mg/dL   POCT GLUCOSE   Result Value Ref Range    POCT Glucose 103 (H) 74 - 99 mg/dL   POCT GLUCOSE   Result Value Ref Range    POCT Glucose 93 74 - 99 mg/dL   POCT GLUCOSE   Result Value Ref Range    POCT Glucose 81 74 - 99 mg/dL   BLOOD GAS CORD VENOUS   Result Value Ref Range    POCT PH Venous, Cord 7.35 7.19 - 7.47 pH    POCT PCO2 Venous, Cord 43 22 - 53 mm Hg    POCT PO2 Venous, Cord 47 (H) 13 - 37 mm Hg    POCT SO2 Venous, Cord 84 16 - 84 %    POCT OXYHEMOGLOBIN VENOUS, Cord 81.6 (L) 94.0 - 98.0 %    POCT Base Excess Venous, Cord -2.0 -8.1 - -0.5 mmol/L    POCT HCO3 Calculated Venous, Cord 23.7 16.0 - 26.0 mmol/L    Patient Temperature 37.0 degrees Celsius    FiO2 21 %   BLOOD GAS CORD ARTERIAL   Result Value Ref Range    POCT PH ARTERIAL, Cord 7.29 7.08 - 7.39 pH    POCT PCO2 ARTERIAL, Cord 57 31 - 75 mm Hg    POCT PO2 ARTERIAL, Cord 27 5 - 31 mm Hg    POCT SO2 ARTERIAL, Cord 38 3 - 69 %    POCT OXYHEMOGLOBIN ARTERIAL, Cord 37.9 (L) 94.0 - 98.0 %    POCT Base Excess Arterial, Cord -0.3 (H) -10.8 - -0.5 mmol/L     POCT HCO3 Calculated, Arterial Cord 27.4 15.0 - 29.0 mmol/L    Patient Temperature 37.0 degrees Celsius    FiO2 21 %   POCT GLUCOSE   Result Value Ref Range    POCT Glucose 192 (H) 74 - 99 mg/dL   POCT GLUCOSE   Result Value Ref Range    POCT Glucose 126 (H) 74 - 99 mg/dL   POCT GLUCOSE   Result Value Ref Range    POCT Glucose 110 (H) 74 - 99 mg/dL   POCT GLUCOSE   Result Value Ref Range    POCT Glucose 95 74 - 99 mg/dL   POCT GLUCOSE   Result Value Ref Range    POCT Glucose 81 74 - 99 mg/dL   POCT GLUCOSE   Result Value Ref Range    POCT Glucose 72 (L) 74 - 99 mg/dL   POCT GLUCOSE   Result Value Ref Range    POCT Glucose 56 (L) 74 - 99 mg/dL   POCT GLUCOSE   Result Value Ref Range    POCT Glucose 102 (H) 74 - 99 mg/dL   POCT GLUCOSE   Result Value Ref Range    POCT Glucose 116 (H) 74 - 99 mg/dL   POCT GLUCOSE   Result Value Ref Range    POCT Glucose 144 (H) 74 - 99 mg/dL

## 2023-12-03 LAB
ALBUMIN SERPL BCP-MCNC: 2.9 G/DL (ref 3.4–5)
ALP SERPL-CCNC: 80 U/L (ref 33–110)
ALT SERPL W P-5'-P-CCNC: 15 U/L (ref 7–45)
ANION GAP SERPL CALC-SCNC: 15 MMOL/L (ref 10–20)
AST SERPL W P-5'-P-CCNC: 16 U/L (ref 9–39)
B-OH-BUTYR SERPL-SCNC: 0.62 MMOL/L (ref 0.02–0.27)
BASE EXCESS BLDV CALC-SCNC: 1 MMOL/L (ref -2–3)
BILIRUB SERPL-MCNC: 0.3 MG/DL (ref 0–1.2)
BILIRUBIN, POC: NEGATIVE
BLOOD URINE, POC: POSITIVE
BODY TEMPERATURE: 37 DEGREES CELSIUS
BUN SERPL-MCNC: 2 MG/DL (ref 6–23)
CALCIUM SERPL-MCNC: 8.4 MG/DL (ref 8.6–10.6)
CHLORIDE SERPL-SCNC: 103 MMOL/L (ref 98–107)
CLARITY, POC: CLEAR
CO2 SERPL-SCNC: 24 MMOL/L (ref 21–32)
COLOR, POC: YELLOW
CREAT SERPL-MCNC: 0.31 MG/DL (ref 0.5–1.05)
ERYTHROCYTE [DISTWIDTH] IN BLOOD BY AUTOMATED COUNT: 12.1 % (ref 11.5–14.5)
GFR SERPL CREATININE-BSD FRML MDRD: >90 ML/MIN/1.73M*2
GLUCOSE BLD MANUAL STRIP-MCNC: 119 MG/DL (ref 74–99)
GLUCOSE BLD MANUAL STRIP-MCNC: 121 MG/DL (ref 74–99)
GLUCOSE BLD MANUAL STRIP-MCNC: 47 MG/DL (ref 74–99)
GLUCOSE BLD MANUAL STRIP-MCNC: 52 MG/DL (ref 74–99)
GLUCOSE BLD MANUAL STRIP-MCNC: 64 MG/DL (ref 74–99)
GLUCOSE BLD MANUAL STRIP-MCNC: 65 MG/DL (ref 74–99)
GLUCOSE BLD MANUAL STRIP-MCNC: 88 MG/DL (ref 74–99)
GLUCOSE BLD MANUAL STRIP-MCNC: 90 MG/DL (ref 74–99)
GLUCOSE SERPL-MCNC: 107 MG/DL (ref 74–99)
GLUCOSE URINE, POC: NEGATIVE
HCO3 BLDV-SCNC: 25.2 MMOL/L (ref 22–26)
HCT VFR BLD AUTO: 28.3 % (ref 36–46)
HGB BLD-MCNC: 9 G/DL (ref 12–16)
INHALED O2 CONCENTRATION: 21 %
KETONES, POC: POSITIVE
LEUKOCYTE EST, POC: NEGATIVE
MCH RBC QN AUTO: 29.5 PG (ref 26–34)
MCHC RBC AUTO-ENTMCNC: 31.8 G/DL (ref 32–36)
MCV RBC AUTO: 93 FL (ref 80–100)
NITRITE, POC: NEGATIVE
NRBC BLD-RTO: 0 /100 WBCS (ref 0–0)
OXYHGB MFR BLDV: 89.6 % (ref 45–75)
PCO2 BLDV: 38 MM HG (ref 41–51)
PH BLDV: 7.43 PH (ref 7.33–7.43)
PH, POC: 7.5
PLATELET # BLD AUTO: 471 X10*3/UL (ref 150–450)
PO2 BLDV: 61 MM HG (ref 35–45)
POC APPEARANCE OF BODY FLUID: CLEAR
POTASSIUM SERPL-SCNC: 4.3 MMOL/L (ref 3.5–5.3)
PROT SERPL-MCNC: 5.2 G/DL (ref 6.4–8.2)
RBC # BLD AUTO: 3.05 X10*6/UL (ref 4–5.2)
SAO2 % BLDV: 92 % (ref 45–75)
SODIUM SERPL-SCNC: 138 MMOL/L (ref 136–145)
SPECIFIC GRAVITY, POC: 1.01
TEST COMMENT: ABNORMAL
URINE PROTEIN, POC: ABNORMAL
UROBILINOGEN, POC: 0.2
WBC # BLD AUTO: 6.6 X10*3/UL (ref 4.4–11.3)

## 2023-12-03 PROCEDURE — 82010 KETONE BODYS QUAN: CPT | Performed by: STUDENT IN AN ORGANIZED HEALTH CARE EDUCATION/TRAINING PROGRAM

## 2023-12-03 PROCEDURE — 2500000004 HC RX 250 GENERAL PHARMACY W/ HCPCS (ALT 636 FOR OP/ED)

## 2023-12-03 PROCEDURE — 1210000001 HC SEMI-PRIVATE ROOM DAILY

## 2023-12-03 PROCEDURE — 2500000001 HC RX 250 WO HCPCS SELF ADMINISTERED DRUGS (ALT 637 FOR MEDICARE OP): Performed by: STUDENT IN AN ORGANIZED HEALTH CARE EDUCATION/TRAINING PROGRAM

## 2023-12-03 PROCEDURE — 82947 ASSAY GLUCOSE BLOOD QUANT: CPT

## 2023-12-03 PROCEDURE — 2500000004 HC RX 250 GENERAL PHARMACY W/ HCPCS (ALT 636 FOR OP/ED): Performed by: OBSTETRICS & GYNECOLOGY

## 2023-12-03 PROCEDURE — 85027 COMPLETE CBC AUTOMATED: CPT

## 2023-12-03 PROCEDURE — 2500000002 HC RX 250 W HCPCS SELF ADMINISTERED DRUGS (ALT 637 FOR MEDICARE OP, ALT 636 FOR OP/ED): Performed by: STUDENT IN AN ORGANIZED HEALTH CARE EDUCATION/TRAINING PROGRAM

## 2023-12-03 PROCEDURE — 82805 BLOOD GASES W/O2 SATURATION: CPT | Performed by: STUDENT IN AN ORGANIZED HEALTH CARE EDUCATION/TRAINING PROGRAM

## 2023-12-03 PROCEDURE — 81002 URINALYSIS NONAUTO W/O SCOPE: CPT | Performed by: STUDENT IN AN ORGANIZED HEALTH CARE EDUCATION/TRAINING PROGRAM

## 2023-12-03 PROCEDURE — 2500000001 HC RX 250 WO HCPCS SELF ADMINISTERED DRUGS (ALT 637 FOR MEDICARE OP)

## 2023-12-03 PROCEDURE — 84075 ASSAY ALKALINE PHOSPHATASE: CPT | Performed by: STUDENT IN AN ORGANIZED HEALTH CARE EDUCATION/TRAINING PROGRAM

## 2023-12-03 PROCEDURE — 99231 SBSQ HOSP IP/OBS SF/LOW 25: CPT | Performed by: STUDENT IN AN ORGANIZED HEALTH CARE EDUCATION/TRAINING PROGRAM

## 2023-12-03 PROCEDURE — 2500000004 HC RX 250 GENERAL PHARMACY W/ HCPCS (ALT 636 FOR OP/ED): Performed by: STUDENT IN AN ORGANIZED HEALTH CARE EDUCATION/TRAINING PROGRAM

## 2023-12-03 RX ORDER — DOCUSATE SODIUM 100 MG/1
100 CAPSULE, LIQUID FILLED ORAL 2 TIMES DAILY
Status: DISCONTINUED | OUTPATIENT
Start: 2023-12-03 | End: 2023-12-06 | Stop reason: HOSPADM

## 2023-12-03 RX ORDER — INSULIN GLARGINE 100 [IU]/ML
10 INJECTION, SOLUTION SUBCUTANEOUS
Status: DISCONTINUED | OUTPATIENT
Start: 2023-12-03 | End: 2023-12-05

## 2023-12-03 RX ORDER — INSULIN GLARGINE 100 [IU]/ML
5 INJECTION, SOLUTION SUBCUTANEOUS ONCE
Status: COMPLETED | OUTPATIENT
Start: 2023-12-03 | End: 2023-12-03

## 2023-12-03 RX ORDER — ADHESIVE BANDAGE
30 BANDAGE TOPICAL DAILY
Status: DISCONTINUED | OUTPATIENT
Start: 2023-12-03 | End: 2023-12-06 | Stop reason: HOSPADM

## 2023-12-03 RX ORDER — DEXTROSE, SODIUM CHLORIDE, SODIUM LACTATE, POTASSIUM CHLORIDE, AND CALCIUM CHLORIDE 5; .6; .31; .03; .02 G/100ML; G/100ML; G/100ML; G/100ML; G/100ML
125 INJECTION, SOLUTION INTRAVENOUS CONTINUOUS
Status: ACTIVE | OUTPATIENT
Start: 2023-12-03 | End: 2023-12-04

## 2023-12-03 RX ORDER — POTASSIUM CHLORIDE 14.9 MG/ML
20 INJECTION INTRAVENOUS
Status: DISCONTINUED | OUTPATIENT
Start: 2023-12-03 | End: 2023-12-03

## 2023-12-03 RX ORDER — BISACODYL 10 MG/1
10 SUPPOSITORY RECTAL ONCE
Status: COMPLETED | OUTPATIENT
Start: 2023-12-03 | End: 2023-12-03

## 2023-12-03 RX ORDER — POTASSIUM CHLORIDE 20 MEQ/1
20 TABLET, EXTENDED RELEASE ORAL ONCE
Status: COMPLETED | OUTPATIENT
Start: 2023-12-03 | End: 2023-12-03

## 2023-12-03 RX ORDER — DEXTROSE MONOHYDRATE 100 MG/ML
0.3 INJECTION, SOLUTION INTRAVENOUS ONCE AS NEEDED
Status: DISCONTINUED | OUTPATIENT
Start: 2023-12-03 | End: 2023-12-06 | Stop reason: HOSPADM

## 2023-12-03 RX ADMIN — NIFEDIPINE 30 MG: 30 TABLET, FILM COATED, EXTENDED RELEASE ORAL at 09:06

## 2023-12-03 RX ADMIN — IBUPROFEN 600 MG: 600 TABLET, FILM COATED ORAL at 17:56

## 2023-12-03 RX ADMIN — SODIUM CHLORIDE, SODIUM LACTATE, POTASSIUM CHLORIDE, CALCIUM CHLORIDE AND DEXTROSE MONOHYDRATE 125 ML/HR: 5; 600; 310; 30; 20 INJECTION, SOLUTION INTRAVENOUS at 23:15

## 2023-12-03 RX ADMIN — ONDANSETRON 4 MG: 2 INJECTION INTRAMUSCULAR; INTRAVENOUS at 15:24

## 2023-12-03 RX ADMIN — POTASSIUM CHLORIDE 20 MEQ: 1500 TABLET, EXTENDED RELEASE ORAL at 17:55

## 2023-12-03 RX ADMIN — DOCUSATE SODIUM 100 MG: 100 CAPSULE, LIQUID FILLED ORAL at 22:15

## 2023-12-03 RX ADMIN — ACETAMINOPHEN 975 MG: 325 TABLET ORAL at 17:56

## 2023-12-03 RX ADMIN — ACETAMINOPHEN 975 MG: 325 TABLET ORAL at 06:23

## 2023-12-03 RX ADMIN — OXYCODONE HYDROCHLORIDE 5 MG: 5 TABLET ORAL at 08:11

## 2023-12-03 RX ADMIN — SODIUM CHLORIDE, SODIUM LACTATE, POTASSIUM CHLORIDE, AND CALCIUM CHLORIDE 1000 ML: 600; 310; 30; 20 INJECTION, SOLUTION INTRAVENOUS at 23:15

## 2023-12-03 RX ADMIN — ACETAMINOPHEN 975 MG: 325 TABLET ORAL at 11:43

## 2023-12-03 RX ADMIN — OXYCODONE HYDROCHLORIDE 5 MG: 5 TABLET ORAL at 01:36

## 2023-12-03 RX ADMIN — BISACODYL 10 MG: 10 SUPPOSITORY RECTAL at 11:41

## 2023-12-03 RX ADMIN — IBUPROFEN 600 MG: 600 TABLET, FILM COATED ORAL at 11:43

## 2023-12-03 RX ADMIN — OXYCODONE HYDROCHLORIDE 10 MG: 5 TABLET ORAL at 22:16

## 2023-12-03 RX ADMIN — ONDANSETRON 4 MG: 2 INJECTION INTRAMUSCULAR; INTRAVENOUS at 22:02

## 2023-12-03 RX ADMIN — IBUPROFEN 600 MG: 600 TABLET, FILM COATED ORAL at 06:23

## 2023-12-03 RX ADMIN — DOCUSATE SODIUM 100 MG: 100 CAPSULE, LIQUID FILLED ORAL at 12:44

## 2023-12-03 RX ADMIN — BISACODYL 10 MG: 10 SUPPOSITORY RECTAL at 11:37

## 2023-12-03 RX ADMIN — INSULIN GLARGINE 5 UNITS: 100 INJECTION, SOLUTION SUBCUTANEOUS at 13:29

## 2023-12-03 RX ADMIN — OXYCODONE HYDROCHLORIDE 5 MG: 5 TABLET ORAL at 12:44

## 2023-12-03 ASSESSMENT — PAIN - FUNCTIONAL ASSESSMENT
PAIN_FUNCTIONAL_ASSESSMENT: 0-10

## 2023-12-03 ASSESSMENT — PAIN DESCRIPTION - DESCRIPTORS
DESCRIPTORS: ACHING;PRESSURE;SORE
DESCRIPTORS: ACHING;SORE;PRESSURE
DESCRIPTORS: ACHING;PRESSURE;SORE
DESCRIPTORS: DULL
DESCRIPTORS: ACHING;SORE;PRESSURE

## 2023-12-03 ASSESSMENT — PAIN SCALES - GENERAL
PAINLEVEL_OUTOF10: 5 - MODERATE PAIN
PAINLEVEL_OUTOF10: 1
PAINLEVEL_OUTOF10: 4
PAINLEVEL_OUTOF10: 4
PAINLEVEL_OUTOF10: 7
PAINLEVEL_OUTOF10: 10 - WORST POSSIBLE PAIN
PAINLEVEL_OUTOF10: 5 - MODERATE PAIN
PAINLEVEL_OUTOF10: 4
PAINLEVEL_OUTOF10: 4
PAINLEVEL_OUTOF10: 0 - NO PAIN
PAINLEVEL_OUTOF10: 3
PAINLEVEL_OUTOF10: 2
PAINLEVEL_OUTOF10: 3
PAINLEVEL_OUTOF10: 5 - MODERATE PAIN

## 2023-12-03 ASSESSMENT — PAIN DESCRIPTION - LOCATION: LOCATION: ABDOMEN

## 2023-12-03 NOTE — PROGRESS NOTES
Postpartum Progress Note    Assessment/Plan   Maria Isabel Cutler is a 23 y.o., , who delivered at 34w6d gestation and is now postpartum day 2 from pLTCS on      siPEC w/ SF   - new severe range Bps > 4 hrs apart since arrival  - nifedipine 30mg initiated, now normotensive to mild range,   - asymptomatic  - HELLP labs neg x2  - s/p Mg gtt for 24 h pp     T1DM; DKA resolved   - DKA on admission, now resolved with closed gap, normoglycemia, BHB wnl   - strict Is/Os  - Patient with low BG overnight and this am, not taking in much PO   - postpartum regimen: Lantus 10 once a day and lispro 2 TID (decreased from 12 yesterday). Fasting, pre and post prandials, with preprandial sliding scale     Asthma  - albuterol PRN     Post op Care  - pain well controlled   - starting Hgb 10.0, EBL 1300, pod 1 hgb 8.8, no si/sx of anemia   - buccal cytotec/IU pit given intraop  - regular diet    Postpartum  - f/up contraception     Dispo  - Anticipate DC on PPD#3 pending no complications  - Plan for BP check in 3-5 days, incision check in 2 weeks and PPV in 4-6 weeks with primary OB     To be discussed with MFM Attending, Dr. Zuluaga.   Indiana Parra MD  OB/GYN PGY3   Pager [phone]        Principal Problem:    DM (diabetes mellitus) type 1, uncontrolled, with ketoacidosis (CMS/HCC)  Active Problems:    Seizures (CMS/HCC)    Pregnancy Problems (from 23 to present)       Problem Noted Resolved    Labor and delivery indication for care or intervention 2023 by Marni De La Cruz MD No    Priority:  Medium      Bilateral sciatica 10/10/2023 by Stuart Munoz MD No    Priority:  Medium      Overview Signed 10/10/2023  2:17 PM by Stuart Munoz MD     Intermittent pain shooting down posteriolateral thighs bilaterally         34 weeks gestation of pregnancy 10/8/2023 by Stuart Munoz MD No    Priority:  Medium      Overview Addendum 10/10/2023  2:24 PM by Stuart Munoz MD     [x] PNLs reviewed wnl, rubella nonimmune  [x] Pap  ASCUS 2022  [x] NT wnl 2023  [x] cell-free DNA, CF/SMA screening low risk  [x] Anatomy US  [x] 2nd trimester labs, Hg 9.9  [x] tdap  [ ] flu/covid  [/] GBS  [ ] Delivery Planning  [ ] PPBC           Asthma affecting pregnancy in third trimester 10/8/2023 by Stuart Munoz MD No    Priority:  Medium      Overview Addendum 10/24/2023  2:08 PM by Stuart Munoz MD     Albuterol PRN  10/24 Regimen: Flovent 250mcg BID puff, Albuterol PRN         Type 1 diabetes mellitus complicating pregnancy, antepartum 10/5/2023 by Indiana Parra MD No    Priority:  Medium      Overview Addendum 2023  8:04 PM by Angeline Boudreaux MD     [x] Baseline HbA1c 12.8 2023  [x] Baseline TSH 2.38 2023  [x] Baseline HELLP Labs wnl, 0.11 2023  [x] bbASA  [x] EKG  wnl  [x] Fetal Echo wnl  [x] Ophthalmology  /podiatry    [ ]  Testing 2x weekly until del    Serial Growths  10/24 29.0 wga EFW 1294g 42%, AC 48%    Current Regimen changed 10/10/2023  Basal  6887-7463 0.90  9092-7351 1.00  8464-7438 0.90     Bolus  3089-6134 1:9     CF: 1:40  Carb ratio: 1:10  Target:110  DOA 5 hrs           Herpes simplex infection in mother during third trimester of pregnancy 10/5/2023 by Indiana Parra MD No    Priority:  Medium      Overview Signed 10/5/2023  8:55 AM by Indiana Parra MD     On acyclovir prophylaxis          Chronic hypertension affecting pregnancy 10/5/2023 by Indiana Parra MD No    Priority:  Medium      Overview Addendum 10/8/2023  3:16 PM by Stuart Munoz MD     [x] Baseline HELLP Labs, P:C 0.11 2023  [x] bbASA  [ ]  Testing  [x] Baseline EKG inpatient      Serial Growths    Med Regimen  No meds         Group beta Strep positive 10/5/2023 by Indiana Parra MD No    Priority:  Medium      Overview Signed 10/5/2023  9:00 AM by Indiana Parra MD     GBS UTI in pregnancy          Depression during pregnancy in third trimester 10/5/2023 by Indiana Parra MD No    Priority:  Medium      Fetal  cardiac anomaly affecting pregnancy, antepartum 10/5/2023 by Indiana Parra MD No    Priority:  Medium      Overview Addendum 2023  6:00 PM by Luz Maria Abebe MD     [facility] Plan of Care: fetal ventricular septum slightly thickened --> code:1: non-urgent peds cardiology consult prior to discharge or within 1-2 weeks if delivered at an outside hospital  No structural abnormalities on repeat fetal echo --> peds cards recommendation for routine delivery and  care with non-urgent peds cardiology consult prior to discharge or within 1-2 weeks if delivered at an outside hospital         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 10/5/2023 by Indiana Parra MD No    Priority:  Medium      Overview Addendum 10/24/2023  2:09 PM by Stuart Munoz MD     - Admitted for blurry vision on , d/c ed with improvement, oligoclonal bands seen on LP, neg MRI head  - New optic disc edema, diabetic edema    - Close follow up with ophto, last appt 10/13, needs IV fluro angio after delivery to prevent progression, next visit   - Neuro-Immunology NPV scheduled for          Ophthalmologic abnormality 10/5/2023 by Indiana Parra MD No    Priority:  Medium      Overview Signed 10/5/2023  9:14 AM by Indiana Parra MD     Diabetic macular edema, both eyes - for panretinal photocoagulation with Optho          Constipation during pregnancy in second trimester 10/9/2023 by Melody Osorio 10/10/2023 by Stuart Munoz MD    Abnormal pregnancy in second trimester 10/9/2023 by Melody Osorio 10/10/2023 by Stuart Munoz MD    Suspected fetal anomaly, antepartum 10/9/2023 by Melody Osorio 10/10/2023 by Stuart Munoz MD    Hyperglycemia in pregnancy 2023 by Melody Osorio 10/10/2023 by Stuart Munoz MD    Vomiting of pregnancy 2023 by Melody Osorio 10/10/2023 by Stuart Munoz MD                Subjective   Her pain is well controlled with current medications  She is passing flatus, has not yet had a bowel movement,  "feeling constipated   She is ambulating well  She is tolerating a Adult diet Regular  She reports no breast or nursing problems      Objective   Allergies:   Patient has no known allergies.         Last Vitals:  Temp Pulse Resp BP MAP Pulse Ox   37.1 °C (98.8 °F) 104 18 114/72 80 mmHg 98 %     Vitals Min/Max Last 24 Hours:  Temp  Min: 37.1 °C (98.8 °F)  Max: 37.8 °C (100 °F)  Pulse  Min: 73  Max: 104  Resp  Min: 17  Max: 18  BP  Min: 114/72  Max: 134/84    Intake/Output:   No intake or output data in the 24 hours ending 12/03/23 1215    Physical Exam:  General: Examination reveals a well developed, well nourished, female, in no acute distress and affect is quiet, withdrawn. She is alert and cooperative.  HEENT: PERRLA. External ears normal. Nose normal, no erythema or discharge. Mouth and throat clear.  Abdomen: soft, gravid, nontender, nondistended, no abnormal masses, no epigastric pain.  Incision: healing well, well approximated.  Fundus: firm.  Extremities: no redness or tenderness in the calves or thighs, no edema.  Neurological: alert, oriented, normal speech, no focal findings or movement disorder noted.  Psychological: awake and alert; oriented to person, place, and time.    Lab Data:  No results found for: \"ABO\", \"LABRH\", \"ABSCRN\"  Lab Results   Component Value Date    WBC 6.9 12/02/2023    HGB 8.8 (L) 12/02/2023    HCT 26.4 (L) 12/02/2023     12/02/2023     No results found for: \"GLUCOSE\", \"NA\", \"K\", \"CL\", \"CO2\", \"ANIONGAP\", \"BUN\", \"CREATININE\", \"EGFR\", \"CALCIUM\", \"ALBUMIN\", \"PROT\", \"ALKPHOS\", \"ALT\", \"AST\", \"BILITOT\"  "

## 2023-12-03 NOTE — LACTATION NOTE
Lactation Consultant Note  Lactation Consultation  Reason for Consult: Initial assessment  Consultant Name: TRINO Manzo, IBCLC    Maternal Information       Maternal Assessment       Infant Assessment       Feeding Assessment       LATCH TOOL       Breast Pump       Other OB Lactation Tools       Patient Follow-up       Other OB Lactation Documentation       Recommendations/Summary    Here to discuss pumping/storage of breast milk for infant in NICU. The patient was in the shower. A letter was left.

## 2023-12-04 LAB
APPEARANCE UR: CLEAR
BILIRUB UR STRIP.AUTO-MCNC: NEGATIVE MG/DL
COLOR UR: ABNORMAL
GLUCOSE BLD MANUAL STRIP-MCNC: 109 MG/DL (ref 74–99)
GLUCOSE BLD MANUAL STRIP-MCNC: 147 MG/DL (ref 74–99)
GLUCOSE BLD MANUAL STRIP-MCNC: 196 MG/DL (ref 74–99)
GLUCOSE BLD MANUAL STRIP-MCNC: 54 MG/DL (ref 74–99)
GLUCOSE BLD MANUAL STRIP-MCNC: 60 MG/DL (ref 74–99)
GLUCOSE BLD MANUAL STRIP-MCNC: 63 MG/DL (ref 74–99)
GLUCOSE BLD MANUAL STRIP-MCNC: 67 MG/DL (ref 74–99)
GLUCOSE BLD MANUAL STRIP-MCNC: 73 MG/DL (ref 74–99)
GLUCOSE BLD MANUAL STRIP-MCNC: 74 MG/DL (ref 74–99)
GLUCOSE BLD MANUAL STRIP-MCNC: 76 MG/DL (ref 74–99)
GLUCOSE BLD MANUAL STRIP-MCNC: 91 MG/DL (ref 74–99)
GLUCOSE UR STRIP.AUTO-MCNC: NEGATIVE MG/DL
KETONES UR STRIP.AUTO-MCNC: NEGATIVE MG/DL
LEUKOCYTE ESTERASE UR QL STRIP.AUTO: ABNORMAL
MUCOUS THREADS #/AREA URNS AUTO: ABNORMAL /LPF
NITRITE UR QL STRIP.AUTO: NEGATIVE
PH UR STRIP.AUTO: 8 [PH]
PROT UR STRIP.AUTO-MCNC: ABNORMAL MG/DL
RBC # UR STRIP.AUTO: ABNORMAL /UL
RBC #/AREA URNS AUTO: >20 /HPF
SP GR UR STRIP.AUTO: 1.01
SQUAMOUS #/AREA URNS AUTO: ABNORMAL /HPF
UROBILINOGEN UR STRIP.AUTO-MCNC: <2 MG/DL
WBC #/AREA URNS AUTO: ABNORMAL /HPF

## 2023-12-04 PROCEDURE — 2500000001 HC RX 250 WO HCPCS SELF ADMINISTERED DRUGS (ALT 637 FOR MEDICARE OP)

## 2023-12-04 PROCEDURE — 2500000002 HC RX 250 W HCPCS SELF ADMINISTERED DRUGS (ALT 637 FOR MEDICARE OP, ALT 636 FOR OP/ED)

## 2023-12-04 PROCEDURE — 2500000001 HC RX 250 WO HCPCS SELF ADMINISTERED DRUGS (ALT 637 FOR MEDICARE OP): Performed by: STUDENT IN AN ORGANIZED HEALTH CARE EDUCATION/TRAINING PROGRAM

## 2023-12-04 PROCEDURE — 2500000004 HC RX 250 GENERAL PHARMACY W/ HCPCS (ALT 636 FOR OP/ED)

## 2023-12-04 PROCEDURE — 82947 ASSAY GLUCOSE BLOOD QUANT: CPT

## 2023-12-04 PROCEDURE — 1210000001 HC SEMI-PRIVATE ROOM DAILY

## 2023-12-04 PROCEDURE — 81001 URINALYSIS AUTO W/SCOPE: CPT

## 2023-12-04 PROCEDURE — 2500000005 HC RX 250 GENERAL PHARMACY W/O HCPCS

## 2023-12-04 PROCEDURE — 99233 SBSQ HOSP IP/OBS HIGH 50: CPT | Performed by: STUDENT IN AN ORGANIZED HEALTH CARE EDUCATION/TRAINING PROGRAM

## 2023-12-04 RX ORDER — BISACODYL 10 MG/1
10 SUPPOSITORY RECTAL ONCE
Status: DISCONTINUED | OUTPATIENT
Start: 2023-12-04 | End: 2023-12-06 | Stop reason: HOSPADM

## 2023-12-04 RX ORDER — DEXTROSE, SODIUM CHLORIDE, SODIUM LACTATE, POTASSIUM CHLORIDE, AND CALCIUM CHLORIDE 5; .6; .31; .03; .02 G/100ML; G/100ML; G/100ML; G/100ML; G/100ML
75 INJECTION, SOLUTION INTRAVENOUS CONTINUOUS
Status: ACTIVE | OUTPATIENT
Start: 2023-12-04 | End: 2023-12-04

## 2023-12-04 RX ORDER — INSULIN GLARGINE 100 [IU]/ML
8 INJECTION, SOLUTION SUBCUTANEOUS ONCE
Status: COMPLETED | OUTPATIENT
Start: 2023-12-04 | End: 2023-12-04

## 2023-12-04 RX ORDER — INSULIN LISPRO 100 [IU]/ML
0-5 INJECTION, SOLUTION INTRAVENOUS; SUBCUTANEOUS
Status: DISCONTINUED | OUTPATIENT
Start: 2023-12-04 | End: 2023-12-05

## 2023-12-04 RX ORDER — DEXTROSE, SODIUM CHLORIDE, SODIUM LACTATE, POTASSIUM CHLORIDE, AND CALCIUM CHLORIDE 5; .6; .31; .03; .02 G/100ML; G/100ML; G/100ML; G/100ML; G/100ML
125 INJECTION, SOLUTION INTRAVENOUS CONTINUOUS
Status: ACTIVE | OUTPATIENT
Start: 2023-12-04 | End: 2023-12-04

## 2023-12-04 RX ORDER — METOCLOPRAMIDE HYDROCHLORIDE 5 MG/ML
10 INJECTION INTRAMUSCULAR; INTRAVENOUS EVERY 6 HOURS PRN
Status: DISCONTINUED | OUTPATIENT
Start: 2023-12-04 | End: 2023-12-06 | Stop reason: HOSPADM

## 2023-12-04 RX ADMIN — ACETAMINOPHEN 975 MG: 325 TABLET ORAL at 22:17

## 2023-12-04 RX ADMIN — INSULIN LISPRO 1 UNITS: 100 INJECTION, SOLUTION INTRAVENOUS; SUBCUTANEOUS at 19:20

## 2023-12-04 RX ADMIN — INSULIN LISPRO 2 UNITS: 100 INJECTION, SOLUTION INTRAVENOUS; SUBCUTANEOUS at 10:02

## 2023-12-04 RX ADMIN — ACETAMINOPHEN 975 MG: 325 TABLET ORAL at 08:30

## 2023-12-04 RX ADMIN — MAGNESIUM HYDROXIDE 30 ML: 400 SUSPENSION ORAL at 09:35

## 2023-12-04 RX ADMIN — OXYCODONE HYDROCHLORIDE 10 MG: 5 TABLET ORAL at 09:48

## 2023-12-04 RX ADMIN — ACETAMINOPHEN 975 MG: 325 TABLET ORAL at 16:38

## 2023-12-04 RX ADMIN — SODIUM CHLORIDE, SODIUM LACTATE, POTASSIUM CHLORIDE, CALCIUM CHLORIDE AND DEXTROSE MONOHYDRATE 125 ML/HR: 5; 600; 310; 30; 20 INJECTION, SOLUTION INTRAVENOUS at 17:11

## 2023-12-04 RX ADMIN — IBUPROFEN 600 MG: 600 TABLET, FILM COATED ORAL at 16:38

## 2023-12-04 RX ADMIN — ONDANSETRON 4 MG: 2 INJECTION INTRAMUSCULAR; INTRAVENOUS at 04:37

## 2023-12-04 RX ADMIN — LIDOCAINE 1 PATCH: 4 PATCH TOPICAL at 22:36

## 2023-12-04 RX ADMIN — NIFEDIPINE 30 MG: 30 TABLET, FILM COATED, EXTENDED RELEASE ORAL at 09:35

## 2023-12-04 RX ADMIN — INSULIN LISPRO 2 UNITS: 100 INJECTION, SOLUTION INTRAVENOUS; SUBCUTANEOUS at 17:09

## 2023-12-04 RX ADMIN — IBUPROFEN 600 MG: 600 TABLET, FILM COATED ORAL at 08:30

## 2023-12-04 RX ADMIN — INSULIN GLARGINE 8 UNITS: 100 INJECTION, SOLUTION SUBCUTANEOUS at 06:55

## 2023-12-04 RX ADMIN — IBUPROFEN 600 MG: 600 TABLET, FILM COATED ORAL at 22:17

## 2023-12-04 RX ADMIN — DOCUSATE SODIUM 100 MG: 100 CAPSULE, LIQUID FILLED ORAL at 22:17

## 2023-12-04 RX ADMIN — OXYCODONE HYDROCHLORIDE 5 MG: 5 TABLET ORAL at 22:36

## 2023-12-04 RX ADMIN — ALBUTEROL SULFATE 2 PUFF: 90 AEROSOL, METERED RESPIRATORY (INHALATION) at 03:13

## 2023-12-04 RX ADMIN — METOCLOPRAMIDE 10 MG: 5 INJECTION, SOLUTION INTRAMUSCULAR; INTRAVENOUS at 00:21

## 2023-12-04 RX ADMIN — DOCUSATE SODIUM 100 MG: 100 CAPSULE, LIQUID FILLED ORAL at 09:35

## 2023-12-04 ASSESSMENT — PAIN DESCRIPTION - DESCRIPTORS
DESCRIPTORS: SORE
DESCRIPTORS: ACHING;CRAMPING;SORE
DESCRIPTORS: ACHING;SORE

## 2023-12-04 ASSESSMENT — PAIN SCALES - GENERAL
PAINLEVEL_OUTOF10: 0 - NO PAIN
PAINLEVEL_OUTOF10: 6
PAINLEVEL_OUTOF10: 8
PAINLEVEL_OUTOF10: 2
PAINLEVEL_OUTOF10: 8
PAINLEVEL_OUTOF10: 8
PAINLEVEL_OUTOF10: 2
PAINLEVEL_OUTOF10: 2
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
## 2023-12-04 NOTE — LACTATION NOTE
Lactation Consultant Note  Lactation Consultation       Maternal Information       Maternal Assessment       Infant Assessment       Feeding Assessment       LATCH TOOL       Breast Pump       Other OB Lactation Tools       Patient Follow-up       Other OB Lactation Documentation       Recommendations/Summary  Attempted to see patient regarding breast feeding/ pumping. Mom not in the room on Mac 4; letter left.

## 2023-12-04 NOTE — PROGRESS NOTES
Postpartum Progress Note    Assessment/Plan   Maria Isabel Cutler is a 23 y.o., , who delivered at 34w6d gestation and is now postpartum day 3 from pLTCS on      siPEC w/ SF   - new severe range Bps > 4 hrs apart since arrival  - nifedipine 30mg initiated, now normotensive to mild range,   - asymptomatic  - HELLP labs neg x2  - s/p Mg gtt for 24 h pp     T1DM; DKA resolved   - DKA on admission, now resolved with closed gap, normoglycemia, BHB wnl   - strict Is/Os  - Patient with low BG overnight and this am, not taking in much PO. 2+ Ketones last night, started on D5LR and feeling better.    - postpartum regimen: Lantus 10 once a day and lispro 2 TID (decreased from 12 yesterday). Fasting, pre and post prandials, with preprandial sliding scale.     Decreased PO intake   - Patient not eating meals for the last few days so checking POCT q4hrs   - Antiemetics prn   - D5LR@ 125 ml/hr    - likely in setting of low mood     Asthma  - albuterol PRN    Episode urinary incontinence   -  f/up UA     Post op Care  - pain well controlled   - starting Hgb 10.0, EBL 1300, pod 1 hgb 8.8, no si/sx of anemia   - buccal cytotec/IU pit given intraop  - regular diet    Mood  - patient endorses low mood, declines resources or psych at this time  - will continue to address     Postpartum  - f/up contraception     Dispo  - Anticipate DC on PPD#3 pending no complications  - Plan for BP check in 3-5 days, incision check in 2 weeks and PPV in 4-6 weeks with primary OB     To be discussed with MFM Attending, Dr. Haro.   Indiana Parra MD  OB/GYN PGY3   Pager [phone]        Principal Problem:    DM (diabetes mellitus) type 1, uncontrolled, with ketoacidosis (CMS/HCC)  Active Problems:    Seizures (CMS/HCC)    Pregnancy Problems (from 24 to present)       Problem Noted Resolved    Labor and delivery indication for care or intervention 2023 by Marni De La Cruz MD No    Priority:  Medium      Bilateral sciatica 10/10/2023 by  Stuart Munoz MD No    Priority:  Medium      Overview Signed 10/10/2023  2:17 PM by Stuart Munoz MD     Intermittent pain shooting down posteriolateral thighs bilaterally         34 weeks gestation of pregnancy 10/8/2023 by Stuart Munoz MD No    Priority:  Medium      Overview Addendum 10/10/2023  2:24 PM by Stuart Munoz MD     [x] PNLs reviewed wnl, rubella nonimmune  [x] Pap ASCUS 2022  [x] NT wnl 2023  [x] cell-free DNA, CF/SMA screening low risk  [x] Anatomy US  [x] 2nd trimester labs, Hg 9.9  [x] tdap  [ ] flu/covid  [/] GBS  [ ] Delivery Planning  [ ] PPBC           Asthma affecting pregnancy in third trimester 10/8/2023 by Stuart Munoz MD No    Priority:  Medium      Overview Addendum 10/24/2023  2:08 PM by Stuart Munoz MD     Albuterol PRN  10/24 Regimen: Flovent 250mcg BID puff, Albuterol PRN         Type 1 diabetes mellitus complicating pregnancy, antepartum 10/5/2023 by Indiana Parra MD No    Priority:  Medium      Overview Addendum 2023  8:04 PM by Angeline Boudreaux MD     [x] Baseline HbA1c 12.8 2023  [x] Baseline TSH 2.38 2023  [x] Baseline HELLP Labs wnl, 0.11 2023  [x] bbASA  [x] EKG  wnl  [x] Fetal Echo wnl  [x] Ophthalmology  /podiatry    [ ]  Testing 2x weekly until del    Serial Growths  10/24 29.0 wga EFW 1294g 42%, AC 48%    Current Regimen changed 10/10/2023  Basal  9036-8933 0.90  2954-7285 1.00  9856-9550 0.90     Bolus  8954-1600 1:9     CF: 1:40  Carb ratio: 1:10  Target:110  DOA 5 hrs           Herpes simplex infection in mother during third trimester of pregnancy 10/5/2023 by Indiana Parra MD No    Priority:  Medium      Overview Signed 10/5/2023  8:55 AM by Indiana Parra MD     On acyclovir prophylaxis          Chronic hypertension affecting pregnancy 10/5/2023 by Indiana Parra MD No    Priority:  Medium      Overview Addendum 10/8/2023  3:16 PM by Stuart Munoz MD     [x] Baseline HELLP Labs, P:C 0.11 2023  [x] bbASA  [ ]   Testing  [x] Baseline EKG inpatient      Serial Growths    Med Regimen  No meds         Group beta Strep positive 10/5/2023 by Indiana Parra MD No    Priority:  Medium      Overview Signed 10/5/2023  9:00 AM by Indiana Parra MD     GBS UTI in pregnancy          Depression during pregnancy in third trimester 10/5/2023 by Indiana Parra MD No    Priority:  Medium      Fetal cardiac anomaly affecting pregnancy, antepartum 10/5/2023 by Indiana Parra MD No    Priority:  Medium      Overview Addendum 2023  6:00 PM by Luz Maria Abebe MD     [facility] Plan of Care: fetal ventricular septum slightly thickened --> code:1: non-urgent peds cardiology consult prior to discharge or within 1-2 weeks if delivered at an outside hospital  No structural abnormalities on repeat fetal echo --> peds cards recommendation for routine delivery and  care with non-urgent peds cardiology consult prior to discharge or within 1-2 weeks if delivered at an outside hospital         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 10/5/2023 by Indiana Parra MD No    Priority:  Medium      Overview Addendum 10/24/2023  2:09 PM by Stuart Munoz MD     - Admitted for blurry vision on , d/c ed with improvement, oligoclonal bands seen on LP, neg MRI head  - New optic disc edema, diabetic edema    - Close follow up with ophto, last appt 10/13, needs IV fluro angio after delivery to prevent progression, next visit   - Neuro-Immunology NPV scheduled for          Ophthalmologic abnormality 10/5/2023 by Indiana Parra MD No    Priority:  Medium      Overview Signed 10/5/2023  9:14 AM by Indiana Parra MD     Diabetic macular edema, both eyes - for panretinal photocoagulation with Optho          Constipation during pregnancy in second trimester 10/9/2023 by Melody Osorio 10/10/2023 by Stuart Munoz MD    Abnormal pregnancy in second trimester 10/9/2023 by Melody Osorio 10/10/2023 by Stuart Munoz MD    Suspected fetal  "anomaly, antepartum 10/9/2023 by Melody Osorio 10/10/2023 by Stuart Munoz MD    Hyperglycemia in pregnancy 9/14/2023 by Melody Osorio 10/10/2023 by Stuart Munoz MD    Vomiting of pregnancy 9/14/2023 by Melody Osorio 10/10/2023 by Stuart Munoz MD                Subjective   Her pain is well controlled with current medications  She is passing flatus, has not yet had a bowel movement, feeling constipated   She is ambulating well  She is tolerating a Adult diet Regular  She reports no breast or nursing problems      Objective   Allergies:   Patient has no known allergies.         Last Vitals:  Temp Pulse Resp BP MAP Pulse Ox   37 °C (98.6 °F) 87 18 127/87 80 mmHg 97 %     Vitals Min/Max Last 24 Hours:  Temp  Min: 37 °C (98.6 °F)  Max: 37.8 °C (100 °F)  Pulse  Min: 82  Max: 104  Resp  Min: 16  Max: 18  BP  Min: 114/72  Max: 142/83    Intake/Output:     Intake/Output Summary (Last 24 hours) at 12/4/2023 0659  Last data filed at 12/3/2023 2345  Gross per 24 hour   Intake 998 ml   Output --   Net 998 ml       Physical Exam:  General: Examination reveals a well developed, well nourished, female, in no acute distress and affect is quiet, withdrawn. She is alert and cooperative.  HEENT: PERRLA. External ears normal. Nose normal, no erythema or discharge. Mouth and throat clear.  Abdomen: soft, gravid, nontender, nondistended, no abnormal masses, no epigastric pain.  Incision: healing well, well approximated.  Fundus: firm.  Extremities: no redness or tenderness in the calves or thighs, no edema.  Neurological: alert, oriented, normal speech, no focal findings or movement disorder noted.  Psychological: awake and alert; oriented to person, place, and time.    Lab Data:  No results found for: \"ABO\", \"LABRH\", \"ABSCRN\"  Lab Results   Component Value Date    WBC 6.6 12/03/2023    HGB 9.0 (L) 12/03/2023    HCT 28.3 (L) 12/03/2023     (H) 12/03/2023     Lab Results   Component Value Date    GLUCOSE 107 (H) 12/03/2023    NA " 138 12/03/2023    K 4.3 12/03/2023     12/03/2023    CO2 24 12/03/2023    ANIONGAP 15 12/03/2023    BUN 2 (L) 12/03/2023    CREATININE 0.31 (L) 12/03/2023    EGFR >90 12/03/2023    CALCIUM 8.4 (L) 12/03/2023    ALBUMIN 2.9 (L) 12/03/2023    PROT 5.2 (L) 12/03/2023    ALKPHOS 80 12/03/2023    ALT 15 12/03/2023    AST 16 12/03/2023    BILITOT 0.3 12/03/2023

## 2023-12-04 NOTE — LACTATION NOTE
This note was copied from a baby's chart.  Lactation Consultant Note    Recommendations/Summary  I spoke with mom at pt's bedside to explained availability of the [facility] LC services.  Instructed on listed patient education: KIAN, Benefits of mother's own milk for the infant, breast massage and hand expression,CDC pump cleaning & sanitizing guidelines.  Notified of [facility] Lactation Support group meeting and provided written invitation.  Mom was providing kangaroo care (skin-to-skin care) during this visit. She reports her milk is coming in and she has no questions at this time.  Mom was invited to follow up with LC services as needed.

## 2023-12-04 NOTE — INDIVIDUALIZED OVERALL PLAN OF CARE NOTE
Notiifed by nurse that pt has had an episode of urge incontinence due to not being able to get out of bed and making it to the bathroom in time. She has urinated on the floor. She had a prior episode of urinary trickling the night before. Denies low back pain, hematuria and dysuria. Will order UA      Separately, BG 0400 (90), previously received lantus 5u (half of current regimen) yesterday in s/o low BG's. Will receive lantus 8u this morning and will continue D5LR and q4hr POCT.       D/w Dr. Longoria,    Ana Spann MD, PGY-2

## 2023-12-05 LAB
GLUCOSE BLD MANUAL STRIP-MCNC: 123 MG/DL (ref 74–99)
GLUCOSE BLD MANUAL STRIP-MCNC: 132 MG/DL (ref 74–99)
GLUCOSE BLD MANUAL STRIP-MCNC: 156 MG/DL (ref 74–99)
GLUCOSE BLD MANUAL STRIP-MCNC: 161 MG/DL (ref 74–99)
GLUCOSE BLD MANUAL STRIP-MCNC: 169 MG/DL (ref 74–99)
GLUCOSE BLD MANUAL STRIP-MCNC: 47 MG/DL (ref 74–99)
GLUCOSE BLD MANUAL STRIP-MCNC: 66 MG/DL (ref 74–99)
GLUCOSE BLD MANUAL STRIP-MCNC: 69 MG/DL (ref 74–99)
GLUCOSE BLD MANUAL STRIP-MCNC: 77 MG/DL (ref 74–99)
GLUCOSE BLD MANUAL STRIP-MCNC: 81 MG/DL (ref 74–99)
LABORATORY COMMENT REPORT: NORMAL
PATH REPORT.FINAL DX SPEC: NORMAL
PATH REPORT.GROSS SPEC: NORMAL
PATH REPORT.RELEVANT HX SPEC: NORMAL
PATH REPORT.TOTAL CANCER: NORMAL

## 2023-12-05 PROCEDURE — 2500000001 HC RX 250 WO HCPCS SELF ADMINISTERED DRUGS (ALT 637 FOR MEDICARE OP)

## 2023-12-05 PROCEDURE — 2500000004 HC RX 250 GENERAL PHARMACY W/ HCPCS (ALT 636 FOR OP/ED)

## 2023-12-05 PROCEDURE — 99233 SBSQ HOSP IP/OBS HIGH 50: CPT | Performed by: STUDENT IN AN ORGANIZED HEALTH CARE EDUCATION/TRAINING PROGRAM

## 2023-12-05 PROCEDURE — 82947 ASSAY GLUCOSE BLOOD QUANT: CPT

## 2023-12-05 PROCEDURE — 2500000001 HC RX 250 WO HCPCS SELF ADMINISTERED DRUGS (ALT 637 FOR MEDICARE OP): Performed by: STUDENT IN AN ORGANIZED HEALTH CARE EDUCATION/TRAINING PROGRAM

## 2023-12-05 PROCEDURE — 1210000001 HC SEMI-PRIVATE ROOM DAILY

## 2023-12-05 PROCEDURE — 2500000002 HC RX 250 W HCPCS SELF ADMINISTERED DRUGS (ALT 637 FOR MEDICARE OP, ALT 636 FOR OP/ED): Performed by: STUDENT IN AN ORGANIZED HEALTH CARE EDUCATION/TRAINING PROGRAM

## 2023-12-05 PROCEDURE — 2500000002 HC RX 250 W HCPCS SELF ADMINISTERED DRUGS (ALT 637 FOR MEDICARE OP, ALT 636 FOR OP/ED)

## 2023-12-05 RX ORDER — DEXTROSE, SODIUM CHLORIDE, SODIUM LACTATE, POTASSIUM CHLORIDE, AND CALCIUM CHLORIDE 5; .6; .31; .03; .02 G/100ML; G/100ML; G/100ML; G/100ML; G/100ML
125 INJECTION, SOLUTION INTRAVENOUS CONTINUOUS
Status: DISCONTINUED | OUTPATIENT
Start: 2023-12-05 | End: 2023-12-06 | Stop reason: HOSPADM

## 2023-12-05 RX ORDER — INSULIN GLARGINE 100 [IU]/ML
8 INJECTION, SOLUTION SUBCUTANEOUS EVERY 24 HOURS
Status: DISCONTINUED | OUTPATIENT
Start: 2023-12-05 | End: 2023-12-06

## 2023-12-05 RX ORDER — INSULIN GLARGINE 100 [IU]/ML
6 INJECTION, SOLUTION SUBCUTANEOUS EVERY 24 HOURS
Qty: 1.8 ML | Refills: 1 | Status: SHIPPED | OUTPATIENT
Start: 2023-12-06 | End: 2023-12-06 | Stop reason: HOSPADM

## 2023-12-05 RX ORDER — OXYCODONE HYDROCHLORIDE 5 MG/1
5 TABLET ORAL EVERY 4 HOURS PRN
Qty: 15 TABLET | Refills: 0 | Status: SHIPPED | OUTPATIENT
Start: 2023-12-05 | End: 2023-12-06 | Stop reason: SDUPTHER

## 2023-12-05 RX ORDER — IBUPROFEN 600 MG/1
600 TABLET ORAL EVERY 6 HOURS
Qty: 56 TABLET | Refills: 0 | Status: SHIPPED | OUTPATIENT
Start: 2023-12-05 | End: 2023-12-06 | Stop reason: SDUPTHER

## 2023-12-05 RX ORDER — NIFEDIPINE 30 MG/1
30 TABLET, FILM COATED, EXTENDED RELEASE ORAL DAILY
Qty: 30 TABLET | Refills: 1 | Status: SHIPPED | OUTPATIENT
Start: 2023-12-06 | End: 2023-12-06 | Stop reason: SDUPTHER

## 2023-12-05 RX ORDER — BENZOCAINE AND LEVOMENTHOL 200; 5 MG/G; MG/G
SPRAY TOPICAL 4 TIMES DAILY PRN
Qty: 78 G | Refills: 1 | Status: SHIPPED | OUTPATIENT
Start: 2023-12-05 | End: 2023-12-15 | Stop reason: WASHOUT

## 2023-12-05 RX ORDER — ACETAMINOPHEN 325 MG/1
975 TABLET ORAL EVERY 6 HOURS PRN
Qty: 56 TABLET | Refills: 0 | Status: SHIPPED | OUTPATIENT
Start: 2023-12-05 | End: 2023-12-19

## 2023-12-05 RX ORDER — INSULIN GLARGINE 100 [IU]/ML
5 INJECTION, SOLUTION SUBCUTANEOUS ONCE
Status: COMPLETED | OUTPATIENT
Start: 2023-12-05 | End: 2023-12-05

## 2023-12-05 RX ORDER — DOCUSATE SODIUM 100 MG/1
100 CAPSULE, LIQUID FILLED ORAL 2 TIMES DAILY
Qty: 28 CAPSULE | Refills: 1 | Status: SHIPPED | OUTPATIENT
Start: 2023-12-05 | End: 2023-12-19

## 2023-12-05 RX ORDER — INSULIN LISPRO 100 [IU]/ML
2 INJECTION, SOLUTION INTRAVENOUS; SUBCUTANEOUS
Qty: 1.8 ML | Refills: 1 | Status: SHIPPED | OUTPATIENT
Start: 2023-12-05 | End: 2023-12-06 | Stop reason: SDUPTHER

## 2023-12-05 RX ADMIN — NIFEDIPINE 30 MG: 30 TABLET, FILM COATED, EXTENDED RELEASE ORAL at 09:08

## 2023-12-05 RX ADMIN — MAGNESIUM HYDROXIDE 30 ML: 400 SUSPENSION ORAL at 09:08

## 2023-12-05 RX ADMIN — INSULIN LISPRO 2 UNITS: 100 INJECTION, SOLUTION INTRAVENOUS; SUBCUTANEOUS at 20:23

## 2023-12-05 RX ADMIN — ACETAMINOPHEN 975 MG: 325 TABLET ORAL at 22:56

## 2023-12-05 RX ADMIN — ACETAMINOPHEN 975 MG: 325 TABLET ORAL at 10:09

## 2023-12-05 RX ADMIN — INSULIN LISPRO 2 UNITS: 100 INJECTION, SOLUTION INTRAVENOUS; SUBCUTANEOUS at 10:46

## 2023-12-05 RX ADMIN — INSULIN LISPRO 2 UNITS: 100 INJECTION, SOLUTION INTRAVENOUS; SUBCUTANEOUS at 10:32

## 2023-12-05 RX ADMIN — ACETAMINOPHEN 975 MG: 325 TABLET ORAL at 16:13

## 2023-12-05 RX ADMIN — IBUPROFEN 600 MG: 600 TABLET, FILM COATED ORAL at 04:05

## 2023-12-05 RX ADMIN — INSULIN GLARGINE 5 UNITS: 100 INJECTION, SOLUTION SUBCUTANEOUS at 10:42

## 2023-12-05 RX ADMIN — ACETAMINOPHEN 975 MG: 325 TABLET ORAL at 04:06

## 2023-12-05 RX ADMIN — INSULIN LISPRO 2 UNITS: 100 INJECTION, SOLUTION INTRAVENOUS; SUBCUTANEOUS at 20:20

## 2023-12-05 RX ADMIN — DOCUSATE SODIUM 100 MG: 100 CAPSULE, LIQUID FILLED ORAL at 09:08

## 2023-12-05 RX ADMIN — IBUPROFEN 600 MG: 600 TABLET, FILM COATED ORAL at 22:58

## 2023-12-05 RX ADMIN — IBUPROFEN 600 MG: 600 TABLET, FILM COATED ORAL at 16:13

## 2023-12-05 RX ADMIN — OXYCODONE HYDROCHLORIDE 10 MG: 5 TABLET ORAL at 20:02

## 2023-12-05 RX ADMIN — DOCUSATE SODIUM 100 MG: 100 CAPSULE, LIQUID FILLED ORAL at 21:59

## 2023-12-05 RX ADMIN — IBUPROFEN 600 MG: 600 TABLET, FILM COATED ORAL at 10:09

## 2023-12-05 RX ADMIN — OXYCODONE HYDROCHLORIDE 10 MG: 5 TABLET ORAL at 10:08

## 2023-12-05 ASSESSMENT — PAIN DESCRIPTION - DESCRIPTORS
DESCRIPTORS: ACHING;CRAMPING;SORE
DESCRIPTORS: CRAMPING

## 2023-12-05 ASSESSMENT — PAIN SCALES - GENERAL
PAINLEVEL_OUTOF10: 4
PAINLEVEL_OUTOF10: 2
PAINLEVEL_OUTOF10: 2
PAINLEVEL_OUTOF10: 5 - MODERATE PAIN
PAINLEVEL_OUTOF10: 2
PAINLEVEL_OUTOF10: 10 - WORST POSSIBLE PAIN
PAINLEVEL_OUTOF10: 5 - MODERATE PAIN
PAINLEVEL_OUTOF10: 10 - WORST POSSIBLE PAIN
PAINLEVEL_OUTOF10: 10 - WORST POSSIBLE PAIN

## 2023-12-05 ASSESSMENT — PAIN - FUNCTIONAL ASSESSMENT
PAIN_FUNCTIONAL_ASSESSMENT: 0-10

## 2023-12-05 NOTE — PROGRESS NOTES
Postpartum Progress Note    Assessment/Plan   Maria Isabel Cutler is a 23 y.o., , who delivered at 34w6d gestation and is now postpartum day 4 from pLTCS on      siPEC w/ SF   - new severe range Bps > 4 hrs apart since arrival  - nifedipine 30mg initiated, now normotensive to mild range,   - asymptomatic  - HELLP labs neg x2  - s/p Mg gtt for 24 h pp     T1DM; DKA resolved   - DKA on admission, now resolved with closed gap, normoglycemia, BHB wnl   - strict Is/Os  - Patient with low BG overnight and this am, not taking in much PO. 1 of lispro corrective postprandial, persistently low this am, restarted on D5LR   - postpartum regimen: Lantus 8 once a day and lispro 2 TID. Fasting, pre and post prandials, with preprandial sliding scale.     Decreased PO intake   - Patient not eating meals for the last few days so checking POCT q4hrs   - Antiemetics prn   - D5LR@ 125 ml/hr    - likely in setting of low mood     Asthma  - albuterol PRN    Episode urinary incontinence   -  f/up UA     Post op Care  - pain well controlled   - starting Hgb 10.0, EBL 1300, pod 1 hgb 8.8, no si/sx of anemia   - buccal cytotec/IU pit given intraop  - regular diet    Mood  - patient endorses low mood, declines resources or psych at this time  - will continue to address     Postpartum  - f/up contraception     Dispo  - Anticipate DC tomorrow pending BG control  - Plan for BP check in 3-5 days, incision check in 2 weeks and PPV in 4-6 weeks with primary OB     To be discussed with MFM Attending, Dr. Haro.   Indiana Parra MD  OB/GYN PGY3   Pager [phone]        Principal Problem:    DM (diabetes mellitus) type 1, uncontrolled, with ketoacidosis (CMS/HCC)  Active Problems:    Seizures (CMS/HCC)    ANTEPARTUM PROGRESS NOTE   2023, 8:19 AM     SUBJECTIVE:  No acute events overnight. Patient has no complaints this morning. Denies HA, vision changes, CP, SOB, RUQ or epigastric pain, pain well controlled     OBJECTIVE:   /68   Pulse  90   Temp 36.2 °C (97.2 °F) (Temporal)   Resp 14   Ht 1.524 m (5')   Wt 60.4 kg (133 lb 2.5 oz)   LMP 04/01/2023   SpO2 97%   Breastfeeding Yes   BMI 26.01 kg/m²    Temp  Min: 36.2 °C (97.2 °F)  Max: 37.2 °C (99 °F)  Pulse  Min: 88  Max: 100  BP  Min: 116/68  Max: 134/80    Physical Exam:  General: Examination reveals a well developed, well nourished, female, in no acute distress and affect is quiet, withdrawn. She is alert and cooperative.  HEENT: PERRLA. External ears normal. Nose normal, no erythema or discharge. Mouth and throat clear.  Abdomen: soft, gravid, nontender, nondistended, no abnormal masses, no epigastric pain.  Incision: healing well, well approximated.  Fundus: firm.  Extremities: no redness or tenderness in the calves or thighs, no edema.  Neurological: alert, oriented, normal speech, no focal findings or movement disorder noted.  Psychological: awake and alert; oriented to person, place, and time.     Labs:   Labs in chart were reviewed.      Indiana Parra MD   [facility]  Pager [phone]     Principal Problem:    DM (diabetes mellitus) type 1, uncontrolled, with ketoacidosis (CMS/HCC)  Active Problems:    Seizures (CMS/HCC)  '

## 2023-12-05 NOTE — LACTATION NOTE
Lactation Consultant Note  Lactation Consultation  Reason for Consult: Initial assessment, NICU baby  Consultant Name: Taylro Quispe RN, IBCLC    Maternal Information  Has mother  before?: No  Infant to breast within first 2 hours of birth?: No  Breastfeeding Delayed Due to: Infant status  Exclusive Pump and Bottle Feed: Yes    Maternal Assessment  Breast Assessment:  (deferred at this time)    Infant Assessment  Infant Behavior:  (infant in NICU)    Feeding Assessment  Nutrition Source: Breastmilk  Feeding Method: Feeding expressed breastmilk    LATCH TOOL       Breast Pump  Pump: Hospital grade electric pump  Frequency: 8-10 times per day  Duration: Maintain phase  Units of Volume: Ounces per session    Other OB Lactation Tools       Patient Follow-up       Other OB Lactation Documentation  Maternal Risk Factors:  delivery, Preeclampsia, Diabetes (gestational, types 1 or 2),  delivery <37 weeks  Infant Risk Factors: Prematurity <37 weeks    Recommendations/Summary  Mother states she has been pumping every 2-3 hours more than what colostrum bottles can hold. Mother states she is in need of more collection bottles as she has run out. Two colostrum bottles given to mother as well as some breastmilk bags for storage. Discussed with mother NICU may have some more bottles/bags for milk storage. Mother states she is still using initiate program. I instructed her to now use maintain program and suggested pumping at least 15 minutes and up to 30 minutes or until breasts feel soft and empty. Mother has a pump for home use through insurance. Discussed Go pump eligibility with mother as well as RBC and outpatient lactation resources available.

## 2023-12-05 NOTE — NURSING NOTE
Pt's blood sugar checked at 1021 before her dinner which was 54. Called Magi J[facility] about skipping the 2 units that are given with the meal. She agreed to skip the 2 units and check a one hour postprandial after patient ate her meal.

## 2023-12-05 NOTE — CARE PLAN
The patient's goals for the shift include Pain control, ambulate    The clinical goals for the shift include BP and BS stable    Problem: Pain - Adult  Goal: Verbalizes/displays adequate comfort level or baseline comfort level  Outcome: Progressing     Problem: Safety - Adult  Goal: Free from fall injury  Outcome: Progressing     Problem: Chronic Conditions and Co-morbidities  Goal: Patient's chronic conditions and co-morbidity symptoms are monitored and maintained or improved  Outcome: Progressing     Problem: Diabetes  Goal: Achieve decreasing blood glucose levels by end of shift  Outcome: Progressing  Goal: Increase stability of blood glucose readings by end of shift  Outcome: Progressing  Goal: Decrease in ketones present in urine by end of shift  Outcome: Progressing  Goal: Maintain electrolyte levels within acceptable range throughout shift  Outcome: Progressing  Goal: Maintain glucose levels >70mg/dl to <250mg/dl throughout shift  Outcome: Progressing  Goal: No changes in neurological exam by end of shift  Outcome: Progressing  Goal: Learn about and adhere to nutrition recommendations by end of shift  Outcome: Progressing  Goal: Vital signs within normal range for age by end of shift  Outcome: Progressing  Goal: Increase self care and/or family involovement by end of shift  Outcome: Progressing  Goal: Receive DSME education by end of shift  Outcome: Progressing     Problem: Postpartum  Goal: Experiences normal postpartum course  Outcome: Progressing  Goal: Appropriate maternal -  bonding  Outcome: Progressing  Goal: Establish and maintain infant feeding pattern for adequate nutrition  Outcome: Progressing  Goal: Incisions, wounds, or drain sites healing without S/S of infection  Outcome: Progressing  Goal: No s/sx infection  Outcome: Progressing  Goal: No s/sx of hemorrhage  Outcome: Progressing  Goal: Minimal s/sx of HDP and BP<160/110  Outcome: Progressing     Problem:  Recovery Care  Goal:  Verbalizes understanding of post-op instructions  Outcome: Progressing  Goal: Manages discomfort  Outcome: Progressing  Goal: Dressing intact until removed with any drainage marked  Outcome: Progressing  Goal: Patient vital signs are stable  Outcome: Progressing  Goal: Urine output is 0.5 mL/kg/hr or more  Outcome: Progressing  Goal: Fundus firm at midline  Outcome: Progressing

## 2023-12-05 NOTE — PROGRESS NOTES
Social Work Assessment     Maria Isabel Cutler is a 23 y.o. female on day 8 of admission presenting with DM (diabetes mellitus) type 1, uncontrolled, with ketoacidosis (CMS/HCC).  Infant was born at 34 wga, on 12/1/2022, transferred to NICU for further care.     Patient: Maria Isabel Cutler  Address: [address] [address] [facility]  Phone: [phone]     Referral Reason: NICU step down admission    Household Composition: Pt lives with her mom, and twin sister.  Says that it is a good and supportive environment.  Dad/FOB Casey Owen is incarcerated, remains in contact with mom.      Car-Seat: No - plans to get one from [facility] with her sister.  Denied any concerns with this  Safe Sleep Space: No - sw provided [employer] pack n play, completed the assessment form and gave mom the handout to watch the video  Reviewed ABCs of safe sleep     Transportation Concerns: Mom denies, has a car but cannot drive right now. Her sister drives her or she uses provide a ride     School/Work/Income:  Previously worked but has not been able to because of her change in eyesight.  Discussed applying for disability, says that she has been meaning to.  SW provided referral to  for assistance with applying for disability.  Has SNAP benefits, needs to apply for WIC.  SW provided hand out on WIC.  Also provided handout on general agencies that assist moms and children.      Insurance: Medicaid, mom will call to add infant     Mental Health:  Hx of anxiety and depression  Medication(s):  Previously, but not currently  Counseling:  Referred during pregnancy, but did not begin  Supports:  Mom, twin sister  Seen by psychiatry on 12/4, due to low mood, concern for insulin misuse.  They cleared her, she did not want to start any medications, referred to outpatient counseling.  They provided resources.   SW also provided resources if needed in the future, including information about POEM.  Mom says that her mood is complicated by  swings in her blood sugars and hormones.      Assessment:   SW met with for assessment. She was accepting and engaged. She has good support but still needed a few items for . Her sister will help her get a car seat,  sw provided a pack and play from [employer]. She is coping with the admission and happy baby is here.  She does not feel well, she says due to her diabetes. She was seen by social work during a previous admission to assess her mood, and also was seen by psychiatry during this admission.  At this time, she denies having any other concerns or needing supports.  Provided mom with information on supports within the hospital such as the [employer] room and mom's club. Provided a handout on general resources in the area for moms and children. Provided handout on WIC.     Plan:   SW introduced role and provided support. SW will remain available as needed throughout admission. Mom will need to obtain a car seat prior to discharge, denied needing help with this.  Mom plans to utilize [facility] for pediatric care.  Please consult social work as needed.     PAZ Guillen

## 2023-12-05 NOTE — DISCHARGE INSTRUCTIONS
.Call if you have:  Temperature greater than 100.4  Heavy vaginal bleeding  Persistent nausea and vomiting  Severe uncontrolled pain  Difficulty breathing, headache or visual disturbances  Hives  Persistent dizziness or light-headedness  Extreme fatigue  Any other questions or concerns you may have after discharge    In an emergency, call 911 or go to an Emergency Department at a nearby hospital    Call if you have   - blood pressure >150/>90  - headache that doesn't improve with medications  - shortness of breath  - chest pain  - right upper quadrant abdominal pain     You will be contacted regarding your blood pressure visit, your 2 week incision check and 4-6 week postpartum visit     Diabetes Type 1 Discharge Instructions, Adult    About this topic  Type 1 diabetes is a health problem where your immune system destroys the cells in the pancreas that make insulin. Insulin helps your body use sugar as energy for many of its daily functions. Without insulin, your blood sugar will go up to dangerous levels. Diabetes does not go away. You will learn to manage it with eating the right way and taking drugs as ordered by a doctor.    What care is needed at home?  Ask your doctor what you need to do when you go home. Make sure you ask questions if you do not understand what the doctor says. This way you will know what you need to do.  Check your blood sugar level. Your doctor will tell you how often, but it is most often a few times a day. Keep a list of your blood sugar levels. This will help you learn what causes high or low readings and help you manage your diabetes.    You will need insulin. Talk to your doctor to see if shots a few times a day or an insulin pump is best for you.  Eat a balanced, healthy diet. You may need to keep track of how much sugar and carbohydrates are in your food and drinks. Talk about this with your doctor or dietitian.  Talk with your doctor about the right amount of exercise for you.  Exercise may affect how much insulin you need. Ask your doctor for instructions.  Take extra care of your feet. Check them often. Always wear socks and shoes. Never walk barefoot, especially outdoors. Take special care around the pool and at the beach as these surfaces may be extremely hot and burn your feet. Your doctor may want you to see a foot doctor each year.  Have your eyes checked each year by an eye doctor.  Always wear a medical alert bracelet or carry a card that says you have diabetes.  Make family members aware of your illness and how to help.    What follow-up care is needed?  Talk with your doctor often. Your diet and insulin shots may need to be changed. They will depend on your health and other needs. Your doctor may ask you to make visits to the office to check on your progress. Be sure to keep these visits.  Review your blood sugar record with your doctor. Your doctor may check a lab test called an A1C that gives an idea about your average blood sugar over the last 3 months.  Your doctor will also want to watch other aspects of your health, such as your blood pressure, how well your kidney works, and your cholesterol.  What drugs may be needed?  You need to take insulin for the rest of your life. This will help to control your blood sugar. The doctor may also give you other drugs. These may help to control other health problems and avoid bad side effects from your illness.  Will physical activity be limited?  Talk to your doctor about how you can be active. This may help control your blood sugar. You can garden, bike, walk, swim, or do other activities. Always check your blood sugar before and after you are active to see how your body responds. Be sure to drink lots of water before, during, and after activities.  What changes to diet are needed?  Work closely with your doctor or dietitian on what foods you may eat. You may need to balance how much sugar, starches, fat, and protein are in your  food.  Eat meals at the same time each day. Do not skip a meal.  Eat low-fat foods like lean cuts of meat, fish, skinless chicken and turkey, legumes, and low-fat milk.  Eat foods that are fiber-rich like non-starchy vegetables, whole grain bread and cereals.  Limit beer, wine, and mixed drinks (alcohol).  Avoid sugary drinks like soda and fruit juices  What problems could happen?  If this illness is not controlled, these problems might happen:  Eye problems  Kidney problems  Infection  High blood pressure  Nerve problems that cause numbness and tingling  Problems breaking down food  Heart problems  Stroke  Sores which are hard to heal  Foot problems  Blockages in the blood vessels in the legs  Dangerously high or low blood sugar levels  What can be done to prevent this health problem?  This is a life-long problem and you cannot prevent it. You can still lead a normal life. Diabetes can be managed through diet, drugs, and being active.  When do I need to call the doctor?  Very high or very low blood sugar readings that do not respond to treatment  Signs of low blood sugar. These include hunger, dizziness, shaking, a fast heartbeat, confusion, or sweating. Keep hard candies, glucose tablets, liquid glucose, or juice on hand for low blood sugar. Ask the doctor how much of these items should be given to treat a low blood sugar reading.    Signs of high blood sugar. These include sleepiness, blurred vision, passing urine more often, increased thirst, breath has a fruity sweet smell, upset stomach and throwing up, dizziness, or passing out.    Feet or legs are numb or painful  Sores on your feet  Helpful tips  Make sure you always carry a diabetes emergency kit. Be sure you and the people around you know how and when to use the kit. Check the expiration date of the items in your kit often.    Teach Back: Helping You Understand  The Teach Back Method helps you understand the information we are giving you. After you talk  with the staff, tell them in your own words what you learned. This helps to make sure the staff has described each thing clearly. It also helps to explain things that may have been confusing. Before going home, make sure you can do these:  I can tell you about my condition.  I can tell you what changes I need to make with my diet.  I can tell you what I will do if I feel dizzy, shaky, sweaty, and confused.  Last Reviewed Date  2021-03-19

## 2023-12-05 NOTE — INDIVIDUALIZED OVERALL PLAN OF CARE NOTE
Mid Morning 's. Patient offered Lantus 8u and requested Lantus 5u. Patient counseled on recommendation for 8u given elevated BG, however due to fluctuating BG's patient continued to want Lantus 5u. One time dose of Lantus 5u administered. Will repeat BG and treat as indicated.     Ana Spann, PGY-2

## 2023-12-06 ENCOUNTER — PHARMACY VISIT (OUTPATIENT)
Dept: PHARMACY | Facility: CLINIC | Age: 23
End: 2023-12-06
Payer: MEDICAID

## 2023-12-06 VITALS
DIASTOLIC BLOOD PRESSURE: 79 MMHG | TEMPERATURE: 97 F | WEIGHT: 133.16 LBS | OXYGEN SATURATION: 97 % | HEIGHT: 60 IN | HEART RATE: 99 BPM | RESPIRATION RATE: 18 BRPM | BODY MASS INDEX: 26.14 KG/M2 | SYSTOLIC BLOOD PRESSURE: 117 MMHG

## 2023-12-06 LAB
GLUCOSE BLD MANUAL STRIP-MCNC: 107 MG/DL (ref 74–99)
GLUCOSE BLD MANUAL STRIP-MCNC: 129 MG/DL (ref 74–99)
GLUCOSE BLD MANUAL STRIP-MCNC: 134 MG/DL (ref 74–99)
GLUCOSE BLD MANUAL STRIP-MCNC: 181 MG/DL (ref 74–99)
GLUCOSE BLD MANUAL STRIP-MCNC: 62 MG/DL (ref 74–99)
GLUCOSE BLD MANUAL STRIP-MCNC: 65 MG/DL (ref 74–99)
GLUCOSE BLD MANUAL STRIP-MCNC: 69 MG/DL (ref 74–99)
GLUCOSE BLD MANUAL STRIP-MCNC: 71 MG/DL (ref 74–99)
GLUCOSE BLD MANUAL STRIP-MCNC: 88 MG/DL (ref 74–99)

## 2023-12-06 PROCEDURE — 2500000001 HC RX 250 WO HCPCS SELF ADMINISTERED DRUGS (ALT 637 FOR MEDICARE OP): Performed by: STUDENT IN AN ORGANIZED HEALTH CARE EDUCATION/TRAINING PROGRAM

## 2023-12-06 PROCEDURE — 82947 ASSAY GLUCOSE BLOOD QUANT: CPT

## 2023-12-06 PROCEDURE — RXMED WILLOW AMBULATORY MEDICATION CHARGE

## 2023-12-06 PROCEDURE — 2500000002 HC RX 250 W HCPCS SELF ADMINISTERED DRUGS (ALT 637 FOR MEDICARE OP, ALT 636 FOR OP/ED): Performed by: STUDENT IN AN ORGANIZED HEALTH CARE EDUCATION/TRAINING PROGRAM

## 2023-12-06 PROCEDURE — 99238 HOSP IP/OBS DSCHRG MGMT 30/<: CPT

## 2023-12-06 PROCEDURE — 2500000001 HC RX 250 WO HCPCS SELF ADMINISTERED DRUGS (ALT 637 FOR MEDICARE OP)

## 2023-12-06 PROCEDURE — 2500000004 HC RX 250 GENERAL PHARMACY W/ HCPCS (ALT 636 FOR OP/ED)

## 2023-12-06 RX ORDER — OXYCODONE HYDROCHLORIDE 5 MG/1
5 TABLET ORAL EVERY 4 HOURS PRN
Qty: 10 TABLET | Refills: 0 | Status: SHIPPED | OUTPATIENT
Start: 2023-12-06 | End: 2023-12-15 | Stop reason: SDUPTHER

## 2023-12-06 RX ORDER — PEN NEEDLE, DIABETIC 30 GX3/16"
1 NEEDLE, DISPOSABLE MISCELLANEOUS DAILY
Qty: 30 EACH | Refills: 2 | Status: SHIPPED | OUTPATIENT
Start: 2023-12-06

## 2023-12-06 RX ORDER — INSULIN LISPRO 100 [IU]/ML
2 INJECTION, SOLUTION INTRAVENOUS; SUBCUTANEOUS
Qty: 1.8 ML | Refills: 1 | Status: SHIPPED | OUTPATIENT
Start: 2023-12-06 | End: 2023-12-06 | Stop reason: HOSPADM

## 2023-12-06 RX ORDER — POLYETHYLENE GLYCOL 3350 17 G/17G
17 POWDER, FOR SOLUTION ORAL 2 TIMES DAILY PRN
Qty: 510 G | Refills: 3 | Status: SHIPPED | OUTPATIENT
Start: 2023-12-06

## 2023-12-06 RX ORDER — INSULIN GLARGINE 100 [IU]/ML
6 INJECTION, SOLUTION SUBCUTANEOUS DAILY
Qty: 15 ML | Refills: 3 | Status: SHIPPED | OUTPATIENT
Start: 2023-12-06 | End: 2024-01-23 | Stop reason: SDUPTHER

## 2023-12-06 RX ORDER — INSULIN GLARGINE 100 [IU]/ML
6 INJECTION, SOLUTION SUBCUTANEOUS DAILY
Qty: 1.8 ML | Refills: 1 | Status: SHIPPED | OUTPATIENT
Start: 2023-12-06 | End: 2023-12-06 | Stop reason: SDUPTHER

## 2023-12-06 RX ORDER — INSULIN GLARGINE 100 [IU]/ML
6 INJECTION, SOLUTION SUBCUTANEOUS EVERY 24 HOURS
Status: DISCONTINUED | OUTPATIENT
Start: 2023-12-06 | End: 2023-12-06 | Stop reason: HOSPADM

## 2023-12-06 RX ORDER — OXYCODONE HYDROCHLORIDE 5 MG/1
5 TABLET ORAL EVERY 6 HOURS PRN
Qty: 5 TABLET | Refills: 0 | Status: SHIPPED | OUTPATIENT
Start: 2023-12-06 | End: 2023-12-06 | Stop reason: HOSPADM

## 2023-12-06 RX ORDER — NIFEDIPINE 30 MG/1
30 TABLET, FILM COATED, EXTENDED RELEASE ORAL DAILY
Qty: 60 TABLET | Refills: 3 | Status: SHIPPED | OUTPATIENT
Start: 2023-12-06

## 2023-12-06 RX ORDER — IBUPROFEN 600 MG/1
600 TABLET ORAL EVERY 6 HOURS
Qty: 60 TABLET | Refills: 3 | Status: SHIPPED | OUTPATIENT
Start: 2023-12-06 | End: 2024-01-23 | Stop reason: SDUPTHER

## 2023-12-06 RX ORDER — BLOOD-GLUCOSE METER
EACH MISCELLANEOUS
Qty: 12 STRIP | Refills: 11 | Status: SHIPPED | OUTPATIENT
Start: 2023-12-06

## 2023-12-06 RX ORDER — FERROUS SULFATE 325(65) MG
65 TABLET ORAL
Qty: 30 TABLET | Refills: 3 | Status: SHIPPED | OUTPATIENT
Start: 2023-12-06

## 2023-12-06 RX ORDER — INSULIN LISPRO 100 [IU]/ML
2 INJECTION, SOLUTION INTRAVENOUS; SUBCUTANEOUS
Qty: 15 ML | Refills: 3 | Status: SHIPPED | OUTPATIENT
Start: 2023-12-06 | End: 2024-01-23 | Stop reason: SDUPTHER

## 2023-12-06 RX ORDER — PEN NEEDLE, DIABETIC 30 GX3/16"
1 NEEDLE, DISPOSABLE MISCELLANEOUS DAILY
Qty: 12 EACH | Refills: 2 | Status: SHIPPED | OUTPATIENT
Start: 2023-12-06 | End: 2024-01-05

## 2023-12-06 RX ADMIN — INSULIN LISPRO 2 UNITS: 100 INJECTION, SOLUTION INTRAVENOUS; SUBCUTANEOUS at 09:35

## 2023-12-06 RX ADMIN — OXYCODONE HYDROCHLORIDE 5 MG: 5 TABLET ORAL at 11:15

## 2023-12-06 RX ADMIN — INSULIN LISPRO 2 UNITS: 100 INJECTION, SOLUTION INTRAVENOUS; SUBCUTANEOUS at 15:16

## 2023-12-06 RX ADMIN — ACETAMINOPHEN 975 MG: 325 TABLET ORAL at 09:28

## 2023-12-06 RX ADMIN — IBUPROFEN 600 MG: 600 TABLET, FILM COATED ORAL at 09:28

## 2023-12-06 RX ADMIN — MAGNESIUM HYDROXIDE 30 ML: 400 SUSPENSION ORAL at 09:28

## 2023-12-06 RX ADMIN — NIFEDIPINE 30 MG: 30 TABLET, FILM COATED, EXTENDED RELEASE ORAL at 09:28

## 2023-12-06 RX ADMIN — DOCUSATE SODIUM 100 MG: 100 CAPSULE, LIQUID FILLED ORAL at 09:28

## 2023-12-06 RX ADMIN — INSULIN GLARGINE 6 UNITS: 100 INJECTION, SOLUTION SUBCUTANEOUS at 14:04

## 2023-12-06 ASSESSMENT — PAIN DESCRIPTION - LOCATION: LOCATION: INCISION

## 2023-12-06 ASSESSMENT — PAIN SCALES - GENERAL
PAINLEVEL_OUTOF10: 3
PAINLEVEL_OUTOF10: 2
PAINLEVEL_OUTOF10: 6
PAINLEVEL_OUTOF10: 2

## 2023-12-06 ASSESSMENT — PAIN DESCRIPTION - DESCRIPTORS: DESCRIPTORS: SORE

## 2023-12-06 NOTE — CARE PLAN
Problem: Pain - Adult  Goal: Verbalizes/displays adequate comfort level or baseline comfort level  2023 134 by Kallie Briggs RN  Outcome: Met  2023 1237 by Kallie Briggs RN  Outcome: Progressing     Problem: Safety - Adult  Goal: Free from fall injury  Outcome: Met     Problem: Chronic Conditions and Co-morbidities  Goal: Patient's chronic conditions and co-morbidity symptoms are monitored and maintained or improved  2023 134 by Kallie Briggs RN  Outcome: Met  2023 1237 by Kallie Briggs RN  Outcome: Progressing     Problem: Diabetes  Goal: Achieve decreasing blood glucose levels by end of shift  Outcome: Met  Goal: Increase stability of blood glucose readings by end of shift  Outcome: Met  Goal: Decrease in ketones present in urine by end of shift  Outcome: Met  Goal: Maintain electrolyte levels within acceptable range throughout shift  Outcome: Met  Goal: Maintain glucose levels >70mg/dl to <250mg/dl throughout shift  Outcome: Met  Goal: No changes in neurological exam by end of shift  Outcome: Met  Goal: Learn about and adhere to nutrition recommendations by end of shift  Outcome: Met  Goal: Vital signs within normal range for age by end of shift  Outcome: Met  Goal: Increase self care and/or family involovement by end of shift  Outcome: Met  Goal: Receive DSME education by end of shift  Outcome: Met     Problem: Postpartum  Goal: Experiences normal postpartum course  Outcome: Met  Goal: Appropriate maternal -  bonding  Outcome: Met  Goal: Establish and maintain infant feeding pattern for adequate nutrition  Outcome: Met  Goal: Incisions, wounds, or drain sites healing without S/S of infection  Outcome: Met  Goal: No s/sx infection  Outcome: Met  Goal: No s/sx of hemorrhage  Outcome: Met  Goal: Minimal s/sx of HDP and BP<160/110  Outcome: Met     Problem:  Recovery Care  Goal: Verbalizes understanding of post-op instructions  Outcome: Met  Goal: Manages  discomfort  Outcome: Met  Goal: Dressing intact until removed with any drainage marked  Outcome: Met  Goal: Patient vital signs are stable  Outcome: Met  Goal: Urine output is 0.5 mL/kg/hr or more  Outcome: Met  Goal: Fundus firm at midline  Outcome: Met   The patient's goals for the shift include glucose control    The clinical goals for the shift include glucose remains >70/<150    Over the shift, the patient did make progress toward the following goals.

## 2023-12-06 NOTE — DISCHARGE SUMMARY
Discharge Summary    Admission Date: 11/27/2023  Discharge Date: 12/6/2023    Discharge Diagnosis  DM (diabetes mellitus) type 1, uncontrolled, with ketoacidosis (CMS/HCC)  Status post pLTCS     Hospital Course  23 y.o G1 now  who is POD#5 s/p pLTCS in s/o failed IOL. Pregnancy complicated by T1DM and multiple hospital admissions for DKA and siPEC w/SF. Operative course was c/b EBL 1300 requiring buccal cytotec, and intrauterine Pitocin. Postpartum course complicated by T1DM with intermittent low BG 50's requring D5LR, self limited PO intake and fluctuating mood and constipation. Psychology was consulted and recommendations and resources were provided. Bps remained normotensive and pt asymptomatic. Patient was stable for discharge home and meeting post-operative milestones on PODd#5. Pt discharged on T1DM regimen: Lantus 6u and Lispro 2u TID. siPEC w/SF regimen: Nifed 30mg daily. Patient to follow up in 2-5 days for BP check and will follow up in 2 weeks for incision check and 4-6 weeks for postpartum check.        Pertinent Physical Exam At Time of Discharge  General: alert and awake  Cards: RRR   Pulm: lungs CTAB   Abdomen: soft, non-tender, has gas pain, fundus below umbilicus, incision well healing   Skin: warm and dry  MSK: no focal deficit  Psych: appropriate mood and affect     Discharge Meds     Your medication list        START taking these medications        Instructions Last Dose Given Next Dose Due   Dermoplast (with menthol) 20-0.5 % topical spray  Generic drug: benzocaine-menthoL      Apply topically 4 times a day as needed for irritation (Discomfort).       docusate sodium 100 mg capsule  Commonly known as: Colace      Take 1 capsule (100 mg) by mouth 2 times a day for 14 days.       ibuprofen 600 mg tablet      Take 1 tablet (600 mg) by mouth every 6 hours for 14 days.       insulin glargine 100 unit/mL injection  Commonly known as: Lantus      Inject 6 Units under the skin once every 24 hours.  "Take as directed per insulin instructions. Do not start before December 6, 2023.       insulin glargine 100 unit/mL (3 mL) pen  Commonly known as: Lantus      Inject 6 Units under the skin once daily. Take as directed per insulin instructions.       NIFEdipine ER 30 mg 24 hr tablet  Commonly known as: Adalat CC      Take 1 tablet (30 mg) by mouth once daily. Do not crush, chew, or split. Do not start before December 6, 2023.       oxyCODONE 5 mg immediate release tablet  Commonly known as: Roxicodone      Take 1 tablet (5 mg) by mouth every 4 hours if needed (Pain score 6-8).              CHANGE how you take these medications        Instructions Last Dose Given Next Dose Due   acetaminophen 325 mg tablet  Commonly known as: Tylenol  What changed: how much to take      Take 3 tablets (975 mg) by mouth every 6 hours if needed for mild pain (1 - 3) for up to 14 days.       insulin lispro 100 unit/mL injection  Commonly known as: HumaLOG  What changed:   how much to take  how to take this  when to take this  additional instructions      Inject 0.02 mL (2 Units) under the skin 3 times a day with meals. Take as directed per insulin instructions.       insulin lispro 100 unit/mL injection  Commonly known as: HumaLOG KwikPen Insulin  What changed: You were already taking a medication with the same name, and this prescription was added. Make sure you understand how and when to take each.      Inject 2 Units under the skin 3 times a day with meals. Take as directed per insulin instructions.       TechLITE Pen Needle 32 gauge x 5/32\" needle  Generic drug: pen needle, diabetic  What changed: Another medication with the same name was added. Make sure you understand how and when to take each.      USE AS DIRECTED WITH INSULIN USE AS DIRECTED TO INJECT INSULIN 4-6 TIMES DAILY       pen needle, diabetic 32 gauge x 5/32\" needle  Commonly known as: Pen Needle  What changed: You were already taking a medication with the same name, and " this prescription was added. Make sure you understand how and when to take each.      1 applicator once daily. Use as needed while taking insulin.              CONTINUE taking these medications        Instructions Last Dose Given Next Dose Due   acyclovir 400 mg tablet  Commonly known as: Zovirax      Take 1 tablet (400 mg) by mouth 2 times a day.       Ventolin HFA 90 mcg/actuation inhaler  Generic drug: albuterol           albuterol 2.5 mg /3 mL (0.083 %) nebulizer solution           Alcohol Prep Pads pads, medicated  Generic drug: alcohol swabs           Dexcom G6 Sensor device  Generic drug: blood-glucose sensor      APPLY NEW DEVICE EVERY 10 DAYS       Dexcom G6 Sensor device  Generic drug: blood-glucose sensor      1 each continuously.       famotidine 20 mg tablet  Commonly known as: Pepcid      Take 1 tablet (20 mg) by mouth 2 times a day.       ferrous sulfate (325 mg ferrous sulfate) tablet  Commonly known as: iron      Take 1 tablet (65 mg of iron) by mouth once daily with a meal.       fluticasone 250 mcg/actuation diskus inhaler  Commonly known as: Flovent Diskus      Inhale 1 puff 2 times a day. Rinse mouth with water after use to reduce aftertaste and incidence of candidiasis. Do not swallow.       glucagon 1 mg injection  Commonly known as: Glucagen           glucose 4 gram chewable tablet           Ketostix strip  Generic drug: acetone (urine) test      DIP URINE TO CHECK FOR KETONES IF NAUSEA/VOMITING OR IF CONCERN FOR DKA OR DEHYDRATION       OneTouch Verio test strips strip  Generic drug: blood sugar diagnostic           Peak Air Peak Flow Meter device  Generic drug: peak flow meter                  STOP taking these medications      aspirin 81 mg chewable tablet        Classic Prenatal 28 mg iron-800 mcg folic acid tablet tablet  Generic drug: prenatal vitamin                  Where to Get Your Medications        These medications were sent to [facility]/pharmacy #[id number] - [facility] [id number]  [address], AT [facility]  [address] [place], [place] [address],, [facility] [address]      Phone: [phone]   acetaminophen 325 mg tablet  Dermoplast (with menthol) 20-0.5 % topical spray  docusate sodium 100 mg capsule  ibuprofen 600 mg tablet  insulin glargine 100 unit/mL (3 mL) pen  insulin glargine 100 unit/mL injection  insulin lispro 100 unit/mL injection  insulin lispro 100 unit/mL injection  NIFEdipine ER 30 mg 24 hr tablet  oxyCODONE 5 mg immediate release tablet  pen needle, diabetic 32 gauge x 5/32\" needle          Complications Requiring Follow-Up  T1DM, will start Lantus 6u and Lispro 2u TID with meals   siPEC w/SF , on nifed 30mg     Test Results Pending At Discharge  Pending Labs       No current pending labs.            Outpatient Follow-Up  Future Appointments   Date Time Provider Department Center   12/18/2023  9:00 AM Allegra Doyle MD PhD [id number] [facility]   1/18/2024  9:30 AM Chito Zafar MD PhD [id number] [facility]   1/26/2024 10:00 AM Hussain Ocasio MD PhD [id number] [facility]       D/w Dr. Haro,    Ana Spann MD  "

## 2023-12-06 NOTE — PROGRESS NOTES
Postpartum Progress Note    Assessment/Plan   Maria Isabel Cutler is a 23 y.o., , who delivered at 34w6d gestation and is now postpartum day 5 from pLTCS on      siPEC w/ SF   - new severe range Bps > 4 hrs apart since arrival  - continue nifed 30mg  - currently normotensive BPs  - asymptomatic  - HELLP labs neg x2  - s/p Mg gtt for 24 h pp     T1DM; DKA resolved   - DKA on admission, now resolved with closed gap, normoglycemia, BHB wnl   - strict Is/Os  - Patient with low BG overnight and elevated (181) this am, not taking in much PO, attempting to take in protein based snacks. Will order breakfast and receive morning Lantus as scheduled   - postpartum regimen: Lantus 8 once a day and lispro 2 TID. Fasting, pre and post prandials, with preprandial sliding scale.     Decreased PO intake   - Patient not eating meals for the last few days so checking POCT q4hrs   - Antiemetics prn   - no BM or flatus passed after multiple bowel agents, will give water enema and reassess    - likely in setting of low mood     Asthma  - albuterol PRN    Post op Care  - pain well controlled   - starting Hgb 10.0, EBL 1300, pod 1 hgb 8.8, no si/sx of anemia   - buccal cytotec/IU pit given intraop  - regular diet    Mood  - patient endorses low mood, declines resources or psych at this time  - will continue to address     Postpartum  - f/up contraception     Dispo  - Anticipate DC today pending BG control and meeting postoperative milestones  - Plan for BP check in 3-5 days, incision check in 2 weeks and PPV in 4-6 weeks with primary OB     To be discussed with MFM Attending, Dr. Haro.   Ana Spann MD, PGY-2  OB/GYN PGY3   Pager [phone]        Principal Problem:    DM (diabetes mellitus) type 1, uncontrolled, with ketoacidosis (CMS/HCC)  Active Problems:    Seizures (CMS/HCC)    ANTEPARTUM PROGRESS NOTE   2023, 7:03 AM     SUBJECTIVE:  No acute events overnight. Continues to not take in much PO and hasn't passed  flatus or had BM. Denies HA, vision changes, CP, SOB, RUQ or epigastric pain, pain well controlled. Mood is stable.     OBJECTIVE:   /72 (BP Location: Left arm, Patient Position: Lying)   Pulse 93   Temp 37 °C (98.6 °F) (Temporal)   Resp 16   Ht 1.524 m (5')   Wt 60.4 kg (133 lb 2.5 oz)   LMP 04/01/2023   SpO2 98%   Breastfeeding Yes   BMI 26.01 kg/m²    Temp  Min: 37 °C (98.6 °F)  Max: 37.4 °C (99.3 °F)  Pulse  Min: 81  Max: 98  BP  Min: 116/66  Max: 137/86    Physical Exam:  General: Examination reveals a well developed, well nourished, female, in no acute distress and affect is quiet, withdrawn. She is alert and cooperative.  HEENT: PERRLA. External ears normal. Nose normal, no erythema or discharge. Mouth and throat clear.  Abdomen: soft, nontender, nondistended, no abnormal masses, no epigastric pain. +bowel sounds   Incision: healing well, well approximated.  Fundus: firm.  Extremities: no redness or tenderness in the calves or thighs, no edema.  Neurological: alert, oriented, normal speech, no focal findings or movement disorder noted.  Psychological: awake and alert; oriented to person, place, and time.     Labs:   Labs in chart were reviewed.      Ana Spann MD   [facility]  Pager [phone]     Principal Problem:    DM (diabetes mellitus) type 1, uncontrolled, with ketoacidosis (CMS/HCC)  Active Problems:    Seizures (CMS/HCC)  '

## 2023-12-07 ENCOUNTER — LACTATION ENCOUNTER (OUTPATIENT)
Age: 23
End: 2023-12-07

## 2023-12-07 NOTE — LACTATION NOTE
This note was copied from a baby's chart.  Lactation Consultant Note  Lactation Consultation       Maternal Information       Maternal Assessment       Infant Assessment       Feeding Assessment       LATCH TOOL       Breast Pump       Other OB Lactation Tools       Patient Follow-up       Other OB Lactation Documentation       Recommendations/Summary  Mom states pumping is excellent. She pumps a few times a day for about 15 minutes and gets about 1.5oz with her efforts.  She will also offer the breast occasionally and feels very confident.  Asked mom how long she plans to provide breastmilk for? Mom unsure. Encouraged mom to pump every 3 hrs or 8x/day to sustain her milk supply for long term use. Explained to mom that if she only pumps a few times a day that her milk supply will dry up quicker. Also explained to mom we will support any choice she makes regarding her pumping schedule. Encouraged mom to contact lactation with any questions or concerns..   Gave mom numbers to outpatient lactation and 24hr breastsfeeding hotline.

## 2023-12-08 ENCOUNTER — LACTATION ENCOUNTER (OUTPATIENT)
Age: 23
End: 2023-12-08

## 2023-12-08 NOTE — LACTATION NOTE
This note was copied from a baby's chart.  Lactation Consultant Note  Lactation Consultation       Maternal Information       Maternal Assessment       Infant Assessment       Feeding Assessment       LATCH TOOL       Breast Pump       Other OB Lactation Tools       Patient Follow-up       Other OB Lactation Documentation       Recommendations/Summary  Met with Mom. She reports she pumps a few times per day and collects ~ 5 ml with her efforts.  Mom encouraged to massage breasts before and during pumping. Instructed in hand expression/ massage techniques. Reviewed discharge information & provided with  Lactation Consultation Services contact  information & [facility] Breastfeeding Hotline phone number.

## 2023-12-08 NOTE — SIGNIFICANT EVENT
12/08/23 1418   Follow Up Phone Call   Do you have questions about your home instructions? No   Did the nurse use a  to talk to you about your discharge instructions? Not applicable   Were you able to fill all of your prescriptions? Yes   Do you have questions about your medication instructions? No   Do you know your follow up appointments? Yes   If you had home services arranged have they begun? Not applicable   If you had equipment ordered for home, did it arrive? Not applicable   Do you know who to call with worsening symptoms? Yes   It is very important to us that we are sensitive to all of your needs and we are responsive to your complaints and concerns. Do you have any additional questions I can assist you with? No     Follow-up Phone Call Note:   Interview:  Care Type: Women's Health   Phone Number Call  [phone]   Call Outcome: Connected with patient   Patient Reports Feeling (symptoms) Are: better   Patient Has a Primary Care Provider:     Yes   Post-hospitalization Follow-up Occurred According To Schedule:    No   Reason: appointment scheduled in the future   Delivered Baby(ies): Yes   Chest Pain:  No   Shortness of breath or difficulty breathing:  No   Seizures:  No   Any thoughts of hurting yourself or your baby:   No   Bleeding that is soaking through one pad/hour or blood clots the size of an egg or bigger:  No   Incision that is not healing:  No   Red or swollen leg that is painful or warm to the touch:  No   Temperature of 100.4F or higher:  No   Headache that does not get better, even after taking medicine, or a bad headache with vision changes:  No   Where or in what is your baby sleeping?: NICU  Comments:  Patient states she has not been taking her blood pressures as instructed, encouraged her to take her BP twice a day and instructed her on when to call the doctor.    Date/Time Of Call: 12/8/23 @ 1418   Call Back Done By: care coordinator   Callback Complete:  Yes

## 2023-12-14 ENCOUNTER — TELEPHONE (OUTPATIENT)
Dept: MATERNAL FETAL MEDICINE | Facility: CLINIC | Age: 23
End: 2023-12-14
Payer: COMMERCIAL

## 2023-12-14 NOTE — TELEPHONE ENCOUNTER
Called patient, identified by name and   Scheduled patient for BP and incision check 12/15  MFM PPV scheduled for      DEMETRA Lawrence RN

## 2023-12-15 ENCOUNTER — PHARMACY VISIT (OUTPATIENT)
Dept: PHARMACY | Facility: CLINIC | Age: 23
End: 2023-12-15
Payer: MEDICAID

## 2023-12-15 ENCOUNTER — CLINICAL SUPPORT (OUTPATIENT)
Dept: OBSTETRICS AND GYNECOLOGY | Facility: CLINIC | Age: 23
End: 2023-12-15
Payer: COMMERCIAL

## 2023-12-15 VITALS — SYSTOLIC BLOOD PRESSURE: 128 MMHG | HEART RATE: 105 BPM | DIASTOLIC BLOOD PRESSURE: 78 MMHG

## 2023-12-15 DIAGNOSIS — L76.82 INCISIONAL PAIN: ICD-10-CM

## 2023-12-15 DIAGNOSIS — Z98.891 STATUS POST CESAREAN DELIVERY: ICD-10-CM

## 2023-12-15 PROCEDURE — RXMED WILLOW AMBULATORY MEDICATION CHARGE

## 2023-12-15 RX ORDER — OXYCODONE HYDROCHLORIDE 5 MG/1
5 TABLET ORAL EVERY 6 HOURS PRN
Qty: 8 TABLET | Refills: 0 | Status: SHIPPED | OUTPATIENT
Start: 2023-12-15

## 2023-12-15 RX ORDER — LIDOCAINE 50 MG/G
1 PATCH TOPICAL DAILY
Qty: 10 PATCH | Refills: 0 | Status: SHIPPED | OUTPATIENT
Start: 2023-12-15

## 2023-12-15 RX ORDER — CYCLOBENZAPRINE HCL 5 MG
5 TABLET ORAL 3 TIMES DAILY PRN
Qty: 10 TABLET | Refills: 0 | Status: SHIPPED | OUTPATIENT
Start: 2023-12-15 | End: 2023-12-25

## 2023-12-15 ASSESSMENT — EDINBURGH POSTNATAL DEPRESSION SCALE (EPDS)
I HAVE BEEN ABLE TO LAUGH AND SEE THE FUNNY SIDE OF THINGS: NOT QUITE SO MUCH NOW
I HAVE LOOKED FORWARD WITH ENJOYMENT TO THINGS: RATHER LESS THAN I USED TO

## 2023-12-15 ASSESSMENT — PAIN SCALES - GENERAL: PAINLEVEL: 3

## 2023-12-15 NOTE — PROGRESS NOTES
Patient here for postpartum BP check and incision check, s/p  C/Section delivery on 12/1/23 at 34.6 weeks GA  History of T1DM, siPEC  Discharged home on Nifedipine  BP today 127/78,    Incisional pain 7 - burning, appears healing, no redness or drainage  Also complaining of upper back pain  Dr Huffman in to evaluate  Flexeril, lidocaine patches and Oxycodone scripts per Dr Huffman   Follow up appointment will be scheduled for next Tuesday for reevaluation.

## 2023-12-18 ENCOUNTER — TELEPHONE (OUTPATIENT)
Dept: OBSTETRICS AND GYNECOLOGY | Facility: HOSPITAL | Age: 23
End: 2023-12-18

## 2023-12-29 ENCOUNTER — PATIENT MESSAGE (OUTPATIENT)
Dept: OBSTETRICS AND GYNECOLOGY | Facility: HOSPITAL | Age: 23
End: 2023-12-29
Payer: COMMERCIAL

## 2024-01-09 ENCOUNTER — TELEPHONE (OUTPATIENT)
Dept: MATERNAL FETAL MEDICINE | Facility: HOSPITAL | Age: 24
End: 2024-01-09
Payer: COMMERCIAL

## 2024-01-09 NOTE — TELEPHONE ENCOUNTER
"Blood Sugar Support Line Communication - coordination of care  Type 1 Diabetes   Maria Isabel Cutler delivered by c/s 24  Is providing breast milk to infant    Maria Isabel Cutler states has missed pp MFM Follow up visits because so busy with new baby  Not using Tandem pump but, would like to.  States new Tandem was not working before delivery -> cause of elevated BG.  Currently taking:  Lantus insulin 10 takes most days in a.m.   Lispro Insulin //2 when she can remember to take     Is doing 1 x day Fingerstick blood glucose monitoring.    Since delivery progressively rising BG, 200's -> 400's -> 522 today. \"Today is the 1st day it was over 500\"  \"It was low right after I delivered\".  Denies any recent Hypoglycemia    Dexcom 6 CGM is  (Dex 6 compatible with pump)    Has urine ketone test strips at home.  Agrees to check, call if Mod/large.  \"I don't feel like I'm in DKA\"  Concern for DKA discussed.    Plan:  Lantus insulin 10 -> 16* Before breakfast   Lispro Insulin /    This nurse will reach out to Endo team to facilitate ongoing care after delivery    Maria Isabel agrees to         1) communicate with Tandem to facilitate resolve pump concerns.  Have available for visit with Endo team implementation & Follow up   2) Obtain Dexcom CGM - needed to pair with pump. As well as support BG management  3) Agrees to call if Mod/large urine ketones.    MFM visit 24 (next week)  States understanding and acceptance    I spent approximately 8-10 minutes on the phone with the patient   discussing the above information.     "

## 2024-01-11 ENCOUNTER — TELEPHONE (OUTPATIENT)
Dept: OBSTETRICS AND GYNECOLOGY | Facility: HOSPITAL | Age: 24
End: 2024-01-11
Payer: COMMERCIAL

## 2024-01-11 NOTE — TELEPHONE ENCOUNTER
Complex/High Risk OB Program    Attempted to call patient, no answer and unable to leave voicemail (mailbox full).  However, sent sms and spoke with sister.   Requested sister pass along message of reason for calling (check-in/appt reminder) and request for call back [phone].     Ro Douglas CNP  Complex/High Risk OB   [phone]

## 2024-01-16 ENCOUNTER — APPOINTMENT (OUTPATIENT)
Dept: MATERNAL FETAL MEDICINE | Facility: CLINIC | Age: 24
End: 2024-01-16
Payer: COMMERCIAL

## 2024-01-16 NOTE — PROGRESS NOTES
MFM Follow-up  1/15/2024         SUBJECTIVE    HPI: Maria Isabel Cutler is a 23 y.o.  at 34w6d here for RPNV. Denies ***contractions, ***bleeding, or ***LOF. Reports ***normal fetal movement. Patient reports ***    OBJECTIVE    Visit Vitals  LMP 2023   OB Status Recent pregnancy   Smoking Status Former        FHT: ***    ASSESSMENT & PLAN    Maria Isabel Cutler is a 23 y.o.  at 34w6d here for the following concerns we addressed today:    Pre-eclampsia superimposed on chronic hypertension, delivered  -Current Regimen: Nifedipine 30mg  -Patient feeling ___ today  -BP ____ Today  -Recommend PCP referral to continue to monitor BP and cHTN.    Pre-existing type 1 diabetes mellitus during pregnancy, postpartum  Current Regimen: Lantus 16u and Lispro 2u TID ().   Regimen changed to   On chart review not using Tandem pump but would like to.Now only doing daily finger sticks with slow rise. Dex 6 CGM .  POCT  today:   Blood sugars reviewed:  Fasting:   Breakfast:   Lunch:   Dinner:     Encounter for postpartum visit  S/P pLTCS on   - Doing well, continue routine postpartum course    Feeding - breast/bottle/pumping  - Continue to encourage    Birth control   - Plans for ____     No orders of the defined types were placed in this encounter.       RTC in *** weeks    {Patient seen and evaluated with  ***}    Praveen Garcia MD

## 2024-01-16 NOTE — ASSESSMENT & PLAN NOTE
S/P pLTCS on 12/1  - Doing well, continue routine postpartum course    Feeding - breast/bottle/pumping  - Continue to encourage    Birth control   - Plans for ____

## 2024-01-16 NOTE — ASSESSMENT & PLAN NOTE
Current Regimen: Lantus 16u and Lispro 2u TID ().   Regimen changed to   On chart review not using Tandem pump but would like to.Now only doing daily finger sticks with slow rise. Dex 6 CGM .  POCT  today:   Blood sugars reviewed:  Fasting:   Breakfast:   Lunch:   Dinner:

## 2024-01-16 NOTE — ASSESSMENT & PLAN NOTE
-Current Regimen: Nifedipine 30mg  -Patient feeling ___ today  -BP ____ Today  -Recommend PCP referral to continue to monitor BP and cHTN.

## 2024-01-23 ENCOUNTER — APPOINTMENT (OUTPATIENT)
Dept: MATERNAL FETAL MEDICINE | Facility: CLINIC | Age: 24
End: 2024-01-23
Payer: COMMERCIAL

## 2024-01-23 ENCOUNTER — TELEPHONE (OUTPATIENT)
Dept: OBSTETRICS AND GYNECOLOGY | Facility: HOSPITAL | Age: 24
End: 2024-01-23
Payer: COMMERCIAL

## 2024-01-23 ENCOUNTER — OFFICE VISIT (OUTPATIENT)
Dept: MATERNAL FETAL MEDICINE | Facility: CLINIC | Age: 24
End: 2024-01-23
Payer: COMMERCIAL

## 2024-01-23 VITALS
HEART RATE: 111 BPM | SYSTOLIC BLOOD PRESSURE: 112 MMHG | DIASTOLIC BLOOD PRESSURE: 73 MMHG | BODY MASS INDEX: 20.58 KG/M2 | WEIGHT: 105.4 LBS

## 2024-01-23 DIAGNOSIS — Z98.891 STATUS POST CESAREAN DELIVERY: ICD-10-CM

## 2024-01-23 DIAGNOSIS — O24.019: ICD-10-CM

## 2024-01-23 LAB — GLUCOSE BLD MANUAL STRIP-MCNC: 192 MG/DL (ref 74–99)

## 2024-01-23 PROCEDURE — 3078F DIAST BP <80 MM HG: CPT | Performed by: OBSTETRICS & GYNECOLOGY

## 2024-01-23 PROCEDURE — 3074F SYST BP LT 130 MM HG: CPT | Performed by: OBSTETRICS & GYNECOLOGY

## 2024-01-23 PROCEDURE — 1036F TOBACCO NON-USER: CPT | Performed by: OBSTETRICS & GYNECOLOGY

## 2024-01-23 PROCEDURE — 82947 ASSAY GLUCOSE BLOOD QUANT: CPT | Performed by: OBSTETRICS & GYNECOLOGY

## 2024-01-23 PROCEDURE — 99215 OFFICE O/P EST HI 40 MIN: CPT | Mod: GC | Performed by: OBSTETRICS & GYNECOLOGY

## 2024-01-23 RX ORDER — ACETAMINOPHEN 500 MG
1000 TABLET ORAL EVERY 6 HOURS PRN
Qty: 30 TABLET | Refills: 1 | Status: SHIPPED | OUTPATIENT
Start: 2024-01-23 | End: 2024-01-23 | Stop reason: SDUPTHER

## 2024-01-23 RX ORDER — INSULIN LISPRO 100 [IU]/ML
INJECTION, SOLUTION INTRAVENOUS; SUBCUTANEOUS
Qty: 15 ML | Refills: 3 | Status: SHIPPED | OUTPATIENT
Start: 2024-01-23 | End: 2024-01-23 | Stop reason: SDUPTHER

## 2024-01-23 RX ORDER — INSULIN GLARGINE 100 [IU]/ML
INJECTION, SOLUTION SUBCUTANEOUS
Qty: 5 EACH | Refills: 3 | Status: SHIPPED | OUTPATIENT
Start: 2024-01-23 | End: 2024-01-23 | Stop reason: SDUPTHER

## 2024-01-23 RX ORDER — IBUPROFEN 600 MG/1
600 TABLET ORAL EVERY 6 HOURS PRN
Qty: 60 TABLET | Refills: 3 | Status: SHIPPED | OUTPATIENT
Start: 2024-01-23 | End: 2024-01-23 | Stop reason: SDUPTHER

## 2024-01-23 RX ORDER — ACETAMINOPHEN 500 MG
1000 TABLET ORAL EVERY 6 HOURS PRN
Qty: 30 TABLET | Refills: 1 | Status: SHIPPED | OUTPATIENT
Start: 2024-01-23

## 2024-01-23 RX ORDER — INSULIN GLARGINE 100 [IU]/ML
INJECTION, SOLUTION SUBCUTANEOUS
Qty: 15 ML | Refills: 3 | Status: SHIPPED | OUTPATIENT
Start: 2024-01-23 | End: 2024-05-24 | Stop reason: SDUPTHER

## 2024-01-23 RX ORDER — INSULIN GLARGINE 100 [IU]/ML
INJECTION, SOLUTION SUBCUTANEOUS
Qty: 15 ML | Refills: 3 | Status: SHIPPED | OUTPATIENT
Start: 2024-01-23 | End: 2024-01-23 | Stop reason: SDUPTHER

## 2024-01-23 RX ORDER — IBUPROFEN 600 MG/1
600 TABLET ORAL EVERY 6 HOURS PRN
Qty: 60 TABLET | Refills: 3 | Status: SHIPPED | OUTPATIENT
Start: 2024-01-23

## 2024-01-23 RX ORDER — INSULIN LISPRO 100 [IU]/ML
INJECTION, SOLUTION INTRAVENOUS; SUBCUTANEOUS
Qty: 15 ML | Refills: 3 | Status: SHIPPED | OUTPATIENT
Start: 2024-01-23 | End: 2024-04-06 | Stop reason: SDUPTHER

## 2024-01-23 ASSESSMENT — EDINBURGH POSTNATAL DEPRESSION SCALE (EPDS)
I HAVE BEEN ANXIOUS OR WORRIED FOR NO GOOD REASON: YES, VERY OFTEN
I HAVE BEEN SO UNHAPPY THAT I HAVE HAD DIFFICULTY SLEEPING: YES, SOMETIMES
I HAVE BEEN SO UNHAPPY THAT I HAVE BEEN CRYING: YES, QUITE OFTEN
I HAVE FELT SAD OR MISERABLE: YES, QUITE OFTEN
I HAVE FELT SCARED OR PANICKY FOR NO GOOD REASON: YES, SOMETIMES
I HAVE BEEN SO UNHAPPY THAT I HAVE HAD DIFFICULTY SLEEPING: YES, SOMETIMES
I HAVE FELT SCARED OR PANICKY FOR NO GOOD REASON: YES, SOMETIMES
THE THOUGHT OF HARMING MYSELF HAS OCCURRED TO ME: NEVER
THINGS HAVE BEEN GETTING ON TOP OF ME: YES, SOMETIMES I HAVEN'T BEEN COPING AS WELL AS USUAL
I HAVE LOOKED FORWARD WITH ENJOYMENT TO THINGS: RATHER LESS THAN I USED TO
TOTAL SCORE: 15
I HAVE BLAMED MYSELF UNNECESSARILY WHEN THINGS WENT WRONG: NO, NEVER
I HAVE BLAMED MYSELF UNNECESSARILY WHEN THINGS WENT WRONG: NO, NEVER
I HAVE BEEN ABLE TO LAUGH AND SEE THE FUNNY SIDE OF THINGS: NOT QUITE SO MUCH NOW
THE THOUGHT OF HARMING MYSELF HAS OCCURRED TO ME: NEVER
I HAVE BEEN SO UNHAPPY THAT I HAVE BEEN CRYING: YES, QUITE OFTEN
I HAVE LOOKED FORWARD WITH ENJOYMENT TO THINGS: RATHER LESS THAN I USED TO
I HAVE BEEN ABLE TO LAUGH AND SEE THE FUNNY SIDE OF THINGS: NOT QUITE SO MUCH NOW
TOTAL SCORE: 15
I HAVE FELT SAD OR MISERABLE: YES, QUITE OFTEN
THINGS HAVE BEEN GETTING ON TOP OF ME: YES, SOMETIMES I HAVEN'T BEEN COPING AS WELL AS USUAL
I HAVE BEEN ANXIOUS OR WORRIED FOR NO GOOD REASON: YES, VERY OFTEN

## 2024-01-23 ASSESSMENT — PAIN SCALES - GENERAL: PAINLEVEL: 0-NO PAIN

## 2024-01-23 NOTE — ASSESSMENT & PLAN NOTE
- Discharged home on Lantus 10u in the AM; Mealtime carb ratio of 1:10   - Has Endo appointment scheduled for 2/5

## 2024-01-23 NOTE — PROGRESS NOTES
"MFM Follow-up  2024         SUBJECTIVE    HPI: Maria Isabel Cutler is a 23 y.o.  here for PPV for management of T1DM. Patient underwent pLTCS for failed IOL in the s/o T1DM and siPEC w/ SF.     Patient and baby are doing well today. Reports intermittent burning pain at incision site and ongoing upper back discomfort. Lochia has resolved. She is currently breast and formula feeding without difficulty. States her mood has been \"OK.\" She has limited support at home but is really happy that the baby is now here. She denies issues with her bowels or bladder. She is not on ppBC at this time. Not currently sexually active.     OBJECTIVE    Visit Vitals  /73   Pulse (!) 111   Wt 47.8 kg (105 lb 6.4 oz)   LMP 2023   Breastfeeding Yes   BMI 20.58 kg/m²   OB Status Recent pregnancy   Smoking Status Former   BSA 1.42 m²      Genitourinary:      Deferred  Cardiovascular:      Rate and Rhythm: Normal rate.   Pulmonary:      Effort: Pulmonary effort is normal.      Breath sounds: Normal breath sounds.   Abdominal:      Palpations: Abdomen is soft.      Comments: Fundus firm below level of U. Incision C/D/I   Neurological:      General: No focal deficit present.      Mental Status: She is alert.   Skin:     General: Skin is warm and dry.   Psychiatric:         Mood and Affect: Mood normal.         Behavior: Behavior normal.           ASSESSMENT & PLAN    Maria Isabel Cutler is a 23 y.o.  at 34w6d here for the following concerns we addressed today:    Encounter for postpartum visit  - Patient doing well today  - Continuing to meet PP milestones  - Mood is \"Ok.\" She is connected with a therapist. Denies additional resources at this time.   - Incision C/D/ I   - Declines ppBC at this time. Options reviewed.   - Tylenol/Motrin Rx'd for back and incisional discomfort. Has Lidocaine patches at home; encouraged use.     Pre-eclampsia superimposed on chronic hypertension, delivered  - Dx'd home on Nifed 30mg  - " Has not been taking it as her BP has been normal at home  - BP today: 112/73  - Can discontinue Nifed. at this time.    Pre-existing type 1 diabetes mellitus during pregnancy, postpartum  - Discharged home on Lantus 10u in the AM; Mealtime carb ratio of 1:10   - Has Endo appointment scheduled for 2/5       Orders Placed This Encounter   Procedures    POCT GLUCOSE     Order Specific Question:   Release result to MyChart     Answer:   Immediate        Pt. To follow up with adult Endo. Appt. Scheduled 2/5.     Patient seen and evaluated with Dr. Blaze Mata MD  PGY-1, Obstetrics & Gynecology   [facility]     I saw and evaluated the patient. I personally obtained the key and critical portions of the history and physical exam or was physically present for key and critical portions performed by the resident/fellow. I reviewed the resident/fellow's documentation and discussed the patient with the resident/fellow. I agree with the resident/fellow's medical decision making as documented in the note.    Doing well PP.  No BG log or CGM data today.  Encouraged to re-start CGM.  Reports was not initially taking insulin consistently but just recently starting to take more regularly.  No longer using pump as she said there is an error with insulin delivery, encouraged to contact tandem and will also reach out to tandem rep.   Does not have pump/supplies with her today. Given lack of daa and what sounds like only recent resumption of consistent MDI use will defer changes today and has follow up with Endo on 2/5.  Discussed importance of having log data for visit.     Normotensive today and so can stop antihypertensive.    Surg path reviewed and no further follow up required.    Reviewed contraceptive options and declines.  Reviewed recommended pregnancy interval as well as preconception visit if desired.    Yunior Thakur MD

## 2024-01-23 NOTE — TELEPHONE ENCOUNTER
Complex/High Risk OB Program    Patient called today requesting transportation to appt today.   [employer] notified, agreed to contact patient and provide transportation assistance.   Will be available for future coordination as needed.    Ro Douglas [facility]  Complex/High Risk OB   [phone]

## 2024-01-23 NOTE — ASSESSMENT & PLAN NOTE
- Dx'd home on Nifed 30mg  - Has not been taking it as her BP has been normal at home  - BP today: 112/73  - Can discontinue Nifed. at this time.

## 2024-01-23 NOTE — ASSESSMENT & PLAN NOTE
"- Patient doing well today  - Continuing to meet PP milestones  - Mood is \"Ok.\" She is connected with a therapist. Denies additional resources at this time.   - Incision C/D/ I   - Declines ppBC at this time. Options reviewed.   - Tylenol/Motrin Rx'd for back and incisional discomfort. Has Lidocaine patches at home; encouraged use.   "

## 2024-02-01 ENCOUNTER — TELEPHONE (OUTPATIENT)
Dept: OBSTETRICS AND GYNECOLOGY | Facility: HOSPITAL | Age: 24
End: 2024-02-01
Payer: COMMERCIAL

## 2024-02-01 NOTE — TELEPHONE ENCOUNTER
Complex/High Risk OB Program    Attempted to call and gently remind about upcoming Endo appt.   No answer, left message with appt date/time.   Encouraged patient to reach out if transportation barriers, will connect to [employer] if needed.   Will continue to be available for future PP coordination if needed.       Ro Douglas CNP  Complex/High Risk OB

## 2024-02-05 ENCOUNTER — APPOINTMENT (OUTPATIENT)
Dept: ENDOCRINOLOGY | Facility: CLINIC | Age: 24
End: 2024-02-05
Payer: COMMERCIAL

## 2024-02-20 ENCOUNTER — HOSPITAL ENCOUNTER (EMERGENCY)
Facility: HOSPITAL | Age: 24
Discharge: HOME | End: 2024-02-20
Payer: COMMERCIAL

## 2024-02-20 ENCOUNTER — APPOINTMENT (OUTPATIENT)
Dept: RADIOLOGY | Facility: HOSPITAL | Age: 24
End: 2024-02-20
Payer: COMMERCIAL

## 2024-02-20 VITALS
SYSTOLIC BLOOD PRESSURE: 112 MMHG | TEMPERATURE: 98.2 F | DIASTOLIC BLOOD PRESSURE: 76 MMHG | BODY MASS INDEX: 26.26 KG/M2 | WEIGHT: 130 LBS | OXYGEN SATURATION: 98 % | RESPIRATION RATE: 15 BRPM | HEART RATE: 96 BPM

## 2024-02-20 DIAGNOSIS — N63.10 MASS OF RIGHT BREAST, UNSPECIFIED QUADRANT: Primary | ICD-10-CM

## 2024-02-20 PROCEDURE — 76604 US EXAM CHEST: CPT

## 2024-02-20 PROCEDURE — 99284 EMERGENCY DEPT VISIT MOD MDM: CPT | Mod: 25

## 2024-02-20 PROCEDURE — 99284 EMERGENCY DEPT VISIT MOD MDM: CPT | Performed by: PHYSICIAN ASSISTANT

## 2024-02-20 ASSESSMENT — PAIN DESCRIPTION - ORIENTATION: ORIENTATION: RIGHT

## 2024-02-20 ASSESSMENT — PAIN DESCRIPTION - LOCATION: LOCATION: BREAST

## 2024-02-20 ASSESSMENT — PAIN - FUNCTIONAL ASSESSMENT: PAIN_FUNCTIONAL_ASSESSMENT: 0-10

## 2024-02-20 ASSESSMENT — COLUMBIA-SUICIDE SEVERITY RATING SCALE - C-SSRS
1. IN THE PAST MONTH, HAVE YOU WISHED YOU WERE DEAD OR WISHED YOU COULD GO TO SLEEP AND NOT WAKE UP?: NO
6. HAVE YOU EVER DONE ANYTHING, STARTED TO DO ANYTHING, OR PREPARED TO DO ANYTHING TO END YOUR LIFE?: NO
2. HAVE YOU ACTUALLY HAD ANY THOUGHTS OF KILLING YOURSELF?: NO

## 2024-02-20 ASSESSMENT — PAIN SCALES - GENERAL: PAINLEVEL_OUTOF10: 7

## 2024-02-20 NOTE — ED PROVIDER NOTES
HPI   Chief Complaint   Patient presents with    Breast Mass       Patient is a 23-year-old female who is previously healthy presents today for evaluation of right breast tenderness and mass, she states she started noticing an area of tenderness about a week ago but has been gradually getting bigger and more painful and now radiates upward.  Patient is not currently breast-feeding, denies any injuries or traumas.  No other associated symptoms.                          [place] Coma Scale Score: 15                     Patient History   Past Medical History:   Diagnosis Date    21 weeks gestation of pregnancy 09/13/2019    21 weeks gestation of pregnancy     No past surgical history on file.  No family history on file.  Social History     Tobacco Use    Smoking status: Not on file    Smokeless tobacco: Not on file   Substance Use Topics    Alcohol use: Not on file    Drug use: Not on file       Physical Exam   ED Triage Vitals [02/20/24 1711]   Temperature Heart Rate Respirations BP   36.8 °C (98.2 °F) (!) 108 15 112/76      Pulse Ox Temp Source Heart Rate Source Patient Position   98 % Temporal -- --      BP Location FiO2 (%)     -- --       Physical Exam  Vitals and nursing note reviewed.   Constitutional:       General: She is not in acute distress.     Appearance: Normal appearance. She is not toxic-appearing.   HENT:      Head: Normocephalic and atraumatic.      Nose: Nose normal.   Eyes:      Extraocular Movements: Extraocular movements intact.   Cardiovascular:      Rate and Rhythm: Normal rate and regular rhythm.   Pulmonary:      Effort: Pulmonary effort is normal.   Chest:          Comments: Approximately 8 x 8 cm round tender and erythematous mass to right lateral breast concerning for breast abscess.  No drainage, no lymphadenopathy, no red streaking.    Abdominal:      Palpations: Abdomen is soft.   Musculoskeletal:         General: Normal range of motion.      Cervical back: Normal range of motion and  neck supple.   Skin:     General: Skin is warm and dry.   Neurological:      General: No focal deficit present.      Mental Status: She is alert.   Psychiatric:         Mood and Affect: Mood normal.         Thought Content: Thought content normal.       US chest   Final Result   1. In the area of concern there are complex well-circumscribed   lesions measuring up to 2.6 cm which is centrally hypoechoic and   peripherally hyperechoic without internal vascularity. No significant   peripheral vascularity is identified however the largest lesion is   concerning for evolving collections/abscess. Additional smaller   lesions are indeterminate and could represent fat necrosis versus   involuting fibroadenoma. Clinical correlation further follow-up and   evaluation with mammogram and mammographic ultrasound is recommended.        I personally reviewed the image(s) / study and I agree with the   findings as stated by Bertha Gambino MD. This study was interpreted at   [facility], [place], [place].        MACRO:   Yenny Key discussed the significance and urgency of this   critical finding by telephone with  RUSSELL JENIFER on 2/20/2024 at   8:31 pm.  (**-RCF-**) Findings:  See findings.        Signed by: Yenny Key 2/20/2024 8:34 PM   Dictation workstation:   [id number]        Labs Reviewed - No data to display      ED Course & MDM   ED Course as of 02/20/24 2231 Tue Feb 20, 2024 2036 Spoke w/ breast and they will call back after discussion with team. [MK]   2042 Pt updated on plan of care and that breast surgery is evaluating ultrasound [MK]   2206 Per Janett Wild with breast; This patient can be discharged with return precautions (if you gets fevers/chills/her skin becomes red and inflamed). She should apply hot packs to the area. And most importantly she needs to be seen in breast clinic. She should call [phone] tomorrow to schedule a follow up appointment.  This was communicated with  patient. [MK]      ED Course User Index  [MK] Janna Alonso PA-C         Diagnoses as of 02/20/24 2231   Mass of right breast, unspecified quadrant       Medical Decision Making    MDM: Patient is a 23-year-old female who presents today for evaluation of her breast mastectomy is concerning for a breast abscess, I did order an ultrasound to better evaluate this.   US showed  IMPRESSION:  1. In the area of concern there are complex well-circumscribed  lesions measuring up to 2.6 cm which is centrally hypoechoic and  peripherally hyperechoic without internal vascularity. No significant  peripheral vascularity is identified however the largest lesion is  concerning for evolving collections/abscess. Additional smaller  lesions are indeterminate and could represent fat necrosis versus  involuting fibroadenoma. Clinical correlation further follow-up and  evaluation with mammogram and mammographic ultrasound is recommended.      I spoke with acute care surgery on for breast, they came and saw the patient, they do not feel that this is an abscess, feel that this is more likely a cystic lesion however they did discuss with patient and I also discussed with patient if she experiences any increasing pain, fevers, chills, warmth, redness to return to ED.  They did arrange for close breast clinic follow-up, information was provided to the patient.  Advised warm compresses.  She is stable for discharge at this time per breast recommendations.        Procedure  Procedures     Janna Alonso PA-C  02/20/24 2232

## 2024-02-21 NOTE — CONSULTS
Reason For Consult  Concern for breast abscess versus mass     History Of Present Illness  Patient is a 23 year female with pmhx of type 1 diabetes who presents with 1 week of lump in left breast and 1 day of breast pain. U/S breast obtained that demonstrated several well circumscribed lesions, with one notable fluid collection roughly 2 cm in size.     She first noted a round area in her breast a week ago. It was not tender or painful. She also had clear breast discharge from her nipple. Over the course of the week, she felt the area had gotten larger. Today, she started to have discomfort with compression of the breast tissue, prompting her to come to the ED. She denies current breast feeding (patient has 4 year old daughter). She is not on an oral contraceptive. She has irregular menstrual cycles at baseline and is unsure of when her next period would be.     Past Medical History  Type 1 diabetes     Surgical History        Social History  Has 4 year old daughter     Family History  No family history of breast cancer      Allergies  No known allergies     Review of Systems  Negative beyond what is noted in above HPI.      Physical Exam  GEN: lying in bed, NAD   HEAD: atraumatic   RESP: breathing comfortably on room air   BREAST: left breast with moderately dense breast tissue, right breast with palpable mass starting at the lateral aspect of the areola and extending a few centimeters laterally and deep to the areola, no fluctuance, no erythema, no pitting of the overlying skin but can appreciate mass with visual inspection of breast alone, very mild tenderness/discomfort on exam. Axilla without palpable abnormality.   CV: well perfused,   ABD: soft, non-distended  : no prajapati   NEURO: no focal deficits appreciated   PSYCH: appropriate      Last Recorded Vitals  Blood pressure 112/76, pulse (!) 108, temperature 36.8 °C (98.2 °F), temperature source Temporal, resp. rate 15, weight 59 kg (130 lb),  SpO2 98 %.    Relevant Results  US breast:   In the area of concern there are complex well-circumscribed  lesions measuring up to 2.6 cm which is centrally hypoechoic and  peripherally hyperechoic without internal vascularity. No significant  peripheral vascularity is identified however the largest lesion is  concerning for evolving collections/abscess. Additional smaller  lesions are indeterminate and could represent fat necrosis versus  involuting fibroadenoma. Clinical correlation further follow-up and  evaluation with mammogram and mammographic ultrasound is recommended.     Assessment/Plan   Patient is a 23 year female with pmhx of type 1 diabetes who presents with 1 week of lump in left breast and 1 day of breast pain. U/S breast obtained that demonstrated several well circumscribed lesions, with one notable fluid collection roughly 2 cm in size. On exam, patient has palpable firmness under left areola and lateral aspect of left breast without fluctuance or overlying erythema. Minimal tenderness on exam.     Recommendations:  - Patient provided with return precautions (including redness or increasing pain in the area, fever or chills, worsening symptoms)  - Patient instructed to wear supportive bra, use warm compresses over the area  - Provided with clinic number and instructed to call tomorrow to arrange a follow up in breast clinic.     Patient seen with Dr. Marin. Discussed with attending Dr. Orr.     Janett Wild MD

## 2024-02-21 NOTE — DISCHARGE INSTRUCTIONS
Please return to the ED or call your PCP if you have fevers, chills, redness of the skin over your breast or worsening of your symptoms.     Please call [phone] tomorrow to schedule a follow up visit in breast clinic.

## 2024-03-08 PROBLEM — N63.10 MASS OF RIGHT BREAST: Status: ACTIVE | Noted: 2024-03-08

## 2024-03-08 NOTE — PROGRESS NOTES
[facility]  Charline Tovar female   2000 24 y.o.   [id number]      Chief Complaint  New patient, right breast mass.     History Of Present Illness  Charline Tovar is a pleasant 24 y.o. AA female presenting to the breast center with a right breast mass. She first noticed the lump about a month ago. Reports the right breast feels tender more often than not, at a 6 out of 10. She had spontaneous yellow discharge once. She has attempted tylenol with minimal relief. She denies trauma to the breast. She has no family history of breast cancer. She denies breast surgery or biopsy.    BREAST IMAGING: 3/11/2024 Right breast ultrasound, indicates BI-RADS Category 4. Suspicious large palpable complex cystic and solid mass in the right breast. Although this may represent a chronic abscess with phlegmonous changes, given lack of clinical symptoms of overt infection and persistent nature, ultrasound-guided biopsy is recommended. Ultrasound-guided aspiration may also be considered at the time of biopsy. Evaluation of the axilla was deferred at this time and may be considered pending the results of biopsy.    REPRODUCTIVE HISTORY: menarche age 18, , first birth age 20,  x 2 weeks, no OCP's, premenopausal, irregular menstrual cycle unsure of LMP                               FAMILY CANCER HISTORY:   None    Review of Systems  Constitutional:  Negative for appetite change, fatigue, fever and unexpected weight change.   HENT:  Negative for ear pain, hearing loss, nosebleeds, sore throat and trouble swallowing.    Eyes:  Negative for discharge, itching and visual disturbance.   Breast: As stated in HPI.  Respiratory:  Negative for cough, chest tightness and shortness of breath.    Cardiovascular:  Negative for chest pain, palpitations and leg swelling.   Gastrointestinal:  Negative for abdominal pain, constipation, diarrhea and nausea.   Endocrine: Negative for cold intolerance and heat intolerance.    Genitourinary:  Negative for dysuria, frequency, hematuria, pelvic pain and vaginal bleeding.   Musculoskeletal:  Negative for arthralgias, back pain, gait problem, joint swelling and myalgias.   Skin:  Negative for color change and rash.   Allergic/Immunologic: Negative for environmental allergies and food allergies.   Neurological:  Negative for dizziness, tremors, speech difficulty, weakness, numbness and headaches.   Hematological:  Does not bruise/bleed easily.   Psychiatric/Behavioral:  Negative for agitation, dysphoric mood and sleep disturbance. The patient is not nervous/anxious.      Past Medical History  She has a past medical history of 21 weeks gestation of pregnancy (09/13/2019).    Surgical History  She has no past surgical history on file.    Family History  Cancer-related family history is not on file.     Social History  She reports that she has never smoked. She does not have any smokeless tobacco history on file. No history on file for alcohol use and drug use.    Allergies  Patient has no known allergies.    Medications  Current Outpatient Medications   Medication Instructions    insulin lispro (HumaLOG) 100 unit/mL injection INJECT UP TO 50 UNITS SUBCUTANEOUSLY DAILY PER SLIDING SCALE    Lantus Solostar U-100 Insulin 100 unit/mL (3 mL) pen Give 20 units daily as directed       Last Recorded Vitals  Vitals:    03/11/24 1351   BP: 105/69   Pulse: 96   Temp: 36.4 °C (97.5 °F)       Physical Exam  Chest:       Patient is alert and oriented x3 and in a relaxed and appropriate mood. Her gait is steady and hand grasps are equal. Sclera is clear. The breasts are nearly symmetrical. The right breast 9:00 subareolar extending to lateral breast 7 x 6.5 cm, firm mass. The left breast tissue is soft without palpable abnormalities, discrete nodules or masses. The skin and nipples appear normal. There is no cervical, supraclavicular or axillary lymphadenopathy. Heart rate and rhythm normal, S1 and S2  appreciated. The lungs are clear to auscultation bilaterally. Abdomen is soft and non-tender.     Relevant Results and Imaging  Study Result    Narrative   Interpreted By:  Jesus Alberto Chong and Avery Ross  STUDY:  BI US BREAST LIMITED RIGHT;  3/11/2024 2:57 pm      ACCESSION NUMBER(S):  [id number]      ORDERING CLINICIAN:  SHARAD ACOSTA      INDICATION:  24-year-old woman presents for a follow-up of a palpable right breast  mass previously evaluated in the emergency department. The patient  reports this has been present for 1 month and denies any fever,  chills, or erythema. No history of antibiotic treatment. No reported  family history of breast cancer.      COMPARISON:  Limited chest ultrasound performed in the emergency department  02/20/2024.      FINDINGS:  A targeted ultrasound in the area of the patient's palpable right  breast mass was performed by a registered sonographer using  elastography.      In the area of the patient's palpable lump, there is a large  irregular complex cystic and solid mass with circumscribed and  indistinct margins measuring at least 10 cm, extending from the right  subareolar breast to the 10 o'clock position 9 cm from the nipple.  There is associated increased internal and peripheral vascularity.  The mass is predominantly soft on elastography. Mobile internal  fluid/debris is questioned in a component of the mass noted in the 8  o'clock, 3 cm from the nipple. The exact measurement of the mass is  difficult due to large size and limited visualization.       Impression   Suspicious large palpable complex cystic and solid mass in the right  breast. Although this may represent a chronic abscess with  phlegmonous changes, given lack of clinical symptoms of overt  infection and persistent nature, ultrasound-guided biopsy is  recommended. Ultrasound-guided aspiration may also be considered at  the time of biopsy. Evaluation of the axilla was deferred at this  time and may be  considered pending the results of biopsy.      The patient is scheduled to see Desirae Acosta CNP immediately after  the exam. Dr. Jesus Alberto Chong discussed the findings and  recommendations in person at the time of the exam with Desirae Acosta CNP. A pre-procedure form was filled out.      Method of Detection: Category Pat - Patient Reported Self-examination  Finding      BI-RADS CATEGORY:      BI-RADS Category:  4 Suspicious.  Recommendation:  Surgical Consultation and Biopsy.  Recommended Date:  Immediate.  Laterality:  Right.      For any future breast imaging appointments, please call [phone] ([phone]).      I personally reviewed the images/study and I agree with the findings  as stated by fellow physician, Dr. David Hobson.          MACRO:  Dr. Jesus Alberto Chong discussed the significance and urgency of this  critical finding in person with  DESIRAE ACOSTA on 3/11/2024 at 3:00  pm. (**-RCF-**) Findings:  See findings.      Signed by: Jesus Alberto Chong 3/11/2024 5:13 PM       I explained the results in depth, along with suggested explanation for follow up recommendations based on the testing results. BI-RADS Category 4    Orders  Orders Placed This Encounter   Procedures    BI US breast limited right     Standing Status:   Future     Number of Occurrences:   1     Standing Expiration Date:   5/11/2025     Order Specific Question:   Reason for exam:     Answer:   right breast mass     Order Specific Question:   Radiologist to Determine Optimal Study     Answer:   Yes     Order Specific Question:   Release result to [facility]     Answer:   Immediate     Order Specific Question:   Is this exam part of a Research Study? If Yes, link this order to the research study     Answer:   No    BI US guided breast localization and biopsy right     Standing Status:   Future     Number of Occurrences:   1     Standing Expiration Date:   5/11/2025     Order Specific Question:   Reason for exam:     Answer:   right breast mass      Order Specific Question:   Radiologist to Determine Optimal Study     Answer:   Yes     Order Specific Question:   Release result to [facility]     Answer:   Immediate     Order Specific Question:   Is this exam part of a Research Study? If Yes, link this order to the research study     Answer:   No       Visit Diagnosis  1. Mass of right breast, unspecified quadrant  BI US breast limited right    BI US guided breast localization and biopsy right          Assessment/Plan  Right breast mass, no breast surgery or biopsy, no family history of breast cancer     Plan:  Right breast ultrasound guided core biopsy.    Patient Discussion/Summary  Proceed to biopsy. A breast radiology physician will perform the biopsy. Results are usually available in about 7 business days. I will call patient with results and instruct on next steps and plan.     You can see your health information, review clinical summaries from office visits & test results online when you follow your health with MY  Chart, a personal health record. To sign up go to [email or web address]. If you need assistance with signing up or trouble getting into your account call [facility] Patient Line 24/7 at [phone].    My office phone number is [phone] if you need to get in touch with me or have additional questions or concerns. Thank you for choosing [facility] and trusting me as your healthcare provider. I look forward to seeing you again at your next office visit. I am honored to be a provider on your health care team and I remain dedicated to helping you achieve your health goals.      Desirae Ruiz, TALON-CNP

## 2024-03-11 ENCOUNTER — HOSPITAL ENCOUNTER (OUTPATIENT)
Dept: RADIOLOGY | Facility: HOSPITAL | Age: 24
Discharge: HOME | End: 2024-03-11
Payer: COMMERCIAL

## 2024-03-11 ENCOUNTER — OFFICE VISIT (OUTPATIENT)
Dept: SURGICAL ONCOLOGY | Facility: HOSPITAL | Age: 24
End: 2024-03-11
Payer: COMMERCIAL

## 2024-03-11 VITALS
WEIGHT: 122.9 LBS | TEMPERATURE: 97.5 F | BODY MASS INDEX: 24.82 KG/M2 | SYSTOLIC BLOOD PRESSURE: 105 MMHG | DIASTOLIC BLOOD PRESSURE: 69 MMHG | HEART RATE: 96 BPM

## 2024-03-11 DIAGNOSIS — N63.10 MASS OF RIGHT BREAST, UNSPECIFIED QUADRANT: ICD-10-CM

## 2024-03-11 DIAGNOSIS — N63.10 MASS OF RIGHT BREAST, UNSPECIFIED QUADRANT: Primary | ICD-10-CM

## 2024-03-11 PROCEDURE — 3074F SYST BP LT 130 MM HG: CPT | Performed by: NURSE PRACTITIONER

## 2024-03-11 PROCEDURE — 99213 OFFICE O/P EST LOW 20 MIN: CPT | Mod: 25 | Performed by: NURSE PRACTITIONER

## 2024-03-11 PROCEDURE — 76642 ULTRASOUND BREAST LIMITED: CPT | Mod: RT

## 2024-03-11 PROCEDURE — 3078F DIAST BP <80 MM HG: CPT | Performed by: NURSE PRACTITIONER

## 2024-03-11 PROCEDURE — 76982 USE 1ST TARGET LESION: CPT | Mod: RT

## 2024-03-11 PROCEDURE — 76642 ULTRASOUND BREAST LIMITED: CPT | Mod: RIGHT SIDE | Performed by: STUDENT IN AN ORGANIZED HEALTH CARE EDUCATION/TRAINING PROGRAM

## 2024-03-11 PROCEDURE — 99203 OFFICE O/P NEW LOW 30 MIN: CPT | Performed by: NURSE PRACTITIONER

## 2024-03-11 ASSESSMENT — PAIN SCALES - GENERAL: PAINLEVEL: 6

## 2024-03-12 ENCOUNTER — TELEPHONE (OUTPATIENT)
Dept: RADIOLOGY | Facility: HOSPITAL | Age: 24
End: 2024-03-12
Payer: COMMERCIAL

## 2024-03-12 NOTE — TELEPHONE ENCOUNTER
ultrasound guided core breast biopsy  possible abscess drain, reviewed information review of allergies tolerance to lidocaine blood thinners ultrasound guided biopsy with clip placement and explanation of post biopsy instructions The patient can see her results in My Chart

## 2024-03-13 ENCOUNTER — HOSPITAL ENCOUNTER (OUTPATIENT)
Dept: RADIOLOGY | Facility: HOSPITAL | Age: 24
Discharge: HOME | End: 2024-03-13
Payer: COMMERCIAL

## 2024-03-13 DIAGNOSIS — N63.10 MASS OF RIGHT BREAST, UNSPECIFIED QUADRANT: ICD-10-CM

## 2024-03-13 DIAGNOSIS — R92.8 OTHER ABNORMAL AND INCONCLUSIVE FINDINGS ON DIAGNOSTIC IMAGING OF BREAST: ICD-10-CM

## 2024-03-13 PROCEDURE — 2500000005 HC RX 250 GENERAL PHARMACY W/O HCPCS: Mod: SE | Performed by: RADIOLOGY

## 2024-03-13 PROCEDURE — 88305 TISSUE EXAM BY PATHOLOGIST: CPT | Performed by: PATHOLOGY

## 2024-03-13 PROCEDURE — 96372 THER/PROPH/DIAG INJ SC/IM: CPT | Performed by: RADIOLOGY

## 2024-03-13 PROCEDURE — 77065 DX MAMMO INCL CAD UNI: CPT | Mod: RIGHT SIDE | Performed by: RADIOLOGY

## 2024-03-13 PROCEDURE — 19083 BX BREAST 1ST LESION US IMAG: CPT | Mod: RIGHT SIDE | Performed by: RADIOLOGY

## 2024-03-13 PROCEDURE — A4648 IMPLANTABLE TISSUE MARKER: HCPCS

## 2024-03-13 PROCEDURE — 19083 BX BREAST 1ST LESION US IMAG: CPT | Mod: RT

## 2024-03-13 PROCEDURE — 88305 TISSUE EXAM BY PATHOLOGIST: CPT | Mod: TC,SUR | Performed by: NURSE PRACTITIONER

## 2024-03-13 PROCEDURE — 2720000007 HC OR 272 NO HCPCS

## 2024-03-13 RX ADMIN — Medication 11 ML: at 10:04

## 2024-03-13 ASSESSMENT — PAIN - FUNCTIONAL ASSESSMENT: PAIN_FUNCTIONAL_ASSESSMENT: 0-10

## 2024-03-13 ASSESSMENT — PAIN SCALES - GENERAL: PAINLEVEL_OUTOF10: 6

## 2024-03-13 NOTE — DISCHARGE INSTRUCTIONS
AFTER THE TEST  A steri-strip and bandage will be placed over the incision. You may shower after 24 hours. Remove bandage after 24 hours. Remove bandage after the shower. Leave the steri-strips in place to fall off on their own. If after 1 week the steri-strips are still on, you may remove them. Avoid swimming or soaking in tub for 3 days.     You may have mild discomfort at the test site. If needed, you may take Tylenol (Acetaminophen) for pain. Please avoid taking NSAIDs, Motrin, Advil, Aleve, or ibuprofen for 24 hours following the biopsy. After 24 hours you may resume NSAIDSs.     If you take aspirin, Plavix, Coumadin, Xarelto or Eliquis please tell us. If these medications were stopped by your provider, please ask them when to resume.     You may have some tenderness, bruising or slight bleeding at the site. Please apply ice packs to the site for 15 minutes on and 15 minutes off for a 2 hour minimum.     Most people can return to their usual routine after the procedure. Avoid Strenuous activity for 24 hours.     Sleep in a bra the night after your biopsy. Continue to do so for comfort.     Call your provider if you have any of the following symptoms :  Fever  Increased pain  Increased bleeding  Redness  Increased swelling  Yellowish drainage  Your provider will get the biopsy results within 5 - 7 days. Call your provider with any questions.     Patient education brochure and pain/comfort measures have been reviewed.   Phone number provided to contact Breast Center if problems arise.     Patient verbalized understanding of home going instructions.

## 2024-03-13 NOTE — Clinical Note
0940, reviewed procedure, allergy, denies pregnancy, nka, okay with lidocaine, denies use of numbing medications, feels safe at home , no history of falls or fear of falling, pain 6/10 not using any medications or comfort measures. Pt. Positioned, scanned site marked and cleansed, lidocaine given by Dr. Ta Barnhart, biopsy completed , no pain, pressure held for 10 min, no bleeding steri strips and DSD applied, no clip films per Dr. Lulu Barnhart, reviewed discharge instructions, given pamphlet  and cards for kellie uRiz and nurse line numbers, observed site, demonstrated, ice placement, dressing remains D&I, no pain, ice started, verbalizing understanding no questions

## 2024-03-18 ENCOUNTER — TELEPHONE (OUTPATIENT)
Dept: SURGICAL ONCOLOGY | Facility: HOSPITAL | Age: 24
End: 2024-03-18
Payer: COMMERCIAL

## 2024-03-18 DIAGNOSIS — N61.1 BREAST ABSCESS: Primary | ICD-10-CM

## 2024-03-18 LAB
LABORATORY COMMENT REPORT: NORMAL
PATH REPORT.FINAL DX SPEC: NORMAL
PATH REPORT.GROSS SPEC: NORMAL
PATH REPORT.RELEVANT HX SPEC: NORMAL
PATH REPORT.TOTAL CANCER: NORMAL

## 2024-03-18 RX ORDER — CLINDAMYCIN HYDROCHLORIDE 300 MG/1
300 CAPSULE ORAL 3 TIMES DAILY
Qty: 30 CAPSULE | Refills: 0 | Status: SHIPPED | OUTPATIENT
Start: 2024-03-18 | End: 2024-03-28

## 2024-03-18 NOTE — TELEPHONE ENCOUNTER
Result Communication      Spoke with Charline Tovar regarding breast biopsy results, benign breast abscess. The area is still red and tender. She denies fever or chills. Clindamycin twice a day for 10 days sent to the pharmacy.     Resulted Orders   Surgical Pathology Exam   Result Value Ref Range    Case Report       Surgical Pathology                                Case: [id number]                                  Authorizing Provider:  DAILY Resendez    Collected:           03/13/2024 1018              Ordering Location:     [facility]       Received:            03/13/2024 1504                                     Center                                                                       Pathologist:           Waleska Ramirez MD                                                       Specimen:    BREAST CORE BIOPSY RIGHT, Right breast 9:00 9cm from nipple                                FINAL DIAGNOSIS       A. Right breast, 9:00, 9 cm from nipple, ultrasound guided core needle biopsy:      -- Marked perivascular and periductal acute and chronic inflammation with granulation tissue consistent with abscess, see note.    Note: Clinical and radiologic correlation is recommended. Please correlate with microbial cultures.    : Dr Cyn Colmenares                By the signature on this report, the individual or group listed as making the Final Interpretation/Diagnosis certifies that they have reviewed this case.       Clinical History       Ultrasound guided core biopsy right breast 9:00 9cm from nipple       Gross Description       A: Received in formalin, labeled with the patient´s name and hospital number, are multiple irregular/cylindrical segments of yellow-white fatty soft tissue aggregating to 1.8 x 1.7 x 0.3 cm.  The specimen is submitted in toto in 2 cassettes.  M[facility]    NOTE:  Ischemia time: Not provided.  This specimen was placed into formalin at: 3/13/24 10:18.         3:39  PM

## 2024-03-28 DIAGNOSIS — E10.65 TYPE 1 DIABETES MELLITUS WITH HYPERGLYCEMIA (MULTI): Primary | ICD-10-CM

## 2024-03-28 RX ORDER — PEN NEEDLE, DIABETIC 32GX 5/32"
NEEDLE, DISPOSABLE MISCELLANEOUS
COMMUNITY
Start: 2024-02-02 | End: 2024-03-28 | Stop reason: SDUPTHER

## 2024-03-29 RX ORDER — PEN NEEDLE, DIABETIC 30 GX3/16"
NEEDLE, DISPOSABLE MISCELLANEOUS
Qty: 200 EACH | Refills: 3 | Status: SHIPPED | OUTPATIENT
Start: 2024-03-29

## 2024-03-29 RX ORDER — INSULIN LISPRO 100 [IU]/ML
INJECTION, SOLUTION INTRAVENOUS; SUBCUTANEOUS
Qty: 15 ML | Refills: 3 | Status: SHIPPED | OUTPATIENT
Start: 2024-03-29

## 2024-03-29 RX ORDER — INSULIN GLARGINE 100 [IU]/ML
INJECTION, SOLUTION SUBCUTANEOUS
Qty: 15 ML | Refills: 3 | Status: SHIPPED | OUTPATIENT
Start: 2024-03-29

## 2024-04-05 ENCOUNTER — CLINICAL SUPPORT (OUTPATIENT)
Dept: EMERGENCY MEDICINE | Facility: HOSPITAL | Age: 24
End: 2024-04-05
Payer: COMMERCIAL

## 2024-04-05 ENCOUNTER — HOSPITAL ENCOUNTER (EMERGENCY)
Facility: HOSPITAL | Age: 24
Discharge: HOME | End: 2024-04-06
Attending: EMERGENCY MEDICINE
Payer: COMMERCIAL

## 2024-04-05 DIAGNOSIS — R73.9 HYPERGLYCEMIA: ICD-10-CM

## 2024-04-05 DIAGNOSIS — R55 NEAR SYNCOPE: Primary | ICD-10-CM

## 2024-04-05 LAB
ANION GAP BLDV CALCULATED.4IONS-SCNC: 19 MMOL/L (ref 10–25)
BASE EXCESS BLDV CALC-SCNC: -3.9 MMOL/L (ref -2–3)
BASOPHILS # BLD AUTO: 0.1 X10*3/UL (ref 0–0.1)
BASOPHILS NFR BLD AUTO: 1.6 %
BODY TEMPERATURE: 37 DEGREES CELSIUS
CA-I BLDV-SCNC: 1.22 MMOL/L (ref 1.1–1.33)
CHLORIDE BLDV-SCNC: 94 MMOL/L (ref 98–107)
EOSINOPHIL # BLD AUTO: 0.13 X10*3/UL (ref 0–0.7)
EOSINOPHIL NFR BLD AUTO: 2 %
ERYTHROCYTE [DISTWIDTH] IN BLOOD BY AUTOMATED COUNT: 13.2 % (ref 11.5–14.5)
GLUCOSE BLD MANUAL STRIP-MCNC: 470 MG/DL (ref 74–99)
GLUCOSE BLDV-MCNC: 528 MG/DL (ref 74–99)
HCO3 BLDV-SCNC: 20.8 MMOL/L (ref 22–26)
HCT VFR BLD AUTO: 35.6 % (ref 36–46)
HCT VFR BLD EST: 38 % (ref 36–46)
HGB BLD-MCNC: 12.5 G/DL (ref 12–16)
HGB BLDV-MCNC: 12.7 G/DL (ref 12–16)
IMM GRANULOCYTES # BLD AUTO: 0.01 X10*3/UL (ref 0–0.7)
IMM GRANULOCYTES NFR BLD AUTO: 0.2 % (ref 0–0.9)
INHALED O2 CONCENTRATION: 21 %
LACTATE BLDV-SCNC: 5.6 MMOL/L (ref 0.4–2)
LYMPHOCYTES # BLD AUTO: 3.25 X10*3/UL (ref 1.2–4.8)
LYMPHOCYTES NFR BLD AUTO: 50.9 %
MCH RBC QN AUTO: 28.7 PG (ref 26–34)
MCHC RBC AUTO-ENTMCNC: 35.1 G/DL (ref 32–36)
MCV RBC AUTO: 82 FL (ref 80–100)
MONOCYTES # BLD AUTO: 0.38 X10*3/UL (ref 0.1–1)
MONOCYTES NFR BLD AUTO: 6 %
NEUTROPHILS # BLD AUTO: 2.51 X10*3/UL (ref 1.2–7.7)
NEUTROPHILS NFR BLD AUTO: 39.3 %
NRBC BLD-RTO: 0 /100 WBCS (ref 0–0)
OXYHGB MFR BLDV: 87.4 % (ref 45–75)
PCO2 BLDV: 36 MM HG (ref 41–51)
PH BLDV: 7.37 PH (ref 7.33–7.43)
PLATELET # BLD AUTO: 406 X10*3/UL (ref 150–450)
PO2 BLDV: 61 MM HG (ref 35–45)
POTASSIUM BLDV-SCNC: 3.7 MMOL/L (ref 3.5–5.3)
RBC # BLD AUTO: 4.35 X10*6/UL (ref 4–5.2)
SAO2 % BLDV: 90 % (ref 45–75)
SODIUM BLDV-SCNC: 130 MMOL/L (ref 136–145)
WBC # BLD AUTO: 6.4 X10*3/UL (ref 4.4–11.3)

## 2024-04-05 PROCEDURE — 99285 EMERGENCY DEPT VISIT HI MDM: CPT | Performed by: EMERGENCY MEDICINE

## 2024-04-05 PROCEDURE — 84443 ASSAY THYROID STIM HORMONE: CPT

## 2024-04-05 PROCEDURE — 84132 ASSAY OF SERUM POTASSIUM: CPT

## 2024-04-05 PROCEDURE — 93005 ELECTROCARDIOGRAM TRACING: CPT

## 2024-04-05 PROCEDURE — 82947 ASSAY GLUCOSE BLOOD QUANT: CPT

## 2024-04-05 PROCEDURE — 85025 COMPLETE CBC W/AUTO DIFF WBC: CPT

## 2024-04-05 PROCEDURE — 2500000004 HC RX 250 GENERAL PHARMACY W/ HCPCS (ALT 636 FOR OP/ED): Mod: SE

## 2024-04-05 PROCEDURE — 99283 EMERGENCY DEPT VISIT LOW MDM: CPT | Mod: 25

## 2024-04-05 PROCEDURE — 82947 ASSAY GLUCOSE BLOOD QUANT: CPT | Mod: 91

## 2024-04-05 PROCEDURE — 96361 HYDRATE IV INFUSION ADD-ON: CPT

## 2024-04-05 PROCEDURE — 96360 HYDRATION IV INFUSION INIT: CPT

## 2024-04-05 PROCEDURE — 36415 COLL VENOUS BLD VENIPUNCTURE: CPT

## 2024-04-05 RX ADMIN — SODIUM CHLORIDE, POTASSIUM CHLORIDE, SODIUM LACTATE AND CALCIUM CHLORIDE 1000 ML: 600; 310; 30; 20 INJECTION, SOLUTION INTRAVENOUS at 23:23

## 2024-04-05 ASSESSMENT — PAIN SCALES - GENERAL
PAINLEVEL_OUTOF10: 0 - NO PAIN
PAINLEVEL_OUTOF10: 0 - NO PAIN

## 2024-04-05 ASSESSMENT — LIFESTYLE VARIABLES
HAVE YOU EVER FELT YOU SHOULD CUT DOWN ON YOUR DRINKING: NO
HAVE PEOPLE ANNOYED YOU BY CRITICIZING YOUR DRINKING: NO
EVER FELT BAD OR GUILTY ABOUT YOUR DRINKING: NO
EVER HAD A DRINK FIRST THING IN THE MORNING TO STEADY YOUR NERVES TO GET RID OF A HANGOVER: NO
TOTAL SCORE: 0

## 2024-04-05 ASSESSMENT — PAIN - FUNCTIONAL ASSESSMENT: PAIN_FUNCTIONAL_ASSESSMENT: 0-10

## 2024-04-06 VITALS
DIASTOLIC BLOOD PRESSURE: 62 MMHG | HEIGHT: 62 IN | TEMPERATURE: 98.4 F | RESPIRATION RATE: 16 BRPM | OXYGEN SATURATION: 98 % | WEIGHT: 105 LBS | SYSTOLIC BLOOD PRESSURE: 95 MMHG | HEART RATE: 82 BPM | BODY MASS INDEX: 19.32 KG/M2

## 2024-04-06 LAB
ALBUMIN SERPL BCP-MCNC: 4.4 G/DL (ref 3.4–5)
ALP SERPL-CCNC: 73 U/L (ref 33–110)
ALT SERPL W P-5'-P-CCNC: 14 U/L (ref 7–45)
ANION GAP BLDV CALCULATED.4IONS-SCNC: 13 MMOL/L (ref 10–25)
ANION GAP SERPL CALC-SCNC: 22 MMOL/L (ref 10–20)
AST SERPL W P-5'-P-CCNC: 13 U/L (ref 9–39)
ATRIAL RATE: 87 BPM
BASE EXCESS BLDV CALC-SCNC: 0.8 MMOL/L (ref -2–3)
BILIRUB SERPL-MCNC: 0.5 MG/DL (ref 0–1.2)
BODY TEMPERATURE: 37 DEGREES CELSIUS
BUN SERPL-MCNC: 12 MG/DL (ref 6–23)
CA-I BLDV-SCNC: 1.26 MMOL/L (ref 1.1–1.33)
CALCIUM SERPL-MCNC: 10.1 MG/DL (ref 8.6–10.6)
CHLORIDE BLDV-SCNC: 97 MMOL/L (ref 98–107)
CHLORIDE SERPL-SCNC: 95 MMOL/L (ref 98–107)
CO2 SERPL-SCNC: 19 MMOL/L (ref 21–32)
CREAT SERPL-MCNC: 0.91 MG/DL (ref 0.5–1.05)
EGFRCR SERPLBLD CKD-EPI 2021: >90 ML/MIN/1.73M*2
GLUCOSE BLD MANUAL STRIP-MCNC: 340 MG/DL (ref 74–99)
GLUCOSE BLD MANUAL STRIP-MCNC: 451 MG/DL (ref 74–99)
GLUCOSE BLDV-MCNC: 516 MG/DL (ref 74–99)
GLUCOSE SERPL-MCNC: 498 MG/DL (ref 74–99)
HCO3 BLDV-SCNC: 26 MMOL/L (ref 22–26)
HCT VFR BLD EST: 35 % (ref 36–46)
HGB BLDV-MCNC: 11.6 G/DL (ref 12–16)
HOLD SPECIMEN: NORMAL
INHALED O2 CONCENTRATION: 21 %
LACTATE BLDV-SCNC: 2.6 MMOL/L (ref 0.4–2)
OXYHGB MFR BLDV: 51.2 % (ref 45–75)
P AXIS: 64 DEGREES
P OFFSET: 185 MS
P ONSET: 141 MS
PCO2 BLDV: 43 MM HG (ref 41–51)
PH BLDV: 7.39 PH (ref 7.33–7.43)
PO2 BLDV: 36 MM HG (ref 35–45)
POTASSIUM BLDV-SCNC: 4.6 MMOL/L (ref 3.5–5.3)
POTASSIUM SERPL-SCNC: 4 MMOL/L (ref 3.5–5.3)
PR INTERVAL: 160 MS
PROT SERPL-MCNC: 7 G/DL (ref 6.4–8.2)
Q ONSET: 221 MS
QRS COUNT: 15 BEATS
QRS DURATION: 76 MS
QT INTERVAL: 356 MS
QTC CALCULATION(BAZETT): 428 MS
QTC FREDERICIA: 402 MS
R AXIS: 24 DEGREES
SAO2 % BLDV: 52 % (ref 45–75)
SODIUM BLDV-SCNC: 131 MMOL/L (ref 136–145)
SODIUM SERPL-SCNC: 132 MMOL/L (ref 136–145)
T AXIS: 31 DEGREES
T OFFSET: 399 MS
TSH SERPL-ACNC: 3.12 MIU/L (ref 0.44–3.98)
VENTRICULAR RATE: 87 BPM

## 2024-04-06 PROCEDURE — 2500000004 HC RX 250 GENERAL PHARMACY W/ HCPCS (ALT 636 FOR OP/ED): Mod: SE

## 2024-04-06 PROCEDURE — 84132 ASSAY OF SERUM POTASSIUM: CPT | Performed by: EMERGENCY MEDICINE

## 2024-04-06 PROCEDURE — 2500000002 HC RX 250 W HCPCS SELF ADMINISTERED DRUGS (ALT 637 FOR MEDICARE OP, ALT 636 FOR OP/ED): Mod: SE

## 2024-04-06 PROCEDURE — 82947 ASSAY GLUCOSE BLOOD QUANT: CPT

## 2024-04-06 PROCEDURE — 36415 COLL VENOUS BLD VENIPUNCTURE: CPT | Performed by: EMERGENCY MEDICINE

## 2024-04-06 RX ORDER — INSULIN LISPRO 100 [IU]/ML
INJECTION, SOLUTION INTRAVENOUS; SUBCUTANEOUS
Qty: 15 ML | Refills: 0 | Status: SHIPPED | OUTPATIENT
Start: 2024-04-06 | End: 2024-05-24 | Stop reason: SDUPTHER

## 2024-04-06 RX ADMIN — INSULIN HUMAN 5 UNITS: 100 INJECTION, SOLUTION PARENTERAL at 01:21

## 2024-04-06 RX ADMIN — SODIUM CHLORIDE, POTASSIUM CHLORIDE, SODIUM LACTATE AND CALCIUM CHLORIDE 500 ML: 600; 310; 30; 20 INJECTION, SOLUTION INTRAVENOUS at 03:05

## 2024-04-06 RX ADMIN — SODIUM CHLORIDE, POTASSIUM CHLORIDE, SODIUM LACTATE AND CALCIUM CHLORIDE 1000 ML: 600; 310; 30; 20 INJECTION, SOLUTION INTRAVENOUS at 01:12

## 2024-04-06 ASSESSMENT — PAIN SCALES - GENERAL
PAINLEVEL_OUTOF10: 0 - NO PAIN
PAINLEVEL_OUTOF10: 0 - NO PAIN

## 2024-04-06 ASSESSMENT — PAIN - FUNCTIONAL ASSESSMENT: PAIN_FUNCTIONAL_ASSESSMENT: 0-10

## 2024-04-06 NOTE — ED PROVIDER NOTES
CC: Dizziness, Fall, Night Sweats, and Hyperglycemia     History provided by: Patient and EMS  Limitations to History: None    HPI:  Patient is a 24-year-old female  with history of type 1 diabetes complicated by previous admissions for DKA, who presents with hyperglycemia and anxiety attack.  She states she checked her sugar and it was in the 500s prior to arrival, she states she took 15 units of her Humalog approximately 1 hour ago because she was eating.  She also states she is coming in because she got up abruptly in the middle of her sleep and felt lightheaded sweaty, anxious and fell to the ground.  She states she does not remember the event but states she did not hit her head..  She states this feels like her prior anxiety attacks.  She states that she is stressed at home, does not take medication for anxiety.  She states she has enough of her insulin at home, does follow with endocrine.  He states this is not like her prior DKA.  She denies any fevers, chills, recent infections, congestion, cough, dysuria.  She denies any chest pain, shortness of breath, history of blood clots.  She states she does have a  at home but denies any SI, HI, auditory visual hallucinations.  She states she feels like she has good support at home.  She denies any abdominal pain, vaginal bleeding, hematuria.    I reviewed discharge summary from 2023 when patient was admitted to [facility] service after LTCS in the setting of failed induction of labor, postpartum course complicated by type 1 diabetes and intermittent hypoglycemia.  I reviewed OB/GYN note from 2024, patient was on mealtime carb ratio 1:10, Lantus 10 units in the morning.    External Records Reviewed:    I reviewed prior ED visits, Care Everywhere, discharge summaries and outpatient records as appropriate.   ???????????????????????????????????????????????????????????????  Triage Vitals:  T 36.9 °C (98.4 °F)  HR 93  /82  RR 16  O2 99 %  None (Room air)    Physical Exam  Vitals and nursing note reviewed.   Constitutional:       General: She is not in acute distress.     Appearance: Normal appearance.   HENT:      Head: Normocephalic and atraumatic.   Eyes:      Conjunctiva/sclera: Conjunctivae normal.   Cardiovascular:      Rate and Rhythm: Normal rate and regular rhythm.      Pulses: Normal pulses.      Heart sounds: Normal heart sounds.   Pulmonary:      Effort: Pulmonary effort is normal. No respiratory distress.      Breath sounds: Normal breath sounds.   Abdominal:      General: Abdomen is flat. There is no distension.      Palpations: Abdomen is soft.      Tenderness: There is no abdominal tenderness.   Musculoskeletal:         General: No swelling or deformity. Normal range of motion.      Cervical back: Normal range of motion and neck supple.      Comments: No midline C-spine tenderness to palpation, head is normocephalic, atraumatic   Skin:     General: Skin is warm and dry.   Neurological:      General: No focal deficit present.      Mental Status: She is alert and oriented to person, place, and time. Mental status is at baseline.   Psychiatric:         Mood and Affect: Mood normal.         Behavior: Behavior normal.        ???????????????????????????????????????????????????????????????  ED Course/Treatment/Medical Decision Making  MDM:  Patient is a 24-year-old female presents with hyperglycemia possible syncopal episode.  Vital signs are stable, patient was in acute respiratory distress, does not appear septic.  Given history of type 1 diabetes differential includes HHS, DKA, hyperglycemia.  Patient mentating appropriately, low suspicion for HHS.  Additionally, given possible syncopal episode differential includes dehydration, vasovagal episode, orthostatic hypotension, arrhythmia.  Less likely pulmonary embolism or acute coronary syndrome. Less likely seizure as patient denies history of seizures, does not appear post-ictal. Patient has  a low risk Wells score and is denying any chest pain, palpitations, shortness of breath.  No signs of trauma based on examination, based off Nexus criteria for CT head imaging unremarkable disoriented at this time.      ED Course:  ED Course as of 04/06/24 0433 Fri Apr 05, 2024   2328 Venous full panel with normal pH, elevated blood glucose 528, elevated lactate 5.6, given normal pH I do not believe patient is in DKA.  Will continue fluid resuscitation in the setting of elevated lactate and repeat VFP. POCT gluc 470, given patient took home humalog approx 1.5 hrs PTA, will monitor blood glucose prior to administering insulin [SA]   2347 EKG reviewed normal sinus rhythm rate 81, SD interval 160 ms, QRS 76 ms, QTc 420 ms, no acute ST segment elevations or depressions, normal axis [SA]   Sat Apr 06, 2024   0117 Repeat workup with improving lactate 2.6, TSH is normal, CBC wnl,  therefore 5u regular insulin given, addtl liter IVF given [SA]   0217 Anion gap likely from elevated lactate, VFP again with normal pH therefore do not believe overt DKA present [SA]   0228 Glucose improved to 340 [SA]   0431 Patient feels comfortable with blood sugar management at home. States she needs refill of short acting insuling which is her humalog which was given [SA]      ED Course User Index  [SA] Cynthia Cohen DO         Diagnoses as of 04/06/24 0433   Near syncope   Hyperglycemia       EKG Interpretation:  See ED Course/Below:    Independent Interpretation of Studies:  I independently interpreted labs/imaging as stated in ED Course or below.    Differential diagnoses considered include but are not limited to: See MDM/Below:    Social Determinants Limiting Care:  None identified    Discussion of Management with Other Providers: See MDM/Below:    Disposition:  Discussed differential and workup results including pertinent labs/imaging and any incidental findings if applicable. Patient will follow-up with the primary  physician in the next 2-3 days. Return if worse. They understand return precautions and discharge instructions. Patient and family/friend/caregiver are in agreement with this plan.       HILARIA Yarbrough, PGY-2    I reviewed the case with the attending ED physician. The attending ED physician agrees with the plan. Patient and/or patient´s representative was counseled regarding labs, imaging, likely diagnosis, and plan. All questions were answered.    Disclaimer: This note was dictated by speech recognition.  Attempt at proofreading was made to minimize errors.  Errors in transcription may be present.  Please call if questions.    Procedures ? [employer] last updated 4/6/2024 4:33 AM        Cynthia Cohen, DO  Resident  04/06/24 0433

## 2024-04-06 NOTE — DISCHARGE INSTRUCTIONS
Follow-up with your primary care doctor as needed.  If you do not have a primary care doctor you may call [phone] to make an appointment.  Return to ED if your symptoms worsen.

## 2024-04-06 NOTE — ED TRIAGE NOTES
Woke with feeling dizzy and sweaty. Pt sts she had an anxiety attack and got out of bed and fell, denied hitting head. Took 15 units of Humalog about hour ago for blood sugar in 500's. A/Ox3

## 2024-04-09 ENCOUNTER — HOSPITAL ENCOUNTER (EMERGENCY)
Facility: HOSPITAL | Age: 24
Discharge: HOME | End: 2024-04-09
Attending: EMERGENCY MEDICINE
Payer: COMMERCIAL

## 2024-04-09 VITALS
WEIGHT: 130 LBS | BODY MASS INDEX: 26.21 KG/M2 | HEIGHT: 59 IN | HEART RATE: 92 BPM | RESPIRATION RATE: 16 BRPM | OXYGEN SATURATION: 99 % | TEMPERATURE: 97.7 F | SYSTOLIC BLOOD PRESSURE: 102 MMHG | DIASTOLIC BLOOD PRESSURE: 66 MMHG

## 2024-04-09 DIAGNOSIS — N61.1 BREAST ABSCESS: ICD-10-CM

## 2024-04-09 DIAGNOSIS — N63.10 MASS OF RIGHT BREAST, UNSPECIFIED QUADRANT: Primary | ICD-10-CM

## 2024-04-09 LAB
BASOPHILS # BLD AUTO: 0.1 X10*3/UL (ref 0–0.1)
BASOPHILS NFR BLD AUTO: 1.2 %
EOSINOPHIL # BLD AUTO: 0.21 X10*3/UL (ref 0–0.7)
EOSINOPHIL NFR BLD AUTO: 2.4 %
ERYTHROCYTE [DISTWIDTH] IN BLOOD BY AUTOMATED COUNT: 11.6 % (ref 11.5–14.5)
GLUCOSE BLD MANUAL STRIP-MCNC: 180 MG/DL (ref 74–99)
GLUCOSE BLD MANUAL STRIP-MCNC: 210 MG/DL (ref 74–99)
HCT VFR BLD AUTO: 39.4 % (ref 36–46)
HGB BLD-MCNC: 12.9 G/DL (ref 12–16)
IMM GRANULOCYTES # BLD AUTO: 0.02 X10*3/UL (ref 0–0.7)
IMM GRANULOCYTES NFR BLD AUTO: 0.2 % (ref 0–0.9)
LYMPHOCYTES # BLD AUTO: 2.9 X10*3/UL (ref 1.2–4.8)
LYMPHOCYTES NFR BLD AUTO: 33.5 %
MCH RBC QN AUTO: 29.7 PG (ref 26–34)
MCHC RBC AUTO-ENTMCNC: 32.7 G/DL (ref 32–36)
MCV RBC AUTO: 91 FL (ref 80–100)
MONOCYTES # BLD AUTO: 0.66 X10*3/UL (ref 0.1–1)
MONOCYTES NFR BLD AUTO: 7.6 %
NEUTROPHILS # BLD AUTO: 4.77 X10*3/UL (ref 1.2–7.7)
NEUTROPHILS NFR BLD AUTO: 55.1 %
NRBC BLD-RTO: 0 /100 WBCS (ref 0–0)
PLATELET # BLD AUTO: 306 X10*3/UL (ref 150–450)
RBC # BLD AUTO: 4.34 X10*6/UL (ref 4–5.2)
WBC # BLD AUTO: 8.7 X10*3/UL (ref 4.4–11.3)

## 2024-04-09 PROCEDURE — 85025 COMPLETE CBC W/AUTO DIFF WBC: CPT | Performed by: STUDENT IN AN ORGANIZED HEALTH CARE EDUCATION/TRAINING PROGRAM

## 2024-04-09 PROCEDURE — 99285 EMERGENCY DEPT VISIT HI MDM: CPT | Performed by: EMERGENCY MEDICINE

## 2024-04-09 PROCEDURE — 10160 PNXR ASPIR ABSC HMTMA BULLA: CPT | Performed by: EMERGENCY MEDICINE

## 2024-04-09 PROCEDURE — 2500000001 HC RX 250 WO HCPCS SELF ADMINISTERED DRUGS (ALT 637 FOR MEDICARE OP): Mod: SE

## 2024-04-09 PROCEDURE — 2500000001 HC RX 250 WO HCPCS SELF ADMINISTERED DRUGS (ALT 637 FOR MEDICARE OP): Mod: SE | Performed by: EMERGENCY MEDICINE

## 2024-04-09 PROCEDURE — 36415 COLL VENOUS BLD VENIPUNCTURE: CPT | Performed by: STUDENT IN AN ORGANIZED HEALTH CARE EDUCATION/TRAINING PROGRAM

## 2024-04-09 PROCEDURE — 87070 CULTURE OTHR SPECIMN AEROBIC: CPT | Performed by: STUDENT IN AN ORGANIZED HEALTH CARE EDUCATION/TRAINING PROGRAM

## 2024-04-09 PROCEDURE — 2500000005 HC RX 250 GENERAL PHARMACY W/O HCPCS: Mod: SE | Performed by: STUDENT IN AN ORGANIZED HEALTH CARE EDUCATION/TRAINING PROGRAM

## 2024-04-09 PROCEDURE — 82947 ASSAY GLUCOSE BLOOD QUANT: CPT

## 2024-04-09 PROCEDURE — 99284 EMERGENCY DEPT VISIT MOD MDM: CPT | Mod: 25

## 2024-04-09 RX ORDER — AMOXICILLIN AND CLAVULANATE POTASSIUM 875; 125 MG/1; MG/1
1 TABLET, FILM COATED ORAL 2 TIMES DAILY
Qty: 14 TABLET | Refills: 0 | Status: SHIPPED | OUTPATIENT
Start: 2024-04-09 | End: 2024-04-16

## 2024-04-09 RX ORDER — LIDOCAINE HYDROCHLORIDE AND EPINEPHRINE 10; 10 MG/ML; UG/ML
20 INJECTION, SOLUTION INFILTRATION; PERINEURAL ONCE
Status: COMPLETED | OUTPATIENT
Start: 2024-04-09 | End: 2024-04-09

## 2024-04-09 RX ORDER — AMOXICILLIN AND CLAVULANATE POTASSIUM 875; 125 MG/1; MG/1
1 TABLET, FILM COATED ORAL ONCE
Status: COMPLETED | OUTPATIENT
Start: 2024-04-09 | End: 2024-04-09

## 2024-04-09 RX ORDER — OXYCODONE AND ACETAMINOPHEN 5; 325 MG/1; MG/1
1 TABLET ORAL ONCE
Status: COMPLETED | OUTPATIENT
Start: 2024-04-09 | End: 2024-04-09

## 2024-04-09 RX ADMIN — LIDOCAINE HYDROCHLORIDE,EPINEPHRINE BITARTRATE 20 ML: 10; .01 INJECTION, SOLUTION INFILTRATION; PERINEURAL at 02:20

## 2024-04-09 RX ADMIN — OXYCODONE HYDROCHLORIDE AND ACETAMINOPHEN 1 TABLET: 5; 325 TABLET ORAL at 01:46

## 2024-04-09 RX ADMIN — AMOXICILLIN AND CLAVULANATE POTASSIUM 1 TABLET: 875; 125 TABLET, FILM COATED ORAL at 02:51

## 2024-04-09 ASSESSMENT — LIFESTYLE VARIABLES
TOTAL SCORE: 0
HAVE PEOPLE ANNOYED YOU BY CRITICIZING YOUR DRINKING: NO
EVER FELT BAD OR GUILTY ABOUT YOUR DRINKING: NO
HAVE YOU EVER FELT YOU SHOULD CUT DOWN ON YOUR DRINKING: NO
EVER HAD A DRINK FIRST THING IN THE MORNING TO STEADY YOUR NERVES TO GET RID OF A HANGOVER: NO

## 2024-04-09 ASSESSMENT — PAIN SCALES - GENERAL: PAINLEVEL_OUTOF10: 5 - MODERATE PAIN

## 2024-04-09 ASSESSMENT — PAIN - FUNCTIONAL ASSESSMENT: PAIN_FUNCTIONAL_ASSESSMENT: 0-10

## 2024-04-09 NOTE — ED TRIAGE NOTES
Pt reports right breast cyst - was given infection medication last month but now it feels like it is reoccurring and getting bigger. Pt is a compliant type 1 diabetic.

## 2024-04-09 NOTE — ED PROVIDER NOTES
HPI   Chief Complaint   Patient presents with    Cyst       HPI     Patient is a 24-year-old female with past medical history significant for type 1 diabetes presenting to the emergency department today with complaints of right breast pain. Patient was seen back in February for similar complaint where she was was evaluated by breast surgery who felt that she likely had a benign breast cysts and would follow outpatient. Outpatient, the patient had biopsy done of cyst which shows simple abscess. She was then prescribed 10 days of clindamycin which she took and was adherent to. She states that while taking the antibiotic, she did not notice any improvement in the process. After finishing the antibiotic, she reports that the right breast seems to have gotten larger and more painful with overlying redness. She denies any trauma, falls, or injuries. She denies history of fevers, chills or myalgias. Denies abscesses or painful masses anywhere else on her body. No headache, dizziness, shortness of breath, nausea, vomiting, abdominal pain, diarrhea, urinary symptoms, numbness, or tingling.                Ann Coma Scale Score: 15                     Patient History   Past Medical History:   Diagnosis Date    21 weeks gestation of pregnancy 09/13/2019    21 weeks gestation of pregnancy    Diabetes (CMS/[facility])      History reviewed. No pertinent surgical history.  Family History   Problem Relation Name Age of Onset    No Known Problems Mother      No Known Problems Father       Social History     Tobacco Use    Smoking status: Never    Smokeless tobacco: Never   Vaping Use    Vaping Use: Never used   Substance Use Topics    Alcohol use: Never    Drug use: Never       Physical Exam   ED Triage Vitals [04/09/24 0018]   Temperature Heart Rate Respirations BP   36.5 °C (97.7 °F) (!) 108 18 128/83      Pulse Ox Temp Source Heart Rate Source Patient Position   97 % Temporal -- --      BP Location FiO2 (%)     -- --       Physical  Exam  Constitutional:       Appearance: Normal appearance.   HENT:      Head: Normocephalic and atraumatic.   Eyes:      Extraocular Movements: Extraocular movements intact.      Pupils: Pupils are equal, round, and reactive to light.   Cardiovascular:      Rate and Rhythm: Normal rate and regular rhythm.   Pulmonary:      Effort: Pulmonary effort is normal.      Breath sounds: Normal breath sounds.   Abdominal:      General: Abdomen is flat.      Palpations: Abdomen is soft.   Musculoskeletal:         General: No swelling or signs of injury. Normal range of motion.   Skin:     Comments: On evaluation of the right breast, there is an approximately 5 cm area of fluctuance and induration with overlying erythema on the lateral aspect of the breast. Tender to palpation, no spontaneous drainage.   Neurological:      General: No focal deficit present.      Mental Status: She is alert and oriented to person, place, and time.         ED Course & MDM        Medical Decision Making  Patient is a 24-year-old female with past medical history significant for type 1 diabetes presenting to the emergency department with for a painful right breast. On chart review of previous notes, it appears that mass was biopsied and resulted as simple abscess. In the Emergency Department, hospital records were reviewed. The patient is afebrile with stable vital signs. Patient is well appearing, seen resting comfortably. She was given a Percocet to treat her pain in the ED. Given that the patient's breast abscess did not improve with 10-day course of antibiotics, acute care surgery team was consulted for consideration of incision and drainage of her breast abscess. Point-of-care ultrasound was performed at bedside showing an approximate 5 cm partially anechoic mass measuring approximately 5 cm. Color Doppler was applied showing no flow. Acute Care Surgery team evaluated the patient and aspirated approximately 20 mL of purulent bloody material  that was sent for culture. CBC was also obtained showing no evidence of leukocytosis or anemia. Point-of-care glucose today was 210. Per Acute Care Surgery, they felt the patient could be discharged home and recommended a course of augmentin for homegoing. The patient remains stable. Following patient's aspiration of abscess with improvement of her symptoms afterwards, the patient will be discharged home with prescription for course of Augmentin 875 mg twice daily. She was given the first dose here in the emergency department prior to discharge. Patient received follow-up referral to breast surgery clinic. As a result of the work-up, patient was discharged home.  They were informed of their diagnosis and instructed to come back with any concerns or worsening of condition and was agreeable to the plan as discussed above.  The patient was given the opportunity to ask questions.  All of the patient's questions were answered.  The patient remained stable under my care.      Procedure  Procedures     Jose Carlos Peralta MD  Resident  04/09/24 0250         Hawa Suarez MD  04/09/24 0258

## 2024-04-09 NOTE — CONSULTS
"Reason For Consult  R breast abscess    History Of Present Illness  Charline Tovar is a 24 y.o. female with hx of T1DM presenting with right breast abscess for past 2 months.  seen in ED for similar complaint of right breast pain with ultrasound showing large irregular complex cystic and solid mass (BIRADS 4).  seen in breast surgery clinic, core breast biopsy showing abscess inflammation tissue. Pt sent out with 10 days of clindamycin, now returning with persistent right breast fluctuance, pain and erythema.     VSS, afebrile. No leukocytosis. Right breast at 9:00 lateral to areola with fluctuance, pain, erythema, and pain on palpation. No fever/chills/nausea/vomiting/SOB.      Past Medical History  She has a past medical history of 21 weeks gestation of pregnancy (2019) and Diabetes (CMS/[facility]).    Surgical History        Social History  She reports that she has never smoked. She has never used smokeless tobacco. She reports that she does not drink alcohol and does not use drugs.    Family History  Family History   Problem Relation Name Age of Onset    No Known Problems Mother      No Known Problems Father          Allergies  Patient has no known allergies.    Review of Systems  Negative except as above      Physical Exam  GEN: lying in bed, NAD   HEAD: atraumatic   RESP: breathing comfortably on room air   BREAST: left breast with moderately dense breast tissue, right breast with palpable mass lateral to areola with approximately 5cm length of palpable fluctuance, erythema, tenderness/discomfort on exam, no palpable abnormalities of axilla  CV: well perfused  ABD: soft, non-distended  : no prajapati   NEURO: no focal deficits appreciated   PSYCH: appropriate         Last Recorded Vitals  Blood pressure 128/83, pulse (!) 108, temperature 36.5 °C (97.7 °F), temperature source Temporal, resp. rate 18, height 1.499 m (4' 11\"), weight 59 kg (130 lb), SpO2 97 %.    Relevant " Results    Assessment/Plan   Charline Tovar is a 24 y.o. female with hx of T1DM presenting with right breast abscess for past 2 months. 02/20 seen in ED for similar complaint of right breast pain with ultrasound showing large irregular complex cystic and solid mass (BIRADS 4). 03/11 seen in breast surgery clinic, core breast biopsy showing abscess inflammation tissue. Pt sent out with 10 days of clindamycin, now returning with persistent right breast fluctuance, pain and erythema.     -needle aspiration at bedside 4/8 with 20cc bloody fluid drained, cultures sent   -discharge with augmentin and follow up in breast clinic within 1 week     Discussed with Dr. Lily Baez MD      Discussed w resident. Right breast abscess. Aspiration advised. Dc home on abx.  FU as outpatient.

## 2024-04-11 LAB
BACTERIA SPEC CULT: NORMAL
GRAM STN SPEC: NORMAL
GRAM STN SPEC: NORMAL

## 2024-04-25 ENCOUNTER — HOSPITAL ENCOUNTER (EMERGENCY)
Facility: HOSPITAL | Age: 24
Discharge: HOME | End: 2024-04-25
Attending: EMERGENCY MEDICINE
Payer: COMMERCIAL

## 2024-04-25 VITALS
OXYGEN SATURATION: 97 % | HEART RATE: 98 BPM | WEIGHT: 130 LBS | SYSTOLIC BLOOD PRESSURE: 116 MMHG | RESPIRATION RATE: 16 BRPM | DIASTOLIC BLOOD PRESSURE: 79 MMHG | BODY MASS INDEX: 26.26 KG/M2 | TEMPERATURE: 99 F

## 2024-04-25 DIAGNOSIS — S21.001A WOUND OF RIGHT BREAST, INITIAL ENCOUNTER: Primary | ICD-10-CM

## 2024-04-25 PROCEDURE — 99281 EMR DPT VST MAYX REQ PHY/QHP: CPT

## 2024-04-25 PROCEDURE — 99283 EMERGENCY DEPT VISIT LOW MDM: CPT | Performed by: EMERGENCY MEDICINE

## 2024-04-25 ASSESSMENT — COLUMBIA-SUICIDE SEVERITY RATING SCALE - C-SSRS
1. IN THE PAST MONTH, HAVE YOU WISHED YOU WERE DEAD OR WISHED YOU COULD GO TO SLEEP AND NOT WAKE UP?: NO
2. HAVE YOU ACTUALLY HAD ANY THOUGHTS OF KILLING YOURSELF?: NO
6. HAVE YOU EVER DONE ANYTHING, STARTED TO DO ANYTHING, OR PREPARED TO DO ANYTHING TO END YOUR LIFE?: NO

## 2024-04-25 NOTE — ED PROVIDER NOTES
Limitations to History: None     HPI:      Charline Tovar is a 24 y.o. who presents to the ED with right breast wound check.  She had abscess drained few days ago and was placed on amoxicillin.  Today and yesterday the wound opened up a little bit further when she was concerned about the gaping hole.  Both wounds are actively draining and her breast is much smaller than previous and a lot less painful.  She denies fevers and she is still on her amoxicillin.    ------------------------------------------------------------------------------------------------------------------------------------------    VS: /79   Pulse 98   Temp 37.2 °C (99 °F) (Temporal)   Resp 16   Wt 59 kg (130 lb)   SpO2 97%   BMI 26.26 kg/m²     Physical Exam:  Gen: Alert, NAD  Head/Neck: NCAT, neck w/ FROM  Eyes: EOMI, PERRL, anicteric sclerae, noninjected conjunctivae  Mouth:  MMM, no OP lesions noted  Heart: RRR no MRG  Breast: Mass palpable in the R breast at the 9 o'clock position that is not fluctuant, skin changes near the areola with approximately a 2cm round defect in the skin. No active drainage or surrounding erythema        ------------------------------------------------------------------------------------------------------------------------------------------    Medical Decision Making: Patient presents emergency room for wound check of her right breast.  She does have some skin breakdown but it does not look to be infected or necrotizing.  I suspect it is secondary to the skin tension that her breast was under before it started draining.  She has appropriate follow-up, is taking her antibiotics and is afebrile.    Diagnoses as of 04/25/24 1958   Wound of right breast, initial encounter       Medications - No data to display    Chronic Medical Conditions Significantly Affecting Care: Type 1 diabetes    External Records Reviewed: I reviewed recent and relevant outside records including: Recent consult note by breast  surgery.       Shan Arellano MD  04/25/24 2022       Shan Arellano MD  04/25/24 2029

## 2024-04-25 NOTE — DISCHARGE INSTRUCTIONS
Please follow up with the breast surgeon as previously scheduled. Return if you develop worsening pain or smell from the wound.

## 2024-04-25 NOTE — ED TRIAGE NOTES
"Pt presents to the ED c/o a cyst on her L breast that she had drained tow weeks ago. Pt states that recently she has noticed that it has turned into a \"hole\".   "
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
## 2024-05-20 ENCOUNTER — OFFICE VISIT (OUTPATIENT)
Dept: SURGICAL ONCOLOGY | Facility: HOSPITAL | Age: 24
End: 2024-05-20
Payer: COMMERCIAL

## 2024-05-20 VITALS
DIASTOLIC BLOOD PRESSURE: 68 MMHG | WEIGHT: 119.27 LBS | BODY MASS INDEX: 24.04 KG/M2 | RESPIRATION RATE: 16 BRPM | HEART RATE: 111 BPM | SYSTOLIC BLOOD PRESSURE: 108 MMHG | HEIGHT: 59 IN | TEMPERATURE: 97.5 F

## 2024-05-20 DIAGNOSIS — S21.001S BREAST WOUND, RIGHT, SEQUELA: Primary | ICD-10-CM

## 2024-05-20 PROCEDURE — 99213 OFFICE O/P EST LOW 20 MIN: CPT | Performed by: NURSE PRACTITIONER

## 2024-05-20 PROCEDURE — 3078F DIAST BP <80 MM HG: CPT | Performed by: NURSE PRACTITIONER

## 2024-05-20 PROCEDURE — 3074F SYST BP LT 130 MM HG: CPT | Performed by: NURSE PRACTITIONER

## 2024-05-20 RX ORDER — AMOXICILLIN AND CLAVULANATE POTASSIUM 875; 125 MG/1; MG/1
875 TABLET, FILM COATED ORAL 2 TIMES DAILY
Qty: 20 TABLET | Refills: 0 | Status: SHIPPED | OUTPATIENT
Start: 2024-05-20 | End: 2024-05-30

## 2024-05-20 ASSESSMENT — PAIN SCALES - GENERAL: PAINLEVEL: 0-NO PAIN

## 2024-05-24 ENCOUNTER — OFFICE VISIT (OUTPATIENT)
Dept: ENDOCRINOLOGY | Facility: CLINIC | Age: 24
End: 2024-05-24
Payer: COMMERCIAL

## 2024-05-24 VITALS
WEIGHT: 113 LBS | HEIGHT: 60 IN | BODY MASS INDEX: 22.19 KG/M2 | SYSTOLIC BLOOD PRESSURE: 124 MMHG | HEART RATE: 111 BPM | DIASTOLIC BLOOD PRESSURE: 75 MMHG

## 2024-05-24 DIAGNOSIS — E10.65 TYPE 1 DIABETES MELLITUS WITH HYPERGLYCEMIA (MULTI): ICD-10-CM

## 2024-05-24 PROCEDURE — 3074F SYST BP LT 130 MM HG: CPT | Performed by: STUDENT IN AN ORGANIZED HEALTH CARE EDUCATION/TRAINING PROGRAM

## 2024-05-24 PROCEDURE — 3078F DIAST BP <80 MM HG: CPT | Performed by: STUDENT IN AN ORGANIZED HEALTH CARE EDUCATION/TRAINING PROGRAM

## 2024-05-24 PROCEDURE — 99204 OFFICE O/P NEW MOD 45 MIN: CPT | Performed by: STUDENT IN AN ORGANIZED HEALTH CARE EDUCATION/TRAINING PROGRAM

## 2024-05-24 PROCEDURE — 1036F TOBACCO NON-USER: CPT | Performed by: STUDENT IN AN ORGANIZED HEALTH CARE EDUCATION/TRAINING PROGRAM

## 2024-05-24 RX ORDER — INSULIN LISPRO 100 [IU]/ML
INJECTION, SOLUTION INTRAVENOUS; SUBCUTANEOUS
Qty: 15 ML | Refills: 11 | Status: SHIPPED | OUTPATIENT
Start: 2024-05-24

## 2024-05-24 RX ORDER — BLOOD-GLUCOSE SENSOR
EACH MISCELLANEOUS
Qty: 3 EACH | Refills: 11 | Status: SHIPPED | OUTPATIENT
Start: 2024-05-24

## 2024-05-24 RX ORDER — INSULIN GLARGINE 100 [IU]/ML
INJECTION, SOLUTION SUBCUTANEOUS
Qty: 15 ML | Refills: 11 | Status: SHIPPED | OUTPATIENT
Start: 2024-05-24

## 2024-05-24 ASSESSMENT — PAIN SCALES - GENERAL: PAINLEVEL: 0-NO PAIN

## 2024-05-24 NOTE — PROGRESS NOTES
Endocrinology  2024    History of Present Illness   Maria Isabel Cutler is a 24 y.o. year old female with medical history of T1D, asthma, anxiety, here for diabetes.     Here today with 5 month old son.   Delivered . Was followed by M through pregnancy for her diabetes.   This is her first baby.   She used tandem pump during pregnancy.   A week before delivery she had pump malfunction, went into DKA. Delivered via  that admission.   Did not resume pump therapy after delivery.   Spoke with [employer] rep but was unable to make appointment in February due to transportation issues.   She has a car now. No transportation earlier this year.      She is breastfeeding and using formula.     Current regimen:   - Humalog - not currently carb counting - dosing based off BG, not using a set regimen. Taking it with and without meals.   - Discharged on ICR 1:10   - Lantus 14 units     SMBG: Meter   Avg , 1.6 readings per day, data from 2024.      Hypoglycemia: Denies    Hypoglycemia awareness: Yes - dizzy, lightheaded, hot - 70 and lower    Weight changes: Gained 10 lbs. Since delivery    Denies any increased thirst, urination. Has blurry vision.     Diet:   Eating 6 meals a day     DM history:   Year/age at diagnosis: Age 5   Prior medications: Insulin   - On a pump during childhood  Previous hospitalizations for hyperglycemia: DKA admission during pregnancy. DKA  multiple times   Complications: PDR - last visit 10/2023. Missed follow up appointment   Last eye exam: 10/2023 - DUE NOW   Last foot exam: Due now     Review of Systems   General: Denies fever or chills   Head: Denies headache or vision changes   Neck: Denies tenderness or lumps   Cardiac: Denies chest pain or palpitations   Respiratory: Denies shortness of breath or cough   GI: Denies abdominal pain, nausea, or vomiting   Extremities: Denies swelling   Skin: Denies rash     Medications     Current Outpatient Medications   Medication  "Instructions    acetaminophen (TYLENOL EXTRA STRENGTH) 1,000 mg, oral, Every 6 hours PRN    acetone, urine, test strip DRIP URINE TO CHECK FOR KETONES IF NAUSEA/VOMITING OR IF CONCERN FOR DKA OR DEHYDRATION    albuterol 2.5 mg, inhalation, Every 6 hours PRN    Alcohol Prep Pads pads, medicated     blood-glucose sensor (Dexcom G6 Sensor) device 1 each, miscellaneous, Continuous    blood-glucose sensor device APPLY NEW DEVICE EVERY 10 DAYS    ClearLax 17 g, oral, 2 times daily PRN    ferrous sulfate, 325 mg ferrous sulfate, (iron) tablet 1 tablet, oral, Daily with breakfast    fluticasone (Flovent Diskus) 250 mcg/actuation diskus inhaler 1 puff, inhalation, 2 times daily RT, Rinse mouth with water after use to reduce aftertaste and incidence of candidiasis. Do not swallow.    glucose 4 gram chewable tablet oral, USE AS DIRECTED TO TREAT HYPOGLYCEMIA    Gvoke HypoPen 1-Pack 1 mg, subcutaneous, Once as needed    ibuprofen 600 mg, oral, Every 6 hours PRN    insulin glargine (Lantus Solostar U-100 Insulin) 100 unit/mL (3 mL) pen Inject 10u in the morning.    insulin lispro (HumaLOG KwikPen Insulin) 100 unit/mL injection Inject 1u for every 10 carbs with meals. May take up to 50u per day.    lidocaine (Lidoderm) 5 % patch 1 patch, transdermal, Daily, Remove & discard patch within 12 hours or as directed by MD.    NIFEdipine ER (ADALAT CC) 30 mg, oral, Daily, Do not crush, chew, or split.    OneTouch Verio test strips strip TEST 6-7 TIMES DAILY    oxyCODONE (ROXICODONE) 5 mg, oral, Every 6 hours PRN    Peak Air Peak Flow Meter device     pen needle, diabetic (Pen Needle) 32 gauge x 5/32\" needle 1 applicator, miscellaneous, Daily, Use as needed while taking insulin.    pen needle, diabetic 32 gauge x 5/32\" needle USE AS DIRECTED WITH INSULIN USE AS DIRECTED TO INJECT INSULIN 4-6 TIMES DAILY    Ventolin HFA 90 mcg/actuation inhaler 1-2 puffs, inhalation, Every 4 hours PRN        History     Past Medical History:   Diagnosis " Date    Asthma ([facility]HS-HCC)     Chlamydia     Chronic hypertension     CSF oligoclonal bands     Depression     Herpes     Type 1 diabetes mellitus with hyperglycemia, with long-term current use of insulin (Multi)     Urinary tract infection        No past surgical history on file.    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   - Lives with older sister      Physical Exam   /75   Pulse (!) 111   Ht 1.524 m (5')   Wt 51.3 kg (113 lb)   BMI 22.07 kg/m²   General: Well appearing, no acute distress  Heart: Normal rate  Neck: No visible goiter   Lungs: Breathing comfortably on room air   Skin: No rashes    Labs and Imaging     Lab Results   Component Value Date    HGBA1C 7.0 (A) 08/08/2023    HGBA1C 12.8 (A) 05/12/2023    HGBA1C 13.2 (H) 12/08/2022     (L) 04/05/2024    K 4.0 04/05/2024    CL 95 (L) 04/05/2024    CO2 19 (L) 04/05/2024    BUN 12 04/05/2024    CREATININE 0.91 04/05/2024    CALCIUM 10.1 04/05/2024    ALBUMIN 4.4 04/05/2024    PROT 7.0 04/05/2024    BILITOT 0.5 04/05/2024    ALKPHOS 73 04/05/2024    ALT 14 04/05/2024    AST 13 04/05/2024    GLUCOSE 498 (HH) 04/05/2024    CHOL 199 01/20/2022    TRIG 56 03/20/2018    HDL 65.3 01/20/2022    BHYDRXBUT 0.62 (H) 12/03/2023       Assessment and Plan   Maria Isabel Cutler is a 24 y.o. year old female with medical history of T1D, asthma, anxiety, here for diabetes. BG control is not at goal. Not using insulin consistently.     # T1D:   - Continue lantus 14 units   - Start carb counting and using ICR 1:10 - discussed this may be aggressive compared to her weight based dose, but she feels it works well   - Dexcom G7 Rx and sample given   - DM education referral for pump issue help - she would like to resume pump therapy  - Follow up with  ophthalmology - PDR requiring treatment     RTC: 3 months      Charlee Gonzales DO   Endocrinology

## 2024-05-24 NOTE — Clinical Note
Hello could we please schedule this patient an appointment with DM education. Must be in person. Thank you.

## 2024-05-24 NOTE — PATIENT INSTRUCTIONS
- Continue lantus 14 units daily   - Please start carb counting again and use 1 unit of insulin for every 10 grams of carbs   - Follow up with diabetes education to help with getting back on your tandem pump. Bring pump to appointment.   - Dexcom G7 sensors sent to pharmacy as well

## 2024-05-28 ENCOUNTER — TELEPHONE (OUTPATIENT)
Dept: ENDOCRINOLOGY | Facility: CLINIC | Age: 24
End: 2024-05-28
Payer: COMMERCIAL

## 2024-05-28 DIAGNOSIS — E10.10 TYPE 1 DIABETES MELLITUS WITH KETOACIDOSIS WITHOUT COMA (MULTI): ICD-10-CM

## 2024-05-28 NOTE — TELEPHONE ENCOUNTER
Paperwork received from [facility] for CGM but patient does not have recent A1C , pending order to provider. Patient aware Margaret Jackson LPN

## 2024-06-17 ENCOUNTER — APPOINTMENT (OUTPATIENT)
Dept: ENDOCRINOLOGY | Facility: CLINIC | Age: 24
End: 2024-06-17
Payer: COMMERCIAL

## 2024-07-06 ENCOUNTER — HOSPITAL ENCOUNTER (EMERGENCY)
Facility: HOSPITAL | Age: 24
Discharge: HOME | End: 2024-07-06
Attending: EMERGENCY MEDICINE
Payer: COMMERCIAL

## 2024-07-06 VITALS
WEIGHT: 120 LBS | TEMPERATURE: 98.8 F | DIASTOLIC BLOOD PRESSURE: 82 MMHG | OXYGEN SATURATION: 97 % | SYSTOLIC BLOOD PRESSURE: 120 MMHG | RESPIRATION RATE: 16 BRPM | HEIGHT: 59 IN | BODY MASS INDEX: 24.19 KG/M2 | HEART RATE: 95 BPM

## 2024-07-06 DIAGNOSIS — N61.1 ABSCESS OF RIGHT BREAST: Primary | ICD-10-CM

## 2024-07-06 LAB
ALBUMIN SERPL BCP-MCNC: 3.7 G/DL (ref 3.4–5)
ALP SERPL-CCNC: 85 U/L (ref 33–110)
ALT SERPL W P-5'-P-CCNC: 15 U/L (ref 7–45)
ANION GAP SERPL CALC-SCNC: 16 MMOL/L (ref 10–20)
AST SERPL W P-5'-P-CCNC: 19 U/L (ref 9–39)
BASOPHILS # BLD AUTO: 0.1 X10*3/UL (ref 0–0.1)
BASOPHILS NFR BLD AUTO: 0.8 %
BILIRUB SERPL-MCNC: 0.3 MG/DL (ref 0–1.2)
BUN SERPL-MCNC: 12 MG/DL (ref 6–23)
CALCIUM SERPL-MCNC: 8.8 MG/DL (ref 8.6–10.6)
CHLORIDE SERPL-SCNC: 100 MMOL/L (ref 98–107)
CO2 SERPL-SCNC: 22 MMOL/L (ref 21–32)
CREAT SERPL-MCNC: 0.55 MG/DL (ref 0.5–1.05)
EGFRCR SERPLBLD CKD-EPI 2021: >90 ML/MIN/1.73M*2
EOSINOPHIL # BLD AUTO: 0.33 X10*3/UL (ref 0–0.7)
EOSINOPHIL NFR BLD AUTO: 2.6 %
ERYTHROCYTE [DISTWIDTH] IN BLOOD BY AUTOMATED COUNT: 11.9 % (ref 11.5–14.5)
GLUCOSE SERPL-MCNC: 364 MG/DL (ref 74–99)
HCT VFR BLD AUTO: 35.4 % (ref 36–46)
HGB BLD-MCNC: 11.6 G/DL (ref 12–16)
IMM GRANULOCYTES # BLD AUTO: 0.06 X10*3/UL (ref 0–0.7)
IMM GRANULOCYTES NFR BLD AUTO: 0.5 % (ref 0–0.9)
LYMPHOCYTES # BLD AUTO: 2.22 X10*3/UL (ref 1.2–4.8)
LYMPHOCYTES NFR BLD AUTO: 17.3 %
MCH RBC QN AUTO: 29.2 PG (ref 26–34)
MCHC RBC AUTO-ENTMCNC: 32.8 G/DL (ref 32–36)
MCV RBC AUTO: 89 FL (ref 80–100)
MONOCYTES # BLD AUTO: 0.72 X10*3/UL (ref 0.1–1)
MONOCYTES NFR BLD AUTO: 5.6 %
NEUTROPHILS # BLD AUTO: 9.42 X10*3/UL (ref 1.2–7.7)
NEUTROPHILS NFR BLD AUTO: 73.2 %
NRBC BLD-RTO: 0 /100 WBCS (ref 0–0)
PLATELET # BLD AUTO: 200 X10*3/UL (ref 150–450)
POTASSIUM SERPL-SCNC: 4.7 MMOL/L (ref 3.5–5.3)
PROT SERPL-MCNC: 6.9 G/DL (ref 6.4–8.2)
RBC # BLD AUTO: 3.97 X10*6/UL (ref 4–5.2)
SODIUM SERPL-SCNC: 133 MMOL/L (ref 136–145)
WBC # BLD AUTO: 12.9 X10*3/UL (ref 4.4–11.3)

## 2024-07-06 PROCEDURE — 2500000001 HC RX 250 WO HCPCS SELF ADMINISTERED DRUGS (ALT 637 FOR MEDICARE OP): Mod: SE

## 2024-07-06 PROCEDURE — 99284 EMERGENCY DEPT VISIT MOD MDM: CPT | Performed by: EMERGENCY MEDICINE

## 2024-07-06 PROCEDURE — 99284 EMERGENCY DEPT VISIT MOD MDM: CPT

## 2024-07-06 PROCEDURE — 85025 COMPLETE CBC W/AUTO DIFF WBC: CPT

## 2024-07-06 PROCEDURE — 80053 COMPREHEN METABOLIC PANEL: CPT

## 2024-07-06 PROCEDURE — 36415 COLL VENOUS BLD VENIPUNCTURE: CPT

## 2024-07-06 RX ORDER — LIDOCAINE HYDROCHLORIDE 10 MG/ML
10 INJECTION, SOLUTION EPIDURAL; INFILTRATION; INTRACAUDAL; PERINEURAL ONCE
Status: DISCONTINUED | OUTPATIENT
Start: 2024-07-06 | End: 2024-07-07 | Stop reason: HOSPADM

## 2024-07-06 RX ORDER — AMOXICILLIN AND CLAVULANATE POTASSIUM 875; 125 MG/1; MG/1
1 TABLET, FILM COATED ORAL ONCE
Status: COMPLETED | OUTPATIENT
Start: 2024-07-06 | End: 2024-07-06

## 2024-07-06 RX ORDER — AMOXICILLIN AND CLAVULANATE POTASSIUM 875; 125 MG/1; MG/1
1 TABLET, FILM COATED ORAL EVERY 12 HOURS
Qty: 20 TABLET | Refills: 0 | Status: SHIPPED | OUTPATIENT
Start: 2024-07-06 | End: 2024-07-16

## 2024-07-06 RX ADMIN — AMOXICILLIN AND CLAVULANATE POTASSIUM 1 TABLET: 875; 125 TABLET, FILM COATED ORAL at 22:34

## 2024-07-06 ASSESSMENT — PAIN DESCRIPTION - PROGRESSION: CLINICAL_PROGRESSION: GRADUALLY WORSENING

## 2024-07-06 ASSESSMENT — PAIN SCALES - GENERAL: PAINLEVEL_OUTOF10: 7

## 2024-07-06 ASSESSMENT — LIFESTYLE VARIABLES
EVER FELT BAD OR GUILTY ABOUT YOUR DRINKING: NO
EVER HAD A DRINK FIRST THING IN THE MORNING TO STEADY YOUR NERVES TO GET RID OF A HANGOVER: NO
HAVE YOU EVER FELT YOU SHOULD CUT DOWN ON YOUR DRINKING: NO
TOTAL SCORE: 0
HAVE PEOPLE ANNOYED YOU BY CRITICIZING YOUR DRINKING: NO

## 2024-07-06 ASSESSMENT — PAIN - FUNCTIONAL ASSESSMENT: PAIN_FUNCTIONAL_ASSESSMENT: 0-10

## 2024-07-06 ASSESSMENT — PAIN DESCRIPTION - ORIENTATION: ORIENTATION: RIGHT

## 2024-07-06 ASSESSMENT — PAIN DESCRIPTION - ONSET: ONSET: ONGOING

## 2024-07-06 ASSESSMENT — PAIN DESCRIPTION - LOCATION: LOCATION: BREAST

## 2024-07-06 ASSESSMENT — PAIN DESCRIPTION - FREQUENCY: FREQUENCY: CONSTANT/CONTINUOUS

## 2024-07-06 ASSESSMENT — PAIN DESCRIPTION - PAIN TYPE: TYPE: ACUTE PAIN

## 2024-07-06 NOTE — ED TRIAGE NOTES
Patient to ED with complaints of an abscess to left breast around the nipple. Noticed it a few days ago. Reports she recently had one drained here recently and put on antibiotics, but did not finish course. Previous abscess was also on left breast, but a different location.

## 2024-07-07 NOTE — DISCHARGE INSTRUCTIONS
You were seen today for an abscess of your breast.  You do not appear to have anything that is drainable per the surgical team.  Given this, we will discharge you home on antibiotics.  You should follow-up with your breast clinic in the next few days.  I will give you Augmentin for 10 days, you should take all of this even if it appears that your area of induration is getting better. Please return here if you develop a fever, chills, worsening pain, or the area appears to be getting worse despite being on antibiotics for 2-3 days.

## 2024-07-07 NOTE — ED PROVIDER NOTES
HPI   Chief Complaint   Patient presents with    Abscess       24-year-old female past medical history of type 1 diabetes and breast abscess who presents today for right breast abscess.  Patient states that she has previously had this drained here, and was started on antibiotics.  She states that she did take the full course, but has not followed up with her breast surgeon in a few weeks.  She states that she developed more swelling over the last week or so and noticed some purulent discharge from her nipple today.  Because of this, she came in.  She denies any chest pain, sore throat, shortness of breath.  She denies any trauma to the area.  Denies current breast-feeding.  Denies any fever, headache.  Denies any numbness weakness or tingling in her arms or legs, no family history of early breast cancer.      History provided by:  Patient   used: No                        No data recorded                   Patient History   Past Medical History:   Diagnosis Date    21 weeks gestation of pregnancy ([facility]) 09/13/2019    21 weeks gestation of pregnancy    Diabetes (Multi)      No past surgical history on file.  Family History   Problem Relation Name Age of Onset    No Known Problems Mother      No Known Problems Father       Social History     Tobacco Use    Smoking status: Never    Smokeless tobacco: Never   Vaping Use    Vaping status: Never Used   Substance Use Topics    Alcohol use: Never    Drug use: Never       Physical Exam   ED Triage Vitals [07/06/24 1954]   Temperature Heart Rate Respirations BP   37.1 °C (98.8 °F) 95 16 120/82      Pulse Ox Temp Source Heart Rate Source Patient Position   97 % Temporal Monitor Sitting      BP Location FiO2 (%)     Left arm --       Physical Exam  Vitals and nursing note reviewed.   Constitutional:       General: She is not in acute distress.     Appearance: Normal appearance. She is well-developed. She is not ill-appearing or diaphoretic.   HENT:       Head: Normocephalic and atraumatic.      Nose: Nose normal. No congestion.      Mouth/Throat:      Mouth: Mucous membranes are moist.      Pharynx: Oropharynx is clear.   Eyes:      Conjunctiva/sclera: Conjunctivae normal.   Cardiovascular:      Rate and Rhythm: Normal rate and regular rhythm.      Heart sounds: No murmur heard.  Pulmonary:      Effort: Pulmonary effort is normal. No respiratory distress.      Breath sounds: Normal breath sounds.      Comments: ~7 o'clock Right breast with roughly 6 cm in diameter area of induration with fluctuance and hyperpigmentation, minimally erythematous, healing wound roughly 1 cm in diameter over lateral aspect.  Over  Abdominal:      Palpations: Abdomen is soft.      Tenderness: There is no abdominal tenderness.   Musculoskeletal:         General: No swelling, deformity or signs of injury.      Cervical back: Neck supple.   Skin:     General: Skin is warm and dry.      Capillary Refill: Capillary refill takes less than 2 seconds.      Coloration: Skin is not jaundiced.      Findings: No bruising or rash.   Neurological:      General: No focal deficit present.      Mental Status: She is alert and oriented to person, place, and time.      Motor: No weakness.      Gait: Gait normal.   Psychiatric:         Mood and Affect: Mood normal.         Behavior: Behavior normal.         ED Course & MDM   ED Course as of 07/07/24 0031   Sat Jul 06, 2024 2234 ED Attending Documentation: This patient was seen by the resident physician.  I have seen and examined the patient, agree with the workup, evaluation, management and diagnosis. I reviewed and edited the above documentation where necessary. On my evaluation of the patient: Right breast swelling when compared to the left, no active drainage from the nipple or from below previous wounds.  Patient does appear well and does not appear to be in DKA or septic.  ACS to see.    Shan Arellano MD  EM Attending Physician   [RG]      ED Course  User Index  [RG] Shan Arellano MD         Diagnoses as of 07/07/24 0031   Abscess of right breast       Medical Decision Making  24-year-old female past medical history of type 1 diabetes as well as breast abscess who presents today for breast abscess.  Given the precarious position of the abscess, we will consult acute care surgery for possible drainage.  They did request labs, so I did put in for a CMP as well as a CBC.  Patient did have a white count of 12.9, as well as an elevated sugar in the 300s.  The patient did endorse that she had not taken her medication today, which explains her sugars.  They are likely also elevated in the setting of an infection.  Patient was evaluated by surgery, who felt that the patient did not have any drainable abscess, so they recommended her starting a 10-day course of Augmentin and following up in breast clinic.  I feel that this is a reasonable plan.  Results discussed with patient, feels comfortable with this.  All questions were answered prior to discharge, return precaution provided.    Amount and/or Complexity of Data Reviewed  Labs: ordered. Decision-making details documented in ED Course.        Procedure  Procedures     Hao Platt MD  Resident  07/07/24 0034

## 2024-07-07 NOTE — CONSULTS
"Reason For Consult  Concern for R breast abscess/mastitis    History Of Present Illness  Charline Tovar is a 24 y.o. female presenting with 2 day history of a tender right breast mass. Pt first noticed this when lying down, feels regional warmth and notes some drainage via the nipple. Patient stated that the discharge was a \"decent amount\" and appeared to be purulent. Pt has a recent history of recurrent abscess in her R breast around the 9:00 position (detailed below) and notes this is the first time she has noticed drainage directly from her nipple instead of the 9:00 region.    Pt has a history of breast abscess in this R breast that first started in 2/24. She was seen 3/11/24 in breast surgery clinic with an abscess, treated with clindamycin 10 days. Presented to the ED 4/9/24 for worsening pain and redness, treated with aspiration and augmentin with good response. Presented to the ED 5/16/24 following trauma to R breast with a 1.5 cm x 1.5 cm open area of purulent drainage at 9:00 para-areolar region, followed up in breast clinic 5/20 and was treated with Augmentin 10 days. Patient reports that after completion of Augmentin she was asymptomatic until the symptoms that occurred 2 days ago as detailed above.    Pt is not currently breastfeeding, has no history of problems with her breasts before 2/24. Pt reports remote history of L groin abscess treated with drainage+antibiotics and did not recur. Pt denies fevers, chills, nausea, vomiting, she has had some upper respiratory symptoms recently. Patient has no family history of breast cancer. Patient does not smoke.     Past Medical History  She has a past medical history of 21 weeks gestation of pregnancy ([facility]) (09/13/2019) and Diabetes (Multi).    Surgical History  She has no past surgical history on file.     Social History  She reports that she has never smoked. She has never used smokeless tobacco. She reports that she does not drink alcohol and does not " "use drugs.    Family History  Family History   Problem Relation Name Age of Onset    No Known Problems Mother      No Known Problems Father     No Fhx of breast cancer     Allergies  Patient has no known allergies.     Visit Vitals  /82 (BP Location: Left arm, Patient Position: Sitting)   Pulse 95   Temp 37.1 °C (98.8 °F) (Temporal)   Resp 16       Physical Exam  Physical Exam  Constitutional:       General: She is not in acute distress.  HENT:      Head: Normocephalic.      Mouth/Throat:      Mouth: Mucous membranes are moist.   Cardiovascular:      Rate and Rhythm: Normal rate.   Pulmonary:      Effort: Pulmonary effort is normal.   Chest:   Breasts:     Right: Swelling and skin change present. No bleeding, mass or nipple discharge.      Left: Normal.          Comments: R breast with induration and redness at superior aspect of breast just above areola with associated tenderness. Area of healing ulceration at 9:00 position without drainage. No discrete mass or area of fluctuance.   Lymphadenopathy:      Upper Body:      Right upper body: No axillary adenopathy.      Left upper body: No axillary adenopathy.   Skin:     Comments: No areas of fluctuance or drainage noted in BL axillae   Neurological:      General: No focal deficit present.      Mental Status: She is alert.   Psychiatric:         Mood and Affect: Mood normal.         Behavior: Behavior normal.          Last Recorded Vitals  Blood pressure 120/82, pulse 95, temperature 37.1 °C (98.8 °F), temperature source Temporal, resp. rate 16, height 1.499 m (4' 11\"), weight 54.4 kg (120 lb), SpO2 97%.    Relevant Results    POCUS within exam room:  - No discrete fluid collections noted     Assessment/Plan     Patient presenting with R breast pain and nipple drainage with notable history of recurrent abscess in the R breast requiring drainage and antibiotics in the past. No notable area of fluctuance or discrete mass on exam, no fluid collections seen on " bedside ultrasound. No active drainage from nipple or adjacent skin. Breast does appear indurated and inflamed. Suspect if the collection had recurred it may have self-drained when she noted nipple drainage. Suspect there is still some infected tissue given exam and history.     - Discharge home on Augmentin 10 days  - Follow up in outpatient clinic 1 week after inflammation has subsided  - Discussed with patient importance of abstaining from smoking (not current smoker), good diabetes management, and not to manipulate the nipple if there is further drainage    Patient discussed with attending breast surgeon, Dr. Fisher.      Juan Pablo Peralta, MS4    Patient seen and examined by me with medical student. I agree with the medical student's assessment and plan and have made the appropriate changes to the note.    Adina Yadav MD  General Surgery    Dariana Fisher MD

## 2024-07-23 ENCOUNTER — HOSPITAL ENCOUNTER (INPATIENT)
Facility: HOSPITAL | Age: 24
LOS: 2 days | Discharge: HOME | End: 2024-07-25
Attending: EMERGENCY MEDICINE | Admitting: INTERNAL MEDICINE
Payer: COMMERCIAL

## 2024-07-23 DIAGNOSIS — E10.65 TYPE 1 DIABETES MELLITUS WITH HYPERGLYCEMIA (MULTI): ICD-10-CM

## 2024-07-23 DIAGNOSIS — E13.10 DIABETIC KETOACIDOSIS WITHOUT COMA ASSOCIATED WITH OTHER SPECIFIED DIABETES MELLITUS (MULTI): Primary | ICD-10-CM

## 2024-07-23 DIAGNOSIS — E10.10 DM (DIABETES MELLITUS) TYPE 1, UNCONTROLLED, WITH KETOACIDOSIS (MULTI): ICD-10-CM

## 2024-07-23 LAB
ALBUMIN SERPL BCP-MCNC: 4.9 G/DL (ref 3.4–5)
ALP SERPL-CCNC: 79 U/L (ref 33–110)
ALT SERPL W P-5'-P-CCNC: 22 U/L (ref 7–45)
ANION GAP BLDV CALCULATED.4IONS-SCNC: 23 MMOL/L (ref 10–25)
ANION GAP BLDV CALCULATED.4IONS-SCNC: 27 MMOL/L (ref 10–25)
ANION GAP BLDV CALCULATED.4IONS-SCNC: 28 MMOL/L (ref 10–25)
ANION GAP SERPL CALC-SCNC: 28 MMOL/L (ref 10–20)
ANION GAP SERPL CALC-SCNC: 29 MMOL/L (ref 10–20)
APPEARANCE UR: CLEAR
AST SERPL W P-5'-P-CCNC: 30 U/L (ref 9–39)
BASE EXCESS BLDV CALC-SCNC: -12.4 MMOL/L (ref -2–3)
BASE EXCESS BLDV CALC-SCNC: -14.5 MMOL/L (ref -2–3)
BASE EXCESS BLDV CALC-SCNC: -16.9 MMOL/L (ref -2–3)
BASOPHILS # BLD AUTO: 0.06 X10*3/UL (ref 0–0.1)
BASOPHILS NFR BLD AUTO: 0.6 %
BILIRUB SERPL-MCNC: 0.6 MG/DL (ref 0–1.2)
BILIRUB UR STRIP.AUTO-MCNC: NEGATIVE MG/DL
BODY TEMPERATURE: 37 DEGREES CELSIUS
BUN SERPL-MCNC: 20 MG/DL (ref 6–23)
BUN SERPL-MCNC: 21 MG/DL (ref 6–23)
CA-I BLDV-SCNC: 1.22 MMOL/L (ref 1.1–1.33)
CA-I BLDV-SCNC: 1.24 MMOL/L (ref 1.1–1.33)
CA-I BLDV-SCNC: 1.28 MMOL/L (ref 1.1–1.33)
CALCIUM SERPL-MCNC: 9 MG/DL (ref 8.6–10.6)
CALCIUM SERPL-MCNC: 9.7 MG/DL (ref 8.6–10.6)
CHLORIDE BLDV-SCNC: 100 MMOL/L (ref 98–107)
CHLORIDE BLDV-SCNC: 93 MMOL/L (ref 98–107)
CHLORIDE BLDV-SCNC: 98 MMOL/L (ref 98–107)
CHLORIDE SERPL-SCNC: 92 MMOL/L (ref 98–107)
CHLORIDE SERPL-SCNC: 98 MMOL/L (ref 98–107)
CO2 SERPL-SCNC: 11 MMOL/L (ref 21–32)
CO2 SERPL-SCNC: 14 MMOL/L (ref 21–32)
COLOR UR: COLORLESS
CREAT SERPL-MCNC: 0.86 MG/DL (ref 0.5–1.05)
CREAT SERPL-MCNC: 0.97 MG/DL (ref 0.5–1.05)
EGFRCR SERPLBLD CKD-EPI 2021: 84 ML/MIN/1.73M*2
EGFRCR SERPLBLD CKD-EPI 2021: >90 ML/MIN/1.73M*2
EOSINOPHIL # BLD AUTO: 0.01 X10*3/UL (ref 0–0.7)
EOSINOPHIL NFR BLD AUTO: 0.1 %
ERYTHROCYTE [DISTWIDTH] IN BLOOD BY AUTOMATED COUNT: 11.9 % (ref 11.5–14.5)
GLUCOSE BLD MANUAL STRIP-MCNC: 281 MG/DL (ref 74–99)
GLUCOSE BLD MANUAL STRIP-MCNC: 389 MG/DL (ref 74–99)
GLUCOSE BLD MANUAL STRIP-MCNC: 440 MG/DL (ref 74–99)
GLUCOSE BLD MANUAL STRIP-MCNC: 465 MG/DL (ref 74–99)
GLUCOSE BLDV-MCNC: 258 MG/DL (ref 74–99)
GLUCOSE BLDV-MCNC: 542 MG/DL (ref 74–99)
GLUCOSE BLDV-MCNC: ABNORMAL MG/DL
GLUCOSE SERPL-MCNC: 513 MG/DL (ref 74–99)
GLUCOSE SERPL-MCNC: 519 MG/DL (ref 74–99)
GLUCOSE UR STRIP.AUTO-MCNC: ABNORMAL MG/DL
HCO3 BLDV-SCNC: 12.1 MMOL/L (ref 22–26)
HCO3 BLDV-SCNC: 13.6 MMOL/L (ref 22–26)
HCO3 BLDV-SCNC: 14 MMOL/L (ref 22–26)
HCT VFR BLD AUTO: 38.1 % (ref 36–46)
HCT VFR BLD EST: 39 % (ref 36–46)
HCT VFR BLD EST: 45 % (ref 36–46)
HCT VFR BLD EST: ABNORMAL %
HGB BLD-MCNC: 12.8 G/DL (ref 12–16)
HGB BLDV-MCNC: 13 G/DL (ref 12–16)
HGB BLDV-MCNC: 14.9 G/DL (ref 12–16)
HGB BLDV-MCNC: ABNORMAL G/DL
IMM GRANULOCYTES # BLD AUTO: 0.06 X10*3/UL (ref 0–0.7)
IMM GRANULOCYTES NFR BLD AUTO: 0.6 % (ref 0–0.9)
INHALED O2 CONCENTRATION: 21 %
KETONES UR STRIP.AUTO-MCNC: ABNORMAL MG/DL
LACTATE BLDV-SCNC: 1.7 MMOL/L (ref 0.4–2)
LACTATE BLDV-SCNC: 3.3 MMOL/L (ref 0.4–2)
LACTATE BLDV-SCNC: 3.4 MMOL/L (ref 0.4–2)
LEUKOCYTE ESTERASE UR QL STRIP.AUTO: NEGATIVE
LYMPHOCYTES # BLD AUTO: 1.61 X10*3/UL (ref 1.2–4.8)
LYMPHOCYTES NFR BLD AUTO: 15.3 %
MAGNESIUM SERPL-MCNC: 2.19 MG/DL (ref 1.6–2.4)
MCH RBC QN AUTO: 29.5 PG (ref 26–34)
MCHC RBC AUTO-ENTMCNC: 33.6 G/DL (ref 32–36)
MCV RBC AUTO: 88 FL (ref 80–100)
MONOCYTES # BLD AUTO: 0.34 X10*3/UL (ref 0.1–1)
MONOCYTES NFR BLD AUTO: 3.2 %
NEUTROPHILS # BLD AUTO: 8.45 X10*3/UL (ref 1.2–7.7)
NEUTROPHILS NFR BLD AUTO: 80.2 %
NITRITE UR QL STRIP.AUTO: NEGATIVE
NRBC BLD-RTO: 0 /100 WBCS (ref 0–0)
OXYHGB MFR BLDV: 40.7 % (ref 45–75)
OXYHGB MFR BLDV: 81.6 % (ref 45–75)
OXYHGB MFR BLDV: ABNORMAL %
PCO2 BLDV: 31 MM HG (ref 41–51)
PCO2 BLDV: 39 MM HG (ref 41–51)
PCO2 BLDV: 41 MM HG (ref 41–51)
PH BLDV: 7.1 PH (ref 7.33–7.43)
PH BLDV: 7.14 PH (ref 7.33–7.43)
PH BLDV: 7.25 PH (ref 7.33–7.43)
PH UR STRIP.AUTO: 5.5 [PH]
PHOSPHATE SERPL-MCNC: 6.6 MG/DL (ref 2.5–4.9)
PLATELET # BLD AUTO: 393 X10*3/UL (ref 150–450)
PO2 BLDV: 32 MM HG (ref 35–45)
PO2 BLDV: 33 MM HG (ref 35–45)
PO2 BLDV: 53 MM HG (ref 35–45)
POTASSIUM BLDV-SCNC: 5.3 MMOL/L (ref 3.5–5.3)
POTASSIUM BLDV-SCNC: 5.5 MMOL/L (ref 3.5–5.3)
POTASSIUM BLDV-SCNC: 5.7 MMOL/L (ref 3.5–5.3)
POTASSIUM SERPL-SCNC: 5.1 MMOL/L (ref 3.5–5.3)
POTASSIUM SERPL-SCNC: 6.3 MMOL/L (ref 3.5–5.3)
PREGNANCY TEST URINE, POC: NEGATIVE
PROT SERPL-MCNC: 8.4 G/DL (ref 6.4–8.2)
PROT UR STRIP.AUTO-MCNC: NEGATIVE MG/DL
RBC # BLD AUTO: 4.34 X10*6/UL (ref 4–5.2)
RBC # UR STRIP.AUTO: NEGATIVE /UL
SAO2 % BLDV: 41 % (ref 45–75)
SAO2 % BLDV: 84 % (ref 45–75)
SAO2 % BLDV: ABNORMAL %
SODIUM BLDV-SCNC: 129 MMOL/L (ref 136–145)
SODIUM BLDV-SCNC: 131 MMOL/L (ref 136–145)
SODIUM BLDV-SCNC: 132 MMOL/L (ref 136–145)
SODIUM SERPL-SCNC: 129 MMOL/L (ref 136–145)
SODIUM SERPL-SCNC: 132 MMOL/L (ref 136–145)
SP GR UR STRIP.AUTO: 1.03
UROBILINOGEN UR STRIP.AUTO-MCNC: NORMAL MG/DL
WBC # BLD AUTO: 10.5 X10*3/UL (ref 4.4–11.3)

## 2024-07-23 PROCEDURE — 82435 ASSAY OF BLOOD CHLORIDE: CPT

## 2024-07-23 PROCEDURE — 2500000004 HC RX 250 GENERAL PHARMACY W/ HCPCS (ALT 636 FOR OP/ED)

## 2024-07-23 PROCEDURE — 36415 COLL VENOUS BLD VENIPUNCTURE: CPT | Performed by: EMERGENCY MEDICINE

## 2024-07-23 PROCEDURE — 83605 ASSAY OF LACTIC ACID: CPT | Performed by: EMERGENCY MEDICINE

## 2024-07-23 PROCEDURE — 96374 THER/PROPH/DIAG INJ IV PUSH: CPT

## 2024-07-23 PROCEDURE — 84100 ASSAY OF PHOSPHORUS: CPT

## 2024-07-23 PROCEDURE — 82435 ASSAY OF BLOOD CHLORIDE: CPT | Performed by: EMERGENCY MEDICINE

## 2024-07-23 PROCEDURE — 96360 HYDRATION IV INFUSION INIT: CPT | Mod: 59

## 2024-07-23 PROCEDURE — 85025 COMPLETE CBC W/AUTO DIFF WBC: CPT | Performed by: EMERGENCY MEDICINE

## 2024-07-23 PROCEDURE — 96361 HYDRATE IV INFUSION ADD-ON: CPT

## 2024-07-23 PROCEDURE — G0378 HOSPITAL OBSERVATION PER HR: HCPCS

## 2024-07-23 PROCEDURE — 80048 BASIC METABOLIC PNL TOTAL CA: CPT | Mod: CCI | Performed by: EMERGENCY MEDICINE

## 2024-07-23 PROCEDURE — 83036 HEMOGLOBIN GLYCOSYLATED A1C: CPT

## 2024-07-23 PROCEDURE — 2500000004 HC RX 250 GENERAL PHARMACY W/ HCPCS (ALT 636 FOR OP/ED): Mod: SE | Performed by: EMERGENCY MEDICINE

## 2024-07-23 PROCEDURE — 2020000001 HC ICU ROOM DAILY

## 2024-07-23 PROCEDURE — 85018 HEMOGLOBIN: CPT | Performed by: EMERGENCY MEDICINE

## 2024-07-23 PROCEDURE — 83735 ASSAY OF MAGNESIUM: CPT

## 2024-07-23 PROCEDURE — 82947 ASSAY GLUCOSE BLOOD QUANT: CPT

## 2024-07-23 PROCEDURE — 36415 COLL VENOUS BLD VENIPUNCTURE: CPT

## 2024-07-23 PROCEDURE — 96365 THER/PROPH/DIAG IV INF INIT: CPT

## 2024-07-23 PROCEDURE — 85025 COMPLETE CBC W/AUTO DIFF WBC: CPT

## 2024-07-23 PROCEDURE — 81025 URINE PREGNANCY TEST: CPT

## 2024-07-23 PROCEDURE — 96366 THER/PROPH/DIAG IV INF ADDON: CPT

## 2024-07-23 PROCEDURE — 99285 EMERGENCY DEPT VISIT HI MDM: CPT | Mod: 25

## 2024-07-23 PROCEDURE — 84484 ASSAY OF TROPONIN QUANT: CPT

## 2024-07-23 PROCEDURE — 83605 ASSAY OF LACTIC ACID: CPT

## 2024-07-23 PROCEDURE — 80048 BASIC METABOLIC PNL TOTAL CA: CPT | Mod: CCI

## 2024-07-23 PROCEDURE — 99291 CRITICAL CARE FIRST HOUR: CPT | Performed by: EMERGENCY MEDICINE

## 2024-07-23 PROCEDURE — 81003 URINALYSIS AUTO W/O SCOPE: CPT

## 2024-07-23 PROCEDURE — 82947 ASSAY GLUCOSE BLOOD QUANT: CPT | Mod: 91

## 2024-07-23 PROCEDURE — 93005 ELECTROCARDIOGRAM TRACING: CPT

## 2024-07-23 RX ORDER — SODIUM CHLORIDE, SODIUM LACTATE, POTASSIUM CHLORIDE, CALCIUM CHLORIDE 600; 310; 30; 20 MG/100ML; MG/100ML; MG/100ML; MG/100ML
125 INJECTION, SOLUTION INTRAVENOUS CONTINUOUS
Status: DISCONTINUED | OUTPATIENT
Start: 2024-07-23 | End: 2024-07-23

## 2024-07-23 RX ORDER — DEXTROSE MONOHYDRATE 100 MG/ML
150 INJECTION, SOLUTION INTRAVENOUS CONTINUOUS
Status: DISCONTINUED | OUTPATIENT
Start: 2024-07-23 | End: 2024-07-24

## 2024-07-23 RX ORDER — DEXTROSE MONOHYDRATE AND SODIUM CHLORIDE 5; .45 G/100ML; G/100ML
150 INJECTION, SOLUTION INTRAVENOUS CONTINUOUS
Status: DISCONTINUED | OUTPATIENT
Start: 2024-07-23 | End: 2024-07-24

## 2024-07-23 RX ORDER — SODIUM CHLORIDE 450 MG/100ML
250 INJECTION, SOLUTION INTRAVENOUS CONTINUOUS
Status: DISCONTINUED | OUTPATIENT
Start: 2024-07-23 | End: 2024-07-25 | Stop reason: HOSPADM

## 2024-07-23 RX ORDER — DEXTROSE 50 % IN WATER (D50W) INTRAVENOUS SYRINGE
50
Status: DISCONTINUED | OUTPATIENT
Start: 2024-07-23 | End: 2024-07-25 | Stop reason: HOSPADM

## 2024-07-23 RX ORDER — ACETAMINOPHEN 325 MG/1
975 TABLET ORAL ONCE
Status: COMPLETED | OUTPATIENT
Start: 2024-07-23 | End: 2024-07-23

## 2024-07-23 RX ORDER — ONDANSETRON HYDROCHLORIDE 2 MG/ML
4 INJECTION, SOLUTION INTRAVENOUS ONCE
Status: DISCONTINUED | OUTPATIENT
Start: 2024-07-23 | End: 2024-07-23

## 2024-07-23 SDOH — SOCIAL STABILITY: SOCIAL INSECURITY: ARE THERE ANY APPARENT SIGNS OF INJURIES/BEHAVIORS THAT COULD BE RELATED TO ABUSE/NEGLECT?: NO

## 2024-07-23 SDOH — SOCIAL STABILITY: SOCIAL INSECURITY: WERE YOU ABLE TO COMPLETE ALL THE BEHAVIORAL HEALTH SCREENINGS?: YES

## 2024-07-23 SDOH — SOCIAL STABILITY: SOCIAL INSECURITY: ABUSE: ADULT

## 2024-07-23 SDOH — SOCIAL STABILITY: SOCIAL INSECURITY: DOES ANYONE TRY TO KEEP YOU FROM HAVING/CONTACTING OTHER FRIENDS OR DOING THINGS OUTSIDE YOUR HOME?: NO

## 2024-07-23 SDOH — SOCIAL STABILITY: SOCIAL INSECURITY: ARE YOU OR HAVE YOU BEEN THREATENED OR ABUSED PHYSICALLY, EMOTIONALLY, OR SEXUALLY BY ANYONE?: NO

## 2024-07-23 SDOH — SOCIAL STABILITY: SOCIAL INSECURITY: DO YOU FEEL UNSAFE GOING BACK TO THE PLACE WHERE YOU ARE LIVING?: NO

## 2024-07-23 SDOH — SOCIAL STABILITY: SOCIAL INSECURITY: HAVE YOU HAD THOUGHTS OF HARMING ANYONE ELSE?: NO

## 2024-07-23 SDOH — SOCIAL STABILITY: SOCIAL INSECURITY: HAVE YOU HAD ANY THOUGHTS OF HARMING ANYONE ELSE?: NO

## 2024-07-23 SDOH — SOCIAL STABILITY: SOCIAL INSECURITY: DO YOU FEEL ANYONE HAS EXPLOITED OR TAKEN ADVANTAGE OF YOU FINANCIALLY OR OF YOUR PERSONAL PROPERTY?: NO

## 2024-07-23 ASSESSMENT — ACTIVITIES OF DAILY LIVING (ADL)
GROOMING: INDEPENDENT
FEEDING YOURSELF: INDEPENDENT
DRESSING YOURSELF: INDEPENDENT
PATIENT'S MEMORY ADEQUATE TO SAFELY COMPLETE DAILY ACTIVITIES?: YES
WALKS IN HOME: INDEPENDENT
HEARING - RIGHT EAR: FUNCTIONAL
ADEQUATE_TO_COMPLETE_ADL: YES
BATHING: INDEPENDENT
TOILETING: INDEPENDENT
LACK_OF_TRANSPORTATION: PATIENT DECLINED
JUDGMENT_ADEQUATE_SAFELY_COMPLETE_DAILY_ACTIVITIES: YES
HEARING - LEFT EAR: FUNCTIONAL

## 2024-07-23 ASSESSMENT — COGNITIVE AND FUNCTIONAL STATUS - GENERAL
DAILY ACTIVITIY SCORE: 24
MOBILITY SCORE: 24
PATIENT BASELINE BEDBOUND: NO

## 2024-07-23 ASSESSMENT — LIFESTYLE VARIABLES
TOTAL SCORE: 0
HOW OFTEN DO YOU HAVE A DRINK CONTAINING ALCOHOL: NEVER
HAVE PEOPLE ANNOYED YOU BY CRITICIZING YOUR DRINKING: NO
AUDIT-C TOTAL SCORE: 0
HAVE YOU EVER FELT YOU SHOULD CUT DOWN ON YOUR DRINKING: NO
EVER HAD A DRINK FIRST THING IN THE MORNING TO STEADY YOUR NERVES TO GET RID OF A HANGOVER: NO
HOW MANY STANDARD DRINKS CONTAINING ALCOHOL DO YOU HAVE ON A TYPICAL DAY: PATIENT DOES NOT DRINK
EVER FELT BAD OR GUILTY ABOUT YOUR DRINKING: NO
SKIP TO QUESTIONS 9-10: 1
AUDIT-C TOTAL SCORE: 0
HOW OFTEN DO YOU HAVE 6 OR MORE DRINKS ON ONE OCCASION: NEVER

## 2024-07-23 ASSESSMENT — COLUMBIA-SUICIDE SEVERITY RATING SCALE - C-SSRS
2. HAVE YOU ACTUALLY HAD ANY THOUGHTS OF KILLING YOURSELF?: NO
1. IN THE PAST MONTH, HAVE YOU WISHED YOU WERE DEAD OR WISHED YOU COULD GO TO SLEEP AND NOT WAKE UP?: NO
6. HAVE YOU EVER DONE ANYTHING, STARTED TO DO ANYTHING, OR PREPARED TO DO ANYTHING TO END YOUR LIFE?: NO

## 2024-07-23 ASSESSMENT — PAIN SCALES - GENERAL
PAINLEVEL_OUTOF10: 7
PAINLEVEL_OUTOF10: 4
PAINLEVEL_OUTOF10: 10 - WORST POSSIBLE PAIN
PAINLEVEL_OUTOF10: 0 - NO PAIN
PAINLEVEL_OUTOF10: 10 - WORST POSSIBLE PAIN
PAINLEVEL_OUTOF10: 7

## 2024-07-23 ASSESSMENT — PAIN - FUNCTIONAL ASSESSMENT
PAIN_FUNCTIONAL_ASSESSMENT: 0-10
PAIN_FUNCTIONAL_ASSESSMENT: 0-10

## 2024-07-23 ASSESSMENT — PAIN DESCRIPTION - LOCATION
LOCATION: BACK
LOCATION: BACK

## 2024-07-24 ENCOUNTER — DOCUMENTATION (OUTPATIENT)
Dept: BEHAVIORAL HEALTH | Facility: CLINIC | Age: 24
End: 2024-07-24
Payer: COMMERCIAL

## 2024-07-24 ENCOUNTER — APPOINTMENT (OUTPATIENT)
Dept: CARDIOLOGY | Facility: HOSPITAL | Age: 24
End: 2024-07-24
Payer: COMMERCIAL

## 2024-07-24 LAB
ALBUMIN SERPL BCP-MCNC: 4.1 G/DL (ref 3.4–5)
ALP SERPL-CCNC: 68 U/L (ref 33–110)
ALT SERPL W P-5'-P-CCNC: 18 U/L (ref 7–45)
ANION GAP BLDV CALCULATED.4IONS-SCNC: 11 MMOL/L (ref 10–25)
ANION GAP SERPL CALC-SCNC: 14 MMOL/L (ref 10–20)
ANION GAP SERPL CALC-SCNC: 18 MMOL/L (ref 10–20)
ANION GAP SERPL CALC-SCNC: 22 MMOL/L (ref 10–20)
AST SERPL W P-5'-P-CCNC: 23 U/L (ref 9–39)
BASE EXCESS BLDV CALC-SCNC: -3.9 MMOL/L (ref -2–3)
BASOPHILS # BLD AUTO: 0.07 X10*3/UL (ref 0–0.1)
BASOPHILS # BLD AUTO: 0.08 X10*3/UL (ref 0–0.1)
BASOPHILS NFR BLD AUTO: 0.4 %
BASOPHILS NFR BLD AUTO: 0.5 %
BILIRUB SERPL-MCNC: 0.4 MG/DL (ref 0–1.2)
BODY TEMPERATURE: 37 DEGREES CELSIUS
BUN SERPL-MCNC: 13 MG/DL (ref 6–23)
BUN SERPL-MCNC: 13 MG/DL (ref 6–23)
BUN SERPL-MCNC: 19 MG/DL (ref 6–23)
CA-I BLDV-SCNC: 1.2 MMOL/L (ref 1.1–1.33)
CALCIUM SERPL-MCNC: 8.6 MG/DL (ref 8.6–10.6)
CALCIUM SERPL-MCNC: 9.2 MG/DL (ref 8.6–10.6)
CALCIUM SERPL-MCNC: 9.4 MG/DL (ref 8.6–10.6)
CARDIAC TROPONIN I PNL SERPL HS: 9 NG/L (ref 0–34)
CHLORIDE BLDV-SCNC: 106 MMOL/L (ref 98–107)
CHLORIDE SERPL-SCNC: 100 MMOL/L (ref 98–107)
CHLORIDE SERPL-SCNC: 101 MMOL/L (ref 98–107)
CHLORIDE SERPL-SCNC: 103 MMOL/L (ref 98–107)
CO2 SERPL-SCNC: 14 MMOL/L (ref 21–32)
CO2 SERPL-SCNC: 17 MMOL/L (ref 21–32)
CO2 SERPL-SCNC: 20 MMOL/L (ref 21–32)
CREAT SERPL-MCNC: 0.66 MG/DL (ref 0.5–1.05)
CREAT SERPL-MCNC: 0.66 MG/DL (ref 0.5–1.05)
CREAT SERPL-MCNC: 0.73 MG/DL (ref 0.5–1.05)
EGFRCR SERPLBLD CKD-EPI 2021: >90 ML/MIN/1.73M*2
EOSINOPHIL # BLD AUTO: 0.01 X10*3/UL (ref 0–0.7)
EOSINOPHIL # BLD AUTO: 0.03 X10*3/UL (ref 0–0.7)
EOSINOPHIL NFR BLD AUTO: 0.1 %
EOSINOPHIL NFR BLD AUTO: 0.2 %
ERYTHROCYTE [DISTWIDTH] IN BLOOD BY AUTOMATED COUNT: 12.1 % (ref 11.5–14.5)
ERYTHROCYTE [DISTWIDTH] IN BLOOD BY AUTOMATED COUNT: 12.1 % (ref 11.5–14.5)
EST. AVERAGE GLUCOSE BLD GHB EST-MCNC: 329 MG/DL
GLUCOSE BLD MANUAL STRIP-MCNC: 107 MG/DL (ref 74–99)
GLUCOSE BLD MANUAL STRIP-MCNC: 153 MG/DL (ref 74–99)
GLUCOSE BLD MANUAL STRIP-MCNC: 164 MG/DL (ref 74–99)
GLUCOSE BLD MANUAL STRIP-MCNC: 169 MG/DL (ref 74–99)
GLUCOSE BLD MANUAL STRIP-MCNC: 187 MG/DL (ref 74–99)
GLUCOSE BLD MANUAL STRIP-MCNC: 253 MG/DL (ref 74–99)
GLUCOSE BLD MANUAL STRIP-MCNC: 259 MG/DL (ref 74–99)
GLUCOSE BLD MANUAL STRIP-MCNC: 317 MG/DL (ref 74–99)
GLUCOSE BLD MANUAL STRIP-MCNC: 362 MG/DL (ref 74–99)
GLUCOSE BLD MANUAL STRIP-MCNC: 63 MG/DL (ref 74–99)
GLUCOSE BLD MANUAL STRIP-MCNC: 82 MG/DL (ref 74–99)
GLUCOSE BLD MANUAL STRIP-MCNC: 99 MG/DL (ref 74–99)
GLUCOSE BLDV-MCNC: 114 MG/DL (ref 74–99)
GLUCOSE SERPL-MCNC: 106 MG/DL (ref 74–99)
GLUCOSE SERPL-MCNC: 240 MG/DL (ref 74–99)
GLUCOSE SERPL-MCNC: 278 MG/DL (ref 74–99)
HBA1C MFR BLD: 13.1 %
HCO3 BLDV-SCNC: 21.6 MMOL/L (ref 22–26)
HCT VFR BLD AUTO: 32.5 % (ref 36–46)
HCT VFR BLD AUTO: 36.5 % (ref 36–46)
HCT VFR BLD EST: 36 % (ref 36–46)
HGB BLD-MCNC: 11.4 G/DL (ref 12–16)
HGB BLD-MCNC: 12.4 G/DL (ref 12–16)
HGB BLDV-MCNC: 12.1 G/DL (ref 12–16)
HOLD SPECIMEN: NORMAL
IMM GRANULOCYTES # BLD AUTO: 0.09 X10*3/UL (ref 0–0.7)
IMM GRANULOCYTES # BLD AUTO: 0.1 X10*3/UL (ref 0–0.7)
IMM GRANULOCYTES NFR BLD AUTO: 0.6 % (ref 0–0.9)
IMM GRANULOCYTES NFR BLD AUTO: 0.6 % (ref 0–0.9)
INHALED O2 CONCENTRATION: 21 %
LACTATE BLDV-SCNC: 1 MMOL/L (ref 0.4–2)
LACTATE BLDV-SCNC: 3.1 MMOL/L (ref 0.4–2)
LACTATE SERPL-SCNC: 1.6 MMOL/L (ref 0.4–2)
LYMPHOCYTES # BLD AUTO: 2.12 X10*3/UL (ref 1.2–4.8)
LYMPHOCYTES # BLD AUTO: 3.6 X10*3/UL (ref 1.2–4.8)
LYMPHOCYTES NFR BLD AUTO: 12.4 %
LYMPHOCYTES NFR BLD AUTO: 22.6 %
MAGNESIUM SERPL-MCNC: 1.93 MG/DL (ref 1.6–2.4)
MCH RBC QN AUTO: 29.2 PG (ref 26–34)
MCH RBC QN AUTO: 30 PG (ref 26–34)
MCHC RBC AUTO-ENTMCNC: 34 G/DL (ref 32–36)
MCHC RBC AUTO-ENTMCNC: 35.1 G/DL (ref 32–36)
MCV RBC AUTO: 86 FL (ref 80–100)
MCV RBC AUTO: 86 FL (ref 80–100)
MONOCYTES # BLD AUTO: 0.5 X10*3/UL (ref 0.1–1)
MONOCYTES # BLD AUTO: 1.1 X10*3/UL (ref 0.1–1)
MONOCYTES NFR BLD AUTO: 2.9 %
MONOCYTES NFR BLD AUTO: 6.9 %
NEUTROPHILS # BLD AUTO: 11.02 X10*3/UL (ref 1.2–7.7)
NEUTROPHILS # BLD AUTO: 14.25 X10*3/UL (ref 1.2–7.7)
NEUTROPHILS NFR BLD AUTO: 69.2 %
NEUTROPHILS NFR BLD AUTO: 83.6 %
NRBC BLD-RTO: 0 /100 WBCS (ref 0–0)
NRBC BLD-RTO: 0 /100 WBCS (ref 0–0)
OXYHGB MFR BLDV: 63.1 % (ref 45–75)
PCO2 BLDV: 40 MM HG (ref 41–51)
PH BLDV: 7.34 PH (ref 7.33–7.43)
PHOSPHATE SERPL-MCNC: 4.5 MG/DL (ref 2.5–4.9)
PLATELET # BLD AUTO: 494 X10*3/UL (ref 150–450)
PLATELET # BLD AUTO: 506 X10*3/UL (ref 150–450)
PO2 BLDV: 37 MM HG (ref 35–45)
POTASSIUM BLDV-SCNC: 4.4 MMOL/L (ref 3.5–5.3)
POTASSIUM SERPL-SCNC: 4.3 MMOL/L (ref 3.5–5.3)
POTASSIUM SERPL-SCNC: 4.3 MMOL/L (ref 3.5–5.3)
POTASSIUM SERPL-SCNC: 5 MMOL/L (ref 3.5–5.3)
PROT SERPL-MCNC: 7.1 G/DL (ref 6.4–8.2)
RBC # BLD AUTO: 3.8 X10*6/UL (ref 4–5.2)
RBC # BLD AUTO: 4.25 X10*6/UL (ref 4–5.2)
SAO2 % BLDV: 65 % (ref 45–75)
SODIUM BLDV-SCNC: 134 MMOL/L (ref 136–145)
SODIUM SERPL-SCNC: 131 MMOL/L (ref 136–145)
SODIUM SERPL-SCNC: 132 MMOL/L (ref 136–145)
SODIUM SERPL-SCNC: 133 MMOL/L (ref 136–145)
WBC # BLD AUTO: 15.9 X10*3/UL (ref 4.4–11.3)
WBC # BLD AUTO: 17.1 X10*3/UL (ref 4.4–11.3)

## 2024-07-24 PROCEDURE — G0378 HOSPITAL OBSERVATION PER HR: HCPCS

## 2024-07-24 PROCEDURE — 82435 ASSAY OF BLOOD CHLORIDE: CPT

## 2024-07-24 PROCEDURE — 82947 ASSAY GLUCOSE BLOOD QUANT: CPT

## 2024-07-24 PROCEDURE — 1100000001 HC PRIVATE ROOM DAILY

## 2024-07-24 PROCEDURE — 96366 THER/PROPH/DIAG IV INF ADDON: CPT

## 2024-07-24 PROCEDURE — 36415 COLL VENOUS BLD VENIPUNCTURE: CPT

## 2024-07-24 PROCEDURE — 85025 COMPLETE CBC W/AUTO DIFF WBC: CPT

## 2024-07-24 PROCEDURE — 2500000004 HC RX 250 GENERAL PHARMACY W/ HCPCS (ALT 636 FOR OP/ED)

## 2024-07-24 PROCEDURE — 82810 BLOOD GASES O2 SAT ONLY: CPT | Mod: CCI

## 2024-07-24 PROCEDURE — 2500000002 HC RX 250 W HCPCS SELF ADMINISTERED DRUGS (ALT 637 FOR MEDICARE OP, ALT 636 FOR OP/ED): Performed by: STUDENT IN AN ORGANIZED HEALTH CARE EDUCATION/TRAINING PROGRAM

## 2024-07-24 PROCEDURE — 2500000002 HC RX 250 W HCPCS SELF ADMINISTERED DRUGS (ALT 637 FOR MEDICARE OP, ALT 636 FOR OP/ED)

## 2024-07-24 PROCEDURE — 82947 ASSAY GLUCOSE BLOOD QUANT: CPT | Mod: 91

## 2024-07-24 RX ORDER — INSULIN LISPRO 100 [IU]/ML
0-5 INJECTION, SOLUTION INTRAVENOUS; SUBCUTANEOUS
Status: DISCONTINUED | OUTPATIENT
Start: 2024-07-24 | End: 2024-07-24

## 2024-07-24 RX ORDER — INSULIN GLARGINE 100 [IU]/ML
10 INJECTION, SOLUTION SUBCUTANEOUS DAILY
Status: DISCONTINUED | OUTPATIENT
Start: 2024-07-24 | End: 2024-07-25 | Stop reason: HOSPADM

## 2024-07-24 RX ORDER — DEXTROSE 50 % IN WATER (D50W) INTRAVENOUS SYRINGE
25
Status: DISCONTINUED | OUTPATIENT
Start: 2024-07-24 | End: 2024-07-25 | Stop reason: HOSPADM

## 2024-07-24 RX ORDER — DEXTROSE 50 % IN WATER (D50W) INTRAVENOUS SYRINGE
12.5
Status: DISCONTINUED | OUTPATIENT
Start: 2024-07-24 | End: 2024-07-25 | Stop reason: HOSPADM

## 2024-07-24 RX ORDER — INSULIN LISPRO 100 [IU]/ML
0-10 INJECTION, SOLUTION INTRAVENOUS; SUBCUTANEOUS
Status: DISCONTINUED | OUTPATIENT
Start: 2024-07-25 | End: 2024-07-25 | Stop reason: HOSPADM

## 2024-07-24 RX ORDER — DEXTROSE MONOHYDRATE AND SODIUM CHLORIDE 5; .9 G/100ML; G/100ML
150 INJECTION, SOLUTION INTRAVENOUS CONTINUOUS
Status: DISCONTINUED | OUTPATIENT
Start: 2024-07-24 | End: 2024-07-24

## 2024-07-24 ASSESSMENT — COGNITIVE AND FUNCTIONAL STATUS - GENERAL
MOBILITY SCORE: 24
DAILY ACTIVITIY SCORE: 24

## 2024-07-24 ASSESSMENT — PAIN - FUNCTIONAL ASSESSMENT
PAIN_FUNCTIONAL_ASSESSMENT: 0-10

## 2024-07-24 ASSESSMENT — PAIN SCALES - GENERAL
PAINLEVEL_OUTOF10: 0 - NO PAIN

## 2024-07-24 NOTE — PROGRESS NOTES
Provider met with patient while she is in-patient at [facility]. Provider explained the Personalized Care Program and how it could benefit patient. Patient stated that she was interested in program. Provider got patient scheduled for an assessment the following day with Dejah Carpio and this provider.     Time Spent: 20 minutes

## 2024-07-24 NOTE — H&P
Medical Intensive Care - History and Physical   Subjective    Maria Isabel Cutler is a 24 y.o. year old female patient admitted on 7/23/2024 with following ICU needs: DKA requiring insulin gtt     HPI:  Maria Isabel Cutler is a 24 y.o. year old female with history of T1DM and asthma who presented today for vomiting and weakness.     In the ED, vitals stable at 119/73, tachy to , satting well on room air at 100%. Initial RFP showed gluc 519, Na 129, K 6.3, Cl 92, bicarb 14, BUN 21, Cr 0.97, Phos 6.6. Normal LFTs, normal CBC. Initial VBG showed pH 7.14, pCO2 41, lactate 3.4. Calculated AG is 23. UA shows 4+ ketones and 4+ glucose, noninfectious.    ED interventions: s/p 1L LR, insulin gtt, tylenol.     Upon admission to the MICU, pt is HDS at 97/61, mildly tachy to , satting 100% on RA. She states that she came to the hospital today as she started vomiting this morning, was having weakness, and was out of her lantus today. She states compliance with her lantus. Otherwise, she had some chest pain as well but is now doing better. No longer having emesis.        Meds    Home medications:  Current Outpatient Medications   Medication Instructions    acetaminophen (TYLENOL EXTRA STRENGTH) 1,000 mg, oral, Every 6 hours PRN    acetone, urine, test strip DRIP URINE TO CHECK FOR KETONES IF NAUSEA/VOMITING OR IF CONCERN FOR DKA OR DEHYDRATION    albuterol 2.5 mg, inhalation, Every 6 hours PRN    Alcohol Prep Pads pads, medicated     blood-glucose sensor (Dexcom G6 Sensor) device 1 each, miscellaneous, Continuous    blood-glucose sensor (Dexcom G7 Sensor) device Change sensor every 10 days    blood-glucose sensor device APPLY NEW DEVICE EVERY 10 DAYS    ClearLax 17 g, oral, 2 times daily PRN    ferrous sulfate, 325 mg ferrous sulfate, (iron) tablet 1 tablet, oral, Daily with breakfast    fluticasone (Flovent Diskus) 250 mcg/actuation diskus inhaler 1 puff, inhalation, 2 times daily RT, Rinse mouth with water after use to  "reduce aftertaste and incidence of candidiasis. Do not swallow.    glucose 4 gram chewable tablet oral, USE AS DIRECTED TO TREAT HYPOGLYCEMIA    Gvoke HypoPen 1-Pack 1 mg, subcutaneous, Once as needed    ibuprofen 600 mg, oral, Every 6 hours PRN    insulin glargine (Lantus Solostar U-100 Insulin) 100 unit/mL (3 mL) pen Inject 10u in the morning.    insulin lispro (HumaLOG KwikPen Insulin) 100 unit/mL injection Inject 1u for every 10 carbs with meals. May take up to 50u per day.    lidocaine (Lidoderm) 5 % patch 1 patch, transdermal, Daily, Remove & discard patch within 12 hours or as directed by MD.    NIFEdipine ER (ADALAT CC) 30 mg, oral, Daily, Do not crush, chew, or split.    OneTouch Verio test strips strip TEST 6-7 TIMES DAILY    oxyCODONE (ROXICODONE) 5 mg, oral, Every 6 hours PRN    Peak Air Peak Flow Meter device     pen needle, diabetic (Pen Needle) 32 gauge x 5/32\" needle 1 applicator, miscellaneous, Daily, Use as needed while taking insulin.    pen needle, diabetic 32 gauge x 5/32\" needle USE AS DIRECTED WITH INSULIN USE AS DIRECTED TO INJECT INSULIN 4-6 TIMES DAILY    Ventolin HFA 90 mcg/actuation inhaler 1-2 puffs, inhalation, Every 4 hours PRN        Inpatient medications:  Scheduled medications       Continuous medications  insulin regular, 0-50 Units/hr, Last Rate: 7 Units/hr (07/23/24 2154)      PRN medications       Objective    Blood pressure 97/61, pulse 102, temperature 37.1 °C (98.8 °F), temperature source Temporal, resp. rate 16, height 1.524 m (5'), weight 50.4 kg (111 lb 1.8 oz), SpO2 100%, currently breastfeeding.     Physical Exam  Constitutional:       General: She is not in acute distress.     Appearance: Normal appearance.   HENT:      Head: Normocephalic and atraumatic.   Eyes:      Extraocular Movements: Extraocular movements intact.   Cardiovascular:      Rate and Rhythm: Regular rhythm. Tachycardia present.   Pulmonary:      Effort: Pulmonary effort is normal.      Comments: Pt on " room air   Abdominal:      General: Abdomen is flat.      Palpations: Abdomen is soft.      Tenderness: There is no abdominal tenderness.   Musculoskeletal:         General: No swelling.   Skin:     General: Skin is warm and dry.   Neurological:      General: No focal deficit present.      Mental Status: She is alert and oriented to person, place, and time.   Psychiatric:         Mood and Affect: Mood normal.         Behavior: Behavior normal.         Thought Content: Thought content normal.         Judgment: Judgment normal.            Intake/Output Summary (Last 24 hours) at 7/23/2024 2304  Last data filed at 7/23/2024 2151  Gross per 24 hour   Intake 999 ml   Output --   Net 999 ml     Labs:   Results from last 72 hours   Lab Units 07/23/24 2004 07/23/24  1840   SODIUM mmol/L 132* 129*   POTASSIUM mmol/L 5.1 6.3*   CHLORIDE mmol/L 98 92*   CO2 mmol/L 11* 14*   BUN mg/dL 20 21   CREATININE mg/dL 0.86 0.97   GLUCOSE mg/dL 513* 519*   CALCIUM mg/dL 9.0 9.7   ANION GAP mmol/L 28* 29*   EGFR mL/min/1.73m*2 >90 84   PHOSPHORUS mg/dL  --  6.6*      Results from last 72 hours   Lab Units 07/23/24  1840   WBC AUTO x10*3/uL 10.5   HEMOGLOBIN g/dL 12.8   HEMATOCRIT % 38.1   PLATELETS AUTO x10*3/uL 393   NEUTROS PCT AUTO % 80.2   LYMPHS PCT AUTO % 15.3   MONOS PCT AUTO % 3.2   EOS PCT AUTO % 0.1        Micro/ID:     Lab Results   Component Value Date    URINECULTURE NO SIGNIFICANT GROWTH. 07/06/2023       Summary of Key Imaging Results  None     Assessment and Plan     Assessment:  Maria Isabel Cutler is a 24 y.o. year old female patient admitted to the MICU on 07/23/24 for DKA.     Mechanical Ventilation: none  Sedation/Analgesia:  none  Restraints: no    Plan:  NEUROLOGY/PSYCH:  Dx: depression  Per chart review, seems like there was Some c/f SI in past   Not on any home meds   Management:  Ctm     CARDIOVASCULAR:  Dx: n/a     PULMONARY:  Dx: asthma (per chart review)   There are some inhalers listed in her med rec that are  "listed as \"historical med\" or \"not taking\"  Pt on RA satting at 100%   Management:  Continue to monitor     RENAL/GENITOURINARY:  Dx: hyperkalemia - resolved   Initial K in ED was 6.3, follow up K was 5.1   DKA protocol started in ED - on insulin gtt + maintenance LR as below   Management:  Repeat RFP in micu   Daily RFP at the least   Will get EKG and trops given chest pain     GASTROENTEROLOGY:  Dx: n/a     ENDOCRINOLOGY:  Dx: T1DM c/b DKA   Initial labs in ED showed gluc 519, Na 129, K 6.3, Cl 92, bicarb 14, BUN 21, Cr 0.97, Phos 6.6  Calculated AG is 23.   VBG: metabolic acidosis with pH 7.14, pCO2 41, bicarb 14 with lactate 3.4.   UA shows 4+ ketones   ED interventions: s/p 1L LR, insulin gtt  Management:  Will collect BHB  Follow DKA protocol - pt on insulin gtt at 7u/hour, will give another 1L LR bolus     HEMATOLOGY:  Dx: n/a     MUSCULOSKELETAL/ SKIN:  Dx: n/a     INFECTIOUS DISEASE:  Dx: n/a     ICU Check List     FEN  Fluids: prn   Electrolytes: prn  Nutrition: renal   Prophylaxis:  DVT ppx:   GI ppx:   Bowel care:   Hardware:  Catheter:   Access:   Drains: n/a  Social:  Code: Full Code    NOK:   Disposition: [facility]UNTER    Cindi Hollins MD   07/23/24 at 11:04 PM     Disclaimer: Documentation completed with the information available at the time of input. The times in the chart may not be reflective of actual patient care times, interventions, or procedures. Documentation occurs after the physical care of the patient.       "

## 2024-07-24 NOTE — CARE PLAN
Problem: Pain - Adult  Goal: Verbalizes/displays adequate comfort level or baseline comfort level  Flowsheets (Taken 7/23/2024 2327)  Verbalizes/displays adequate comfort level or baseline comfort level: Assess pain using appropriate pain scale     Problem: Safety - Adult  Goal: Free from fall injury  Flowsheets (Taken 7/23/2024 2327)  Free from fall injury:   Instruct family/caregiver on patient safety   Based on caregiver fall risk screen, instruct family/caregiver to ask for assistance with transferring infant if caregiver noted to have fall risk factors   The patient's goals for the shift include

## 2024-07-24 NOTE — PROGRESS NOTES
Floor Readiness Note       I, personally, evaluated Maria Isabel Cutler prior to transfer to the floor, including reviewing all current laboratory and imaging studies. The patient remains appropriate for transfer to the floor. Bedside nurse and respiratory therapy are also in agreement of patient's readiness for the floor.     Brief summary:  Maria Isabel Cutler is a 24 y.o. female who was admitted to the MICU on 07/23/2024 for DKA. They have been treated with insulin drip, 10 units of lantus and D5 NS. See ICU to wolff note for more information.     Updated focused Physical Exam:  Constitutional:       General: She is not in acute distress.     Appearance: Normal appearance.   HENT:      Head: Normocephalic and atraumatic.   Eyes:      Extraocular Movements: Extraocular movements intact.   Cardiovascular:      Rate and Rhythm: Regular rhythm.   Pulmonary:      Effort: Pulmonary effort is normal.   Abdominal:      General: Abdomen is flat.      Palpations: Abdomen is soft.      Tenderness: There is no abdominal tenderness.   Musculoskeletal:         General: No swelling.   Skin:     General: Skin is warm and dry.   Neurological:      General: No focal deficit present.      Mental Status: She is alert and oriented to person, place, and time.   Psychiatric:         Mood and Affect: Mood normal.         Behavior: Behavior normal.         Thought Content: Thought content normal.         Judgment: Judgment normal.     Current Vital Signs:  Heart Rate: 96 (07/24/24 1200 : Maria L Grant RN)  BP: 96/65 (07/24/24 1200 : Maria L Grant, RN)  Temp: 36.7 °C (98.1 °F) (07/24/24 1200 : Byron Calderón)  Resp: 16 (07/24/24 1200 : Maria L Grant, RN)  SpO2: 100 % (07/24/24 1200 : Maria L Grant, RN)    Relevant updates since rounds:      Accepting team, Hospitalist B, received verbal sign out and the Provider Care team/Attending has been updated. Bedside nurse will now call accepting nurse for report and patient will be transferred to  [place] [id number].    Rajinder Mariee MD

## 2024-07-24 NOTE — ED PROVIDER NOTES
History of Present Illness     History provided by: Patient  Limitations to History: None  External Records Reviewed with Brief Summary: Outpatient progress note from 05/24/2024 which showed the patient was taking 14 units of lantus with sliding scale lispro. AT that time medical team noted that the patients blood glucose control was not at goal and she was not using insulin consistently.     HPI:  Maria Isabel Cutler is a 24 y.o. female with a history of Type 1 Diabetes on Lantus and Lispro, who presents to the ED for evaluation of abdominal pain and several episodes of vomiting which onset this morning. The patient describes her abdominal pain as sharp and constant, primarily localized to her lower belly. Since arriving in the emergency department, the patient has also developed a lower back pain as well as a headache present in the middle of her head. Patient was experiencing chest pain at home which has since resolved. She states this was a constant pain until it went away. Patient states she developed shortness of breath prior to coming into the emergency department. Patient expresses desire to get a pregnancy test while in the department. Patient states she has been taking her insulin as prescribed up until this morning when she realized she was out of her regular injection. She takes Lantus and Lispro. Patient states she has been hospitalized in the ICU before for her diabetes.       Physical Exam   Triage vitals:  T    HR (!) 116  /73  RR 16  O2 100 % None (Room air)    General: Awake, alert, in no acute distress  Eyes: Gaze conjugate.  No scleral icterus or injection  HENT: Normo-cephalic, atraumatic. No stridor. Mucous membranes dry.   CV: Tachycardic rate, regular rhythm. Radial and DP pulses 2+ bilaterally  Resp: Increased work of breathing noted, though patient is speaking in full sentences.  Clear to auscultation bilaterally.   GI: Soft, non-distended, with tenderness over the suprapubic region  and right lower quadrant. Some voluntary guarding noted over the aforementioned areas with no rebound.  MSK/Extremities: No gross bony deformities. Moving all extremities. No midline thoracic or lumbar spinal tenderness. No CVA tenderness.   Skin: Warm. Appropriate color  Neuro: Alert. Oriented. Face symmetric. Speech is fluent.  Patient ambulated from wheelchair in room to the bed.   Psych: Appropriate mood and affect    Medical Decision Making & ED Course   Medical Decision Makin y.o. female who presents to the ED for evaluation of abdominal pain and vomiting since this morning. On arrival VBG was performed which revealed that the patient was in DKA. Initial anion gap was noted to be 28 with a pH of 7.13.     DKA due to viral illness vs. Missed insulin vs. other infections and stressors such as a urinary tract infection with or without pyelonephritis, appendicitis, ectopic pregnancy. Patient started on a 1L bolus of LR, Following initial potassium results from CMP, patient was started on an insulin drip as well.     On attending physician evaluation of the patient, additional history reveals that while the patient has been taking her insulin, she woke up this morning and realized she was out of her sliding scale medications. At this time, feel that missed insulin is likely more of the picture for her DKA. CBC reveals no leukocytosis or bandemia. There is no evidence of infection in her urine.      Patient was given oral Tylenol for her back pain while nursing staff worked to start an additional IV as the other was being used for the insulin and fluids.     Urine pregnancy was negative. Patient reevaluated and informed of her results this far. Patient resting, still tachycardic on the monitor, denies any increasing shortness of breath.     Attending Physician, Dr. Mendoza, spoke with the MICU team regarding the patient presentation and treatment thus far in the department. They agree to admit the patient at  this time. Patient was updated and her questions were answered.     ----         Social Determinants of Health which Significantly Impact Care: None identified     Chronic conditions affecting the patient's care: As documented above in [facility]    The patient was discussed with the following consultants/services:  Medical ICU attending who accepted the patient for admission .     Care Considerations: As documented above in [facility]    ED Course:  Diagnoses as of 07/23/24 2214   Diabetic ketoacidosis without coma associated with other specified diabetes mellitus (Multi)     Disposition   As a result of their workup, the patient will require admission to the hospital.  The patient was informed of her diagnosis.  The patient was given the opportunity to ask questions and I answered them. The patient agreed to be admitted to the hospital.    Procedures   Procedures    Patient seen and discussed with ED attending physician.    Shaista Fajardo DO  Emergency Medicine     Shaista Fajardo DO  Resident  07/24/24 0014

## 2024-07-24 NOTE — NURSING NOTE
Admission Note: 7/24/24: 1500    Patient arrived to [address] MICU. Patient is alert and oriented x4. Belongings at bedside including a cell phone, a phone , a pair of flips and a outfit. VSS Patient oriented to room and call light. Discharge disposition unknown at this time.    Fernanda Velásquez RN

## 2024-07-24 NOTE — CARE PLAN
Problem: Skin  Goal: Decreased wound size/increased tissue granulation at next dressing change  Outcome: Progressing  Flowsheets (Taken 7/24/2024 0132)  Decreased wound size/increased tissue granulation at next dressing change: Promote sleep for wound healing  Goal: Participates in plan/prevention/treatment measures  Outcome: Progressing  Flowsheets (Taken 7/24/2024 0132)  Participates in plan/prevention/treatment measures: Increase activity/out of bed for meals  Goal: Prevent/manage excess moisture  Outcome: Progressing  Flowsheets (Taken 7/24/2024 0132)  Prevent/manage excess moisture: Moisturize dry skin  Goal: Prevent/minimize sheer/friction injuries  Outcome: Progressing  Flowsheets (Taken 7/24/2024 0132)  Prevent/minimize sheer/friction injuries:   Turn/reposition every 2 hours/use positioning/transfer devices   HOB 30 degrees or less  Goal: Promote/optimize nutrition  Outcome: Progressing  Flowsheets (Taken 7/24/2024 0132)  Promote/optimize nutrition: Monitor/record intake including meals  Goal: Promote skin healing  Outcome: Progressing  Flowsheets (Taken 7/24/2024 0132)  Promote skin healing:   Assess skin/pad under line(s)/device(s)   Protective dressings over bony prominences

## 2024-07-24 NOTE — CARE PLAN
The patient's goals for the shift include  rest    The clinical goals for the shift include pt's BG will remain <250 but >70 during shift.      Problem: Fall/Injury  Goal: Not fall by end of shift  Outcome: Progressing  Goal: Be free from injury by end of the shift  Outcome: Progressing     Problem: Diabetes  Goal: Achieve decreasing blood glucose levels by end of shift  Outcome: Progressing  Goal: Increase stability of blood glucose readings by end of shift  Outcome: Progressing  Goal: Maintain glucose levels >70mg/dl to <250mg/dl throughout shift  Outcome: Progressing     Problem: Safety - Adult  Goal: Free from fall injury  Outcome: Progressing

## 2024-07-24 NOTE — CARE PLAN
Problem: Skin  Goal: Decreased wound size/increased tissue granulation at next dressing change  Outcome: Progressing  Flowsheets (Taken 7/24/2024 0836)  Decreased wound size/increased tissue granulation at next dressing change: Promote sleep for wound healing  Goal: Participates in plan/prevention/treatment measures  Outcome: Progressing  Flowsheets (Taken 7/24/2024 0132 by Ro Malone RN)  Participates in plan/prevention/treatment measures: Increase activity/out of bed for meals  Goal: Prevent/manage excess moisture  Outcome: Progressing  Flowsheets (Taken 7/24/2024 0836)  Prevent/manage excess moisture: Moisturize dry skin  Goal: Prevent/minimize sheer/friction injuries  Outcome: Progressing  Flowsheets (Taken 7/24/2024 0836)  Prevent/minimize sheer/friction injuries:   Complete micro-shifts as needed if patient unable. Adjust patient position to relieve pressure points, not a full turn   HOB 30 degrees or less   Turn/reposition every 2 hours/use positioning/transfer devices   Increase activity/out of bed for meals   Use pull sheet  Goal: Promote/optimize nutrition  Outcome: Progressing  Flowsheets (Taken 7/24/2024 0836)  Promote/optimize nutrition:   Monitor/record intake including meals   Consume > 50% meals/supplements   Offer water/supplements/favorite foods  Goal: Promote skin healing  Outcome: Progressing  Flowsheets (Taken 7/24/2024 0836)  Promote skin healing:   Assess skin/pad under line(s)/device(s)   Protective dressings over bony prominences     Problem: Fall/Injury  Goal: Not fall by end of shift  Outcome: Progressing  Goal: Be free from injury by end of the shift  Outcome: Progressing  Goal: Verbalize understanding of personal risk factors for fall in the hospital  Outcome: Progressing     Problem: Diabetes  Goal: Achieve decreasing blood glucose levels by end of shift  Outcome: Progressing  Goal: Increase stability of blood glucose readings by end of shift  Outcome: Progressing  Goal: Maintain  electrolyte levels within acceptable range throughout shift  Outcome: Progressing  Goal: Maintain glucose levels >70mg/dl to <250mg/dl throughout shift  Outcome: Progressing  Goal: No changes in neurological exam by end of shift  Outcome: Progressing  Goal: Learn about and adhere to nutrition recommendations by end of shift  Outcome: Progressing  Goal: Vital signs within normal range for age by end of shift  Outcome: Progressing     Problem: Pain - Adult  Goal: Verbalizes/displays adequate comfort level or baseline comfort level  Outcome: Progressing  Flowsheets (Taken 7/23/2024 3682 by Ro Malone RN)  Verbalizes/displays adequate comfort level or baseline comfort level: Assess pain using appropriate pain scale     Problem: Safety - Adult  Goal: Free from fall injury  Outcome: Progressing  Flowsheets (Taken 7/24/2024 1541)  Free from fall injury:   Instruct family/caregiver on patient safety   Based on caregiver fall risk screen, instruct family/caregiver to ask for assistance with transferring infant if caregiver noted to have fall risk factors     Problem: Nutrition  Goal: Oral intake greater 75%  Outcome: Progressing  Goal: Adequate PO fluid intake  Outcome: Progressing  Goal: Nutrition support goals are met within 48 hrs  Outcome: Progressing  Goal: Nutrition support is meeting 75% of nutrient needs  Outcome: Progressing  Goal: BG  mg/dL  Outcome: Progressing  Goal: Lab values WNL  Outcome: Progressing  Goal: Electrolytes WNL  Outcome: Progressing  Goal: Maintain stable weight  Outcome: Progressing

## 2024-07-24 NOTE — PROGRESS NOTES
Pharmacy Medication History Review    Maria Isabel Cutler is a 24 y.o. female admitted for Diabetic ketoacidosis without coma associated with other specified diabetes mellitus (Multi). Pharmacy reviewed the patient's [id number] medications and allergies for accuracy.    Medications ADDED:  N/A  Medications CHANGED:  Lantus 100units/mL: 12 units subQ every morning  Medications REMOVED:   Nifedipine ER 30mg  Flovent Diskus 250mcg/actuation      The list below reflects the updated PTA list. Comments regarding how patient may be taking medications differently can be found in the Admit Orders Activity  Prior to Admission Medications   Prescriptions Last Dose Informant   Alcohol Prep Pads pads, medicated  Self   NIFEdipine ER (Adalat CC) 30 mg 24 hr tablet Not Taking Self   Sig: Take 1 tablet (30 mg) by mouth once daily. Do not crush, chew, or split.   Patient not taking: Reported on 2024   OneTouch Verio test strips strip  Self   Sig: TEST 6-7 TIMES DAILY   Peak Air Peak Flow Meter device  Self   Ventolin HFA 90 mcg/actuation inhaler  Self   Sig: Inhale 1-2 puffs every 4 hours if needed for wheezing or shortness of breath.   acetaminophen (Tylenol Extra Strength) 500 mg tablet  Self   Sig: Take 2 tablets (1,000 mg) by mouth every 6 hours if needed for mild pain (1 - 3).   Patient not taking: Reported on 2024   acetone, urine, test strip  Self   Sig: DRIP URINE TO CHECK FOR KETONES IF NAUSEA/VOMITING OR IF CONCERN FOR DKA OR DEHYDRATION   albuterol 2.5 mg /3 mL (0.083 %) nebulizer solution  Self   Sig: Inhale 3 mL (2.5 mg) every 6 hours if needed for wheezing.   blood-glucose sensor (Dexcom G6 Sensor) device  Self   Si each continuously.   Patient not taking: Reported on 2024   blood-glucose sensor (Dexcom G7 Sensor) device  Self   Sig: Change sensor every 10 days   blood-glucose sensor device  Self   Sig: APPLY NEW DEVICE EVERY 10 DAYS   Patient not taking: Reported on 2024   ferrous  "sulfate, 325 mg ferrous sulfate, (iron) tablet  Self   Sig: Take 1 tablet by mouth once daily with a meal.   Patient not taking: Reported on 2024   fluticasone (Flovent Diskus) 250 mcg/actuation diskus inhaler Not Taking Self   Sig: Inhale 1 puff 2 times a day. Rinse mouth with water after use to reduce aftertaste and incidence of candidiasis. Do not swallow.   Patient not taking: Reported on 2024   glucagon 1 mg/0.2 mL auto-injector  Self   Sig: Inject 1 mg under the skin 1 time if needed (use for low blood sugar if unable to take anything by mouth) for up to 1 dose.   glucose 4 gram chewable tablet  Self   Sig: Chew. USE AS DIRECTED TO TREAT HYPOGLYCEMIA   ibuprofen 600 mg tablet  Self   Sig: Take 1 tablet (600 mg) by mouth every 6 hours if needed for mild pain (1 - 3).   Patient not taking: Reported on 2024   insulin glargine (Lantus Solostar U-100 Insulin) 100 unit/mL (3 mL) pen  Self   Sig: Inject 10u in the morning.   Patient taking differently: Inject 12 Units under the skin once daily in the morning.   insulin lispro (HumaLOG KwikPen Insulin) 100 unit/mL injection  Self   Sig: Inject 1u for every 10 carbs with meals. May take up to 50u per day.   lidocaine (Lidoderm) 5 % patch  Self   Sig: Place 1 patch over 12 hours on the skin once daily. Remove & discard patch within 12 hours or as directed by MD.   Patient not taking: Reported on 2024   oxyCODONE (Roxicodone) 5 mg immediate release tablet  Self   Sig: Take 1 tablet (5 mg) by mouth every 6 hours if needed for moderate pain (4 - 6) or severe pain (7 - 10).   Patient not taking: Reported on 2024   pen needle, diabetic (Pen Needle) 32 gauge x \" needle  Self   Si applicator once daily. Use as needed while taking insulin.   pen needle, diabetic 32 gauge x \" needle  Self   Sig: USE AS DIRECTED WITH INSULIN USE AS DIRECTED TO INJECT INSULIN 4-6 TIMES DAILY   polyethylene glycol (Glycolax, Miralax) 17 gram/dose powder  Self "   Sig: Take 17 g by mouth 2 times a day as needed (constipation).   Patient not taking: Reported on 5/24/2024      Facility-Administered Medications: None       The list below reflects the updated allergy list. Please review each documented allergy for additional clarification and justification.  Allergies  Reviewed by Lenin Crawford RN on 7/23/2024   No Known Allergies         Patient accepts M2B at discharge. Pharmacy has been updated to [facility].    Sources used to complete the med history include out patient fill history, OARRS, and patient interview   Reliable historian     Below are additional concerns with the patient's PTA list.  N/A    Telly Adamson, PharmD  Transitions of Care Pharmacist  [facility]  Please reach out via Secure Chat for questions, or if no response call [employer] or [facility]

## 2024-07-25 ENCOUNTER — PHARMACY VISIT (OUTPATIENT)
Dept: PHARMACY | Facility: CLINIC | Age: 24
End: 2024-07-25
Payer: MEDICAID

## 2024-07-25 ENCOUNTER — DOCUMENTATION (OUTPATIENT)
Dept: BEHAVIORAL HEALTH | Facility: CLINIC | Age: 24
End: 2024-07-25
Payer: COMMERCIAL

## 2024-07-25 VITALS
BODY MASS INDEX: 22.29 KG/M2 | DIASTOLIC BLOOD PRESSURE: 67 MMHG | HEART RATE: 91 BPM | TEMPERATURE: 98.1 F | HEIGHT: 60 IN | WEIGHT: 113.54 LBS | RESPIRATION RATE: 16 BRPM | SYSTOLIC BLOOD PRESSURE: 98 MMHG | OXYGEN SATURATION: 99 %

## 2024-07-25 LAB
ALBUMIN SERPL BCP-MCNC: 3.5 G/DL (ref 3.4–5)
ANION GAP SERPL CALC-SCNC: 14 MMOL/L (ref 10–20)
BUN SERPL-MCNC: 10 MG/DL (ref 6–23)
CALCIUM SERPL-MCNC: 8.3 MG/DL (ref 8.6–10.6)
CHLORIDE SERPL-SCNC: 102 MMOL/L (ref 98–107)
CO2 SERPL-SCNC: 23 MMOL/L (ref 21–32)
CREAT SERPL-MCNC: 0.56 MG/DL (ref 0.5–1.05)
EGFRCR SERPLBLD CKD-EPI 2021: >90 ML/MIN/1.73M*2
ERYTHROCYTE [DISTWIDTH] IN BLOOD BY AUTOMATED COUNT: 12.4 % (ref 11.5–14.5)
GLUCOSE BLD MANUAL STRIP-MCNC: 157 MG/DL (ref 74–99)
GLUCOSE BLD MANUAL STRIP-MCNC: 266 MG/DL (ref 74–99)
GLUCOSE BLD MANUAL STRIP-MCNC: 393 MG/DL (ref 74–99)
GLUCOSE SERPL-MCNC: 286 MG/DL (ref 74–99)
HCT VFR BLD AUTO: 34.2 % (ref 36–46)
HGB BLD-MCNC: 11.3 G/DL (ref 12–16)
MCH RBC QN AUTO: 29.7 PG (ref 26–34)
MCHC RBC AUTO-ENTMCNC: 33 G/DL (ref 32–36)
MCV RBC AUTO: 90 FL (ref 80–100)
NRBC BLD-RTO: 0 /100 WBCS (ref 0–0)
PHOSPHATE SERPL-MCNC: 2.3 MG/DL (ref 2.5–4.9)
PLATELET # BLD AUTO: 417 X10*3/UL (ref 150–450)
POTASSIUM SERPL-SCNC: 4 MMOL/L (ref 3.5–5.3)
RBC # BLD AUTO: 3.81 X10*6/UL (ref 4–5.2)
SODIUM SERPL-SCNC: 135 MMOL/L (ref 136–145)
WBC # BLD AUTO: 8.3 X10*3/UL (ref 4.4–11.3)

## 2024-07-25 PROCEDURE — RXMED WILLOW AMBULATORY MEDICATION CHARGE

## 2024-07-25 PROCEDURE — 36415 COLL VENOUS BLD VENIPUNCTURE: CPT | Performed by: STUDENT IN AN ORGANIZED HEALTH CARE EDUCATION/TRAINING PROGRAM

## 2024-07-25 PROCEDURE — 85027 COMPLETE CBC AUTOMATED: CPT | Performed by: STUDENT IN AN ORGANIZED HEALTH CARE EDUCATION/TRAINING PROGRAM

## 2024-07-25 PROCEDURE — G0378 HOSPITAL OBSERVATION PER HR: HCPCS

## 2024-07-25 PROCEDURE — 80069 RENAL FUNCTION PANEL: CPT | Performed by: STUDENT IN AN ORGANIZED HEALTH CARE EDUCATION/TRAINING PROGRAM

## 2024-07-25 PROCEDURE — 2500000002 HC RX 250 W HCPCS SELF ADMINISTERED DRUGS (ALT 637 FOR MEDICARE OP, ALT 636 FOR OP/ED): Performed by: STUDENT IN AN ORGANIZED HEALTH CARE EDUCATION/TRAINING PROGRAM

## 2024-07-25 PROCEDURE — 99239 HOSP IP/OBS DSCHRG MGMT >30: CPT | Performed by: STUDENT IN AN ORGANIZED HEALTH CARE EDUCATION/TRAINING PROGRAM

## 2024-07-25 PROCEDURE — 82947 ASSAY GLUCOSE BLOOD QUANT: CPT

## 2024-07-25 RX ORDER — INSULIN GLARGINE 100 [IU]/ML
12 INJECTION, SOLUTION SUBCUTANEOUS EVERY MORNING
Qty: 15 ML | Refills: 2 | Status: SHIPPED | OUTPATIENT
Start: 2024-07-25 | End: 2024-07-30

## 2024-07-25 RX ORDER — PEN NEEDLE, DIABETIC 30 GX3/16"
1 NEEDLE, DISPOSABLE MISCELLANEOUS 4 TIMES DAILY
Qty: 200 EACH | Refills: 2 | Status: SHIPPED | OUTPATIENT
Start: 2024-07-25 | End: 2024-08-30 | Stop reason: SDUPTHER

## 2024-07-25 RX ORDER — INSULIN LISPRO 100 [IU]/ML
INJECTION, SOLUTION INTRAVENOUS; SUBCUTANEOUS
Qty: 15 ML | Refills: 11 | Status: SHIPPED | OUTPATIENT
Start: 2024-07-25 | End: 2024-08-30 | Stop reason: SDUPTHER

## 2024-07-25 RX ORDER — ACETAMINOPHEN 325 MG/1
975 TABLET ORAL EVERY 8 HOURS PRN
Status: DISCONTINUED | OUTPATIENT
Start: 2024-07-25 | End: 2024-07-25 | Stop reason: HOSPADM

## 2024-07-25 RX ORDER — INSULIN LISPRO 100 [IU]/ML
2 INJECTION, SOLUTION INTRAVENOUS; SUBCUTANEOUS ONCE
Status: COMPLETED | OUTPATIENT
Start: 2024-07-25 | End: 2024-07-25

## 2024-07-25 SDOH — ECONOMIC STABILITY: FOOD INSECURITY: WITHIN THE PAST 12 MONTHS, YOU WORRIED THAT YOUR FOOD WOULD RUN OUT BEFORE YOU GOT MONEY TO BUY MORE.: SOMETIMES TRUE

## 2024-07-25 SDOH — ECONOMIC STABILITY: FOOD INSECURITY
WITHIN THE PAST 12 MONTHS, YOU WORRIED THAT YOUR FOOD WOULD RUN OUT BEFORE YOU GOT THE MONEY TO BUY MORE.: SOMETIMES TRUE

## 2024-07-25 SDOH — ECONOMIC STABILITY: TRANSPORTATION INSECURITY

## 2024-07-25 SDOH — ECONOMIC STABILITY: FOOD INSECURITY: WITHIN THE PAST 12 MONTHS, THE FOOD YOU BOUGHT JUST DIDN'T LAST AND YOU DIDN'T HAVE MONEY TO GET MORE.: SOMETIMES TRUE

## 2024-07-25 SDOH — ECONOMIC STABILITY: TRANSPORTATION INSECURITY
IN THE PAST 12 MONTHS, HAS THE LACK OF TRANSPORTATION KEPT YOU FROM MEDICAL APPOINTMENTS OR FROM GETTING MEDICATIONS?: YES

## 2024-07-25 SDOH — ECONOMIC STABILITY: TRANSPORTATION INSECURITY
IN THE PAST 12 MONTHS, HAS LACK OF TRANSPORTATION KEPT YOU FROM MEETINGS, WORK, OR FROM GETTING THINGS NEEDED FOR DAILY LIVING?: YES

## 2024-07-25 SDOH — ECONOMIC STABILITY: FOOD INSECURITY

## 2024-07-25 SDOH — ECONOMIC STABILITY: TRANSPORTATION INSECURITY: IN THE PAST 12 MONTHS, HAS LACK OF TRANSPORTATION KEPT YOU FROM MEDICAL APPOINTMENTS OR FROM GETTING MEDICATIONS?: YES

## 2024-07-25 ASSESSMENT — ACTIVITIES OF DAILY LIVING (ADL)
LACK_OF_TRANSPORTATION: NO
LACK_OF_TRANSPORTATION: YES

## 2024-07-25 NOTE — PROGRESS NOTES
Nutrition Note:   Nutrition Assessment    Reason for Assessment: Admission nursing screening    RDN met with pt who denies needs at present time. States appetite is good and she has no questions regarding a diabetic diet. RDN offered to phone lactation to have breast pump provided, but pt declined at this time.    I/O:   Last BM Date:  (prior to admission);      Dietary Orders (From admission, onward)       Start     Ordered    07/24/24 1342  Adult diet Consistent Carb; CCD 60 gm/meal  Diet effective now        Question Answer Comment   Diet type Consistent Carb    Carb diet selection: CCD 60 gm/meal        07/24/24 1341                    Nutrition Interventions/Recommendations         Nutrition Prescription:  Individualized Nutrition Prescription Provided for : consistent carb diet, 60 gm/meal; reconsult should needs change           Time Spent (min): 15 minutes

## 2024-07-25 NOTE — PROGRESS NOTES
Provider met with patient and GELY Cisneros while patient was in the hospital. Patient was getting ready to be discharged. Provider and GELY RAPHAEL April spoke with patient about getting rescheduled for Assessment for Personalized Care Program. Patient agreed to reschedule for the following Friday August 2nd.     Time Spent: 11 minutes

## 2024-07-25 NOTE — PROGRESS NOTES
07/25/24 1227   Discharge Planning   Living Arrangements Children   Support Systems Children   Assistance Needed none   Type of Residence Private residence   Who is requesting discharge planning? Provider   Home or Post Acute Services Other (Comment)  ( Healthy at Home virtual monitoring)   Expected Discharge Disposition Home   Does the patient need discharge transport arranged? Yes  (insurance uber/lyft)   RoundTrip coordination needed? Yes   Financial Resource Strain   How hard is it for you to pay for the very basics like food, housing, medical care, and heating? Not hard   Transportation Needs   In the past 12 months, has lack of transportation kept you from medical appointments or from getting medications? no   In the past 12 months, has lack of transportation kept you from meetings, work, or from getting things needed for daily living? No     Met with pt and introduced myself as care coordinator and member of the Care Transitions team for discharge planning. Pt lives at home with her infant son, she reports being completely independent with ADL's/iADL's. Pt walks without use of an assistive device. Pt denies any falls. Pt stated she feels safe at home. Pt's address, phone number, and contact information was verified. Pt denies any social or financial concerns at this time.    Home care: none.    DME: glucometer + supplies (pt reports no supplies needed for home glucometer)  : none.  PCP: Pt stated she forgot the name of provider    Last appt: 2 months ago   Transport to appts: [employer].  Pharm: [facility]  (denies issues affording/obtaining medications)    Discharge Planning: Pt admitted to ICU for DKA requiring insulin gtt now s/p transfer, plan for discharge home today, [id number] ordered. Pt voiced no additional discharge needs other than prescriptions and transport, she stated she is comfortable with self glucometer monitoring + insulin injections. Pt is agreeable to   Healthy at Home virtual monitoring for assistance with uncontrolled DM. Pt aware she will need to answer their calls to participate in the program and agreed to do so. Referral made to  [facility] Home for virtual monitoring due to uncontrolled DM. Pt provided with insurance transport [employer] # [phone] to request an uber/lyft at time of discharge, verbal and written instructions on how to schedule transport provided. Pt voiced no additional questions or concerns regarding discharge planning. Attending and nursing updated of above.  Care coordinator will continue to follow for discharge planning needs.    Feliciano King RN  Transitional Care Coordinator/TCC  [phone]

## 2024-07-25 NOTE — NURSING NOTE
Discharge note:    This RN went over discharge instructions with patient, patient verbalized understanding. 2 IV's taken out intact. Patient has all belongings including clothing, shoes, cell phone, M2B, and cell phone . Transport to take patient off unit via wheelchair. Disposition is home via lyft. Referral to [facility] at home made by Feliciano King RN, TAISHA Whipple RN

## 2024-07-25 NOTE — ED PROCEDURE NOTE
Procedure  Critical Care    Performed by: Ole Mendoza DO  Authorized by: Ole Mendoza DO    Critical care provider statement:     Critical care was necessary to treat or prevent imminent or life-threatening deterioration of the following conditions:  Endocrine crisis  Comments:      I have personally spent 33 minutes of critical care time, exclusive of time spent on any procedures, in evaluation and management of this critically ill patient                 Ole Mendoza DO  07/25/24 0701

## 2024-07-26 ENCOUNTER — APPOINTMENT (OUTPATIENT)
Dept: ENDOCRINOLOGY | Facility: CLINIC | Age: 24
End: 2024-07-26
Payer: COMMERCIAL

## 2024-07-26 ENCOUNTER — DOCUMENTATION (OUTPATIENT)
Dept: BEHAVIORAL HEALTH | Facility: CLINIC | Age: 24
End: 2024-07-26
Payer: COMMERCIAL

## 2024-07-28 ENCOUNTER — PATIENT OUTREACH (OUTPATIENT)
Dept: HOME HEALTH SERVICES | Age: 24
End: 2024-07-28
Payer: COMMERCIAL

## 2024-07-28 SDOH — HEALTH STABILITY: MENTAL HEALTH: HOW OFTEN DO YOU HAVE 6 OR MORE DRINKS ON ONE OCCASION?: NEVER

## 2024-07-28 SDOH — HEALTH STABILITY: MENTAL HEALTH
STRESS IS WHEN SOMEONE FEELS TENSE, NERVOUS, ANXIOUS, OR CAN'T SLEEP AT NIGHT BECAUSE THEIR MIND IS TROUBLED. HOW STRESSED ARE YOU?: VERY MUCH

## 2024-07-28 SDOH — SOCIAL STABILITY: SOCIAL INSECURITY
WITHIN THE LAST YEAR, HAVE YOU BEEN KICKED, HIT, SLAPPED, OR OTHERWISE PHYSICALLY HURT BY YOUR PARTNER OR EX-PARTNER?: NO

## 2024-07-28 SDOH — SOCIAL STABILITY: SOCIAL NETWORK: ARE YOU MARRIED, WIDOWED, DIVORCED, SEPARATED, NEVER MARRIED, OR LIVING WITH A PARTNER?: NEVER MARRIED

## 2024-07-28 SDOH — SOCIAL STABILITY: SOCIAL NETWORK: HOW OFTEN DO YOU ATTEND CHURCH OR RELIGIOUS SERVICES?: 1 TO 4 TIMES PER YEAR

## 2024-07-28 SDOH — SOCIAL STABILITY: SOCIAL INSECURITY: WITHIN THE LAST YEAR, HAVE YOU BEEN HUMILIATED OR EMOTIONALLY ABUSED IN OTHER WAYS BY YOUR PARTNER OR EX-PARTNER?: NO

## 2024-07-28 SDOH — SOCIAL STABILITY: SOCIAL INSECURITY: WITHIN THE LAST YEAR, HAVE YOU BEEN AFRAID OF YOUR PARTNER OR EX-PARTNER?: NO

## 2024-07-28 SDOH — HEALTH STABILITY: MENTAL HEALTH: HOW OFTEN DO YOU HAVE A DRINK CONTAINING ALCOHOL?: NEVER

## 2024-07-28 SDOH — SOCIAL STABILITY: SOCIAL NETWORK
DO YOU BELONG TO ANY CLUBS OR ORGANIZATIONS SUCH AS CHURCH GROUPS UNIONS, FRATERNAL OR ATHLETIC GROUPS, OR SCHOOL GROUPS?: NO

## 2024-07-28 SDOH — ECONOMIC STABILITY: INCOME INSECURITY: HOW HARD IS IT FOR YOU TO PAY FOR THE VERY BASICS LIKE FOOD, HOUSING, MEDICAL CARE, AND HEATING?: HARD

## 2024-07-28 SDOH — SOCIAL STABILITY: SOCIAL INSECURITY
WITHIN THE LAST YEAR, HAVE TO BEEN RAPED OR FORCED TO HAVE ANY KIND OF SEXUAL ACTIVITY BY YOUR PARTNER OR EX-PARTNER?: NO

## 2024-07-28 SDOH — ECONOMIC STABILITY: FOOD INSECURITY: WITHIN THE PAST 12 MONTHS, THE FOOD YOU BOUGHT JUST DIDN'T LAST AND YOU DIDN'T HAVE MONEY TO GET MORE.: SOMETIMES TRUE

## 2024-07-28 SDOH — HEALTH STABILITY: MENTAL HEALTH: HOW MANY STANDARD DRINKS CONTAINING ALCOHOL DO YOU HAVE ON A TYPICAL DAY?: PATIENT DOES NOT DRINK

## 2024-07-28 SDOH — SOCIAL STABILITY: SOCIAL NETWORK: HOW OFTEN DO YOU GET TOGETHER WITH FRIENDS OR RELATIVES?: NEVER

## 2024-07-28 SDOH — HEALTH STABILITY: PHYSICAL HEALTH: ON AVERAGE, HOW MANY DAYS PER WEEK DO YOU ENGAGE IN MODERATE TO STRENUOUS EXERCISE (LIKE A BRISK WALK)?: 0 DAYS

## 2024-07-28 SDOH — HEALTH STABILITY: MENTAL HEALTH
HOW OFTEN DO YOU NEED TO HAVE SOMEONE HELP YOU WHEN YOU READ INSTRUCTIONS, PAMPHLETS, OR OTHER WRITTEN MATERIAL FROM YOUR DOCTOR OR PHARMACY?: NEVER

## 2024-07-28 SDOH — SOCIAL STABILITY: SOCIAL NETWORK: IN A TYPICAL WEEK, HOW MANY TIMES DO YOU TALK ON THE PHONE WITH FAMILY, FRIENDS, OR NEIGHBORS?: NEVER

## 2024-07-28 SDOH — HEALTH STABILITY: PHYSICAL HEALTH: ON AVERAGE, HOW MANY MINUTES DO YOU ENGAGE IN EXERCISE AT THIS LEVEL?: 0 MIN

## 2024-07-28 SDOH — ECONOMIC STABILITY: FOOD INSECURITY: WITHIN THE PAST 12 MONTHS, YOU WORRIED THAT YOUR FOOD WOULD RUN OUT BEFORE YOU GOT MONEY TO BUY MORE.: SOMETIMES TRUE

## 2024-07-28 SDOH — SOCIAL STABILITY: SOCIAL NETWORK: HOW OFTEN DO YOU ATTENT MEETINGS OF THE CLUB OR ORGANIZATION YOU BELONG TO?: NEVER

## 2024-07-28 ASSESSMENT — LIFESTYLE VARIABLES
SKIP TO QUESTIONS 9-10: 1
AUDIT-C TOTAL SCORE: 0

## 2024-07-28 NOTE — PROGRESS NOTES
0191- Outreach for enrollment call, patient answered and asked for a return call later today.     9663- Return call to patient to enroll. Discussed Healthy at Home program with patient and she agrees to enroll. SOCRN assessment and [facility] completed. Secure chat to  for transportation, food, housing and financial needs per [facility]. Patient uses Dexcom G7 CGM. Sh estates her blood sugars have been in range of 100-140's. She states she has a new  PCP, but can't remember name, she will look and let nurse know on call tomorrow. Scheduled initial [facility] for video conference on 7/30/24 at 0830, patient states she has mychart and verbalizes understanding. Provided contact number and operating hours for [facility] at Home. No other questions at this time.

## 2024-07-29 ENCOUNTER — PATIENT OUTREACH (OUTPATIENT)
Dept: HOME HEALTH SERVICES | Age: 24
End: 2024-07-29
Payer: COMMERCIAL

## 2024-07-29 DIAGNOSIS — E10.65 TYPE 1 DIABETES MELLITUS WITH HYPERGLYCEMIA (MULTI): ICD-10-CM

## 2024-07-29 DIAGNOSIS — E10.10 DM (DIABETES MELLITUS) TYPE 1, UNCONTROLLED, WITH KETOACIDOSIS (MULTI): ICD-10-CM

## 2024-07-29 DIAGNOSIS — E10.10 DM (DIABETES MELLITUS) TYPE 1, UNCONTROLLED, WITH KETOACIDOSIS (MULTI): Primary | ICD-10-CM

## 2024-07-29 DIAGNOSIS — E13.10 DIABETIC KETOACIDOSIS WITHOUT COMA ASSOCIATED WITH OTHER SPECIFIED DIABETES MELLITUS (MULTI): Primary | ICD-10-CM

## 2024-07-29 NOTE — PROGRESS NOTES
Maria Isabel Cutler  Age: 24 y.o.  MRN: [id number]  Date: 7/26/2024  Location of service: chart review    Program Details  Medicaid Community Clinical Case Management  Status: Waiting for Assessment  Start Date: 7/24/2024  Responsible Staff: JAIME Miramontes      Goals Reviewed:      Summary:  This writer did a chart review    [id number]  15 min                 Viviane Sanford RN

## 2024-07-30 ENCOUNTER — PATIENT OUTREACH (OUTPATIENT)
Dept: HOME HEALTH SERVICES | Age: 24
End: 2024-07-30

## 2024-07-30 ENCOUNTER — APPOINTMENT (OUTPATIENT)
Dept: CARE COORDINATION | Age: 24
End: 2024-07-30
Payer: COMMERCIAL

## 2024-07-30 ENCOUNTER — TELEMEDICINE (OUTPATIENT)
Dept: PHARMACY | Facility: HOSPITAL | Age: 24
End: 2024-07-30
Payer: COMMERCIAL

## 2024-07-30 DIAGNOSIS — E10.10 DM (DIABETES MELLITUS) TYPE 1, UNCONTROLLED, WITH KETOACIDOSIS (MULTI): ICD-10-CM

## 2024-07-30 DIAGNOSIS — E10.65 TYPE 1 DIABETES MELLITUS WITH HYPERGLYCEMIA (MULTI): Primary | ICD-10-CM

## 2024-07-30 DIAGNOSIS — E13.10 DIABETIC KETOACIDOSIS WITHOUT COMA ASSOCIATED WITH OTHER SPECIFIED DIABETES MELLITUS (MULTI): ICD-10-CM

## 2024-07-30 DIAGNOSIS — O99.513 ASTHMA AFFECTING PREGNANCY IN THIRD TRIMESTER (HHS-HCC): Primary | ICD-10-CM

## 2024-07-30 DIAGNOSIS — J45.909 ASTHMA AFFECTING PREGNANCY IN THIRD TRIMESTER (HHS-HCC): Primary | ICD-10-CM

## 2024-07-30 DIAGNOSIS — E10.319 TYPE 1 DIABETES MELLITUS WITH RETINOPATHY WITHOUT MACULAR EDEMA, UNSPECIFIED LATERALITY, UNSPECIFIED RETINOPATHY SEVERITY (MULTI): Primary | ICD-10-CM

## 2024-07-30 DIAGNOSIS — E10.65 TYPE 1 DIABETES MELLITUS WITH HYPERGLYCEMIA (MULTI): ICD-10-CM

## 2024-07-30 RX ORDER — ALBUTEROL SULFATE 90 UG/1
1-2 AEROSOL, METERED RESPIRATORY (INHALATION) EVERY 4 HOURS PRN
Qty: 18 G | Refills: 11 | Status: SHIPPED | OUTPATIENT
Start: 2024-07-30

## 2024-07-30 RX ORDER — INSULIN GLARGINE 100 [IU]/ML
10 INJECTION, SOLUTION SUBCUTANEOUS EVERY MORNING
Qty: 15 ML | Refills: 2 | Status: SHIPPED | OUTPATIENT
Start: 2024-07-30

## 2024-07-30 ASSESSMENT — ENCOUNTER SYMPTOMS: BLURRED VISION: 1

## 2024-07-30 NOTE — PROGRESS NOTES
Brief summary of hospitalization or reason for referral  Admission Dx and Associated Referral Dx:   This is Ms. Cutler's initial [facility] call.    Ms. Cutler is a 24-year-old female with past medical history of asthma, diabetes mellitus type 1 who was recently hospitalized from 7/23-7/24 for DKA.    She states that she missed 1 dose of her insulin Lantus which put her into diabetic ketoacidosis.  She has had type 1 diabetes since the age 5.  Her hemoglobin A1c was 13.1.    Interval Subjective:  -Since her discharge, she has been doing okay, she denies polyuria, polydipsia.  She does admit to having blurry vision.  She does have a history of diabetic retinopathy.  -Medications reviewed  -Blood sugars have been ranging from 180-250   -Currently on Insulin Lantus 10 units, Insulin Lispro 1 unit every 10 carbs,   -Meds Reviewed  -Pt has a CGM      Masimo: No  Oxygen: No     CPAP/BIPAP: No    Wt Readings from Last 3 Encounters:   07/25/24 51.5 kg (113 lb 8.6 oz)   05/24/24 51.3 kg (113 lb)   04/05/24 47.6 kg (105 lb)       Medications Issues/Refill need  Medication Follow up action needed: None    Requested Prescriptions      No prescriptions requested or ordered in this encounter       Upcoming Visits:  Recent Visits  No visits were found meeting these conditions.  Showing recent visits within past 30 days and meeting all other requirements  Future Appointments  No visits were found meeting these conditions.  Showing future appointments within next 90 days and meeting all other requirements      Interval or Pertinent Labs/Testing:  Lab Review:   Hemoglobin A1C   Date/Time Value Ref Range Status   07/23/2024 11:14 PM 13.1 (H) see below % Final   08/08/2023 02:27 PM 7.0 (A) % Final     Comment:          Diagnosis of Diabetes-Adults   Non-Diabetic: < or = 5.6%   Increased risk for developing diabetes: 5.7-6.4%   Diagnostic of diabetes: > or = 6.5%  .       Monitoring of Diabetes                Age (y)     Therapeutic  Goal (%)   Adults:          >18           <7.0   Pediatrics:    13-18           <7.5                   7-12           <8.0                   0- 6            7.5-8.5   American Diabetes Association. Diabetes Care 33(S1), Jan 2010.     05/12/2023 12:28 AM 12.8 (A) % Final     Comment:          Diagnosis of Diabetes-Adults   Non-Diabetic: < or = 5.6%   Increased risk for developing diabetes: 5.7-6.4%   Diagnostic of diabetes: > or = 6.5%  .       Monitoring of Diabetes                Age (y)     Therapeutic Goal (%)   Adults:          >18           <7.0   Pediatrics:    13-18           <7.5                   7-12           <8.0                   0- 6            7.5-8.5   American Diabetes Association. Diabetes Care 33(S1), Jan 2010.     12/08/2022 10:50 AM 13.2 (H) 4.3 - 5.6 % Final     Comment:     American Diabetes Association guidelines indicate that patients with HgbA1c in the range 5.7-6.4% are at increased risk for development of diabetes, and intervention by lifestyle modification may be beneficial. HgbA1c greater or equal to 6.5% is considered diagnostic of diabetes.   09/02/2022 02:14 AM 13.4 (H) 4.3 - 5.6 % Final     Comment:     American Diabetes Association guidelines indicate that patients with HgbA1c in the range 5.7-6.4% are at increased risk for development of diabetes, and intervention by lifestyle modification may be beneficial. HgbA1c greater or equal to 6.5% is considered diagnostic of diabetes.   01/20/2022 11:47 AM 11.3 (A) % Final     Comment:          Diagnosis of Diabetes-Adults   Non-Diabetic: < or = 5.6%   Increased risk for developing diabetes: 5.7-6.4%   Diagnostic of diabetes: > or = 6.5%  .       Monitoring of Diabetes                Age (y)     Therapeutic Goal (%)   Adults:          >18           <7.0   Pediatrics:    13-18           <7.5                   7-12           <8.0                   0- 6            7.5-8.5   American Diabetes Association. Diabetes Care 33(S1), Jan 2010.          not applicable     SDOH Concerns & Interventions: Food insecurity - Referral made for [facility].  She currently has an 8-month-old son which she breast-feeds and provides formula.  No issues currently with obtaining formula.    Assessment/Plan  DM-1  DKA  Diabetic retinopathy.  - Hemoglobin A1C 13.1  -Currently taking 10 units of Lantus in the morning, 1 unit lispro for 10 g of carb.  Typically averages 8 units of lispro.  -Has a continuous glucose monitor.  Blood sugars have been ranging from 100-250.  -Referral sent for endocrinology, ophthalmology, podiatry.    Video Conference call done with RN Luz Maria and pharmacist Travon Flores.

## 2024-07-30 NOTE — PROGRESS NOTES
Pharmacy Post-Discharge Visit    Maria Isabel Cutler is a 24 y.o. female was referred to Clinical Pharmacy Team to complete a post-discharge medication optimization and monitoring visit.  The patient was referred for their diabetes management while now recently enrolled in our [facility].     Admission Date: 7/23/24  Discharge Date: 7/25/24    Referring Provider: Uziel Willingham*  PCP: No Assigned PCP Generic Provider, MD - nursing to help with establishing one for her       Subjective   No Known Allergies    [id number] - [place], [place] - [address], [address] [facility]  [address] [address]  Phone: [phone] Fax: [phone]    [facility] - [place] - [address] AT [facility]  [place] [address]  Phone: [phone] Fax: [phone]    [facility]  [address]  [facility] [address]  Phone: [phone] Fax: [phone]    [facility]  [address], [address]  [facility] [address]  Phone: [phone] Fax: [phone]    [id number] - Closed - [place], [place] - [address] AT [facility]  [address]  [facility] [address]  Phone: [phone] Fax: [phone]    [id number] - [facility] [address] AT [facility]  [address] [address]  Phone: [phone] Fax: [phone]      Medication System Management:  Affordability/Accessibility: medicaid   Adherence/Organization: no issues since being home       Social History     Social History Narrative    Not on file        Notable Medication changes following discharge:  Start: none new  Stop: none  Change: lantus, lispro    Diabetes  She presents for her initial diabetic visit. She has type 1 diabetes mellitus. Her disease course has been  fluctuating. There are no hypoglycemic associated symptoms. Associated symptoms include blurred vision. There are no hypoglycemic complications. Risk factors for coronary artery disease include diabetes mellitus. Current diabetic treatment includes insulin injections. She is compliant with treatment all of the time. Her weight is stable. Her overall blood glucose range is 180-200 mg/dl. An ACE inhibitor/angiotensin II receptor blocker is not being taken. She sees a podiatrist.Eye exam is not current.         Review of Systems   Eyes:  Positive for blurred vision.           Objective     There were no vitals taken for this visit.   BP Readings from Last 4 Encounters:   07/25/24 98/67   05/24/24 124/75   04/06/24 95/62   01/23/24 112/73      There were no vitals filed for this visit.     LAB  Lab Results   Component Value Date    BILITOT 0.4 07/23/2024    CALCIUM 8.3 (L) 07/25/2024    CO2 23 07/25/2024     07/25/2024    CREATININE 0.56 07/25/2024    GLUCOSE 286 (H) 07/25/2024    ALKPHOS 68 07/23/2024    K 4.0 07/25/2024    PROT 7.1 07/23/2024     (L) 07/25/2024    AST 23 07/23/2024    ALT 18 07/23/2024    BUN 10 07/25/2024    ANIONGAP 14 07/25/2024    MG 1.93 07/23/2024    PHOS 2.3 (L) 07/25/2024     (H) 11/20/2023    ALBUMIN 3.5 07/25/2024    AMYLASE 50 11/25/2017    LIPASE 14 09/29/2023    GFRF >90 09/29/2023    GFRMALE CANCELED 06/13/2023     Lab Results   Component Value Date    TRIG 56 03/20/2018    CHOL 199 01/20/2022    HDL 65.3 01/20/2022     Lab Results   Component Value Date    HGBA1C 13.1 (H) 07/23/2024         Current Outpatient Medications   Medication Instructions    acetaminophen (TYLENOL EXTRA STRENGTH) 1,000 mg, oral, Every 6 hours PRN    acetone, urine, test strip DRIP URINE TO CHECK FOR KETONES IF NAUSEA/VOMITING OR IF CONCERN FOR DKA OR DEHYDRATION    albuterol 2.5 mg, inhalation, Every 6 hours PRN    Alcohol Prep Pads pads, medicated     blood-glucose sensor (Dexcom G6 Sensor)  "device 1 each, miscellaneous, Continuous    blood-glucose sensor (Dexcom G7 Sensor) device Change sensor every 10 days    blood-glucose sensor device APPLY NEW DEVICE EVERY 10 DAYS    glucose 4 gram chewable tablet oral, USE AS DIRECTED TO TREAT HYPOGLYCEMIA    Gvoke HypoPen 1-Pack 1 mg, subcutaneous, Once as needed    insulin lispro (HumaLOG KwikPen Insulin) 100 unit/mL injection Inject 1 unit under the skin for every 10 carbs with meals. May take up to 50 units per day.    Lantus Solostar U-100 Insulin 12 Units, subcutaneous, Every morning    OneTouch Verio test strips strip TEST 6-7 TIMES DAILY    Peak Air Peak Flow Meter device     pen needle, diabetic (Pen Needle) 32 gauge x 5/32\" needle 1 applicator, miscellaneous, Daily, Use as needed while taking insulin.    pen needle, diabetic 32 gauge x 5/32\" needle USE AS DIRECTED WITH INSULIN USE AS DIRECTED TO INJECT INSULIN 4-6 TIMES DAILY    pen needle, diabetic 32 gauge x 5/32\" needle Use 1 pen needle for insulin injection 4 times a day.    Ventolin HFA 90 mcg/actuation inhaler 1-2 puffs, inhalation, Every 4 hours PRN        HISTORICAL PHARMACOTHERAPY  N/a    DRUG INTERACTIONS  None at time of review      Assessment/Plan   Problem List Items Addressed This Visit       DM (diabetes mellitus) type 1, uncontrolled, with ketoacidosis (Multi)     DM  Most recent A1c 13.1% while admitted   Using dexcom for her CGM to monitor sugars   Have been ranging in upper 100's   Currently doing 10 units of lantus every morning   Lispro is 1 unit for every 10 carbs she is eating --> has been averaging about 8 units of this then   Needs to see eye and foot doctor --> Dr. Post will place referrals and nursing will help with scheduling   Also will place referral for food for life     Follow Up: 1 week     Continue all meds under the continuation of care with the referring provider and clinical pharmacy team.    Travon Flores, PharmD     Verbal consent to manage patient's drug therapy " was obtained from the patient. They were informed they may decline to participate or withdraw from participation in pharmacy services at any time.

## 2024-07-30 NOTE — PROGRESS NOTES
Daily Call Note:     Patient states she missed a dose of her morning Lantus and it put her into DKA.   Patient was dx with diabetes at age 5    Patient states she takes Lantus 10 units in the morning and Lispro 1 unit every 10 carbs.      Patient still complain of blurry vision..  Patient states she was dx with retinopathy.  Patient states she saw an an eye doctor before having her  baby.  Pt's baby is 8 months old.      Dr. Post will place a [facility] Referral and the [facility] team will schedule and appointment.     The [facility] team will schedule a podiatry, ophthalmology and New Patient PCP appointment     Change call time to after 11     [facility] 8/4/24 @ 11:00                    Pt Education:   Barriers:   Topics for Daily Review:   Pt demonstrates clear understanding:     Daily Weight:  There were no vitals filed for this visit.   Last 3 Weights:  Wt Readings from Last 7 Encounters:   07/25/24 51.5 kg (113 lb 8.6 oz)   05/24/24 51.3 kg (113 lb)   04/05/24 47.6 kg (105 lb)   01/23/24 47.8 kg (105 lb 6.4 oz)   11/27/23 60.4 kg (133 lb 2.5 oz)   11/20/23 60.9 kg (134 lb 4.2 oz)   11/04/23 59.4 kg (130 lb 15.3 oz)       Masimo Device:    Masimo Clinical Impression:     Virtual Visits--Scheduled (Most Recent Date at Top)  Follow up Appointments  Recent Visits  No visits were found meeting these conditions.  Showing recent visits within past 30 days and meeting all other requirements  Future Appointments  No visits were found meeting these conditions.  Showing future appointments within next 90 days and meeting all other requirements       Frequency of RN Calls & Virtual Visits per Team Agreement:     Medication issues Addressed (what was done):     Follow up appointments scheduled by [facility] Staff:   Referrals made by [facility] staff:

## 2024-07-31 ENCOUNTER — DOCUMENTATION (OUTPATIENT)
Dept: CARE COORDINATION | Age: 24
End: 2024-07-31
Payer: COMMERCIAL

## 2024-07-31 ENCOUNTER — PATIENT OUTREACH (OUTPATIENT)
Dept: HOME HEALTH SERVICES | Age: 24
End: 2024-07-31
Payer: COMMERCIAL

## 2024-07-31 NOTE — PROGRESS NOTES
Daily Call Note: Daily call completed. The patient stated she is doing well. She was asked if she was logging her blood sugar numbers to which she replied they were on the carlos eduardo. She was asked to write down the numbers before meals, two hours after meals and bedtime, and anytime something felt unusual re blood sugars so they can be readily on hand. She was instructed on having the numbers available so that provider can make adjustments if needed with insulin doses, and to be able to follow the trends if intervention is necessary. She was asked to give readings for yesterday and today, but would only provide the reading for 1000, which was before breakfast - it was 229. She stated that she has been ranging from 170 to 250 - has had no low alerts. She did not take another at the time of the call. She has agreed to provide times and readings for the nurse moving forward.  The patient was reminded that she has referrals for follow up - she needs PCP, Podiatry, Ophthalmology, and Last [facility]. She insisted that she will schedule the appointments herself through [facility]. She was given the number to [facility]. She would like more education on diabetic diet - Population Health referral was placed.          Pt demonstrates clear understanding: Yes    Daily Weight:  There were no vitals filed for this visit.   Last 3 Weights:  Wt Readings from Last 7 Encounters:   07/25/24 51.5 kg (113 lb 8.6 oz)   05/24/24 51.3 kg (113 lb)   04/05/24 47.6 kg (105 lb)   01/23/24 47.8 kg (105 lb 6.4 oz)   11/27/23 60.4 kg (133 lb 2.5 oz)   11/20/23 60.9 kg (134 lb 4.2 oz)   11/04/23 59.4 kg (130 lb 15.3 oz)           Virtual Visits--Scheduled (Most Recent Date at Top)  Follow up Appointments  Recent Visits  No visits were found meeting these conditions.  Showing recent visits within past 30 days and meeting all other requirements  Future Appointments  No visits were found meeting these conditions.  Showing future appointments within next  90 days and meeting all other requirements       Frequency of RN Calls & Virtual Visits per Team Agreement: [facility] Frequency: Daily

## 2024-07-31 NOTE — PROGRESS NOTES
Attempted to contact PT in regards to [facility] referral for resource assist: PT was not available, left a voice message w/my name and number for a return call back.    Discussed the following topics on behalf of the patient:  []  Behavioral Health Assistance     []  Case Management  []   Assistance  []  Digital Equity Assistance  []  Dental Health Assistance  []  Education Assistance  []  Employment Assistance  []  Financial Strain Relief Assistance  [x]  Food Insecurity Assistance  []  Healthcare Coverage Assistance  [x]  Housing Stability Assistance  []  IP Violence Relief Assistance  []  Legal Assistance  []  Physical Activity Assistance  []  Social Connection Assistance  []  Stress Relief Assistance   []  Substance Abuse Assistance  []  Transportation Assistance  []  Utility Assistance  []  Other: [insert comment here]    Next Steps:         Remedios Grayson LCSW     
English

## 2024-08-02 ENCOUNTER — DOCUMENTATION (OUTPATIENT)
Dept: BEHAVIORAL HEALTH | Facility: CLINIC | Age: 24
End: 2024-08-02
Payer: COMMERCIAL

## 2024-08-02 ENCOUNTER — APPOINTMENT (OUTPATIENT)
Dept: BEHAVIORAL HEALTH | Facility: CLINIC | Age: 24
End: 2024-08-02
Payer: COMMERCIAL

## 2024-08-02 ENCOUNTER — APPOINTMENT (OUTPATIENT)
Dept: ENDOCRINOLOGY | Facility: CLINIC | Age: 24
End: 2024-08-02
Payer: COMMERCIAL

## 2024-08-02 NOTE — PROGRESS NOTES
"Maria Isabel Cutler  Age: 24 y.o.  MRN: [id number]  Date: 8/2/2024  Location of service: phone call    Program Details  Medicaid Community Clinical Case Management  Status: Waiting for Assessment  Start Date: 7/24/2024  Responsible Staff: JAIME Miramontes      Goals Reviewed:      Summary:  This provider made telephone contact with the patient to confirm our appointment for 11:00am today. Patient states \"I forgot you were coming and I'm not ready for visitors\". This provider rescheduled with patient for Wednesday, August 7th, 2024 at 12:00pm.    Start time: 8:55am  End time: 8:57am       JAIME Miramontes   "

## 2024-08-04 ENCOUNTER — TELEMEDICINE (OUTPATIENT)
Dept: CARE COORDINATION | Age: 24
End: 2024-08-04
Payer: COMMERCIAL

## 2024-08-04 ENCOUNTER — PATIENT OUTREACH (OUTPATIENT)
Dept: HOME HEALTH SERVICES | Age: 24
End: 2024-08-04

## 2024-08-04 DIAGNOSIS — E13.10 DIABETIC KETOACIDOSIS WITHOUT COMA ASSOCIATED WITH OTHER SPECIFIED DIABETES MELLITUS (MULTI): Primary | ICD-10-CM

## 2024-08-04 DIAGNOSIS — E10.10 DM (DIABETES MELLITUS) TYPE 1, UNCONTROLLED, WITH KETOACIDOSIS (MULTI): ICD-10-CM

## 2024-08-04 PROBLEM — O35.BXX0 FETAL CARDIAC ANOMALY AFFECTING PREGNANCY, ANTEPARTUM (HHS-HCC): Status: RESOLVED | Noted: 2023-10-05 | Resolved: 2024-08-04

## 2024-08-04 PROBLEM — B00.9: Status: RESOLVED | Noted: 2023-10-05 | Resolved: 2024-08-04

## 2024-08-04 PROBLEM — O98.513: Status: RESOLVED | Noted: 2023-10-05 | Resolved: 2024-08-04

## 2024-08-04 PROBLEM — O10.919 CHRONIC HYPERTENSION AFFECTING PREGNANCY (HHS-HCC): Status: RESOLVED | Noted: 2023-10-05 | Resolved: 2024-08-04

## 2024-08-04 PROBLEM — Z3A.34 34 WEEKS GESTATION OF PREGNANCY (HHS-HCC): Status: RESOLVED | Noted: 2023-10-08 | Resolved: 2024-08-04

## 2024-08-04 PROBLEM — O99.513 ASTHMA AFFECTING PREGNANCY IN THIRD TRIMESTER (HHS-HCC): Status: RESOLVED | Noted: 2023-10-08 | Resolved: 2024-08-04

## 2024-08-04 PROBLEM — J45.909 ASTHMA AFFECTING PREGNANCY IN THIRD TRIMESTER (HHS-HCC): Status: RESOLVED | Noted: 2023-10-08 | Resolved: 2024-08-04

## 2024-08-04 PROBLEM — F32.A DEPRESSION DURING PREGNANCY IN THIRD TRIMESTER (HHS-HCC): Status: RESOLVED | Noted: 2023-10-05 | Resolved: 2024-08-04

## 2024-08-04 PROBLEM — O99.343 DEPRESSION DURING PREGNANCY IN THIRD TRIMESTER (HHS-HCC): Status: RESOLVED | Noted: 2023-10-05 | Resolved: 2024-08-04

## 2024-08-04 NOTE — PROGRESS NOTES
Daily Call Note: [facility] weekly call with patient and AI Kam.   The patient told the practitioner that her blood glucoses have ranged from 180 or less, which is an improvement in the numbers.   She has had no hypoglycemic episodes. She still had not scheduled a PCP appointment and now has agreed to allow nursing to assist her with that. She has an endocrinology appointment scheduled for the end of the month, but wants to schedule ophthalmology and podiatry herself. She will need reminder with next daily call to support that effort. She reports that she has remained compliant with taking her insulins as prescribed, using Lantus 10 units in the morning, and covering her meals with sliding scale based on carbs.  She denies having medication needs or issues today, or questions to be addressed. She was reminded to call nurse should any arise.  Daily Weight:  There were no vitals filed for this visit.   Last 3 Weights:  Wt Readings from Last 7 Encounters:   07/25/24 51.5 kg (113 lb 8.6 oz)   05/24/24 51.3 kg (113 lb)   04/05/24 47.6 kg (105 lb)   01/23/24 47.8 kg (105 lb 6.4 oz)   11/27/23 60.4 kg (133 lb 2.5 oz)   11/20/23 60.9 kg (134 lb 4.2 oz)   11/04/23 59.4 kg (130 lb 15.3 oz)       Virtual Visits--Scheduled (Most Recent Date at Top)  Follow up Appointments  Recent Visits  No visits were found meeting these conditions.  Showing recent visits within past 30 days and meeting all other requirements  Future Appointments  No visits were found meeting these conditions.  Showing future appointments within next 90 days and meeting all other requirements       Frequency of RN Calls & Virtual Visits per Team Agreement: [facility] Home Frequency: Daily

## 2024-08-05 ENCOUNTER — PATIENT OUTREACH (OUTPATIENT)
Dept: HOME HEALTH SERVICES | Age: 24
End: 2024-08-05
Payer: COMMERCIAL

## 2024-08-05 NOTE — PROGRESS NOTES
Daily Call Note: Daily call complete. Patient states she is doing fine. She reports blood sugar today was 126. She  has a Dexcom G7 CGM but needs to  sensor for it. She would like assistance scheduling PCP appointment, but she will schedule podiatry appointment. Next [facility] 8/12/24 at 1100, verbalized understanding. No other questions at this time.     Pt Education: per POC  Barriers: none  Topics for Daily Review: blood sugar  Pt demonstrates clear understanding: Yes    Daily Weight:  There were no vitals filed for this visit.   Last 3 Weights:  Wt Readings from Last 7 Encounters:   07/25/24 51.5 kg (113 lb 8.6 oz)   05/24/24 51.3 kg (113 lb)   04/05/24 47.6 kg (105 lb)   01/23/24 47.8 kg (105 lb 6.4 oz)   11/27/23 60.4 kg (133 lb 2.5 oz)   11/20/23 60.9 kg (134 lb 4.2 oz)   11/04/23 59.4 kg (130 lb 15.3 oz)       Masimo Device: No   Masimo Clinical Impression: n/a    Virtual Visits--Scheduled (Most Recent Date at Top)  Follow up Appointments  Recent Visits  No visits were found meeting these conditions.  Showing recent visits within past 30 days and meeting all other requirements  Future Appointments  No visits were found meeting these conditions.  Showing future appointments within next 90 days and meeting all other requirements       Frequency of RN Calls & Virtual Visits per Team Agreement: [facility] Home Frequency: Daily    Medication issues Addressed (what was done): none    Follow up appointments scheduled by [facility] Staff: none  Referrals made by [facility] staff: none

## 2024-08-06 ENCOUNTER — DOCUMENTATION (OUTPATIENT)
Dept: BEHAVIORAL HEALTH | Facility: CLINIC | Age: 24
End: 2024-08-06
Payer: COMMERCIAL

## 2024-08-06 NOTE — PROGRESS NOTES
Maria Isabel Cutler  Age: 24 y.o.  MRN: [id number]  Date: 8/6/2024  Location of service: phone call    Program Details  Medicaid Community Clinical Case Management  Status: Waiting for Assessment  Start Date: 7/24/2024  Responsible Staff: JAIME Miramontes      Goals Reviewed:      Summary:  This provider made telephone contact with the patient to confirm our scheduled assessment for tomorrow, August 7th, 2024 @ 12:00pm. Patient verbally confirmed this appointment.     Start time: 2:33pm  End time: 2:34pm                 JAIME Miramontes

## 2024-08-07 ENCOUNTER — DOCUMENTATION (OUTPATIENT)
Dept: BEHAVIORAL HEALTH | Facility: CLINIC | Age: 24
End: 2024-08-07
Payer: COMMERCIAL

## 2024-08-07 ENCOUNTER — DOCUMENTATION (OUTPATIENT)
Dept: BEHAVIORAL HEALTH | Facility: CLINIC | Age: 24
End: 2024-08-07

## 2024-08-07 DIAGNOSIS — F32.9 MAJOR DEPRESSIVE DISORDER, REMISSION STATUS UNSPECIFIED, UNSPECIFIED WHETHER RECURRENT: ICD-10-CM

## 2024-08-07 DIAGNOSIS — F41.1 GAD (GENERALIZED ANXIETY DISORDER): ICD-10-CM

## 2024-08-07 ASSESSMENT — COLUMBIA-SUICIDE SEVERITY RATING SCALE - C-SSRS
TOTAL  NUMBER OF ABORTED OR SELF INTERRUPTED ATTEMPTS LIFETIME: NO
2. HAVE YOU ACTUALLY HAD ANY THOUGHTS OF KILLING YOURSELF?: NO
1. HAVE YOU WISHED YOU WERE DEAD OR WISHED YOU COULD GO TO SLEEP AND NOT WAKE UP?: NO
6. HAVE YOU EVER DONE ANYTHING, STARTED TO DO ANYTHING, OR PREPARED TO DO ANYTHING TO END YOUR LIFE?: NO
TOTAL  NUMBER OF INTERRUPTED ATTEMPTS LIFETIME: NO
ATTEMPT LIFETIME: NO

## 2024-08-07 ASSESSMENT — ANXIETY QUESTIONNAIRES
GAD7 TOTAL SCORE: 20
3. WORRYING TOO MUCH ABOUT DIFFERENT THINGS: NEARLY EVERY DAY
1. FEELING NERVOUS, ANXIOUS, OR ON EDGE: NEARLY EVERY DAY
6. BECOMING EASILY ANNOYED OR IRRITABLE: NEARLY EVERY DAY
2. NOT BEING ABLE TO STOP OR CONTROL WORRYING: NEARLY EVERY DAY
7. FEELING AFRAID AS IF SOMETHING AWFUL MIGHT HAPPEN: MORE THAN HALF THE DAYS
IF YOU CHECKED OFF ANY PROBLEMS ON THIS QUESTIONNAIRE, HOW DIFFICULT HAVE THESE PROBLEMS MADE IT FOR YOU TO DO YOUR WORK, TAKE CARE OF THINGS AT HOME, OR GET ALONG WITH OTHER PEOPLE: EXTREMELY DIFFICULT
4. TROUBLE RELAXING: NEARLY EVERY DAY
5. BEING SO RESTLESS THAT IT IS HARD TO SIT STILL: NEARLY EVERY DAY

## 2024-08-07 ASSESSMENT — PATIENT HEALTH QUESTIONNAIRE - PHQ9
3. TROUBLE FALLING OR STAYING ASLEEP: NEARLY EVERY DAY
SUM OF ALL RESPONSES TO PHQ9 QUESTIONS 1 & 2: 6
SUM OF ALL RESPONSES TO PHQ QUESTIONS 1-9: 19
5. POOR APPETITE OR OVEREATING: NEARLY EVERY DAY
4. FEELING TIRED OR HAVING LITTLE ENERGY: MORE THAN HALF THE DAYS
9. THOUGHTS THAT YOU WOULD BE BETTER OFF DEAD, OR OF HURTING YOURSELF: NOT AT ALL
7. TROUBLE CONCENTRATING ON THINGS, SUCH AS READING THE NEWSPAPER OR WATCHING TELEVISION: NEARLY EVERY DAY
2. FEELING DOWN, DEPRESSED OR HOPELESS: NEARLY EVERY DAY
6. FEELING BAD ABOUT YOURSELF - OR THAT YOU ARE A FAILURE OR HAVE LET YOURSELF OR YOUR FAMILY DOWN: SEVERAL DAYS
8. MOVING OR SPEAKING SO SLOWLY THAT OTHER PEOPLE COULD HAVE NOTICED. OR THE OPPOSITE, BEING SO FIGETY OR RESTLESS THAT YOU HAVE BEEN MOVING AROUND A LOT MORE THAN USUAL: SEVERAL DAYS
10. IF YOU CHECKED OFF ANY PROBLEMS, HOW DIFFICULT HAVE THESE PROBLEMS MADE IT FOR YOU TO DO YOUR WORK, TAKE CARE OF THINGS AT HOME, OR GET ALONG WITH OTHER PEOPLE: NOT DIFFICULT AT ALL
1. LITTLE INTEREST OR PLEASURE IN DOING THINGS: NEARLY EVERY DAY

## 2024-08-07 NOTE — PROGRESS NOTES
Medicaid Community Clinical Case Management Assessment    Maria Isabel Cutler  Age: 24 y.o.  MRN: [id number]  Date: 8/7/2024  Location of service: in community    Problem List Items Addressed This Visit    None      Maria Isabel Cutler has given written permission to speak to the following regarding treatment:   Name:   Relationship:   Phone:   Others:     Referral Source   Patient was self identified by the Personalized Care Team    Reason for Admission    See Nurse Viviane Sanford's note    Current Symptoms   See Nurse Viviane Sanford's note  HPI   See Nurse Viviane Sanford's note    Problem List  does not have any pertinent problems on file.    Recent Treatment/Changes  See Nurse Viviane Sanford's note    Other Agencies Involved In Care: [facility] CM     ______________________________________________________________________________________________  Past Medical History  Maria Isabel Cutler has a past medical history of Asthma ([facility]), Chlamydia, Chronic hypertension, CSF oligoclonal bands, Depression, Herpes, Type 1 diabetes mellitus with hyperglycemia, with long-term current use of insulin (Multi), and Urinary tract infection.    She has no past medical history of Abnormal Pap smear of cervix, Anemia, Gonorrhea, or Varicella.    Family History  Maria Isabel Cutlerfamily history includes Asthma in her child; Hypertension in her maternal grandmother.    Social History  Maria Isabel Cutler reports that she has quit smoking. Her smoking use included cigarettes. She has been exposed to tobacco smoke. She has never used smokeless tobacco. She reports that she does not currently use alcohol. She reports that she does not currently use drugs after having used the following drugs: Marijuana.    Maria Isabel Cutler reports being sexually active.      ______________________________________________________________________________________________  Social Determinants of Health     Intimate Partner Violence: Not At  Risk (2024)    Humiliation, Afraid, Rape, and Kick questionnaire     Fear of Current or Ex-Partner: No     Emotionally Abused: No     Physically Abused: No     Sexually Abused: No   Social Connections: Socially Isolated (2024)    Social Connection and Isolation Panel [NHANES]     Frequency of Communication with Friends and Family: Never     Frequency of Social Gatherings with Friends and Family: Never     Attends [employer] Services: 1 to 4 times per year     Active Member of Clubs or Organizations: No     Attends Club or Organization Meetings: Never     Marital Status: Never    Alcohol Use: Not At Risk (2024)    AUDIT-C     Frequency of Alcohol Consumption: Never     Average Number of Drinks: Patient does not drink     Frequency of Binge Drinking: Never   Tobacco Use: Medium Risk (2024)    Patient History     Smoking Tobacco Use: Former     Smokeless Tobacco Use: Never     Passive Exposure: Past   Financial Resource Strain: High Risk (2024)    Overall Financial Resource Strain (CARDIA)     Difficulty of Paying Living Expenses: Hard   Depression: At risk (2024)    PHQ-2     PHQ-2 Score: 6   Postpartum Depression: High Risk (2024)    [place]  Depression Scale     Last EPDS Total Score: 15     Last EPDS Self Harm Result: Never   Stress: Stress Concern Present (2024)    [employer] [place] [employer] - Occupational Stress Questionnaire     Feeling of Stress : Very much   Physical Activity: Inactive (2024)    Exercise Vital Sign     Days of Exercise per Week: 0 days     Minutes of Exercise per Session: 0 min   Food Insecurity: Food Insecurity Present (2024)    Hunger Vital Sign     Worried About Running Out of Food in the Last Year: Sometimes true     Ran Out of Food in the Last Year: Sometimes true   Transportation Needs: Unmet Transportation Needs (2024)    PRAPARE - Transportation     Lack of Transportation (Medical): Yes     Lack of  Transportation (Non-Medical): Yes       Patient Background  Demographic Information  What is the highest level of education that you have completed?: Secondary/high school completed    Marriage  Have you ever been ?: No  If you are a mother or father, what was your age when your first child was born?: 23    Relationship with Parents/Guardians (First 18 Years of Life)  Did your parents/guardians understand your problems and worries?: Rarely  Did your parents/guardians really know what you were doing with your free time when you were not at school or work?: Never  How often did your parents/guardians not give you enough food even when they could have easily done so?: Never  Were your parents/guardians too drunk or intoxicated by drugs to take care of you?: Never  How often did your parents/guardians not send you to school even when it was available?: Never    Family Environment (First 18 Years of Life)  Did you live with a household member who was a problem drinker or alcoholic, or misused street or prescription drugs?: No  Did you live with a household member who was depressed, mentally ill, or suicidal?: Yes  Did you live with a household member who was ever sent to [facility] or alf?: No  Were your parents ever  or ?: Yes  Did your mother, father, or guardian die?: No  Did you see or hear a parent or household member in your home being slapped, kicked, punched, or beaten up?: Many times  Did you see or hear a parent or household member in your home being hit or cut with an object, such as a stick (or cane), bottle, club, knife, or whip etc.?: Never  Did a parent, guardian, or other household member yell, scream or swear at you, insult or humiliate you?: Many times  Did a parent, guardian, or other household member threaten to, or actually, abandon you or throw you out of the house?: Many times  Did a parent, guardian, or other household member spank, slap, kick, punch, or beat you up?:  Never  Did a parent, guardian, or other household member hit or cut you with an object, such as a stick (or cane), bottle, club, knife, whip etc.?: Never  Did someone touch or fondle you in a sexual way when you did not want them to?: Many times  Did someone make you touch their body in a sexual way when you did not want them to?: Many times  Did someone attempt oral, anal, or vaginal intercourse with you when you did not want them to?: Once  Did someone actually have oral, anal, or vaginal intercourse with you when you did not want them to?: Once    Peer Violence (First 18 Years of Life)  How often were you bullied? : Never  How often were you in a physical fight?: Once    Witnessing Community Violence (First 18 Years of Life)  Did you see or hear someone being beaten up in real life? : Never  Did you see or hear someone being stabbed or shot in real life?: Never  Did you see or hear someone being threatened with a knife or gun in real life?: Never    Exposure to War/Collective Violence (First 18 Years of Life)  Were you forced to go and live in another place due to any of these events?: Never  Did you experience the deliberate destruction of your home due to any of these events?: Never  Were you beaten up by soldiers, police, militia, or gangs?: Never  Was a family member or friend killed or beaten up by soldiers, police, militia, or gangs?: Never      Current Living Situation  Present Living Situation  Independent Living?: No  Dependent on Others?: No  Structured Supportive Housing?: Yes  Other:: Homeless shelter for women and children      Legal Concerns  Do you have any past or present legal problems? Patient reports that she has been to skilled nursing but that charges were dropped.     Guns/Firearms  Do you own guns or have access to firearms? Patient has access to a firearm but denies any thoughts     ______________________________________________________________________________________________  Assessment Scores &  "Scales  Audit-C Score:   / 12  C-SSRS (Lifetime/Recent): No   DAST:   / 20  JIM-7: 20 / 21  PHQ-9: 19 / 27    ______________________________________________________________________________________________  Impressions  Observational Components:  General Appearance: Well groomed, appropriate eye contact  Attitude/Behavior: Cooperative  Motor: No psychomotor agitation or retardation, no tremor or other abnormal movements  Speech: Normal rate, volume, prosody  Gait/Station: WFL - Within functional limits  Mood: Patient says mood is \"fine\"  Affect: Flat  Direct Inquiry Components:  Thought Process: Linear, goal directed  Thought Associations: No loosening of associations  Thought Content: Normal  Perception: No perceptual abnormalities noted  Cognitive Exam:  Sensorium: Alert  Insight: Fair  Judgement: Fair  Cognition: Cognitively intact to conversational testing with respect to attention, orientation, fund of knowledge, recent and remote memory, and language  Testing: N/A    Diagnostic Summary  Patient is a 24 year old woman who was self-identified by the Personalized Care Team. Patient's medical hx includes uncontrolled type 1 diabetes with insulin., seizures, and sciatica.  Patient gave birth to a son 8 months ago and reports a hx of depression. Patient is currently living in a women's shelter ([facility]) with her son after she was kicked out of her mother's home approximately 6 months ago. Patient does have a CM through the shelter who is helping her with housing and potentially finding a job. Patient reports that she has a lot of worries and often feels down but that she keeps going for her son. Patient was given a JIM-7 and a PHQ-9 during the assessment. Patient scored a 20 on the JIM and a 19 on the PHQ-9 indicating severe anxiety and moderately severe depression.  Patient also reports limited coping skills and says she \"doesn't find a lot of things fun\" anymore. Patient currently denies any safety concerns. " "Patient was recently hospitalized for DKA and admits that she does not \"take care\" of her diabetes as well as she could. Patient states that she has limited supports and that she is facing financial difficulties. Patient is interested in working with the Personalized Care Program and reports she would like help with her \"mood'.     Given patient's scores on the JIM-7 and the PHQ-9, along with a hx of suicidal ideations,  patient qualifies for a diagnoses of Generalized Anxiety Disorder and Major Depressive Disorder unspecified.     Recommendation for Treatment  Given patient's physical and mental health concerns along with SDOH needs, patient is recommended for the Personalized Care Program.     Medication List Reviewed with Patient    Current Outpatient Medications:     acetaminophen (Tylenol Extra Strength) 500 mg tablet, Take 2 tablets (1,000 mg) by mouth every 6 hours if needed for mild pain (1 - 3). (Patient not taking: Reported on 8/7/2024), Disp: 30 tablet, Rfl: 1    acetone, urine, test strip, DRIP URINE TO CHECK FOR KETONES IF NAUSEA/VOMITING OR IF CONCERN FOR DKA OR DEHYDRATION, Disp: 100 each, Rfl: 0    albuterol (Ventolin HFA) 90 mcg/actuation inhaler, Inhale 1-2 puffs every 4 hours if needed for wheezing or shortness of breath., Disp: 18 g, Rfl: 11    albuterol 2.5 mg /3 mL (0.083 %) nebulizer solution, Inhale 3 mL (2.5 mg) every 6 hours if needed for wheezing., Disp: , Rfl:     blood-glucose sensor (Dexcom G7 Sensor) device, Change sensor every 10 days, Disp: 3 each, Rfl: 11    glucagon 1 mg/0.2 mL auto-injector, Inject 1 mg under the skin 1 time if needed (use for low blood sugar if unable to take anything by mouth) for up to 1 dose., Disp: 0.2 mL, Rfl: 12    glucose 4 gram chewable tablet, Chew. USE AS DIRECTED TO TREAT HYPOGLYCEMIA, Disp: , Rfl:     insulin glargine (Lantus Solostar U-100 Insulin) 100 unit/mL (3 mL) pen, Inject 10 Units under the skin once daily in the morning., Disp: 15 mL, Rfl: " "2    insulin lispro (HumaLOG KwikPen Insulin) 100 unit/mL injection, Inject 1 unit under the skin for every 10 carbs with meals. May take up to 50 units per day., Disp: 15 mL, Rfl: 11    OneTouch Verio test strips strip, TEST 6-7 TIMES DAILY, Disp: 12 strip, Rfl: 11    pen needle, diabetic 32 gauge x 5/32\" needle, Use 1 pen needle for insulin injection 4 times a day., Disp: 200 each, Rfl: 2  ______________________________________________________________________________________________        Billable: 12:20pm-1:09  Non-Billable: 1:10-1:30          APRIL Everett                   "

## 2024-08-08 NOTE — PROGRESS NOTES
Maria Isabel Cutler  Age: 24 y.o.  MRN: [id number]  Date: 8/7/2024  Location of service: in community    Program Details  Medicaid Community Clinical Case Management  Status: Enrolled  Effective Dates: 8/7/2024 - present  Responsible Staff: JAIME Miramontes      Goals Reviewed:      Summary:  This provider accompanied Aimee Aragon-  & APRIL and Viviane Sanford RN to the assessment for this patient at the patient's home. This provider asked follow up questions during the assessment for clarification. This provider also assisted with interacting with patient's baby so that patient was able to engage fully with the assessment. An appointment was made for the Care Plan for next Friday.              JAIME Miramontes

## 2024-08-09 ENCOUNTER — PATIENT OUTREACH (OUTPATIENT)
Dept: CARE COORDINATION | Age: 24
End: 2024-08-09
Payer: COMMERCIAL

## 2024-08-09 NOTE — PROGRESS NOTES
Daily Call Note:   LVM.    Daily Weight:  There were no vitals filed for this visit.   Last 3 Weights:  Wt Readings from Last 7 Encounters:   07/25/24 51.5 kg (113 lb 8.6 oz)   05/24/24 51.3 kg (113 lb)   04/05/24 47.6 kg (105 lb)   01/23/24 47.8 kg (105 lb 6.4 oz)   11/27/23 60.4 kg (133 lb 2.5 oz)   11/20/23 60.9 kg (134 lb 4.2 oz)   11/04/23 59.4 kg (130 lb 15.3 oz)           Virtual Visits--Scheduled (Most Recent Date at Top)  Follow up Appointments  Recent Visits  Date Type Provider Dept   07/30/24 Telemedicine Jewel Willingham MD [facility]   Showing recent visits within past 30 days and meeting all other requirements  Future Appointments  Date Type Provider Dept   08/21/24 Appointment MACKENZIE Dennis-CNP [id number]   Showing future appointments within next 90 days and meeting all other requirements

## 2024-08-09 NOTE — PROGRESS NOTES
Maria Isabel Cutler  Age: 24 y.o.  MRN: [id number]  Date: 2024  Location of service: in community    Program Details  Medicaid Community Clinical Case Management  Status: Enrolled  Effective Dates: 2024 - present  Responsible Staff: JAIME Miramontes      Goals Reviewed:      Summary  This writer, Nirali CHEN, and Dejah SANDOVAL met with patient to complete an initial assessment. See Nirali Aragon's note for psychosocial assessment.      Patient Reported Medical History:   Patient has a  with her son.  Patient states most of her hospitalizations have been for diabetes.     Patient Reported Medical Dx:    Asthma  Anxiety  Type 1 Diabetes     Patient Reported Daily Effects of Conditions:   Patient states her anxiety makes her afraid of life.  Patient states her diabetes is managed well but sometimes she gets stressed and doesn't focus on it.   Patient states her asthma doesn't affect her.      Patient Medications:  Patient states she has an insulin pump but something is wrong with it and it's not working.   This writer reviewed patient's medications with the patient. See med list below:      Current Outpatient Medications on File Prior to Visit   Medication Sig Dispense Refill    acetone, urine, test strip DRIP URINE TO CHECK FOR KETONES IF NAUSEA/VOMITING OR IF CONCERN FOR DKA OR DEHYDRATION 100 each 0    albuterol (Ventolin HFA) 90 mcg/actuation inhaler Inhale 1-2 puffs every 4 hours if needed for wheezing or shortness of breath. 18 g 11    albuterol 2.5 mg /3 mL (0.083 %) nebulizer solution Inhale 3 mL (2.5 mg) every 6 hours if needed for wheezing.      blood-glucose sensor (Dexcom G7 Sensor) device Change sensor every 10 days 3 each 11    glucagon 1 mg/0.2 mL auto-injector Inject 1 mg under the skin 1 time if needed (use for low blood sugar if unable to take anything by mouth) for up to 1 dose. 0.2 mL 12    insulin glargine (Lantus Solostar U-100 Insulin) 100 unit/mL (3 mL) pen Inject  "10 Units under the skin once daily in the morning. 15 mL 2    insulin lispro (HumaLOG KwikPen Insulin) 100 unit/mL injection Inject 1 unit under the skin for every 10 carbs with meals. May take up to 50 units per day. 15 mL 11    OneTouch Verio test strips strip TEST 6-7 TIMES DAILY 12 strip 11    pen needle, diabetic 32 gauge x 5/32\" needle Use 1 pen needle for insulin injection 4 times a day. 200 each 2    acetaminophen (Tylenol Extra Strength) 500 mg tablet Take 2 tablets (1,000 mg) by mouth every 6 hours if needed for mild pain (1 - 3). (Patient not taking: Reported on 8/7/2024) 30 tablet 1    glucose 4 gram chewable tablet Chew. USE AS DIRECTED TO TREAT HYPOGLYCEMIA       No current facility-administered medications on file prior to visit.        Appointment start time: 1220  Appointment completion time: 1330  Total time spent with patient (in minutes): 70      Viviane Sanford RN     "

## 2024-08-10 ENCOUNTER — PATIENT OUTREACH (OUTPATIENT)
Dept: HOME HEALTH SERVICES | Age: 24
End: 2024-08-10
Payer: COMMERCIAL

## 2024-08-10 NOTE — PROGRESS NOTES
Daily call completed. Pt states she is feeling good, denies any new symptoms or concerns at this time. Pt inquired about a paper she needs to be signed stating she is getting help for the new place she is moving into- advised to speak to provider during [facility] appt as it is not something RN is familiar with or aware of. Verbalizes understanding, pt states she took her vitals and checked her bgt this morning but didn't have the numbers at time of call. Advised to write them down. Daily calls dropped to T/TH after 1100. Denies any further needs/questions/concerns at this time. Aware of upcoming appts. [facility] 8/12 at 1100.    Pt Education: logging vitals, bgt  Barriers: na  Topics for Daily Review: daily needs, bgt, vs  Pt demonstrates clear understanding: Yes    Daily Weight:  There were no vitals filed for this visit.   Last 3 Weights:  Wt Readings from Last 7 Encounters:   07/25/24 51.5 kg (113 lb 8.6 oz)   05/24/24 51.3 kg (113 lb)   04/05/24 47.6 kg (105 lb)   01/23/24 47.8 kg (105 lb 6.4 oz)   11/27/23 60.4 kg (133 lb 2.5 oz)   11/20/23 60.9 kg (134 lb 4.2 oz)   11/04/23 59.4 kg (130 lb 15.3 oz)       Masimo Device: No   Masimo Clinical Impression: na    Virtual Visits--Scheduled (Most Recent Date at Top)  Follow up Appointments  Recent Visits  No visits were found meeting these conditions.  Showing recent visits within past 30 days and meeting all other requirements  Future Appointments  Date Type Provider Dept   08/21/24 Appointment MACKENZIE Dennis-CNP [id number]   Showing future appointments within next 90 days and meeting all other requirements       Frequency of RN Calls & Virtual Visits per Team Agreement: [facility] Frequency: Daily    Medication issues Addressed (what was done): na    Follow up appointments scheduled by [facility] Staff: na  Referrals made by [facility] staff: na      [facility] Care Transitions Assessment    Patient's Address:   [address] [place] [address]  **   If this is not the address patient will receive services - alert team and address in EMR**       Patient Contacts:  Extended Emergency Contact Information  Primary Emergency Contact: Angela Cutler   [facility]  Home Phone: [phone]  Relation: Sibling                                Patient's Preferred Phone: [phone]  Patient's E-mail: [email or web address]

## 2024-08-11 ENCOUNTER — PATIENT OUTREACH (OUTPATIENT)
Dept: HOME HEALTH SERVICES | Age: 24
End: 2024-08-11
Payer: COMMERCIAL

## 2024-08-12 ENCOUNTER — APPOINTMENT (OUTPATIENT)
Dept: PHARMACY | Facility: HOSPITAL | Age: 24
End: 2024-08-12
Payer: COMMERCIAL

## 2024-08-12 ENCOUNTER — APPOINTMENT (OUTPATIENT)
Dept: CARE COORDINATION | Age: 24
End: 2024-08-12
Payer: COMMERCIAL

## 2024-08-13 ENCOUNTER — PATIENT OUTREACH (OUTPATIENT)
Dept: HOME HEALTH SERVICES | Age: 24
End: 2024-08-13
Payer: COMMERCIAL

## 2024-08-15 ENCOUNTER — PATIENT OUTREACH (OUTPATIENT)
Dept: HOME HEALTH SERVICES | Age: 24
End: 2024-08-15
Payer: COMMERCIAL

## 2024-08-15 ENCOUNTER — DOCUMENTATION (OUTPATIENT)
Dept: BEHAVIORAL HEALTH | Facility: CLINIC | Age: 24
End: 2024-08-15
Payer: COMMERCIAL

## 2024-08-15 NOTE — PROGRESS NOTES
Maria Isabel Cutler  Age: 24 y.o.  MRN: [id number]  Date: 8/15/2024  Location of service: phone call    Program Details  Medicaid Community Clinical Case Management  Status: Enrolled  Effective Dates: 8/7/2024 - present  Responsible Staff: JAIME Miramontes      Goals Reviewed:      Summary:  This provider attempted to make contact via telephone call with the patient to confirm our appointment for the treatment plan tomorrow. This provider was unsuccessful so provider left a detailed voicemail..     Start: 3:47pm  End: 3:48pm               JAIME Miramontes

## 2024-08-15 NOTE — PROGRESS NOTES
Daily Call Note:   Spoke to patient, reports she is feeling OK. States she is picking up FFL appointment today. Reviewed nutrition apt next week. Asked her BG and she hasn't checked yet today, she has to  and replace sensors.   Denies any new symptoms.   [facility] rescheduled.   Pt Education: POC  Barriers: na  Topics for Daily Review: POC  Pt demonstrates clear understanding: Yes    Daily Weight:  There were no vitals filed for this visit.   Last 3 Weights:  Wt Readings from Last 7 Encounters:   07/25/24 51.5 kg (113 lb 8.6 oz)   05/24/24 51.3 kg (113 lb)   04/05/24 47.6 kg (105 lb)   01/23/24 47.8 kg (105 lb 6.4 oz)   11/27/23 60.4 kg (133 lb 2.5 oz)   11/20/23 60.9 kg (134 lb 4.2 oz)   11/04/23 59.4 kg (130 lb 15.3 oz)       Masimo Device: No   Masimo Clinical Impression: na    Virtual Visits--Scheduled (Most Recent Date at Top)  Follow up Appointments  Recent Visits  No visits were found meeting these conditions.  Showing recent visits within past 30 days and meeting all other requirements  Future Appointments  Date Type Provider Dept   08/21/24 Appointment MACKENZIE Dennis-CNP [id number]   Showing future appointments within next 90 days and meeting all other requirements       Frequency of RN Calls & Virtual Visits per Team Agreement: [facility] Frequency: Bi-Weekly    Medication issues Addressed (what was done): na    Follow up appointments scheduled by [facility] Staff: shakira  Referrals made by [facility] staff: shakira

## 2024-08-16 ENCOUNTER — DOCUMENTATION (OUTPATIENT)
Dept: BEHAVIORAL HEALTH | Facility: CLINIC | Age: 24
End: 2024-08-16
Payer: COMMERCIAL

## 2024-08-16 ENCOUNTER — DOCUMENTATION (OUTPATIENT)
Dept: BEHAVIORAL HEALTH | Facility: CLINIC | Age: 24
End: 2024-08-16

## 2024-08-16 DIAGNOSIS — F32.1 CURRENT MODERATE EPISODE OF MAJOR DEPRESSIVE DISORDER, UNSPECIFIED WHETHER RECURRENT (MULTI): ICD-10-CM

## 2024-08-16 PROBLEM — F32.9 MAJOR DEPRESSIVE DISORDER: Status: ACTIVE | Noted: 2024-08-16

## 2024-08-16 PROBLEM — F41.1 GAD (GENERALIZED ANXIETY DISORDER): Status: ACTIVE | Noted: 2024-08-16

## 2024-08-16 PROCEDURE — H2019 THER BEHAV SVC, PER 15 MIN: HCPCS | Mod: HE | Performed by: COMMUNITY/BEHAVIORAL HEALTH

## 2024-08-18 ENCOUNTER — APPOINTMENT (OUTPATIENT)
Dept: CARE COORDINATION | Age: 24
End: 2024-08-18
Payer: COMMERCIAL

## 2024-08-18 ENCOUNTER — PATIENT OUTREACH (OUTPATIENT)
Dept: HOME HEALTH SERVICES | Age: 24
End: 2024-08-18

## 2024-08-19 NOTE — PROGRESS NOTES
Maria Isabel Cutler  Age: 24 y.o.  MRN: [id number]  Date: 8/16/2024  Location of service: in community    Program Details  Medicaid Community Clinical Case Management  Status: Enrolled  Effective Dates: 8/7/2024 - present  Responsible Staff: JAIME Miramontes      Goals Reviewed:  Problem: Lack of Community Resources        Goal: Coordination of Services will be Obtained          Goal: Link Patient to services within the community       Priority: High        Problem: Limited Understanding of Medications       Goal: Patient will Verbalize Understanding of Medications       Priority: High        Problem: Risk of Uncoordinated Care       Goal: Care will be Coordinated and Supported by a Multidisciplinary Team of Providers       Priority: Medium          Summary:  This provider and Viviane Rojas RN met with patient at the patient's home to create the CarePlan. This provider and RN arrived to the facility where the patient resides at 10:02am, it took the patient until 10:20am to come downstairs for the appointment even though provider called ahead of time. This provider and RN explained to the Care Plan to the patient and received verbal confirmation from the patient that she understands and agrees to the above problems, goals, and interventions. Patient was meeting with her new employer after the appointment so she could not commit to scheduling a meeting for next week yet with this provider.                  JAIME Miramontes

## 2024-08-20 ENCOUNTER — PATIENT OUTREACH (OUTPATIENT)
Dept: HOME HEALTH SERVICES | Age: 24
End: 2024-08-20
Payer: COMMERCIAL

## 2024-08-20 NOTE — PROGRESS NOTES
Maria Isabel Cutler  Age: 24 y.o.  MRN: [id number]  Date: 8/16/2024  Location of service: in community    Program Details  Medicaid Community Clinical Case Management  Status: Enrolled  Effective Dates: 8/7/2024 - present  Responsible Staff: JAIME Miramontes      Goals Reviewed:  Problem: Lack of Community Resources        Goal: Coordination of Services will be Obtained          Goal: Link Patient to services within the community       Priority: High        Problem: Limited Understanding of Medications       Goal: Patient will Verbalize Understanding of Medications       Priority: High        Problem: Risk of Uncoordinated Care       Goal: Care will be Coordinated and Supported by a Multidisciplinary Team of Providers       Priority: Medium          Summary:  This writer and Dejah SANDOVAL met with patient to complete her care plan. Patient participates in creating the care plan and is agreeable to all goals and interventions.    Appointment start time: 1020  Appointment completion time: 1042  Total time spent with patient (in minutes): 22      Viviane Sanford RN

## 2024-08-21 ENCOUNTER — APPOINTMENT (OUTPATIENT)
Dept: PRIMARY CARE | Facility: CLINIC | Age: 24
End: 2024-08-21
Payer: COMMERCIAL

## 2024-08-22 ENCOUNTER — PATIENT OUTREACH (OUTPATIENT)
Dept: HOME HEALTH SERVICES | Age: 24
End: 2024-08-22
Payer: COMMERCIAL

## 2024-08-22 NOTE — PROGRESS NOTES
Maria Isabel Cutler  Age: 24 y.o.  MRN: [id number]  Date: 8/19/2024  Location of service: in community    Program Details  Medicaid Community Clinical Case Management  Status: Enrolled  Effective Dates: 8/7/2024 - present  Responsible Staff: JAIME Miramontes      Goals Reviewed:  Problem: Lack of Community Resources        Goal: Coordination of Services will be Obtained          Goal: Link Patient to services within the community       Priority: High        Problem: Limited Understanding of Medications       Goal: Patient will Verbalize Understanding of Medications       Priority: High        Problem: Risk of Uncoordinated Care       Goal: Care will be Coordinated and Supported by a Multidisciplinary Team of Providers       Priority: Medium          Summary:  Entered in error.     Appointment start time: 1020  Appointment completion time: 1042  Total time spent with patient (in minutes): 22 April JAIME Tejeda

## 2024-08-26 ENCOUNTER — DOCUMENTATION (OUTPATIENT)
Dept: BEHAVIORAL HEALTH | Facility: CLINIC | Age: 24
End: 2024-08-26
Payer: COMMERCIAL

## 2024-08-26 NOTE — PROGRESS NOTES
Maria Isabel Cutler  Age: 24 y.o.  MRN: [id number]  Date: 8/26/2024  Location of service: phone call    Program Details  Medicaid Community Clinical Case Management  Status: Enrolled  Effective Dates: 8/7/2024 - present  Responsible Staff: JAIME Miramontes      Goals Reviewed:  Problem: Lack of Community Resources        Goal: Coordination of Services will be Obtained          Goal: Link Patient to services within the community       Priority: High        Problem: Limited Understanding of Medications       Goal: Patient will Verbalize Understanding of Medications       Priority: High        Problem: Risk of Uncoordinated Care       Goal: Care will be Coordinated and Supported by a Multidisciplinary Team of Providers       Priority: Medium          Summary:  This provider attempted to make contact with the patient via telephone. This provider left a detailed voicemail with a request for a return call.     Start time: 9:13am  End time: 9:14am.                  JAIME Miramontes

## 2024-08-27 ENCOUNTER — DOCUMENTATION (OUTPATIENT)
Dept: BEHAVIORAL HEALTH | Facility: CLINIC | Age: 24
End: 2024-08-27
Payer: COMMERCIAL

## 2024-08-29 NOTE — DISCHARGE SUMMARY
"Discharge Diagnosis  Diabetic ketoacidosis without coma associated with other specified diabetes mellitus (Multi)    Issues Requiring Follow-Up  N/A    Discharge Meds     Your medication list        START taking these medications        Instructions Last Dose Given Next Dose Due   acetaminophen 500 mg tablet  Commonly known as: Tylenol Extra Strength      Take 2 tablets (1,000 mg) by mouth every 6 hours if needed for mild pain (1 - 3).              CHANGE how you take these medications        Instructions Last Dose Given Next Dose Due   insulin lispro 100 unit/mL injection  Commonly known as: HumaLOG KwikPen Insulin  What changed: additional instructions      Inject 1 unit under the skin for every 10 carbs with meals. May take up to 50 units per day.       TechLITE Pen Needle 32 gauge x 5/32\" needle  Generic drug: pen needle, diabetic  What changed: Another medication with the same name was added. Make sure you understand how and when to take each.      USE AS DIRECTED WITH INSULIN USE AS DIRECTED TO INJECT INSULIN 4-6 TIMES DAILY       TRUEplus Pen Needle 32 gauge x 5/32\" needle  Generic drug: pen needle, diabetic  What changed: Another medication with the same name was added. Make sure you understand how and when to take each.      1 applicator once daily. Use as needed while taking insulin.       TRUEplus Pen Needle 32 gauge x 5/32\" needle  Generic drug: pen needle, diabetic  What changed: You were already taking a medication with the same name, and this prescription was added. Make sure you understand how and when to take each.      Use 1 pen needle for insulin injection 4 times a day.              CONTINUE taking these medications        Instructions Last Dose Given Next Dose Due   Ventolin HFA 90 mcg/actuation inhaler  Generic drug: albuterol           albuterol 2.5 mg /3 mL (0.083 %) nebulizer solution           Alcohol Prep Pads pads, medicated  Generic drug: alcohol swabs           Dexcom G6 Sensor " "device  Generic drug: blood-glucose sensor      APPLY NEW DEVICE EVERY 10 DAYS       Dexcom G6 Sensor device  Generic drug: blood-glucose sensor      1 each continuously.       Dexcom G7 Sensor device  Generic drug: blood-glucose sensor      Change sensor every 10 days       glucose 4 gram chewable tablet           Gvoke HypoPen 1-Pack 1 mg/0.2 mL auto-injector  Generic drug: glucagon      Inject 1 mg under the skin 1 time if needed (use for low blood sugar if unable to take anything by mouth) for up to 1 dose.       Ketostix strip  Generic drug: acetone (urine) test      DRIP URINE TO CHECK FOR KETONES IF NAUSEA/VOMITING OR IF CONCERN FOR DKA OR DEHYDRATION       Lantus Solostar U-100 Insulin 100 unit/mL (3 mL) pen  Generic drug: insulin glargine      Inject 12 Units under the skin once daily in the morning.       OneTouch Verio test strips strip  Generic drug: blood sugar diagnostic      TEST 6-7 TIMES DAILY       Peak Air Peak Flow Meter device  Generic drug: peak flow meter                  STOP taking these medications      ClearLax 17 gram/dose powder  Generic drug: polyethylene glycol        FeroSuL tablet  Generic drug: ferrous sulfate (325 mg ferrous sulfate)        fluticasone 250 mcg/actuation diskus inhaler  Commonly known as: Flovent Diskus        ibuprofen 600 mg tablet        lidocaine 5 % patch  Commonly known as: Lidoderm        NIFEdipine ER 30 mg 24 hr tablet  Commonly known as: Adalat CC        oxyCODONE 5 mg immediate release tablet  Commonly known as: Roxicodone                  Where to Get Your Medications        These medications were sent to [facility]  [address], [address], [place] [address]      Hours: 8AM to 6PM Mon-Fri, 8AM to 4PM Sat, 9AM to 1PM Sun Phone: [phone]   insulin lispro 100 unit/mL injection  Lantus Solostar U-100 Insulin 100 unit/mL (3 mL) pen  TRUEplus Pen Needle 32 gauge x 5/32\" needle         Test Results Pending At " Discharge  N/A    Hospital Course    Maria Isabel Cutler is a 24 y.o. female who was admitted to the MICU on 07/23/2024 for DKA. They have been treated with insulin drip, 10 units of lantus and D5 NS. The patient has known non-compliance. With resumption of subcutaneous insulin the patient's BG stabilized. She will be discharged on Lantus 12 units with lispro with meals using the carb counting method. She will be referred to endocrinology on discharge. The patient was educated on insulin use before discharged. She was discharged home in stable condition with prescriptions at bedside.      Pertinent Physical Exam At Time of Discharge    GENERAL APPEARANCE: A&Ox3, appears in no acute distress  HEAD: normocephalic, atraumatic  THROAT: Oral cavity and pharynx normal. No inflammation, swelling, exudate, or lesions.  NECK: Neck supple, non-tender without lymphadenopathy, masses or thyromegaly.  CARDIAC: Normal S1 and S2. No S3, S4 or murmurs. Rhythm is regular. There is no peripheral edema, cyanosis or pallor. Extremities are warm and well perfused. No carotid bruits.  LUNGS: Clear to auscultation bilaterally without rales, rhonchi, wheezing or diminished breath sounds.  ABDOMEN: Positive bowel sounds. Soft, nondistended, nontender. No guarding or rebound. No masses.  EXTREMITIES: No significant deformity or joint abnormality. No edema. Peripheral pulses intact. No varicosities.  SKIN: Skin normal color, texture and turgor with no lesions or rash  PSYCHIATRIC: oriented to person, place, and time, good judgement and reason, without hallucinations, abnormal affect or abnormal behaviors during the examination. Patient is not suicidal.        Outpatient Follow-Up  Future Appointments   Date Time Provider Department [place]   8/30/2024 11:00 AM Charlee Gonzales DO [id number] [facility]   12/2/2024  3:00 PM Neela Tom RN PhD [id number] [facility]   12/9/2024  3:15 PM Neha Smith DPM [id number] [facility]  MD [place]

## 2024-08-29 NOTE — PROGRESS NOTES
Endocrinology  2024    History of Present Illness   Maria Isabel Cutler is a 24 y.o. year old female with medical history of T1D, asthma, anxiety, here for diabetes.     LV: 2024  Admitted with DKA. In MICU.  Since LV her son has started   She is between jobs    She is living in a homeless shelter with her son and figuring out housing.   She is still breastfeeding and using formula.     History:  Delivered . Was followed by M through pregnancy for her diabetes.   This is her first baby.   She used tandem pump during pregnancy.   A week before delivery she had pump malfunction, went into DKA. Delivered via  that admission.   Did not resume pump therapy after delivery.   Spoke with [employer] rep but was unable to make appointment in February due to transportation issues.   She has a car now. No transportation earlier this year.    No transportation now.     Current regimen:   - Lantus 12 units - not forgetting this   - Humalog ICR 1:10 + correction - if  - 4 extra units, if  - 6 extra units - not taking this often     SMBG: Meter. Also wearing Dexcom, but cannot ?log in       Hypoglycemia: Denies    Hypoglycemia awareness: Yes - dizzy, lightheaded, hot - 70 and lower    Weight changes: Gained 10 lbs. Since delivery    Denies any increased thirst, urination. Has blurry vision.     Diet:   - 4 meals a day + snacks     DM history:   Year/age at diagnosis: Age 5   Prior medications: Insulin   - On a pump during childhood  Previous hospitalizations for hyperglycemia: DKA 2024. DKA admission during pregnancy. DKA  multiple times   Complications: PDR - last visit 10/2023. Missed follow up appointment   Last eye exam: 10/2023 - DUE NOW   Last foot exam: Due now     Review of Systems   General: Denies fever or chills   Head: Denies headache or vision changes   Neck: Denies tenderness or lumps   Cardiac: Denies chest pain or palpitations   Respiratory: Denies shortness of breath or  "cough   GI: Denies abdominal pain, nausea, or vomiting   Extremities: Denies swelling   Skin: Denies rash     Medications     Current Outpatient Medications   Medication Instructions    acetaminophen (TYLENOL EXTRA STRENGTH) 1,000 mg, oral, Every 6 hours PRN    acetone, urine, test strip DRIP URINE TO CHECK FOR KETONES IF NAUSEA/VOMITING OR IF CONCERN FOR DKA OR DEHYDRATION    albuterol (Ventolin HFA) 90 mcg/actuation inhaler 1-2 puffs, inhalation, Every 4 hours PRN    albuterol 2.5 mg, inhalation, Every 6 hours PRN    blood-glucose sensor (Dexcom G7 Sensor) device Change sensor every 10 days    glucose 4 gram chewable tablet oral, USE AS DIRECTED TO TREAT HYPOGLYCEMIA    Gvoke HypoPen 1-Pack 1 mg, subcutaneous, Once as needed    insulin lispro (HumaLOG KwikPen Insulin) 100 unit/mL injection Inject 1 unit under the skin for every 10 carbs with meals. May take up to 50 units per day.    Lantus Solostar U-100 Insulin 10 Units, subcutaneous, Every morning    OneTouch Verio test strips strip TEST 6-7 TIMES DAILY    pen needle, diabetic 32 gauge x 5/32\" needle Use 1 pen needle for insulin injection 4 times a day.        History     Past Medical History:   Diagnosis Date    Asthma ([facility]     Chlamydia     Chronic hypertension     CSF oligoclonal bands     Depression     Herpes     Type 1 diabetes mellitus with hyperglycemia, with long-term current use of insulin (Multi)     Urinary tract infection        No past surgical history on file.    Family History   Problem Relation Name Age of Onset    Hypertension Maternal Grandmother      Asthma Child          No Known Allergies     Social history:   - Denies alcohol use   - Denies tobacco use   - +MJ   - Homeless shelter with son     Physical Exam   /63 (BP Location: Right arm, Patient Position: Sitting, BP Cuff Size: Adult)   Pulse 97   Ht 1.524 m (5')   Wt 53.5 kg (118 lb)   BMI 23.05 kg/m²   General: Well appearing, no acute distress  Heart: Normal rate  Neck: " No visible goiter   Lungs: Breathing comfortably on room air   Skin: No rashes    Labs and Imaging     Lab Results   Component Value Date    HGBA1C 13.1 (H) 07/23/2024    HGBA1C 7.0 (A) 08/08/2023    HGBA1C 12.8 (A) 05/12/2023     (L) 07/25/2024    K 4.0 07/25/2024     07/25/2024    CO2 23 07/25/2024    BUN 10 07/25/2024    CREATININE 0.56 07/25/2024    CALCIUM 8.3 (L) 07/25/2024    ALBUMIN 3.5 07/25/2024    PROT 7.1 07/23/2024    BILITOT 0.4 07/23/2024    ALKPHOS 68 07/23/2024    ALT 18 07/23/2024    AST 23 07/23/2024    GLUCOSE 286 (H) 07/25/2024    CHOL 199 01/20/2022    TRIG 56 03/20/2018    HDL 65.3 01/20/2022    BHYDRXBUT 0.62 (H) 12/03/2023       Assessment and Plan   Maria Isabel Cutler is a 24 y.o. year old female with medical history of T1D, asthma, anxiety, here for diabetes. BG control is not at goal. Not using insulin consistently.     # T1D:   - Increase lantus to 14 units   - Start carb counting and using ICR 1:10 - discussed this may be aggressive compared to her weight based dose, but she feels it works well   - Asked to start using CGM again  - Asked to call [employer] about pump issue   - Follow up with ophthalmology - PDR requiring treatment     RTC: 3 months      Charlee Gonzales,    Endocrinology

## 2024-08-30 ENCOUNTER — APPOINTMENT (OUTPATIENT)
Dept: ENDOCRINOLOGY | Facility: CLINIC | Age: 24
End: 2024-08-30
Payer: COMMERCIAL

## 2024-08-30 VITALS
SYSTOLIC BLOOD PRESSURE: 109 MMHG | HEIGHT: 60 IN | BODY MASS INDEX: 23.16 KG/M2 | HEART RATE: 97 BPM | DIASTOLIC BLOOD PRESSURE: 63 MMHG | WEIGHT: 118 LBS

## 2024-08-30 DIAGNOSIS — E10.65 TYPE 1 DIABETES MELLITUS WITH HYPERGLYCEMIA (MULTI): ICD-10-CM

## 2024-08-30 PROCEDURE — 3046F HEMOGLOBIN A1C LEVEL >9.0%: CPT | Performed by: STUDENT IN AN ORGANIZED HEALTH CARE EDUCATION/TRAINING PROGRAM

## 2024-08-30 PROCEDURE — 3008F BODY MASS INDEX DOCD: CPT | Performed by: STUDENT IN AN ORGANIZED HEALTH CARE EDUCATION/TRAINING PROGRAM

## 2024-08-30 PROCEDURE — 99214 OFFICE O/P EST MOD 30 MIN: CPT | Performed by: STUDENT IN AN ORGANIZED HEALTH CARE EDUCATION/TRAINING PROGRAM

## 2024-08-30 PROCEDURE — 3078F DIAST BP <80 MM HG: CPT | Performed by: STUDENT IN AN ORGANIZED HEALTH CARE EDUCATION/TRAINING PROGRAM

## 2024-08-30 PROCEDURE — 3074F SYST BP LT 130 MM HG: CPT | Performed by: STUDENT IN AN ORGANIZED HEALTH CARE EDUCATION/TRAINING PROGRAM

## 2024-08-30 RX ORDER — PEN NEEDLE, DIABETIC 30 GX3/16"
1 NEEDLE, DISPOSABLE MISCELLANEOUS 4 TIMES DAILY
Qty: 200 EACH | Refills: 11 | Status: SHIPPED | OUTPATIENT
Start: 2024-08-30

## 2024-08-30 RX ORDER — INSULIN LISPRO 100 [IU]/ML
INJECTION, SOLUTION INTRAVENOUS; SUBCUTANEOUS
Qty: 15 ML | Refills: 11 | Status: SHIPPED | OUTPATIENT
Start: 2024-08-30

## 2024-08-30 RX ORDER — INSULIN GLARGINE 100 [IU]/ML
14 INJECTION, SOLUTION SUBCUTANEOUS EVERY MORNING
Qty: 15 ML | Refills: 11 | Status: SHIPPED | OUTPATIENT
Start: 2024-08-30

## 2024-08-30 RX ORDER — BLOOD-GLUCOSE SENSOR
EACH MISCELLANEOUS
Qty: 3 EACH | Refills: 11 | Status: SHIPPED | OUTPATIENT
Start: 2024-08-30

## 2024-08-30 ASSESSMENT — PAIN SCALES - GENERAL: PAINLEVEL: 0-NO PAIN

## 2024-08-30 NOTE — PATIENT INSTRUCTIONS
- Please call [employer] to discuss your pump issue   - Let me know when you get a working pump   - Please call dexcom for help logging in  - Increase lantus to 14 units   - Continue humalog 1 unit for every 10 grams of carbs   - Follow up in 3 months

## 2024-09-03 ENCOUNTER — DOCUMENTATION (OUTPATIENT)
Dept: BEHAVIORAL HEALTH | Facility: CLINIC | Age: 24
End: 2024-09-03
Payer: COMMERCIAL

## 2024-09-03 NOTE — PROGRESS NOTES
Maria Isabel Cutler  Age: 24 y.o.  MRN: [id number]  Date: 8/27/2024  Location of service: phone call    Program Details  Medicaid Community Clinical Case Management  Status: Enrolled  Effective Dates: 8/7/2024 - present  Responsible Staff: JAIME Miramontes      Goals Reviewed:      Summary:  This writer calls patient to get her scheduled with this writer for an appointment. This writer and patient schedule at this time. Patient requests information for transportation. This writer provides [facility]    Appointment start time: 1246  Appointment completion time: 1252  Total time spent with patient (in minutes): 6  Non-Billable Time: 6  Billable Time Total: 0    Viviane Sanford RN

## 2024-09-03 NOTE — PROGRESS NOTES
Maria Isabel Cutler  Age: 24 y.o.  MRN: [id number]  Date: 9/3/2024  Location of service: phone call    Program Details  Medicaid Community Clinical Case Management  Status: Enrolled  Effective Dates: 8/7/2024 - present  Responsible Staff: JAIME Miramontes      Goals Reviewed:  Problem: Lack of Community Resources        Goal: Coordination of Services will be Obtained          Goal: Link Patient to services within the community       Priority: High        Problem: Limited Understanding of Medications       Goal: Patient will Verbalize Understanding of Medications       Priority: High        Problem: Risk of Uncoordinated Care       Goal: Care will be Coordinated and Supported by a Multidisciplinary Team of Providers       Priority: Medium          Summary:  This provider attempted to make telephone contact with the patient to confirm appointment for tomorrow. This provider had to leave a detailed voicemail requesting a return call for the patient.     Start time; 4:18pm  End time: 4:19pm                      JAIME Miramontes

## 2024-09-04 ENCOUNTER — DOCUMENTATION (OUTPATIENT)
Dept: BEHAVIORAL HEALTH | Facility: CLINIC | Age: 24
End: 2024-09-04
Payer: COMMERCIAL

## 2024-09-04 NOTE — PROGRESS NOTES
Maria Isabel Cutler  Age: 24 y.o.  MRN: [id number]  Date: 9/4/2024  Location of service: phone call    Program Details  Medicaid Community Clinical Case Management  Status: Enrolled  Effective Dates: 8/7/2024 - present  Responsible Staff: JAIME Miramontes      Goals Reviewed:  Problem: Lack of Community Resources        Goal: Coordination of Services will be Obtained          Goal: Link Patient to services within the community       Priority: High        Problem: Limited Understanding of Medications       Goal: Patient will Verbalize Understanding of Medications       Priority: High        Problem: Risk of Uncoordinated Care       Goal: Care will be Coordinated and Supported by a Multidisciplinary Team of Providers       Priority: Medium          Summary:  This provider attempted to make telephone contact with patient to confirm appointment for today. Patient did not answer the phone and this provider left a detailed message requesting a return call.     Start time: 9:08am  End time: 9:09am                      JAIME Miramontes

## 2024-09-09 ENCOUNTER — DOCUMENTATION (OUTPATIENT)
Dept: BEHAVIORAL HEALTH | Facility: CLINIC | Age: 24
End: 2024-09-09
Payer: COMMERCIAL

## 2024-09-09 DIAGNOSIS — F41.1 GAD (GENERALIZED ANXIETY DISORDER): ICD-10-CM

## 2024-09-10 ENCOUNTER — DOCUMENTATION (OUTPATIENT)
Dept: BEHAVIORAL HEALTH | Facility: CLINIC | Age: 24
End: 2024-09-10
Payer: COMMERCIAL

## 2024-09-13 ENCOUNTER — DOCUMENTATION (OUTPATIENT)
Dept: BEHAVIORAL HEALTH | Facility: CLINIC | Age: 24
End: 2024-09-13
Payer: COMMERCIAL

## 2024-09-13 DIAGNOSIS — F32.1 CURRENT MODERATE EPISODE OF MAJOR DEPRESSIVE DISORDER, UNSPECIFIED WHETHER RECURRENT (MULTI): ICD-10-CM

## 2024-09-13 NOTE — PROGRESS NOTES
Maria Isabel Cutler  Age: 24 y.o.  MRN: [id number]  Date: 9/10/2024  Location of service: phone call    Program Details  Medicaid Community Clinical Case Management  Status: Enrolled  Effective Dates: 2024 - present  Responsible Staff: JAIME Miramontes      Goals Reviewed:  Problem: Lack of Community Resources        Goal: Coordination of Services will be Obtained          Goal: Link Patient to services within the community       Priority: High        Summary:  This writer calls patient to discuss the  IOP. Patient is still considering adoption, this writer informs patient that she may not be a good fit for  IOP if she is considering adoption. This writer gives patient a resource for making decisions regarding adoption. Patient is very appreciative of this information.      Appointment start time: 1324  Appointment completion time: 1330  Total time spent with patient (in minutes): 6  Non-Billable Time: 6  Billable Time Total: 0    Viviane Sanford RN

## 2024-09-13 NOTE — PROGRESS NOTES
Maria Isabel Cutler  Age: 24 y.o.  MRN: [id number]  Date: 9/9/2024  Location of service: in community    Program Details  Medicaid Community Clinical Case Management  Status: Enrolled  Effective Dates: 8/7/2024 - present  Responsible Staff: JAIME Miramontes      Goals Reviewed:  Problem: Risk of Uncoordinated Care       Goal: Care will be Coordinated and Supported by a Multidisciplinary Team of Providers       Priority: Medium          Summary:  UNBILLABLE [id number]   Summary  This writer met with patient for a community visit.   Patient discusses difficulties with having a child. Patient states she has been considering giving her son up for adoption. Patient requests information about adoption. This writer encourages patient to discuss this with the shelter she is staying at.  Patient discusses the difficulties of dealing with her own family and her son's family.  This writer provides therapeutic communication and empathetic listening.      BILLABLE [id number]   Patient discusses her diabetes, she states she needs education on diabetic diet.  This writer provides patient with print outs of diabetic diet education. This writer goes over these print outs with the patient.   Patient verbalizes better understanding, but requests additional education from this writer moving forward.    Appointment start time: 1333  Appointment completion time: 1435  Total time spent with patient (in minutes): 62  Non-Billable Time: 32  Billable Time Total: 30    Viviane Sanford RN

## 2024-09-15 NOTE — PROGRESS NOTES
Maria Isabel Cutler  Age: 24 y.o.  MRN: [id number]  Date: 9/13/2024  Location of service: in community    Program Details  Medicaid Community Clinical Case Management  Status: Enrolled  Effective Dates: 8/7/2024 - present  Responsible Staff: JAIME Miramontes      Goals Reviewed:  Problem: Lack of Community Resources        Goal: Coordination of Services will be Obtained          Goal: Link Patient to services within the community       Priority: High          Problem: Risk of Uncoordinated Care       Goal: Care will be Coordinated and Supported by a Multidisciplinary Team of Providers       Priority: Medium          Summary:  This provider met with patient at the patient's home. This provider listened with empathy as patient explained that her priorities are securing a job and getting her car fixed so that she can properly support her infant son. Provider reviewed some additional community supports with patient such as IOP services, the local community collaboration, and a car program through [employer]. 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                      JAIME Miramontes

## 2024-09-18 ENCOUNTER — DOCUMENTATION (OUTPATIENT)
Dept: BEHAVIORAL HEALTH | Facility: CLINIC | Age: 24
End: 2024-09-18
Payer: COMMERCIAL

## 2024-09-18 NOTE — PROGRESS NOTES
Maria Isabel Cutler  Age: 24 y.o.  MRN: [id number]  Date: 9/18/2024  Location of service: in community    Program Details  Medicaid Community Clinical Case Management  Status: Enrolled  Effective Dates: 8/7/2024 - present  Responsible Staff: JAIME Miramontes      Goals Reviewed:  Problem: Lack of Community Resources        Goal: Coordination of Services will be Obtained          Goal: Link Patient to services within the community       Priority: High            Problem: Risk of Uncoordinated Care       Goal: Care will be Coordinated and Supported by a Multidisciplinary Team of Providers       Priority: Medium          Summary:  This provider met with patient at the patient's home. This provider listened with empathy as patient spoke about the lack of support she is experiencing. This provider and patient explored the [employer] website together and spoke about training opportunities for employment.                    JAIME Miramontes

## 2024-09-24 NOTE — PROGRESS NOTES
Maria Isabel Cutler  Age: 24 y.o.  MRN: [id number]  Date: 9/18/2024  Location of service: in community    Program Details  Medicaid Community Clinical Case Management  Status: Enrolled  Effective Dates: 8/7/2024 - present  Responsible Staff: JAIME Miramontes      Goals Reviewed:      Summary:  This writer met with patient at the shelter she is staying at.  Patient states she has been managing her diet better, but states she has not been keeping a meal diary. Patient states she will try to keep one moving forward.  Patient states she will be meeting with someone from the adoption agency to discuss her options and decide if adoption is her best option.   This writer assists patient with creating a Pro/Con list related to giving her son up for adoption.    Appointment start time: 1505  Appointment completion time: 1538  Total time spent with patient (in minutes): 33  Non-Billable Time: 33  Billable Time Total: 0    Viviane Sanford RN

## 2024-09-25 ENCOUNTER — DOCUMENTATION (OUTPATIENT)
Dept: BEHAVIORAL HEALTH | Facility: CLINIC | Age: 24
End: 2024-09-25
Payer: COMMERCIAL

## 2024-09-25 DIAGNOSIS — F32.1 CURRENT MODERATE EPISODE OF MAJOR DEPRESSIVE DISORDER, UNSPECIFIED WHETHER RECURRENT (MULTI): ICD-10-CM

## 2024-09-25 NOTE — PROGRESS NOTES
Maria Isabel Cutler  Age: 24 y.o.  MRN: [id number]  Date: 9/25/2024  Location of service: in community    Program Details  Medicaid Community Clinical Case Management  Status: Enrolled  Effective Dates: 8/7/2024 - present  Responsible Staff: JAIME Miramontes      Goals Reviewed:  Problem: Lack of Community Resources        Goal: Coordination of Services will be Obtained          Goal: Link Patient to services within the community       Priority: High            Problem: Risk of Uncoordinated Care       Goal: Care will be Coordinated and Supported by a Multidisciplinary Team of Providers       Priority: Medium          Summary:  This provider met with patient at the patient's home. This provider participated in a meeting the patient was having to a couple of representatives from [facility]. This provider listened with empathy as patient explained a recent conversation with a loved one and how it impacted her. Provider validated patient's feelings. Provider gave patient information on some resources within the community.                      JAIME Miramontes

## 2024-09-27 ENCOUNTER — DOCUMENTATION (OUTPATIENT)
Dept: BEHAVIORAL HEALTH | Facility: CLINIC | Age: 24
End: 2024-09-27
Payer: COMMERCIAL
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
## 2024-10-02 ENCOUNTER — DOCUMENTATION (OUTPATIENT)
Dept: BEHAVIORAL HEALTH | Facility: CLINIC | Age: 24
End: 2024-10-02
Payer: COMMERCIAL

## 2024-10-02 DIAGNOSIS — F41.1 GAD (GENERALIZED ANXIETY DISORDER): ICD-10-CM

## 2024-10-02 NOTE — PROGRESS NOTES
Maria Isabel Cutler  Age: 24 y.o.  MRN: [id number]  Date: 10/2/2024  Location of service: in community    Program Details  Medicaid Community Clinical Case Management  Status: Enrolled  Effective Dates: 2024 - present  Responsible Staff: JAIME Miramontes      Goals Reviewed:  Problem: Lack of Community Resources        Goal: Coordination of Services will be Obtained          Goal: Link Patient to services within the community       Priority: High          Problem: Risk of Uncoordinated Care       Goal: Care will be Coordinated and Supported by a Multidisciplinary Team of Providers       Priority: Medium          Summary:  This provider met with patient at the patient's home. This provider listened with empathy as patient expressed that she is still in need of finding trusted childcare so she can gain employment as a way to become more social. This provider gave patient the flyers for the   [employer] program as well as a paper application for  assistance.                      JAIME Miramontes

## 2024-10-08 ENCOUNTER — DOCUMENTATION (OUTPATIENT)
Dept: BEHAVIORAL HEALTH | Facility: CLINIC | Age: 24
End: 2024-10-08
Payer: COMMERCIAL

## 2024-10-09 NOTE — PROGRESS NOTES
Maria Isabel Cutler  Age: 24 y.o.  MRN: [id number]  Date: 10/8/2024  Location of service: in community    Program Details  Medicaid Community Clinical Case Management  Status: Enrolled  Effective Dates: 8/7/2024 - present  Responsible Staff: JAIME Miramontes      Goals Reviewed:  Problem: Limited Understanding of Medications       Goal: Patient will Verbalize Understanding of Medications       Priority: High        Problem: Risk of Uncoordinated Care       Goal: Care will be Coordinated and Supported by a Multidisciplinary Team of Providers       Priority: Medium          Summary:  This writer meets patient at the shelter for a community visit.  This writer assists patient in getting scheduled with ophthalmology for her diabetic retinopathy.    Patient discusses her plans to take her son to an apple orchard today.   Patient states she has been trying to manage her blood sugars and states she often doesn't think about it. This writer and patient come up with a plan for patient to make signs in her room to remind her.    Appointment start time: 1305  Appointment completion time: 1337  Total time spent with patient (in minutes): 32  Non-Billable Time: 32  Billable Time Total: 0    Viviane Sanford RN

## 2024-10-10 ENCOUNTER — APPOINTMENT (OUTPATIENT)
Dept: OPHTHALMOLOGY | Facility: CLINIC | Age: 24
End: 2024-10-10
Payer: COMMERCIAL

## 2024-10-16 ENCOUNTER — DOCUMENTATION (OUTPATIENT)
Dept: BEHAVIORAL HEALTH | Facility: CLINIC | Age: 24
End: 2024-10-16
Payer: COMMERCIAL

## 2024-10-16 NOTE — PROGRESS NOTES
Maria Isabel Cutler  Age: 24 y.o.  MRN: [id number]  Date: 10/16/2024  Location of service: phone call    Program Details  Medicaid Community Clinical Case Management  Status: Enrolled  Effective Dates: 8/7/2024 - present  Responsible Staff: JAIME Miramontes      Goals Reviewed:  Problem: Lack of Community Resources        Goal: Coordination of Services will be Obtained          Goal: Link Patient to services within the community       Priority: High        Problem: Limited Understanding of Medications       Goal: Patient will Verbalize Understanding of Medications       Priority: High        Problem: Risk of Uncoordinated Care       Goal: Care will be Coordinated and Supported by a Multidisciplinary Team of Providers       Priority: Medium          Summary:  This provider attempted to make contact with the patient via telephone to schedule our next appointment. This provider had to leave a detailed voicemail with a request for a return call.                      JAIME Miramontes

## 2024-10-17 NOTE — PROGRESS NOTES
Maria Isabel Cutler  Age: 24 y.o.  MRN: [id number]  Date: 10/16/2024  Location of service: in community    Program Details  Medicaid Community Clinical Case Management  Status: Enrolled  Effective Dates: 8/7/2024 - present  Responsible Staff: JAIME Miramontes      Goals Reviewed:  Problem: Risk of Uncoordinated Care       Goal: Care will be Coordinated and Supported by a Multidisciplinary Team of Providers       Priority: Medium          Summary:  This writer met with patient at the homeless shelter for a community visit.  Patient states she has been managing her diabetes better and states her BGs have been in the low 200s. This writer discusses with patient other ways to manage her blood sugar.   This writer educates patient on diabetes and the long term effects of high blood sugar.    Appointment start time: 1400  Appointment completion time: 1430  Total time spent with patient (in minutes): 30  Non-Billable Time: 30  Billable Time Total: 0    Viviane Sanford RN

## 2024-10-23 ENCOUNTER — DOCUMENTATION (OUTPATIENT)
Dept: PSYCHIATRY | Facility: HOSPITAL | Age: 24
End: 2024-10-23
Payer: COMMERCIAL

## 2024-10-23 DIAGNOSIS — F41.1 GAD (GENERALIZED ANXIETY DISORDER): ICD-10-CM

## 2024-10-23 NOTE — PROGRESS NOTES
Maria Isabel Cutler  Age: 24 y.o.  MRN: [id number]  Date: 10/23/2024  Location of service: in community    Program Details  Medicaid Community Clinical Case Management  Status: Enrolled  Effective Dates: 8/7/2024 - present  Responsible Staff: JAIME Miramontes      Goals Reviewed:  Problem: Lack of Community Resources        Goal: Coordination of Services will be Obtained          Goal: Link Patient to services within the community       Priority: High          Problem: Risk of Uncoordinated Care       Goal: Care will be Coordinated and Supported by a Multidisciplinary Team of Providers       Priority: Medium          Summary:  This provider met with patient at the patient's home along with Samantha SANDOVAL from [facility]. This provider listened with empathy as patient expressed a lack of family support. This provider offered to assist patient with arranging transportation to an appointment for her infant son and offered to help reschedule patient appointment with Dr. Doyle in which patient declined both offers. Patient agreed to make those phone calls once provider and other LSW left. This provider also reached out to Mr. Allennicoleangella from [facility] regarding a date when patient can move into her own residence that has already been approved.                      JAIME Miramontes

## 2024-10-30 ENCOUNTER — DOCUMENTATION (OUTPATIENT)
Dept: BEHAVIORAL HEALTH | Facility: CLINIC | Age: 24
End: 2024-10-30
Payer: COMMERCIAL

## 2024-11-05 ENCOUNTER — DOCUMENTATION (OUTPATIENT)
Dept: PSYCHIATRY | Facility: HOSPITAL | Age: 24
End: 2024-11-05
Payer: COMMERCIAL

## 2024-11-05 NOTE — PROGRESS NOTES
Maria Isabel Cutler  Age: 24 y.o.  MRN: [id number]  Date: 11/5/2024  Location of service: in office    Program Details  Medicaid Community Clinical Case Management  Status: Enrolled  Effective Dates: 8/7/2024 - present  Responsible Staff: JAIME Miramontes      Goals Reviewed:  Problem: Lack of Community Resources        Goal: Coordination of Services will be Obtained          Goal: Link Patient to services within the community       Priority: High            Problem: Risk of Uncoordinated Care       Goal: Care will be Coordinated and Supported by a Multidisciplinary Team of Providers       Priority: Medium          Summary:  This provider reached out to a Cathie M[facility]. This provider inquired about the services that the agency can assist the patient with once the patient moves into their new home. Cathie explained that they can assist with searching for childcare as well as assist them in job training skills and securing employment. This provider wrote down all the information to share with the patient at our next appointment. This provider then attempted to make contact with the patient via telephone call but had to leave a detailed voicemail with a request for a return call.                      JAIME Miramontes

## 2024-11-06 ENCOUNTER — DOCUMENTATION (OUTPATIENT)
Dept: BEHAVIORAL HEALTH | Facility: CLINIC | Age: 24
End: 2024-11-06
Payer: COMMERCIAL

## 2024-11-08 ENCOUNTER — DOCUMENTATION (OUTPATIENT)
Dept: PSYCHIATRY | Facility: HOSPITAL | Age: 24
End: 2024-11-08
Payer: COMMERCIAL

## 2024-11-08 DIAGNOSIS — F41.1 GAD (GENERALIZED ANXIETY DISORDER): ICD-10-CM

## 2024-11-09 NOTE — PROGRESS NOTES
Maria Isabel Cutler  Age: 24 y.o.  MRN: [id number]  Date: 11/8/2024  Location of service: in community    Program Details  Medicaid Community Clinical Case Management  Status: Enrolled  Effective Dates: 8/7/2024 - present  Responsible Staff: JAIME Miramontes      Goals Reviewed:  Problem: Lack of Community Resources        Goal: Coordination of Services will be Obtained          Goal: Link Patient to services within the community       Priority: High          Summary:  This provider met with patient at the patient's home. This provider used active listening as patient spoke about recent support systems patient has developed. This provider gave patient information on childcare centers, job training, and community resources close to the new residence patient is waiting to move to. This provider inquired if patient is still interested in therapy services and explained that there is a licensed therapist on the Personalized Care Team.                      JAIME Miramontes

## 2024-11-10 DIAGNOSIS — E10.65 TYPE 1 DIABETES MELLITUS WITH HYPERGLYCEMIA (MULTI): ICD-10-CM

## 2024-11-11 NOTE — PROGRESS NOTES
"Maria Isabel Cutler  Age: 24 y.o.  MRN: [id number]  Date: 11/6/2024  Location of service: in community    Program Details  Medicaid Community Clinical Case Management  Status: Enrolled  Effective Dates: 8/7/2024 - present  Responsible Staff: JAIME Miramontes      Goals Reviewed:  Problem: Risk of Uncoordinated Care       Goal: Care will be Coordinated and Supported by a Multidisciplinary Team of Providers       Priority: Medium          Summary:  This writer met with patient for a community visit.  Patient's [employer]  is also present at this time.  Patient states her Dexcom was recalled and failed so she had to get a new one.  Patient states her blood sugars have been high lately, \"in the 300s\"  This writer discusses the dangers related to her high blood sugars and her diabetic retinopathy.  Patient states she forgot to call and schedule her eye doctor appointment for her diabetic retinopathy. This writer offered to schedule the appointment for her. Patient states she will call on her own.  This writer confirms patient's upcoming endocrinology appointment and confirms this writer can attend the appointment. Patient is agreeable to this writer attending the appointment with her.    Appointment start time: 1108  Appointment completion time: 1139  Total time spent with patient (in minutes): 31  Non-Billable Time: 31  Billable Time Total: 0    Viviane Sanford RN    "

## 2024-11-12 RX ORDER — INSULIN LISPRO 100 [IU]/ML
INJECTION, SOLUTION INTRAVENOUS; SUBCUTANEOUS
Qty: 15 ML | Refills: 1 | Status: SHIPPED | OUTPATIENT
Start: 2024-11-12

## 2024-11-13 ENCOUNTER — DOCUMENTATION (OUTPATIENT)
Dept: BEHAVIORAL HEALTH | Facility: CLINIC | Age: 24
End: 2024-11-13
Payer: COMMERCIAL

## 2024-11-19 ENCOUNTER — APPOINTMENT (OUTPATIENT)
Dept: OPHTHALMOLOGY | Facility: CLINIC | Age: 24
End: 2024-11-19
Payer: COMMERCIAL

## 2024-11-19 NOTE — PROGRESS NOTES
Maria Isabel Cutler  Age: 24 y.o.  MRN: [id number]  Date: 11/13/2024  Location of service: in community    Program Details  Medicaid Community Clinical Case Management  Status: Enrolled  Effective Dates: 8/7/2024 - present  Responsible Staff: JAIME Miramontes      Goals Reviewed:  Problem: Risk of Uncoordinated Care       Goal: Care will be Coordinated and Supported by a Multidisciplinary Team of Providers       Priority: Medium          Summary:  This writer meets with patient for a community visit.  This writer assists patient in calling and scheduling an appointment with the eye doctor for her diabetic retinopathy.   This writer and patient discuss patient's diabetic retinopathy, the risks that is poses to her, and the reasons why she would like to start going to appointments and managing her condition.      Appointment start time: 1000  Appointment completion time: 1037  Total time spent with patient (in minutes): 37  Non-Billable Time: 37  Billable Time Total: 0    Viviane Sanford RN

## 2024-11-20 ENCOUNTER — DOCUMENTATION (OUTPATIENT)
Dept: BEHAVIORAL HEALTH | Facility: CLINIC | Age: 24
End: 2024-11-20
Payer: COMMERCIAL

## 2024-11-20 NOTE — PROGRESS NOTES
Maria Isabel Cutler  Age: 24 y.o.  MRN: [id number]  Date: 11/20/2024  Location of service: in community    Program Details  Medicaid Community Clinical Case Management  Status: Enrolled  Effective Dates: 8/7/2024 - present  Responsible Staff: JAIME Miramontes      Goals Reviewed:      Summary:  This writer met with patient at the [facility] for a community visit.  Patient didn't realize she missed her eye doctor appointment. Patient appears upset with herself over this. Patient states she feels confident she can reschedule her appointment herself today.  Patient had to leave our appointment early because her sister arrived to take her to  her medications.     Appointment start time: 1156  Appointment completion time: 1213  Total time spent with patient (in minutes): 17  Non-Billable Time: 17  Billable Time Total: 0    Viviane Sanford RN

## 2024-11-27 ENCOUNTER — DOCUMENTATION (OUTPATIENT)
Dept: BEHAVIORAL HEALTH | Facility: CLINIC | Age: 24
End: 2024-11-27
Payer: COMMERCIAL

## 2024-11-27 NOTE — PROGRESS NOTES
Maria Isabel Cutler  Age: 24 y.o.  MRN: [id number]  Date: 11/27/2024  Location of service: phone call    Program Details  Medicaid Community Clinical Case Management  Status: Enrolled  Effective Dates: 8/7/2024 - present  Responsible Staff: JAIME Miramontes      Goals Reviewed:      Summary:  This writer called patient to see if the patient was able to schedule an appointment with the eye doctor. Patient states she was able to, but upon checking EPIC, it appears there is not appointment. Patient calls the scheduling line back and schedules an appointment at this time.    Appointment start time: 1309  Appointment completion time: 1315  Total time spent with patient (in minutes): 6  Non-Billable Time: 6  Billable Time Total: 0    Viviane Sanford RN

## 2024-12-02 ENCOUNTER — APPOINTMENT (OUTPATIENT)
Dept: ENDOCRINOLOGY | Facility: CLINIC | Age: 24
End: 2024-12-02
Payer: COMMERCIAL

## 2024-12-05 ENCOUNTER — DOCUMENTATION (OUTPATIENT)
Dept: BEHAVIORAL HEALTH | Facility: CLINIC | Age: 24
End: 2024-12-05
Payer: COMMERCIAL

## 2024-12-06 ENCOUNTER — APPOINTMENT (OUTPATIENT)
Dept: ENDOCRINOLOGY | Facility: CLINIC | Age: 24
End: 2024-12-06
Payer: COMMERCIAL

## 2024-12-11 NOTE — PROGRESS NOTES
Maria Isabel Cutler  Age: 24 y.o.  MRN: [id number]  Date: 12/5/2024  Location of service: phone call    Program Details  Medicaid Community Clinical Case Management  Status: Enrolled  Effective Dates: 8/7/2024 - present  Responsible Staff: JAIME Miramontes      Goals Reviewed:      Summary:  This writer called patient to remind her of her endocrinology appointment tomorrow at [facility]. Patient states she thought it was a different day but states she will try to get a ride from her sister.    Appointment start time: 1047  Appointment completion time: 1052  Total time spent with patient (in minutes): 5  Non-Billable Time: 5  Billable Time Total: 0    Viviane Sanford RN

## 2024-12-16 ENCOUNTER — DOCUMENTATION (OUTPATIENT)
Dept: BEHAVIORAL HEALTH | Facility: CLINIC | Age: 24
End: 2024-12-16
Payer: COMMERCIAL

## 2024-12-18 NOTE — PROGRESS NOTES
Maria Isabel Cutler  Age: 24 y.o.  MRN: [id number]  Date: 12/16/2024  Location of service: phone call    Program Details  Medicaid Community Clinical Case Management  Status: Enrolled  Effective Dates: 8/7/2024 - present  Responsible Staff: JAIME Miramontes      Goals Reviewed:      Summary:  This writer calls patient to get patient scheduled for an appointment with this writer. Patient is unable to answer at this time. This writer leaves a voicemail.    Appointment start time: 1353  Appointment completion time: 1354  Total time spent with patient (in minutes): 1  Non-Billable Time: 1  Billable Time Total: 0    Viviane Sanford RN

## 2024-12-20 ENCOUNTER — DOCUMENTATION (OUTPATIENT)
Dept: BEHAVIORAL HEALTH | Facility: CLINIC | Age: 24
End: 2024-12-20
Payer: COMMERCIAL

## 2024-12-20 DIAGNOSIS — F32.1 CURRENT MODERATE EPISODE OF MAJOR DEPRESSIVE DISORDER, UNSPECIFIED WHETHER RECURRENT (MULTI): ICD-10-CM

## 2024-12-22 ENCOUNTER — HOSPITAL ENCOUNTER (INPATIENT)
Facility: HOSPITAL | Age: 24
LOS: 1 days | Discharge: HOME | End: 2024-12-24
Attending: EMERGENCY MEDICINE | Admitting: INTERNAL MEDICINE

## 2024-12-22 DIAGNOSIS — E10.65 TYPE 1 DIABETES MELLITUS WITH HYPERGLYCEMIA (MULTI): ICD-10-CM

## 2024-12-22 DIAGNOSIS — E10.10 DIABETIC KETOACIDOSIS WITHOUT COMA ASSOCIATED WITH TYPE 1 DIABETES MELLITUS (MULTI): Primary | ICD-10-CM

## 2024-12-22 LAB
ALBUMIN SERPL BCP-MCNC: 5.1 G/DL (ref 3.4–5)
ALP SERPL-CCNC: 95 U/L (ref 33–110)
ALT SERPL W P-5'-P-CCNC: 30 U/L (ref 7–45)
ANION GAP BLDV CALCULATED.4IONS-SCNC: 22 MMOL/L (ref 10–25)
ANION GAP BLDV CALCULATED.4IONS-SCNC: 24 MMOL/L (ref 10–25)
ANION GAP BLDV CALCULATED.4IONS-SCNC: 27 MMOL/L (ref 10–25)
ANION GAP SERPL CALC-SCNC: 33 MMOL/L (ref 10–20)
APPEARANCE UR: CLEAR
AST SERPL W P-5'-P-CCNC: 22 U/L (ref 9–39)
B-OH-BUTYR SERPL-SCNC: 8.38 MMOL/L (ref 0.02–0.27)
BASE EXCESS BLDV CALC-SCNC: -16.1 MMOL/L (ref -2–3)
BASE EXCESS BLDV CALC-SCNC: -16.8 MMOL/L (ref -2–3)
BASE EXCESS BLDV CALC-SCNC: -16.8 MMOL/L (ref -2–3)
BASE EXCESS BLDV CALC-SCNC: -17.2 MMOL/L (ref -2–3)
BASOPHILS # BLD AUTO: 0.17 X10*3/UL (ref 0–0.1)
BASOPHILS NFR BLD AUTO: 1.6 %
BILIRUB SERPL-MCNC: 0.8 MG/DL (ref 0–1.2)
BILIRUB UR STRIP.AUTO-MCNC: NEGATIVE MG/DL
BODY TEMPERATURE: 37 DEGREES CELSIUS
BUN SERPL-MCNC: 15 MG/DL (ref 6–23)
CA-I BLDV-SCNC: 1.31 MMOL/L (ref 1.1–1.33)
CA-I BLDV-SCNC: 1.32 MMOL/L (ref 1.1–1.33)
CA-I BLDV-SCNC: 1.38 MMOL/L (ref 1.1–1.33)
CALCIUM SERPL-MCNC: 10.7 MG/DL (ref 8.6–10.6)
CHLORIDE BLDV-SCNC: 100 MMOL/L (ref 98–107)
CHLORIDE BLDV-SCNC: 103 MMOL/L (ref 98–107)
CHLORIDE BLDV-SCNC: 96 MMOL/L (ref 98–107)
CHLORIDE SERPL-SCNC: 94 MMOL/L (ref 98–107)
CO2 SERPL-SCNC: 8 MMOL/L (ref 21–32)
COLOR UR: COLORLESS
CREAT SERPL-MCNC: 0.96 MG/DL (ref 0.5–1.05)
EGFRCR SERPLBLD CKD-EPI 2021: 85 ML/MIN/1.73M*2
EOSINOPHIL # BLD AUTO: 0.09 X10*3/UL (ref 0–0.7)
EOSINOPHIL NFR BLD AUTO: 0.9 %
ERYTHROCYTE [DISTWIDTH] IN BLOOD BY AUTOMATED COUNT: 11.6 % (ref 11.5–14.5)
GLUCOSE BLD MANUAL STRIP-MCNC: 327 MG/DL (ref 74–99)
GLUCOSE BLD MANUAL STRIP-MCNC: 383 MG/DL (ref 74–99)
GLUCOSE BLD MANUAL STRIP-MCNC: 419 MG/DL (ref 74–99)
GLUCOSE BLDV-MCNC: 323 MG/DL (ref 74–99)
GLUCOSE BLDV-MCNC: 444 MG/DL (ref 74–99)
GLUCOSE BLDV-MCNC: 527 MG/DL (ref 74–99)
GLUCOSE SERPL-MCNC: 455 MG/DL (ref 74–99)
GLUCOSE UR STRIP.AUTO-MCNC: ABNORMAL MG/DL
HCO3 BLDV-SCNC: 11.7 MMOL/L (ref 22–26)
HCO3 BLDV-SCNC: 11.7 MMOL/L (ref 22–26)
HCO3 BLDV-SCNC: 12.3 MMOL/L (ref 22–26)
HCO3 BLDV-SCNC: 9.8 MMOL/L (ref 22–26)
HCT VFR BLD AUTO: 44.6 % (ref 36–46)
HCT VFR BLD EST: 42 % (ref 36–46)
HCT VFR BLD EST: 42 % (ref 36–46)
HCT VFR BLD EST: 46 % (ref 36–46)
HGB BLD-MCNC: 15 G/DL (ref 12–16)
HGB BLDV-MCNC: 14 G/DL (ref 12–16)
HGB BLDV-MCNC: 14.1 G/DL (ref 12–16)
HGB BLDV-MCNC: 15.4 G/DL (ref 12–16)
IMM GRANULOCYTES # BLD AUTO: 0.04 X10*3/UL (ref 0–0.7)
IMM GRANULOCYTES NFR BLD AUTO: 0.4 % (ref 0–0.9)
INR PPP: 1 (ref 0.9–1.1)
KETONES UR STRIP.AUTO-MCNC: ABNORMAL MG/DL
LACTATE BLDV-SCNC: 1.1 MMOL/L (ref 0.4–2)
LACTATE BLDV-SCNC: 1.6 MMOL/L (ref 0.4–2)
LACTATE BLDV-SCNC: 1.7 MMOL/L (ref 0.4–2)
LEUKOCYTE ESTERASE UR QL STRIP.AUTO: ABNORMAL
LIPASE SERPL-CCNC: 15 U/L (ref 9–82)
LYMPHOCYTES # BLD AUTO: 3.61 X10*3/UL (ref 1.2–4.8)
LYMPHOCYTES NFR BLD AUTO: 34.7 %
MAGNESIUM SERPL-MCNC: 2.1 MG/DL (ref 1.6–2.4)
MCH RBC QN AUTO: 30.4 PG (ref 26–34)
MCHC RBC AUTO-ENTMCNC: 33.6 G/DL (ref 32–36)
MCV RBC AUTO: 90 FL (ref 80–100)
MONOCYTES # BLD AUTO: 0.68 X10*3/UL (ref 0.1–1)
MONOCYTES NFR BLD AUTO: 6.5 %
MUCOUS THREADS #/AREA URNS AUTO: ABNORMAL /LPF
NEUTROPHILS # BLD AUTO: 5.81 X10*3/UL (ref 1.2–7.7)
NEUTROPHILS NFR BLD AUTO: 55.9 %
NITRITE UR QL STRIP.AUTO: NEGATIVE
NRBC BLD-RTO: 0 /100 WBCS (ref 0–0)
OXYHGB MFR BLDV: 43.5 % (ref 45–75)
OXYHGB MFR BLDV: 48.1 % (ref 45–75)
OXYHGB MFR BLDV: 50.3 % (ref 45–75)
OXYHGB MFR BLDV: 61.4 % (ref 45–75)
PCO2 BLDV: 27 MM HG (ref 41–51)
PCO2 BLDV: 36 MM HG (ref 41–51)
PCO2 BLDV: 36 MM HG (ref 41–51)
PCO2 BLDV: 37 MM HG (ref 41–51)
PH BLDV: 7.12 PH (ref 7.33–7.43)
PH BLDV: 7.12 PH (ref 7.33–7.43)
PH BLDV: 7.13 PH (ref 7.33–7.43)
PH BLDV: 7.17 PH (ref 7.33–7.43)
PH UR STRIP.AUTO: 5 [PH]
PHOSPHATE SERPL-MCNC: 5.4 MG/DL (ref 2.5–4.9)
PLATELET # BLD AUTO: 507 X10*3/UL (ref 150–450)
PO2 BLDV: 35 MM HG (ref 35–45)
PO2 BLDV: 37 MM HG (ref 35–45)
PO2 BLDV: 37 MM HG (ref 35–45)
PO2 BLDV: 43 MM HG (ref 35–45)
POTASSIUM BLDV-SCNC: 4.8 MMOL/L (ref 3.5–5.3)
POTASSIUM BLDV-SCNC: 5.4 MMOL/L (ref 3.5–5.3)
POTASSIUM BLDV-SCNC: 5.4 MMOL/L (ref 3.5–5.3)
POTASSIUM SERPL-SCNC: 4.8 MMOL/L (ref 3.5–5.3)
PREGNANCY TEST URINE, POC: NEGATIVE
PROT SERPL-MCNC: 9 G/DL (ref 6.4–8.2)
PROT UR STRIP.AUTO-MCNC: ABNORMAL MG/DL
PROTHROMBIN TIME: 10.8 SECONDS (ref 9.8–12.8)
RBC # BLD AUTO: 4.94 X10*6/UL (ref 4–5.2)
RBC # UR STRIP.AUTO: NEGATIVE /UL
RBC #/AREA URNS AUTO: ABNORMAL /HPF
SAO2 % BLDV: 44 % (ref 45–75)
SAO2 % BLDV: 49 % (ref 45–75)
SAO2 % BLDV: 51 % (ref 45–75)
SAO2 % BLDV: 63 % (ref 45–75)
SODIUM BLDV-SCNC: 129 MMOL/L (ref 136–145)
SODIUM BLDV-SCNC: 130 MMOL/L (ref 136–145)
SODIUM BLDV-SCNC: 130 MMOL/L (ref 136–145)
SODIUM SERPL-SCNC: 130 MMOL/L (ref 136–145)
SP GR UR STRIP.AUTO: 1.03
SQUAMOUS #/AREA URNS AUTO: ABNORMAL /HPF
UROBILINOGEN UR STRIP.AUTO-MCNC: NORMAL MG/DL
WBC # BLD AUTO: 10.4 X10*3/UL (ref 4.4–11.3)
WBC #/AREA URNS AUTO: ABNORMAL /HPF

## 2024-12-22 PROCEDURE — 84100 ASSAY OF PHOSPHORUS: CPT

## 2024-12-22 PROCEDURE — 82010 KETONE BODYS QUAN: CPT

## 2024-12-22 PROCEDURE — 83036 HEMOGLOBIN GLYCOSYLATED A1C: CPT

## 2024-12-22 PROCEDURE — 99291 CRITICAL CARE FIRST HOUR: CPT | Performed by: EMERGENCY MEDICINE

## 2024-12-22 PROCEDURE — 36415 COLL VENOUS BLD VENIPUNCTURE: CPT

## 2024-12-22 PROCEDURE — 82947 ASSAY GLUCOSE BLOOD QUANT: CPT

## 2024-12-22 PROCEDURE — 82805 BLOOD GASES W/O2 SATURATION: CPT

## 2024-12-22 PROCEDURE — 84132 ASSAY OF SERUM POTASSIUM: CPT | Performed by: EMERGENCY MEDICINE

## 2024-12-22 PROCEDURE — 82436 ASSAY OF URINE CHLORIDE: CPT

## 2024-12-22 PROCEDURE — 84132 ASSAY OF SERUM POTASSIUM: CPT

## 2024-12-22 PROCEDURE — 2500000004 HC RX 250 GENERAL PHARMACY W/ HCPCS (ALT 636 FOR OP/ED)

## 2024-12-22 PROCEDURE — 2500000001 HC RX 250 WO HCPCS SELF ADMINISTERED DRUGS (ALT 637 FOR MEDICARE OP): Performed by: EMERGENCY MEDICINE

## 2024-12-22 PROCEDURE — 83690 ASSAY OF LIPASE: CPT | Performed by: EMERGENCY MEDICINE

## 2024-12-22 PROCEDURE — 96374 THER/PROPH/DIAG INJ IV PUSH: CPT

## 2024-12-22 PROCEDURE — 81001 URINALYSIS AUTO W/SCOPE: CPT | Performed by: EMERGENCY MEDICINE

## 2024-12-22 PROCEDURE — 85610 PROTHROMBIN TIME: CPT | Performed by: EMERGENCY MEDICINE

## 2024-12-22 PROCEDURE — 85025 COMPLETE CBC W/AUTO DIFF WBC: CPT | Performed by: EMERGENCY MEDICINE

## 2024-12-22 PROCEDURE — 36415 COLL VENOUS BLD VENIPUNCTURE: CPT | Performed by: EMERGENCY MEDICINE

## 2024-12-22 PROCEDURE — 82947 ASSAY GLUCOSE BLOOD QUANT: CPT | Performed by: EMERGENCY MEDICINE

## 2024-12-22 PROCEDURE — 83735 ASSAY OF MAGNESIUM: CPT

## 2024-12-22 PROCEDURE — 82570 ASSAY OF URINE CREATININE: CPT

## 2024-12-22 PROCEDURE — 80307 DRUG TEST PRSMV CHEM ANLYZR: CPT

## 2024-12-22 PROCEDURE — 96361 HYDRATE IV INFUSION ADD-ON: CPT

## 2024-12-22 PROCEDURE — 87086 URINE CULTURE/COLONY COUNT: CPT | Performed by: EMERGENCY MEDICINE

## 2024-12-22 PROCEDURE — 99291 CRITICAL CARE FIRST HOUR: CPT | Mod: 25 | Performed by: EMERGENCY MEDICINE

## 2024-12-22 PROCEDURE — 82810 BLOOD GASES O2 SAT ONLY: CPT

## 2024-12-22 PROCEDURE — 83930 ASSAY OF BLOOD OSMOLALITY: CPT

## 2024-12-22 RX ORDER — ACETAMINOPHEN 325 MG/1
975 TABLET ORAL ONCE
Status: COMPLETED | OUTPATIENT
Start: 2024-12-22 | End: 2024-12-22

## 2024-12-22 RX ORDER — SODIUM CHLORIDE, SODIUM LACTATE, POTASSIUM CHLORIDE, CALCIUM CHLORIDE 600; 310; 30; 20 MG/100ML; MG/100ML; MG/100ML; MG/100ML
250 INJECTION, SOLUTION INTRAVENOUS CONTINUOUS
Status: DISCONTINUED | OUTPATIENT
Start: 2024-12-22 | End: 2024-12-23

## 2024-12-22 RX ADMIN — SODIUM CHLORIDE, POTASSIUM CHLORIDE, SODIUM LACTATE AND CALCIUM CHLORIDE 1000 ML: 600; 310; 30; 20 INJECTION, SOLUTION INTRAVENOUS at 21:15

## 2024-12-22 RX ADMIN — INSULIN HUMAN 4 UNITS/HR: 1 INJECTION, SOLUTION INTRAVENOUS at 22:50

## 2024-12-22 RX ADMIN — ACETAMINOPHEN 975 MG: 325 TABLET ORAL at 21:16

## 2024-12-22 ASSESSMENT — COLUMBIA-SUICIDE SEVERITY RATING SCALE - C-SSRS
6. HAVE YOU EVER DONE ANYTHING, STARTED TO DO ANYTHING, OR PREPARED TO DO ANYTHING TO END YOUR LIFE?: NO
2. HAVE YOU ACTUALLY HAD ANY THOUGHTS OF KILLING YOURSELF?: NO
1. IN THE PAST MONTH, HAVE YOU WISHED YOU WERE DEAD OR WISHED YOU COULD GO TO SLEEP AND NOT WAKE UP?: NO

## 2024-12-22 ASSESSMENT — PAIN SCALES - GENERAL
PAINLEVEL_OUTOF10: 8
PAINLEVEL_OUTOF10: 0 - NO PAIN

## 2024-12-22 ASSESSMENT — PAIN - FUNCTIONAL ASSESSMENT: PAIN_FUNCTIONAL_ASSESSMENT: 0-10

## 2024-12-22 ASSESSMENT — PAIN DESCRIPTION - LOCATION: LOCATION: ABDOMEN

## 2024-12-23 LAB
ALBUMIN SERPL BCP-MCNC: 3.6 G/DL (ref 3.4–5)
ALBUMIN SERPL BCP-MCNC: 3.8 G/DL (ref 3.4–5)
ALBUMIN SERPL BCP-MCNC: 3.8 G/DL (ref 3.4–5)
ALBUMIN SERPL BCP-MCNC: 4.2 G/DL (ref 3.4–5)
ALBUMIN SERPL BCP-MCNC: 4.5 G/DL (ref 3.4–5)
AMPHETAMINES UR QL SCN: NORMAL
ANION GAP BLDV CALCULATED.4IONS-SCNC: 15 MMOL/L (ref 10–25)
ANION GAP BLDV CALCULATED.4IONS-SCNC: 15 MMOL/L (ref 10–25)
ANION GAP BLDV CALCULATED.4IONS-SCNC: 17 MMOL/L (ref 10–25)
ANION GAP SERPL CALC-SCNC: 15 MMOL/L (ref 10–20)
ANION GAP SERPL CALC-SCNC: 16 MMOL/L (ref 10–20)
ANION GAP SERPL CALC-SCNC: 17 MMOL/L (ref 10–20)
ANION GAP SERPL CALC-SCNC: 18 MMOL/L (ref 10–20)
ANION GAP SERPL CALC-SCNC: 22 MMOL/L (ref 10–20)
BARBITURATES UR QL SCN: NORMAL
BASE EXCESS BLDV CALC-SCNC: -12.1 MMOL/L (ref -2–3)
BASE EXCESS BLDV CALC-SCNC: -8 MMOL/L (ref -2–3)
BASE EXCESS BLDV CALC-SCNC: -8.9 MMOL/L (ref -2–3)
BASOPHILS # BLD AUTO: 0.09 X10*3/UL (ref 0–0.1)
BASOPHILS NFR BLD AUTO: 0.9 %
BENZODIAZ UR QL SCN: NORMAL
BODY TEMPERATURE: 37 DEGREES CELSIUS
BUN SERPL-MCNC: 11 MG/DL (ref 6–23)
BUN SERPL-MCNC: 12 MG/DL (ref 6–23)
BUN SERPL-MCNC: 13 MG/DL (ref 6–23)
BUN SERPL-MCNC: 7 MG/DL (ref 6–23)
BUN SERPL-MCNC: 9 MG/DL (ref 6–23)
BZE UR QL SCN: NORMAL
CA-I BLDV-SCNC: 1.14 MMOL/L (ref 1.1–1.33)
CA-I BLDV-SCNC: 1.19 MMOL/L (ref 1.1–1.33)
CA-I BLDV-SCNC: 1.2 MMOL/L (ref 1.1–1.33)
CALCIUM SERPL-MCNC: 7.9 MG/DL (ref 8.6–10.6)
CALCIUM SERPL-MCNC: 8.7 MG/DL (ref 8.6–10.6)
CALCIUM SERPL-MCNC: 8.7 MG/DL (ref 8.6–10.6)
CALCIUM SERPL-MCNC: 9.2 MG/DL (ref 8.6–10.6)
CALCIUM SERPL-MCNC: 9.7 MG/DL (ref 8.6–10.6)
CANNABINOIDS UR QL SCN: NORMAL
CHLORIDE BLDV-SCNC: 100 MMOL/L (ref 98–107)
CHLORIDE BLDV-SCNC: 100 MMOL/L (ref 98–107)
CHLORIDE BLDV-SCNC: 106 MMOL/L (ref 98–107)
CHLORIDE SERPL-SCNC: 102 MMOL/L (ref 98–107)
CHLORIDE SERPL-SCNC: 102 MMOL/L (ref 98–107)
CHLORIDE SERPL-SCNC: 103 MMOL/L (ref 98–107)
CHLORIDE SERPL-SCNC: 104 MMOL/L (ref 98–107)
CHLORIDE SERPL-SCNC: 109 MMOL/L (ref 98–107)
CHLORIDE UR-SCNC: 21 MMOL/L
CHLORIDE/CREATININE (MMOL/G) IN URINE: 71 MMOL/G CREAT (ref 38–318)
CO2 SERPL-SCNC: 11 MMOL/L (ref 21–32)
CO2 SERPL-SCNC: 13 MMOL/L (ref 21–32)
CO2 SERPL-SCNC: 14 MMOL/L (ref 21–32)
CO2 SERPL-SCNC: 17 MMOL/L (ref 21–32)
CO2 SERPL-SCNC: 18 MMOL/L (ref 21–32)
CREAT SERPL-MCNC: 0.53 MG/DL (ref 0.5–1.05)
CREAT SERPL-MCNC: 0.56 MG/DL (ref 0.5–1.05)
CREAT SERPL-MCNC: 0.58 MG/DL (ref 0.5–1.05)
CREAT SERPL-MCNC: 0.63 MG/DL (ref 0.5–1.05)
CREAT SERPL-MCNC: 0.75 MG/DL (ref 0.5–1.05)
CREAT UR-MCNC: 29.7 MG/DL (ref 20–320)
EGFRCR SERPLBLD CKD-EPI 2021: >90 ML/MIN/1.73M*2
EOSINOPHIL # BLD AUTO: 0.1 X10*3/UL (ref 0–0.7)
EOSINOPHIL NFR BLD AUTO: 1 %
ERYTHROCYTE [DISTWIDTH] IN BLOOD BY AUTOMATED COUNT: 11.6 % (ref 11.5–14.5)
EST. AVERAGE GLUCOSE BLD GHB EST-MCNC: 283 MG/DL
ETHANOL SERPL-MCNC: <10 MG/DL
FENTANYL+NORFENTANYL UR QL SCN: NORMAL
FLUAV RNA RESP QL NAA+PROBE: NOT DETECTED
FLUBV RNA RESP QL NAA+PROBE: NOT DETECTED
GLUCOSE BLD MANUAL STRIP-MCNC: 113 MG/DL (ref 74–99)
GLUCOSE BLD MANUAL STRIP-MCNC: 124 MG/DL (ref 74–99)
GLUCOSE BLD MANUAL STRIP-MCNC: 124 MG/DL (ref 74–99)
GLUCOSE BLD MANUAL STRIP-MCNC: 129 MG/DL (ref 74–99)
GLUCOSE BLD MANUAL STRIP-MCNC: 131 MG/DL (ref 74–99)
GLUCOSE BLD MANUAL STRIP-MCNC: 135 MG/DL (ref 74–99)
GLUCOSE BLD MANUAL STRIP-MCNC: 137 MG/DL (ref 74–99)
GLUCOSE BLD MANUAL STRIP-MCNC: 169 MG/DL (ref 74–99)
GLUCOSE BLD MANUAL STRIP-MCNC: 171 MG/DL (ref 74–99)
GLUCOSE BLD MANUAL STRIP-MCNC: 179 MG/DL (ref 74–99)
GLUCOSE BLD MANUAL STRIP-MCNC: 180 MG/DL (ref 74–99)
GLUCOSE BLD MANUAL STRIP-MCNC: 180 MG/DL (ref 74–99)
GLUCOSE BLD MANUAL STRIP-MCNC: 198 MG/DL (ref 74–99)
GLUCOSE BLD MANUAL STRIP-MCNC: 199 MG/DL (ref 74–99)
GLUCOSE BLD MANUAL STRIP-MCNC: 204 MG/DL (ref 74–99)
GLUCOSE BLD MANUAL STRIP-MCNC: 214 MG/DL (ref 74–99)
GLUCOSE BLD MANUAL STRIP-MCNC: 222 MG/DL (ref 74–99)
GLUCOSE BLD MANUAL STRIP-MCNC: 238 MG/DL (ref 74–99)
GLUCOSE BLD MANUAL STRIP-MCNC: 246 MG/DL (ref 74–99)
GLUCOSE BLD MANUAL STRIP-MCNC: 256 MG/DL (ref 74–99)
GLUCOSE BLD MANUAL STRIP-MCNC: 358 MG/DL (ref 74–99)
GLUCOSE BLDV-MCNC: 125 MG/DL (ref 74–99)
GLUCOSE BLDV-MCNC: 189 MG/DL (ref 74–99)
GLUCOSE BLDV-MCNC: 192 MG/DL (ref 74–99)
GLUCOSE SERPL-MCNC: 113 MG/DL (ref 74–99)
GLUCOSE SERPL-MCNC: 127 MG/DL (ref 74–99)
GLUCOSE SERPL-MCNC: 188 MG/DL (ref 74–99)
GLUCOSE SERPL-MCNC: 188 MG/DL (ref 74–99)
GLUCOSE SERPL-MCNC: 195 MG/DL (ref 74–99)
HBA1C MFR BLD: 11.5 %
HCO3 BLDV-SCNC: 14 MMOL/L (ref 22–26)
HCO3 BLDV-SCNC: 16.8 MMOL/L (ref 22–26)
HCO3 BLDV-SCNC: 17.7 MMOL/L (ref 22–26)
HCT VFR BLD AUTO: 35.1 % (ref 36–46)
HCT VFR BLD EST: 38 % (ref 36–46)
HCT VFR BLD EST: 39 % (ref 36–46)
HCT VFR BLD EST: 40 % (ref 36–46)
HGB BLD-MCNC: 12.1 G/DL (ref 12–16)
HGB BLDV-MCNC: 12.8 G/DL (ref 12–16)
HGB BLDV-MCNC: 13.1 G/DL (ref 12–16)
HGB BLDV-MCNC: 13.2 G/DL (ref 12–16)
HOLD SPECIMEN: NORMAL
IMM GRANULOCYTES # BLD AUTO: 0.05 X10*3/UL (ref 0–0.7)
IMM GRANULOCYTES NFR BLD AUTO: 0.5 % (ref 0–0.9)
INHALED O2 CONCENTRATION: 21 %
LACTATE BLDV-SCNC: 0.5 MMOL/L (ref 0.4–2)
LACTATE BLDV-SCNC: 0.6 MMOL/L (ref 0.4–2)
LACTATE BLDV-SCNC: 0.6 MMOL/L (ref 0.4–2)
LYMPHOCYTES # BLD AUTO: 3.88 X10*3/UL (ref 1.2–4.8)
LYMPHOCYTES NFR BLD AUTO: 37.9 %
MCH RBC QN AUTO: 30.5 PG (ref 26–34)
MCHC RBC AUTO-ENTMCNC: 34.5 G/DL (ref 32–36)
MCV RBC AUTO: 88 FL (ref 80–100)
METHADONE UR QL SCN: NORMAL
MONOCYTES # BLD AUTO: 0.5 X10*3/UL (ref 0.1–1)
MONOCYTES NFR BLD AUTO: 4.9 %
NEUTROPHILS # BLD AUTO: 5.61 X10*3/UL (ref 1.2–7.7)
NEUTROPHILS NFR BLD AUTO: 54.8 %
NRBC BLD-RTO: 0 /100 WBCS (ref 0–0)
OPIATES UR QL SCN: NORMAL
OSMOLALITY SERPL: 311 MOSM/KG (ref 280–300)
OXYCODONE+OXYMORPHONE UR QL SCN: NORMAL
OXYHGB MFR BLDV: 87.3 % (ref 45–75)
OXYHGB MFR BLDV: 89.8 % (ref 45–75)
OXYHGB MFR BLDV: 93.8 % (ref 45–75)
PCO2 BLDV: 32 MM HG (ref 41–51)
PCO2 BLDV: 35 MM HG (ref 41–51)
PCO2 BLDV: 36 MM HG (ref 41–51)
PCP UR QL SCN: NORMAL
PH BLDV: 7.25 PH (ref 7.33–7.43)
PH BLDV: 7.29 PH (ref 7.33–7.43)
PH BLDV: 7.3 PH (ref 7.33–7.43)
PHOSPHATE SERPL-MCNC: 3 MG/DL (ref 2.5–4.9)
PHOSPHATE SERPL-MCNC: 3.4 MG/DL (ref 2.5–4.9)
PHOSPHATE SERPL-MCNC: 3.4 MG/DL (ref 2.5–4.9)
PHOSPHATE SERPL-MCNC: 3.7 MG/DL (ref 2.5–4.9)
PHOSPHATE SERPL-MCNC: 4.2 MG/DL (ref 2.5–4.9)
PLATELET # BLD AUTO: 455 X10*3/UL (ref 150–450)
PO2 BLDV: 60 MM HG (ref 35–45)
PO2 BLDV: 62 MM HG (ref 35–45)
PO2 BLDV: 74 MM HG (ref 35–45)
POTASSIUM BLDV-SCNC: 3.6 MMOL/L (ref 3.5–5.3)
POTASSIUM BLDV-SCNC: 4 MMOL/L (ref 3.5–5.3)
POTASSIUM BLDV-SCNC: 4.2 MMOL/L (ref 3.5–5.3)
POTASSIUM SERPL-SCNC: 3.6 MMOL/L (ref 3.5–5.3)
POTASSIUM SERPL-SCNC: 4.2 MMOL/L (ref 3.5–5.3)
POTASSIUM SERPL-SCNC: 4.3 MMOL/L (ref 3.5–5.3)
POTASSIUM UR-SCNC: 30 MMOL/L
POTASSIUM/CREAT UR-RTO: 101 MMOL/G CREAT
RBC # BLD AUTO: 3.97 X10*6/UL (ref 4–5.2)
RSV RNA RESP QL NAA+PROBE: NOT DETECTED
SAO2 % BLDV: 90 % (ref 45–75)
SAO2 % BLDV: 92 % (ref 45–75)
SAO2 % BLDV: 96 % (ref 45–75)
SARS-COV-2 RNA RESP QL NAA+PROBE: NOT DETECTED
SODIUM BLDV-SCNC: 128 MMOL/L (ref 136–145)
SODIUM BLDV-SCNC: 128 MMOL/L (ref 136–145)
SODIUM BLDV-SCNC: 133 MMOL/L (ref 136–145)
SODIUM SERPL-SCNC: 131 MMOL/L (ref 136–145)
SODIUM SERPL-SCNC: 131 MMOL/L (ref 136–145)
SODIUM SERPL-SCNC: 132 MMOL/L (ref 136–145)
SODIUM SERPL-SCNC: 132 MMOL/L (ref 136–145)
SODIUM SERPL-SCNC: 135 MMOL/L (ref 136–145)
SODIUM UR-SCNC: 91 MMOL/L
SODIUM/CREAT UR-RTO: 306 MMOL/G CREAT
WBC # BLD AUTO: 10.2 X10*3/UL (ref 4.4–11.3)

## 2024-12-23 PROCEDURE — 36415 COLL VENOUS BLD VENIPUNCTURE: CPT

## 2024-12-23 PROCEDURE — 82947 ASSAY GLUCOSE BLOOD QUANT: CPT

## 2024-12-23 PROCEDURE — 84132 ASSAY OF SERUM POTASSIUM: CPT

## 2024-12-23 PROCEDURE — 80069 RENAL FUNCTION PANEL: CPT

## 2024-12-23 PROCEDURE — 2500000001 HC RX 250 WO HCPCS SELF ADMINISTERED DRUGS (ALT 637 FOR MEDICARE OP)

## 2024-12-23 PROCEDURE — 83605 ASSAY OF LACTIC ACID: CPT

## 2024-12-23 PROCEDURE — 82077 ASSAY SPEC XCP UR&BREATH IA: CPT

## 2024-12-23 PROCEDURE — 2500000004 HC RX 250 GENERAL PHARMACY W/ HCPCS (ALT 636 FOR OP/ED)

## 2024-12-23 PROCEDURE — 99291 CRITICAL CARE FIRST HOUR: CPT

## 2024-12-23 PROCEDURE — 2500000002 HC RX 250 W HCPCS SELF ADMINISTERED DRUGS (ALT 637 FOR MEDICARE OP, ALT 636 FOR OP/ED)

## 2024-12-23 PROCEDURE — 2500000002 HC RX 250 W HCPCS SELF ADMINISTERED DRUGS (ALT 637 FOR MEDICARE OP, ALT 636 FOR OP/ED): Performed by: STUDENT IN AN ORGANIZED HEALTH CARE EDUCATION/TRAINING PROGRAM

## 2024-12-23 PROCEDURE — 1100000001 HC PRIVATE ROOM DAILY

## 2024-12-23 PROCEDURE — 85025 COMPLETE CBC W/AUTO DIFF WBC: CPT

## 2024-12-23 PROCEDURE — 87637 SARSCOV2&INF A&B&RSV AMP PRB: CPT

## 2024-12-23 RX ORDER — DEXTROSE MONOHYDRATE, SODIUM CHLORIDE, AND POTASSIUM CHLORIDE 50; 1.49; 4.5 G/1000ML; G/1000ML; G/1000ML
150 INJECTION, SOLUTION INTRAVENOUS CONTINUOUS
Status: DISCONTINUED | OUTPATIENT
Start: 2024-12-23 | End: 2024-12-23

## 2024-12-23 RX ORDER — INSULIN LISPRO 100 [IU]/ML
6 INJECTION, SOLUTION INTRAVENOUS; SUBCUTANEOUS ONCE
Status: COMPLETED | OUTPATIENT
Start: 2024-12-23 | End: 2024-12-23

## 2024-12-23 RX ORDER — INSULIN LISPRO 100 [IU]/ML
0-10 INJECTION, SOLUTION INTRAVENOUS; SUBCUTANEOUS
Status: DISCONTINUED | OUTPATIENT
Start: 2024-12-23 | End: 2024-12-24 | Stop reason: HOSPADM

## 2024-12-23 RX ORDER — DEXTROSE MONOHYDRATE 100 MG/ML
150 INJECTION, SOLUTION INTRAVENOUS CONTINUOUS PRN
Status: DISCONTINUED | OUTPATIENT
Start: 2024-12-23 | End: 2024-12-23

## 2024-12-23 RX ORDER — POTASSIUM CHLORIDE 14.9 MG/ML
20 INJECTION INTRAVENOUS ONCE
Status: COMPLETED | OUTPATIENT
Start: 2024-12-23 | End: 2024-12-23

## 2024-12-23 RX ORDER — DEXTROSE MONOHYDRATE AND SODIUM CHLORIDE 5; .45 G/100ML; G/100ML
150 INJECTION, SOLUTION INTRAVENOUS CONTINUOUS
Status: DISCONTINUED | OUTPATIENT
Start: 2024-12-23 | End: 2024-12-23

## 2024-12-23 RX ORDER — POTASSIUM CHLORIDE 20 MEQ/1
20 TABLET, EXTENDED RELEASE ORAL ONCE
Status: CANCELLED | OUTPATIENT
Start: 2024-12-23 | End: 2024-12-23

## 2024-12-23 RX ORDER — DEXTROSE 50 % IN WATER (D50W) INTRAVENOUS SYRINGE
50
Status: DISCONTINUED | OUTPATIENT
Start: 2024-12-23 | End: 2024-12-24 | Stop reason: HOSPADM

## 2024-12-23 RX ORDER — CEFTRIAXONE 1 G/50ML
1 INJECTION, SOLUTION INTRAVENOUS ONCE
Status: DISCONTINUED | OUTPATIENT
Start: 2024-12-23 | End: 2024-12-23

## 2024-12-23 RX ORDER — DEXTROSE MONOHYDRATE AND SODIUM CHLORIDE 5; .45 G/100ML; G/100ML
150 INJECTION, SOLUTION INTRAVENOUS CONTINUOUS PRN
Status: DISCONTINUED | OUTPATIENT
Start: 2024-12-23 | End: 2024-12-23

## 2024-12-23 RX ORDER — INSULIN GLARGINE 100 [IU]/ML
20 INJECTION, SOLUTION SUBCUTANEOUS EVERY 24 HOURS
Status: DISCONTINUED | OUTPATIENT
Start: 2024-12-23 | End: 2024-12-24 | Stop reason: HOSPADM

## 2024-12-23 RX ORDER — ACETAMINOPHEN 325 MG/1
650 TABLET ORAL EVERY 4 HOURS PRN
Status: DISCONTINUED | OUTPATIENT
Start: 2024-12-23 | End: 2024-12-24 | Stop reason: HOSPADM

## 2024-12-23 RX ORDER — POTASSIUM CHLORIDE 14.9 MG/ML
20 INJECTION INTRAVENOUS ONCE
Status: DISCONTINUED | OUTPATIENT
Start: 2024-12-23 | End: 2024-12-23

## 2024-12-23 RX ADMIN — DEXTROSE MONOHYDRATE 150 ML/HR: 100 INJECTION, SOLUTION INTRAVENOUS at 09:38

## 2024-12-23 RX ADMIN — DEXTROSE MONOHYDRATE 150 ML/HR: 100 INJECTION, SOLUTION INTRAVENOUS at 08:12

## 2024-12-23 RX ADMIN — ACETAMINOPHEN 650 MG: 325 TABLET ORAL at 11:54

## 2024-12-23 RX ADMIN — INSULIN LISPRO 6 UNITS: 100 INJECTION, SOLUTION INTRAVENOUS; SUBCUTANEOUS at 20:59

## 2024-12-23 RX ADMIN — DEXTROSE AND SODIUM CHLORIDE 150 ML/HR: 5; 450 INJECTION, SOLUTION INTRAVENOUS at 10:25

## 2024-12-23 RX ADMIN — INSULIN GLARGINE 20 UNITS: 100 INJECTION, SOLUTION SUBCUTANEOUS at 13:16

## 2024-12-23 RX ADMIN — POTASSIUM CHLORIDE 20 MEQ: 14.9 INJECTION, SOLUTION INTRAVENOUS at 10:40

## 2024-12-23 SDOH — SOCIAL STABILITY: SOCIAL INSECURITY: WERE YOU ABLE TO COMPLETE ALL THE BEHAVIORAL HEALTH SCREENINGS?: YES

## 2024-12-23 SDOH — ECONOMIC STABILITY: INCOME INSECURITY: IN THE PAST 12 MONTHS HAS THE ELECTRIC, GAS, OIL, OR WATER COMPANY THREATENED TO SHUT OFF SERVICES IN YOUR HOME?: NO

## 2024-12-23 SDOH — ECONOMIC STABILITY: FOOD INSECURITY: HOW HARD IS IT FOR YOU TO PAY FOR THE VERY BASICS LIKE FOOD, HOUSING, MEDICAL CARE, AND HEATING?: NOT HARD AT ALL

## 2024-12-23 SDOH — ECONOMIC STABILITY: HOUSING INSECURITY: AT ANY TIME IN THE PAST 12 MONTHS, WERE YOU HOMELESS OR LIVING IN A SHELTER (INCLUDING NOW)?: NO

## 2024-12-23 SDOH — SOCIAL STABILITY: SOCIAL INSECURITY
WITHIN THE LAST YEAR, HAVE YOU BEEN RAPED OR FORCED TO HAVE ANY KIND OF SEXUAL ACTIVITY BY YOUR PARTNER OR EX-PARTNER?: NO

## 2024-12-23 SDOH — ECONOMIC STABILITY: HOUSING INSECURITY: IN THE LAST 12 MONTHS, WAS THERE A TIME WHEN YOU WERE NOT ABLE TO PAY THE MORTGAGE OR RENT ON TIME?: NO

## 2024-12-23 SDOH — ECONOMIC STABILITY: FOOD INSECURITY: WITHIN THE PAST 12 MONTHS, THE FOOD YOU BOUGHT JUST DIDN'T LAST AND YOU DIDN'T HAVE MONEY TO GET MORE.: NEVER TRUE

## 2024-12-23 SDOH — SOCIAL STABILITY: SOCIAL INSECURITY: HAVE YOU HAD THOUGHTS OF HARMING ANYONE ELSE?: NO

## 2024-12-23 SDOH — SOCIAL STABILITY: SOCIAL INSECURITY: DOES ANYONE TRY TO KEEP YOU FROM HAVING/CONTACTING OTHER FRIENDS OR DOING THINGS OUTSIDE YOUR HOME?: NO

## 2024-12-23 SDOH — SOCIAL STABILITY: SOCIAL INSECURITY: DO YOU FEEL ANYONE HAS EXPLOITED OR TAKEN ADVANTAGE OF YOU FINANCIALLY OR OF YOUR PERSONAL PROPERTY?: NO

## 2024-12-23 SDOH — SOCIAL STABILITY: SOCIAL INSECURITY: WITHIN THE LAST YEAR, HAVE YOU BEEN HUMILIATED OR EMOTIONALLY ABUSED IN OTHER WAYS BY YOUR PARTNER OR EX-PARTNER?: NO

## 2024-12-23 SDOH — SOCIAL STABILITY: SOCIAL INSECURITY: WITHIN THE LAST YEAR, HAVE YOU BEEN AFRAID OF YOUR PARTNER OR EX-PARTNER?: NO

## 2024-12-23 SDOH — SOCIAL STABILITY: SOCIAL INSECURITY: ARE THERE ANY APPARENT SIGNS OF INJURIES/BEHAVIORS THAT COULD BE RELATED TO ABUSE/NEGLECT?: NO

## 2024-12-23 SDOH — SOCIAL STABILITY: SOCIAL INSECURITY: ABUSE: ADULT

## 2024-12-23 SDOH — ECONOMIC STABILITY: HOUSING INSECURITY: IN THE PAST 12 MONTHS, HOW MANY TIMES HAVE YOU MOVED WHERE YOU WERE LIVING?: 0

## 2024-12-23 SDOH — SOCIAL STABILITY: SOCIAL INSECURITY: ARE YOU OR HAVE YOU BEEN THREATENED OR ABUSED PHYSICALLY, EMOTIONALLY, OR SEXUALLY BY ANYONE?: NO

## 2024-12-23 SDOH — SOCIAL STABILITY: SOCIAL INSECURITY: HAS ANYONE EVER THREATENED TO HURT YOUR FAMILY OR YOUR PETS?: NO

## 2024-12-23 SDOH — SOCIAL STABILITY: SOCIAL INSECURITY: HAVE YOU HAD ANY THOUGHTS OF HARMING ANYONE ELSE?: NO

## 2024-12-23 SDOH — ECONOMIC STABILITY: TRANSPORTATION INSECURITY: IN THE PAST 12 MONTHS, HAS LACK OF TRANSPORTATION KEPT YOU FROM MEDICAL APPOINTMENTS OR FROM GETTING MEDICATIONS?: NO

## 2024-12-23 SDOH — ECONOMIC STABILITY: FOOD INSECURITY: WITHIN THE PAST 12 MONTHS, YOU WORRIED THAT YOUR FOOD WOULD RUN OUT BEFORE YOU GOT THE MONEY TO BUY MORE.: NEVER TRUE

## 2024-12-23 SDOH — SOCIAL STABILITY: SOCIAL INSECURITY: DO YOU FEEL UNSAFE GOING BACK TO THE PLACE WHERE YOU ARE LIVING?: NO

## 2024-12-23 ASSESSMENT — LIFESTYLE VARIABLES
PRESCIPTION_ABUSE_PAST_12_MONTHS: NO
AUDIT-C TOTAL SCORE: 0
HOW OFTEN DO YOU HAVE A DRINK CONTAINING ALCOHOL: NEVER
AUDIT-C TOTAL SCORE: 0
HOW OFTEN DO YOU HAVE 6 OR MORE DRINKS ON ONE OCCASION: NEVER
SKIP TO QUESTIONS 9-10: 1
HOW MANY STANDARD DRINKS CONTAINING ALCOHOL DO YOU HAVE ON A TYPICAL DAY: PATIENT DOES NOT DRINK
SUBSTANCE_ABUSE_PAST_12_MONTHS: NO

## 2024-12-23 ASSESSMENT — ACTIVITIES OF DAILY LIVING (ADL)
PATIENT'S MEMORY ADEQUATE TO SAFELY COMPLETE DAILY ACTIVITIES?: YES
WALKS IN HOME: INDEPENDENT
DRESSING YOURSELF: INDEPENDENT
LACK_OF_TRANSPORTATION: NO
FEEDING YOURSELF: INDEPENDENT
JUDGMENT_ADEQUATE_SAFELY_COMPLETE_DAILY_ACTIVITIES: YES
LACK_OF_TRANSPORTATION: NO
TOILETING: INDEPENDENT
HEARING - RIGHT EAR: FUNCTIONAL
BATHING: INDEPENDENT
ADEQUATE_TO_COMPLETE_ADL: YES
GROOMING: INDEPENDENT
HEARING - LEFT EAR: FUNCTIONAL
LACK_OF_TRANSPORTATION: NO

## 2024-12-23 ASSESSMENT — COGNITIVE AND FUNCTIONAL STATUS - GENERAL
DAILY ACTIVITIY SCORE: 24
PATIENT BASELINE BEDBOUND: NO
MOBILITY SCORE: 24

## 2024-12-23 ASSESSMENT — PAIN SCALES - GENERAL
PAINLEVEL_OUTOF10: 0 - NO PAIN
PAINLEVEL_OUTOF10: 3

## 2024-12-23 ASSESSMENT — PAIN - FUNCTIONAL ASSESSMENT
PAIN_FUNCTIONAL_ASSESSMENT: 0-10

## 2024-12-23 NOTE — ED PROVIDER NOTES
HPI   Chief Complaint   Patient presents with    Hyperglycemia       Patient is a 24-year-old female with past medical history of T1DM who presents with self-reported concern for DKA.  Reports that she thinks she missed some of her insulin yesterday and then when she woke up this morning she had nausea, malaise the patient reports feels similar to DKA in the past.  Thinks that she took her Lantus but cannot remember and does not member exactly how much Humalog she took.  Reports that she is on sliding scale Humalog and Lantus 16 units nightly.  Reports having all these medications but just missed dose yesterday.  Denies anything else that she thinks precipitated DKA and denies fevers, chills, malaise, cough, congestion, abdominal pain or any infectious symptoms prior to onset of DKA symptoms this morning.  Denies burning with peeing, pain with pain, urinary frequency or any other changes in urination.              Patient History   Past Medical History:   Diagnosis Date    21 weeks gestation of pregnancy ([facility]) 09/13/2019    21 weeks gestation of pregnancy    Diabetes (Multi)      No past surgical history on file.  Family History   Problem Relation Name Age of Onset    No Known Problems Mother      No Known Problems Father       Social History     Tobacco Use    Smoking status: Never    Smokeless tobacco: Never   Vaping Use    Vaping status: Never Used   Substance Use Topics    Alcohol use: Never    Drug use: Never       Physical Exam   ED Triage Vitals [12/22/24 2019]   Temperature Heart Rate Respirations BP   35.9 °C (96.6 °F) (!) 108 18 117/74      Pulse Ox Temp Source Heart Rate Source Patient Position   97 % Temporal Monitor Sitting      BP Location FiO2 (%)     Left arm --       Physical Exam  Constitutional:       Appearance: Normal appearance.   HENT:      Head: Normocephalic and atraumatic.   Eyes:      Extraocular Movements: Extraocular movements intact.   Cardiovascular:      Rate and Rhythm: Normal  rate.   Pulmonary:      Effort: Pulmonary effort is normal.      Comments: Breathing comfortably room air, clear to auscultation bilaterally.  Abdominal:      Comments: Soft, nondistended, no tenderness to palpation in any quadrant.   Musculoskeletal:         General: Normal range of motion.      Cervical back: Normal range of motion.   Skin:     General: Skin is warm and dry.   Neurological:      General: No focal deficit present.      Mental Status: She is alert and oriented to person, place, and time.   Psychiatric:         Mood and Affect: Mood normal.         Behavior: Behavior normal.           ED Course & MDM   Diagnoses as of 12/23/24 0100   Diabetic ketoacidosis without coma associated with type 1 diabetes mellitus (Multi)                 No data recorded                                 Medical Decision Making  Patient is a 24-year-old female with past medical history of T1DM who presents with self-reported concern for DKA.  Labs consistent with DKA with BHB 8, pH 7.12.  Unusual for patient to go into DKA with only partial missed dose of insulin yesterday but patient endorses that she has had similar symptoms in the past with only single missed dose.  Otherwise endorses taking her medication as prescribed up until yesterday and reports having these at home.  Does not endorse any symptoms concerning for alternative cause of DKA.  No abdominal pain, no infectious symptoms, no urinary symptoms.  UA generally bland with 25 Mary Lou/uL leukocyte esterase and initial decision made to defer antibiotics given no infectious symptoms.  In further discussion with MICU attending, decision made to empirically treat with single dose of ceftriaxone given story concerning for not having definitive cause of DKA.  Treated on DKA pathway with insulin drip, IV fluids.  Did approach euglycemia with glucose less than 250 before resolution of acidosis and transitioned to dextrose containing fluids.  Case discussed with MICU attending,  Dr. Orr and patient admitted for further management.    Patient seen and discussed with Dr. Delon Stout MD, PhD  Emergency Medicine PGY3          Procedure  Procedures    Cholo Stout MD  Resident  12/23/24 0104    ------------------------------------------------    This patient was seen by the resident physician. I have seen and examined the patient, agree with the workup, evaluation, management and diagnosis. The care plan has been discussed and I concur.    My assessment reveals the following:    HPI: Patient is a 23 y/o female with h/o DM1 on insulin presenting with symptoms c/w DKA. Reports increased thirst and urination. Reports N/V. Also reports generalized headache and myalgias. Has insulin, but forgot to take it yesterday. No F/C/cough/urinary complaints. No CP/SOB/abd pain.     PE:  Vital signs reviewed in nursing triage note, EMR flowsheets, and at patient's bedside  GEN: Patient is awake, alert, calm, cooperative, and in moderate metabolic distress.  HEAD: Normocephalic and atraumatic.  EYES: Anicteric sclera.  MOUTH: Mucous membranes moist.  CV: Regular rate and rhythm. (+) s1/s2. No murmurs/rubs/gallops.  PULM: CTAB. No wheezes, rales, or crackles.  GI: Soft, non-tender, non-distended without rebound or guarding.  EXT: No deformities noted.  NEURO: Moves all extremities.  SKIN: Warm, dry. No erythema or ecchymosis.    Labs Reviewed   CBC WITH AUTO DIFFERENTIAL - Abnormal       Result Value    WBC 10.4      nRBC 0.0      RBC 4.94      Hemoglobin 15.0      Hematocrit 44.6      MCV 90      MCH 30.4      MCHC 33.6      RDW 11.6      Platelets 507 (*)     Neutrophils % 55.9      Immature Granulocytes %, Automated 0.4      Lymphocytes % 34.7      Monocytes % 6.5      Eosinophils % 0.9      Basophils % 1.6      Neutrophils Absolute 5.81      Immature Granulocytes Absolute, Automated 0.04      Lymphocytes Absolute 3.61      Monocytes Absolute 0.68      Eosinophils Absolute 0.09      Basophils  Absolute 0.17 (*)    COMPREHENSIVE METABOLIC PANEL - Abnormal    Glucose 455 (*)     Sodium 130 (*)     Potassium 4.8      Chloride 94 (*)     Bicarbonate 8 (*)     Anion Gap 33 (*)     Urea Nitrogen 15      Creatinine 0.96      eGFR 85      Calcium 10.7 (*)     Albumin 5.1 (*)     Alkaline Phosphatase 95      Total Protein 9.0 (*)     AST 22      Bilirubin, Total 0.8      ALT 30     PHOSPHORUS - Abnormal    Phosphorus 5.4 (*)    BETA HYDROXYBUTYRATE - Abnormal    Beta-Hydroxybutyrate 8.38 (*)     Narrative:     The beta-hydroxybutyrate test performance characteristics have been validated by  [facility]. This test has not been approved by the FDA; however such approval is not necessary.     URINALYSIS WITH REFLEX CULTURE AND MICROSCOPIC - Abnormal    Color, Urine Colorless (*)     Appearance, Urine Clear      Specific Gravity, Urine 1.031      pH, Urine 5.0      Protein, Urine 20 (TRACE)      Glucose, Urine OVER (4+) (*)     Blood, Urine NEGATIVE      Ketones, Urine OVER (4+) (*)     Bilirubin, Urine NEGATIVE      Urobilinogen, Urine Normal      Nitrite, Urine NEGATIVE      Leukocyte Esterase, Urine 25 Mary Lou/µL (*)     Narrative:     OVER is reported when the result is greater than the clinically reportable range.   MICROSCOPIC ONLY, URINE - Abnormal    WBC, Urine 1-5      RBC, Urine 6-10 (*)     Squamous Epithelial Cells, Urine 1-9 (SPARSE)      Mucus, Urine FEW     RENAL FUNCTION PANEL - Abnormal    Glucose 195 (*)     Sodium 132 (*)     Potassium 4.2      Chloride 103      Bicarbonate 11 (*)     Anion Gap 22 (*)     Urea Nitrogen 13      Creatinine 0.75      eGFR >90      Calcium 9.7      Phosphorus 3.7      Albumin 4.5     OSMOLALITY - Abnormal    Osmolality, Serum 311 (*)    HEMOGLOBIN A1C - Abnormal    Hemoglobin A1C 11.5 (*)     Estimated Average Glucose 283      Narrative:     Diagnosis of Diabetes-Adults  Non-Diabetic: < or = 5.6%  Increased risk for developing  diabetes: 5.7-6.4%  Diagnostic of diabetes: > or = 6.5%       CBC WITH AUTO DIFFERENTIAL - Abnormal    WBC 10.2      nRBC 0.0      RBC 3.97 (*)     Hemoglobin 12.1      Hematocrit 35.1 (*)     MCV 88      MCH 30.5      MCHC 34.5      RDW 11.6      Platelets 455 (*)     Neutrophils % 54.8      Immature Granulocytes %, Automated 0.5      Lymphocytes % 37.9      Monocytes % 4.9      Eosinophils % 1.0      Basophils % 0.9      Neutrophils Absolute 5.61      Immature Granulocytes Absolute, Automated 0.05      Lymphocytes Absolute 3.88      Monocytes Absolute 0.50      Eosinophils Absolute 0.10      Basophils Absolute 0.09     RENAL FUNCTION PANEL - Abnormal    Glucose 127 (*)     Sodium 135 (*)     Potassium 3.6      Chloride 109 (*)     Bicarbonate 13 (*)     Anion Gap 17      Urea Nitrogen 11      Creatinine 0.53      eGFR >90      Calcium 7.9 (*)     Phosphorus 3.4      Albumin 3.6     BLOOD GAS VENOUS FULL PANEL - Abnormal    POCT pH, Venous 7.25 (*)     POCT pCO2, Venous 32 (*)     POCT pO2, Venous 60 (*)     POCT SO2, Venous 90 (*)     POCT Oxy Hemoglobin, Venous 87.3 (*)     POCT Hematocrit Calculated, Venous 38.0      POCT Sodium, Venous 133 (*)     POCT Potassium, Venous 3.6      POCT Chloride, Venous 106      POCT Ionized Calicum, Venous 1.14      POCT Glucose, Venous 125 (*)     POCT Lactate, Venous 0.6      POCT Base Excess, Venous -12.1 (*)     POCT HCO3 Calculated, Venous 14.0 (*)     POCT Hemoglobin, Venous 12.8      POCT Anion Gap, Venous 17.0      Patient Temperature 37.0      FiO2 21     RENAL FUNCTION PANEL - Abnormal    Glucose 113 (*)     Sodium 132 (*)     Potassium 4.3      Chloride 104      Bicarbonate 14 (*)     Anion Gap 18      Urea Nitrogen 12      Creatinine 0.63      eGFR >90      Calcium 9.2      Phosphorus 4.2      Albumin 4.2     RENAL FUNCTION PANEL - Abnormal    Glucose 188 (*)     Sodium 131 (*)     Potassium 4.3      Chloride 102      Bicarbonate 17 (*)     Anion Gap 16      Urea  Nitrogen 9      Creatinine 0.58      eGFR >90      Calcium 8.7      Phosphorus 3.4      Albumin 3.8     BLOOD GAS VENOUS FULL PANEL - Abnormal    POCT pH, Venous 7.29 (*)     POCT pCO2, Venous 35 (*)     POCT pO2, Venous 74 (*)     POCT SO2, Venous 96 (*)     POCT Oxy Hemoglobin, Venous 93.8 (*)     POCT Hematocrit Calculated, Venous 39.0      POCT Sodium, Venous 128 (*)     POCT Potassium, Venous 4.0      POCT Chloride, Venous 100      POCT Ionized Calicum, Venous 1.19      POCT Glucose, Venous 192 (*)     POCT Lactate, Venous 0.5      POCT Base Excess, Venous -8.9 (*)     POCT HCO3 Calculated, Venous 16.8 (*)     POCT Hemoglobin, Venous 13.1      POCT Anion Gap, Venous 15.0      Patient Temperature 37.0      FiO2 21     POCT GLUCOSE - Abnormal    POCT Glucose 419 (*)    POCT GLUCOSE - Abnormal    POCT Glucose 383 (*)    POCT GLUCOSE - Abnormal    POCT Glucose 327 (*)    POCT GLUCOSE - Abnormal    POCT Glucose 222 (*)    POCT GLUCOSE - Abnormal    POCT Glucose 214 (*)    POCT GLUCOSE - Abnormal    POCT Glucose 238 (*)    POCT GLUCOSE - Abnormal    POCT Glucose 198 (*)    POCT GLUCOSE - Abnormal    POCT Glucose 169 (*)    POCT GLUCOSE - Abnormal    POCT Glucose 135 (*)    POCT GLUCOSE - Abnormal    POCT Glucose 137 (*)    POCT GLUCOSE - Abnormal    POCT Glucose 124 (*)    POCT GLUCOSE - Abnormal    POCT Glucose 124 (*)    POCT GLUCOSE - Abnormal    POCT Glucose 113 (*)    POCT GLUCOSE - Abnormal    POCT Glucose 129 (*)    POCT GLUCOSE - Abnormal    POCT Glucose 256 (*)    POCT GLUCOSE - Abnormal    POCT Glucose 180 (*)    POCT GLUCOSE - Abnormal    POCT Glucose 204 (*)    POCT GLUCOSE - Abnormal    POCT Glucose 199 (*)    POCT GLUCOSE - Abnormal    POCT Glucose 246 (*)    BLOOD GAS VENOUS FULL PANEL UNSOLICITED - Abnormal    POCT pH, Venous 7.12 (*)     POCT pCO2, Venous 36 (*)     POCT pO2, Venous 37      POCT SO2, Venous 51      POCT Oxy Hemoglobin, Venous 50.3      POCT Hematocrit Calculated, Venous 46.0       POCT Sodium, Venous 129 (*)     POCT Potassium, Venous 5.4 (*)     POCT Chloride, Venous 96 (*)     POCT Ionized Calicum, Venous 1.38 (*)     POCT Glucose, Venous 527 (*)     POCT Lactate, Venous 1.6      POCT Base Excess, Venous -16.8 (*)     POCT HCO3 Calculated, Venous 11.7 (*)     POCT Hemoglobin, Venous 15.4      POCT Anion Gap, Venous 27.0 (*)     Patient Temperature 37.0     BLOOD GAS VENOUS UNSOLICITED - Abnormal    POCT pH, Venous 7.13 (*)     POCT pCO2, Venous 37 (*)     POCT pO2, Venous 35      POCT SO2, Venous 44 (*)     POCT Oxy Hemoglobin, Venous 43.5 (*)     POCT Base Excess, Venous -16.1 (*)     POCT HCO3 Calculated, Venous 12.3 (*)     Patient Temperature 37.0     BLOOD GAS VENOUS FULL PANEL UNSOLICITED - Abnormal    POCT pH, Venous 7.12 (*)     POCT pCO2, Venous 36 (*)     POCT pO2, Venous 37      POCT SO2, Venous 49      POCT Oxy Hemoglobin, Venous 48.1      POCT Hematocrit Calculated, Venous 42.0      POCT Sodium, Venous 130 (*)     POCT Potassium, Venous 5.4 (*)     POCT Chloride, Venous 100      POCT Ionized Calicum, Venous 1.32      POCT Glucose, Venous 444 (*)     POCT Lactate, Venous 1.7      POCT Base Excess, Venous -16.8 (*)     POCT HCO3 Calculated, Venous 11.7 (*)     POCT Hemoglobin, Venous 14.0      POCT Anion Gap, Venous 24.0      Patient Temperature 37.0     BLOOD GAS VENOUS FULL PANEL UNSOLICITED - Abnormal    POCT pH, Venous 7.17 (*)     POCT pCO2, Venous 27 (*)     POCT pO2, Venous 43      POCT SO2, Venous 63      POCT Oxy Hemoglobin, Venous 61.4      POCT Hematocrit Calculated, Venous 42.0      POCT Sodium, Venous 130 (*)     POCT Potassium, Venous 4.8      POCT Chloride, Venous 103      POCT Ionized Calicum, Venous 1.31      POCT Glucose, Venous 323 (*)     POCT Lactate, Venous 1.1      POCT Base Excess, Venous -17.2 (*)     POCT HCO3 Calculated, Venous 9.8 (*)     POCT Hemoglobin, Venous 14.1      POCT Anion Gap, Venous 22.0      Patient Temperature 37.0     LIPASE - Normal     Lipase 15      Narrative:     Venipuncture immediately after or during the administration of Metamizole may lead to falsely low results. Testing should be performed immediately prior to Metamizole dosing.   PROTIME-INR - Normal    Protime 10.8      INR 1.0     MAGNESIUM - Normal    Magnesium 2.10     SARS-COV-2 PCR - Normal    Coronavirus 2019, PCR Not Detected      Narrative:     This assay has received FDA Emergency Use Authorization (EUA) and is only authorized for the duration of time that circumstances exist to justify the authorization of the emergency use of in vitro diagnostic tests for the detection of SARS-CoV-2 virus and/or diagnosis of COVID-19 infection under section 564(b)(1) of the Act, 21 U.S.C. 360bbb-3(b)(1). This assay is an in vitro diagnostic nucleic acid amplification test for the qualitative detection of SARS-CoV-2 from nasopharyngeal specimens and has been validated for use at [facility]. Negative results do not preclude COVID-19 infections and should not be used as the sole basis for diagnosis, treatment, or other management decisions.     INFLUENZA A AND B PCR - Normal    Flu A Result Not Detected      Flu B Result Not Detected      Narrative:     This assay is an in vitro diagnostic multiplex nucleic acid amplification test for the detection and discrimination of Influenza A & B from nasopharyngeal specimens, and has been validated for use at [facility]. Negative results do not preclude Influenza A/B infections, and should not be used as the sole basis for diagnosis, treatment, or other management decisions. If Influenza A/B and RSV PCR results are negative, testing for Parainfluenza virus, Adenovirus and Metapneumovirus is routinely performed for [facility] pediatric oncology and intensive care inpatients, and is available on other patients by placing an add-on request.   RSV PCR - Normal    RSV PCR Not Detected      Narrative:     This assay is an  FDA-cleared, in vitro diagnostic nucleic acid amplification test for the detection of RSV from nasopharyngeal specimens, and has been validated for use at [facility]. Negative results do not preclude RSV infections, and should not be used as the sole basis for diagnosis, treatment, or other management decisions. If Influenza A/B and RSV PCR results are negative, testing for Parainfluenza virus, Adenovirus and Metapneumovirus is routinely performed for pediatric oncology and intensive care inpatients at [facility], and is available on other patients by placing an add-on request.       DRUG SCREEN,URINE - Normal    Amphetamine Screen, Urine Presumptive Negative      Barbiturate Screen, Urine Presumptive Negative      Benzodiazepines Screen, Urine Presumptive Negative      Cannabinoid Screen, Urine Presumptive Negative      Cocaine Metabolite Screen, Urine Presumptive Negative      Fentanyl Screen, Urine Presumptive Negative      Opiate Screen, Urine Presumptive Negative      Oxycodone Screen, Urine Presumptive Negative      PCP Screen, Urine Presumptive Negative      Methadone Screen, Urine Presumptive Negative      Narrative:     Drug screen results are presumptive and should not be used to assess   compliance with prescribed medication. Contact the performing [facility] laboratory   to add-on definitive confirmatory testing if clinically indicated.    Toxicology screening results are reported qualitatively. The concentration must   be greater than or equal to the cutoff to be reported as positive. The concentration   at which the screening test can detect an individual drug or metabolite varies.   The absence of expected drug(s) and/or drug metabolite(s) may indicate non-compliance,   inappropriate timing of specimen collection relative to drug administration, poor drug   absorption, diluted/adulterated urine, or limitations of testing. For medical purposes   only; not valid for forensic  use.    Interpretive questions should be directed to the laboratory medical directors.   ALCOHOL - Normal    Alcohol <10     POCT PREGNANCY, URINE - Normal    Preg Test, Ur Negative     URINE CULTURE   URINALYSIS WITH REFLEX CULTURE AND MICROSCOPIC    Narrative:     The following orders were created for panel order Urinalysis with Reflex Culture and Microscopic.  Procedure                               Abnormality         Status                     ---------                               -----------         ------                     Urinalysis with Reflex C...[[id number]]  Abnormal            Final result               Extra Urine Gray Tube[[id number]]                            Final result                 Please view results for these tests on the individual orders.   EXTRA URINE GRAY TUBE    Extra Tube Hold for add-ons.     ELECTROLYTE PANEL, URINE    Sodium, Urine Random 91      Sodium/Creatinine Ratio 306      Potassium, Urine Random 30      Potassium/Creatinine Ratio 101      Chloride, Urine Random 21      Chloride/Creatinine Ratio 71      Creatinine, Urine Random 29.7     BLOOD GAS VENOUS FULL PANEL   RENAL FUNCTION PANEL   BLOOD GAS VENOUS FULL PANEL   RENAL FUNCTION PANEL   POCT GLUCOSE METER   POCT GLUCOSE METER   POCT GLUCOSE METER   POCT GLUCOSE METER   POCT GLUCOSE METER   POCT GLUCOSE METER   POCT GLUCOSE METER   POCT GLUCOSE METER   POCT GLUCOSE METER   POCT GLUCOSE METER   POCT GLUCOSE METER   POCT GLUCOSE METER   POCT GLUCOSE METER   POCT GLUCOSE METER   POCT GLUCOSE METER   POCT GLUCOSE METER   POCT GLUCOSE METER   POCT GLUCOSE METER   POCT GLUCOSE METER   POCT GLUCOSE METER   POCT GLUCOSE METER   POCT GLUCOSE METER   POCT GLUCOSE METER   POCT GLUCOSE METER   POCT GLUCOSE METER     Medical Decision Making:  - IVF  - Labs  - Urine studies  - Insulin gtt  - Abx for UTI  - Admit to MICU    Differential Diagnoses Considered: Hyperglycemia, DKA, Medication non-adherence.     Chronic Medical Conditions  Significantly Affecting Care: DM Type 1    Escalation of Care:  Appropriate for inpatient management    MD Patric Livingston MD  12/23/24 7220

## 2024-12-23 NOTE — PROGRESS NOTES
ICU to [facility] Transfer Summary     I:  ICU Admission Reason & Brief ICU Course:      Ms. Tovar is a 25yo F with PMH T1DM c/b several episodes of DKA, breast abscess 3/2024 s/p Augmentin who presented to the ED after waking up this morning with abdominal pain, nausea, malaise, dizziness, dehydration, polyuria in the setting of missing some of her lispro yesterday. States that she has been very fatigued and spent most of the day in bed. Took her lantus the night prior. Normally takes lantus 22U and SSI lispro. Checks insulin via fingerstick and BG normally range 150-180s but in the 300s when she forgets her insulin. States that on average she takes her insulin about 5 days a week, though occasionally forgets to take it. Has had DKA 3-4x in the past related to missing insulin, including at 20yo when she was initially dx with T1DM. Previously on dexcom with better control of her sugars and waiting to see her endocrinologist next month for dexcom G7. Eats 3 meals a day, and states that she watches her carb intake. No issues with hypoglycemia since 2019 when she was pregnant. She was given IV insulin drip with IV dextrose infusion. Out of DKA on 12/23/2024, Na 131, HCO3 18, AG 11, VBG pH 7.3 and .    C: Code Status/DPOA Info/Goals of Care/ACP Note    Full Code  DPOA/Contact Number: sister Alida [phone]     U: Unprescribing & Pertinent High-Risk Medications    Changes to home meds: -     Anticoagulation: No Reason for no VTE prophylaxis:procedure not indicated    Antibiotics:   [x] N/A - no current planned antimicrobioals  []  No indication - start date - planned duration -    P: Pending Tests at the Time of Transfer   -      A: Active consultants, including Rehab:   []  Subspecialty Consultants: -  []  PT  []  OT  []  SLP  []  Wound Care    U: Uncertainty Measure/Diagnostic Pause:    Working diagnosis at the time of transfer DKA, though ddx includes -     Diagnosis Degree of Certainty: 3. Marked uncertainty  about the clinical diagnosis.     S: Summary of Major Problems and To-Dos:     Assessment: Charline Tovar is a 24 y.o. year old female patient admitted on 12/22/2024 with PMH T1DM Normally takes lantus 22U and SSI lispro. She presented to the ED after waking up this morning with abdominal pain, nausea, malaise, dizziness, dehydration, polyuria in the setting of missing some of her lispro yesterday. Checks insulin via fingerstick and BG normally range 150-180s but in the 300s when she forgets her insulin. Prior several episodes of DKA, breast abscess 3/2024.      Plan:  #T1DM with DKA (missed insulin doses)  #Wide anion gap metabolic acidosis from DKA  :: takes lantus 22U and SSI lispro at home  :: HgbA1C 11.5%   :: POC glucose 238 @ 0137  :: EKG sinus tachycardia   :: 12/22/24 20.37: VBG pH 7.12, PCO2 27, lactate 1.6, HCO3 8, AG 28, K 4.8,   :: S/P IV fluid, IV insulin --> 12/23/24: VBG pH 7.30, PCO2 36, HCO3 18, AG 11, K 4.3  Management  - c/w insulin glargine 20 U subcutaneous + insulin sliding scale 2    Will continue to adjust insulin to optimize blood glucose 140-180 mg/dL  - POC glucose q premeal ac, hs, hypoglycemia protocol     E: Exam, including Lines/Drains/Airways & Data Review:   Physical Exam  Constitutional:       Appearance: Normal appearance.   Cardiovascular:      Rate and Rhythm: Normal rate and regular rhythm.      Pulses: Normal pulses.      Heart sounds: Normal heart sounds.   Pulmonary:      Effort: Pulmonary effort is normal.      Breath sounds: Normal breath sounds.   Abdominal:      General: Bowel sounds are normal.      Palpations: Abdomen is soft.   Musculoskeletal:         General: No swelling.      Right lower leg: No edema.      Left lower leg: No edema.   Skin:     General: Skin is warm.      Capillary Refill: Capillary refill takes less than 2 seconds.   Neurological:      General: No focal deficit present.      Mental Status: She is alert and oriented to person, place, and  time.   Psychiatric:         Mood and Affect: Mood normal.     Difficult airway? N/A  Lines/drains assessed for removal? No    Within 30 minutes of the patient physically leaving the floor, a Floor Readiness Note needs to be placed with updated vitals.      Gissel Aguilar MD  PGY-1, Internal Medicine/Medical Genetics

## 2024-12-23 NOTE — PROGRESS NOTES
Floor Readiness Note       I, personally, evaluated Charline Tovar prior to transfer to the floor, including reviewing all current laboratory and imaging studies. The patient remains appropriate for transfer to the floor. Bedside nurse and respiratory therapy are also in agreement of patient's readiness for the floor.     Brief summary:  Charline Tovar is a 24 y.o. female who was admitted to the MICU on 12/22/2024 for Diabetic ketoacidosis. They have been treated with IV insulin drip, IV dextrose solution infusion, IV potassium repletion with continued subcutaneous insulin injection. She has improvement on clinical N/V, abdominal pain, and dehydration. Out of DKA on 12/23/2024.    Updated focused Physical Exam:  Cardiovascular:      Rate and Rhythm: Normal rate and regular rhythm.      Pulses: Normal pulses.      Heart sounds: Normal heart sounds.   Pulmonary:      Effort: Pulmonary effort is normal.      Breath sounds: Normal breath sounds.   Abdominal:      General: Bowel sounds are normal.      Palpations: Abdomen is soft.     Current Vital Signs:  Heart Rate: 88 (12/23/24 1600 : User, System Default)  BP: 97/55 (12/23/24 1600 : User, System Default)  Temp: 37.4 °C (99.3 °F) (12/23/24 1600 : Tracey Briones)  Resp: 14 (12/23/24 1600 : User, System Default)  SpO2: 99 % (12/23/24 1600 : User, System Default)    Relevant updates since rounds:  #T1DM with DKA (missed insulin doses)  #Wide anion gap metabolic acidosis from DKA  Management  - c/w insulin glargine 20 U subcutaneous + insulin sliding scale 2    Will continue to adjust insulin to optimize blood glucose 140-180 mg/dL  - POC glucose q premeal ac, hs, hypoglycemia protocol     Accepting team, Hospitalist E, , received verbal sign out and the Provider Care team/Attending has been updated. Bedside nurse will now call accepting nurse for report and patient will be transferred to [place] [id number].    Gissel Aguilar MD  PGY-1, Internal  Medicine/Medical Genetics

## 2024-12-23 NOTE — CARE PLAN
Problem: Skin  Goal: Participates in plan/prevention/treatment measures  Outcome: Progressing  Flowsheets (Taken 12/23/2024 1437)  Participates in plan/prevention/treatment measures: Elevate heels  Goal: Prevent/manage excess moisture  Outcome: Progressing  Flowsheets (Taken 12/23/2024 1437)  Prevent/manage excess moisture: Moisturize dry skin  Goal: Prevent/minimize sheer/friction injuries  Outcome: Progressing  Flowsheets (Taken 12/23/2024 1437)  Prevent/minimize sheer/friction injuries:   Use pull sheet   Increase activity/out of bed for meals     Problem: Pain - Adult  Goal: Verbalizes/displays adequate comfort level or baseline comfort level  Outcome: Progressing     Problem: Fall/Injury  Goal: Be free from injury by end of the shift  Outcome: Progressing

## 2024-12-23 NOTE — PROGRESS NOTES
Medical Intensive Care - Daily Progress Note   Subjective    Charline Tovar is a 24 y.o. year old female patient admitted on 12/22/2024 with following ICU needs: DKA    Interval History:  Ms. Tovar is a 25yo F with PMH T1DM c/b several episodes of DKA, breast abscess 3/2024 s/p Augmentin who presented to the ED after waking up this morning with abdominal pain, nausea, malaise, dizziness, dehydration, polyuria in the setting of missing some of her lispro yesterday. States that she has been very fatigued and spent most of the day in bed. Took her lantus the night prior. Normally takes lantus 22U and SSI lispro. Checks insulin via fingerstick and BG normally range 150-180s but in the 300s when she forgets her insulin. States that on average she takes her insulin about 5 days a week, though occasionally forgets to take it.  Has had DKA 3-4x in the past related to missing insulin, including at 18yo when she was initially dx with T1DM. Previously on dexcom with better control of her sugars and waiting to see her endocrinologist next month for dexcom G7. Eats 3 meals a day, and states that she watches her carb intake. No issues with hypoglycemia since 2019 when she was pregnant.       Denies fevers, chills, congestion, CP, SOB, cough, abdominal pain, N/V/D, rashes, dysuria, hematuria, breast pain/redness/swelling, numbness, focal weakness, HA, recent changes to meds or OTC medications, drug use.     Today  This morning, patient is resting comfortably in bed. No abdominal pain, nausea today. Dizziness has been significantly improved. She reported improvement with fatigue after getting treatment. No shortness of breath/chest pain/fever.    Meds    Scheduled medications  potassium chloride, 20 mEq, intravenous, Once      Continuous medications  dextrose 10 % in water (D10W), 150 mL/hr, Last Rate: Stopped (12/23/24 1025)  dextrose 10 % in water (D10W), 150 mL/hr  dextrose 5%-0.45 % sodium chloride, 150 mL/hr, Last Rate:  "150 mL/hr (12/23/24 1025)  insulin regular, 0-50 Units/hr, Last Rate: 1 Units/hr (12/23/24 1020)      PRN medications  PRN medications: dextrose 10 % in water (D10W), dextrose 10 % in water (D10W), dextrose     Objective    Blood pressure 100/65, pulse 91, temperature 36.2 °C (97.2 °F), temperature source Temporal, resp. rate 14, height 1.499 m (4' 11\"), weight 58.1 kg (128 lb), SpO2 99%.     Physical Exam  Constitutional:       Appearance: Normal appearance.   Cardiovascular:      Rate and Rhythm: Normal rate and regular rhythm.      Pulses: Normal pulses.      Heart sounds: Normal heart sounds.   Pulmonary:      Effort: Pulmonary effort is normal.      Breath sounds: Normal breath sounds.   Abdominal:      General: Bowel sounds are normal.      Palpations: Abdomen is soft.   Musculoskeletal:         General: No swelling.      Right lower leg: No edema.      Left lower leg: No edema.   Skin:     General: Skin is warm.      Capillary Refill: Capillary refill takes less than 2 seconds.   Neurological:      General: No focal deficit present.      Mental Status: She is alert and oriented to person, place, and time.   Psychiatric:         Mood and Affect: Mood normal.       Intake/Output Summary (Last 24 hours) at 12/23/2024 1112  Last data filed at 12/23/2024 1025  Gross per 24 hour   Intake 1273.11 ml   Output --   Net 1273.11 ml     Labs:   Results from last 72 hours   Lab Units 12/23/24  0602 12/23/24  0419 12/23/24  0142   SODIUM mmol/L 132* 135* 132*   POTASSIUM mmol/L 4.3 3.6 4.2   CHLORIDE mmol/L 104 109* 103   CO2 mmol/L 14* 13* 11*   BUN mg/dL 12 11 13   CREATININE mg/dL 0.63 0.53 0.75   GLUCOSE mg/dL 113* 127* 195*   CALCIUM mg/dL 9.2 7.9* 9.7   ANION GAP mmol/L 18 17 22*   EGFR mL/min/1.73m*2 >90 >90 >90   PHOSPHORUS mg/dL 4.2 3.4 3.7      Results from last 72 hours   Lab Units 12/23/24  0419 12/22/24 2037   WBC AUTO x10*3/uL 10.2 10.4   HEMOGLOBIN g/dL 12.1 15.0   HEMATOCRIT % 35.1* 44.6   PLATELETS AUTO " "x10*3/uL 455* 507*   NEUTROS PCT AUTO % 54.8 55.9   LYMPHS PCT AUTO % 37.9 34.7   MONOS PCT AUTO % 4.9 6.5   EOS PCT AUTO % 1.0 0.9          Results from last 72 hours   Lab Units 12/23/24  0424 12/22/24  2356 12/22/24  2244   POCT PH, VENOUS pH 7.25* 7.17* 7.12*   POCT PCO2, VENOUS mm Hg 32* 27* 36*   POCT PO2, VENOUS mm Hg 60* 43 37        Micro/ID:     No results found for: \"URINECULTURE\", \"BLOODCULT\", \"CSFCULTSMEAR\"    Summary of key imaging results from the last 24 hours    Assessment and Plan     Assessment: Charline Tovar is a 24 y.o. year old female patient admitted on 12/22/2024 with PMH T1DM Normally takes lantus 22U and SSI lispro. She presented to the ED after waking up this morning with abdominal pain, nausea, malaise, dizziness, dehydration, polyuria in the setting of missing some of her lispro yesterday. Checks insulin via fingerstick and BG normally range 150-180s but in the 300s when she forgets her insulin. Prior several episodes of DKA, breast abscess 3/2024.     Mechanical Ventilation: none  Sedation/Analgesia:  none  Restraints: no    Summary for 12/23/24  :  Continue IV dextrose; maintain blood glucose 150-250 mg/dL  IV Kcl 20 mEq today  F/U POCT glucose q 1, VBG, and RFP to monitor AG, HCO3, and venous pH  If DKA resolution (AG <12, HCO3 >15, pH>7.3) plan switch to subcutaneous insulin as previous regimen    Plan:  NEUROLOGY/PSYCH:  No active problems    CARDIOVASCULAR:  No active problems    PULMONARY:  No active problems    RENAL/GENITOURINARY:  #Wide anion gap metabolic acidosis from DKA  - 12/22/24 20.37: VBG pH 7.12, PCO2 27, lactate 1.6, HCO3 8, AG 28, K 4.8,   - S/P IV fluid, IV insulin --> 12/23/24: VBG pH 7.25, PCO2 32, HCO3 13, AG 13, K 3.6  Management    - continue IV fluid 5%DN/2 150 mL/hr    - continue IV insulin drip, will deescalate to subcutaneous insulin after DKA resolution    - Potassium replacement as needed i/s/o DKA     GASTROENTEROLOGY:  No active " issues    ENDOCRINOLOGY:  #Diabetic ketoacidosis I/s/o T1DM with missed insulin doses    #T1DM  :: takes lantus 22U and SSI lispro at home  :: HgbA1C 11.5%   :: POC glucose 238 @ 0137  :: EKG sinus tachycardia   Management  - c/w D5 1/2 NS at 150cc/h  - insulin gtt at 4U/hr, continue gtt until AG closed. AG currently 28 at 2037, repeat RFP pending  - RFP q2h, keep K 4-5   - POC glucose q1h, hypoglycemia protocol     HEMATOLOGY:  No active issues    SKIN:  No active issues    MUSCULOSKELETAL:  No active issues    INFECTIOUS DISEASE:  No active issues    ICU Check List       ICU Liberation: Intervention:   Assess, Prevent, Manage Pain CPOT - -   Both SAT and SBT [] SAT  [] SBT 30-60 min [] Extubate to NC  [] Extubate to NIV  [] Discuss Trach   Choice of analgesia and sedation RASS: 0  none -   Delirium: Assess, prevent and manage CAM - -   Early Mobility and Exercise  [] PT /OT consult   Family Engagement and Empowerment []Family updated []SW consult     FEN  Fluids: 5%DN/2 150 mL/hr  Electrolytes: Correct as needed  Nutrition: NPO  Prophylaxis:  DVT ppx: SCD  GI ppx: -  Bowel care: -    Hardware:            Social:  Code: Full Code    HPOA: sister Alida [phone]   Disposition: NICKOLAS Aguilar MD   12/23/24 at 11:12 AM     Disclaimer: Documentation completed with the information available at the time of input. The times in the chart may not be reflective of actual patient care times, interventions, or procedures. Documentation occurs after the physical care of the patient.

## 2024-12-23 NOTE — ED TRIAGE NOTES
Hx DM1, c/o frequent urination, N/V, abd pain, feeling dehydrated since noon today, BG in triage 419, c/o consistent SOB, denies CP, pt states compliant with insulin regimen

## 2024-12-23 NOTE — H&P
HPI  Ms. Tovar is a 23yo F with PMH T1DM c/b several episodes of DKA, breast abscess 3/2024 s/p Augmentin who presented to the ED after waking up this morning with abdominal pain, nausea, malaise, dizziness, dehydration, polyuria in the setting of missing some of her lispro yesterday. States that she has been very fatigued and spent most of the day in bed. Took her lantus the night prior. Normally takes lantus 22U and SSI lispro. Checks insulin via fingerstick and BG normally range 150-180s but in the 300s when she forgets her insulin. States that on average she takes her insulin about 5 days a week, though occasionally forgets to take it.  Has had DKA 3-4x in the past related to missing insulin, including at 20yo when she was initially dx with T1DM. Previously on dexcom with better control of her sugars and waiting to see her endocrinologist next month for dexcom G7. Eats 3 meals a day, and states that she watches her carb intake. No issues with hypoglycemia since 2019 when she was pregnant.      Denies fevers, chills, congestion, CP, SOB, cough, abdominal pain, N/V/D, rashes, dysuria, hematuria, breast pain/redness/swelling, numbness, focal weakness, HA, recent changes to meds or OTC medications, drug use.     In the ED:   ED Triage Vitals [12/22/24 2019]   Temperature Heart Rate Respirations BP   35.9 °C (96.6 °F) (!) 108 18 117/74       Pulse Ox Temp Source Heart Rate Source Patient Position   97 % Temporal Monitor Sitting     CBC Plt 507   CMP Na 130 (136 corrected), K 4.8, Cl 94, HCO3 8, BUN 15, Cr 0.96, Mg 2.10, Ph 5.4, albumin 5.1   LFTs WNL, lipase 15   POC Glucose 419 @ 2023 > 214 @ 0106   Beta hydroxybutyrate 8.38   VBG @ 2039: pH 7.12, pCO2 36, lactate 1.6   VBG @ 2356: pH 7.17, pCO2 27, lactate 1.1   UA 4+ glucose, 4+ ketones, +LE, +RBCs    Urine pregnancy test negative    Interventions: Tylenol 975mg, 1L LR > D51/2NS @ 150cc/h, insulin gtt at 4U/hr    Allergies NDA  Social Hx lives at home with  daughter and grandma, denies tobacco, alcohol, or other drug use  Surgical Hx none     Vitals:    12/22/24 2019   BP: 117/74   Pulse: (!) 108   Resp: 18   Temp: 35.9 °C (96.6 °F)   SpO2: 97%      Physical Exam  General: well developed F in NAD  HEENT: moist MM   CV: tachycardic, regular, no m/r/g  Pulm: CTAB, on RA without increased WOB   GI: soft, NT, ND  Extremities: warm and dry, no rashes     A&P  Ms. Tovar is a 25yo F with PMH T1DM, breast abscess 7/2024 s/p Augmentin 7/2-0 c/b prior DKA who presented to the ED after waking up this morning with abdominal pain, nausea, malaise, dizziness, dehydration, polyuria in the setting of missing some of her lispro yesterday.     Ms. Tovar is a 25yo F with PMH T1DM c/b DKA, breast abscess 7/2024 s/p Augmentin who presents to the ED with 1d of nausea and malaise I/s/o missed insulin doses yesterday. Labs notable for glucose 419, pH 7.17, HCO3 8, AG 28, elevated beta hydroxybutyrate consistent with DKA. No infectious s/s or other precipitating factor. Continue with DKA protocol.     Neuro  - no active problems    CV  - no active problems    Pulm  - no active problems    GI  - no active problems    Renal  #Anion gap metabolic acidosis likely 2/2 DKA  - management of DKA as below, monitor RFP  - urine electrolytes      Heme/Onc  #Thrombocytosis, likely reactive I/s/o DKA  - CTM CBC    ID  - COVID/flu/RSV pending    Endo   #DKA I/s/o missed insulin doses    #Anion gap metabolic acidosis   #T1DM  :: takes lantus 22U and SSI lispro at home  :: HgbA1C 11.5%   :: POC glucose 238 @ 0137  :: EKG sinus tachycardia   - c/w D5 1/2 NS at 150cc/h  - insulin gtt at 4U/hr, continue gtt until AG closed. AG currently 28 at 2037, repeat RFP pending  - RFP q2h, keep K 4-5   - POC glucose q1h, hypoglycemia protocol     F: D5 1/2 NS at 150cc/h   E: PRN, keep K 4-5   N: NPO   A: PIV  DVT ppx: SCDs  Full Code  NOK: sister Alida [phone]

## 2024-12-23 NOTE — ED PROCEDURE NOTE
Procedure  Critical Care    Performed by: Patric Jernigan MD  Authorized by: Patric Jernigan MD    Critical care provider statement:     Critical care time (minutes):  30    Critical care time was exclusive of:  Separately billable procedures and treating other patients    Critical care was necessary to treat or prevent imminent or life-threatening deterioration of the following conditions:  Endocrine crisis    Critical care was time spent personally by me on the following activities:  Ordering and performing treatments and interventions, ordering and review of laboratory studies, development of treatment plan with patient or surrogate, ordering and review of radiographic studies, pulse oximetry, evaluation of patient's response to treatment, re-evaluation of patient's condition, examination of patient, interpretation of cardiac output measurements and obtaining history from patient or surrogate               Patric Jernigan MD  12/23/24 3709

## 2024-12-23 NOTE — CARE PLAN
Problem: Skin  Goal: Participates in plan/prevention/treatment measures  Outcome: Progressing  Flowsheets (Taken 12/23/2024 0211)  Participates in plan/prevention/treatment measures: Elevate heels  Goal: Prevent/manage excess moisture  Outcome: Progressing  Flowsheets (Taken 12/23/2024 0211)  Prevent/manage excess moisture: Monitor for/manage infection if present  Goal: Prevent/minimize sheer/friction injuries  Outcome: Progressing  Flowsheets (Taken 12/23/2024 0211)  Prevent/minimize sheer/friction injuries:   Use pull sheet   Increase activity/out of bed for meals     Problem: Pain - Adult  Goal: Verbalizes/displays adequate comfort level or baseline comfort level  Outcome: Progressing     Problem: Fall/Injury  Goal: Be free from injury by end of the shift  Outcome: Progressing

## 2024-12-23 NOTE — PROGRESS NOTES
SOCIAL WORK NOTE    12/23/24 1124   Discharge Planning   Living Arrangements Children;Family members   Support Systems Children;Family members   Assistance Needed independent   Type of Residence Private residence   Home or Post Acute Services None   Expected Discharge Disposition Home   Financial Resource Strain   How hard is it for you to pay for the very basics like food, housing, medical care, and heating? Not hard   Housing Stability   In the last 12 months, was there a time when you were not able to pay the mortgage or rent on time? N   In the past 12 months, how many times have you moved where you were living? 0   At any time in the past 12 months, were you homeless or living in a shelter (including now)? N   Transportation Needs   In the past 12 months, has lack of transportation kept you from medical appointments or from getting medications? no   In the past 12 months, has lack of transportation kept you from meetings, work, or from getting things needed for daily living? No     NOK: confirmed sister   DME: DM supplies   Home Care: denied  HD: denied   PCP: , provider name unknown   Additional information: SW met with patient at bedside for assessment. Patient normally lives at home with grandmother and daughter (safe). She is typically independent at baseline. No reported issues with SDOH when prompted, but confirmed her insurance lapsed. Referral placed to HRS via email. Social work to follow.  Leighann Aldana, YAZMIN, SOLITARIOW-S ([id number])

## 2024-12-23 NOTE — PROGRESS NOTES
Maria Isabel Cutler  Age: 24 y.o.  MRN: [id number]  Date: 12/20/2024  Location of service: in community    Program Details  Medicaid Community Clinical Case Management  Status: Enrolled  Effective Dates: 8/7/2024 - present  Responsible Staff: JAIME Miramontes      Goals Reviewed:  Problem: Lack of Community Resources        Goal: Coordination of Services will be Obtained          Goal: Link Patient to services within the community       Priority: High        Problem: Limited Understanding of Medications       Goal: Patient will Verbalize Understanding of Medications       Priority: High        Problem: Risk of Uncoordinated Care       Goal: Care will be Coordinated and Supported by a Multidisciplinary Team of Providers       Priority: Medium          Summary:  This provider met with patient at the patient's home. This provider listened with empathy as patient expressed her frustration with not being able to secure housing yet. Patient explained that she turned in all of the necessary requested paperwork but they are requesting it again. Patient had until today at closing of the business day to turn it in. This provider sent an email to Mr. Hannah Childs with the patient copied requesting that Mr. Hannah Childs make contact with the patient to clear up an confusion. This provider also offered to link the patient to the Personalized Care Team's licensed therapist.                      JAIME Miramontes

## 2024-12-24 VITALS
DIASTOLIC BLOOD PRESSURE: 68 MMHG | WEIGHT: 128 LBS | RESPIRATION RATE: 16 BRPM | OXYGEN SATURATION: 98 % | SYSTOLIC BLOOD PRESSURE: 107 MMHG | HEART RATE: 82 BPM | TEMPERATURE: 97.5 F | BODY MASS INDEX: 25.8 KG/M2 | HEIGHT: 59 IN

## 2024-12-24 LAB
ALBUMIN SERPL BCP-MCNC: 3.8 G/DL (ref 3.4–5)
AMPHETAMINES UR QL SCN: NORMAL
ANION GAP SERPL CALC-SCNC: 14 MMOL/L (ref 10–20)
BARBITURATES UR QL SCN: NORMAL
BENZODIAZ UR QL SCN: NORMAL
BUN SERPL-MCNC: 8 MG/DL (ref 6–23)
BZE UR QL SCN: NORMAL
CALCIUM SERPL-MCNC: 9 MG/DL (ref 8.6–10.6)
CANNABINOIDS UR QL SCN: NORMAL
CHLORIDE SERPL-SCNC: 105 MMOL/L (ref 98–107)
CO2 SERPL-SCNC: 23 MMOL/L (ref 21–32)
CREAT SERPL-MCNC: 0.78 MG/DL (ref 0.5–1.05)
EGFRCR SERPLBLD CKD-EPI 2021: >90 ML/MIN/1.73M*2
ERYTHROCYTE [DISTWIDTH] IN BLOOD BY AUTOMATED COUNT: 11.9 % (ref 11.5–14.5)
FENTANYL+NORFENTANYL UR QL SCN: NORMAL
GLUCOSE BLD MANUAL STRIP-MCNC: 206 MG/DL (ref 74–99)
GLUCOSE BLD MANUAL STRIP-MCNC: 262 MG/DL (ref 74–99)
GLUCOSE BLD MANUAL STRIP-MCNC: 87 MG/DL (ref 74–99)
GLUCOSE SERPL-MCNC: 221 MG/DL (ref 74–99)
HCT VFR BLD AUTO: 39.3 % (ref 36–46)
HGB BLD-MCNC: 13.2 G/DL (ref 12–16)
MCH RBC QN AUTO: 30.5 PG (ref 26–34)
MCHC RBC AUTO-ENTMCNC: 33.6 G/DL (ref 32–36)
MCV RBC AUTO: 91 FL (ref 80–100)
METHADONE UR QL SCN: NORMAL
NRBC BLD-RTO: 0 /100 WBCS (ref 0–0)
OPIATES UR QL SCN: NORMAL
OXYCODONE+OXYMORPHONE UR QL SCN: NORMAL
PCP UR QL SCN: NORMAL
PHOSPHATE SERPL-MCNC: 2 MG/DL (ref 2.5–4.9)
PLATELET # BLD AUTO: 482 X10*3/UL (ref 150–450)
POTASSIUM SERPL-SCNC: 4.1 MMOL/L (ref 3.5–5.3)
RBC # BLD AUTO: 4.33 X10*6/UL (ref 4–5.2)
SODIUM SERPL-SCNC: 138 MMOL/L (ref 136–145)
WBC # BLD AUTO: 7.4 X10*3/UL (ref 4.4–11.3)

## 2024-12-24 PROCEDURE — 99239 HOSP IP/OBS DSCHRG MGMT >30: CPT | Performed by: INTERNAL MEDICINE

## 2024-12-24 PROCEDURE — 36415 COLL VENOUS BLD VENIPUNCTURE: CPT | Performed by: INTERNAL MEDICINE

## 2024-12-24 PROCEDURE — 2500000001 HC RX 250 WO HCPCS SELF ADMINISTERED DRUGS (ALT 637 FOR MEDICARE OP): Performed by: STUDENT IN AN ORGANIZED HEALTH CARE EDUCATION/TRAINING PROGRAM

## 2024-12-24 PROCEDURE — 80307 DRUG TEST PRSMV CHEM ANLYZR: CPT | Performed by: STUDENT IN AN ORGANIZED HEALTH CARE EDUCATION/TRAINING PROGRAM

## 2024-12-24 PROCEDURE — 85027 COMPLETE CBC AUTOMATED: CPT | Performed by: INTERNAL MEDICINE

## 2024-12-24 PROCEDURE — 80069 RENAL FUNCTION PANEL: CPT

## 2024-12-24 PROCEDURE — 82947 ASSAY GLUCOSE BLOOD QUANT: CPT

## 2024-12-24 PROCEDURE — 2500000002 HC RX 250 W HCPCS SELF ADMINISTERED DRUGS (ALT 637 FOR MEDICARE OP, ALT 636 FOR OP/ED): Performed by: STUDENT IN AN ORGANIZED HEALTH CARE EDUCATION/TRAINING PROGRAM

## 2024-12-24 RX ORDER — INSULIN LISPRO 100 [IU]/ML
INJECTION, SOLUTION INTRAVENOUS; SUBCUTANEOUS
Qty: 15 ML | Refills: 11 | Status: SHIPPED | OUTPATIENT
Start: 2024-12-24

## 2024-12-24 RX ORDER — PEN NEEDLE, DIABETIC 30 GX3/16"
NEEDLE, DISPOSABLE MISCELLANEOUS
Qty: 200 EACH | Refills: 3 | Status: SHIPPED | OUTPATIENT
Start: 2024-12-24

## 2024-12-24 RX ORDER — INSULIN GLARGINE 100 [IU]/ML
INJECTION, SOLUTION SUBCUTANEOUS
Qty: 15 ML | Refills: 3 | Status: SHIPPED | OUTPATIENT
Start: 2024-12-24 | End: 2024-12-24

## 2024-12-24 RX ORDER — INSULIN LISPRO 100 [IU]/ML
INJECTION, SOLUTION INTRAVENOUS; SUBCUTANEOUS
Qty: 15 ML | Refills: 11 | Status: SHIPPED | OUTPATIENT
Start: 2024-12-24 | End: 2024-12-24

## 2024-12-24 RX ORDER — INSULIN GLARGINE 100 [IU]/ML
INJECTION, SOLUTION SUBCUTANEOUS
Qty: 15 ML | Refills: 3 | Status: SHIPPED | OUTPATIENT
Start: 2024-12-24

## 2024-12-24 RX ADMIN — ACETAMINOPHEN 650 MG: 325 TABLET ORAL at 03:29

## 2024-12-24 RX ADMIN — INSULIN LISPRO 6 UNITS: 100 INJECTION, SOLUTION INTRAVENOUS; SUBCUTANEOUS at 14:42

## 2024-12-24 RX ADMIN — INSULIN LISPRO 4 UNITS: 100 INJECTION, SOLUTION INTRAVENOUS; SUBCUTANEOUS at 08:41

## 2024-12-24 RX ADMIN — INSULIN GLARGINE 20 UNITS: 100 INJECTION, SOLUTION SUBCUTANEOUS at 12:53

## 2024-12-24 ASSESSMENT — PAIN SCALES - GENERAL: PAINLEVEL_OUTOF10: 0 - NO PAIN

## 2024-12-24 NOTE — DISCHARGE SUMMARY
"Discharge Diagnosis  Diabetic ketoacidosis without coma associated with type 1 diabetes mellitus (Multi)    Issues Requiring Follow-Up  PCP follow up.   Needs to  meds from [facility].     Discharge Meds     Medication List      CHANGE how you take these medications     insulin lispro 100 unit/mL injection; Commonly known as: HumaLOG; Take 2   units per every 50 mg, if sugars > 150 mg.    Take 3 times a day before   meals.; What changed: See the new instructions.   Lantus Solostar U-100 Insulin 100 unit/mL (3 mL) pen; Generic drug:   insulin glargine; Inject 20 units at bedtime daily as directed; What   changed: additional instructions     CONTINUE taking these medications     pen needle, diabetic 32 gauge x 5/32\" needle; Commonly known as: BD Елена   2nd Gen Pen Needle; Use 4 a day with insulin pens as directed       Test Results Pending At Discharge  Pending Labs       Order Current Status    BLOOD GAS VENOUS FULL PANEL In process    POCT pregnancy, urine In process    Urine Culture In process            Hospital Course    Charline Tovar is a 24 y.o. year old female patient with PMH T1DM Normally takes lantus 22U and SSI lispro, was admitted on 12/22/2024  with c/o abdominal pain, nausea, malaise, dizziness, dehydration, polyuria in the setting of missing some of her lispro yesterday. Checks insulin via fingerstick and BG normally range 150-180s but in the 300s when she forgets her insulin. Prior several episodes of DKA, breast abscess 3/2024. She was treated in MICU for DKA with AG of 33 and Bicarb of 8. She was treated with insulin and iv fluids with closure of AG.   She was restarted on home lantus on 12/23, in MICU and transferred to medicine floor on early hrs of 12/24.     She remained stable on the floor. Labs showed bicarb of 23 on 12/24.     She was continued on Lantus and SSI on the floor.      Her prescriptions were sent to [facility] pharmacy, and they said she has no active insurance on file. "   Social work, [facility] was contacted. [facility] confirmed that [facility] sent a letter to [facility] to issue the meds.   [facility] was closed at 1 pm, and patient was informed that she can get the medicines on 12/25 am when the [facility] pharmacy opens.     She insisted on going home, She was explained the risk of leaving the hospital without insulin supplies.     Despite explaining risks, she left AMA on 12/24, evening.      Advised to come back on 12/25 am to [facility] pharmacy and  the insulin supplies.     Discharge day management time > 30 minutes.       Pertinent Physical Exam At Time of Discharge  Vitals:    12/24/24 0544   BP: 107/68   Pulse: 82   Resp: 16   Temp: 36.4 °C (97.5 °F)   SpO2: 98%       Physical Exam  HENT:      Head: Normocephalic.      Nose: Nose normal.   Eyes:      Extraocular Movements: Extraocular movements intact.      Pupils: Pupils are equal, round, and reactive to light.   Cardiovascular:      Rate and Rhythm: Normal rate and regular rhythm.      Heart sounds: Normal heart sounds. No murmur heard.  Pulmonary:      Effort: Pulmonary effort is normal. No respiratory distress.      Breath sounds: Normal breath sounds. No wheezing.   Abdominal:      General: Bowel sounds are normal. There is no distension.      Palpations: Abdomen is soft.      Tenderness: There is no abdominal tenderness.   Musculoskeletal:         General: Normal range of motion.      Cervical back: Normal range of motion.   Skin:     General: Skin is warm.   Neurological:      General: No focal deficit present.      Mental Status: She is alert and oriented to person, place, and time.   Psychiatric:         Mood and Affect: Mood normal.         Outpatient Follow-Up  Future Appointments   Date Time Provider Department [place]   1/10/2025  9:30 AM Kay Victoria MD [id number] [facility]   2/12/2025 11:15 AM Zahra Valenzuela MD [id number] [facility]         Joselo Beth MD

## 2024-12-24 NOTE — CARE PLAN
Problem: Skin  Goal: Participates in plan/prevention/treatment measures  Outcome: Progressing  Goal: Prevent/manage excess moisture  Outcome: Progressing  Goal: Prevent/minimize sheer/friction injuries  Outcome: Progressing   The patient's goals for the shift include      The clinical goals for the shift include pt anion gap will close during shift and insulin gtt will be turned off    Over the shift, the patient did not make progress toward the following goals.

## 2024-12-24 NOTE — PROGRESS NOTES
Called and informed Mary Jo that A1C is needed.  Order has been placed.   Charline Tovar is a 24 y.o. female on day 1 of admission presenting with Diabetic ketoacidosis without coma associated with type 1 diabetes mellitus (Multi).    Transitional Care Coordination Progress Note:  TCC received notification from MD patient scripts were not covered at [facility] patient pharmacy due to no insurance    TCC sent a request to [facility].  [facility] response we have accepted her for Medicaid and I just sent the letter over to [facility].    TCC notified Md to send scripts  to [facility] a letter has been sent to cover meds by [facility]    RYLEY BhardwajN-RN  Transitional Care Coordinator (TCC)  [phone] ext [phone]

## 2024-12-25 LAB — BACTERIA UR CULT: NORMAL

## 2024-12-31 ENCOUNTER — DOCUMENTATION (OUTPATIENT)
Dept: BEHAVIORAL HEALTH | Facility: CLINIC | Age: 24
End: 2024-12-31
Payer: COMMERCIAL

## 2025-01-03 NOTE — PROGRESS NOTES
Maria Isabel Cutler  Age: 24 y.o.  MRN: [id number]  Date: 12/31/2024  Location of service: phone call    Program Details  Medicaid Community Clinical Case Management  Status: Enrolled  Effective Dates: 8/7/2024 - present  Responsible Staff: JAIME Miramontes      Goals Reviewed:      Summary:  This writer called patient to get patient scheduled with this writer. Patient was unable to answer at this time. This writer leaves a voicemail.    Appointment start time: 1057  Appointment completion time: 1058  Total time spent with patient (in minutes): 1  Non-Billable Time: 1  Billable Time Total: 0    Viviane Sanford RN

## 2025-01-06 ENCOUNTER — DOCUMENTATION (OUTPATIENT)
Dept: BEHAVIORAL HEALTH | Facility: CLINIC | Age: 25
End: 2025-01-06
Payer: COMMERCIAL

## 2025-01-06 NOTE — PROGRESS NOTES
Maria Isabel Cutler  Age: 24 y.o.  MRN: [id number]  Date: 1/6/2025  Location of service: phone call    Program Details  Medicaid Community Clinical Case Management  Status: Enrolled  Effective Dates: 8/7/2024 - present  Responsible Staff: JAIME Miramontes      Goals Reviewed:      Summary:  This writer called patient to get patient scheduled with this writer. Patient gets scheduled with this writer at this time.    Appointment start time: 1529  Appointment completion time: 1531  Total time spent with patient (in minutes): 2  Non-Billable Time: 2  Billable Time Total: 0    Viviane Sanford RN

## 2025-01-08 ENCOUNTER — APPOINTMENT (OUTPATIENT)
Dept: BEHAVIORAL HEALTH | Facility: CLINIC | Age: 25
End: 2025-01-08
Payer: COMMERCIAL

## 2025-01-10 ENCOUNTER — APPOINTMENT (OUTPATIENT)
Dept: ENDOCRINOLOGY | Facility: CLINIC | Age: 25
End: 2025-01-10
Payer: COMMERCIAL

## 2025-01-15 ENCOUNTER — CLINICAL SUPPORT (OUTPATIENT)
Dept: EMERGENCY MEDICINE | Facility: HOSPITAL | Age: 25
End: 2025-01-15
Payer: COMMERCIAL

## 2025-01-15 ENCOUNTER — HOSPITAL ENCOUNTER (INPATIENT)
Facility: HOSPITAL | Age: 25
LOS: 2 days | Discharge: HOME | End: 2025-01-17
Attending: EMERGENCY MEDICINE | Admitting: STUDENT IN AN ORGANIZED HEALTH CARE EDUCATION/TRAINING PROGRAM
Payer: COMMERCIAL

## 2025-01-15 ENCOUNTER — DOCUMENTATION (OUTPATIENT)
Dept: BEHAVIORAL HEALTH | Facility: CLINIC | Age: 25
End: 2025-01-15

## 2025-01-15 DIAGNOSIS — E10.10 DM (DIABETES MELLITUS) TYPE 1, UNCONTROLLED, WITH KETOACIDOSIS: ICD-10-CM

## 2025-01-15 DIAGNOSIS — E10.10 DIABETIC KETOACIDOSIS WITHOUT COMA ASSOCIATED WITH TYPE 1 DIABETES MELLITUS (MULTI): Primary | ICD-10-CM

## 2025-01-15 DIAGNOSIS — E13.10 DIABETIC KETOACIDOSIS WITHOUT COMA ASSOCIATED WITH OTHER SPECIFIED DIABETES MELLITUS: ICD-10-CM

## 2025-01-15 LAB
ALBUMIN SERPL BCP-MCNC: 4.8 G/DL (ref 3.4–5)
ALP SERPL-CCNC: 68 U/L (ref 33–110)
ALT SERPL W P-5'-P-CCNC: 14 U/L (ref 7–45)
ANION GAP BLDV CALCULATED.4IONS-SCNC: 14 MMOL/L (ref 10–25)
ANION GAP BLDV CALCULATED.4IONS-SCNC: 17 MMOL/L (ref 10–25)
ANION GAP BLDV CALCULATED.4IONS-SCNC: 19 MMOL/L (ref 10–25)
ANION GAP BLDV CALCULATED.4IONS-SCNC: 22 MMOL/L (ref 10–25)
ANION GAP BLDV CALCULATED.4IONS-SCNC: 23 MMOL/L (ref 10–25)
ANION GAP BLDV CALCULATED.4IONS-SCNC: ABNORMAL MMOL/L
ANION GAP SERPL CALC-SCNC: 26 MMOL/L (ref 10–20)
ANION GAP SERPL CALC-SCNC: 28 MMOL/L (ref 10–20)
APPEARANCE UR: CLEAR
AST SERPL W P-5'-P-CCNC: 13 U/L (ref 9–39)
B-HCG SERPL-ACNC: <3 MIU/ML
B-OH-BUTYR SERPL-SCNC: 7.69 MMOL/L (ref 0.02–0.27)
BASE EXCESS BLDV CALC-SCNC: -10.5 MMOL/L (ref -2–3)
BASE EXCESS BLDV CALC-SCNC: -11.1 MMOL/L (ref -2–3)
BASE EXCESS BLDV CALC-SCNC: -11.3 MMOL/L (ref -2–3)
BASE EXCESS BLDV CALC-SCNC: -12.2 MMOL/L (ref -2–3)
BASE EXCESS BLDV CALC-SCNC: -13.1 MMOL/L (ref -2–3)
BASE EXCESS BLDV CALC-SCNC: -14.8 MMOL/L (ref -2–3)
BASOPHILS # BLD AUTO: 0.1 X10*3/UL (ref 0–0.1)
BASOPHILS NFR BLD AUTO: 1.3 %
BILIRUB SERPL-MCNC: 0.6 MG/DL (ref 0–1.2)
BILIRUB UR STRIP.AUTO-MCNC: NEGATIVE MG/DL
BNP SERPL-MCNC: 6 PG/ML (ref 0–99)
BNP SERPL-MCNC: 9 PG/ML (ref 0–99)
BODY TEMPERATURE: 37 DEGREES CELSIUS
BUN SERPL-MCNC: 15 MG/DL (ref 6–23)
BUN SERPL-MCNC: 16 MG/DL (ref 6–23)
CA-I BLDV-SCNC: 1.01 MMOL/L (ref 1.1–1.33)
CA-I BLDV-SCNC: 1.21 MMOL/L (ref 1.1–1.33)
CA-I BLDV-SCNC: 1.22 MMOL/L (ref 1.1–1.33)
CA-I BLDV-SCNC: 1.26 MMOL/L (ref 1.1–1.33)
CA-I BLDV-SCNC: 1.28 MMOL/L (ref 1.1–1.33)
CA-I BLDV-SCNC: 1.3 MMOL/L (ref 1.1–1.33)
CALCIUM SERPL-MCNC: 10 MG/DL (ref 8.6–10.6)
CALCIUM SERPL-MCNC: 9.4 MG/DL (ref 8.6–10.6)
CHLORIDE BLDV-SCNC: 102 MMOL/L (ref 98–107)
CHLORIDE BLDV-SCNC: 103 MMOL/L (ref 98–107)
CHLORIDE BLDV-SCNC: 105 MMOL/L (ref 98–107)
CHLORIDE BLDV-SCNC: 96 MMOL/L (ref 98–107)
CHLORIDE BLDV-SCNC: 99 MMOL/L (ref 98–107)
CHLORIDE BLDV-SCNC: ABNORMAL MMOL/L
CHLORIDE SERPL-SCNC: 95 MMOL/L (ref 98–107)
CHLORIDE SERPL-SCNC: 98 MMOL/L (ref 98–107)
CO2 SERPL-SCNC: 14 MMOL/L (ref 21–32)
CO2 SERPL-SCNC: 15 MMOL/L (ref 21–32)
COLOR UR: COLORLESS
CREAT SERPL-MCNC: 0.78 MG/DL (ref 0.5–1.05)
CREAT SERPL-MCNC: 0.84 MG/DL (ref 0.5–1.05)
EGFRCR SERPLBLD CKD-EPI 2021: >90 ML/MIN/1.73M*2
EGFRCR SERPLBLD CKD-EPI 2021: >90 ML/MIN/1.73M*2
EOSINOPHIL # BLD AUTO: 0.03 X10*3/UL (ref 0–0.7)
EOSINOPHIL NFR BLD AUTO: 0.4 %
ERYTHROCYTE [DISTWIDTH] IN BLOOD BY AUTOMATED COUNT: 11.3 % (ref 11.5–14.5)
FLUAV RNA RESP QL NAA+PROBE: NOT DETECTED
FLUBV RNA RESP QL NAA+PROBE: NOT DETECTED
GLUCOSE BLD MANUAL STRIP-MCNC: 492 MG/DL (ref 74–99)
GLUCOSE BLDV-MCNC: 151 MG/DL (ref 74–99)
GLUCOSE BLDV-MCNC: 190 MG/DL (ref 74–99)
GLUCOSE BLDV-MCNC: 199 MG/DL (ref 74–99)
GLUCOSE BLDV-MCNC: 275 MG/DL (ref 74–99)
GLUCOSE BLDV-MCNC: 458 MG/DL (ref 74–99)
GLUCOSE BLDV-MCNC: 499 MG/DL (ref 74–99)
GLUCOSE SERPL-MCNC: 403 MG/DL (ref 74–99)
GLUCOSE SERPL-MCNC: 453 MG/DL (ref 74–99)
GLUCOSE UR STRIP.AUTO-MCNC: ABNORMAL MG/DL
HCO3 BLDV-SCNC: 11.5 MMOL/L (ref 22–26)
HCO3 BLDV-SCNC: 14.6 MMOL/L (ref 22–26)
HCO3 BLDV-SCNC: 14.9 MMOL/L (ref 22–26)
HCO3 BLDV-SCNC: 15.7 MMOL/L (ref 22–26)
HCO3 BLDV-SCNC: 15.7 MMOL/L (ref 22–26)
HCO3 BLDV-SCNC: 16 MMOL/L (ref 22–26)
HCT VFR BLD AUTO: 42 % (ref 36–46)
HCT VFR BLD EST: 30 % (ref 36–46)
HCT VFR BLD EST: 38 % (ref 36–46)
HCT VFR BLD EST: 38 % (ref 36–46)
HCT VFR BLD EST: 40 % (ref 36–46)
HCT VFR BLD EST: 41 % (ref 36–46)
HCT VFR BLD EST: 45 % (ref 36–46)
HGB BLD-MCNC: 14.3 G/DL (ref 12–16)
HGB BLDV-MCNC: 10.1 G/DL (ref 12–16)
HGB BLDV-MCNC: 12.5 G/DL (ref 12–16)
HGB BLDV-MCNC: 12.7 G/DL (ref 12–16)
HGB BLDV-MCNC: 13.2 G/DL (ref 12–16)
HGB BLDV-MCNC: 13.8 G/DL (ref 12–16)
HGB BLDV-MCNC: 15 G/DL (ref 12–16)
IMM GRANULOCYTES # BLD AUTO: 0.02 X10*3/UL (ref 0–0.7)
IMM GRANULOCYTES NFR BLD AUTO: 0.3 % (ref 0–0.9)
INHALED O2 CONCENTRATION: 21 %
INR PPP: 1 (ref 0.9–1.1)
KETONES UR STRIP.AUTO-MCNC: ABNORMAL MG/DL
LACTATE BLDV-SCNC: 0.7 MMOL/L (ref 0.4–2)
LACTATE BLDV-SCNC: 0.8 MMOL/L (ref 0.4–2)
LACTATE BLDV-SCNC: 0.9 MMOL/L (ref 0.4–2)
LACTATE BLDV-SCNC: 1.7 MMOL/L (ref 0.4–2)
LACTATE BLDV-SCNC: 1.8 MMOL/L (ref 0.4–2)
LACTATE BLDV-SCNC: 2.1 MMOL/L (ref 0.4–2)
LEUKOCYTE ESTERASE UR QL STRIP.AUTO: NEGATIVE
LIPASE SERPL-CCNC: 9 U/L (ref 9–82)
LYMPHOCYTES # BLD AUTO: 2.29 X10*3/UL (ref 1.2–4.8)
LYMPHOCYTES NFR BLD AUTO: 29.6 %
MAGNESIUM SERPL-MCNC: 1.86 MG/DL (ref 1.6–2.4)
MCH RBC QN AUTO: 29.6 PG (ref 26–34)
MCHC RBC AUTO-ENTMCNC: 34 G/DL (ref 32–36)
MCV RBC AUTO: 87 FL (ref 80–100)
MONOCYTES # BLD AUTO: 0.27 X10*3/UL (ref 0.1–1)
MONOCYTES NFR BLD AUTO: 3.5 %
NEUTROPHILS # BLD AUTO: 5.02 X10*3/UL (ref 1.2–7.7)
NEUTROPHILS NFR BLD AUTO: 64.9 %
NITRITE UR QL STRIP.AUTO: NEGATIVE
NRBC BLD-RTO: 0 /100 WBCS (ref 0–0)
OXYHGB MFR BLDV: 34.3 % (ref 45–75)
OXYHGB MFR BLDV: 40.9 % (ref 45–75)
OXYHGB MFR BLDV: 47.4 % (ref 45–75)
OXYHGB MFR BLDV: 76.6 % (ref 45–75)
OXYHGB MFR BLDV: 81.1 % (ref 45–75)
OXYHGB MFR BLDV: 86.5 % (ref 45–75)
PCO2 BLDV: 28 MM HG (ref 41–51)
PCO2 BLDV: 34 MM HG (ref 41–51)
PCO2 BLDV: 35 MM HG (ref 41–51)
PCO2 BLDV: 39 MM HG (ref 41–51)
PCO2 BLDV: 39 MM HG (ref 41–51)
PCO2 BLDV: 42 MM HG (ref 41–51)
PH BLDV: 7.18 PH (ref 7.33–7.43)
PH BLDV: 7.18 PH (ref 7.33–7.43)
PH BLDV: 7.22 PH (ref 7.33–7.43)
PH BLDV: 7.22 PH (ref 7.33–7.43)
PH BLDV: 7.25 PH (ref 7.33–7.43)
PH BLDV: 7.26 PH (ref 7.33–7.43)
PH UR STRIP.AUTO: 5 [PH]
PHOSPHATE SERPL-MCNC: 4.8 MG/DL (ref 2.5–4.9)
PLATELET # BLD AUTO: 501 X10*3/UL (ref 150–450)
PO2 BLDV: 28 MM HG (ref 35–45)
PO2 BLDV: 31 MM HG (ref 35–45)
PO2 BLDV: 34 MM HG (ref 35–45)
PO2 BLDV: 49 MM HG (ref 35–45)
PO2 BLDV: 53 MM HG (ref 35–45)
PO2 BLDV: 60 MM HG (ref 35–45)
POTASSIUM BLDV-SCNC: 2.9 MMOL/L (ref 3.5–5.3)
POTASSIUM BLDV-SCNC: 4.7 MMOL/L (ref 3.5–5.3)
POTASSIUM BLDV-SCNC: 4.8 MMOL/L (ref 3.5–5.3)
POTASSIUM BLDV-SCNC: 4.8 MMOL/L (ref 3.5–5.3)
POTASSIUM BLDV-SCNC: 5.2 MMOL/L (ref 3.5–5.3)
POTASSIUM BLDV-SCNC: 5.3 MMOL/L (ref 3.5–5.3)
POTASSIUM SERPL-SCNC: 4.8 MMOL/L (ref 3.5–5.3)
POTASSIUM SERPL-SCNC: 4.8 MMOL/L (ref 3.5–5.3)
PROT SERPL-MCNC: 8.1 G/DL (ref 6.4–8.2)
PROT UR STRIP.AUTO-MCNC: NEGATIVE MG/DL
PROTHROMBIN TIME: 10.9 SECONDS (ref 9.8–12.8)
RBC # BLD AUTO: 4.83 X10*6/UL (ref 4–5.2)
RBC # UR STRIP.AUTO: NEGATIVE /UL
RSV RNA RESP QL NAA+PROBE: NOT DETECTED
SAO2 % BLDV: 35 % (ref 45–75)
SAO2 % BLDV: 42 % (ref 45–75)
SAO2 % BLDV: 48 % (ref 45–75)
SAO2 % BLDV: 78 % (ref 45–75)
SAO2 % BLDV: 83 % (ref 45–75)
SAO2 % BLDV: 89 % (ref 45–75)
SARS-COV-2 RNA RESP QL NAA+PROBE: NOT DETECTED
SODIUM BLDV-SCNC: 129 MMOL/L (ref 136–145)
SODIUM BLDV-SCNC: 130 MMOL/L (ref 136–145)
SODIUM BLDV-SCNC: 130 MMOL/L (ref 136–145)
SODIUM BLDV-SCNC: 131 MMOL/L (ref 136–145)
SODIUM BLDV-SCNC: 132 MMOL/L (ref 136–145)
SODIUM BLDV-SCNC: 137 MMOL/L (ref 136–145)
SODIUM SERPL-SCNC: 132 MMOL/L (ref 136–145)
SODIUM SERPL-SCNC: 134 MMOL/L (ref 136–145)
SP GR UR STRIP.AUTO: 1.03
UROBILINOGEN UR STRIP.AUTO-MCNC: NORMAL MG/DL
WBC # BLD AUTO: 7.7 X10*3/UL (ref 4.4–11.3)

## 2025-01-15 PROCEDURE — 85610 PROTHROMBIN TIME: CPT | Performed by: EMERGENCY MEDICINE

## 2025-01-15 PROCEDURE — 85025 COMPLETE CBC W/AUTO DIFF WBC: CPT | Performed by: EMERGENCY MEDICINE

## 2025-01-15 PROCEDURE — 83036 HEMOGLOBIN GLYCOSYLATED A1C: CPT

## 2025-01-15 PROCEDURE — 99285 EMERGENCY DEPT VISIT HI MDM: CPT | Performed by: EMERGENCY MEDICINE

## 2025-01-15 PROCEDURE — 82010 KETONE BODYS QUAN: CPT | Performed by: EMERGENCY MEDICINE

## 2025-01-15 PROCEDURE — 82947 ASSAY GLUCOSE BLOOD QUANT: CPT | Performed by: EMERGENCY MEDICINE

## 2025-01-15 PROCEDURE — 36415 COLL VENOUS BLD VENIPUNCTURE: CPT | Performed by: EMERGENCY MEDICINE

## 2025-01-15 PROCEDURE — 2500000004 HC RX 250 GENERAL PHARMACY W/ HCPCS (ALT 636 FOR OP/ED): Mod: SE

## 2025-01-15 PROCEDURE — 96374 THER/PROPH/DIAG INJ IV PUSH: CPT

## 2025-01-15 PROCEDURE — 84295 ASSAY OF SERUM SODIUM: CPT

## 2025-01-15 PROCEDURE — 96361 HYDRATE IV INFUSION ADD-ON: CPT

## 2025-01-15 PROCEDURE — 99291 CRITICAL CARE FIRST HOUR: CPT

## 2025-01-15 PROCEDURE — 84702 CHORIONIC GONADOTROPIN TEST: CPT

## 2025-01-15 PROCEDURE — 81003 URINALYSIS AUTO W/O SCOPE: CPT | Performed by: EMERGENCY MEDICINE

## 2025-01-15 PROCEDURE — 83880 ASSAY OF NATRIURETIC PEPTIDE: CPT | Performed by: EMERGENCY MEDICINE

## 2025-01-15 PROCEDURE — 82805 BLOOD GASES W/O2 SATURATION: CPT

## 2025-01-15 PROCEDURE — 83690 ASSAY OF LIPASE: CPT | Performed by: EMERGENCY MEDICINE

## 2025-01-15 PROCEDURE — 87637 SARSCOV2&INF A&B&RSV AMP PRB: CPT

## 2025-01-15 PROCEDURE — 83735 ASSAY OF MAGNESIUM: CPT | Performed by: EMERGENCY MEDICINE

## 2025-01-15 PROCEDURE — 82947 ASSAY GLUCOSE BLOOD QUANT: CPT

## 2025-01-15 PROCEDURE — 83605 ASSAY OF LACTIC ACID: CPT

## 2025-01-15 PROCEDURE — 82435 ASSAY OF BLOOD CHLORIDE: CPT | Performed by: EMERGENCY MEDICINE

## 2025-01-15 PROCEDURE — 2020000001 HC ICU ROOM DAILY

## 2025-01-15 PROCEDURE — 80053 COMPREHEN METABOLIC PANEL: CPT | Performed by: EMERGENCY MEDICINE

## 2025-01-15 PROCEDURE — 2500000002 HC RX 250 W HCPCS SELF ADMINISTERED DRUGS (ALT 637 FOR MEDICARE OP, ALT 636 FOR OP/ED): Mod: SE

## 2025-01-15 PROCEDURE — 2500000004 HC RX 250 GENERAL PHARMACY W/ HCPCS (ALT 636 FOR OP/ED): Mod: SE | Performed by: EMERGENCY MEDICINE

## 2025-01-15 PROCEDURE — 82805 BLOOD GASES W/O2 SATURATION: CPT | Performed by: EMERGENCY MEDICINE

## 2025-01-15 PROCEDURE — 84100 ASSAY OF PHOSPHORUS: CPT | Performed by: EMERGENCY MEDICINE

## 2025-01-15 PROCEDURE — 93005 ELECTROCARDIOGRAM TRACING: CPT

## 2025-01-15 PROCEDURE — 80048 BASIC METABOLIC PNL TOTAL CA: CPT | Mod: CCI | Performed by: EMERGENCY MEDICINE

## 2025-01-15 RX ORDER — DEXTROSE MONOHYDRATE 100 MG/ML
150 INJECTION, SOLUTION INTRAVENOUS CONTINUOUS PRN
Status: DISCONTINUED | OUTPATIENT
Start: 2025-01-15 | End: 2025-01-16

## 2025-01-15 RX ORDER — POTASSIUM CHLORIDE 20 MEQ/1
20 TABLET, EXTENDED RELEASE ORAL ONCE
Status: COMPLETED | OUTPATIENT
Start: 2025-01-15 | End: 2025-01-15

## 2025-01-15 RX ORDER — ONDANSETRON HYDROCHLORIDE 2 MG/ML
4 INJECTION, SOLUTION INTRAVENOUS EVERY 8 HOURS PRN
Status: DISCONTINUED | OUTPATIENT
Start: 2025-01-15 | End: 2025-01-17 | Stop reason: HOSPADM

## 2025-01-15 RX ORDER — SODIUM CHLORIDE 450 MG/100ML
250 INJECTION, SOLUTION INTRAVENOUS CONTINUOUS
Status: DISCONTINUED | OUTPATIENT
Start: 2025-01-15 | End: 2025-01-16

## 2025-01-15 RX ORDER — DEXTROSE 50 % IN WATER (D50W) INTRAVENOUS SYRINGE
50
Status: DISCONTINUED | OUTPATIENT
Start: 2025-01-15 | End: 2025-01-17 | Stop reason: HOSPADM

## 2025-01-15 RX ORDER — POTASSIUM CHLORIDE 14.9 MG/ML
20 INJECTION INTRAVENOUS
Status: COMPLETED | OUTPATIENT
Start: 2025-01-15 | End: 2025-01-16

## 2025-01-15 RX ORDER — DEXTROSE MONOHYDRATE AND SODIUM CHLORIDE 5; .45 G/100ML; G/100ML
150 INJECTION, SOLUTION INTRAVENOUS CONTINUOUS PRN
Status: DISCONTINUED | OUTPATIENT
Start: 2025-01-15 | End: 2025-01-16

## 2025-01-15 RX ADMIN — DEXTROSE AND SODIUM CHLORIDE 150 ML/HR: 5; 450 INJECTION, SOLUTION INTRAVENOUS at 23:15

## 2025-01-15 RX ADMIN — POTASSIUM CHLORIDE 20 MEQ: 1500 TABLET, EXTENDED RELEASE ORAL at 21:26

## 2025-01-15 RX ADMIN — INSULIN HUMAN 8 UNITS/HR: 1 INJECTION, SOLUTION INTRAVENOUS at 18:14

## 2025-01-15 RX ADMIN — SODIUM CHLORIDE, SODIUM LACTATE, POTASSIUM CHLORIDE, AND CALCIUM CHLORIDE 1000 ML: 600; 310; 30; 20 INJECTION, SOLUTION INTRAVENOUS at 16:25

## 2025-01-15 RX ADMIN — POTASSIUM CHLORIDE 20 MEQ: 14.9 INJECTION, SOLUTION INTRAVENOUS at 21:27

## 2025-01-15 RX ADMIN — SODIUM CHLORIDE 1000 ML: 0.9 INJECTION, SOLUTION INTRAVENOUS at 18:56

## 2025-01-15 RX ADMIN — POTASSIUM CHLORIDE 20 MEQ: 14.9 INJECTION, SOLUTION INTRAVENOUS at 23:42

## 2025-01-15 RX ADMIN — SODIUM CHLORIDE 250 ML/HR: 450 INJECTION, SOLUTION INTRAVENOUS at 22:54

## 2025-01-15 ASSESSMENT — PAIN - FUNCTIONAL ASSESSMENT: PAIN_FUNCTIONAL_ASSESSMENT: 0-10

## 2025-01-15 ASSESSMENT — PAIN SCALES - GENERAL: PAINLEVEL_OUTOF10: 0 - NO PAIN

## 2025-01-15 NOTE — ED TRIAGE NOTES
Pt says hx DM1, hasn't been on insulin in 2 weeks because she's been stressed out, last BG reading 'high'

## 2025-01-16 ENCOUNTER — DOCUMENTATION (OUTPATIENT)
Dept: BEHAVIORAL HEALTH | Facility: CLINIC | Age: 25
End: 2025-01-16
Payer: COMMERCIAL

## 2025-01-16 VITALS
OXYGEN SATURATION: 99 % | HEIGHT: 60 IN | SYSTOLIC BLOOD PRESSURE: 101 MMHG | BODY MASS INDEX: 24.15 KG/M2 | HEART RATE: 93 BPM | RESPIRATION RATE: 17 BRPM | WEIGHT: 123.02 LBS | TEMPERATURE: 97.7 F | DIASTOLIC BLOOD PRESSURE: 66 MMHG

## 2025-01-16 LAB
ALBUMIN SERPL BCP-MCNC: 3.5 G/DL (ref 3.4–5)
ALBUMIN SERPL BCP-MCNC: 3.9 G/DL (ref 3.4–5)
ANION GAP BLDV CALCULATED.4IONS-SCNC: 12 MMOL/L (ref 10–25)
ANION GAP BLDV CALCULATED.4IONS-SCNC: 7 MMOL/L (ref 10–25)
ANION GAP BLDV CALCULATED.4IONS-SCNC: 9 MMOL/L (ref 10–25)
ANION GAP SERPL CALC-SCNC: 10 MMOL/L (ref 10–20)
ANION GAP SERPL CALC-SCNC: 15 MMOL/L (ref 10–20)
BASE EXCESS BLDV CALC-SCNC: -10.7 MMOL/L (ref -2–3)
BASE EXCESS BLDV CALC-SCNC: -3.2 MMOL/L (ref -2–3)
BASE EXCESS BLDV CALC-SCNC: -6.2 MMOL/L (ref -2–3)
BASOPHILS # BLD AUTO: 0.08 X10*3/UL (ref 0–0.1)
BASOPHILS NFR BLD AUTO: 1 %
BODY TEMPERATURE: 37 DEGREES CELSIUS
BUN SERPL-MCNC: 10 MG/DL (ref 6–23)
BUN SERPL-MCNC: 11 MG/DL (ref 6–23)
CA-I BLDV-SCNC: 1.2 MMOL/L (ref 1.1–1.33)
CA-I BLDV-SCNC: 1.23 MMOL/L (ref 1.1–1.33)
CA-I BLDV-SCNC: 1.24 MMOL/L (ref 1.1–1.33)
CALCIUM SERPL-MCNC: 8.8 MG/DL (ref 8.6–10.6)
CALCIUM SERPL-MCNC: 8.8 MG/DL (ref 8.6–10.6)
CHLORIDE BLDV-SCNC: 107 MMOL/L (ref 98–107)
CHLORIDE BLDV-SCNC: 107 MMOL/L (ref 98–107)
CHLORIDE BLDV-SCNC: 109 MMOL/L (ref 98–107)
CHLORIDE SERPL-SCNC: 103 MMOL/L (ref 98–107)
CHLORIDE SERPL-SCNC: 107 MMOL/L (ref 98–107)
CO2 SERPL-SCNC: 17 MMOL/L (ref 21–32)
CO2 SERPL-SCNC: 26 MMOL/L (ref 21–32)
CREAT SERPL-MCNC: 0.55 MG/DL (ref 0.5–1.05)
CREAT SERPL-MCNC: 0.61 MG/DL (ref 0.5–1.05)
EGFRCR SERPLBLD CKD-EPI 2021: >90 ML/MIN/1.73M*2
EGFRCR SERPLBLD CKD-EPI 2021: >90 ML/MIN/1.73M*2
EOSINOPHIL # BLD AUTO: 0.26 X10*3/UL (ref 0–0.7)
EOSINOPHIL NFR BLD AUTO: 3.3 %
ERYTHROCYTE [DISTWIDTH] IN BLOOD BY AUTOMATED COUNT: 11.4 % (ref 11.5–14.5)
EST. AVERAGE GLUCOSE BLD GHB EST-MCNC: 318 MG/DL
GLUCOSE BLD MANUAL STRIP-MCNC: 115 MG/DL (ref 74–99)
GLUCOSE BLD MANUAL STRIP-MCNC: 117 MG/DL (ref 74–99)
GLUCOSE BLD MANUAL STRIP-MCNC: 125 MG/DL (ref 74–99)
GLUCOSE BLD MANUAL STRIP-MCNC: 161 MG/DL (ref 74–99)
GLUCOSE BLD MANUAL STRIP-MCNC: 181 MG/DL (ref 74–99)
GLUCOSE BLD MANUAL STRIP-MCNC: 235 MG/DL (ref 74–99)
GLUCOSE BLD MANUAL STRIP-MCNC: 391 MG/DL (ref 74–99)
GLUCOSE BLD MANUAL STRIP-MCNC: 66 MG/DL (ref 74–99)
GLUCOSE BLDV-MCNC: 147 MG/DL (ref 74–99)
GLUCOSE BLDV-MCNC: 227 MG/DL (ref 74–99)
GLUCOSE BLDV-MCNC: 69 MG/DL (ref 74–99)
GLUCOSE SERPL-MCNC: 144 MG/DL (ref 74–99)
GLUCOSE SERPL-MCNC: 232 MG/DL (ref 74–99)
HBA1C MFR BLD: 12.7 %
HCO3 BLDV-SCNC: 15.9 MMOL/L (ref 22–26)
HCO3 BLDV-SCNC: 19.6 MMOL/L (ref 22–26)
HCO3 BLDV-SCNC: 22 MMOL/L (ref 22–26)
HCT VFR BLD AUTO: 33.8 % (ref 36–46)
HCT VFR BLD EST: 37 % (ref 36–46)
HCT VFR BLD EST: 37 % (ref 36–46)
HCT VFR BLD EST: 38 % (ref 36–46)
HGB BLD-MCNC: 11.7 G/DL (ref 12–16)
HGB BLDV-MCNC: 12.3 G/DL (ref 12–16)
HGB BLDV-MCNC: 12.4 G/DL (ref 12–16)
HGB BLDV-MCNC: 12.6 G/DL (ref 12–16)
HOLD SPECIMEN: NORMAL
IMM GRANULOCYTES # BLD AUTO: 0.02 X10*3/UL (ref 0–0.7)
IMM GRANULOCYTES NFR BLD AUTO: 0.3 % (ref 0–0.9)
INHALED O2 CONCENTRATION: 21 %
LACTATE BLDV-SCNC: 0.6 MMOL/L (ref 0.4–2)
LACTATE BLDV-SCNC: 0.8 MMOL/L (ref 0.4–2)
LACTATE BLDV-SCNC: 1.1 MMOL/L (ref 0.4–2)
LACTATE BLDV-SCNC: 1.8 MMOL/L (ref 0.4–2)
LYMPHOCYTES # BLD AUTO: 4.06 X10*3/UL (ref 1.2–4.8)
LYMPHOCYTES NFR BLD AUTO: 51.1 %
MAGNESIUM SERPL-MCNC: 1.73 MG/DL (ref 1.6–2.4)
MCH RBC QN AUTO: 29.9 PG (ref 26–34)
MCHC RBC AUTO-ENTMCNC: 34.6 G/DL (ref 32–36)
MCV RBC AUTO: 86 FL (ref 80–100)
MONOCYTES # BLD AUTO: 0.52 X10*3/UL (ref 0.1–1)
MONOCYTES NFR BLD AUTO: 6.5 %
NEUTROPHILS # BLD AUTO: 3 X10*3/UL (ref 1.2–7.7)
NEUTROPHILS NFR BLD AUTO: 37.8 %
NRBC BLD-RTO: 0 /100 WBCS (ref 0–0)
OXYHGB MFR BLDV: 76.4 % (ref 45–75)
OXYHGB MFR BLDV: 88.6 % (ref 45–75)
OXYHGB MFR BLDV: 91.4 % (ref 45–75)
PCO2 BLDV: 37 MM HG (ref 41–51)
PCO2 BLDV: 39 MM HG (ref 41–51)
PCO2 BLDV: 39 MM HG (ref 41–51)
PH BLDV: 7.24 PH (ref 7.33–7.43)
PH BLDV: 7.31 PH (ref 7.33–7.43)
PH BLDV: 7.36 PH (ref 7.33–7.43)
PHOSPHATE SERPL-MCNC: 3 MG/DL (ref 2.5–4.9)
PHOSPHATE SERPL-MCNC: 3.3 MG/DL (ref 2.5–4.9)
PLATELET # BLD AUTO: 408 X10*3/UL (ref 150–450)
PO2 BLDV: 51 MM HG (ref 35–45)
PO2 BLDV: 60 MM HG (ref 35–45)
PO2 BLDV: 64 MM HG (ref 35–45)
POTASSIUM BLDV-SCNC: 4 MMOL/L (ref 3.5–5.3)
POTASSIUM BLDV-SCNC: 4.1 MMOL/L (ref 3.5–5.3)
POTASSIUM BLDV-SCNC: 4.3 MMOL/L (ref 3.5–5.3)
POTASSIUM SERPL-SCNC: 4.1 MMOL/L (ref 3.5–5.3)
POTASSIUM SERPL-SCNC: 4.6 MMOL/L (ref 3.5–5.3)
POTASSIUM SERPL-SCNC: 4.6 MMOL/L (ref 3.5–5.3)
RBC # BLD AUTO: 3.91 X10*6/UL (ref 4–5.2)
SAO2 % BLDV: 78 % (ref 45–75)
SAO2 % BLDV: 91 % (ref 45–75)
SAO2 % BLDV: 94 % (ref 45–75)
SODIUM BLDV-SCNC: 131 MMOL/L (ref 136–145)
SODIUM BLDV-SCNC: 131 MMOL/L (ref 136–145)
SODIUM BLDV-SCNC: 134 MMOL/L (ref 136–145)
SODIUM SERPL-SCNC: 134 MMOL/L (ref 136–145)
SODIUM SERPL-SCNC: 134 MMOL/L (ref 136–145)
WBC # BLD AUTO: 7.9 X10*3/UL (ref 4.4–11.3)

## 2025-01-16 PROCEDURE — 2500000002 HC RX 250 W HCPCS SELF ADMINISTERED DRUGS (ALT 637 FOR MEDICARE OP, ALT 636 FOR OP/ED): Mod: SE

## 2025-01-16 PROCEDURE — 82947 ASSAY GLUCOSE BLOOD QUANT: CPT

## 2025-01-16 PROCEDURE — 82435 ASSAY OF BLOOD CHLORIDE: CPT

## 2025-01-16 PROCEDURE — 85025 COMPLETE CBC W/AUTO DIFF WBC: CPT

## 2025-01-16 PROCEDURE — 80069 RENAL FUNCTION PANEL: CPT

## 2025-01-16 PROCEDURE — 2500000004 HC RX 250 GENERAL PHARMACY W/ HCPCS (ALT 636 FOR OP/ED): Mod: SE | Performed by: EMERGENCY MEDICINE

## 2025-01-16 PROCEDURE — 2500000002 HC RX 250 W HCPCS SELF ADMINISTERED DRUGS (ALT 637 FOR MEDICARE OP, ALT 636 FOR OP/ED): Mod: SE | Performed by: STUDENT IN AN ORGANIZED HEALTH CARE EDUCATION/TRAINING PROGRAM

## 2025-01-16 PROCEDURE — 82330 ASSAY OF CALCIUM: CPT

## 2025-01-16 PROCEDURE — 83735 ASSAY OF MAGNESIUM: CPT

## 2025-01-16 PROCEDURE — 36415 COLL VENOUS BLD VENIPUNCTURE: CPT

## 2025-01-16 PROCEDURE — 99254 IP/OBS CNSLTJ NEW/EST MOD 60: CPT | Performed by: STUDENT IN AN ORGANIZED HEALTH CARE EDUCATION/TRAINING PROGRAM

## 2025-01-16 PROCEDURE — 84132 ASSAY OF SERUM POTASSIUM: CPT

## 2025-01-16 PROCEDURE — 84132 ASSAY OF SERUM POTASSIUM: CPT | Performed by: INTERNAL MEDICINE

## 2025-01-16 PROCEDURE — 2500000004 HC RX 250 GENERAL PHARMACY W/ HCPCS (ALT 636 FOR OP/ED): Mod: SE

## 2025-01-16 PROCEDURE — 1210000001 HC SEMI-PRIVATE ROOM DAILY

## 2025-01-16 RX ORDER — INSULIN LISPRO 100 [IU]/ML
10 INJECTION, SOLUTION INTRAVENOUS; SUBCUTANEOUS
Status: DISCONTINUED | OUTPATIENT
Start: 2025-01-16 | End: 2025-01-17

## 2025-01-16 RX ORDER — POLYETHYLENE GLYCOL 3350 17 G/17G
17 POWDER, FOR SOLUTION ORAL DAILY PRN
Status: DISCONTINUED | OUTPATIENT
Start: 2025-01-16 | End: 2025-01-17 | Stop reason: HOSPADM

## 2025-01-16 RX ORDER — ALBUTEROL SULFATE 90 UG/1
1-2 INHALANT RESPIRATORY (INHALATION) EVERY 4 HOURS PRN
Status: DISCONTINUED | OUTPATIENT
Start: 2025-01-16 | End: 2025-01-17 | Stop reason: HOSPADM

## 2025-01-16 RX ORDER — DEXTROSE 50 % IN WATER (D50W) INTRAVENOUS SYRINGE
50
Status: CANCELLED | OUTPATIENT
Start: 2025-01-16

## 2025-01-16 RX ORDER — DEXTROSE MONOHYDRATE 100 MG/ML
150 INJECTION, SOLUTION INTRAVENOUS CONTINUOUS PRN
Status: CANCELLED | OUTPATIENT
Start: 2025-01-16 | End: 2025-01-17

## 2025-01-16 RX ORDER — DEXTROSE 50 % IN WATER (D50W) INTRAVENOUS SYRINGE
25
Status: DISCONTINUED | OUTPATIENT
Start: 2025-01-16 | End: 2025-01-17 | Stop reason: HOSPADM

## 2025-01-16 RX ORDER — INSULIN GLARGINE 100 [IU]/ML
14 INJECTION, SOLUTION SUBCUTANEOUS NIGHTLY
Status: DISCONTINUED | OUTPATIENT
Start: 2025-01-16 | End: 2025-01-16

## 2025-01-16 RX ORDER — DEXTROSE 50 % IN WATER (D50W) INTRAVENOUS SYRINGE
12.5
Status: DISCONTINUED | OUTPATIENT
Start: 2025-01-16 | End: 2025-01-17 | Stop reason: HOSPADM

## 2025-01-16 RX ORDER — MAGNESIUM SULFATE HEPTAHYDRATE 40 MG/ML
2 INJECTION, SOLUTION INTRAVENOUS ONCE
Status: COMPLETED | OUTPATIENT
Start: 2025-01-16 | End: 2025-01-16

## 2025-01-16 RX ORDER — INSULIN LISPRO 100 [IU]/ML
12 INJECTION, SOLUTION INTRAVENOUS; SUBCUTANEOUS
Status: DISCONTINUED | OUTPATIENT
Start: 2025-01-16 | End: 2025-01-16

## 2025-01-16 RX ORDER — ENOXAPARIN SODIUM 100 MG/ML
40 INJECTION SUBCUTANEOUS DAILY
Status: DISCONTINUED | OUTPATIENT
Start: 2025-01-16 | End: 2025-01-17 | Stop reason: HOSPADM

## 2025-01-16 RX ORDER — SODIUM CHLORIDE 450 MG/100ML
125 INJECTION, SOLUTION INTRAVENOUS CONTINUOUS
Status: DISCONTINUED | OUTPATIENT
Start: 2025-01-16 | End: 2025-01-16

## 2025-01-16 RX ORDER — INSULIN GLARGINE 100 [IU]/ML
14 INJECTION, SOLUTION SUBCUTANEOUS NIGHTLY
Status: DISCONTINUED | OUTPATIENT
Start: 2025-01-16 | End: 2025-01-17 | Stop reason: HOSPADM

## 2025-01-16 RX ORDER — INSULIN LISPRO 100 [IU]/ML
0-10 INJECTION, SOLUTION INTRAVENOUS; SUBCUTANEOUS
Status: DISCONTINUED | OUTPATIENT
Start: 2025-01-16 | End: 2025-01-17 | Stop reason: HOSPADM

## 2025-01-16 RX ADMIN — INSULIN LISPRO 10 UNITS: 100 INJECTION, SOLUTION INTRAVENOUS; SUBCUTANEOUS at 17:22

## 2025-01-16 RX ADMIN — ENOXAPARIN SODIUM 40 MG: 100 INJECTION SUBCUTANEOUS at 08:15

## 2025-01-16 RX ADMIN — SODIUM CHLORIDE, POTASSIUM CHLORIDE, SODIUM LACTATE AND CALCIUM CHLORIDE 500 ML: 600; 310; 30; 20 INJECTION, SOLUTION INTRAVENOUS at 03:34

## 2025-01-16 RX ADMIN — INSULIN LISPRO 4 UNITS: 100 INJECTION, SOLUTION INTRAVENOUS; SUBCUTANEOUS at 12:02

## 2025-01-16 RX ADMIN — INSULIN LISPRO 2 UNITS: 100 INJECTION, SOLUTION INTRAVENOUS; SUBCUTANEOUS at 08:15

## 2025-01-16 RX ADMIN — INSULIN LISPRO 10 UNITS: 100 INJECTION, SOLUTION INTRAVENOUS; SUBCUTANEOUS at 17:21

## 2025-01-16 RX ADMIN — INSULIN GLARGINE 14 UNITS: 100 INJECTION, SOLUTION SUBCUTANEOUS at 02:54

## 2025-01-16 RX ADMIN — MAGNESIUM SULFATE HEPTAHYDRATE 2 G: 40 INJECTION, SOLUTION INTRAVENOUS at 02:18

## 2025-01-16 RX ADMIN — INSULIN GLARGINE 14 UNITS: 100 INJECTION, SOLUTION SUBCUTANEOUS at 20:44

## 2025-01-16 RX ADMIN — DEXTROSE MONOHYDRATE 150 ML/HR: 100 INJECTION, SOLUTION INTRAVENOUS at 01:53

## 2025-01-16 SDOH — ECONOMIC STABILITY: HOUSING INSECURITY: IN THE PAST 12 MONTHS, HOW MANY TIMES HAVE YOU MOVED WHERE YOU WERE LIVING?: 1

## 2025-01-16 SDOH — SOCIAL STABILITY: SOCIAL INSECURITY: WITHIN THE LAST YEAR, HAVE YOU BEEN AFRAID OF YOUR PARTNER OR EX-PARTNER?: NO

## 2025-01-16 SDOH — ECONOMIC STABILITY: FOOD INSECURITY: WITHIN THE PAST 12 MONTHS, THE FOOD YOU BOUGHT JUST DIDN'T LAST AND YOU DIDN'T HAVE MONEY TO GET MORE.: NEVER TRUE

## 2025-01-16 SDOH — SOCIAL STABILITY: SOCIAL INSECURITY: WERE YOU ABLE TO COMPLETE ALL THE BEHAVIORAL HEALTH SCREENINGS?: YES

## 2025-01-16 SDOH — ECONOMIC STABILITY: INCOME INSECURITY: IN THE PAST 12 MONTHS HAS THE ELECTRIC, GAS, OIL, OR WATER COMPANY THREATENED TO SHUT OFF SERVICES IN YOUR HOME?: NO

## 2025-01-16 SDOH — ECONOMIC STABILITY: HOUSING INSECURITY: AT ANY TIME IN THE PAST 12 MONTHS, WERE YOU HOMELESS OR LIVING IN A SHELTER (INCLUDING NOW)?: YES

## 2025-01-16 SDOH — SOCIAL STABILITY: SOCIAL INSECURITY: ARE YOU OR HAVE YOU BEEN THREATENED OR ABUSED PHYSICALLY, EMOTIONALLY, OR SEXUALLY BY ANYONE?: NO

## 2025-01-16 SDOH — SOCIAL STABILITY: SOCIAL INSECURITY: HAVE YOU HAD THOUGHTS OF HARMING ANYONE ELSE?: NO

## 2025-01-16 SDOH — SOCIAL STABILITY: SOCIAL INSECURITY: DO YOU FEEL ANYONE HAS EXPLOITED OR TAKEN ADVANTAGE OF YOU FINANCIALLY OR OF YOUR PERSONAL PROPERTY?: NO

## 2025-01-16 SDOH — ECONOMIC STABILITY: HOUSING INSECURITY: IN THE LAST 12 MONTHS, WAS THERE A TIME WHEN YOU WERE NOT ABLE TO PAY THE MORTGAGE OR RENT ON TIME?: NO

## 2025-01-16 SDOH — SOCIAL STABILITY: SOCIAL INSECURITY: WITHIN THE LAST YEAR, HAVE YOU BEEN HUMILIATED OR EMOTIONALLY ABUSED IN OTHER WAYS BY YOUR PARTNER OR EX-PARTNER?: NO

## 2025-01-16 SDOH — SOCIAL STABILITY: SOCIAL INSECURITY: ABUSE: ADULT

## 2025-01-16 SDOH — ECONOMIC STABILITY: FOOD INSECURITY: WITHIN THE PAST 12 MONTHS, YOU WORRIED THAT YOUR FOOD WOULD RUN OUT BEFORE YOU GOT THE MONEY TO BUY MORE.: NEVER TRUE

## 2025-01-16 SDOH — SOCIAL STABILITY: SOCIAL INSECURITY: DOES ANYONE TRY TO KEEP YOU FROM HAVING/CONTACTING OTHER FRIENDS OR DOING THINGS OUTSIDE YOUR HOME?: NO

## 2025-01-16 SDOH — ECONOMIC STABILITY: TRANSPORTATION INSECURITY: IN THE PAST 12 MONTHS, HAS LACK OF TRANSPORTATION KEPT YOU FROM MEDICAL APPOINTMENTS OR FROM GETTING MEDICATIONS?: YES

## 2025-01-16 SDOH — SOCIAL STABILITY: SOCIAL INSECURITY: DO YOU FEEL UNSAFE GOING BACK TO THE PLACE WHERE YOU ARE LIVING?: NO

## 2025-01-16 SDOH — SOCIAL STABILITY: SOCIAL INSECURITY: ARE THERE ANY APPARENT SIGNS OF INJURIES/BEHAVIORS THAT COULD BE RELATED TO ABUSE/NEGLECT?: NO

## 2025-01-16 SDOH — SOCIAL STABILITY: SOCIAL INSECURITY: HAS ANYONE EVER THREATENED TO HURT YOUR FAMILY OR YOUR PETS?: NO

## 2025-01-16 SDOH — ECONOMIC STABILITY: FOOD INSECURITY: HOW HARD IS IT FOR YOU TO PAY FOR THE VERY BASICS LIKE FOOD, HOUSING, MEDICAL CARE, AND HEATING?: NOT HARD AT ALL

## 2025-01-16 SDOH — SOCIAL STABILITY: SOCIAL INSECURITY: HAVE YOU HAD ANY THOUGHTS OF HARMING ANYONE ELSE?: NO

## 2025-01-16 ASSESSMENT — COGNITIVE AND FUNCTIONAL STATUS - GENERAL
MOBILITY SCORE: 24
DAILY ACTIVITIY SCORE: 24
EATING MEALS: A LITTLE
PATIENT BASELINE BEDBOUND: NO
DAILY ACTIVITIY SCORE: 23
MOBILITY SCORE: 24
DAILY ACTIVITIY SCORE: 24

## 2025-01-16 ASSESSMENT — LIFESTYLE VARIABLES
SUBSTANCE_ABUSE_PAST_12_MONTHS: NO
PRESCIPTION_ABUSE_PAST_12_MONTHS: NO
SKIP TO QUESTIONS 9-10: 1
AUDIT-C TOTAL SCORE: 0
HOW OFTEN DO YOU HAVE 6 OR MORE DRINKS ON ONE OCCASION: NEVER
AUDIT-C TOTAL SCORE: 0
HOW MANY STANDARD DRINKS CONTAINING ALCOHOL DO YOU HAVE ON A TYPICAL DAY: PATIENT DOES NOT DRINK
HOW OFTEN DO YOU HAVE A DRINK CONTAINING ALCOHOL: NEVER

## 2025-01-16 ASSESSMENT — PATIENT HEALTH QUESTIONNAIRE - PHQ9
2. FEELING DOWN, DEPRESSED OR HOPELESS: SEVERAL DAYS
1. LITTLE INTEREST OR PLEASURE IN DOING THINGS: SEVERAL DAYS
SUM OF ALL RESPONSES TO PHQ9 QUESTIONS 1 & 2: 2

## 2025-01-16 ASSESSMENT — ACTIVITIES OF DAILY LIVING (ADL)
GROOMING: INDEPENDENT
JUDGMENT_ADEQUATE_SAFELY_COMPLETE_DAILY_ACTIVITIES: YES
WALKS IN HOME: INDEPENDENT
BATHING: INDEPENDENT
TOILETING: INDEPENDENT
HEARING - LEFT EAR: FUNCTIONAL
PATIENT'S MEMORY ADEQUATE TO SAFELY COMPLETE DAILY ACTIVITIES?: YES
DRESSING YOURSELF: INDEPENDENT
FEEDING YOURSELF: INDEPENDENT
HEARING - RIGHT EAR: FUNCTIONAL
LACK_OF_TRANSPORTATION: YES
ADEQUATE_TO_COMPLETE_ADL: YES

## 2025-01-16 ASSESSMENT — PAIN SCALES - GENERAL
PAINLEVEL_OUTOF10: 0 - NO PAIN

## 2025-01-16 ASSESSMENT — PAIN - FUNCTIONAL ASSESSMENT
PAIN_FUNCTIONAL_ASSESSMENT: 0-10

## 2025-01-16 ASSESSMENT — PAIN SCALES - WONG BAKER: WONGBAKER_NUMERICALRESPONSE: NO HURT

## 2025-01-16 NOTE — CARE PLAN
The patient's goals for the shift include      The clinical goals for the shift include none      Problem: Fall/Injury  Goal: Not fall by end of shift  Outcome: Progressing  Goal: Be free from injury by end of the shift  Outcome: Progressing  Goal: Verbalize understanding of personal risk factors for fall in the hospital  Outcome: Progressing  Goal: Verbalize understanding of risk factor reduction measures to prevent injury from fall in the home  Outcome: Progressing  Goal: Use assistive devices by end of the shift  Outcome: Progressing  Goal: Pace activities to prevent fatigue by end of the shift  Outcome: Progressing     Problem: Diabetes  Goal: Achieve decreasing blood glucose levels by end of shift  Outcome: Progressing  Goal: Increase stability of blood glucose readings by end of shift  Outcome: Progressing  Goal: Decrease in ketones present in urine by end of shift  Outcome: Progressing  Goal: Maintain electrolyte levels within acceptable range throughout shift  Outcome: Progressing  Goal: Maintain glucose levels >70mg/dl to <250mg/dl throughout shift  Outcome: Progressing  Goal: No changes in neurological exam by end of shift  Outcome: Progressing  Goal: Learn about and adhere to nutrition recommendations by end of shift  Outcome: Progressing  Goal: Vital signs within normal range for age by end of shift  Outcome: Progressing  Goal: Increase self care and/or family involovement by end of shift  Outcome: Progressing  Goal: Receive DSME education by end of shift  Outcome: Progressing     Problem: Pain - Adult  Goal: Verbalizes/displays adequate comfort level or baseline comfort level  Outcome: Progressing     Problem: Safety - Adult  Goal: Free from fall injury  Outcome: Progressing     Problem: Discharge Planning  Goal: Discharge to home or other facility with appropriate resources  Outcome: Progressing     Problem: Chronic Conditions and Co-morbidities  Goal: Patient's chronic conditions and co-morbidity  symptoms are monitored and maintained or improved  Outcome: Progressing     Problem: Chronic Conditions and Co-morbidities  Goal: Patient's chronic conditions and co-morbidity symptoms are monitored and maintained or improved  Outcome: Progressing

## 2025-01-16 NOTE — CONSULTS
"Inpatient Diabetes Education Consult    Reason for Visit:  Maria Isabel Cutler is a 24 y.o. female who presents for DKA, T1DM    Consulting Service/Provider: MICU team    Visit Type: Initial visit    Visit Modality: In-person    Discharge Equipment/Supply Needs:       Patient has supplies at home: Blood glucose meter:  , Testing strips:  , Lancets, Pen needles, and CGM supplies: wears DexCom G7    Patient History and Assessment:  New diagnosis:  DKA  Previous diagnosis: Type 1  Patient known to Diabetes Education department: Yes  Treatment prior to hospital admission: Insulin: Rapid acting, Long acting, via pen, waiting to have Tandem pump repaired  Blood glucose testing: Continuous glucose monitor  Complications: none  PTA Medications:    Current Outpatient Medications   Medication Instructions    acetaminophen (TYLENOL EXTRA STRENGTH) 1,000 mg, oral, Every 6 hours PRN    acetone, urine, test strip DRIP URINE TO CHECK FOR KETONES IF NAUSEA/VOMITING OR IF CONCERN FOR DKA OR DEHYDRATION    albuterol (Ventolin HFA) 90 mcg/actuation inhaler 1-2 puffs, inhalation, Every 4 hours PRN    blood-glucose sensor (Dexcom G7 Sensor) device Change sensor every 10 days    glucose 4 gram chewable tablet oral, USE AS DIRECTED TO TREAT HYPOGLYCEMIA    Gvoke HypoPen 1-Pack 1 mg, subcutaneous, Once as needed    insulin lispro (HumaLOG KwikPen Insulin) 100 unit/mL injection Inject 1 unit under the skin for every 10 carbs with meals. May take up to 50 units per day.    Lantus Solostar U-100 Insulin 14 Units, subcutaneous, Every morning    OneTouch Verio test strips strip TEST 6-7 TIMES DAILY    pen needle, diabetic 32 gauge x 5/32\" needle Use 1 pen needle for insulin injection 4 times a day.       Glucose   Date/Time Value Ref Range Status   01/16/2025 10:53  (H) 74 - 99 mg/dL Final   01/16/2025 12:24  (H) 74 - 99 mg/dL Final   01/15/2025 06:00  (H) 74 - 99 mg/dL Final   01/15/2025 04:14  (HH) 74 - 99 mg/dL Final " "  07/25/2024 06:20  (H) 74 - 99 mg/dL Final   07/24/2024 05:43  (H) 74 - 99 mg/dL Final   07/24/2024 04:18  (H) 74 - 99 mg/dL Final   07/23/2024 11:14  (H) 74 - 99 mg/dL Final   07/23/2024 08:04  (HH) 74 - 99 mg/dL Final   07/23/2024 06:40  (HH) 74 - 99 mg/dL Final     No results found for: \"CPEPTIDE\"  Hemoglobin A1C   Date Value Ref Range Status   01/15/2025 12.7 (H) See comment % Final   07/23/2024 13.1 (H) see below % Final   08/08/2023 7.0 (A) % Final     Comment:          Diagnosis of Diabetes-Adults   Non-Diabetic: < or = 5.6%   Increased risk for developing diabetes: 5.7-6.4%   Diagnostic of diabetes: > or = 6.5%  .       Monitoring of Diabetes                Age (y)     Therapeutic Goal (%)   Adults:          >18           <7.0   Pediatrics:    13-18           <7.5                   7-12           <8.0                   0- 6            7.5-8.5   American Diabetes Association. Diabetes Care 33(S1), Jan 2010.     05/12/2023 12.8 (A) % Final     Comment:          Diagnosis of Diabetes-Adults   Non-Diabetic: < or = 5.6%   Increased risk for developing diabetes: 5.7-6.4%   Diagnostic of diabetes: > or = 6.5%  .       Monitoring of Diabetes                Age (y)     Therapeutic Goal (%)   Adults:          >18           <7.0   Pediatrics:    13-18           <7.5                   7-12           <8.0                   0- 6            7.5-8.5   American Diabetes Association. Diabetes Care 33(S1), Jan 2010.     12/08/2022 13.2 (H) 4.3 - 5.6 % Final     Comment:     American Diabetes Association guidelines indicate that patients with HgbA1c in the range 5.7-6.4% are at increased risk for development of diabetes, and intervention by lifestyle modification may be beneficial. HgbA1c greater or equal to 6.5% is considered diagnostic of diabetes.       Patient Learning/Readiness Assessment:  Ms. Cutler is agreeable to diabetes ed visit    Interventions/Topics Covered:  See After Visit " Summary for handouts/information sheets provided to patient.  Education Documentation  Self-Care Behaviors, taught by Ladan Prabhakar RN at 1/16/2025  1:51 PM.  Learner: Patient  Readiness: Acceptance  Method: Explanation, Handout, Teach-back  Response: Verbalizes Understanding          Additional topics covered: Review of recent events leading up to this hospital admission.  States feeling stressed with 1 year old who has been ill.  Did not take insulin x 2 weeks.  She is also able to say that she is fully aware of taking insulin daily.  Offered emotional support.  Discussed availability of [place] [employer] as a resource (for people with (T1DM).  Enc her to sign up for the program as it can offer resources and support she may need.  She uses DexCom G7 CGM and is comfortable with that technology.  Has no issues with using insulin pen -- just skips doses when she feels stressed.  States she has resources to assist her with managing stress.    Additional materials provided: flyer for [employer]    CGM:  wears DexCom g7    Education Outcome/Recommendations:        Recommendations for bedside nursing: Allow patient to self-inject insulin (supervised)    Recommendations for Providers: Follow-up w/ PCP and/or Endocrinology    Additional Comments: Ms. Cutler is feeling better since hospital admit yesterday.  She is agreeable to follow up as needed while inpatient.  She is knowledgeable about taking insulin, monitoring BG and seeking follow up. Time spent: 25 mins.

## 2025-01-16 NOTE — CARE PLAN
The patient's goals for the shift include  get better    The clinical goals for the shift include none    Over the shift, the patient did not make progress toward the following goals. Barriers to progression include none. Recommendations to address these barriers include   Problem: Fall/Injury  Goal: Not fall by end of shift  1/16/2025 0026 by Tye Dixon RN  Outcome: Progressing  1/16/2025 0026 by Tye Dixon RN  Outcome: Progressing  Goal: Be free from injury by end of the shift  1/16/2025 0026 by Tye Dixon RN  Outcome: Progressing  1/16/2025 0026 by Tye Dixon RN  Outcome: Progressing  Goal: Verbalize understanding of personal risk factors for fall in the hospital  1/16/2025 0026 by Tye Dixon RN  Outcome: Progressing  1/16/2025 0026 by Tye Dxion RN  Outcome: Progressing  Goal: Verbalize understanding of risk factor reduction measures to prevent injury from fall in the home  Outcome: Progressing  Goal: Use assistive devices by end of the shift  Outcome: Progressing  Goal: Pace activities to prevent fatigue by end of the shift  Outcome: Progressing     Problem: Safety - Adult  Goal: Free from fall injury  Outcome: Progressing   .

## 2025-01-16 NOTE — ED PROVIDER NOTES
History of Present Illness     History provided by: Patient  Limitations to History: None  External Records Reviewed with Brief Summary: Outpatient progress note from 2024 which showed history of type I diabetes requiring lantus 14 u    HPI:  Maria Isabel Cutler is a 24 y.o. female with past medical history of type I diabetes who is presenting today with hyperglycemia. She reports that her daughter has been unwell with a viral infection due to that she is not been taking her insulin. Reports having nausea vomiting. She reports her last insulin reading was high.    Physical Exam   Triage vitals:  T 36.6 °C (97.9 °F)  HR (!) 104  /74  RR 16  O2 98 % None (Room air)    General: Awake, alert, in no acute distress  Eyes: Gaze conjugate.  No scleral icterus or injection  HENT: Normo-cephalic, atraumatic. No stridor dry mucous membranes  CV: Tachycardic rate, regular rhythm. Radial pulses 2+ bilaterally  Resp: Breathing non-labored, speaking in full sentences.  Clear to auscultation bilaterally  GI: Soft, non-distended, non-tender. No rebound or guarding.  MSK/Extremities: No gross bony deformities. Moving all extremities  Skin: Warm. Appropriate color  Neuro: Alert. Oriented. Face symmetric. Speech is fluent.  Gross strength and sensation intact in b/l UE and LEs  Psych: Appropriate mood and affect      Medical Decision Making & ED Course   Medical Decision Makin y.o. female with past medical history of type I diabetes who is presenting today with hyperglycemia. Patient had lab work that was ordered in triage which showed a hyperglycemia and acidosis. Concerning for DKA. Likely in the setting of missing home insulin. However given the viral infections in the family will obtain viral swabs with a COVID flu and RSV. Patient was given limits. Patient was started on insulin drip.  CBC showed no leukocytosis. CMP showed a anion gap. Beta hydroxybutyrate was elevated. Patient was discussed with the MICU and  accepted for admission.  ----      Differential diagnoses considered include but are not limited to: Please see MDM.     Social Determinants of Health which Significantly Impact Care: None identified     EKG Independent Interpretation: EKG interpreted by myself. Please see ED Course and [facility] for full interpretation.    Independent Result Review and Interpretation: Results were independently reviewed and interpreted by myself. Please see ED course and [facility] for full interpretation.    Chronic conditions affecting the patient's care: As documented in the MDM    The patient was discussed with the following consultants/services:  Medical ICU attending who accepted the patient for admission for DKA    Care Considerations: As per [facility]    ED Course:  ED Course as of 01/17/25 2200   Wed Dariusz 15, 2025   1810 I briefly evaluated patient. Reports has only been intermittently been taking insulin as she has been stressed. No infectious symptoms, no chest pain/dyspnea. Mild abd pain and nausea, none right now. Soft nontender abdomen, AO x4. Added EKG and zofran. Pt aware of plan for DKA tx and admission.  [SS]   2313 Home lantus is 14 units [COY]      ED Course User Index  [COY] Didi Nielsen MD  [SS] Grecia Gambino MD         Diagnoses as of 01/17/25 2200   Diabetic ketoacidosis without coma associated with type 1 diabetes mellitus (Multi)     Disposition   As a result of their workup, the patient will require admission to the hospital.  The patient was informed of her diagnosis.  The patient was given the opportunity to ask questions and I answered them. The patient agreed to be admitted to the hospital.    Procedures   Procedures    Patient seen and discussed with ED attending physician.    Didi Nielsen MD  Emergency Medicine     Didi Nielsen MD  Resident  01/17/25 2208

## 2025-01-16 NOTE — PROGRESS NOTES
SOCIAL WORK NOTE   SW attempted to assess, but asleep. Patient had left floor before reattempts. Per notes, has support from  Behavioral [facility] and [place]. Concerns for not taking insuline due to stress per notes. Social work to follow.  YOLI Alex, LISW-S ([id number])

## 2025-01-16 NOTE — PROGRESS NOTES
"Pharmacy Medication History Review    Maria Isabel Cutler is a 24 y.o. female admitted for Diabetic ketoacidosis without coma associated with type 1 diabetes mellitus (Multi). Pharmacy reviewed the patient's [id number] medications and allergies for accuracy.    Medications CHANGED:  Lantus to 16u AM  Medications REMOVED:   Albuterol neb solution - on PRN inhaler  Tylenol    The list below reflects the updated PTA list.   Prior to Admission Medications   Prescriptions Last Dose Informant   OneTouch Verio test strips strip  Self   Sig: TEST 6-7 TIMES DAILY   acetaminophen (Tylenol Extra Strength) 500 mg tablet Not Taking Self   Sig: Take 2 tablets (1,000 mg) by mouth every 6 hours if needed for mild pain (1 - 3).   Patient not taking: Reported on 1/16/2025   acetone, urine, test strip  Self   Sig: DRIP URINE TO CHECK FOR KETONES IF NAUSEA/VOMITING OR IF CONCERN FOR DKA OR DEHYDRATION   albuterol (Ventolin HFA) 90 mcg/actuation inhaler PRN still taking Self   Sig: Inhale 1-2 puffs every 4 hours if needed for wheezing or shortness of breath.   blood-glucose sensor (Dexcom G7 Sensor) device     Sig: Change sensor every 10 days   glucagon 1 mg/0.2 mL auto-injector  Self   Sig: Inject 1 mg under the skin 1 time if needed (use for low blood sugar if unable to take anything by mouth) for up to 1 dose.   glucose 4 gram chewable tablet  Self   Sig: Chew. USE AS DIRECTED TO TREAT HYPOGLYCEMIA   insulin glargine (Lantus Solostar U-100 Insulin) 100 unit/mL (3 mL) pen 1/15/2025 Morning    Sig: Inject 14 Units under the skin once daily in the morning.   Patient taking differently: Inject 16 Units under the skin once daily in the morning.   insulin lispro (HumaLOG KwikPen Insulin) 100 unit/mL injection Past Week    Sig: Inject 1 unit under the skin for every 10 carbs with meals. May take up to 50 units per day.   pen needle, diabetic 32 gauge x 5/32\" needle     Sig: Use 1 pen needle for insulin injection 4 times a day.    " "  Facility-Administered Medications: None        The list below reflects the updated allergy list. Please review each documented allergy for additional clarification and justification.  Allergies  Reviewed by Ricarda Cerna RN on 1/15/2025        Severity Reactions Comments    Pollen Extracts Not Specified Other Runny nose            Patient accepts M2B at discharge.     Sources:   [facility]  Pharmacy dispense history  Patient interview Good historian  Chart Review    Additional Comments:  Reported last dose of insulin to be yesterday AM, Lantus 16u + Humalog pen SSI      Lulu Hoffmann, PharmD  Transitions of Care Pharmacist  01/16/25     Secure Chat preferred   If no response call [phone] or [employer] \"Med Rec\"    "

## 2025-01-16 NOTE — H&P
Medical Intensive Care - History and Physical   Subjective    Maria Isabel Cutler is a 24 y.o. year old female patient admitted on 1/15/2025 with following ICU needs: DKA    HPI:  Maria Isabel Cutler is a 24 y.o. year old female with history of T1DM and asthma presenting to MICU for DKA in the setting of insulin non-compliance.   Last admission for DKA 7/2024.     Patient reports that she has missed her insulin doses for few weeks in the setting of increased stress with her son going to . She has been having good PO intake.   Presented to the ED as she was having abdominal pain. No diarrhea/ constipation.  Denies fever, chills, chest pain, or urinary sxs.     Review of Systems:  As described in HPI    In the ED:  -Initial labs in ED showed gluc 453, Na 134, K 4.8, Cl 98, bicarb 15, BUN 16, Cr 0.78, Phos 4.8  -Calculated AG is 21.   -VBG: metabolic acidosis with pH 7.18, pCO2 39, bicarb 14.6 with lactate 2.1.   -UA shows 4+ ketones   -Received 1L of LR and 1L of NSS  -Started on DKA protocol    Meds    Home medications:  Current Outpatient Medications   Medication Instructions    acetaminophen (TYLENOL EXTRA STRENGTH) 1,000 mg, oral, Every 6 hours PRN    acetone, urine, test strip DRIP URINE TO CHECK FOR KETONES IF NAUSEA/VOMITING OR IF CONCERN FOR DKA OR DEHYDRATION    albuterol (Ventolin HFA) 90 mcg/actuation inhaler 1-2 puffs, inhalation, Every 4 hours PRN    albuterol 2.5 mg, inhalation, Every 6 hours PRN    blood-glucose sensor (Dexcom G7 Sensor) device Change sensor every 10 days    glucose 4 gram chewable tablet oral, USE AS DIRECTED TO TREAT HYPOGLYCEMIA    Gvoke HypoPen 1-Pack 1 mg, subcutaneous, Once as needed    insulin lispro (HumaLOG KwikPen Insulin) 100 unit/mL injection Inject 1 unit under the skin for every 10 carbs with meals. May take up to 50 units per day.    Lantus Solostar U-100 Insulin 14 Units, subcutaneous, Every morning    OneTouch Verio test strips strip TEST 6-7 TIMES DAILY    pen  "needle, diabetic 32 gauge x 5/32\" needle Use 1 pen needle for insulin injection 4 times a day.        Inpatient medications:  Scheduled medications  sodium chloride, 1,000 mL, intravenous, Once      Continuous medications  dextrose 10 % in water (D10W), 150 mL/hr  dextrose 10 % in water (D10W), 150 mL/hr  dextrose 5%-0.45 % sodium chloride, 150 mL/hr  insulin regular, 0-50 Units/hr, Last Rate: 8 Units/hr (01/15/25 1941)  sodium chloride, 250 mL/hr      PRN medications  PRN medications: dextrose 10 % in water (D10W), dextrose 10 % in water (D10W), dextrose 5%-0.45 % sodium chloride, dextrose, insulin regular, ondansetron     Objective    Blood pressure 112/66, pulse 97, temperature 36.6 °C (97.9 °F), temperature source Temporal, resp. rate 16, weight 54.4 kg (120 lb), SpO2 99%, currently breastfeeding.     Physical Exam  Constitutional:       General: She is not in acute distress.     Appearance: She is not ill-appearing.   Pulmonary:      Effort: Pulmonary effort is normal. No respiratory distress.      Breath sounds: Normal breath sounds. No wheezing or rales.   Abdominal:      General: Abdomen is flat. There is no distension.      Tenderness: There is no abdominal tenderness. There is no guarding.   Musculoskeletal:      Right lower leg: No edema.      Left lower leg: No edema.   Skin:     General: Skin is warm.   Neurological:      General: No focal deficit present.      Mental Status: She is alert and oriented to person, place, and time.        Labs:   Results from last 72 hours   Lab Units 01/15/25  1800 01/15/25  1614   SODIUM mmol/L 134* 132*   POTASSIUM mmol/L 4.8 4.8   CHLORIDE mmol/L 98 95*   CO2 mmol/L 15* 14*   BUN mg/dL 15 16   CREATININE mg/dL 0.78 0.84   GLUCOSE mg/dL 403* 453*   CALCIUM mg/dL 9.4 10.0   ANION GAP mmol/L 26* 28*   EGFR mL/min/1.73m*2 >90 >90   PHOSPHORUS mg/dL 4.8  --       Results from last 72 hours   Lab Units 01/15/25  1614   WBC AUTO x10*3/uL 7.7   HEMOGLOBIN g/dL 14.3 " "  HEMATOCRIT % 42.0   PLATELETS AUTO x10*3/uL 501*   NEUTROS PCT AUTO % 64.9   LYMPHS PCT AUTO % 29.6   MONOS PCT AUTO % 3.5   EOS PCT AUTO % 0.4          Results from last 72 hours   Lab Units 01/15/25  1810 01/15/25  1614   POCT PH, VENOUS pH 7.18* 7.22*   POCT PCO2, VENOUS mm Hg 39* 39*   POCT PO2, VENOUS mm Hg 28* 34*      RSV negative   COVID/ influenza negative   BNP 9  HCG<3  UA significant for glucose and ketones    Assessment and Plan     Assessment:  Maria Isabel Cutler is a 24 y.o. year old female patient admitted to the MICU on 1/15/2025 for DKA in the setting of insulin non-compliance.      Mechanical Ventilation: none  Sedation/Analgesia:  none  Restraints: no     Plan:  NEUROLOGY/PSYCH:  Dx: depression  Per chart review, seems like there was Some c/f SI in past   Not on any home meds   Management:  Ctm      CARDIOVASCULAR:  Dx: n/a      PULMONARY:  Dx: asthma (per chart review)   There are some inhalers listed in her med rec that are listed as \"historical med\" or \"not taking\"  Pt on RA satting at 100%   Management:  Continue to monitor      RENAL/GENITOURINARY:  Dx: Acidosis  High AG metabolic acidosis in the setting of DKA  DKA protocol started in ED - on insulin gtt + maintenance LR as below   Management:  DKA protocol     GASTROENTEROLOGY:  Dx: n/a      ENDOCRINOLOGY:  Dx: T1DM c/b DKA   Initial labs in ED showed gluc 453, Na 134, K 4.8, Cl 98, bicarb 15, BUN 16, Cr 0.78, Phos 4.8  Calculated AG is 21.   VBG: metabolic acidosis with pH 7.18, pCO2 39, bicarb 14.6 with lactate 2.1.   UA shows 4+ ketones   Management:  Will collect BHB  Follow DKA protocol   F/U HbA1c (last done 5 months ago 13.1)     HEMATOLOGY:  :: Hb looks concentrated most likely in the setting of volume depletion  -Will monitor      MUSCULOSKELETAL/ SKIN:  Dx: n/a      INFECTIOUS DISEASE:  Dx: n/a     ICU Check List     FEN  Fluids: PRN  Electrolytes: PRN  Nutrition: NPO with sips for meds  Prophylaxis:  DVT ppx:  lovenox subQ  GI " ppx: Pantoprazole  Bowel care: Miralax PRN  Hardware:                   Social:  Code: Full Code  (confirmed on admission)  NOK: Angela uCtler (Sibling)  [phone] (Home Phone)   Disposition: MICU    Staffed with Dr. Meagan MD   01/15/25 at 7:53 PM     Disclaimer: Documentation completed with the information available at the time of input. The times in the chart may not be reflective of actual patient care times, interventions, or procedures. Documentation occurs after the physical care of the patient.

## 2025-01-16 NOTE — PROGRESS NOTES
"ICU to [facility] Transfer Summary     I:  ICU Admission Reason & Brief ICU Course:    Maria Isabel Cutler is a 24 y.o. year old female with history of T1DM and asthma presenting to MICU for DKA in the setting of insulin non-compliance.   Last admission for DKA 7/2024. Patient reports that she has missed her insulin doses for few weeks in the setting of increased stress with her son going to . She has been having good PO intake. Presented to the ED as she was having abdominal pain. No diarrhea/ constipation. Denies fever, chills, chest pain, or urinary symptoms    -Initial labs in ED showed gluc 453, Na 134, K 4.8, Cl 98, bicarb 15, BUN 16, Cr 0.78, Phos 4.8  -Calculated AG is 21.   -VBG: metabolic acidosis with pH 7.18, pCO2 39, bicarb 14.6 with lactate 2.1.   -UA shows 4+ ketones   -Received 1L of LR and 1L of NSS  -Started on DKA protocol     Now, anion gap 10, insulin lantus 14, lispro 0-10     C: Code Status/DPOA Info/Goals of Care/ACP Note    Code: Full Code  (confirmed on admission)  NOK: Angela Cutler (Sibling)  [phone] (Home Phone)       U: Unprescribing & Pertinent High-Risk Medications    Changes to home meds: None     Anticoagulation: Lovenox subq        P: Pending Tests at the Time of Transfer   None      A: Active consultants, including Rehab:   []  Subspecialty Consultants: None  []  PT  []  OT  []  SLP  []  Wound Care    U: Uncertainty Measure/Diagnostic Pause:    Working diagnosis at the time of transfer DKA in the setting of noncompliance     Diagnosis Degree of Certainty: 1. High degree of certainty about the clinical diagnosis.     S: Summary of Major Problems and To-Dos:   NEUROLOGY/PSYCH:  Dx: depression  Per chart review, seems like there was Some c/f SI in past   Not on any home meds   Management:  Ctm      CARDIOVASCULAR:  Dx: n/a      PULMONARY:  Dx: asthma (per chart review)   There are some inhalers listed in her med rec that are listed as \"historical med\" or \"not taking\"  Pt " on RA satting at 100%   Management:  Continue to monitor      RENAL/GENITOURINARY:  Dx: Acidosis  High AG metabolic acidosis in the setting of DKA  DKA protocol started in ED - on insulin gtt + maintenance LR as below   Management:  DKA protocol     GASTROENTEROLOGY:  Dx: n/a      ENDOCRINOLOGY:  Dx: T1DM c/b DKA   Initial labs in ED showed gluc 453, Na 134, K 4.8, Cl 98, bicarb 15, BUN 16, Cr 0.78, Phos 4.8  Calculated AG is 21.   VBG: metabolic acidosis with pH 7.18, pCO2 39, bicarb 14.6 with lactate 2.1.   UA shows 4+ ketones   Management:  Will collect BHB  Follow DKA protocol   F/U HbA1c (last done 5 months ago 13.1)     HEMATOLOGY:  :: Hb looks concentrated most likely in the setting of volume depletion  -Will monitor      MUSCULOSKELETAL/ SKIN:  Dx: n/a      INFECTIOUS DISEASE:  Dx: n/a     To-do list prior to transfer:  None       E: Exam, including Lines/Drains/Airways & Data Review:   Physical Exam  Constitutional:       General: She is not in acute distress.     Appearance: She is not ill-appearing.   Pulmonary:      Effort: Pulmonary effort is normal. No respiratory distress.      Breath sounds: Normal breath sounds. No wheezing or rales.   Abdominal:      General: Abdomen is flat. There is no distension.      Tenderness: There is no abdominal tenderness. There is no guarding.   Musculoskeletal:      Right lower leg: No edema.      Left lower leg: No edema.   Skin:     General: Skin is warm.   Neurological:      General: No focal deficit present.      Mental Status: She is alert and oriented to person, place, and time.      Difficult airway? N/A  Lines/drains assessed for removal? No    Within 30 minutes of the patient physically leaving the floor, a Floor Readiness Note needs to be placed with updated vitals.

## 2025-01-16 NOTE — CONSULTS
Inpatient consult to Endocrinology  Consult performed by: Jordan Barrera MD  Consult ordered by: James Jaime MD  Reason for consult: DKA, insulin noncompliance          Reason For Consult  DKA, insulin noncompliance     History Of Present Illness  Maria Isabel Cutler is a 24 y.o. female with a past medical history of T1DM, asthma and anxiety who presented to the hospital with hyperglycemia. Endocrinology has been asked to see the patient for issues with insulin non compliance.    Diabetes Diagnosis: at age 5  Diabetes duration: - 20 years   Home blood glucose checks: through Dexcom G7  Trends: - 300  Hypoglycemia (y/n, threshold and symptoms): denies  Home diabetes regimen: G16/ Lispro per SS 1 + ICR 1:10  Home diet (how many meals a day): eats whatever she wants to  Outpatient physician managing diabetes: Dr. Gonzales  Macrovascular complications: denies  Microvascular complications: Retinopathy +  Last A1c: 12.7 on 01/15/2025  Inpatient diabetes regimen: insulin drip bridged to subcutaneous insulin at 2:30 am  Inpatient diet: Regular diet    Upon my evaluation, she reported that her baby was sick which is why she has not been taking her insulin at all. It has been about 2 weeks since she did not take it. She reports compliance before that. She was supposed to call [employer] helpline for pump support, however she has not done that either since 08/2024. She reported that she felt fine PTA and feels no different right now. She brought her baby for a check up to the ED, when the nurse asked her questions about her medications and recommended getting checked herself because of missed dose. Upon presentation, her BG was 435, with a BHB of 7.69, Corrected Sodium of around 140, potassium of 4.8, AG of 23, VBG pH of 7.22 along with ketonuria and glucosuria. She was started on insulin drip which was successfully bridged at 2:30 am with 14 units of Glargine.    Results from Most Recent A1C  Hemoglobin A1C   Date/Time  Value Ref Range Status   01/15/2025 04:14 PM 12.7 (H) See comment % Final        Diabetes Problem List Entries with Dates  Problem List:  2025-01: Diabetic ketoacidosis without coma associated with type 1   diabetes mellitus (Multi)  2024-07: Diabetic ketoacidosis without coma associated with other   specified diabetes mellitus  2023-11: DM (diabetes mellitus) type 1, uncontrolled, with   ketoacidosis  2023-10: Pre-existing type 1 diabetes mellitus during pregnancy,   postpartum ([facility])      History where DKA was Reason for Admission in last year as above    Insulin administered via shot    Insulin Dose: G14/ L per ICR 1:10     Past Medical History  She has a past medical history of Asthma, Chlamydia, Chronic hypertension, CSF oligoclonal bands, Depression, Herpes, Type 1 diabetes mellitus with hyperglycemia, with long-term current use of insulin, and Urinary tract infection.    She has no past medical history of Abnormal Pap smear of cervix, Anemia, Gonorrhea, or Varicella.    Surgical History  She has no past surgical history on file.     Social History  She reports that she has quit smoking. Her smoking use included cigarettes. She has been exposed to tobacco smoke. She has never used smokeless tobacco. She reports that she does not currently use alcohol. She reports that she does not currently use drugs after having used the following drugs: Marijuana.    Family History  Family History   Problem Relation Name Age of Onset    Hypertension Maternal Grandmother      Asthma Child          Allergies  Pollen extracts    Review of Systems  Negative unless noted above     Physical Exam    General: young AA female patient in NAD  HEENT: AT/NC  Neck: trachea in midline  Resp: CTA B/L  CVS: normal s1 and s2  Abdomen: soft and non tender to palpation, BS+  Skin: warm, dry and intact  Neuro: AAO x3  Psych: cooperative     ROS, PMH, FH/SH, surgical history and allergies have been reviewed.    Last Recorded Vitals  Blood  pressure 108/68, pulse 84, temperature 36.4 °C (97.5 °F), resp. rate 17, height 1.524 m (5'), weight 55.8 kg (123 lb 0.3 oz), SpO2 100%, currently breastfeeding.    Relevant Results  Results from last 7 days   Lab Units 01/16/25  1132 01/16/25  0741 01/16/25  0555 01/16/25  0511 01/16/25  0155 01/16/25  0112 01/16/25  0024 01/15/25  2359 01/15/25  1800 01/15/25  1614   POCT GLUCOSE mg/dL 235* 181* 125* 66* 115*   < >  --    < >  --   --    GLUCOSE mg/dL  --   --   --   --   --   --  144*  --  403* 453*    < > = values in this interval not displayed.     No current facility-administered medications on file prior to encounter.     Current Outpatient Medications on File Prior to Encounter   Medication Sig Dispense Refill    insulin glargine (Lantus Solostar U-100 Insulin) 100 unit/mL (3 mL) pen Inject 14 Units under the skin once daily in the morning. (Patient taking differently: Inject 16 Units under the skin once daily in the morning.) 15 mL 11    insulin lispro (HumaLOG KwikPen Insulin) 100 unit/mL injection Inject 1 unit under the skin for every 10 carbs with meals. May take up to 50 units per day. 15 mL 11    acetaminophen (Tylenol Extra Strength) 500 mg tablet Take 2 tablets (1,000 mg) by mouth every 6 hours if needed for mild pain (1 - 3). (Patient not taking: Reported on 1/16/2025) 30 tablet 1    acetone, urine, test strip DRIP URINE TO CHECK FOR KETONES IF NAUSEA/VOMITING OR IF CONCERN FOR DKA OR DEHYDRATION 100 each 0    albuterol (Ventolin HFA) 90 mcg/actuation inhaler Inhale 1-2 puffs every 4 hours if needed for wheezing or shortness of breath. 18 g 11    blood-glucose sensor (Dexcom G7 Sensor) device Change sensor every 10 days 3 each 11    glucagon 1 mg/0.2 mL auto-injector Inject 1 mg under the skin 1 time if needed (use for low blood sugar if unable to take anything by mouth) for up to 1 dose. 0.2 mL 12    glucose 4 gram chewable tablet Chew. USE AS DIRECTED TO TREAT HYPOGLYCEMIA      OneTouch Verio  "test strips strip TEST 6-7 TIMES DAILY 12 strip 11    pen needle, diabetic 32 gauge x 5/32\" needle Use 1 pen needle for insulin injection 4 times a day. 200 each 11    [DISCONTINUED] albuterol 2.5 mg /3 mL (0.083 %) nebulizer solution Inhale 3 mL (2.5 mg) every 6 hours if needed for wheezing.            Assessment/Plan   Assessment & Plan  Diabetic ketoacidosis without coma associated with type 1 diabetes mellitus (Multi)    The patient is a 24 year old female who is admitted to the hospital for DKA in the setting of insulin non compliance.    DM:   Hyperglycemia 2/2 poorly controlled diabetes, insufficient insulin administration, and stress state  -Goal -180  -Rx Glargine 14 U starting 01/16 evening instead of at 3 am.  -Rx prandial Lispro 4 U with meals  -Accuchecks (not BMP) TID and QHS- kindly ensure QHS Accucheck is drawn; it is often missed  -Low dose SSI (1U of Lispro for every 50 above 150) TID w/ meals only; avoid night time correction   -Hypoglycemia protocol  -Diabetic Diet- low carb 60 CHO  -Will continue to follow and titrate insulin accordingly  -Please contact/[facility] if/when pt's diet changes or if TF/D5 are started, and 1-2 days prior to anticipated discharge for optimal planning and transition to home regimen  -Consult DM Education to reinforce and teach insulin application,  hypoglycemia awareness, self-glucose monitoring, CGM use and placement  -Please ensure adequate home supplies/ provide rx for insulin pens, needles, glucometer, alcohol swabs, lancets  -Consider rx for glucagon (nasal powder 3mg)  -Follow up with Endocrinology 1-2w after discharge            Thank you for the consult   Plan conveyed to primary team  Patient was seen, examined and plan was discussed with Dr. Gonzales.      Jordan Barrera MD  PGY-4 Endocrinology Fellow  "

## 2025-01-16 NOTE — PROGRESS NOTES
Floor Readiness Note       I, personally, evaluated Maria Isabel Cutler prior to transfer to the floor, including reviewing all current laboratory and imaging studies. The patient remains appropriate for transfer to the floor. Bedside nurse and respiratory therapy are also in agreement of patient's readiness for the floor.     Brief summary:  Maria Isabel Cutler is a 24 y.o. year old female with history of T1DM and asthma presenting to MICU for DKA in the setting of insulin non-compliance.   Last admission for DKA 7/2024. Patient reports that she has missed her insulin doses for few weeks in the setting of increased stress with her son going to . She has been having good PO intake. Presented to the ED as she was having abdominal pain. No diarrhea/ constipation. Denies fever, chills, chest pain, or urinary symptoms     -Initial labs in ED showed gluc 453, Na 134, K 4.8, Cl 98, bicarb 15, BUN 16, Cr 0.78, Phos 4.8  -Calculated AG is 21.   -VBG: metabolic acidosis with pH 7.18, pCO2 39, bicarb 14.6 with lactate 2.1.   -UA shows 4+ ketones   -Received 1L of LR and 1L of NSS  -Started on DKA protocol      Now, anion gap 10, insulin lantus 14, lispro 0-10     Updated focused Physical Exam:  Physical Exam  Constitutional:       General: She is not in acute distress.     Appearance: She is not ill-appearing.   Pulmonary:      Effort: Pulmonary effort is normal. No respiratory distress.      Breath sounds: Normal breath sounds. No wheezing or rales.   Abdominal:      General: Abdomen is flat. There is no distension.      Tenderness: There is no abdominal tenderness. There is no guarding.   Musculoskeletal:      Right lower leg: No edema.      Left lower leg: No edema.   Skin:     General: Skin is warm.   Neurological:      General: No focal deficit present.      Mental Status: She is alert and oriented to person, place, and time.     Current Vital Signs:  Heart Rate: 93 (01/16/25 1000 : User, System Default)  BP:  96/61 (01/16/25 1000 : User, System Default)  Temp: 36.4 °C (97.5 °F) (01/16/25 0800 : Magalie Montague)  Resp: 15 (01/16/25 1000 : User, System Default)  SpO2: 99 % (01/16/25 1000 : User, System Default)    Relevant updates since rounds:  None    Accepting team, Hospitalist C, received verbal sign out and the Provider Care team/Attending has been updated. Bedside nurse will now call accepting nurse for report and patient will be transferred to [place] [id number].    Sanjuanita Blanca MD

## 2025-01-17 ENCOUNTER — PHARMACY VISIT (OUTPATIENT)
Dept: PHARMACY | Facility: CLINIC | Age: 25
End: 2025-01-17
Payer: MEDICAID

## 2025-01-17 ENCOUNTER — DOCUMENTATION (OUTPATIENT)
Dept: BEHAVIORAL HEALTH | Facility: CLINIC | Age: 25
End: 2025-01-17
Payer: COMMERCIAL

## 2025-01-17 LAB
ALBUMIN SERPL BCP-MCNC: 3.4 G/DL (ref 3.4–5)
ANION GAP SERPL CALC-SCNC: 11 MMOL/L (ref 10–20)
BASOPHILS # BLD AUTO: 0.06 X10*3/UL (ref 0–0.1)
BASOPHILS NFR BLD AUTO: 0.9 %
BUN SERPL-MCNC: 9 MG/DL (ref 6–23)
CALCIUM SERPL-MCNC: 8.4 MG/DL (ref 8.6–10.6)
CHLORIDE SERPL-SCNC: 101 MMOL/L (ref 98–107)
CO2 SERPL-SCNC: 25 MMOL/L (ref 21–32)
CREAT SERPL-MCNC: 0.88 MG/DL (ref 0.5–1.05)
EGFRCR SERPLBLD CKD-EPI 2021: >90 ML/MIN/1.73M*2
EOSINOPHIL # BLD AUTO: 0.24 X10*3/UL (ref 0–0.7)
EOSINOPHIL NFR BLD AUTO: 3.8 %
ERYTHROCYTE [DISTWIDTH] IN BLOOD BY AUTOMATED COUNT: 11.4 % (ref 11.5–14.5)
GLUCOSE BLD MANUAL STRIP-MCNC: 165 MG/DL (ref 74–99)
GLUCOSE BLD MANUAL STRIP-MCNC: 256 MG/DL (ref 74–99)
GLUCOSE BLD MANUAL STRIP-MCNC: 265 MG/DL (ref 74–99)
GLUCOSE SERPL-MCNC: 264 MG/DL (ref 74–99)
HCT VFR BLD AUTO: 36.5 % (ref 36–46)
HGB BLD-MCNC: 12.3 G/DL (ref 12–16)
IMM GRANULOCYTES # BLD AUTO: 0.02 X10*3/UL (ref 0–0.7)
IMM GRANULOCYTES NFR BLD AUTO: 0.3 % (ref 0–0.9)
LYMPHOCYTES # BLD AUTO: 3.15 X10*3/UL (ref 1.2–4.8)
LYMPHOCYTES NFR BLD AUTO: 49.2 %
MCH RBC QN AUTO: 30.5 PG (ref 26–34)
MCHC RBC AUTO-ENTMCNC: 33.7 G/DL (ref 32–36)
MCV RBC AUTO: 91 FL (ref 80–100)
MONOCYTES # BLD AUTO: 0.38 X10*3/UL (ref 0.1–1)
MONOCYTES NFR BLD AUTO: 5.9 %
NEUTROPHILS # BLD AUTO: 2.55 X10*3/UL (ref 1.2–7.7)
NEUTROPHILS NFR BLD AUTO: 39.9 %
NRBC BLD-RTO: 0 /100 WBCS (ref 0–0)
PHOSPHATE SERPL-MCNC: 2.1 MG/DL (ref 2.5–4.9)
PLATELET # BLD AUTO: 408 X10*3/UL (ref 150–450)
POTASSIUM SERPL-SCNC: 4 MMOL/L (ref 3.5–5.3)
RBC # BLD AUTO: 4.03 X10*6/UL (ref 4–5.2)
SODIUM SERPL-SCNC: 133 MMOL/L (ref 136–145)
WBC # BLD AUTO: 6.4 X10*3/UL (ref 4.4–11.3)

## 2025-01-17 PROCEDURE — 2500000002 HC RX 250 W HCPCS SELF ADMINISTERED DRUGS (ALT 637 FOR MEDICARE OP, ALT 636 FOR OP/ED): Mod: SE | Performed by: STUDENT IN AN ORGANIZED HEALTH CARE EDUCATION/TRAINING PROGRAM

## 2025-01-17 PROCEDURE — 36415 COLL VENOUS BLD VENIPUNCTURE: CPT | Performed by: STUDENT IN AN ORGANIZED HEALTH CARE EDUCATION/TRAINING PROGRAM

## 2025-01-17 PROCEDURE — 99232 SBSQ HOSP IP/OBS MODERATE 35: CPT | Performed by: STUDENT IN AN ORGANIZED HEALTH CARE EDUCATION/TRAINING PROGRAM

## 2025-01-17 PROCEDURE — 85025 COMPLETE CBC W/AUTO DIFF WBC: CPT | Performed by: STUDENT IN AN ORGANIZED HEALTH CARE EDUCATION/TRAINING PROGRAM

## 2025-01-17 PROCEDURE — 82947 ASSAY GLUCOSE BLOOD QUANT: CPT

## 2025-01-17 PROCEDURE — 80069 RENAL FUNCTION PANEL: CPT | Performed by: STUDENT IN AN ORGANIZED HEALTH CARE EDUCATION/TRAINING PROGRAM

## 2025-01-17 PROCEDURE — RXMED WILLOW AMBULATORY MEDICATION CHARGE

## 2025-01-17 RX ORDER — INSULIN LISPRO 100 [IU]/ML
4 INJECTION, SOLUTION INTRAVENOUS; SUBCUTANEOUS
Qty: 3.6 ML | Refills: 1 | Status: CANCELLED | OUTPATIENT
Start: 2025-01-17 | End: 2025-03-18

## 2025-01-17 RX ORDER — PEN NEEDLE, DIABETIC 29 G X1/2"
NEEDLE, DISPOSABLE MISCELLANEOUS
Qty: 100 EACH | Refills: 11 | Status: SHIPPED | OUTPATIENT
Start: 2025-01-17 | End: 2026-01-17

## 2025-01-17 RX ORDER — INSULIN LISPRO 100 [IU]/ML
8 INJECTION, SOLUTION INTRAVENOUS; SUBCUTANEOUS
Qty: 15 ML | Refills: 0 | Status: SHIPPED | OUTPATIENT
Start: 2025-01-17

## 2025-01-17 RX ORDER — INSULIN LISPRO 100 [IU]/ML
4 INJECTION, SOLUTION INTRAVENOUS; SUBCUTANEOUS
Status: DISCONTINUED | OUTPATIENT
Start: 2025-01-17 | End: 2025-01-17 | Stop reason: HOSPADM

## 2025-01-17 RX ADMIN — INSULIN LISPRO 4 UNITS: 100 INJECTION, SOLUTION INTRAVENOUS; SUBCUTANEOUS at 12:12

## 2025-01-17 RX ADMIN — INSULIN LISPRO 2 UNITS: 100 INJECTION, SOLUTION INTRAVENOUS; SUBCUTANEOUS at 08:33

## 2025-01-17 RX ADMIN — INSULIN LISPRO 6 UNITS: 100 INJECTION, SOLUTION INTRAVENOUS; SUBCUTANEOUS at 12:13

## 2025-01-17 RX ADMIN — INSULIN LISPRO 10 UNITS: 100 INJECTION, SOLUTION INTRAVENOUS; SUBCUTANEOUS at 08:32

## 2025-01-17 ASSESSMENT — COGNITIVE AND FUNCTIONAL STATUS - GENERAL
MOBILITY SCORE: 24
DAILY ACTIVITIY SCORE: 24

## 2025-01-17 ASSESSMENT — PAIN SCALES - GENERAL: PAINLEVEL_OUTOF10: 0 - NO PAIN

## 2025-01-17 ASSESSMENT — ACTIVITIES OF DAILY LIVING (ADL): LACK_OF_TRANSPORTATION: NO

## 2025-01-17 NOTE — NURSING NOTE
"Ms. Cutler is resting in bed and states feeling \"better\"  She states she will sign up today for the [facility].  States she does not believe she needs any refills for meds but attending will review her record.  States she will follow up with Dr. Gonzales.  Current insulin regimen reviewed with her -- verbalizes understanding of this plan.  Will sign off at this time as she is to be discharged today per primary team.    Time spent:  15 mins.    "

## 2025-01-17 NOTE — CARE PLAN
The patient's goals for the shift include      The clinical goals for the shift include Baseline Blood sugar      Problem: Fall/Injury  Goal: Not fall by end of shift  Outcome: Progressing  Goal: Be free from injury by end of the shift  Outcome: Progressing  Goal: Verbalize understanding of personal risk factors for fall in the hospital  Outcome: Progressing  Goal: Verbalize understanding of risk factor reduction measures to prevent injury from fall in the home  Outcome: Progressing  Goal: Use assistive devices by end of the shift  Outcome: Progressing  Goal: Pace activities to prevent fatigue by end of the shift  Outcome: Progressing     Problem: Diabetes  Goal: Achieve decreasing blood glucose levels by end of shift  Outcome: Progressing  Goal: Increase stability of blood glucose readings by end of shift  Outcome: Progressing  Goal: Decrease in ketones present in urine by end of shift  Outcome: Progressing  Goal: Maintain electrolyte levels within acceptable range throughout shift  Outcome: Progressing  Goal: Maintain glucose levels >70mg/dl to <250mg/dl throughout shift  Outcome: Progressing  Goal: No changes in neurological exam by end of shift  Outcome: Progressing  Goal: Learn about and adhere to nutrition recommendations by end of shift  Outcome: Progressing  Goal: Vital signs within normal range for age by end of shift  Outcome: Progressing  Goal: Increase self care and/or family involovement by end of shift  Outcome: Progressing  Goal: Receive DSME education by end of shift  Outcome: Progressing     Problem: Pain - Adult  Goal: Verbalizes/displays adequate comfort level or baseline comfort level  Outcome: Progressing     Problem: Safety - Adult  Goal: Free from fall injury  Outcome: Progressing     Problem: Discharge Planning  Goal: Discharge to home or other facility with appropriate resources  Outcome: Progressing     Problem: Chronic Conditions and Co-morbidities  Goal: Patient's chronic conditions and  co-morbidity symptoms are monitored and maintained or improved  Outcome: Progressing

## 2025-01-17 NOTE — PROGRESS NOTES
01/17/25 1016   Discharge Planning   Living Arrangements Children   Support Systems Family members   Assistance Needed Independent for adls   Type of Residence Private residence   Number of Stairs to Enter Residence 2   Number of Stairs Within Residence 0   Home or Post Acute Services None   Expected Discharge Disposition Home   Does the patient need discharge transport arranged? No   Financial Resource Strain   How hard is it for you to pay for the very basics like food, housing, medical care, and heating? Not hard   Housing Stability   In the last 12 months, was there a time when you were not able to pay the mortgage or rent on time? N   At any time in the past 12 months, were you homeless or living in a shelter (including now)? N   Transportation Needs   In the past 12 months, has lack of transportation kept you from medical appointments or from getting medications? no   In the past 12 months, has lack of transportation kept you from meetings, work, or from getting things needed for daily living? No   Stroke Family Assessment   Stroke Family Assessment Needed No   Intensity of Service   Intensity of Service 0-30 min     Transitional Care Coordination Progress Note:  Patient discussed during interdisciplinary rounds.   Team members present: TAISHA JHA  Plan per Medical/Surgical team: AUDRA  Payor: Yesenia  Discharge disposition: Home  Potential Barriers: none  ADOD: 1-2 days    Previous Home Care: NA  DME: AIMEEM  Pharmacy: [facility]  Falls: Denies  PCP:  Patient states she is unsure of name of PCP; last visit 3 months ago  Met with patient at bedside, provided introduction of self and role. Patient states she lives at home with her child. Patient states she uses provide a ride to get to appointments. Patient states no concerns obtaining/affording medication; states no social/financial concerns. Patient states sister will provide transport home at time of discharge. Will continue to monitor for discharge  planning needs.     Juliette DYEN, RN  Transitional Care Coordinator (TCC)  [phone]

## 2025-01-17 NOTE — PROGRESS NOTES
Maria Isabel Cutler is a 24 y.o. female on day 2 of admission presenting with Diabetic ketoacidosis without coma associated with type 1 diabetes mellitus (Multi).    Subjective   Patient seen and examined. She was started on scheduled lispro of 10 units with meals as opposed to 4 as advised. We discussed insulin titration with her.     I have reviewed histories, allergies and medications have been reviewed and there are no changes       Objective       Last Recorded Vitals  Blood pressure 101/66, pulse 93, temperature 36.5 °C (97.7 °F), temperature source Temporal, resp. rate 17, height 1.524 m (5'), weight 55.8 kg (123 lb 0.3 oz), SpO2 99%, currently breastfeeding.  Intake/Output last 3 Shifts:  I/O last 3 completed shifts:  In: 1046.3 (18.8 mL/kg) [I.V.:729.7 (13.1 mL/kg); IV Piggyback:316.7]  Out: - (0 mL/kg)   Weight: 55.8 kg     Relevant Results  Results from last 7 days   Lab Units 01/17/25  0734 01/16/25  2110 01/16/25  1620 01/16/25  1132 01/16/25  1053 01/16/25  0741 01/16/25  0112 01/16/25  0024 01/15/25  2359 01/15/25  1800 01/15/25  1614   POCT GLUCOSE mg/dL 165* 265* 391* 235*  --  181*   < >  --    < >  --   --    GLUCOSE mg/dL  --   --   --   --  232*  --   --  144*  --  403* 453*    < > = values in this interval not displayed.       No current facility-administered medications on file prior to encounter.     Current Outpatient Medications on File Prior to Encounter   Medication Sig Dispense Refill    insulin glargine (Lantus Solostar U-100 Insulin) 100 unit/mL (3 mL) pen Inject 14 Units under the skin once daily in the morning. (Patient taking differently: Inject 16 Units under the skin once daily in the morning.) 15 mL 11    insulin lispro (HumaLOG KwikPen Insulin) 100 unit/mL injection Inject 1 unit under the skin for every 10 carbs with meals. May take up to 50 units per day. 15 mL 11    acetaminophen (Tylenol Extra Strength) 500 mg tablet Take 2 tablets (1,000 mg) by mouth every 6 hours if needed  "for mild pain (1 - 3). (Patient not taking: Reported on 1/16/2025) 30 tablet 1    acetone, urine, test strip DRIP URINE TO CHECK FOR KETONES IF NAUSEA/VOMITING OR IF CONCERN FOR DKA OR DEHYDRATION 100 each 0    albuterol (Ventolin HFA) 90 mcg/actuation inhaler Inhale 1-2 puffs every 4 hours if needed for wheezing or shortness of breath. 18 g 11    blood-glucose sensor (Dexcom G7 Sensor) device Change sensor every 10 days 3 each 11    glucagon 1 mg/0.2 mL auto-injector Inject 1 mg under the skin 1 time if needed (use for low blood sugar if unable to take anything by mouth) for up to 1 dose. 0.2 mL 12    glucose 4 gram chewable tablet Chew. USE AS DIRECTED TO TREAT HYPOGLYCEMIA      OneTouch Verio test strips strip TEST 6-7 TIMES DAILY 12 strip 11    pen needle, diabetic 32 gauge x 5/32\" needle Use 1 pen needle for insulin injection 4 times a day. 200 each 11    [DISCONTINUED] albuterol 2.5 mg /3 mL (0.083 %) nebulizer solution Inhale 3 mL (2.5 mg) every 6 hours if needed for wheezing.           Assessment/Plan   Assessment & Plan  Diabetic ketoacidosis without coma associated with type 1 diabetes mellitus (Multi)    The patient is a 24 year old female who is admitted to the hospital for DKA in the setting of insulin non compliance.     Discharge recommendations:  - Glargine: 14 units every 24 hours  - Lispro: 8 units with meals  - SS 1: 50 > 150 with meals  - Primary team to ensure discharge supplies - insulin pens, needles, lancets, test strips, glucometer, alcohol swabs.  - The patient has been given adequate resources for discharge as there might be barriers to her care.  - She should follow up with Dr. Gonzales post discharge, appointment to be arranged. She can check her My Chart for the exact date. We will request for [facility] plan to help with titration in the interim.    The patient was seen and discussed with Dr. Gonzales.    Jordan Barrera MD  PGY-4 Endocrinology Fellow  "

## 2025-01-20 NOTE — PROGRESS NOTES
Maria Isabel Cutler  Age: 24 y.o.  MRN: [id number]  Date: 1/17/2025  Location of service: in community    Program Details  Medicaid Community Clinical Case Management  Status: Enrolled  Effective Dates: 8/7/2024 - present  Responsible Staff: JAIME Miramontes      Goals Reviewed:  Problem: Lack of Community Resources        Goal: Coordination of Services will be Obtained          Goal: Link Patient to services within the community       Priority: High        Problem: Limited Understanding of Medications       Goal: Patient will Verbalize Understanding of Medications       Priority: High        Problem: Risk of Uncoordinated Care       Goal: Care will be Coordinated and Supported by a Multidisciplinary Team of Providers       Priority: Medium          Summary:  This provider met with patient at [facility] where patient was admitted on January 15th, 2025. This provider listened with empathy as patient talked about her recent and new health conditions that she is experiencing. This provider did some MI with patient as a way to guide patient with some major decision making. Patient expressed that she was grateful for provider's visit today.     Appointment start time: 1428  Appointment completion time: 1523  Total time spent with patient (in minutes): 55          JAIME Miramontes

## 2025-01-21 ENCOUNTER — TELEPHONE (OUTPATIENT)
Dept: ENDOCRINOLOGY | Facility: CLINIC | Age: 25
End: 2025-01-21

## 2025-01-21 NOTE — TELEPHONE ENCOUNTER
Left a voice message for patient to call us back to schedule follow up appt with Dr. Charlee Gonzales. Patient is already scheduled with Neela.

## 2025-01-22 ENCOUNTER — APPOINTMENT (OUTPATIENT)
Dept: PHARMACY | Facility: HOSPITAL | Age: 25
End: 2025-01-22
Payer: COMMERCIAL

## 2025-01-22 DIAGNOSIS — E10.10 DIABETIC KETOACIDOSIS WITHOUT COMA ASSOCIATED WITH TYPE 1 DIABETES MELLITUS (MULTI): ICD-10-CM

## 2025-01-22 NOTE — PROGRESS NOTES
"Clinical Pharmacy Team contacted Maria Isabel He regarding a consultation for diabetes management thanks to a referral from Dr. Charlee Gonzales DO. Below is a summary of our conversation and recommendations:  ________________________________________________________________________      Allergies   Allergen Reactions    Pollen Extracts Other     Runny nose         Objective     There were no vitals taken for this visit.    Current Outpatient Medications on File Prior to Visit   Medication Sig Dispense Refill    acetone, urine, test strip DRIP URINE TO CHECK FOR KETONES IF NAUSEA/VOMITING OR IF CONCERN FOR DKA OR DEHYDRATION 100 each 0    albuterol (Ventolin HFA) 90 mcg/actuation inhaler Inhale 1-2 puffs every 4 hours if needed for wheezing or shortness of breath. 18 g 11    blood-glucose sensor (Dexcom G7 Sensor) device Change sensor every 10 days 3 each 11    glucagon 1 mg/0.2 mL auto-injector Inject 1 mg under the skin 1 time if needed (use for low blood sugar if unable to take anything by mouth) for up to 1 dose. 0.2 mL 12    glucose 4 gram chewable tablet Chew. USE AS DIRECTED TO TREAT HYPOGLYCEMIA      insulin glargine (Lantus Solostar U-100 Insulin) 100 unit/mL (3 mL) pen Inject 14 Units under the skin once daily in the morning. 15 mL 11    insulin lispro (HumaLOG KwikPen Insulin) 100 unit/mL injection Inject 1 unit under the skin for every 10 carbs with meals. May take up to 50 units per day. 15 mL 11    insulin lispro (HumaLOG) 100 unit/mL injection Inject 8 Units under the skin 3 times a day before meals. Inject 8 Units under the skin 3 times a day before meals. 15 mL 0    OneTouch Verio test strips strip TEST 6-7 TIMES DAILY 12 strip 11    pen needle 1/2\" 29G X 12mm needle Use to inject 1-4 times daily as directed. 100 each 11    pen needle, diabetic 32 gauge x 5/32\" needle Use 1 pen needle for insulin injection 4 times a day. 200 each 11    [DISCONTINUED] acetaminophen (Tylenol Extra Strength) 500 mg " tablet Take 2 tablets (1,000 mg) by mouth every 6 hours if needed for mild pain (1 - 3). (Patient not taking: Reported on 2025) 30 tablet 1    [DISCONTINUED] albuterol 2.5 mg /3 mL (0.083 %) nebulizer solution Inhale 3 mL (2.5 mg) every 6 hours if needed for wheezing.       No current facility-administered medications on file prior to visit.           Lab Review  Lab Results   Component Value Date    BILITOT 0.6 01/15/2025    CALCIUM 8.4 (L) 2025    CO2 25 2025     2025    CREATININE 0.88 2025    GLUCOSE 264 (H) 2025    ALKPHOS 68 01/15/2025    K 4.0 2025    PROT 8.1 01/15/2025     (L) 2025    AST 13 01/15/2025    ALT 14 01/15/2025    BUN 9 2025    ANIONGAP 11 2025    MG 1.73 2025    PHOS 2.1 (L) 2025     (H) 2023    ALBUMIN 3.4 2025    AMYLASE 50 2017    LIPASE 9 01/15/2025    GFRF >90 2023    GFRMALE CANCELED 2023     Lab Results   Component Value Date    TRIG 56 2018    CHOL 199 2022    HDL 65.3 2022     Lab Results   Component Value Date    HGBA1C 12.7 (H) 01/15/2025    HGBA1C 13.1 (H) 2024    HGBA1C 7.0 (A) 2023     The ASCVD Risk score (Lora DK, et al., 2019) failed to calculate for the following reasons:    The 2019 ASCVD risk score is only valid for ages 40 to 79      Monitoring     Did not have CGM data to review at this time    Assessment/Plan     The patient reports today for a diabetes consultation.  Discussed DM regimen with the patient. At this time do not have CGM data to review and as a result cannot make insulin adjustments. Patient will be meeting with our CDCES next week to help get back on the tandem insulin pump. Informed the patient to schedule a follow up with Dr. Gonzales which is currently not scheduled. Patient was agreeable to this.        PATIENT EDUCATION/GOALS      Goals  Fasting B - 130 mg/dL  Postprandial BG: less than 180  mg/dL  A1c: less than 7%      Provided counseling on lifestyle modifications, medications, and self-monitoring. Patient has no additional questions at this time. Pharmacy to follow up on request. Please reach out with any questions. Thank you.       Michelle Dalton PharmD    Continue all meds under the continuation of care with the referring provider and clinical pharmacy team.

## 2025-01-22 NOTE — PROGRESS NOTES
Maria Isabel Cutler  Age: 24 y.o.  MRN: [id number]  Date: 1/15/2025  Location of service: phone call    Program Details  Medicaid Community Clinical Case Management  Status: Enrolled  Effective Dates: 8/7/2024 - present  Responsible Staff: JAIME Miramontes      Goals Reviewed:      Summary:  This writer called patient to confirm our appointment for today. Patient is unable to answer at this time. This writer leaves a voicemail.    Appointment start time: 1110  Appointment completion time: 1111  Total time spent with patient (in minutes): 1  Non-Billable Time: 1  Billable Time Total: 0    Viviane Sanford RN

## 2025-01-23 NOTE — PROGRESS NOTES
Maria Isabel Ctuler  Age: 24 y.o.  MRN: [id number]  Date: 1/16/2025  Location of service: in community    Program Details  Medicaid Community Clinical Case Management  Status: Enrolled  Effective Dates: 8/7/2024 - present  Responsible Staff: JAIME Miramontes      Goals Reviewed:  Problem: Risk of Uncoordinated Care       Goal: Care will be Coordinated and Supported by a Multidisciplinary Team of Providers       Priority: Medium          Summary:  This writer met with patient in the hospital for a community visit.  Patient states she was admitted after being found to be in DKA yesterday at her son's appointment.   Patient confirms with this writer that her insulin pump is broken. Patient states she did not notify her inpatient team. This writer notifies patient's inpatient team.  Patient states she has been having nerve pain in her feet lately. Patient states she did not notify her inpatient team. This writer notifies patient's inpatient team.  This writer and patient discuss the pros/cons of being admitted to the hospital.  This writer engages in relationship building.    Appointment start time: 1030  Appointment completion time: 1143  Total time spent with patient (in minutes): 73  Non-Billable Time: 73  Billable Time Total: 0    Viviane Sanford RN

## 2025-01-27 ENCOUNTER — APPOINTMENT (OUTPATIENT)
Dept: ENDOCRINOLOGY | Facility: CLINIC | Age: 25
End: 2025-01-27
Payer: COMMERCIAL

## 2025-01-27 DIAGNOSIS — E10.319 TYPE 1 DIABETES MELLITUS WITH RETINOPATHY WITHOUT MACULAR EDEMA, UNSPECIFIED LATERALITY, UNSPECIFIED RETINOPATHY SEVERITY: ICD-10-CM

## 2025-01-27 DIAGNOSIS — E10.319 TYPE 1 DIABETES MELLITUS WITH RETINOPATHY WITHOUT MACULAR EDEMA, UNSPECIFIED LATERALITY, UNSPECIFIED RETINOPATHY SEVERITY: Primary | ICD-10-CM

## 2025-01-27 PROCEDURE — 95251 CONT GLUC MNTR ANALYSIS I&R: CPT | Performed by: NURSE PRACTITIONER

## 2025-01-27 PROCEDURE — G0108 DIAB MANAGE TRN  PER INDIV: HCPCS | Performed by: NURSE PRACTITIONER

## 2025-01-27 RX ORDER — INSULIN LISPRO 100 [IU]/ML
INJECTION, SOLUTION INTRAVENOUS; SUBCUTANEOUS
Qty: 90 ML | Refills: 3 | Status: SHIPPED | OUTPATIENT
Start: 2025-01-27

## 2025-01-27 NOTE — PROGRESS NOTES
"Reason for Visit:  Maria Isabel Cutler is a 24 y.o. female who presents for Follow-up DSME Visit    Counseling and Education:     DSME - Meal Planning and Diet Recall  Are you currently following any meal plan: Carbohydrate Counting.    Does your culture or Latter day require any of the following:  N/A  Who does the grocery shopping? patient  Who does the cooking in the home? patient      DSME - Goals and Recommendations:    DSME S.M.A.R.T. From Previous Visit:  Start insulin pump     Approximate time for goal completion: 4-6 weeks    Did the patient successfully meet the S.M.A.R.T. Goal: Partially Met (counts toward goal being \"met\" for ADA reporting.    Comments: Patient restarted Dexcom, ordered supplies and brought insulin pump with her today. However, her pump screen is not turning on. We called for a replacement pump.       Impression:  DSME Topics Covered During Visit:   Reviewed Hypoglycemia Signs/Symptoms/Tx Plan  Reviewed Hyperglycemia Signs/Symptoms/Tx Plan  Glycemic Pattern Management  Diabetes Data Apps (T:Connect [employer], Dexcom Clarity sharing data)    Reviewed Diabetes Technology: CGM AGP, mobile bolusing and syncing data through T:Connect mobile     DSME Topics for Follow-Up:   Glycemic Pattern Management  Insulin pump start and refresher on any needed insulin pump education    Provider Impression: Patient is here pr Dr. Gonzales for DKA post-discharge follow-up and was previously referred to re initiate insulin pump therapy. Patient brought in insulin pump today but the screen will not turn on. We called Tandem customer support together and her replacement pump is on its way. I connected patient to  [employer]. Patient is wearing DexcomG7, I connected patient to  [employer]atterii. See below for more details.       CGM Data:                     CGM wear time:  99.9 %  Average Glucose: 277 mg/dL   GMI: 9.9 %  Time In Range  mg/dl: 18 %  Time Below Range <70mg/dl: 0 %   Time Below Range <54mg/dl: 0 " %  Time Above Range >180mg/dl: 27 %  Time Above Range >250mg/dl: 55 %  Glucose Variability: 35.5 %  Treatment adjustments/recommendations based on this CGM review and interpretation: Do not reduce TDD by 15-20% for insulin starting settings.   The following insulin doses are recommended for insulin pump therapy initiation (will have Dr. Gonzales verify):  Current regimen: Lantus 14 every morning   8 units for base dose meals + 1:50 -- Lispro   Current TDD: 38 units   -I did not reduce by 15-20% as mean glucose and A1c are elevated   Time Basal Rate (units/hr) Correction Factor Carb Ratio Target BG (mg/dL)   0000 0.6 45 13 110   Duration of insulin action: 5 hrs   Max bolus: 15 units   Basal limit: 2 units/hr   Auto-off: OFF     Previous settings from pregnancy (for reference):   Pump Profile settings - 4months (From October 2023 during pregnancy)   Active at upload  Time           Basal Rate (units/hr)  12:00 AM       0.900  9:00 AM        1.000  9:00 PM        0.900  Total Daily Basal: 22.800 units    Time           Correction Factor (units:mg/dL)  12:00 AM       1:40  9:00 AM        1:40  9:00 PM        1:40    Time           Carb Ratio (units:grams)  12:00 AM       1:9.0  9:00 AM        1:9.0  9:00 PM        1:9.0    Time           Target BG (mg/dL)  12:00 AM       110  9:00 AM        110  9:00 PM        110  Insulin Duration: 5 hr  Carbohydrates: On      Orders needed:  DexcomG7 (already has!)  Insulin vials   Type of insulin: Lispro vials     Patient provided the pre-pump start instructions to:   Download T:Connect mobile - done   Create Tandem source account - done     Dr. Gonzales -- order vials of Lispro   Today we completed the following:   -Ordered replacement insulin pump   -[facility]comG[facility] to  [facility]  -[facility]andem Pump to  [facility]  -Patient downloaded T:Connect mobile carlos eduardo on phone, logged into [employer] account   -Verified current insulin regimen, downloaded CGM  report, determined starting insulin pump settings    Plan of Care:   -Have Dr. Gonzales send vials of Lispro  -Patient calling [facility] to figure out how to get TruSteel (as she was sent autosofts)   -Patient waiting to hear back from Sherri Randle from [facility] about interim TruSteel infusion sets  -Patient's replacement insulin pump was ordered today and patient should receive it in 48 hours  -Follow-up in 1-2 weeks with [facility] to re-start insulin pump; verify starting settings with Dr. Gonzales       Time: I personally spent a total of 30 minutes with the patient providing diabetes self-management education. Visit documentation will be sent electronically to referring provider.     Provider on site: Iris iNxon, AI Tom PhD, RN, BC-ADM, [facility]

## 2025-01-27 NOTE — Clinical Note
We made some progress today! She needs vials of Lispro for max TDD 50 units sent in, but has Lispro pens she can pull from if needed in the interim. See my note for more details and the action plan. Barber

## 2025-01-31 ENCOUNTER — DOCUMENTATION (OUTPATIENT)
Dept: BEHAVIORAL HEALTH | Facility: CLINIC | Age: 25
End: 2025-01-31
Payer: COMMERCIAL

## 2025-01-31 DIAGNOSIS — F32.1 CURRENT MODERATE EPISODE OF MAJOR DEPRESSIVE DISORDER, UNSPECIFIED WHETHER RECURRENT (MULTI): ICD-10-CM

## 2025-02-01 NOTE — PROGRESS NOTES
Maria Isabel Cutler  Age: 24 y.o.  MRN: [id number]  Date: 1/31/2025  Location of service: in community    Program Details  Medicaid Community Clinical Case Management  Status: Enrolled  Effective Dates: 8/7/2024 - present  Responsible Staff: JAIME Miramontes      Goals Reviewed:    Problem: Risk of Uncoordinated Care       Goal: Care will be Coordinated and Supported by a Multidisciplinary Team of Providers       Priority: Medium          Summary:  This provider met with patient at the patient's home. This provider listened with empathy as patient talked about experiencing panic attacks at night as she is in bed. Provider assisted patient with identifying what may be causing some of these panic attacks to include her living environment. Provider guided patient with recognizing the importance of attending medical appointments and how this can have a long term impact with patient's son. Patient reports that she believes her young son may also have type I diabetes, This provider assisted patient with validated patient's concerns if the baby does have diabetes.                      JAIME Miramontes

## 2025-02-10 ENCOUNTER — DOCUMENTATION (OUTPATIENT)
Dept: BEHAVIORAL HEALTH | Facility: CLINIC | Age: 25
End: 2025-02-10
Payer: COMMERCIAL

## 2025-02-10 DIAGNOSIS — F41.1 GAD (GENERALIZED ANXIETY DISORDER): ICD-10-CM

## 2025-02-11 ENCOUNTER — DOCUMENTATION (OUTPATIENT)
Dept: BEHAVIORAL HEALTH | Facility: CLINIC | Age: 25
End: 2025-02-11
Payer: COMMERCIAL

## 2025-02-11 NOTE — PROGRESS NOTES
Maria Isabel Cutler  Age: 24 y.o.  MRN: [id number]  Date: 2/10/2025  Location of service: in community    Program Details  Medicaid Community Clinical Case Management  Status: Enrolled  Effective Dates: 8/7/2024 - present  Responsible Staff: JAIME Miramontes      Goals Reviewed:  Problem: Lack of Community Resources        Goal: Coordination of Services will be Obtained          Goal: Link Patient to services within the community       Priority: High        Problem: Limited Understanding of Medications       Goal: Patient will Verbalize Understanding of Medications       Priority: High        Problem: Risk of Uncoordinated Care       Goal: Care will be Coordinated and Supported by a Multidisciplinary Team of Providers       Priority: Medium          Summary:  This provider met with patient at patient's home. This provider listened with empathy as patient talked about continued confusion regarding housing and lack of supports. This provider reflected with patient on how exactly patient feels regarding her current medical and living conditions. This provider encouraged patient to look towards the future and what is needed to meet future goals. This provider assisted patient with looking into other potential housing options. Provider sent an email to Cristina GUILLEN to add to patient's case for additional support.                      JAIME Miramontes

## 2025-02-11 NOTE — PROGRESS NOTES
Maria Isabel Cutler  Age: 24 y.o.  MRN: [id number]  Date: 2/11/2025  Location of service: phone call    Program Details  Medicaid Community Clinical Case Management  Status: Enrolled  Effective Dates: 8/7/2024 - present  Responsible Staff: JAIME Miramontes      Goals Reviewed:      Summary:  This writer calls patient to get patient scheduled with this writer. Patient is unable to answer at this time. This writer leaves a voicemail with a suggested date and time.    Appointment start time: 0947  Appointment completion time: 0948  Total time spent with patient (in minutes): 1  Non-Billable Time: 1  Billable Time Total: 0    Viviane Sanford RN

## 2025-02-12 ENCOUNTER — DOCUMENTATION (OUTPATIENT)
Dept: CARE COORDINATION | Facility: CLINIC | Age: 25
End: 2025-02-12
Payer: COMMERCIAL

## 2025-02-12 ENCOUNTER — APPOINTMENT (OUTPATIENT)
Dept: DERMATOLOGY | Facility: CLINIC | Age: 25
End: 2025-02-12
Payer: COMMERCIAL

## 2025-02-14 ENCOUNTER — DOCUMENTATION (OUTPATIENT)
Dept: BEHAVIORAL HEALTH | Facility: CLINIC | Age: 25
End: 2025-02-14
Payer: COMMERCIAL

## 2025-02-17 ENCOUNTER — APPOINTMENT (OUTPATIENT)
Dept: ENDOCRINOLOGY | Facility: CLINIC | Age: 25
End: 2025-02-17
Payer: COMMERCIAL

## 2025-02-17 DIAGNOSIS — E10.65 TYPE 1 DIABETES MELLITUS WITH HYPERGLYCEMIA (MULTI): Primary | ICD-10-CM

## 2025-02-17 PROCEDURE — G0108 DIAB MANAGE TRN  PER INDIV: HCPCS | Performed by: NURSE PRACTITIONER

## 2025-02-17 NOTE — PROGRESS NOTES
Maria Isabel Cutler  Age: 24 y.o.  MRN: [id number]  Date: 2/14/2025  Location of service: phone call    Program Details  Medicaid Community Clinical Case Management  Status: Enrolled  Effective Dates: 8/7/2024 - present  Responsible Staff: JAIME Miramontes      Goals Reviewed:  Problem: Lack of Community Resources        Goal: Coordination of Services will be Obtained          Goal: Link Patient to services within the community       Priority: High        Problem: Limited Understanding of Medications       Goal: Patient will Verbalize Understanding of Medications       Priority: High        Problem: Risk of Uncoordinated Care       Goal: Care will be Coordinated and Supported by a Multidisciplinary Team of Providers       Priority: Medium          Summary:  This provider made successful telephone contact with the patient. This provider listened with empathy as patient explained not being able to access her medication and the fear of going into DKA. This provider offered suggestions on how patient can secure the needed medications to prevent DKA. This provider reminded patient of our appointment this week.                      JAIME Miramontes

## 2025-02-17 NOTE — PROGRESS NOTES
Patient joined virtual visit to provide an update. Patient provided the following update:     -Patient's replacement tandem x2 insulin pump arrived.   -Patient received samples of TruSteel infusion sets from local tandem pump rep.   -Patient was able to exchange box of AutosoftXc sets for TruSteel sets   -Patient turned it on and programmed the following starting settings:  Time Basal Rate (units/hr) Correction Factor Carb Ratio Target BG (mg/dL)   0000 0.6 45 13 110   -Patient will start pump tomorrow morning around 10am instead of taking her Lantus  -Patient is wearing DexcomG7 and will connect it to pump  -[facility] is [facility] to  [facility]com Clarity and Tandem Source   -Diabetes educator will review patient data in [facility] portal in 24-48 hours. Diabetes educator will call patient if unable to see data or if any concerns.   -Patient will reach out to diabetes team if she has any concerns.     Neela Tom PhD, RN, BC-ADM, [facility]

## 2025-02-19 ENCOUNTER — DOCUMENTATION (OUTPATIENT)
Dept: BEHAVIORAL HEALTH | Facility: CLINIC | Age: 25
End: 2025-02-19

## 2025-02-19 ENCOUNTER — DOCUMENTATION (OUTPATIENT)
Dept: BEHAVIORAL HEALTH | Facility: CLINIC | Age: 25
End: 2025-02-19
Payer: COMMERCIAL

## 2025-02-19 DIAGNOSIS — F41.1 GAD (GENERALIZED ANXIETY DISORDER): ICD-10-CM

## 2025-02-20 ENCOUNTER — DOCUMENTATION (OUTPATIENT)
Dept: BEHAVIORAL HEALTH | Facility: CLINIC | Age: 25
End: 2025-02-20
Payer: COMMERCIAL

## 2025-02-20 NOTE — PROGRESS NOTES
Maria Isabel Cutler  Age: 24 y.o.  MRN: [id number]  Date: 2/19/2025  Location of service: in community    Program Details  Medicaid Community Clinical Case Management  Status: Enrolled  Effective Dates: 8/7/2024 - present  Responsible Staff: JAIME Miramontes      Goals Reviewed:  Problem: Lack of Community Resources        Goal: Coordination of Services will be Obtained          Goal: Link Patient to services within the community       Priority: High          Problem: Risk of Uncoordinated Care       Goal: Care will be Coordinated and Supported by a Multidisciplinary Team of Providers       Priority: Medium          Summary:  This provider met with patient at the patient's home. This provider introduced Cristina Fields the CHW to patient. This provider listened with empathy as patient talked about all of the changes taking place at the shelter where patient resides. This provider did a check in to see how well patient has been coping with her anxiety. Patient states that she has been staying at her mom's house for longer visits during the day which is helping to have someone around. This provider assisted patient with scheduling an appointment for retinopathy. This provider and patient talked about the housing process and why there has been a delay. Provider inquired about patient's insulin pump and needed medication to ensure that patient has everything she needs at the moment for her diabetes.                      JAIME Miramontes

## 2025-02-21 NOTE — PROGRESS NOTES
Maria Isabel Cutler  Age: 24 y.o.  MRN: [id number]  Date: 2/20/2025  Location of service: Care coordination and phone call    Program Details  Medicaid Community Clinical Case Management  Status: Enrolled  Effective Dates: 8/7/2024 - present  Responsible Staff: JAIME Miramontes      Goals Reviewed:  Problem: Risk of Uncoordinated Care       Goal: Care will be Coordinated and Supported by a Multidisciplinary Team of Providers       Priority: Medium          Summary:  This writer calls [facility] scheduling and gets patient scheduled for a podiatry and endo appointment. This writer also gets patient a sooner appointment with her ophthalmologist.   This writer calls patient to inform her of these appointments.    Appointment start time: 0943  Appointment completion time: 1000  Total time spent with patient (in minutes): 17  Non-Billable Time: 17  Billable Time Total: 0    Viviane Sanford RN

## 2025-02-21 NOTE — PROGRESS NOTES
Maria Isabel Cutler  Age: 24 y.o.  MRN: [id number]  Date: 2/19/2025  Location of service: in community    Program Details  Medicaid Community Clinical Case Management  Status: Enrolled  Effective Dates: 8/7/2024 - present  Responsible Staff: JAIME Miramontes      Goals Reviewed:  Problem: Limited Understanding of Medications       Goal: Patient will Verbalize Understanding of Medications       Priority: High        Problem: Risk of Uncoordinated Care       Goal: Care will be Coordinated and Supported by a Multidisciplinary Team of Providers       Priority: Medium          Summary:  This writer meets with patient at the [facility] for a community visit.   Patient requests this writer schedule appointments with podiatry and endo for her. This writer will attend to this tomorrow.     BILLABLE  Physical Health:  Patient has a bright affect as she shows this writer her CGM sensor that is on her arm and as she discusses receiving her new insulin pump. Patient discusses starting to use it and states she feels comfortable with starting it tomorrow morning.  Patient states she blood sugars have been running around 150 and states this has been because she has been compliant with her insulin even without the pump at this time.   Patient states her feet are still hurting very badly. This writer encourages patient to schedule an appointment with podiatry.   This writer also discusses the importance of scheduling an endo appointment.    Appointment start time: 1614  Appointment completion time: 1642  Total time spent with patient (in minutes): 28  Non-Billable Time: 5  Billable Time Total: 23    Viviane Sanford RN

## 2025-02-24 ENCOUNTER — TELEPHONE (OUTPATIENT)
Dept: ENDOCRINOLOGY | Facility: CLINIC | Age: 25
End: 2025-02-24
Payer: COMMERCIAL

## 2025-02-24 NOTE — TELEPHONE ENCOUNTER
Called patient as tandem pump data has not synced yet and Dexcom Clarity shows patient is logging insulin injections. Patient said she wanted to use up some of her insulin pens but has everything ready to go to get started again on her Tandem Pump. Patient said she does not need additional support to get started on her Tandem pump at this time. Patient said having a diabetes care team member check on her within the next week to see progress can help hold her accountable to start her pump again. I will let Dr. Gonzales know.     -Neela Tom, PhD, RN, BC-ADM, [facility]

## 2025-02-26 ENCOUNTER — DOCUMENTATION (OUTPATIENT)
Dept: BEHAVIORAL HEALTH | Facility: CLINIC | Age: 25
End: 2025-02-26
Payer: COMMERCIAL

## 2025-02-26 NOTE — PROGRESS NOTES
Maria Isabel Cutler  Age: 25 y.o.  MRN: [id number]  Date: 2/26/2025  Location of service: in community    Program Details  Medicaid Community Clinical Case Management  Status: Enrolled  Effective Dates: 8/7/2024 - present  Responsible Staff: JAIME Miramontes      Goals Reviewed:  Problem: Lack of Community Resources        Goal: Coordination of Services will be Obtained          Goal: Link Patient to services within the community       Priority: High        Problem: Limited Understanding of Medications       Goal: Patient will Verbalize Understanding of Medications       Priority: High        Problem: Risk of Uncoordinated Care       Goal: Care will be Coordinated and Supported by a Multidisciplinary Team of Providers       Priority: Medium          Summary:  This provider went to patient's home to meet with patient for our scheduled weekly meeting. Provider attempted to make telephone contact before driving to patient's home. When provider arrived to the residency, the staff at the shelter went to locate patient for provider. After two different staff members going to patient's room, it was determined that patient was not available to meet today. This provider found out from the building staff that patient is due to move into a permanent residents next week.                      JAIME Miramontes

## 2025-03-03 ENCOUNTER — DOCUMENTATION (OUTPATIENT)
Dept: BEHAVIORAL HEALTH | Facility: CLINIC | Age: 25
End: 2025-03-03
Payer: COMMERCIAL

## 2025-03-04 ENCOUNTER — DOCUMENTATION (OUTPATIENT)
Dept: BEHAVIORAL HEALTH | Facility: CLINIC | Age: 25
End: 2025-03-04
Payer: COMMERCIAL

## 2025-03-04 DIAGNOSIS — F41.1 GAD (GENERALIZED ANXIETY DISORDER): ICD-10-CM

## 2025-03-05 ENCOUNTER — DOCUMENTATION (OUTPATIENT)
Dept: BEHAVIORAL HEALTH | Facility: CLINIC | Age: 25
End: 2025-03-05
Payer: COMMERCIAL

## 2025-03-05 DIAGNOSIS — F41.1 GAD (GENERALIZED ANXIETY DISORDER): ICD-10-CM

## 2025-03-05 NOTE — PROGRESS NOTES
Maria Isabel Cutler  Age: 25 y.o.  MRN: [id number]  Date: 3/4/2025  Location of service: in community    Program Details  Medicaid Community Clinical Case Management  Status: Enrolled  Effective Dates: 8/7/2024 - present  Responsible Staff: JAIME Miramontes      Goals Reviewed:  Problem: Lack of Community Resources        Goal: Coordination of Services will be Obtained          Goal: Link Patient to services within the community       Priority: High            Summary:  This provider met with patient at the patient's home. This provider listened with empathy as patient talked about a recent situation with a new employer that has caused added stress to her life. This provider worked with patient on identifying patient's values. Provider challenged patient to look at herself, family, friends, and people in society that she values and why. Provider inquired with patient about sending in a referral to the local community collaboration for services once patient moves this week into her new apartment.                      JAIME Miramontes

## 2025-03-12 ENCOUNTER — DOCUMENTATION (OUTPATIENT)
Dept: BEHAVIORAL HEALTH | Facility: CLINIC | Age: 25
End: 2025-03-12
Payer: COMMERCIAL

## 2025-03-12 NOTE — PROGRESS NOTES
Maria Isabel Cutler  Age: 25 y.o.  MRN: [id number]  Date: 3/12/2025  Location of service: phone call    Program Details  Medicaid Community Clinical Case Management  Status: Enrolled  Effective Dates: 8/7/2024 - present  Responsible Staff: JAIME Miramontes      Goals Reviewed:  Problem: Lack of Community Resources        Goal: Coordination of Services will be Obtained          Goal: Link Patient to services within the community       Priority: High        Problem: Limited Understanding of Medications       Goal: Patient will Verbalize Understanding of Medications       Priority: High        Problem: Risk of Uncoordinated Care       Goal: Care will be Coordinated and Supported by a Multidisciplinary Team of Providers       Priority: Medium          Summary:  This provider made successful contact with patient via phone call. Provider reminded patient of our scheduled appointment for this morning. Patient requested that we reschedule for another time due to not being home. This provider and patient scheduled for Wednesday, April 2nd, 2025 at 10:30am.                      JAIME Miramontes

## 2025-03-12 NOTE — PROGRESS NOTES
Maria Isabel Cutler  Age: 25 y.o.  MRN: [id number]  Date: 3/5/2025  Location of service: in community    Program Details  Medicaid Community Clinical Case Management  Status: Enrolled  Effective Dates: 8/7/2024 - present  Responsible Staff: JAIME Miramontes      Goals Reviewed:  Problem: Limited Understanding of Medications       Goal: Patient will Verbalize Understanding of Medications       Priority: High        Problem: Risk of Uncoordinated Care       Goal: Care will be Coordinated and Supported by a Multidisciplinary Team of Providers       Priority: Medium          Summary:  This writer meets with patient and her mother's home for a community visit.  This writer notifies Neela Tom RN, PhD via epic about patient now utilizing her insulin pump and her concerns with it.     BILLABLE  Patient confirms that she has been using her insulin pump since Monday when she had contacted her endocrinologist regarding a refill on her lispro.  Patient states she feels have been better managed are less controlled with the insulin pump than they were when she was doing her insulin injections herself.   Patient states she has been feeling better managed.    Appointment start time: 1130  Appointment completion time: 1214  Total time spent with patient (in minutes): 44  Non-Billable Time: 15  Billable Time Total: 29    Viviane Sanford RN

## 2025-03-12 NOTE — PROGRESS NOTES
Maria Isabel Cutler  Age: 25 y.o.  MRN: [id number]  Date: 3/3/2025  Location of service: phone call and care coordination    Program Details  Medicaid Community Clinical Case Management  Status: Enrolled  Effective Dates: 8/7/2024 - present  Responsible Staff: JAIME Miramontes      Goals Reviewed:      Summary:  Patient calls this writer and states she is out of her lispro.  Patient states she needs a refill on these pens as soon as possible because she has not yet started using the insulin pump.  She states she sent a message to her provider.  This writer reaches out to Neela Tom RN PhD after noticing that the patient's endocrinologist is unavailable at this time.  Neela advises this writer send a staff message.    Appointment start time: 1315  Appointment completion time: 1326  Total time spent with patient (in minutes): 11  Non-Billable Time: 11  Billable Time Total: 0    Viviane Sanford RN

## 2025-03-15 ENCOUNTER — APPOINTMENT (OUTPATIENT)
Dept: RADIOLOGY | Facility: HOSPITAL | Age: 25
End: 2025-03-15

## 2025-03-15 ENCOUNTER — HOSPITAL ENCOUNTER (INPATIENT)
Facility: HOSPITAL | Age: 25
End: 2025-03-15
Attending: EMERGENCY MEDICINE | Admitting: STUDENT IN AN ORGANIZED HEALTH CARE EDUCATION/TRAINING PROGRAM

## 2025-03-15 ENCOUNTER — CLINICAL SUPPORT (OUTPATIENT)
Dept: EMERGENCY MEDICINE | Facility: HOSPITAL | Age: 25
End: 2025-03-15

## 2025-03-15 DIAGNOSIS — E08.00 DIABETES MELLITUS DUE TO UNDERLYING CONDITION WITH HYPEROSMOLARITY WITHOUT COMA, WITH LONG-TERM CURRENT USE OF INSULIN: ICD-10-CM

## 2025-03-15 DIAGNOSIS — E10.65 TYPE 1 DIABETES MELLITUS WITH HYPERGLYCEMIA (MULTI): ICD-10-CM

## 2025-03-15 DIAGNOSIS — E10.10 TYPE 1 DIABETES MELLITUS WITH KETOACIDOSIS WITHOUT COMA: Primary | ICD-10-CM

## 2025-03-15 DIAGNOSIS — Z79.4 DIABETES MELLITUS DUE TO UNDERLYING CONDITION WITH HYPEROSMOLARITY WITHOUT COMA, WITH LONG-TERM CURRENT USE OF INSULIN: ICD-10-CM

## 2025-03-15 LAB
ALBUMIN SERPL BCP-MCNC: 3.6 G/DL (ref 3.4–5)
ALBUMIN SERPL BCP-MCNC: 3.7 G/DL (ref 3.4–5)
ALBUMIN SERPL BCP-MCNC: 3.9 G/DL (ref 3.4–5)
ALBUMIN SERPL BCP-MCNC: 4.6 G/DL (ref 3.4–5)
ALP SERPL-CCNC: 63 U/L (ref 33–110)
ALT SERPL W P-5'-P-CCNC: 11 U/L (ref 7–45)
ANION GAP BLDV CALCULATED.4IONS-SCNC: 10 MMOL/L (ref 10–25)
ANION GAP BLDV CALCULATED.4IONS-SCNC: 12 MMOL/L (ref 10–25)
ANION GAP BLDV CALCULATED.4IONS-SCNC: 12 MMOL/L (ref 10–25)
ANION GAP BLDV CALCULATED.4IONS-SCNC: 14 MMOL/L (ref 10–25)
ANION GAP BLDV CALCULATED.4IONS-SCNC: 19 MMOL/L (ref 10–25)
ANION GAP BLDV CALCULATED.4IONS-SCNC: 28 MMOL/L (ref 10–25)
ANION GAP BLDV CALCULATED.4IONS-SCNC: 9 MMOL/L (ref 10–25)
ANION GAP SERPL CALC-SCNC: 12 MMOL/L (ref 10–20)
ANION GAP SERPL CALC-SCNC: 13 MMOL/L (ref 10–20)
ANION GAP SERPL CALC-SCNC: 13 MMOL/L (ref 10–20)
ANION GAP SERPL CALC-SCNC: 25 MMOL/L (ref 10–20)
ANION GAP SERPL CALC-SCNC: 31 MMOL/L (ref 10–20)
APPEARANCE UR: CLEAR
AST SERPL W P-5'-P-CCNC: 13 U/L (ref 9–39)
ATRIAL RATE: 113 BPM
B-OH-BUTYR SERPL-SCNC: 7.71 MMOL/L (ref 0.02–0.27)
BASE EXCESS BLDV CALC-SCNC: -10.5 MMOL/L (ref -2–3)
BASE EXCESS BLDV CALC-SCNC: -11.3 MMOL/L (ref -2–3)
BASE EXCESS BLDV CALC-SCNC: -14.3 MMOL/L (ref -2–3)
BASE EXCESS BLDV CALC-SCNC: -16.4 MMOL/L (ref -2–3)
BASE EXCESS BLDV CALC-SCNC: -19.2 MMOL/L (ref -2–3)
BASE EXCESS BLDV CALC-SCNC: -8.6 MMOL/L (ref -2–3)
BASE EXCESS BLDV CALC-SCNC: -8.7 MMOL/L (ref -2–3)
BASE EXCESS BLDV CALC-SCNC: -8.8 MMOL/L (ref -2–3)
BASOPHILS # BLD AUTO: 0.09 X10*3/UL (ref 0–0.1)
BASOPHILS NFR BLD AUTO: 0.8 %
BILIRUB SERPL-MCNC: 0.7 MG/DL (ref 0–1.2)
BILIRUB UR STRIP.AUTO-MCNC: NEGATIVE MG/DL
BODY TEMPERATURE: 37 DEGREES CELSIUS
BUN SERPL-MCNC: 10 MG/DL (ref 6–23)
BUN SERPL-MCNC: 10 MG/DL (ref 6–23)
BUN SERPL-MCNC: 12 MG/DL (ref 6–23)
BUN SERPL-MCNC: 12 MG/DL (ref 6–23)
BUN SERPL-MCNC: 9 MG/DL (ref 6–23)
CA-I BLDV-SCNC: 1.03 MMOL/L (ref 1.1–1.33)
CA-I BLDV-SCNC: 1.15 MMOL/L (ref 1.1–1.33)
CA-I BLDV-SCNC: 1.23 MMOL/L (ref 1.1–1.33)
CA-I BLDV-SCNC: 1.25 MMOL/L (ref 1.1–1.33)
CA-I BLDV-SCNC: 1.27 MMOL/L (ref 1.1–1.33)
CA-I BLDV-SCNC: 1.28 MMOL/L (ref 1.1–1.33)
CA-I BLDV-SCNC: 1.3 MMOL/L (ref 1.1–1.33)
CALCIUM SERPL-MCNC: 8.1 MG/DL (ref 8.6–10.6)
CALCIUM SERPL-MCNC: 8.2 MG/DL (ref 8.6–10.6)
CALCIUM SERPL-MCNC: 8.2 MG/DL (ref 8.6–10.6)
CALCIUM SERPL-MCNC: 8.5 MG/DL (ref 8.6–10.6)
CALCIUM SERPL-MCNC: 9.7 MG/DL (ref 8.6–10.6)
CHLORIDE BLDV-SCNC: 109 MMOL/L (ref 98–107)
CHLORIDE BLDV-SCNC: 111 MMOL/L (ref 98–107)
CHLORIDE BLDV-SCNC: 111 MMOL/L (ref 98–107)
CHLORIDE BLDV-SCNC: 114 MMOL/L (ref 98–107)
CHLORIDE BLDV-SCNC: 95 MMOL/L (ref 98–107)
CHLORIDE SERPL-SCNC: 107 MMOL/L (ref 98–107)
CHLORIDE SERPL-SCNC: 109 MMOL/L (ref 98–107)
CHLORIDE SERPL-SCNC: 115 MMOL/L (ref 98–107)
CHLORIDE SERPL-SCNC: 115 MMOL/L (ref 98–107)
CHLORIDE SERPL-SCNC: 96 MMOL/L (ref 98–107)
CK SERPL-CCNC: 74 U/L (ref 0–215)
CO2 SERPL-SCNC: 15 MMOL/L (ref 21–32)
CO2 SERPL-SCNC: 15 MMOL/L (ref 21–32)
CO2 SERPL-SCNC: 17 MMOL/L (ref 21–32)
CO2 SERPL-SCNC: 7 MMOL/L (ref 21–32)
CO2 SERPL-SCNC: 9 MMOL/L (ref 21–32)
COLOR UR: COLORLESS
CREAT SERPL-MCNC: 0.68 MG/DL (ref 0.5–1.05)
CREAT SERPL-MCNC: 0.76 MG/DL (ref 0.5–1.05)
CREAT SERPL-MCNC: 0.76 MG/DL (ref 0.5–1.05)
CREAT SERPL-MCNC: 0.81 MG/DL (ref 0.5–1.05)
CREAT SERPL-MCNC: 0.94 MG/DL (ref 0.5–1.05)
EGFRCR SERPLBLD CKD-EPI 2021: 87 ML/MIN/1.73M*2
EGFRCR SERPLBLD CKD-EPI 2021: >90 ML/MIN/1.73M*2
EOSINOPHIL # BLD AUTO: 0.02 X10*3/UL (ref 0–0.7)
EOSINOPHIL NFR BLD AUTO: 0.2 %
ERYTHROCYTE [DISTWIDTH] IN BLOOD BY AUTOMATED COUNT: 11.8 % (ref 11.5–14.5)
EST. AVERAGE GLUCOSE BLD GHB EST-MCNC: 301 MG/DL
GLUCOSE BLD MANUAL STRIP-MCNC: 127 MG/DL (ref 74–99)
GLUCOSE BLD MANUAL STRIP-MCNC: 145 MG/DL (ref 74–99)
GLUCOSE BLD MANUAL STRIP-MCNC: 161 MG/DL (ref 74–99)
GLUCOSE BLD MANUAL STRIP-MCNC: 168 MG/DL (ref 74–99)
GLUCOSE BLD MANUAL STRIP-MCNC: 173 MG/DL (ref 74–99)
GLUCOSE BLD MANUAL STRIP-MCNC: 181 MG/DL (ref 74–99)
GLUCOSE BLD MANUAL STRIP-MCNC: 239 MG/DL (ref 74–99)
GLUCOSE BLD MANUAL STRIP-MCNC: 290 MG/DL (ref 74–99)
GLUCOSE BLD MANUAL STRIP-MCNC: 415 MG/DL (ref 74–99)
GLUCOSE BLD MANUAL STRIP-MCNC: 578 MG/DL (ref 74–99)
GLUCOSE BLD MANUAL STRIP-MCNC: 66 MG/DL (ref 74–99)
GLUCOSE BLDV-MCNC: 153 MG/DL (ref 74–99)
GLUCOSE BLDV-MCNC: 167 MG/DL (ref 74–99)
GLUCOSE BLDV-MCNC: 195 MG/DL (ref 74–99)
GLUCOSE BLDV-MCNC: 214 MG/DL (ref 74–99)
GLUCOSE BLDV-MCNC: 85 MG/DL (ref 74–99)
GLUCOSE BLDV-MCNC: 90 MG/DL (ref 74–99)
GLUCOSE BLDV-MCNC: ABNORMAL MG/DL
GLUCOSE SERPL-MCNC: 164 MG/DL (ref 74–99)
GLUCOSE SERPL-MCNC: 164 MG/DL (ref 74–99)
GLUCOSE SERPL-MCNC: 173 MG/DL (ref 74–99)
GLUCOSE SERPL-MCNC: 326 MG/DL (ref 74–99)
GLUCOSE SERPL-MCNC: 617 MG/DL (ref 74–99)
GLUCOSE UR STRIP.AUTO-MCNC: ABNORMAL MG/DL
HBA1C MFR BLD: 12.1 %
HCO3 BLDV-SCNC: 11 MMOL/L (ref 22–26)
HCO3 BLDV-SCNC: 11.4 MMOL/L (ref 22–26)
HCO3 BLDV-SCNC: 14.8 MMOL/L (ref 22–26)
HCO3 BLDV-SCNC: 14.9 MMOL/L (ref 22–26)
HCO3 BLDV-SCNC: 16.5 MMOL/L (ref 22–26)
HCO3 BLDV-SCNC: 16.6 MMOL/L (ref 22–26)
HCO3 BLDV-SCNC: 16.7 MMOL/L (ref 22–26)
HCO3 BLDV-SCNC: 6.9 MMOL/L (ref 22–26)
HCT VFR BLD AUTO: 40.4 % (ref 36–46)
HCT VFR BLD EST: 30 % (ref 36–46)
HCT VFR BLD EST: 35 % (ref 36–46)
HCT VFR BLD EST: 36 % (ref 36–46)
HCT VFR BLD EST: 43 % (ref 36–46)
HGB BLD-MCNC: 13.5 G/DL (ref 12–16)
HGB BLDV-MCNC: 10.1 G/DL (ref 12–16)
HGB BLDV-MCNC: 11.5 G/DL (ref 12–16)
HGB BLDV-MCNC: 11.9 G/DL (ref 12–16)
HGB BLDV-MCNC: 12 G/DL (ref 12–16)
HGB BLDV-MCNC: 14.2 G/DL (ref 12–16)
HOLD SPECIMEN: NORMAL
IMM GRANULOCYTES # BLD AUTO: 0.08 X10*3/UL (ref 0–0.7)
IMM GRANULOCYTES NFR BLD AUTO: 0.7 % (ref 0–0.9)
INHALED O2 CONCENTRATION: 21 %
KETONES UR STRIP.AUTO-MCNC: ABNORMAL MG/DL
LACTATE BLDV-SCNC: 0.5 MMOL/L (ref 0.4–2)
LACTATE BLDV-SCNC: 0.5 MMOL/L (ref 0.4–2)
LACTATE BLDV-SCNC: 0.7 MMOL/L (ref 0.4–2)
LACTATE BLDV-SCNC: 0.7 MMOL/L (ref 0.4–2)
LACTATE BLDV-SCNC: 0.9 MMOL/L (ref 0.4–2)
LACTATE BLDV-SCNC: 1.1 MMOL/L (ref 0.4–2)
LACTATE BLDV-SCNC: 2.6 MMOL/L (ref 0.4–2)
LEUKOCYTE ESTERASE UR QL STRIP.AUTO: ABNORMAL
LYMPHOCYTES # BLD AUTO: 1.73 X10*3/UL (ref 1.2–4.8)
LYMPHOCYTES NFR BLD AUTO: 15.3 %
MAGNESIUM SERPL-MCNC: 2.03 MG/DL (ref 1.6–2.4)
MCH RBC QN AUTO: 29.2 PG (ref 26–34)
MCHC RBC AUTO-ENTMCNC: 33.4 G/DL (ref 32–36)
MCV RBC AUTO: 87 FL (ref 80–100)
MONOCYTES # BLD AUTO: 0.38 X10*3/UL (ref 0.1–1)
MONOCYTES NFR BLD AUTO: 3.4 %
NEUTROPHILS # BLD AUTO: 9.01 X10*3/UL (ref 1.2–7.7)
NEUTROPHILS NFR BLD AUTO: 79.6 %
NITRITE UR QL STRIP.AUTO: NEGATIVE
NRBC BLD-RTO: 0 /100 WBCS (ref 0–0)
OXYHGB MFR BLDV: 51.5 % (ref 45–75)
OXYHGB MFR BLDV: 65.2 % (ref 45–75)
OXYHGB MFR BLDV: 69.5 % (ref 45–75)
OXYHGB MFR BLDV: 76.9 % (ref 45–75)
OXYHGB MFR BLDV: 80 % (ref 45–75)
OXYHGB MFR BLDV: 83.9 % (ref 45–75)
OXYHGB MFR BLDV: 89.1 % (ref 45–75)
OXYHGB MFR BLDV: 93.1 % (ref 45–75)
P AXIS: 81 DEGREES
P OFFSET: 206 MS
P ONSET: 155 MS
PCO2 BLDV: 18 MM HG (ref 41–51)
PCO2 BLDV: 26 MM HG (ref 41–51)
PCO2 BLDV: 30 MM HG (ref 41–51)
PCO2 BLDV: 31 MM HG (ref 41–51)
PCO2 BLDV: 32 MM HG (ref 41–51)
PCO2 BLDV: 33 MM HG (ref 41–51)
PCO2 BLDV: 34 MM HG (ref 41–51)
PCO2 BLDV: 34 MM HG (ref 41–51)
PH BLDV: 7.16 PH (ref 7.33–7.43)
PH BLDV: 7.19 PH (ref 7.33–7.43)
PH BLDV: 7.25 PH (ref 7.33–7.43)
PH BLDV: 7.25 PH (ref 7.33–7.43)
PH BLDV: 7.3 PH (ref 7.33–7.43)
PH BLDV: 7.3 PH (ref 7.33–7.43)
PH BLDV: 7.31 PH (ref 7.33–7.43)
PH BLDV: 7.32 PH (ref 7.33–7.43)
PH UR STRIP.AUTO: 5 [PH]
PHOSPHATE SERPL-MCNC: 1.9 MG/DL (ref 2.5–4.9)
PHOSPHATE SERPL-MCNC: 2.4 MG/DL (ref 2.5–4.9)
PHOSPHATE SERPL-MCNC: 2.7 MG/DL (ref 2.5–4.9)
PHOSPHATE SERPL-MCNC: 2.7 MG/DL (ref 2.5–4.9)
PHOSPHATE SERPL-MCNC: 4.4 MG/DL (ref 2.5–4.9)
PLATELET # BLD AUTO: 510 X10*3/UL (ref 150–450)
PO2 BLDV: 35 MM HG (ref 35–45)
PO2 BLDV: 41 MM HG (ref 35–45)
PO2 BLDV: 45 MM HG (ref 35–45)
PO2 BLDV: 49 MM HG (ref 35–45)
PO2 BLDV: 52 MM HG (ref 35–45)
PO2 BLDV: 58 MM HG (ref 35–45)
PO2 BLDV: 70 MM HG (ref 35–45)
PO2 BLDV: 77 MM HG (ref 35–45)
POTASSIUM BLDV-SCNC: 3 MMOL/L (ref 3.5–5.3)
POTASSIUM BLDV-SCNC: 3.5 MMOL/L (ref 3.5–5.3)
POTASSIUM BLDV-SCNC: 4.6 MMOL/L (ref 3.5–5.3)
POTASSIUM BLDV-SCNC: 4.7 MMOL/L (ref 3.5–5.3)
POTASSIUM BLDV-SCNC: 5 MMOL/L (ref 3.5–5.3)
POTASSIUM BLDV-SCNC: 5.3 MMOL/L (ref 3.5–5.3)
POTASSIUM BLDV-SCNC: 5.4 MMOL/L (ref 3.5–5.3)
POTASSIUM SERPL-SCNC: 3.9 MMOL/L (ref 3.5–5.3)
POTASSIUM SERPL-SCNC: 4.6 MMOL/L (ref 3.5–5.3)
POTASSIUM SERPL-SCNC: 4.8 MMOL/L (ref 3.5–5.3)
PR INTERVAL: 132 MS
PREGNANCY TEST URINE, POC: NEGATIVE
PROT SERPL-MCNC: 8.3 G/DL (ref 6.4–8.2)
PROT UR STRIP.AUTO-MCNC: NEGATIVE MG/DL
Q ONSET: 221 MS
QRS COUNT: 19 BEATS
QRS DURATION: 82 MS
QT INTERVAL: 314 MS
QTC CALCULATION(BAZETT): 430 MS
QTC FREDERICIA: 387 MS
R AXIS: 50 DEGREES
RBC # BLD AUTO: 4.62 X10*6/UL (ref 4–5.2)
RBC # UR STRIP.AUTO: ABNORMAL MG/DL
RBC #/AREA URNS AUTO: NORMAL /HPF
SAO2 % BLDV: 52 % (ref 45–75)
SAO2 % BLDV: 66 % (ref 45–75)
SAO2 % BLDV: 71 % (ref 45–75)
SAO2 % BLDV: 78 % (ref 45–75)
SAO2 % BLDV: 82 % (ref 45–75)
SAO2 % BLDV: 85 % (ref 45–75)
SAO2 % BLDV: 91 % (ref 45–75)
SAO2 % BLDV: 95 % (ref 45–75)
SODIUM BLDV-SCNC: 129 MMOL/L (ref 136–145)
SODIUM BLDV-SCNC: 130 MMOL/L (ref 136–145)
SODIUM BLDV-SCNC: 134 MMOL/L (ref 136–145)
SODIUM BLDV-SCNC: 134 MMOL/L (ref 136–145)
SODIUM BLDV-SCNC: 135 MMOL/L (ref 136–145)
SODIUM BLDV-SCNC: 135 MMOL/L (ref 136–145)
SODIUM BLDV-SCNC: 136 MMOL/L (ref 136–145)
SODIUM SERPL-SCNC: 131 MMOL/L (ref 136–145)
SODIUM SERPL-SCNC: 132 MMOL/L (ref 136–145)
SODIUM SERPL-SCNC: 136 MMOL/L (ref 136–145)
SODIUM SERPL-SCNC: 138 MMOL/L (ref 136–145)
SODIUM SERPL-SCNC: 138 MMOL/L (ref 136–145)
SP GR UR STRIP.AUTO: 1.02
SQUAMOUS #/AREA URNS AUTO: NORMAL /HPF
T AXIS: 60 DEGREES
T OFFSET: 378 MS
UROBILINOGEN UR STRIP.AUTO-MCNC: NORMAL MG/DL
VENTRICULAR RATE: 113 BPM
WBC # BLD AUTO: 11.3 X10*3/UL (ref 4.4–11.3)
WBC #/AREA URNS AUTO: NORMAL /HPF

## 2025-03-15 PROCEDURE — 2020000001 HC ICU ROOM DAILY

## 2025-03-15 PROCEDURE — 36415 COLL VENOUS BLD VENIPUNCTURE: CPT

## 2025-03-15 PROCEDURE — 84100 ASSAY OF PHOSPHORUS: CPT | Performed by: EMERGENCY MEDICINE

## 2025-03-15 PROCEDURE — 84132 ASSAY OF SERUM POTASSIUM: CPT

## 2025-03-15 PROCEDURE — 93010 ELECTROCARDIOGRAM REPORT: CPT | Performed by: EMERGENCY MEDICINE

## 2025-03-15 PROCEDURE — 81001 URINALYSIS AUTO W/SCOPE: CPT

## 2025-03-15 PROCEDURE — 83735 ASSAY OF MAGNESIUM: CPT | Performed by: EMERGENCY MEDICINE

## 2025-03-15 PROCEDURE — 2500000004 HC RX 250 GENERAL PHARMACY W/ HCPCS (ALT 636 FOR OP/ED)

## 2025-03-15 PROCEDURE — 82947 ASSAY GLUCOSE BLOOD QUANT: CPT

## 2025-03-15 PROCEDURE — 99285 EMERGENCY DEPT VISIT HI MDM: CPT | Performed by: EMERGENCY MEDICINE

## 2025-03-15 PROCEDURE — 36415 COLL VENOUS BLD VENIPUNCTURE: CPT | Performed by: EMERGENCY MEDICINE

## 2025-03-15 PROCEDURE — 71046 X-RAY EXAM CHEST 2 VIEWS: CPT

## 2025-03-15 PROCEDURE — 84132 ASSAY OF SERUM POTASSIUM: CPT | Performed by: EMERGENCY MEDICINE

## 2025-03-15 PROCEDURE — 93005 ELECTROCARDIOGRAM TRACING: CPT

## 2025-03-15 PROCEDURE — 85025 COMPLETE CBC W/AUTO DIFF WBC: CPT | Performed by: EMERGENCY MEDICINE

## 2025-03-15 PROCEDURE — 2500000004 HC RX 250 GENERAL PHARMACY W/ HCPCS (ALT 636 FOR OP/ED): Performed by: EMERGENCY MEDICINE

## 2025-03-15 PROCEDURE — 96374 THER/PROPH/DIAG INJ IV PUSH: CPT

## 2025-03-15 PROCEDURE — 81025 URINE PREGNANCY TEST: CPT

## 2025-03-15 PROCEDURE — 82010 KETONE BODYS QUAN: CPT

## 2025-03-15 PROCEDURE — 2500000001 HC RX 250 WO HCPCS SELF ADMINISTERED DRUGS (ALT 637 FOR MEDICARE OP)

## 2025-03-15 PROCEDURE — 83036 HEMOGLOBIN GLYCOSYLATED A1C: CPT

## 2025-03-15 PROCEDURE — 84100 ASSAY OF PHOSPHORUS: CPT

## 2025-03-15 PROCEDURE — 2500000005 HC RX 250 GENERAL PHARMACY W/O HCPCS

## 2025-03-15 PROCEDURE — 82805 BLOOD GASES W/O2 SATURATION: CPT

## 2025-03-15 PROCEDURE — 2500000002 HC RX 250 W HCPCS SELF ADMINISTERED DRUGS (ALT 637 FOR MEDICARE OP, ALT 636 FOR OP/ED)

## 2025-03-15 PROCEDURE — 96375 TX/PRO/DX INJ NEW DRUG ADDON: CPT

## 2025-03-15 PROCEDURE — 71046 X-RAY EXAM CHEST 2 VIEWS: CPT | Performed by: RADIOLOGY

## 2025-03-15 PROCEDURE — 82550 ASSAY OF CK (CPK): CPT

## 2025-03-15 PROCEDURE — 87086 URINE CULTURE/COLONY COUNT: CPT

## 2025-03-15 PROCEDURE — 99285 EMERGENCY DEPT VISIT HI MDM: CPT | Mod: 25 | Performed by: EMERGENCY MEDICINE

## 2025-03-15 PROCEDURE — 96361 HYDRATE IV INFUSION ADD-ON: CPT

## 2025-03-15 RX ORDER — INSULIN GLARGINE 100 [IU]/ML
22 INJECTION, SOLUTION SUBCUTANEOUS NIGHTLY
Status: DISCONTINUED | OUTPATIENT
Start: 2025-03-15 | End: 2025-03-17 | Stop reason: HOSPADM

## 2025-03-15 RX ORDER — POTASSIUM CHLORIDE 14.9 MG/ML
20 INJECTION INTRAVENOUS
Status: COMPLETED | OUTPATIENT
Start: 2025-03-15 | End: 2025-03-15

## 2025-03-15 RX ORDER — POTASSIUM CHLORIDE 20 MEQ/1
20 TABLET, EXTENDED RELEASE ORAL ONCE
Status: COMPLETED | OUTPATIENT
Start: 2025-03-15 | End: 2025-03-15

## 2025-03-15 RX ORDER — ACETAMINOPHEN 325 MG/1
650 TABLET ORAL ONCE
Status: COMPLETED | OUTPATIENT
Start: 2025-03-15 | End: 2025-03-15

## 2025-03-15 RX ORDER — SODIUM CHLORIDE, SODIUM LACTATE, POTASSIUM CHLORIDE, CALCIUM CHLORIDE 600; 310; 30; 20 MG/100ML; MG/100ML; MG/100ML; MG/100ML
250 INJECTION, SOLUTION INTRAVENOUS CONTINUOUS
Status: DISCONTINUED | OUTPATIENT
Start: 2025-03-15 | End: 2025-03-17 | Stop reason: HOSPADM

## 2025-03-15 RX ORDER — ONDANSETRON HYDROCHLORIDE 2 MG/ML
4 INJECTION, SOLUTION INTRAVENOUS ONCE
Status: COMPLETED | OUTPATIENT
Start: 2025-03-15 | End: 2025-03-15

## 2025-03-15 RX ORDER — KETOROLAC TROMETHAMINE 15 MG/ML
15 INJECTION, SOLUTION INTRAMUSCULAR; INTRAVENOUS ONCE
Status: COMPLETED | OUTPATIENT
Start: 2025-03-15 | End: 2025-03-15

## 2025-03-15 RX ORDER — DEXTROSE MONOHYDRATE AND SODIUM CHLORIDE 5; .9 G/100ML; G/100ML
150 INJECTION, SOLUTION INTRAVENOUS CONTINUOUS PRN
Status: DISCONTINUED | OUTPATIENT
Start: 2025-03-15 | End: 2025-03-17 | Stop reason: HOSPADM

## 2025-03-15 RX ORDER — DEXTROSE MONOHYDRATE 100 MG/ML
150 INJECTION, SOLUTION INTRAVENOUS CONTINUOUS PRN
Status: DISCONTINUED | OUTPATIENT
Start: 2025-03-15 | End: 2025-03-17 | Stop reason: HOSPADM

## 2025-03-15 RX ORDER — DEXTROSE 50 % IN WATER (D50W) INTRAVENOUS SYRINGE
50
Status: DISCONTINUED | OUTPATIENT
Start: 2025-03-15 | End: 2025-03-17 | Stop reason: HOSPADM

## 2025-03-15 RX ORDER — DEXTROSE 50 % IN WATER (D50W) INTRAVENOUS SYRINGE
12.5 ONCE
Status: COMPLETED | OUTPATIENT
Start: 2025-03-15 | End: 2025-03-15

## 2025-03-15 RX ADMIN — DEXTROSE MONOHYDRATE 150 ML/HR: 100 INJECTION, SOLUTION INTRAVENOUS at 16:49

## 2025-03-15 RX ADMIN — DEXTROSE MONOHYDRATE 150 ML/HR: 100 INJECTION, SOLUTION INTRAVENOUS at 19:02

## 2025-03-15 RX ADMIN — INSULIN HUMAN 8.13 UNITS/HR: 1 INJECTION, SOLUTION INTRAVENOUS at 09:34

## 2025-03-15 RX ADMIN — DEXTROSE MONOHYDRATE 12.5 G: 25 INJECTION, SOLUTION INTRAVENOUS at 23:28

## 2025-03-15 RX ADMIN — DEXTROSE AND SODIUM CHLORIDE 150 ML/HR: 5; 900 INJECTION, SOLUTION INTRAVENOUS at 20:59

## 2025-03-15 RX ADMIN — INSULIN GLARGINE 22 UNITS: 100 INJECTION, SOLUTION SUBCUTANEOUS at 20:14

## 2025-03-15 RX ADMIN — POTASSIUM CHLORIDE 20 MEQ: 14.9 INJECTION, SOLUTION INTRAVENOUS at 11:08

## 2025-03-15 RX ADMIN — ONDANSETRON 4 MG: 2 INJECTION INTRAMUSCULAR; INTRAVENOUS at 09:33

## 2025-03-15 RX ADMIN — POTASSIUM CHLORIDE 20 MEQ: 14.9 INJECTION, SOLUTION INTRAVENOUS at 13:00

## 2025-03-15 RX ADMIN — SODIUM CHLORIDE 1000 ML: 9 INJECTION, SOLUTION INTRAVENOUS at 09:32

## 2025-03-15 RX ADMIN — KETOROLAC TROMETHAMINE 15 MG: 15 INJECTION, SOLUTION INTRAMUSCULAR; INTRAVENOUS at 09:34

## 2025-03-15 RX ADMIN — DEXTROSE AND SODIUM CHLORIDE 150 ML/HR: 5; 900 INJECTION, SOLUTION INTRAVENOUS at 12:59

## 2025-03-15 RX ADMIN — POTASSIUM CHLORIDE 20 MEQ: 1500 TABLET, EXTENDED RELEASE ORAL at 09:32

## 2025-03-15 RX ADMIN — ACETAMINOPHEN 650 MG: 325 TABLET ORAL at 09:33

## 2025-03-15 RX ADMIN — SODIUM CHLORIDE, POTASSIUM CHLORIDE, SODIUM LACTATE AND CALCIUM CHLORIDE 1000 ML: 600; 310; 30; 20 INJECTION, SOLUTION INTRAVENOUS at 08:32

## 2025-03-15 RX ADMIN — SODIUM CHLORIDE, POTASSIUM CHLORIDE, SODIUM LACTATE AND CALCIUM CHLORIDE 250 ML/HR: 600; 310; 30; 20 INJECTION, SOLUTION INTRAVENOUS at 10:20

## 2025-03-15 ASSESSMENT — PAIN SCALES - GENERAL
PAINLEVEL_OUTOF10: 0 - NO PAIN
PAINLEVEL_OUTOF10: 0 - NO PAIN

## 2025-03-15 ASSESSMENT — PAIN DESCRIPTION - PAIN TYPE: TYPE: ACUTE PAIN

## 2025-03-15 ASSESSMENT — PAIN - FUNCTIONAL ASSESSMENT
PAIN_FUNCTIONAL_ASSESSMENT: 0-10
PAIN_FUNCTIONAL_ASSESSMENT: 0-10

## 2025-03-15 NOTE — PROGRESS NOTES
"Pharmacy Medication History Review    Charline Tovar is a 25 y.o. female admitted for Type 1 diabetes mellitus with ketoacidosis without coma. Pharmacy reviewed the patient's [id number] medications and allergies for accuracy.    Medications ADDED:  N/A  Medications CHANGED:  Lantus  Medications REMOVED/NOT TAKING:   N/A     The list below reflects the updated PTA list.   Prior to Admission Medications   Prescriptions Last Dose Informant   insulin glargine (Lantus Solostar U-100 Insulin) 100 unit/mL (3 mL) pen Past Month Self   Sig: Inject 20 units at bedtime daily as directed   Patient taking differently: Inject 22 Units under the skin once daily at bedtime. Inject 20 units at bedtime daily as directed  Pt had surplus supply, needs new script.    insulin lispro (HumaLOG) 100 unit/mL injection 3/14/2025 at  4:00 AM Self   Sig: Take 2 units per every 50 mg, if sugars > 150 mg.     Take 3 times a day before meals.  Pt had surplus supply, needs new script.    pen needle, diabetic (BD Елена 2nd Gen Pen Needle) 32 gauge x 5/32\" needle  Self   Sig: Use 4 a day with insulin pens as directed      Facility-Administered Medications: None        The list below reflects the updated allergy list. Please review each documented allergy for additional clarification and justification.  Allergies  Reviewed by Breanna Phoenix on 3/15/2025   No Known Allergies         Patient accepts M2B at discharge.     Sources:   [facility]  Pharmacy dispense history  Patient Interview Good historian  Chart Review     Additional Comments:  Pt was able to confirm her meds, dosages, and last taken.  Pt missed two doctors appointments, she is in need of new scripts for her insulin.       HANNA SELBY [place]  Pharmacy Technician  03/15/25     Secure Chat preferred   If no response call [id number] or [facility] \"Med Rec\"   "

## 2025-03-15 NOTE — ED PROVIDER NOTES
History of Present Illness     History provided by: Patient  Limitations to History: None  External Records Reviewed with Brief Summary:     HPI:  Charline Tovar is a 25 y.o. female with past medical history of T1DM who presents for hyperglyemia.  Patient states that he does feel like she is in DKA.  Patient states she woke up this morning and started feeling nauseous and multiple episodes of vomiting.  Patient additionally states that she feels dehydrated.  She checked her glucose at home which read high.  Patient states that she been having trouble at home with her insurance as they are not covering her medications.  Patient additionally states that she is completely out of medications at home.  She does state that she has a minimal headache rated 3 out of 10, no photophobia, no phonophobia.  Patient does state that she has been having some bilateral lower pelvic pain which is very similar to her abdominal pain when she has DKA.  Patient does state that the one different symptoms that she is having is the midsternal chest pain does not radiate.  Patient does not have any cardiac reaccess risk, does not take any OCPs.    Physical Exam   Triage vitals:  T 36.5 °C (97.7 °F)  HR 98  /72  RR 17  O2 100 % None (Room air)    General: Awake, alert, in no acute distress  Eyes: Gaze conjugate.  No scleral icterus or injection  HENT: Normo-cephalic, atraumatic. No stridor  CV: Regular rate, regular rhythm. Radial pulses 2+ bilaterally  Resp: Breathing non-labored, speaking in full sentences.  Clear to auscultation bilaterally  GI: Soft, non-distended, non-tender. No rebound or guarding.  : Deferred  MSK/Extremities: No gross bony deformities. Moving all extremities  Skin: Warm. Appropriate color  Neuro: A&Ox3.  No slurred speech.  No facial droop.  Symmetric smile.  Equal cheek puffing.  Normal sensation to light touch in V1-3.  5/5 strength shoulder shrug.  5/5 strength in bilateral UE and LE.  Normal  sensation to light touch in bilateral UE and LE.   No ataxic gait.  No tandem gait abnormalities.  Psych: Appropriate mood and affect    Medical Decision Making & ED Course   Medical Decision Makin y.o. female  with past medical history of T1DM who presents for hyperglykemia.  Presents slightly tachycardic to 10 5, not hypoxic, satting well at 100% on room air, afebrile.  Patient is nontoxic-appearing, in no acute distress.    This patient presents with hyperglycemia and symptoms concerning for DKA. Differential diagnosis includes other metabolic causes of hyperglycemia such as HHS, worsened diabetes or medication noncompliance. Considered possible causes of DKA to include infection (pancreatitis, UTI, pneumonia), infarction / ischemia (acute coronary syndrome, cerebral vascular accident), medication non-compliance with insulin therapy, illicit substance abuse, iatrogenic (including prescription medications and drug-drug interactions), idiopathic causes. Most likely etiology at this time is medication noncompliance. Plan to treat the hyperglycemia as below while simultaneously evaluating and treating potential underlying etiologies.    Plan: POC glucose monitoring (Q1H), RFP (Q4H), blood gas (Q2), UA, serum ketones, CBC, infectious workup (CHEST X-RAY), IVF, IV Insulin therapy, serial reassessment, admission for treatment of hyperglycemia.    Point-of-care glucose was found to be 578.  CBC showed no significant abnormalities with exception for thrombocytosis however likely hemoconcentrated.  CMP showed a hyperglycemia of 617, calculated anion gap of 26 with a depleted bicarb and hypochloremia.  These labs do indicate DKA.  20 mEq of potassium was given prior to insulin drip.    VBG showed a pH of 7.16, CO2 of 31, bicarb of 11 with a lactate of 2.6 which does indicate a metabolic acidosis.  Beta hydroxybutyrate is elevated.  Urinalysis showed 4+ glucose, 2+ blood, 4+ ketones however no evidence of urinary tract  infection.  Urine pregnancy is negative.    Repeat VBG showed a pH of 7.19, CO2 of 18, bicarb of 6.9 which does some show some improvement.  At this time, point-of-care glucose was 415.    Repeat blood glucose showed glucose of 290.  RFP showed a calculated anion gap of 20.  Creatinine kinase, phosphorus was within normal limits.    At this time, we spoke to the MICU and recommend admission which they agreed.    Repeat point-of-care glucose was 239 then 181.  VBG showed a pH of 7.25, CO2 of 26, bicarb of 11.4 which does still show some improvement however is still acidotic.  At this time, insulin drip was decreased to 5 units mg/kg.    Repeat blood care glucose shows glucose of 168.  Repeat RFP showed an anion gap of 8.  VBG showed a pH of 7.25, CO2 of 34, bicarb of 14.9.  Repeat point-of-care glucose was 127.  At this time, patient was p.o. challenged and is able to eat and drink.    Therefore, patient was given Lantus 22 units per home regimen.  Patient will continue to be on D10 drip and insulin infusion for another 2 hours to be appropriate.  At this time, patient is suitable for floor.  Patient has been handed off to oncoming rider Dr. Villatoro to continue to monitor and transition off insulin infusion.    Updates can be seen in ED course    ----  Scoring Tools Utilized: None     Social Determinants of Health which Significantly Impact Care: None identified The following actions were taken to address these social determinants: None    EKG Independent Interpretation: EKG interpreted by myself. Please see ED Course for full interpretation.    Independent Result Review and Interpretation: Relevant laboratory and radiographic results were reviewed and independently interpreted by myself.  As necessary, they are commented on in the ED Course.    Chronic conditions affecting the patient's care: As documented above in MDM    The patient was discussed with the following consultants/services: None    Care  Considerations: As documented above in [facility]    ED Course:  ED Course as of 03/16/25 2016   Sat Mar 15, 2025   0850 Senior staff note: Patient is a 25-year-old female with history of type 1 diabetes presenting the emergency department today for diabetic ketoacidosis.  Reports with past 2 to 4 days nausea and other Lantus because her insurance ([employer]) refuses to pay.  Has been having nausea and vomiting for the past 24 hours now.  Started on fluids in the emergency department.  Initial blood gas showed acidosis to 7.16 with elevated anion gap to 21.  Will get basic labs and plan for fluid/insulin drip and admission for endocrine consultation/social work evaluation to coordinate care. [AS]   0922 EKG shows sinus tachycardia, no interval abnormalities, no axis deviation, no ST elevations, no ST depressions, wave inversion seen in V1, aVR.  EKG appears similar to previous EKGs. [YG]   2055 Blood Gas Venous Full Panel(!)  VBG notable for stable pH at 7.30 and anion gap remains closed.  Blood sugar is slightly increased at 195 and will transition to D5 from the T10 that is currently running. [RS]      ED Course User Index  [AS] Oskar Romero MD  [RS] Scot Villatoro DO  [YG] Angelica Torres MD         Diagnoses as of 03/16/25 2016   Type 1 diabetes mellitus with ketoacidosis without coma     Disposition   Patient was signed out to Dr. Villatoro at 7 pm pending completion of their work-up.  Please see the next provider's transition of care note for the remainder of the patient's care.     Procedures   Procedures    Patient seen and discussed with ED attending physician.    Angelica Torres MD  Emergency Medicine     Angelica Torres MD  Resident  03/16/25 2016

## 2025-03-15 NOTE — PROGRESS NOTES
Charline Tovar is a 25 y.o. female on day 0 of admission    TCC contacted by patient's provider about patient's lack of insurance and being unable to afford insulin. TCC introduced self and role in care to patient. Patient reports that her insurance lapsed as she did not reapply in time. TCC received okay to give her information to hospital referrals for assistance with Medicaid screening. TCC sent information via email to hospital referrals for review. TCC will continue to follow patient for discharge planning.    Kathryn Kan, ANUSHA

## 2025-03-15 NOTE — ED PROCEDURE NOTE
Procedure  Critical Care    Performed by: Omar Byrd MD  Authorized by: Omar Byrd MD    Critical care provider statement:     Critical care time (minutes):  20    Critical care time was exclusive of:  Separately billable procedures and treating other patients and teaching time    Critical care was necessary to treat or prevent imminent or life-threatening deterioration of the following conditions:  Endocrine crisis    Critical care was time spent personally by me on the following activities:  Blood draw for specimens, development of treatment plan with patient or surrogate, examination of patient, evaluation of patient's response to treatment, ordering and performing treatments and interventions, ordering and review of laboratory studies, ordering and review of radiographic studies, pulse oximetry, re-evaluation of patient's condition and review of old charts             Omar Byrd MD  03/16/25 0829

## 2025-03-15 NOTE — ED TRIAGE NOTES
Pt presented to ED for c/o possible DKA. Pt stated that her glucose just read HIGH. Pt endorses N/V, dehydration. Glucose 578 in triage.

## 2025-03-16 VITALS
DIASTOLIC BLOOD PRESSURE: 72 MMHG | WEIGHT: 124.12 LBS | OXYGEN SATURATION: 99 % | SYSTOLIC BLOOD PRESSURE: 109 MMHG | HEART RATE: 98 BPM | RESPIRATION RATE: 16 BRPM | BODY MASS INDEX: 25.02 KG/M2 | TEMPERATURE: 97.9 F | HEIGHT: 59 IN

## 2025-03-16 LAB
ALBUMIN SERPL BCP-MCNC: 3.1 G/DL (ref 3.4–5)
ALBUMIN SERPL BCP-MCNC: 3.4 G/DL (ref 3.4–5)
ALBUMIN SERPL BCP-MCNC: 3.5 G/DL (ref 3.4–5)
ANION GAP BLDV CALCULATED.4IONS-SCNC: 16 MMOL/L (ref 10–25)
ANION GAP BLDV CALCULATED.4IONS-SCNC: 18 MMOL/L (ref 10–25)
ANION GAP BLDV CALCULATED.4IONS-SCNC: NORMAL MMOL/L
ANION GAP SERPL CALC-SCNC: 16 MMOL/L (ref 10–20)
ANION GAP SERPL CALC-SCNC: 18 MMOL/L (ref 10–20)
ANION GAP SERPL CALC-SCNC: 18 MMOL/L (ref 10–20)
BACTERIA UR CULT: NORMAL
BASE EXCESS BLDV CALC-SCNC: -10.3 MMOL/L (ref -2–3)
BASE EXCESS BLDV CALC-SCNC: -8.7 MMOL/L (ref -2–3)
BASE EXCESS BLDV CALC-SCNC: NORMAL MMOL/L
BASOPHILS # BLD AUTO: 0.06 X10*3/UL (ref 0–0.1)
BASOPHILS NFR BLD AUTO: 0.6 %
BODY TEMPERATURE: 37 DEGREES CELSIUS
BUN SERPL-MCNC: 5 MG/DL (ref 6–23)
BUN SERPL-MCNC: 6 MG/DL (ref 6–23)
BUN SERPL-MCNC: 8 MG/DL (ref 6–23)
CA-I BLDV-SCNC: 1.1 MMOL/L (ref 1.1–1.33)
CA-I BLDV-SCNC: 1.18 MMOL/L (ref 1.1–1.33)
CA-I BLDV-SCNC: NORMAL MMOL/L
CALCIUM SERPL-MCNC: 7.5 MG/DL (ref 8.6–10.6)
CALCIUM SERPL-MCNC: 8.1 MG/DL (ref 8.6–10.6)
CALCIUM SERPL-MCNC: 8.1 MG/DL (ref 8.6–10.6)
CHLORIDE BLDV-SCNC: 101 MMOL/L (ref 98–107)
CHLORIDE BLDV-SCNC: 104 MMOL/L (ref 98–107)
CHLORIDE BLDV-SCNC: NORMAL MMOL/L
CHLORIDE SERPL-SCNC: 105 MMOL/L (ref 98–107)
CHLORIDE SERPL-SCNC: 106 MMOL/L (ref 98–107)
CHLORIDE SERPL-SCNC: 109 MMOL/L (ref 98–107)
CO2 SERPL-SCNC: 14 MMOL/L (ref 21–32)
CO2 SERPL-SCNC: 14 MMOL/L (ref 21–32)
CO2 SERPL-SCNC: 17 MMOL/L (ref 21–32)
CREAT SERPL-MCNC: 0.6 MG/DL (ref 0.5–1.05)
CREAT SERPL-MCNC: 0.71 MG/DL (ref 0.5–1.05)
CREAT SERPL-MCNC: 0.79 MG/DL (ref 0.5–1.05)
EGFRCR SERPLBLD CKD-EPI 2021: >90 ML/MIN/1.73M*2
EOSINOPHIL # BLD AUTO: 0.14 X10*3/UL (ref 0–0.7)
EOSINOPHIL NFR BLD AUTO: 1.3 %
ERYTHROCYTE [DISTWIDTH] IN BLOOD BY AUTOMATED COUNT: 11.9 % (ref 11.5–14.5)
GLUCOSE BLD MANUAL STRIP-MCNC: 123 MG/DL (ref 74–99)
GLUCOSE BLD MANUAL STRIP-MCNC: 240 MG/DL (ref 74–99)
GLUCOSE BLD MANUAL STRIP-MCNC: 255 MG/DL (ref 74–99)
GLUCOSE BLD MANUAL STRIP-MCNC: 265 MG/DL (ref 74–99)
GLUCOSE BLD MANUAL STRIP-MCNC: 275 MG/DL (ref 74–99)
GLUCOSE BLD MANUAL STRIP-MCNC: 293 MG/DL (ref 74–99)
GLUCOSE BLD MANUAL STRIP-MCNC: 373 MG/DL (ref 74–99)
GLUCOSE BLDV-MCNC: 254 MG/DL (ref 74–99)
GLUCOSE BLDV-MCNC: 376 MG/DL (ref 74–99)
GLUCOSE BLDV-MCNC: NORMAL MG/DL
GLUCOSE SERPL-MCNC: 249 MG/DL (ref 74–99)
GLUCOSE SERPL-MCNC: 258 MG/DL (ref 74–99)
GLUCOSE SERPL-MCNC: 285 MG/DL (ref 74–99)
HCO3 BLDV-SCNC: 14.4 MMOL/L (ref 22–26)
HCO3 BLDV-SCNC: 16.6 MMOL/L (ref 22–26)
HCO3 BLDV-SCNC: NORMAL MMOL/L
HCT VFR BLD AUTO: 32.6 % (ref 36–46)
HCT VFR BLD EST: 37 % (ref 36–46)
HGB BLD-MCNC: 11.1 G/DL (ref 12–16)
HGB BLDV-MCNC: 12.3 G/DL (ref 12–16)
IMM GRANULOCYTES # BLD AUTO: 0.05 X10*3/UL (ref 0–0.7)
IMM GRANULOCYTES NFR BLD AUTO: 0.5 % (ref 0–0.9)
INHALED O2 CONCENTRATION: 21 %
LACTATE BLDV-SCNC: 1.2 MMOL/L (ref 0.4–2)
LACTATE BLDV-SCNC: 1.6 MMOL/L (ref 0.4–2)
LACTATE BLDV-SCNC: NORMAL MMOL/L
LYMPHOCYTES # BLD AUTO: 3.17 X10*3/UL (ref 1.2–4.8)
LYMPHOCYTES NFR BLD AUTO: 29.8 %
MAGNESIUM SERPL-MCNC: 1.72 MG/DL (ref 1.6–2.4)
MCH RBC QN AUTO: 29.5 PG (ref 26–34)
MCHC RBC AUTO-ENTMCNC: 34 G/DL (ref 32–36)
MCV RBC AUTO: 87 FL (ref 80–100)
MONOCYTES # BLD AUTO: 0.65 X10*3/UL (ref 0.1–1)
MONOCYTES NFR BLD AUTO: 6.1 %
NEUTROPHILS # BLD AUTO: 6.55 X10*3/UL (ref 1.2–7.7)
NEUTROPHILS NFR BLD AUTO: 61.7 %
NRBC BLD-RTO: 0 /100 WBCS (ref 0–0)
OXYHGB MFR BLDV: 54.6 % (ref 45–75)
OXYHGB MFR BLDV: 69.8 % (ref 45–75)
OXYHGB MFR BLDV: 94.1 % (ref 45–75)
PCO2 BLDV: 28 MM HG (ref 41–51)
PCO2 BLDV: 33 MM HG (ref 41–51)
PCO2 BLDV: NORMAL MM[HG]
PH BLDV: 7.31 PH (ref 7.33–7.43)
PH BLDV: 7.32 PH (ref 7.33–7.43)
PH BLDV: NORMAL [PH]
PHOSPHATE SERPL-MCNC: 1.9 MG/DL (ref 2.5–4.9)
PHOSPHATE SERPL-MCNC: 2.5 MG/DL (ref 2.5–4.9)
PHOSPHATE SERPL-MCNC: 2.6 MG/DL (ref 2.5–4.9)
PLATELET # BLD AUTO: 400 X10*3/UL (ref 150–450)
PO2 BLDV: 36 MM HG (ref 35–45)
PO2 BLDV: 76 MM HG (ref 35–45)
PO2 BLDV: NORMAL MM[HG]
POTASSIUM BLDV-SCNC: 4.3 MMOL/L (ref 3.5–5.3)
POTASSIUM BLDV-SCNC: 4.3 MMOL/L (ref 3.5–5.3)
POTASSIUM BLDV-SCNC: NORMAL MMOL/L
POTASSIUM SERPL-SCNC: 3.8 MMOL/L (ref 3.5–5.3)
POTASSIUM SERPL-SCNC: 3.9 MMOL/L (ref 3.5–5.3)
POTASSIUM SERPL-SCNC: 4.1 MMOL/L (ref 3.5–5.3)
RBC # BLD AUTO: 3.76 X10*6/UL (ref 4–5.2)
SAO2 % BLDV: 56 % (ref 45–75)
SAO2 % BLDV: 72 % (ref 45–75)
SAO2 % BLDV: 96 % (ref 45–75)
SODIUM BLDV-SCNC: 130 MMOL/L (ref 136–145)
SODIUM BLDV-SCNC: 131 MMOL/L (ref 136–145)
SODIUM BLDV-SCNC: NORMAL MMOL/L
SODIUM SERPL-SCNC: 134 MMOL/L (ref 136–145)
SODIUM SERPL-SCNC: 135 MMOL/L (ref 136–145)
SODIUM SERPL-SCNC: 136 MMOL/L (ref 136–145)
WBC # BLD AUTO: 10.6 X10*3/UL (ref 4.4–11.3)

## 2025-03-16 PROCEDURE — 84132 ASSAY OF SERUM POTASSIUM: CPT | Performed by: EMERGENCY MEDICINE

## 2025-03-16 PROCEDURE — 36415 COLL VENOUS BLD VENIPUNCTURE: CPT

## 2025-03-16 PROCEDURE — 36415 COLL VENOUS BLD VENIPUNCTURE: CPT | Performed by: EMERGENCY MEDICINE

## 2025-03-16 PROCEDURE — 84132 ASSAY OF SERUM POTASSIUM: CPT

## 2025-03-16 PROCEDURE — 80069 RENAL FUNCTION PANEL: CPT

## 2025-03-16 PROCEDURE — 83735 ASSAY OF MAGNESIUM: CPT

## 2025-03-16 PROCEDURE — 82947 ASSAY GLUCOSE BLOOD QUANT: CPT

## 2025-03-16 PROCEDURE — 2500000002 HC RX 250 W HCPCS SELF ADMINISTERED DRUGS (ALT 637 FOR MEDICARE OP, ALT 636 FOR OP/ED): Performed by: STUDENT IN AN ORGANIZED HEALTH CARE EDUCATION/TRAINING PROGRAM

## 2025-03-16 PROCEDURE — 99291 CRITICAL CARE FIRST HOUR: CPT

## 2025-03-16 PROCEDURE — 85025 COMPLETE CBC W/AUTO DIFF WBC: CPT

## 2025-03-16 PROCEDURE — 1100000001 HC PRIVATE ROOM DAILY

## 2025-03-16 PROCEDURE — 84132 ASSAY OF SERUM POTASSIUM: CPT | Performed by: STUDENT IN AN ORGANIZED HEALTH CARE EDUCATION/TRAINING PROGRAM

## 2025-03-16 PROCEDURE — 2500000001 HC RX 250 WO HCPCS SELF ADMINISTERED DRUGS (ALT 637 FOR MEDICARE OP)

## 2025-03-16 RX ORDER — INSULIN LISPRO 100 [IU]/ML
0-10 INJECTION, SOLUTION INTRAVENOUS; SUBCUTANEOUS
Status: DISCONTINUED | OUTPATIENT
Start: 2025-03-16 | End: 2025-03-17 | Stop reason: HOSPADM

## 2025-03-16 RX ORDER — ENOXAPARIN SODIUM 100 MG/ML
40 INJECTION SUBCUTANEOUS DAILY
Status: DISCONTINUED | OUTPATIENT
Start: 2025-03-16 | End: 2025-03-17 | Stop reason: HOSPADM

## 2025-03-16 RX ORDER — ACETAMINOPHEN 325 MG/1
975 TABLET ORAL EVERY 6 HOURS PRN
Status: DISCONTINUED | OUTPATIENT
Start: 2025-03-16 | End: 2025-03-17 | Stop reason: HOSPADM

## 2025-03-16 RX ORDER — DEXTROSE 50 % IN WATER (D50W) INTRAVENOUS SYRINGE
25
Status: DISCONTINUED | OUTPATIENT
Start: 2025-03-16 | End: 2025-03-17 | Stop reason: HOSPADM

## 2025-03-16 RX ADMIN — INSULIN LISPRO 6 UNITS: 100 INJECTION, SOLUTION INTRAVENOUS; SUBCUTANEOUS at 14:03

## 2025-03-16 RX ADMIN — INSULIN LISPRO 6 UNITS: 100 INJECTION, SOLUTION INTRAVENOUS; SUBCUTANEOUS at 18:04

## 2025-03-16 RX ADMIN — INSULIN GLARGINE 22 UNITS: 100 INJECTION, SOLUTION SUBCUTANEOUS at 21:26

## 2025-03-16 RX ADMIN — ACETAMINOPHEN 975 MG: 325 TABLET ORAL at 04:27

## 2025-03-16 SDOH — ECONOMIC STABILITY: FOOD INSECURITY: HOW HARD IS IT FOR YOU TO PAY FOR THE VERY BASICS LIKE FOOD, HOUSING, MEDICAL CARE, AND HEATING?: NOT VERY HARD

## 2025-03-16 SDOH — SOCIAL STABILITY: SOCIAL INSECURITY: ARE THERE ANY APPARENT SIGNS OF INJURIES/BEHAVIORS THAT COULD BE RELATED TO ABUSE/NEGLECT?: NO

## 2025-03-16 SDOH — SOCIAL STABILITY: SOCIAL INSECURITY: WITHIN THE LAST YEAR, HAVE YOU BEEN HUMILIATED OR EMOTIONALLY ABUSED IN OTHER WAYS BY YOUR PARTNER OR EX-PARTNER?: NO

## 2025-03-16 SDOH — SOCIAL STABILITY: SOCIAL INSECURITY: WITHIN THE LAST YEAR, HAVE YOU BEEN AFRAID OF YOUR PARTNER OR EX-PARTNER?: NO

## 2025-03-16 SDOH — ECONOMIC STABILITY: FOOD INSECURITY: WITHIN THE PAST 12 MONTHS, YOU WORRIED THAT YOUR FOOD WOULD RUN OUT BEFORE YOU GOT THE MONEY TO BUY MORE.: NEVER TRUE

## 2025-03-16 SDOH — SOCIAL STABILITY: SOCIAL INSECURITY: ABUSE: ADULT

## 2025-03-16 SDOH — ECONOMIC STABILITY: HOUSING INSECURITY: IN THE PAST 12 MONTHS, HOW MANY TIMES HAVE YOU MOVED WHERE YOU WERE LIVING?: 0

## 2025-03-16 SDOH — ECONOMIC STABILITY: FOOD INSECURITY: HOW HARD IS IT FOR YOU TO PAY FOR THE VERY BASICS LIKE FOOD, HOUSING, MEDICAL CARE, AND HEATING?: VERY HARD

## 2025-03-16 SDOH — ECONOMIC STABILITY: INCOME INSECURITY: IN THE PAST 12 MONTHS HAS THE ELECTRIC, GAS, OIL, OR WATER COMPANY THREATENED TO SHUT OFF SERVICES IN YOUR HOME?: NO

## 2025-03-16 SDOH — SOCIAL STABILITY: SOCIAL INSECURITY: WERE YOU ABLE TO COMPLETE ALL THE BEHAVIORAL HEALTH SCREENINGS?: YES

## 2025-03-16 SDOH — SOCIAL STABILITY: SOCIAL INSECURITY: ARE YOU OR HAVE YOU BEEN THREATENED OR ABUSED PHYSICALLY, EMOTIONALLY, OR SEXUALLY BY ANYONE?: NO

## 2025-03-16 SDOH — ECONOMIC STABILITY: HOUSING INSECURITY: AT ANY TIME IN THE PAST 12 MONTHS, WERE YOU HOMELESS OR LIVING IN A SHELTER (INCLUDING NOW)?: NO

## 2025-03-16 SDOH — ECONOMIC STABILITY: FOOD INSECURITY: WITHIN THE PAST 12 MONTHS, THE FOOD YOU BOUGHT JUST DIDN'T LAST AND YOU DIDN'T HAVE MONEY TO GET MORE.: NEVER TRUE

## 2025-03-16 SDOH — SOCIAL STABILITY: SOCIAL INSECURITY: DO YOU FEEL ANYONE HAS EXPLOITED OR TAKEN ADVANTAGE OF YOU FINANCIALLY OR OF YOUR PERSONAL PROPERTY?: NO

## 2025-03-16 SDOH — ECONOMIC STABILITY: HOUSING INSECURITY: IN THE LAST 12 MONTHS, WAS THERE A TIME WHEN YOU WERE NOT ABLE TO PAY THE MORTGAGE OR RENT ON TIME?: NO

## 2025-03-16 SDOH — SOCIAL STABILITY: SOCIAL INSECURITY: DOES ANYONE TRY TO KEEP YOU FROM HAVING/CONTACTING OTHER FRIENDS OR DOING THINGS OUTSIDE YOUR HOME?: NO

## 2025-03-16 SDOH — ECONOMIC STABILITY: TRANSPORTATION INSECURITY: IN THE PAST 12 MONTHS, HAS LACK OF TRANSPORTATION KEPT YOU FROM MEDICAL APPOINTMENTS OR FROM GETTING MEDICATIONS?: NO

## 2025-03-16 SDOH — SOCIAL STABILITY: SOCIAL INSECURITY: HAS ANYONE EVER THREATENED TO HURT YOUR FAMILY OR YOUR PETS?: NO

## 2025-03-16 SDOH — SOCIAL STABILITY: SOCIAL INSECURITY: DO YOU FEEL UNSAFE GOING BACK TO THE PLACE WHERE YOU ARE LIVING?: NO

## 2025-03-16 SDOH — SOCIAL STABILITY: SOCIAL INSECURITY: HAVE YOU HAD THOUGHTS OF HARMING ANYONE ELSE?: NO

## 2025-03-16 SDOH — SOCIAL STABILITY: SOCIAL INSECURITY: HAVE YOU HAD ANY THOUGHTS OF HARMING ANYONE ELSE?: NO

## 2025-03-16 ASSESSMENT — LIFESTYLE VARIABLES
AUDIT-C TOTAL SCORE: 0
HOW OFTEN DO YOU HAVE A DRINK CONTAINING ALCOHOL: NEVER
SKIP TO QUESTIONS 9-10: 1
HOW MANY STANDARD DRINKS CONTAINING ALCOHOL DO YOU HAVE ON A TYPICAL DAY: PATIENT DOES NOT DRINK
HOW OFTEN DO YOU HAVE 6 OR MORE DRINKS ON ONE OCCASION: NEVER
AUDIT-C TOTAL SCORE: 0

## 2025-03-16 ASSESSMENT — COGNITIVE AND FUNCTIONAL STATUS - GENERAL
DAILY ACTIVITIY SCORE: 24
MOBILITY SCORE: 24
DAILY ACTIVITIY SCORE: 24
DAILY ACTIVITIY SCORE: 24
MOBILITY SCORE: 24
MOBILITY SCORE: 24
PATIENT BASELINE BEDBOUND: NO

## 2025-03-16 ASSESSMENT — PAIN SCALES - GENERAL
PAINLEVEL_OUTOF10: 0 - NO PAIN
PAINLEVEL_OUTOF10: 7

## 2025-03-16 ASSESSMENT — ACTIVITIES OF DAILY LIVING (ADL)
LACK_OF_TRANSPORTATION: NO
FEEDING YOURSELF: INDEPENDENT
GROOMING: INDEPENDENT
HEARING - LEFT EAR: FUNCTIONAL
LACK_OF_TRANSPORTATION: NO
LACK_OF_TRANSPORTATION: NO
TOILETING: INDEPENDENT
LACK_OF_TRANSPORTATION: NO
BATHING: INDEPENDENT
HEARING - RIGHT EAR: FUNCTIONAL
ADEQUATE_TO_COMPLETE_ADL: YES
WALKS IN HOME: INDEPENDENT
PATIENT'S MEMORY ADEQUATE TO SAFELY COMPLETE DAILY ACTIVITIES?: YES
JUDGMENT_ADEQUATE_SAFELY_COMPLETE_DAILY_ACTIVITIES: YES
DRESSING YOURSELF: INDEPENDENT

## 2025-03-16 ASSESSMENT — PAIN - FUNCTIONAL ASSESSMENT
PAIN_FUNCTIONAL_ASSESSMENT: 0-10
PAIN_FUNCTIONAL_ASSESSMENT: 0-10

## 2025-03-16 ASSESSMENT — ENCOUNTER SYMPTOMS
VOMITING: 0
ABDOMINAL PAIN: 0
NAUSEA: 0
HEADACHES: 1

## 2025-03-16 ASSESSMENT — PAIN SCALES - PAIN ASSESSMENT IN ADVANCED DEMENTIA (PAINAD): TOTALSCORE: MEDICATION (SEE MAR)

## 2025-03-16 ASSESSMENT — PAIN DESCRIPTION - LOCATION: LOCATION: OTHER (COMMENT)

## 2025-03-16 NOTE — PROGRESS NOTES
Emergency Department Transition of Care Note       Signout   I received Charline Tovar in signout from Dr. Angelica Torres.  Please see the ED Provider Note for all HPI, PE and MDM up to the time of signout at 1900.  This is in addition to the primary record.    In brief Charline Tovar is an 25 y.o. female presenting for DKA secondary to medication noncompliance because of some insurance complications.  The patient was found to be in DKA here and started on an insulin drip.  The patient was accepted to the MICU but was found to have improving labs and now her gap is closed.  The previous team had just ordered her home long-acting insulin with plans to wean off of the insulin drip and hopefully admit the patient to the regular nursing floor.    At the time of signout we were awaiting:  Weaning off the insulin drip and reevaluation.    ED Course & Medical Decision Making   Medical Decision Making:  Under my care, the patient remained hemodynamically stable and is in no acute distress.  She was able to tolerate p.o. intake prior to administration of her long-acting insulin.  She was transitioned to D5 as her blood glucose had started to increase but then was unable to tolerate any additional p.o. intake.  Her blood sugar was found to be 66 and the insulin drip was immediately shut off.  She was provided with 12.5 g of D50 and her blood sugar increased into the 280s with this.  Patient continued to not be able to tolerate p.o. intake and blood sugar continued to rise with a repeat VBG showing improvement in her acidosis but her gap did reopen and therefore was requiring the insulin drip again.  I did discuss this again with the ICU team who was able to transfer her up to the floor where she will be started on an insulin drip again in the setting of being unable to tolerate p.o. intake and recurrent DKA.  The patient was transported upstairs in stable condition.    ED Course:  ED Course as of 03/16/25 0327   Sat Mar  15, 2025   9155 Senior staff note: Patient is a 25-year-old female with history of type 1 diabetes presenting the emergency department today for diabetic ketoacidosis.  Reports with past 2 to 4 days nausea and other Lantus because her insurance ([employer]) refuses to pay.  Has been having nausea and vomiting for the past 24 hours now.  Started on fluids in the emergency department.  Initial blood gas showed acidosis to 7.16 with elevated anion gap to 21.  Will get basic labs and plan for fluid/insulin drip and admission for endocrine consultation/social work evaluation to coordinate care. [AS]   0922 EKG shows sinus tachycardia, no interval abnormalities, no axis deviation, no ST elevations, no ST depressions, wave inversion seen in V1, aVR.  EKG appears similar to previous EKGs. [YG]   2055 Blood Gas Venous Full Panel(!)  VBG notable for stable pH at 7.30 and anion gap remains closed.  Blood sugar is slightly increased at 195 and will transition to D5 from the T10 that is currently running. [RS]      ED Course User Index  [AS] Oskar Romero MD  [RS] Scot Villatoro DO  [YG] Angelica Torres MD         Diagnoses as of 03/16/25 0327   Type 1 diabetes mellitus with ketoacidosis without coma       Disposition   As a result of their workup, the patient will require admission to the hospital.  The patient was informed of her diagnosis.  The patient was given the opportunity to ask questions and I answered them. The patient agreed to be admitted to the hospital.    Procedures   Procedures    Patient seen and discussed with ED attending physician.    Scot Villatoro DO  Emergency Medicine

## 2025-03-16 NOTE — H&P
Medical Intensive Care - History and Physical   Subjective    Charline Tovar is a 25 y.o. year old female patient admitted on 3/15/2025 with following ICU needs: DKA    HPI:  Charline Tovar is a 25yoF with PMH DM type 1 (dx at age 19, HgbA1c 12.1 3/2025) presenting with nausea/vomiting and dehydration with high blood sugar at home due medication shortage from issues with insurance coverage of her long acting insulin. She also reports headache.     Upon presentation to ER, patient was afebrile, tachycardic (97.7 F, , 114/72, RR 17, 100% on RA). Initial workup significant for hyperglycemia glucose 617, relative hyponatremia Na 131 corrected to 135, HCO3 9, AG 26, B-hydroxybutyrate 7.71 and VBG pH 7.16, pCO2 31, lactate 2.6. UA with glucose 4+, ketones 4+, LE 25. A1c 12.1. Patient was given 1 L LR, 1 L NS and started on LR mIVF 250 mL/hR with improvement in AG and acidosis. In the ED, given tylenol 650mg once, toradol 15mg once, 1L LR and 1L NS, Zofran 4mg once, K40. She was started on an insulin drip and her gap closed and she tolerated eating some crackers and was given 22 units of lantus (home dose). Later on in the night, her gap reopened, so insulin drip was restarted briefly. However, insulin drip was stopped in ED and patient arrived to MICU off drip.     On evaluation in MICU, patient reports headache but resolution of her other symptoms. POCT glucose 240. She has a history of multiple prior episodes of DKA due to missing insulin, most recently in 12/2024 when she left AMA without insulin supplies. Patient’s HgbA1c has not been below 11 on  labs since 2019. She sees Dr. Pierce in endo. Pt states she last took her lantus a month ago and has just been carb counting and using her humalog.     Review of Systems:  Review of Systems   Gastrointestinal:  Negative for abdominal pain, nausea and vomiting.   Neurological:  Positive for headaches.          Meds    Home medications:  Current Outpatient  "Medications   Medication Instructions    insulin glargine (Lantus Solostar U-100 Insulin) 100 unit/mL (3 mL) pen Inject 20 units at bedtime daily as directed    insulin lispro (HumaLOG) 100 unit/mL injection Take 2 units per every 50 mg, if sugars > 150 mg.     Take 3 times a day before meals.    pen needle, diabetic (BD Елена 2nd Gen Pen Needle) 32 gauge x 5/32\" needle Use 4 a day with insulin pens as directed        Inpatient medications:  Scheduled medications  enoxaparin, 40 mg, subcutaneous, Daily  insulin glargine, 22 Units, subcutaneous, Nightly  insulin lispro, 0-10 Units, subcutaneous, TID AC      Continuous medications  D5 % and 0.9 % sodium chloride, 150 mL/hr, Last Rate: Stopped (03/15/25 2223)  dextrose 10 % in water (D10W), 150 mL/hr, Last Rate: Stopped (03/15/25 2059)  dextrose 10 % in water (D10W), 150 mL/hr  lactated Ringer's, 250 mL/hr, Last Rate: Stopped (03/15/25 1300)      PRN medications  PRN medications: acetaminophen, D5 % and 0.9 % sodium chloride, dextrose 10 % in water (D10W), dextrose 10 % in water (D10W), dextrose, dextrose, glucagon, glucagon     Objective    Blood pressure 107/76, pulse 95, temperature 36 °C (96.8 °F), temperature source Oral, resp. rate 16, height 1.499 m (4' 11\"), weight 56.3 kg (124 lb 1.9 oz), SpO2 100%.     Physical Exam  Constitutional:       General: She is not in acute distress.     Appearance: Normal appearance.   HENT:      Mouth/Throat:      Mouth: Mucous membranes are moist.      Pharynx: Oropharynx is clear.   Eyes:      General: No scleral icterus.     Extraocular Movements: Extraocular movements intact.      Conjunctiva/sclera: Conjunctivae normal.      Pupils: Pupils are equal, round, and reactive to light.   Cardiovascular:      Rate and Rhythm: Normal rate and regular rhythm.   Pulmonary:      Effort: Pulmonary effort is normal.      Breath sounds: Normal breath sounds. No wheezing or rhonchi.   Abdominal:      General: Abdomen is flat. Bowel sounds " are normal.      Palpations: Abdomen is soft.   Musculoskeletal:      Right lower leg: No edema.      Left lower leg: No edema.   Neurological:      Mental Status: She is alert and oriented to person, place, and time. Mental status is at baseline.          No intake or output data in the 24 hours ending 03/16/25 0427  Labs:   Results from last 72 hours   Lab Units 03/16/25  0004 03/15/25  2047 03/15/25  1600   SODIUM mmol/L 134* 132* 138  138   POTASSIUM mmol/L 3.8 3.9 4.8  4.8   CHLORIDE mmol/L 106 107 115*  115*   CO2 mmol/L 14* 17* 15*  15*   BUN mg/dL 8 9 10  10   CREATININE mg/dL 0.71 0.68 0.76  0.76   GLUCOSE mg/dL 285* 173* 164*  164*   CALCIUM mg/dL 8.1* 8.1* 8.2*  8.2*   ANION GAP mmol/L 18 12 13  13   EGFR mL/min/1.73m*2 >90 >90 >90  >90   PHOSPHORUS mg/dL 2.5 2.4* 1.9*      Results from last 72 hours   Lab Units 03/15/25  0754   WBC AUTO x10*3/uL 11.3   HEMOGLOBIN g/dL 13.5   HEMATOCRIT % 40.4   PLATELETS AUTO x10*3/uL 510*   NEUTROS PCT AUTO % 79.6   LYMPHS PCT AUTO % 15.3   MONOS PCT AUTO % 3.4   EOS PCT AUTO % 0.2          Results from last 72 hours   Lab Units 03/16/25  0143 03/15/25  2322 03/15/25  2229   POCT PH, VENOUS pH 7.32* 7.32* 7.30*   POCT PCO2, VENOUS mm Hg 28* 32* 30*   POCT PO2, VENOUS mm Hg 76* 41 52*        Micro/ID:     Lab Results   Component Value Date    URINECULTURE  12/22/2024     Clinically insignificant growth based on current clinical standards.       Summary of Key Imaging Results  See imaging     Assessment and Plan     Assessment:  Charline Tovar is a 25 y.o. year old female patient admitted to the MICU on 03/16/25 for type 1 diabetes with DKA.     Mechanical Ventilation: none  Sedation/Analgesia:  none  Restraints: no    Plan:   NEUROLOGIC  No active issues    CARDIOVASCULAR        No active issues     PULMONARY  No active issues  On room air     RENAL/  #AGMA 2/2 DKA  -2+ blood but no RBCs on UA, glucose, ketones   -CK WNL    GASTROINTESTINAL  #N/V 2/2  DKA  -resolved    ENDOCRINE  #Type 1 diabetes c/b DKA  -ODILIA antibody positive 2019, negative Islet ab  -home insulin regimen: 22u at bedtime lantus, and 2u SSI  -tiggered by missing doses d/t not being able to afford medication through insurance  -A1c 12.1%  -BHB elevated   -s/p insulin drip and 22 units @2000  -POCT glucose q1 then BIDAC  -IVF pending repeat labs   -Monitor RFP  -Social work for financial assistance with medications       ICU Check List     ICU Liberation: Intervention:   Assess, Prevent, Manage Pain 0 - No pain    Both SAT and SBT [] SAT  [] SBT 30-60 min [] Extubate to NC  [] Extubate to NIV  [] Discuss Trach   Choice of analgesia and sedation Leiva Agitation Sedation Scale (RASS): Alert and calm none     Delirium: Assess, prevent and manage  CAM ICU        Early Mobility and Exercise   [] PT /OT consult   Family Engagement and Empowerment []Family updated []SW consult     FEN  Fluids: s/p 2L IVF, D5NS  Electrolytes: monitor RFP  Nutrition: diabetic   Prophylaxis:  DVT ppx: lovenox  GI ppx: not indicated  Bowel care: as needed  Hardware:                   Social:  Code: Full Code    HPOA: sister Alida [phone]   Disposition: NICKOLAS Dixon MD   03/16/25 at 4:27 AM     Disclaimer: Documentation completed with the information available at the time of input. The times in the chart may not be reflective of actual patient care times, interventions, or procedures. Documentation occurs after the physical care of the patient.

## 2025-03-16 NOTE — PROGRESS NOTES
"Medical Intensive Care - Daily Progress Note   Subjective    Charline Tovar is a 25 y.o. year old female patient admitted on 3/15/2025 with following ICU needs: here for DKA with expeected need for drip - after arrival drip no longer needed and anion gap closed     Interval History:  Gap closed - feeling good -  eating and drinking, no nausea    Meds    Scheduled medications  [Transfer Hold] enoxaparin, 40 mg, subcutaneous, Daily  insulin glargine, 22 Units, subcutaneous, Nightly  [Transfer Hold] insulin lispro, 0-10 Units, subcutaneous, TID AC      Continuous medications  D5 % and 0.9 % sodium chloride, 150 mL/hr, Last Rate: Stopped (03/15/25 2223)  dextrose 10 % in water (D10W), 150 mL/hr, Last Rate: Stopped (03/15/25 2059)  dextrose 10 % in water (D10W), 150 mL/hr  lactated Ringer's, 250 mL/hr, Last Rate: Stopped (03/15/25 1300)      PRN medications  PRN medications: [Transfer Hold] acetaminophen, D5 % and 0.9 % sodium chloride, dextrose 10 % in water (D10W), dextrose 10 % in water (D10W), [Transfer Hold] dextrose, dextrose, [Transfer Hold] glucagon, [Transfer Hold] glucagon     Objective    Blood pressure 119/84, pulse 99, temperature 37 °C (98.6 °F), temperature source Temporal, resp. rate 14, height 1.499 m (4' 11\"), weight 56.3 kg (124 lb 1.9 oz), SpO2 100%.     Physical Exam   Physical Exam  Constitutional:       General: She is not in acute distress.     Appearance: Normal appearance. She is normal weight. She is not ill-appearing.   HENT:      Head: Normocephalic and atraumatic.      Mouth/Throat:      Mouth: Mucous membranes are moist.      Pharynx: Oropharynx is clear. No oropharyngeal exudate or posterior oropharyngeal erythema.   Eyes:      Extraocular Movements: Extraocular movements intact.      Pupils: Pupils are equal, round, and reactive to light.   Cardiovascular:      Rate and Rhythm: Normal rate and regular rhythm.      Pulses: Normal pulses.      Heart sounds: Normal heart sounds. No murmur " heard.     No friction rub. No gallop.   Pulmonary:      Effort: Pulmonary effort is normal. No respiratory distress.      Breath sounds: Normal breath sounds. No stridor. No wheezing, rhonchi or rales.   Chest:      Chest wall: No tenderness.   Abdominal:      General: Abdomen is flat.      Palpations: Abdomen is soft.      Tenderness: There is no abdominal tenderness. There is no guarding or rebound.   Skin:     General: Skin is warm and dry.      Capillary Refill: Capillary refill takes less than 2 seconds.   Neurological:      Mental Status: She is alert and oriented to person, place, and time.     No intake or output data in the 24 hours ending 03/16/25 1117  Labs:   Results from last 72 hours   Lab Units 03/16/25  0359 03/16/25  0004 03/15/25  2047   SODIUM mmol/L 135* 134* 132*   POTASSIUM mmol/L 3.9 3.8 3.9   CHLORIDE mmol/L 109* 106 107   CO2 mmol/L 14* 14* 17*   BUN mg/dL 5* 8 9   CREATININE mg/dL 0.60 0.71 0.68   GLUCOSE mg/dL 258* 285* 173*   CALCIUM mg/dL 7.5* 8.1* 8.1*   ANION GAP mmol/L 16 18 12   EGFR mL/min/1.73m*2 >90 >90 >90   PHOSPHORUS mg/dL 2.6 2.5 2.4*      Results from last 72 hours   Lab Units 03/16/25  0359 03/15/25  0754   WBC AUTO x10*3/uL 10.6 11.3   HEMOGLOBIN g/dL 11.1* 13.5   HEMATOCRIT % 32.6* 40.4   PLATELETS AUTO x10*3/uL 400 510*   NEUTROS PCT AUTO % 61.7 79.6   LYMPHS PCT AUTO % 29.8 15.3   MONOS PCT AUTO % 6.1 3.4   EOS PCT AUTO % 1.3 0.2        Micro/ID:     Lab Results   Component Value Date    URINECULTURE  03/15/2025     Clinically insignificant growth based on current clinical standards.       Summary of key imaging results from the last 24 hours  CXR - no acute process    Assessment and Plan     Assessment: Charline Tovar is a 25 y.o. year old female patient admitted on 3/15/2025 with T1DM DKA     Summary for 03/16/25  :  Anion gap closed   Eating and drinking - no N/V   Got basal insulin  Ready for floor    Plan:   NEUROLOGIC  No active issues     CARDIOVASCULAR         No active issues      PULMONARY  No active issues  On room air      RENAL/  #AGMA 2/2 DKA  -2+ blood but no RBCs on UA, glucose, ketones   -CK WNL     GASTROINTESTINAL  #N/V 2/2 DKA  -resolved    ENDOCRINE  #Type 1 diabetes c/b DKA  -ODILIA antibody positive 2019, negative Islet ab  -home insulin regimen: 22u at bedtime lantus, and 2u SSI  -tiggered by missing doses d/t not being able to afford medication through insurance  -A1c 12.1%  -BHB elevated   -s/p insulin drip and 22 units @2000  -POCT glucose q1 then BIDAC  -IVF pending repeat labs   -Monitor RFP  -Social work for financial assistance with medications     HEMATOLOGY:  VIVIANE    MUSCULOSKELETAL/ SKIN:  VIVIANE    INFECTIOUS DISEASE:  VIVIANE    ICU Check List         FEN  Fluids: s/p 2L IVF, D5NS  Electrolytes: monitor RFP  Nutrition: diabetic   Prophylaxis:  DVT ppx: lovenox  GI ppx: not indicated  Bowel care: as needed  Hardware:                        Social:  Code: Full Code    HPOA: sister Alida [phone]  Disposition: floor    Neil Lyon MD   03/16/25 at 11:17 AM     Disclaimer: Documentation completed with the information available at the time of input. The times in the chart may not be reflective of actual patient care times, interventions, or procedures. Documentation occurs after the physical care of the patient.

## 2025-03-16 NOTE — SIGNIFICANT EVENT
Floor Readiness Note       I, personally, evaluated Charline Tovar prior to transfer to the floor, including reviewing all current laboratory and imaging studies. The patient remains appropriate for transfer to the floor. Bedside nurse and respiratory therapy are also in agreement of patient's readiness for the floor.     Brief summary:  Charline Tovar is a 25 y.o. female who was admitted to the MICU on 3/15 for DKA. They have been treated with insulin drip which was not needed after arrival - has gotten lantus and eating now.     Updated focused Physical Exam:  Well appearing, not diaphoretic   Heart: RRR  Lungs: CTAB   Abdomen clear    Current Vital Signs:  Heart Rate: 99 (03/16/25 0900 : User, System Default)  BP: 119/84 (03/16/25 0900 : User, System Default)  Temp: 37 °C (98.6 °F) (03/16/25 0700 : Brandie Hernandez)  Resp: 14 (03/16/25 0900 : User, System Default)  SpO2: 100 % (03/16/25 0900 : User, System Default)    Relevant updates since rounds:  None      Neil Lyon MD

## 2025-03-16 NOTE — CARE PLAN
The patient's goals for the shift include      The clinical goals for the shift include accu checks      Problem: Skin  Goal: Decreased wound size/increased tissue granulation at next dressing change  Outcome: Progressing  Goal: Participates in plan/prevention/treatment measures  Outcome: Progressing  Goal: Prevent/manage excess moisture  Outcome: Progressing  Goal: Prevent/minimize sheer/friction injuries  Outcome: Progressing  Goal: Promote/optimize nutrition  Outcome: Progressing  Goal: Promote skin healing  Outcome: Progressing     Problem: Safety - Adult  Goal: Free from fall injury  Outcome: Progressing     Problem: Chronic Conditions and Co-morbidities  Goal: Patient's chronic conditions and co-morbidity symptoms are monitored and maintained or improved  Outcome: Progressing

## 2025-03-16 NOTE — SIGNIFICANT EVENT
ICU to [facility] Transfer Summary     I:  ICU Admission Reason & Brief ICU Course:    Admitted overnight in DKA    C: Code Status/DPOA Info/Goals of Care/ACP Note    Full Code  DPOA/Contact Number: Alida Tovar (Sister) [phone] (Mobile)     U: Unprescribing & Pertinent High-Risk Medications    Changes to home meds: none - back on lantus which was not on at home      Anticoagulation: lovenox    Antibiotics:   [x] N/A - no current planned antimicrobioals    P: Pending Tests at the Time of Transfer   None pending      A: Active consultants, including Rehab:   []  Subspecialty Consultants:   []  PT  []  OT  []  SLP  []  Wound Care  [x]Social work    U: Uncertainty Measure/Diagnostic Pause:    Working diagnosis at the time of transfer DKA - resolved , though ddx includes HHS      Diagnosis Degree of Certainty: 1. High degree of certainty about the clinical diagnosis.     S: Summary of Major Problems and To-Dos:   #assist with discharge planning and long acting insulin as patient has been without for nearly 1 month   # monitor to normalization and readiness for d/c      To-do list prior to transfer:  [x]none      E: Exam, including Lines/Drains/Airways & Data Review:   Physical Exam  Constitutional:       General: She is not in acute distress.     Appearance: Normal appearance. She is normal weight. She is not ill-appearing.   HENT:      Head: Normocephalic and atraumatic.      Mouth/Throat:      Mouth: Mucous membranes are moist.      Pharynx: Oropharynx is clear. No oropharyngeal exudate or posterior oropharyngeal erythema.   Eyes:      Extraocular Movements: Extraocular movements intact.      Pupils: Pupils are equal, round, and reactive to light.   Cardiovascular:      Rate and Rhythm: Normal rate and regular rhythm.      Pulses: Normal pulses.      Heart sounds: Normal heart sounds. No murmur heard.     No friction rub. No gallop.   Pulmonary:      Effort: Pulmonary effort is normal. No respiratory distress.       Breath sounds: Normal breath sounds. No stridor. No wheezing, rhonchi or rales.   Chest:      Chest wall: No tenderness.   Abdominal:      General: Abdomen is flat.      Palpations: Abdomen is soft.      Tenderness: There is no abdominal tenderness. There is no guarding or rebound.   Skin:     General: Skin is warm and dry.      Capillary Refill: Capillary refill takes less than 2 seconds.   Neurological:      Mental Status: She is alert and oriented to person, place, and time        Difficult airway? N/A  Lines/drains assessed for removal? PIV - needed if rebound    Within 30 minutes of the patient physically leaving the floor, a Floor Readiness Note needs to be placed with updated vitals.

## 2025-03-16 NOTE — HOSPITAL COURSE
Charline Tovar is a 25 y.o. year old female patient with   admitted for DKA 2/2 T1DM precipitated  by inability to afford basal insulin due to issue with insurance.  Arrived to hospital with signs of DKA on 3/15.  Arrived to MICU as gap closed and drip no longer needed. On 3/16 was transferred from MICU to floor

## 2025-03-16 NOTE — PROGRESS NOTES
03/16/25 1323   Discharge Planning   Living Arrangements Children;Family members  (Home with dtr and grandmother.)   Support Systems Family members   Assistance Needed None.   Type of Residence Private residence   Who is requesting discharge planning? Patient   Home or Post Acute Services None   Expected Discharge Disposition Home   Does the patient need discharge transport arranged? No  (Pt's sister will provide.)   Financial Resource Strain   How hard is it for you to pay for the very basics like food, housing, medical care, and heating? Very Hard   Housing Stability   At any time in the past 12 months, were you homeless or living in a shelter (including now)? N   Transportation Needs   In the past 12 months, has lack of transportation kept you from medical appointments or from getting medications? no   In the past 12 months, has lack of transportation kept you from meetings, work, or from getting things needed for daily living? No   Intensity of Service   Intensity of Service 0-30 min     Assessment Note:  Met with pt and introduced myself as care coordinator and member of the Care Transitions team for discharge planning.   Pt was independent prior to admission.  Pt works part-time as a [employer] (pt will need a return to work slip).  Pt drives to any [employer] appts.  Pt's address, phone number and contact information was verified.  Pt is listed as self-pay due to lapse of insurance.  ED TCC sent a referral to [facility] to screen for Medicaid eligibility.  Pt will require meds to beds for discharge prescriptions.  Pt does not have any other questions/concerns at this time.     Previous Home Care: None.  DME: CGM (does not needs need refills).  Pharmacy: [facility]. Pt could not afford her insulin due to lapse of insurance.  Falls: Denies.  PCP:   Endocrinologist Kay Victoria (pt cannot recall the name of the last visit).    Bernie Aly MSN, RN-BC  Transitional Care Coordinator (TCC)  [phone]

## 2025-03-17 ENCOUNTER — PHARMACY VISIT (OUTPATIENT)
Dept: PHARMACY | Facility: CLINIC | Age: 25
End: 2025-03-17
Payer: COMMERCIAL

## 2025-03-17 VITALS
HEIGHT: 59 IN | WEIGHT: 124.12 LBS | DIASTOLIC BLOOD PRESSURE: 66 MMHG | HEART RATE: 84 BPM | OXYGEN SATURATION: 99 % | BODY MASS INDEX: 25.02 KG/M2 | RESPIRATION RATE: 17 BRPM | SYSTOLIC BLOOD PRESSURE: 105 MMHG | TEMPERATURE: 97.9 F

## 2025-03-17 PROBLEM — E10.10 TYPE 1 DIABETES MELLITUS WITH KETOACIDOSIS WITHOUT COMA: Status: RESOLVED | Noted: 2025-03-15 | Resolved: 2025-03-17

## 2025-03-17 LAB
ALBUMIN SERPL BCP-MCNC: 3.2 G/DL (ref 3.4–5)
ANION GAP BLDV CALCULATED.4IONS-SCNC: 15 MMOL/L (ref 10–25)
ANION GAP SERPL CALC-SCNC: 12 MMOL/L (ref 10–20)
BASE EXCESS BLDV CALC-SCNC: -11 MMOL/L (ref -2–3)
BASOPHILS # BLD AUTO: 0.07 X10*3/UL (ref 0–0.1)
BASOPHILS NFR BLD AUTO: 1 %
BODY TEMPERATURE: 37 DEGREES CELSIUS
BUN SERPL-MCNC: 8 MG/DL (ref 6–23)
CA-I BLDV-SCNC: 1.18 MMOL/L (ref 1.1–1.33)
CALCIUM SERPL-MCNC: 8.1 MG/DL (ref 8.6–10.6)
CHLORIDE BLDV-SCNC: 105 MMOL/L (ref 98–107)
CHLORIDE SERPL-SCNC: 105 MMOL/L (ref 98–107)
CO2 SERPL-SCNC: 24 MMOL/L (ref 21–32)
CREAT SERPL-MCNC: 0.6 MG/DL (ref 0.5–1.05)
EGFRCR SERPLBLD CKD-EPI 2021: >90 ML/MIN/1.73M*2
EOSINOPHIL # BLD AUTO: 0.23 X10*3/UL (ref 0–0.7)
EOSINOPHIL NFR BLD AUTO: 3.2 %
ERYTHROCYTE [DISTWIDTH] IN BLOOD BY AUTOMATED COUNT: 11.8 % (ref 11.5–14.5)
GLUCOSE BLD MANUAL STRIP-MCNC: 140 MG/DL (ref 74–99)
GLUCOSE BLD MANUAL STRIP-MCNC: 334 MG/DL (ref 74–99)
GLUCOSE BLDV-MCNC: 289 MG/DL (ref 74–99)
GLUCOSE SERPL-MCNC: 167 MG/DL (ref 74–99)
HCO3 BLDV-SCNC: 14.6 MMOL/L (ref 22–26)
HCT VFR BLD AUTO: 36.5 % (ref 36–46)
HCT VFR BLD EST: 37 % (ref 36–46)
HGB BLD-MCNC: 11.9 G/DL (ref 12–16)
HGB BLDV-MCNC: 12.2 G/DL (ref 12–16)
IMM GRANULOCYTES # BLD AUTO: 0.02 X10*3/UL (ref 0–0.7)
IMM GRANULOCYTES NFR BLD AUTO: 0.3 % (ref 0–0.9)
INHALED O2 CONCENTRATION: 21 %
LACTATE BLDV-SCNC: 2.4 MMOL/L (ref 0.4–2)
LYMPHOCYTES # BLD AUTO: 2.9 X10*3/UL (ref 1.2–4.8)
LYMPHOCYTES NFR BLD AUTO: 40.3 %
MAGNESIUM SERPL-MCNC: 1.65 MG/DL (ref 1.6–2.4)
MCH RBC QN AUTO: 29.8 PG (ref 26–34)
MCHC RBC AUTO-ENTMCNC: 32.6 G/DL (ref 32–36)
MCV RBC AUTO: 91 FL (ref 80–100)
MONOCYTES # BLD AUTO: 0.45 X10*3/UL (ref 0.1–1)
MONOCYTES NFR BLD AUTO: 6.3 %
NEUTROPHILS # BLD AUTO: 3.53 X10*3/UL (ref 1.2–7.7)
NEUTROPHILS NFR BLD AUTO: 48.9 %
NRBC BLD-RTO: 0 /100 WBCS (ref 0–0)
OXYHGB MFR BLDV: 73 % (ref 45–75)
PCO2 BLDV: 31 MM HG (ref 41–51)
PH BLDV: 7.28 PH (ref 7.33–7.43)
PHOSPHATE SERPL-MCNC: 2.2 MG/DL (ref 2.5–4.9)
PLATELET # BLD AUTO: 417 X10*3/UL (ref 150–450)
PO2 BLDV: 47 MM HG (ref 35–45)
POTASSIUM BLDV-SCNC: 4 MMOL/L (ref 3.5–5.3)
POTASSIUM SERPL-SCNC: 3.4 MMOL/L (ref 3.5–5.3)
RBC # BLD AUTO: 4 X10*6/UL (ref 4–5.2)
SAO2 % BLDV: 74 % (ref 45–75)
SODIUM BLDV-SCNC: 131 MMOL/L (ref 136–145)
SODIUM SERPL-SCNC: 138 MMOL/L (ref 136–145)
WBC # BLD AUTO: 7.2 X10*3/UL (ref 4.4–11.3)

## 2025-03-17 PROCEDURE — RXMED WILLOW AMBULATORY MEDICATION CHARGE

## 2025-03-17 PROCEDURE — 2500000005 HC RX 250 GENERAL PHARMACY W/O HCPCS: Performed by: STUDENT IN AN ORGANIZED HEALTH CARE EDUCATION/TRAINING PROGRAM

## 2025-03-17 PROCEDURE — 82947 ASSAY GLUCOSE BLOOD QUANT: CPT

## 2025-03-17 PROCEDURE — 83735 ASSAY OF MAGNESIUM: CPT | Performed by: STUDENT IN AN ORGANIZED HEALTH CARE EDUCATION/TRAINING PROGRAM

## 2025-03-17 PROCEDURE — 2500000001 HC RX 250 WO HCPCS SELF ADMINISTERED DRUGS (ALT 637 FOR MEDICARE OP): Performed by: STUDENT IN AN ORGANIZED HEALTH CARE EDUCATION/TRAINING PROGRAM

## 2025-03-17 PROCEDURE — 36415 COLL VENOUS BLD VENIPUNCTURE: CPT | Performed by: STUDENT IN AN ORGANIZED HEALTH CARE EDUCATION/TRAINING PROGRAM

## 2025-03-17 PROCEDURE — 2500000002 HC RX 250 W HCPCS SELF ADMINISTERED DRUGS (ALT 637 FOR MEDICARE OP, ALT 636 FOR OP/ED): Performed by: STUDENT IN AN ORGANIZED HEALTH CARE EDUCATION/TRAINING PROGRAM

## 2025-03-17 PROCEDURE — 99239 HOSP IP/OBS DSCHRG MGMT >30: CPT | Performed by: STUDENT IN AN ORGANIZED HEALTH CARE EDUCATION/TRAINING PROGRAM

## 2025-03-17 PROCEDURE — 85025 COMPLETE CBC W/AUTO DIFF WBC: CPT | Performed by: STUDENT IN AN ORGANIZED HEALTH CARE EDUCATION/TRAINING PROGRAM

## 2025-03-17 PROCEDURE — 2500000004 HC RX 250 GENERAL PHARMACY W/ HCPCS (ALT 636 FOR OP/ED): Performed by: STUDENT IN AN ORGANIZED HEALTH CARE EDUCATION/TRAINING PROGRAM

## 2025-03-17 PROCEDURE — 80069 RENAL FUNCTION PANEL: CPT | Performed by: STUDENT IN AN ORGANIZED HEALTH CARE EDUCATION/TRAINING PROGRAM

## 2025-03-17 RX ORDER — PEN NEEDLE, DIABETIC 30 GX3/16"
1 NEEDLE, DISPOSABLE MISCELLANEOUS 4 TIMES DAILY
Qty: 400 EACH | Refills: 3 | Status: SHIPPED | OUTPATIENT
Start: 2025-03-17 | End: 2025-06-15

## 2025-03-17 RX ORDER — INSULIN GLARGINE 100 [IU]/ML
22 INJECTION, SOLUTION SUBCUTANEOUS NIGHTLY
Qty: 15 ML | Refills: 1 | Status: SHIPPED | OUTPATIENT
Start: 2025-03-17 | End: 2025-05-24

## 2025-03-17 RX ORDER — BLOOD-GLUCOSE CONTROL, NORMAL
1 EACH MISCELLANEOUS 4 TIMES DAILY
Qty: 400 EACH | Refills: 3 | Status: SHIPPED | OUTPATIENT
Start: 2025-03-17

## 2025-03-17 RX ORDER — INSULIN LISPRO 100 [IU]/ML
INJECTION, SOLUTION INTRAVENOUS; SUBCUTANEOUS
Qty: 15 ML | Refills: 11 | Status: SHIPPED | OUTPATIENT
Start: 2025-03-17

## 2025-03-17 RX ORDER — ISOPROPYL ALCOHOL 70 ML/100ML
1 SWAB TOPICAL 4 TIMES DAILY
Qty: 400 EACH | Refills: 3 | Status: SHIPPED | OUTPATIENT
Start: 2025-03-17 | End: 2025-06-15

## 2025-03-17 RX ADMIN — DEXTROSE MONOHYDRATE 21 MMOL: 50 INJECTION, SOLUTION INTRAVENOUS at 09:56

## 2025-03-17 RX ADMIN — POTASSIUM PHOSPHATE, MONOBASIC 1000 MG: 500 TABLET, SOLUBLE ORAL at 17:54

## 2025-03-17 RX ADMIN — INSULIN LISPRO 8 UNITS: 100 INJECTION, SOLUTION INTRAVENOUS; SUBCUTANEOUS at 17:48

## 2025-03-17 NOTE — CARE PLAN
The patient's goals for the shift include      The clinical goals for the shift include maintain blood sugar levels WNL

## 2025-03-17 NOTE — DISCHARGE SUMMARY
"Date of Admission: 3/15/2025    Date of Discharge: 3/17/2025    Discharge Diagnosis  Type 1 diabetes mellitus with ketoacidosis without coma  Hypokalemia  Hypophosphatemia  Medication non-adherence     Discharge Meds     Your medication list        START taking these medications        Instructions Last Dose Given Next Dose Due   alcohol swabs pads, medicated      Apply 1 each topically 4 times a day. Use prior to checking glucose or injecting insulin       glucagon 3 mg/actuation spray,non-aerosol      Administer 1 spray into affected nostril(s) every 15 minutes if needed (symptomatic low blood sugar <70 and unable to take orals).       lancets misc      1 each 4 times a day. Use to check glucose 4 times daily, before meals and at bedtime              CHANGE how you take these medications        Instructions Last Dose Given Next Dose Due   pen needle, diabetic 32 gauge x 5/32\" needle  Commonly known as: BD Елена 2nd Gen Pen Needle  What changed: Another medication with the same name was added. Make sure you understand how and when to take each.      Use 4 a day with insulin pens as directed       pen needle, diabetic 32 gauge x 5/32\" needle  What changed: You were already taking a medication with the same name, and this prescription was added. Make sure you understand how and when to take each.      1 each 4 times a day. Use to inject insulin 4 times daily              CONTINUE taking these medications        Instructions Last Dose Given Next Dose Due   insulin lispro 100 unit/mL pen  Commonly known as: HumaLOG      Take 2 units per every 50 mg, if sugars > 150 mg.     Take 3 times a day before meals.       Lantus Solostar U-100 Insulin 100 unit/mL (3 mL) pen  Generic drug: insulin glargine      Inject 22 Units under the skin once daily at bedtime.                 Where to Get Your Medications        These medications were sent to [facility]  [address], [address], [place] [id number]      " "Hours: 8AM to 6PM Mon-Fri, 8AM to 4PM Sat, 9AM to 1PM Sun Phone: [phone]   alcohol swabs pads, medicated  glucagon 3 mg/actuation spray,non-aerosol  insulin lispro 100 unit/mL pen  lancets misc  Lantus Solostar U-100 Insulin 100 unit/mL (3 mL) pen  pen needle, diabetic 32 gauge x 5/32\" needle         Test Results Pending At Discharge  Pending Labs       Order Current Status    BLOOD GAS VENOUS FULL PANEL In process    Blood Gas Venous In process            Hospital Course  Charline Tovar is a 25 y.o. year old female patient with   admitted for DKA 2/2 T1DM precipitated  by inability to afford basal insulin due to issue with insurance.  Arrived to hospital with signs of DKA on 3/15. Arrived to MICU as gap closed and drip no longer needed. On 3/16 was transferred from MICU to floor. She was continued on her home regimen. Glucose controlled. SW consulted, screened for medicaid and letter sent to [facility] for medication coverage. Pt pescribed her home regimen and needed testing supplies, given information on blue [place], referred to pcp, endo and  healthy at [place] for follow up. DKA is in setting of missing medications 2/2 financial burden. Pt will need to arrange follow up for referrals.. Discharge plan of care discussed, education and counseling provided involving active problem care plan, warning signs,  risks/benefits of new medications or medication changes, follow-up care and testing.  Patient advised to follow-up with her primary care within 1 week of discharge or the next available for posthospitalization transition of care.  There was verbalized understanding and agreement with discharge care plan.    Pertinent Physical Exam At Time of Discharge  On the day of discharge, No acute distress, interactive, no increased work of breathing, regular rate and rhythm, abdomen soft/nontender, no edema.    Outpatient Follow-Up  No future appointments.  PCP, endocrine,  healthy at home referrals, need " scheduling    Pt instructed to take all medications as prescribed.  Keep all follow-up appointments.  Contact their primary care physician with any questions or concerns that arise.  Come to the emergency department with worsening of your symptoms or any other medical emergency.        Time spent >30 minutes on discharge management.    Patric Garcia MD

## 2025-03-17 NOTE — SIGNIFICANT EVENT
March 17, 2025     Patient: Charline Tovar   YOB: 2000   Date of Visit: 3/15/2025       To Whom It May Concern:    Charline Tovar was seen at [facility] on 3/15/2025 until 3/17/2025.  Please excuse Charline for her absence from work on these days.   Pt may return to work on 3/18/2025  with no restrictions.      Sincerely,         Patric Garcia MD  Hospitalist Medicine  [facility]

## 2025-03-17 NOTE — DISCHARGE INSTRUCTIONS
You were admitted for management of diabetic ketoacidosis after you missed insulin from running out. You glucose has stabilized and you are ready to return home to outpatient follow up. You were seen for insurance/financial concerns for your medication and a letter has been sent to [facility] that you are now medicaid pending status and can received medications from [facility]. Your insulin and additional testing supplies will be prescribed.     Follow-up with primary care within 7 to 10 days or next soonest available for post-hospitalization assessment and examination.     Please schedule follow up with your endocrinologist.  Please have your diabetes regimen evaluated for need for meal time insulin dosing to limit your need for sliding scale correction dose and get better control of your A1c.     You were referred to [facility] at [facility] .  They are a telehealth program that is multidisciplinary and can follow you up to about a month after discharge to assist with diabetes management and to address any barriers to your care that you are having.  This will not replace your outpatient visits.  Please look out for a phone call from them after discharge to schedule your appointments.     Please take all medications as prescribed and keep all follow-up appointments.  Please contact your primary care physician with any questions or concerns that arise.  You may contact your outpatient specialists office for any questions regarding specifics relating to their recommendations. Please monitor your symptoms and come to the emergency department with worsening of your symptoms, severe chest pain, shortness of breath, or any other medical emergency or concerns for your health.    If you face challenges with controlling your type 1 diabetes you may seek additional assistance through the [employer] program. Information can be found online. They are a non-profit virtual program for specific states, including [place].

## 2025-03-17 NOTE — PROGRESS NOTES
Patient screened by [facility] and they have accepted for Medicaid. [employer] sent letter to [employer] for medication coverage. MD rdz.   Nataly Cruz BSN, RN  Transitional Care Coordinator (TCC)  ([phone])

## 2025-03-19 ENCOUNTER — DOCUMENTATION (OUTPATIENT)
Dept: BEHAVIORAL HEALTH | Facility: CLINIC | Age: 25
End: 2025-03-19
Payer: COMMERCIAL

## 2025-03-19 ENCOUNTER — PATIENT OUTREACH (OUTPATIENT)
Dept: CARE COORDINATION | Age: 25
End: 2025-03-19

## 2025-03-19 DIAGNOSIS — E10.10 DIABETIC KETOACIDOSIS WITHOUT COMA ASSOCIATED WITH TYPE 1 DIABETES MELLITUS (MULTI): Primary | ICD-10-CM

## 2025-03-19 LAB
BASE EXCESS BLDV CALC-SCNC: -17.5 MMOL/L (ref -2–3)
BODY TEMPERATURE: 37 DEGREES CELSIUS
HCO3 BLDV-SCNC: 8.2 MMOL/L (ref 22–26)
INHALED O2 CONCENTRATION: 21 %
OXYHGB MFR BLDV: 95.3 % (ref 45–75)
PCO2 BLDV: 20 MM HG (ref 41–51)
PH BLDV: 7.22 PH (ref 7.33–7.43)
PO2 BLDV: 86 MM HG (ref 35–45)
SAO2 % BLDV: 97 % (ref 45–75)

## 2025-03-19 SDOH — ECONOMIC STABILITY: HOUSING INSECURITY: IN THE LAST 12 MONTHS, WAS THERE A TIME WHEN YOU WERE NOT ABLE TO PAY THE MORTGAGE OR RENT ON TIME?: NO

## 2025-03-19 SDOH — ECONOMIC STABILITY: FOOD INSECURITY: WITHIN THE PAST 12 MONTHS, THE FOOD YOU BOUGHT JUST DIDN'T LAST AND YOU DIDN'T HAVE MONEY TO GET MORE.: NEVER TRUE

## 2025-03-19 SDOH — SOCIAL STABILITY: SOCIAL INSECURITY: WITHIN THE LAST YEAR, HAVE YOU BEEN AFRAID OF YOUR PARTNER OR EX-PARTNER?: NO

## 2025-03-19 SDOH — HEALTH STABILITY: MENTAL HEALTH: HOW OFTEN DO YOU HAVE SIX OR MORE DRINKS ON ONE OCCASION?: NEVER

## 2025-03-19 SDOH — ECONOMIC STABILITY: HOUSING INSECURITY: AT ANY TIME IN THE PAST 12 MONTHS, WERE YOU HOMELESS OR LIVING IN A SHELTER (INCLUDING NOW)?: NO

## 2025-03-19 SDOH — ECONOMIC STABILITY: TRANSPORTATION INSECURITY: IN THE PAST 12 MONTHS, HAS LACK OF TRANSPORTATION KEPT YOU FROM MEDICAL APPOINTMENTS OR FROM GETTING MEDICATIONS?: NO

## 2025-03-19 SDOH — ECONOMIC STABILITY: FOOD INSECURITY: HOW HARD IS IT FOR YOU TO PAY FOR THE VERY BASICS LIKE FOOD, HOUSING, MEDICAL CARE, AND HEATING?: VERY HARD

## 2025-03-19 SDOH — ECONOMIC STABILITY: INCOME INSECURITY: IN THE PAST 12 MONTHS HAS THE ELECTRIC, GAS, OIL, OR WATER COMPANY THREATENED TO SHUT OFF SERVICES IN YOUR HOME?: NO

## 2025-03-19 SDOH — SOCIAL STABILITY: SOCIAL INSECURITY: ARE YOU MARRIED, WIDOWED, DIVORCED, SEPARATED, NEVER MARRIED, OR LIVING WITH A PARTNER?: NEVER MARRIED

## 2025-03-19 SDOH — HEALTH STABILITY: PHYSICAL HEALTH: ON AVERAGE, HOW MANY MINUTES DO YOU ENGAGE IN EXERCISE AT THIS LEVEL?: 0 MIN

## 2025-03-19 SDOH — HEALTH STABILITY: PHYSICAL HEALTH: ON AVERAGE, HOW MANY DAYS PER WEEK DO YOU ENGAGE IN MODERATE TO STRENUOUS EXERCISE (LIKE A BRISK WALK)?: 0 DAYS

## 2025-03-19 SDOH — SOCIAL STABILITY: SOCIAL INSECURITY: WITHIN THE LAST YEAR, HAVE YOU BEEN HUMILIATED OR EMOTIONALLY ABUSED IN OTHER WAYS BY YOUR PARTNER OR EX-PARTNER?: NO

## 2025-03-19 SDOH — SOCIAL STABILITY: SOCIAL NETWORK: HOW OFTEN DO YOU ATTEND MEETINGS OF THE CLUBS OR ORGANIZATIONS YOU BELONG TO?: NEVER

## 2025-03-19 SDOH — ECONOMIC STABILITY: FOOD INSECURITY: WITHIN THE PAST 12 MONTHS, YOU WORRIED THAT YOUR FOOD WOULD RUN OUT BEFORE YOU GOT THE MONEY TO BUY MORE.: NEVER TRUE

## 2025-03-19 SDOH — HEALTH STABILITY: MENTAL HEALTH
DO YOU FEEL STRESS - TENSE, RESTLESS, NERVOUS, OR ANXIOUS, OR UNABLE TO SLEEP AT NIGHT BECAUSE YOUR MIND IS TROUBLED ALL THE TIME - THESE DAYS?: NOT AT ALL

## 2025-03-19 SDOH — SOCIAL STABILITY: SOCIAL NETWORK: IN A TYPICAL WEEK, HOW MANY TIMES DO YOU TALK ON THE PHONE WITH FAMILY, FRIENDS, OR NEIGHBORS?: TWICE A WEEK

## 2025-03-19 SDOH — SOCIAL STABILITY: SOCIAL NETWORK: HOW OFTEN DO YOU ATTEND CHURCH OR RELIGIOUS SERVICES?: NEVER

## 2025-03-19 SDOH — HEALTH STABILITY: MENTAL HEALTH: HOW OFTEN DO YOU HAVE A DRINK CONTAINING ALCOHOL?: NEVER

## 2025-03-19 SDOH — HEALTH STABILITY: MENTAL HEALTH: HOW MANY DRINKS CONTAINING ALCOHOL DO YOU HAVE ON A TYPICAL DAY WHEN YOU ARE DRINKING?: PATIENT DOES NOT DRINK

## 2025-03-19 SDOH — SOCIAL STABILITY: SOCIAL NETWORK: HOW OFTEN DO YOU GET TOGETHER WITH FRIENDS OR RELATIVES?: TWICE A WEEK

## 2025-03-19 SDOH — ECONOMIC STABILITY: HOUSING INSECURITY: IN THE PAST 12 MONTHS, HOW MANY TIMES HAVE YOU MOVED WHERE YOU WERE LIVING?: 0

## 2025-03-19 SDOH — SOCIAL STABILITY: SOCIAL NETWORK
DO YOU BELONG TO ANY CLUBS OR ORGANIZATIONS SUCH AS CHURCH GROUPS, UNIONS, FRATERNAL OR ATHLETIC GROUPS, OR SCHOOL GROUPS?: NO

## 2025-03-19 ASSESSMENT — ACTIVITIES OF DAILY LIVING (ADL): LACK_OF_TRANSPORTATION: NO

## 2025-03-19 ASSESSMENT — LIFESTYLE VARIABLES
AUDIT-C TOTAL SCORE: 0
SKIP TO QUESTIONS 9-10: 1

## 2025-03-19 NOTE — PROGRESS NOTES
Patient agreeable to [facility] ([facility]y [facility] Home). Enrollment call completed and program overview and expectations were explained to patient, with appropriate numbers/info given. Initial visit is 3/20 @ 1000 via video.    [facility] [place] - type 1 Dm. Pt had issues affording insulin. Pt treated in ICU. Pt screened and applied for medicaid. Sent home with medication regimen and missing dm supplies and appropriate drs referrals ordered. No concerns noted during enrollment call other than what is documented here, which was obtained via chart review of dc summary.

## 2025-03-20 ENCOUNTER — APPOINTMENT (OUTPATIENT)
Dept: CARE COORDINATION | Age: 25
End: 2025-03-20

## 2025-03-20 ENCOUNTER — APPOINTMENT (OUTPATIENT)
Dept: PHARMACY | Facility: HOSPITAL | Age: 25
End: 2025-03-20

## 2025-03-20 NOTE — PROGRESS NOTES
[facility] Home Virtual Visit     Admission Date: 3/15  Discharge Date: 3/17  Discharging Facility: [facility]  PCP: Dr. Garcia  [facility] Visit: Initial     Admission Dx and Associated Referral Dx: T1DM, Hypokalemia, hypophosphatemia      Brief summary of hospitalization or reason for referral: 25 y.o. year old female patient with admitted for DKA 2/2 T1DM precipitated by inability to afford basal insulin due to issue with insurance. Arrived to hospital with signs of DKA on 3/15. She was started on an inuslin gtt until her gap closed. On 3/16 was transferred from MICU to floor. She was continued on her home regimen. Glucose controlled. SW consulted, screened for medicaid and letter sent to [facility] for medication coverage. Pt pescribed her home regimen and needed testing supplies, given information on blue [place], referred to pcp, endo and  healthy at home for follow up. DKA is in setting of missing medications 2/2 financial burden.       Interval Subjective: *No show     BP:  HR:   SPO2:   Glucose:   Weight:     Masimo: {[facility] Home Masimo:89026}  Oxygen: {[facility] Oxygen:22322}             Medications Issues/Refill need  Medication Follow up action needed:      Interval or Pertinent Labs/Testing:  Lab Review:   Hemoglobin A1C   Date/Time Value Ref Range Status   03/15/2025 07:54 AM 12.1 (H) See comment % Final   12/22/2024 08:37 PM 11.5 (H) See comment % Final   05/05/2023 06:20 PM 12.4 (A) % Final     Comment:          Diagnosis of Diabetes-Adults   Non-Diabetic: < or = 5.6%   Increased risk for developing diabetes: 5.7-6.4%   Diagnostic of diabetes: > or = 6.5%  .       Monitoring of Diabetes                Age (y)     Therapeutic Goal (%)   Adults:          >18           <7.0   Pediatrics:    13-18           <7.5                   7-12           <8.0                   0- 6            7.5-8.5   American Diabetes Association. Diabetes Care 33(S1), Jan 2010.     02/09/2021 12:00 AM 12.8 % Final      "Comment:          Diagnosis of Diabetes-Adults   Non-Diabetic: < or = 5.6%   Increased risk for developing diabetes: 5.7-6.4%   Diagnostic of diabetes: > or = 6.5%  .       Monitoring of Diabetes                Age (y)     Therapeutic Goal (%)   Adults:          >18           <7.0   Pediatrics:    13-18           <7.5                   7-12           <8.0                   0- 6            7.5-8.5   American Diabetes Association. Diabetes Care 33(S1), Jan 2010.     10/15/2020 03:56 PM 14.9 % Final     Comment:          Diagnosis of Diabetes-Adults   Non-Diabetic: < or = 5.6%   Increased risk for developing diabetes: 5.7-6.4%   Diagnostic of diabetes: > or = 6.5%  .       Monitoring of Diabetes                Age (y)     Therapeutic Goal (%)   Adults:          >18           <7.0   Pediatrics:    13-18           <7.5                   7-12           <8.0                   0- 6            7.5-8.5   American Diabetes Association. Diabetes Care 33(S1), Jan 2010.         {Recent labs:12916::\"not applicable\"}     Social Determinants of Health:  Medication Finances:  {[facility]28[facility]}  Transportation:  {[facility]66882}  Access to Food:  {[facility]73647}           Assessment/Plan    Type 1 DM  - has had several hospital admissions for DKA in the last year  -She has now applied for medicaid- this is pending. For now her medications are covered under internal fund with  until she is active  -continued on lantus 22 units nightly and humalog 2 units if sugars are greater than 150 TID    Hypokalemia  Hypophosphatemia  K 3.4 while in ED, Phos 2.2  Will repeat lab work this week and follow results         Upcoming Appointments:     Telemedicine Visit:  Patient Location during Visit:  [place]  Visit Modality:  {AJHVCvisittype:56822}  Additional Visit Participants:  {[facility] Hospital[facility]visitparticipant:11222}  Patient/Proxy verbally consents to Evaluation and Treatment     Suzanne HANLEY, STEPHANE  General Internal " Medicine  [facility] at [facility]

## 2025-03-21 ENCOUNTER — PATIENT OUTREACH (OUTPATIENT)
Dept: CARE COORDINATION | Age: 25
End: 2025-03-21

## 2025-03-21 NOTE — PROGRESS NOTES
Daily Call Note:   1345 - Daily call completed with pt.  She states she is doing well today.  AM glucose 144.  Will begin to take BP daily and track it.  She is without questions or concerns today.  Rescheduled her missed Initial HHVC for 3/22 at 1000.    Pt Education: POC  Barriers: none  Topics for Daily Review: daily call / resched missed HHVC  Pt demonstrates clear understanding: Yes    Daily Weight:  There were no vitals filed for this visit.   Last 3 Weights:  Wt Readings from Last 7 Encounters:   03/16/25 56.3 kg (124 lb 1.9 oz)   12/22/24 58.1 kg (128 lb)   07/06/24 54.4 kg (120 lb)   05/20/24 54.1 kg (119 lb 4.3 oz)   04/25/24 59 kg (130 lb)   04/09/24 59 kg (130 lb)   03/11/24 55.7 kg (122 lb 14.4 oz)       Masimo Device: No   Masimo Clinical Impression: n/a    Virtual Visits--Scheduled (Most Recent Date at Top)  Follow up Appointments  Recent Visits  No visits were found meeting these conditions.  Showing recent visits within past 30 days and meeting all other requirements  Future Appointments  No visits were found meeting these conditions.  Showing future appointments within next 90 days and meeting all other requirements       Frequency of RN Calls & Virtual Visits per Team Agreement: [facility] at Home Frequency: Daily    Medication issues Addressed (what was done): none    Follow up appointments scheduled by [facility] Staff: none  Referrals made by [facility] staff: none

## 2025-03-22 ENCOUNTER — PATIENT OUTREACH (OUTPATIENT)
Dept: CARE COORDINATION | Age: 25
End: 2025-03-22

## 2025-03-22 ENCOUNTER — TELEMEDICINE (OUTPATIENT)
Dept: CARE COORDINATION | Age: 25
End: 2025-03-22

## 2025-03-22 DIAGNOSIS — R73.9 HYPERGLYCEMIA: ICD-10-CM

## 2025-03-22 DIAGNOSIS — E10.10 DIABETIC KETOACIDOSIS WITHOUT COMA ASSOCIATED WITH TYPE 1 DIABETES MELLITUS (MULTI): Primary | ICD-10-CM

## 2025-03-22 NOTE — PROGRESS NOTES
Healthy at Home Virtual Visit      Admission Date: 3/15/2025  Discharge Date: 3/17/25  Discharging Facility:  [facility]  Insurance: Medicaid pending  [facility] Visit: First     Brief summary of hospitalization or reason for referral  DM-1 poorly controlled    Admission Dx and Associated Referral Dx:   -Diagnosed with diabetes at age 19  -Admitted for diabetic ketoacidosis  -Patient admits that she was unable to obtain her Lantus and due to that she went into ketoacidosis, started on an insulin drip, anion gap closed and discharged on Lantus 22 units at night, and 8 to 10 units of lispro per meal.    Interval Subjective:     -She currently has no active complaints, denies nausea, vomiting denies any abdominal symptoms  -Typically checks her blood sugar every 2-3 hours.  -BS: 102 AM; 144 at lunch; 140s in PM  -Lntus 22units; 8-10units per meal.  -Medications reviewed    Masimo: No  Oxygen: No     CPAP/BIPAP: No    Wt Readings from Last 3 Encounters:   03/16/25 56.3 kg (124 lb 1.9 oz)   12/22/24 58.1 kg (128 lb)   07/06/24 54.4 kg (120 lb)       Medications Issues/Refill need  Medication Follow up action needed: Not    Requested Prescriptions      No prescriptions requested or ordered in this encounter       Upcoming Visits:  Recent Visits  No visits were found meeting these conditions.  Showing recent visits within past 30 days and meeting all other requirements  Future Appointments  No visits were found meeting these conditions.  Showing future appointments within next 90 days and meeting all other requirements      Interval or Pertinent Labs/Testing:  Lab Review:   Hemoglobin A1C   Date/Time Value Ref Range Status   03/15/2025 07:54 AM 12.1 (H) See comment % Final   12/22/2024 08:37 PM 11.5 (H) See comment % Final   05/05/2023 06:20 PM 12.4 (A) % Final     Comment:          Diagnosis of Diabetes-Adults   Non-Diabetic: < or = 5.6%   Increased risk for developing diabetes: 5.7-6.4%   Diagnostic of  diabetes: > or = 6.5%  .       Monitoring of Diabetes                Age (y)     Therapeutic Goal (%)   Adults:          >18           <7.0   Pediatrics:    13-18           <7.5                   7-12           <8.0                   0- 6            7.5-8.5   American Diabetes Association. Diabetes Care 33(S1), Jan 2010.     02/09/2021 12:00 AM 12.8 % Final     Comment:          Diagnosis of Diabetes-Adults   Non-Diabetic: < or = 5.6%   Increased risk for developing diabetes: 5.7-6.4%   Diagnostic of diabetes: > or = 6.5%  .       Monitoring of Diabetes                Age (y)     Therapeutic Goal (%)   Adults:          >18           <7.0   Pediatrics:    13-18           <7.5                   7-12           <8.0                   0- 6            7.5-8.5   American Diabetes Association. Diabetes Care 33(S1), Jan 2010.     10/15/2020 03:56 PM 14.9 % Final     Comment:          Diagnosis of Diabetes-Adults   Non-Diabetic: < or = 5.6%   Increased risk for developing diabetes: 5.7-6.4%   Diagnostic of diabetes: > or = 6.5%  .       Monitoring of Diabetes                Age (y)     Therapeutic Goal (%)   Adults:          >18           <7.0   Pediatrics:    13-18           <7.5                   7-12           <8.0                   0- 6            7.5-8.5   American Diabetes Association. Diabetes Care 33(S1), Jan 2010.         not applicable     [facility] Concerns & Interventions:     Assessment/Plan  # Type 1 diabetes, poorly controlled  -Hemoglobin A1c 12.1  -Currently remains on Lantus 22 units nightly, lispro 8 to 10 units, per carb count  -Pharmacy to look at if patient would be eligible for a continuous glucose monitor.  -Endocrinology follow-up  - PC to assist with PCP follow-up    Prevention:  # Diabetic foot health:  -Referral made for podiatry, patient has not seen a podiatrist  # Diabetic ocular health  -She admits that she did see an ophthalmologist over 6 months ago who advised her to obtain glasses.  Referral  made for ophthalmology follow-up.    Telemedicine Visit:  Patient Location during Visit:  [place]  Visit Modality:  Video  Additional Visit Participants:  None  Patient/Proxy verbally consents to Evaluation and Treatment

## 2025-03-22 NOTE — PROGRESS NOTES
Daily Call Note:     Initial [facility] call with the patient, Dr. Gilliland, and the Nurse.  Pt states she is doing okay.  She checks her BS every 2-3 hours.  Provider mentioned CGM.  Will schedule next call with the Pharmacists.  BS this morning 102.  Typically at lunch and evening around 140-150. Pt wad diagnosed with Type 1 when she turned 19.  States BS has always been controlled.  This last incident pt was unable to get Insulin.  Most recent Hg A1C 12.1.      Provider did put in a referral for a podiatrist, and  Ophthalmologists.  Pt states she doesn't have a PCP. Will need assistance with that.  Pt's next [facility] call 3/28 @ 1100.    Pt Education:   Barriers:   Topics for Daily Review:   Pt demonstrates clear understanding:     Daily Weight:  There were no vitals filed for this visit.   Last 3 Weights:  Wt Readings from Last 7 Encounters:   03/16/25 56.3 kg (124 lb 1.9 oz)   12/22/24 58.1 kg (128 lb)   07/06/24 54.4 kg (120 lb)   05/20/24 54.1 kg (119 lb 4.3 oz)   04/25/24 59 kg (130 lb)   04/09/24 59 kg (130 lb)   03/11/24 55.7 kg (122 lb 14.4 oz)       Masimo Device:   Masimo Clinical Impression:     Virtual Visits--Scheduled (Most Recent Date at Top)  Follow up Appointments  Recent Visits  No visits were found meeting these conditions.  Showing recent visits within past 30 days and meeting all other requirements  Future Appointments  No visits were found meeting these conditions.  Showing future appointments within next 90 days and meeting all other requirements       Frequency of RN Calls & Virtual Visits per Team Agreement:    Medication issues Addressed (what was done):    Follow up appointments scheduled by [facility] Staff:   Referrals made by [facility] staff:

## 2025-03-24 ENCOUNTER — PATIENT OUTREACH (OUTPATIENT)
Dept: CARE COORDINATION | Age: 25
End: 2025-03-24

## 2025-03-24 NOTE — PROGRESS NOTES
Daily Call Note:   LVM.      Last 3 Weights:  Wt Readings from Last 7 Encounters:   03/16/25 56.3 kg (124 lb 1.9 oz)   12/22/24 58.1 kg (128 lb)   07/06/24 54.4 kg (120 lb)   05/20/24 54.1 kg (119 lb 4.3 oz)   04/25/24 59 kg (130 lb)   04/09/24 59 kg (130 lb)   03/11/24 55.7 kg (122 lb 14.4 oz)         Virtual Visits--Scheduled (Most Recent Date at Top)  Follow up Appointments  Recent Visits  Date Type Provider Dept   03/22/25 Telemedicine Nilton Bradford MD [facility]   Showing recent visits within past 30 days and meeting all other requirements  Future Appointments  No visits were found meeting these conditions.  Showing future appointments within next 90 days and meeting all other requirements

## 2025-03-25 ENCOUNTER — PATIENT OUTREACH (OUTPATIENT)
Dept: CARE COORDINATION | Age: 25
End: 2025-03-25

## 2025-03-25 DIAGNOSIS — E10.10 DIABETIC KETOACIDOSIS WITHOUT COMA ASSOCIATED WITH TYPE 1 DIABETES MELLITUS: ICD-10-CM

## 2025-03-25 NOTE — PROGRESS NOTES
Spoke to patient briefly, she states she did not take bp today and that she is helping her sister move today. Asked about BG and call disconnected by patient.  Referral for CHW placed.   Referrals for specialty appointments and no PCP noted, no insurance noted. Appointments will pend Medicaid khurram.   Noted by TCC in hosp, referral sent to [facility] for Medicaid screen.

## 2025-03-26 ENCOUNTER — DOCUMENTATION (OUTPATIENT)
Dept: CARE COORDINATION | Facility: CLINIC | Age: 25
End: 2025-03-26

## 2025-03-26 SDOH — HEALTH STABILITY: MENTAL HEALTH: HOW OFTEN DO YOU HAVE A DRINK CONTAINING ALCOHOL?: MONTHLY OR LESS

## 2025-03-26 SDOH — ECONOMIC STABILITY: HOUSING INSECURITY: AT ANY TIME IN THE PAST 12 MONTHS, WERE YOU HOMELESS OR LIVING IN A SHELTER (INCLUDING NOW)?: NO

## 2025-03-26 SDOH — ECONOMIC STABILITY: INCOME INSECURITY: IN THE PAST 12 MONTHS HAS THE ELECTRIC, GAS, OIL, OR WATER COMPANY THREATENED TO SHUT OFF SERVICES IN YOUR HOME?: NO

## 2025-03-26 SDOH — ECONOMIC STABILITY: GENERAL: WHICH SDOH AREAS ARE YOU CURRENLTY ADDRESSING WITH THE PATIENT?: EMPLOYMENT ASSISTANCE

## 2025-03-26 SDOH — HEALTH STABILITY: PHYSICAL HEALTH: ON AVERAGE, HOW MANY MINUTES DO YOU ENGAGE IN EXERCISE AT THIS LEVEL?: 60 MIN

## 2025-03-26 SDOH — ECONOMIC STABILITY: FOOD INSECURITY: HOW HARD IS IT FOR YOU TO PAY FOR THE VERY BASICS LIKE FOOD, HOUSING, MEDICAL CARE, AND HEATING?: VERY HARD

## 2025-03-26 SDOH — SOCIAL STABILITY: SOCIAL INSECURITY: WITHIN THE LAST YEAR, HAVE YOU BEEN AFRAID OF YOUR PARTNER OR EX-PARTNER?: NO

## 2025-03-26 SDOH — SOCIAL STABILITY: SOCIAL NETWORK: HOW OFTEN DO YOU ATTEND MEETINGS OF THE CLUBS OR ORGANIZATIONS YOU BELONG TO?: NEVER

## 2025-03-26 SDOH — SOCIAL STABILITY: SOCIAL INSECURITY: ARE YOU MARRIED, WIDOWED, DIVORCED, SEPARATED, NEVER MARRIED, OR LIVING WITH A PARTNER?: NEVER MARRIED

## 2025-03-26 SDOH — ECONOMIC STABILITY: HOUSING INSECURITY: IN THE LAST 12 MONTHS, WAS THERE A TIME WHEN YOU WERE NOT ABLE TO PAY THE MORTGAGE OR RENT ON TIME?: NO

## 2025-03-26 SDOH — SOCIAL STABILITY: SOCIAL INSECURITY: WITHIN THE LAST YEAR, HAVE YOU BEEN HUMILIATED OR EMOTIONALLY ABUSED IN OTHER WAYS BY YOUR PARTNER OR EX-PARTNER?: NO

## 2025-03-26 SDOH — ECONOMIC STABILITY: GENERAL: WOULD YOU LIKE HELP WITH ANY OF THE FOLLOWING NEEDS?: EMPLOYMENT

## 2025-03-26 SDOH — SOCIAL STABILITY: SOCIAL NETWORK: HOW OFTEN DO YOU GET TOGETHER WITH FRIENDS OR RELATIVES?: MORE THAN THREE TIMES A WEEK

## 2025-03-26 SDOH — SOCIAL STABILITY: SOCIAL NETWORK: HOW OFTEN DO YOU ATTEND CHURCH OR RELIGIOUS SERVICES?: MORE THAN 4 TIMES PER YEAR

## 2025-03-26 SDOH — HEALTH STABILITY: MENTAL HEALTH: HOW MANY DRINKS CONTAINING ALCOHOL DO YOU HAVE ON A TYPICAL DAY WHEN YOU ARE DRINKING?: PATIENT DOES NOT DRINK

## 2025-03-26 SDOH — ECONOMIC STABILITY: FOOD INSECURITY
ARE ANY OF YOUR NEEDS URGENT? FOR EXAMPLE, UNCERTAINTY OF WHERE YOU WILL GET YOUR NEXT MEAL OR NOT HAVING THE MEDICATIONS YOU NEED TO TAKE TOMORROW.: NO

## 2025-03-26 SDOH — HEALTH STABILITY: MENTAL HEALTH: HOW OFTEN DO YOU HAVE SIX OR MORE DRINKS ON ONE OCCASION?: NEVER

## 2025-03-26 SDOH — ECONOMIC STABILITY: TRANSPORTATION INSECURITY: IN THE PAST 12 MONTHS, HAS LACK OF TRANSPORTATION KEPT YOU FROM MEDICAL APPOINTMENTS OR FROM GETTING MEDICATIONS?: NO

## 2025-03-26 SDOH — SOCIAL STABILITY: SOCIAL NETWORK
IN A TYPICAL WEEK, HOW MANY TIMES DO YOU TALK ON THE PHONE WITH FAMILY, FRIENDS, OR NEIGHBORS?: MORE THAN THREE TIMES A WEEK

## 2025-03-26 SDOH — HEALTH STABILITY: PHYSICAL HEALTH: ON AVERAGE, HOW MANY DAYS PER WEEK DO YOU ENGAGE IN MODERATE TO STRENUOUS EXERCISE (LIKE A BRISK WALK)?: 3 DAYS

## 2025-03-26 ASSESSMENT — LIFESTYLE VARIABLES
AUDIT-C TOTAL SCORE: 1
SKIP TO QUESTIONS 9-10: 1

## 2025-03-26 ASSESSMENT — ACTIVITIES OF DAILY LIVING (ADL): LACK_OF_TRANSPORTATION: NO

## 2025-03-26 NOTE — PROGRESS NOTES
Maria Isabel Cutler  Age: 25 y.o.  MRN: [id number]  Date: 3/19/2025  Location of service: phone call    Program Details  Medicaid Community Clinical Case Management  Status: Enrolled  Effective Dates: 8/7/2024 - present  Responsible Staff: JAIME Miramontes      Goals Reviewed:      Summary:  This writer calls patient to confirm her appointment for today.  Patient states she is unable to meet.  This writer will reschedule patient for a different day.    Appointment start time: 1010  Appointment completion time: 1016  Total time spent with patient (in minutes): 6  Non-Billable Time: 6  Billable Time Total: 0    Viviane Sanford RN

## 2025-03-26 NOTE — PROGRESS NOTES
CHW spoke with patient. Patient stated she only needs employment resources. CHW will email resources to patient per patient's request.   Community Resource Name: [employer]  Phone Number: [phone]  Staff Member:      Discussed the following topics on behalf of the patient:    [x]  Employment Assistance    Next Steps:     CHW will follow up with patient within 2 weeks.    Tran Calvillo [facility], MD, Cancer CenterLIZBETH

## 2025-03-27 DIAGNOSIS — E10.10 DIABETIC KETOACIDOSIS WITHOUT COMA ASSOCIATED WITH TYPE 1 DIABETES MELLITUS: Primary | ICD-10-CM

## 2025-03-28 ENCOUNTER — TELEMEDICINE CLINICAL SUPPORT (OUTPATIENT)
Dept: CARE COORDINATION | Age: 25
End: 2025-03-28

## 2025-03-28 ENCOUNTER — TELEMEDICINE (OUTPATIENT)
Dept: PHARMACY | Facility: HOSPITAL | Age: 25
End: 2025-03-28

## 2025-03-28 ENCOUNTER — PATIENT OUTREACH (OUTPATIENT)
Dept: CARE COORDINATION | Age: 25
End: 2025-03-28

## 2025-03-28 ENCOUNTER — APPOINTMENT (OUTPATIENT)
Dept: CARE COORDINATION | Age: 25
End: 2025-03-28

## 2025-03-28 DIAGNOSIS — E83.39 HYPOPHOSPHATEMIA: ICD-10-CM

## 2025-03-28 DIAGNOSIS — E87.6 HYPOKALEMIA: Primary | ICD-10-CM

## 2025-03-28 DIAGNOSIS — E10.10 DIABETIC KETOACIDOSIS WITHOUT COMA ASSOCIATED WITH TYPE 1 DIABETES MELLITUS: Primary | ICD-10-CM

## 2025-03-28 DIAGNOSIS — E10.10 DIABETIC KETOACIDOSIS WITHOUT COMA ASSOCIATED WITH TYPE 1 DIABETES MELLITUS: ICD-10-CM

## 2025-03-28 DIAGNOSIS — E87.6 HYPOKALEMIA: ICD-10-CM

## 2025-03-28 NOTE — PROGRESS NOTES
Daily Call Note:   [facility] completed with Gerardo GARCIA and Lizabeth.     Ms. Tovar does not have insurance at this time.  Patient states she completed insurance paperwork in the hospital.  I ran JACQUE, no updates.     Blood sugar 125, patient states the highest since hospital discharge was 155.    Patient is tolerating Insulin well.     Lizabeth PharmD advised patient she will order a CGM when insurance is approved.     Denies nausea, vomiting, or abd pain.     Ms. Tovar did not take her vitals or weight.     Advised Ms. Tovar to have labs (Phosphous and Comprehensive) drawn before next [facility] 4/4/25 @ 13:00.           Pt Education:   Barriers:   Topics for Daily Review:   Pt demonstrates clear understanding:     Daily Weight:  There were no vitals filed for this visit.   Last 3 Weights:  Wt Readings from Last 7 Encounters:   03/16/25 56.3 kg (124 lb 1.9 oz)   12/22/24 58.1 kg (128 lb)   07/06/24 54.4 kg (120 lb)   05/20/24 54.1 kg (119 lb 4.3 oz)   04/25/24 59 kg (130 lb)   04/09/24 59 kg (130 lb)   03/11/24 55.7 kg (122 lb 14.4 oz)       Masimo Device:    Masimo Clinical Impression:     Virtual Visits--Scheduled (Most Recent Date at Top)  Follow up Appointments  Recent Visits  No visits were found meeting these conditions.  Showing recent visits within past 30 days and meeting all other requirements  Future Appointments  No visits were found meeting these conditions.  Showing future appointments within next 90 days and meeting all other requirements       Frequency of RN Calls & Virtual Visits per Team Agreement:     Medication issues Addressed (what was done):     Follow up appointments scheduled by [facility] Staff:   Referrals made by [facility] staff:

## 2025-03-28 NOTE — PROGRESS NOTES
Healthy at Home Virtual Visit      Admission Date: 3/15/2025  Discharge Date: 3/17/25  Discharging Facility:  [facility]  Insurance: Medicaid pending  [facility] Visit: Third     Brief summary of hospitalization or reason for referral  DM-1 poorly controlled     Admission Dx and Associated Referral Dx:   -Diagnosed with diabetes at age 19  -Admitted for diabetic ketoacidosis  -Patient admits that she was unable to obtain her Lantus and due to that she went into ketoacidosis, started on an insulin drip, anion gap closed and discharged on Lantus 22 units at night, and 8 to 10 units of lispro per meal.     Interval Subjective:      -She currently has no active complaints, denies nausea, vomiting denies any abdominal symptoms  -Typically checks her blood sugar every 2-3 hours.  -BS: 125 AM; 155 highest  -Lantus 22units; 8-10units per meal.     Masimo: No  Oxygen: No  CPAP/BIPAP: No           Wt Readings from Last 3 Encounters:   03/16/25 56.3 kg (124 lb 1.9 oz)   12/22/24 58.1 kg (128 lb)   07/06/24 54.4 kg (120 lb)         Medications Issues/Refill need  Medication Follow up action needed: Not     Requested Prescriptions                 Upcoming Visits:  Recent Visits  No visits were found meeting these conditions.  Showing recent visits within past 30 days and meeting all other requirements  Future Appointments  No visits were found meeting these conditions.  Showing future appointments within next 90 days and meeting all other requirements        Interval or Pertinent Labs/Testing:  Lab Review:                 Hemoglobin A1C   Date/Time Value Ref Range Status   03/15/2025 07:54 AM 12.1 (H) See comment % Final   12/22/2024 08:37 PM 11.5 (H) See comment % Final   05/05/2023 06:20 PM 12.4 (A) % Final       Comment:            Diagnosis of Diabetes-Adults   Non-Diabetic: < or = 5.6%   Increased risk for developing diabetes: 5.7-6.4%   Diagnostic of diabetes: > or = 6.5%  .       Monitoring of Diabetes                 Age (y)     Therapeutic Goal (%)   Adults:          >18           <7.0   Pediatrics:    13-18           <7.5                   7-12           <8.0                   0- 6            7.5-8.5   American Diabetes Association. Diabetes Care 33(S1), Jan 2010.      02/09/2021 12:00 AM 12.8 % Final       Comment:            Diagnosis of Diabetes-Adults   Non-Diabetic: < or = 5.6%   Increased risk for developing diabetes: 5.7-6.4%   Diagnostic of diabetes: > or = 6.5%  .       Monitoring of Diabetes                Age (y)     Therapeutic Goal (%)   Adults:          >18           <7.0   Pediatrics:    13-18           <7.5                   7-12           <8.0                   0- 6            7.5-8.5   American Diabetes Association. Diabetes Care 33(S1), Jan 2010.      10/15/2020 03:56 PM 14.9 % Final       Comment:            Diagnosis of Diabetes-Adults   Non-Diabetic: < or = 5.6%   Increased risk for developing diabetes: 5.7-6.4%   Diagnostic of diabetes: > or = 6.5%  .       Monitoring of Diabetes                Age (y)     Therapeutic Goal (%)   Adults:          >18           <7.0   Pediatrics:    13-18           <7.5                   7-12           <8.0                   0- 6            7.5-8.5   American Diabetes Association. Diabetes Care 33(S1), Jan 2010.            not applicable     SDOH Concerns & Interventions:      Assessment/Plan  # Type 1 diabetes, poorly controlled  -Hemoglobin A1c 12.1  -Currently remains on Lantus 22 units nightly, lispro 8 to 10 units, per carb count  -Pharmacy to look at if patient would be eligible for a continuous glucose monitor.  Patient agreeable to CGM - will order with pending Medicaid  -Endocrinology follow-up  - PC to assist with PCP follow-up     Prevention:  # Diabetic foot health:  -Referral made for podiatry, patient has not seen a podiatrist    # Diabetic ocular health  -She admits that she did see an ophthalmologist over 6 months ago who advised her to obtain  glasses.  Referral made for ophthalmology follow-up.    #Hypokalemia  #Hypophosphorus  -labs ordered           Telemedicine Visit:  Patient Location during Visit:  [place]  Visit Modality:  Video  Additional Visit Participants:  None  Patient/Proxy verbally consents to Evaluation and Treatment     Participants on the call include Lizabeth (Pharm) and Dinorah (RN)Gerardo (STAN)    Inessa HANLEY, CNP  Healthy at Home

## 2025-03-28 NOTE — PROGRESS NOTES
Healthy at Home [facility]    Charline Tovar is a 25 y.o. female was referred to Clinical Pharmacy Team to complete a post-discharge medication optimization and monitoring visit.  The patient was referred for diabetes management while in [facility]. Today was our second visit.       Referring Provider: Inessa Eastman AP*  PCP: No primary care provider on file. - needs to schedule once medicaid is active       Subjective   No Known Allergies    [id number] - Closed - [place], [place] - [address] AT [facility]  [address]  [place] [facility] [address]  Phone: [phone] Fax: [phone]    [id number] - [place], [place] - [address]  [address]  [place] [address]  Phone: [phone] Fax: [phone]    [id number] - [place], [place] - [address]  [address] [address]  Phone: [phone] Fax: [phone]    [facility]  [address], [address]  [facility] [address]  Phone: [phone] Fax: [phone]      Medication System Management:  Affordability/Accessibility: medicaid pending, received discharge meds through  internal fund  Adherence/Organization: no concern      Social History     Social History Narrative    Not on file          HPI      Review of Systems        Objective     There were no vitals taken for this visit.   BP Readings from Last 4 Encounters:   03/17/25 105/66   12/24/24 107/68   07/06/24 120/82   05/20/24 108/68      There were no vitals filed for this visit.     LAB  Lab Results   Component Value Date    BILITOT 0.7 03/15/2025    CALCIUM 8.1 (L) 03/17/2025    CO2 24 03/17/2025     03/17/2025    CREATININE 0.60 03/17/2025    GLUCOSE 167 (H) 03/17/2025    ALKPHOS 63 03/15/2025    K 3.4 (L) 03/17/2025    PROT 8.3 (H) 03/15/2025     03/17/2025    AST 13 03/15/2025    ALT 11 03/15/2025    BUN 8 03/17/2025    ANIONGAP 12 03/17/2025    MG 1.65 03/17/2025    PHOS 2.2 (L)  "03/17/2025     12/13/2019    ALBUMIN 3.2 (L) 03/17/2025    LIPASE 15 12/22/2024    GFRF >90 05/05/2023     Lab Results   Component Value Date    TRIG 87 05/05/2023    CHOL 232 (H) 05/05/2023    HDL 80.1 05/05/2023     Lab Results   Component Value Date    HGBA1C 12.1 (H) 03/15/2025         Current Outpatient Medications   Medication Instructions    alcohol swabs pads, medicated 1 each, topical (top), 4 times daily, Use prior to checking glucose or injecting insulin    glucagon 3 mg/actuation spray,non-aerosol 1 spray, nasal, Every 15 min PRN    insulin lispro (HumaLOG) 100 unit/mL pen Take 2 units per every 50 mg, if sugars > 150 mg.     Take 3 times a day before meals.    lancets 30 gauge misc 1 each, miscellaneous, 4 times daily, Use to check glucose 4 times daily, before meals and at bedtime    Lantus Solostar U-100 Insulin 22 Units, subcutaneous, Nightly    pen needle, diabetic (BD Елена 2nd Gen Pen Needle) 32 gauge x 5/32\" needle Use 4 a day with insulin pens as directed    pen needle, diabetic 32 gauge x 5/32\" needle 1 each, miscellaneous, 4 times daily, Use to inject insulin 4 times daily          Assessment/Plan   Problem List Items Addressed This Visit       Diabetic ketoacidosis without coma associated with type 1 diabetes mellitus (Multi)     -sugar = 125 (fasting)  -highest BG reading she seen is 155  -denies N/V/abdominal symptoms   -reports she has a  PCP but unsure who it is   -last update she heard on her medicaid is that it is still pending   -denies SXS of hypo- or hyperglycemia       Medications Changes/Concerns:  Type 1 DM  Most recent A1c 12.1% (3/15/25)  Continue Lantus 22 units at bedtime  Continue Humalog sliding scale (2 units if >150, increase by 2 units every 50)  [facility]   Has glucagon spray as well in case of hypoglycemic emergency       Refills Needed:  -none needed today      Plan/To Do:  -referral to PCP   -cannot schedule appts to " ophthalmology or podiatry until PCP scheduled   -order CGM once medicaid becomes active  -lab order for CMP + phos --> encouraged to complete asap       [facility] PAP Status:  -n/a   -medicaid pending       Time Spent with Patient and [facility] Team - Gerardo Eastman NP and Dinorah RN via phone call: 10 minutes     Follow Up: 1 week    Continue all meds under the continuation of care with the referring provider and clinical pharmacy team.    Lizabeth Bergeron, Titus     Verbal consent to manage patient's drug therapy was obtained from the patient. They were informed they may decline to participate or withdraw from participation in pharmacy services at any time.

## 2025-03-29 ENCOUNTER — PATIENT OUTREACH (OUTPATIENT)
Dept: CARE COORDINATION | Age: 25
End: 2025-03-29

## 2025-03-29 NOTE — PROGRESS NOTES
1350 - Daily call attempted.  Pt answered the call and indicated she was out of the home and could not talk.

## 2025-03-31 ENCOUNTER — PATIENT OUTREACH (OUTPATIENT)
Dept: CARE COORDINATION | Age: 25
End: 2025-03-31

## 2025-03-31 NOTE — PROGRESS NOTES
Daily Call Note:     Daily call complete   Awaiting call for help with Insulin cost   Awaiting insurance coverage   Doing well with blood sugars and glucose regimen    before lunch   Given location and phone number for lab for blood work   Next [facility] 4/4 @ 1300    Pt Education: POC  Barriers: none  Topics for Daily Review: BS, bloodwork  Pt demonstrates clear understanding: Yes    Daily Weight:  There were no vitals filed for this visit.   Last 3 Weights:  Wt Readings from Last 7 Encounters:   03/16/25 56.3 kg (124 lb 1.9 oz)   12/22/24 58.1 kg (128 lb)   07/06/24 54.4 kg (120 lb)   05/20/24 54.1 kg (119 lb 4.3 oz)   04/25/24 59 kg (130 lb)   04/09/24 59 kg (130 lb)   03/11/24 55.7 kg (122 lb 14.4 oz)       Masimo Device: No   Masimo Clinical Impression:     Virtual Visits--Scheduled (Most Recent Date at Top)  Follow up Appointments  Recent Visits  No visits were found meeting these conditions.  Showing recent visits within past 30 days and meeting all other requirements  Future Appointments  No visits were found meeting these conditions.  Showing future appointments within next 90 days and meeting all other requirements       Frequency of RN Calls & Virtual Visits per Team Agreement: Healthy at Home Frequency: Daily and weekly    Medication issues Addressed (what was done):     Follow up appointments scheduled by [facility] Staff: yes  Referrals made by [facility] staff:

## 2025-04-01 ENCOUNTER — DOCUMENTATION (OUTPATIENT)
Dept: BEHAVIORAL HEALTH | Facility: CLINIC | Age: 25
End: 2025-04-01
Payer: COMMERCIAL

## 2025-04-01 ENCOUNTER — PATIENT OUTREACH (OUTPATIENT)
Dept: CARE COORDINATION | Age: 25
End: 2025-04-01

## 2025-04-01 NOTE — PROGRESS NOTES
Maria Isabel Cutler  Age: 25 y.o.  MRN: [id number]  Date: 4/1/2025  Location of service: phone call    Program Details  Medicaid Community Clinical Case Management  Status: Enrolled  Effective Dates: 8/7/2024 - present  Responsible Staff: JAIME Miramontes      Goals Reviewed:  Problem: Lack of Community Resources        Goal: Coordination of Services will be Obtained          Goal: Link Patient to services within the community       Priority: High        Problem: Limited Understanding of Medications       Goal: Patient will Verbalize Understanding of Medications       Priority: High        Problem: Risk of Uncoordinated Care       Goal: Care will be Coordinated and Supported by a Multidisciplinary Team of Providers       Priority: Medium          Summary:  This provider made successful contact with patient via telephone call to confirm our appointment for tomorrow at 10:00am. Patient declined being able to meet due to a scheduling conflict. This provider and patient agreed to meet next Wednesday instead.                      JAIME Miramontes

## 2025-04-01 NOTE — PROGRESS NOTES
Daily Call complete     Patient doing well  Plan to get labs before 4/4 [facility] appt     Awaiting call for Insulin cost assistance- pt has no insurance(awaiting Medicaid)  Next [facility] 4/4 @ 1300    Pt Education: POC  Barriers: none  Topics for Daily Review: Labs, BS   Pt demonstrates clear understanding: Yes    Daily Weight:  There were no vitals filed for this visit.   Last 3 Weights:  Wt Readings from Last 7 Encounters:   03/16/25 56.3 kg (124 lb 1.9 oz)   12/22/24 58.1 kg (128 lb)   07/06/24 54.4 kg (120 lb)   05/20/24 54.1 kg (119 lb 4.3 oz)   04/25/24 59 kg (130 lb)   04/09/24 59 kg (130 lb)   03/11/24 55.7 kg (122 lb 14.4 oz)       Masimo Device: No   Masimo Clinical Impression:     Virtual Visits--Scheduled (Most Recent Date at Top)  Follow up Appointments  Recent Visits  No visits were found meeting these conditions.  Showing recent visits within past 30 days and meeting all other requirements  Future Appointments  No visits were found meeting these conditions.  Showing future appointments within next 90 days and meeting all other requirements       Frequency of RN Calls & Virtual Visits per Team Agreement: Healthy at Home Frequency: Daily amd weekly     Medication issues Addressed (what was done):     Follow up appointments scheduled by [facility] Staff: Yes  Referrals made by [facility] staff:

## 2025-04-02 ENCOUNTER — PATIENT OUTREACH (OUTPATIENT)
Dept: CARE COORDINATION | Age: 25
End: 2025-04-02

## 2025-04-02 NOTE — PROGRESS NOTES
3/26/25 2:37 PM  CHW emailed employment resources to patient at [email or web address].   Community Resource Name: [employer]  Phone Number: [phone]  Staff Member:      Discussed the following topics on behalf of the patient:    [x]  Employment Assistance      Next Steps:     CHW will follow up with patient in 2 weeks.    SELINA Harris

## 2025-04-02 NOTE — PROGRESS NOTES
Daily Call Note:   Pt currently has no active complaints, denies nausea, vomiting denies any abdominal symptoms. Typically checks her blood sugar every 2-3 hours. BS: 131 AM. No further questions or concerns, denies any new or worsening symptoms today. Next [facility] 4/4     Topics for Daily Review: BS  Pt demonstrates clear understanding: Yes    Daily VS:  Pt not at home during call but stated her VS were good.    Last 3 Weights:  Wt Readings from Last 7 Encounters:   03/16/25 56.3 kg (124 lb 1.9 oz)   12/22/24 58.1 kg (128 lb)   07/06/24 54.4 kg (120 lb)   05/20/24 54.1 kg (119 lb 4.3 oz)   04/25/24 59 kg (130 lb)   04/09/24 59 kg (130 lb)   03/11/24 55.7 kg (122 lb 14.4 oz)           Virtual Visits--Scheduled (Most Recent Date at Top)  Follow up Appointments  Recent Visits  No visits were found meeting these conditions.  Showing recent visits within past 30 days and meeting all other requirements  Future Appointments  No visits were found meeting these conditions.  Showing future appointments within next 90 days and meeting all other requirements

## 2025-04-03 ENCOUNTER — DOCUMENTATION (OUTPATIENT)
Dept: BEHAVIORAL HEALTH | Facility: CLINIC | Age: 25
End: 2025-04-03
Payer: COMMERCIAL

## 2025-04-03 ENCOUNTER — PATIENT OUTREACH (OUTPATIENT)
Dept: CARE COORDINATION | Age: 25
End: 2025-04-03

## 2025-04-03 DIAGNOSIS — E10.10 DIABETIC KETOACIDOSIS WITHOUT COMA ASSOCIATED WITH TYPE 1 DIABETES MELLITUS: Primary | ICD-10-CM

## 2025-04-03 NOTE — PROGRESS NOTES
Daily Call Note:   1228 - Daily call completed with pt.  She states that she is feeling good today.  Denies pain, SOB, edema.  Fasting glucose this morning 100.  No questions or concerns at this time.  Reminded of [facility] tomorrow 4/4 at 1300.    Pt Education: POC  Barriers: no insurance  Topics for Daily Review: daily call  Pt demonstrates clear understanding: Yes    Daily Weight:  There were no vitals filed for this visit.   Last 3 Weights:  Wt Readings from Last 7 Encounters:   03/16/25 56.3 kg (124 lb 1.9 oz)   12/22/24 58.1 kg (128 lb)   07/06/24 54.4 kg (120 lb)   05/20/24 54.1 kg (119 lb 4.3 oz)   04/25/24 59 kg (130 lb)   04/09/24 59 kg (130 lb)   03/11/24 55.7 kg (122 lb 14.4 oz)       Masimo Device: No   Masimo Clinical Impression: n/a    Virtual Visits--Scheduled (Most Recent Date at Top)  Follow up Appointments  Recent Visits  No visits were found meeting these conditions.  Showing recent visits within past 30 days and meeting all other requirements  Future Appointments  No visits were found meeting these conditions.  Showing future appointments within next 90 days and meeting all other requirements       Frequency of RN Calls & Virtual Visits per Team Agreement: Healthy at Home Frequency: Daily    Medication issues Addressed (what was done): none    Follow up appointments scheduled by [facility] Staff: none  Referrals made by [facility] staff: none

## 2025-04-03 NOTE — PROGRESS NOTES
Maria Isabel Cutler  Age: 25 y.o.  MRN: [id number]  Date: 4/3/2025  Location of service: phone call    Program Details  Medicaid Community Clinical Case Management  Status: Enrolled  Effective Dates: 8/7/2024 - present  Responsible Staff: JAIME Miramontes      Goals Reviewed:      Summary:  This writer called patient to get patient scheduled for another appointment with this writer.  This writer and patient scheduled an appointment at this time.    Appointment start time: 1342  Appointment completion time: 1345  Total time spent with patient (in minutes): 3  Non-Billable Time: 3  Billable Time Total: 0    Viviane Sanford RN

## 2025-04-04 ENCOUNTER — APPOINTMENT (OUTPATIENT)
Dept: CARE COORDINATION | Age: 25
End: 2025-04-04

## 2025-04-04 ENCOUNTER — APPOINTMENT (OUTPATIENT)
Dept: PHARMACY | Facility: HOSPITAL | Age: 25
End: 2025-04-04

## 2025-04-05 ENCOUNTER — PATIENT OUTREACH (OUTPATIENT)
Dept: CARE COORDINATION | Age: 25
End: 2025-04-05

## 2025-04-05 NOTE — PROGRESS NOTES
Daily Call Note:     Pt stated AM BS was 135, denies any symptoms. [facility] re-scheduled for 4/8 @1330. Daily calls decreased to MWF today. Pt stated she was doing well, no other concerns or questions today. Pt has not heard anything from Medicaid, still waiting then will make a PCP appt for follow up.    Topics for Daily Review: medicaid, [facility] rescheduled, BS.  Pt demonstrates clear understanding: Yes    Daily VS:  none    Last 3 Weights:  Wt Readings from Last 7 Encounters:   03/16/25 56.3 kg (124 lb 1.9 oz)   12/22/24 58.1 kg (128 lb)   07/06/24 54.4 kg (120 lb)   05/20/24 54.1 kg (119 lb 4.3 oz)   04/25/24 59 kg (130 lb)   04/09/24 59 kg (130 lb)   03/11/24 55.7 kg (122 lb 14.4 oz)       Masimo Device: No     Virtual Visits--Scheduled (Most Recent Date at Top)  Follow up Appointments  Recent Visits  No visits were found meeting these conditions.  Showing recent visits within past 30 days and meeting all other requirements  Future Appointments  No visits were found meeting these conditions.  Showing future appointments within next 90 days and meeting all other requirements       Frequency of RN Calls & Virtual Visits per Team Agreement: Healthy at Home Frequency: Bi-Weekly MWF    Follow up appointments scheduled by [facility] Staff: 4/8 @1333

## 2025-04-07 ENCOUNTER — PATIENT OUTREACH (OUTPATIENT)
Dept: CARE COORDINATION | Age: 25
End: 2025-04-07

## 2025-04-07 DIAGNOSIS — E10.10 DIABETIC KETOACIDOSIS WITHOUT COMA ASSOCIATED WITH TYPE 1 DIABETES MELLITUS: Primary | ICD-10-CM

## 2025-04-07 NOTE — PROGRESS NOTES
Daily Call Note:     Daily call completed  Pt currently driving  States she is feeling good  No questions or concerns  Reminded of [facility] 4/8 @ 133    Pt Education:   Barriers:   Topics for Daily Review:  Pt demonstrates clear understanding:     Daily Weight:  There were no vitals filed for this visit.   Last 3 Weights:  Wt Readings from Last 7 Encounters:   03/16/25 56.3 kg (124 lb 1.9 oz)   12/22/24 58.1 kg (128 lb)   07/06/24 54.4 kg (120 lb)   05/20/24 54.1 kg (119 lb 4.3 oz)   04/25/24 59 kg (130 lb)   04/09/24 59 kg (130 lb)   03/11/24 55.7 kg (122 lb 14.4 oz)       Masimo Device:    Masimo Clinical Impression:     Virtual Visits--Scheduled (Most Recent Date at Top)  Follow up Appointments  Recent Visits  No visits were found meeting these conditions.  Showing recent visits within past 30 days and meeting all other requirements  Future Appointments  No visits were found meeting these conditions.  Showing future appointments within next 90 days and meeting all other requirements       Frequency of RN Calls & Virtual Visits per Team Agreement:    Medication issues Addressed (what was done):     Follow up appointments scheduled by [facility] Staff:  Referrals made by [facility] staff:

## 2025-04-08 ENCOUNTER — TELEMEDICINE (OUTPATIENT)
Dept: PHARMACY | Facility: HOSPITAL | Age: 25
End: 2025-04-08

## 2025-04-08 ENCOUNTER — DOCUMENTATION (OUTPATIENT)
Dept: BEHAVIORAL HEALTH | Facility: CLINIC | Age: 25
End: 2025-04-08
Payer: COMMERCIAL

## 2025-04-08 ENCOUNTER — PATIENT OUTREACH (OUTPATIENT)
Dept: CARE COORDINATION | Age: 25
End: 2025-04-08

## 2025-04-08 ENCOUNTER — APPOINTMENT (OUTPATIENT)
Dept: CARE COORDINATION | Age: 25
End: 2025-04-08

## 2025-04-08 DIAGNOSIS — E10.65 TYPE 1 DIABETES MELLITUS WITH HYPERGLYCEMIA (MULTI): ICD-10-CM

## 2025-04-08 DIAGNOSIS — E10.10 DIABETIC KETOACIDOSIS WITHOUT COMA ASSOCIATED WITH TYPE 1 DIABETES MELLITUS: Primary | ICD-10-CM

## 2025-04-08 DIAGNOSIS — E10.10 DIABETIC KETOACIDOSIS WITHOUT COMA ASSOCIATED WITH TYPE 1 DIABETES MELLITUS: ICD-10-CM

## 2025-04-08 NOTE — PROGRESS NOTES
Healthy at Home [facility]    Charline Tovar is a 25 y.o. female was referred to Clinical Pharmacy Team to complete a post-discharge medication optimization and monitoring visit.  The patient was referred for diabetes management while in [facility]. Today was our third visit.       Referring Provider: Inessa Eastman AP*  PCP: No primary care provider on file. - needs to schedule once medicaid is active       Subjective   No Known Allergies    [id number] - [place], [place] - [address] AT [facility]  [address]  [facility] [address]  Phone: [phone] Fax: [phone]    [id number] [place], [place] - [address] [facility]  [address]  [place] [address]  Phone: [phone] Fax: [phone]    [id number] [place], [place] - [address] [address]  [address] [facility] [address]  Phone: [phone] Fax: [phone]    [facility]  [address], [address]  [facility] [address]  Phone: [phone] Fax: [phone]      Medication System Management:  Affordability/Accessibility: medicaid pending; received discharge meds through  internal fund   Adherence/Organization: no concern      Social History     Social History Narrative    Not on file          HPI      Review of Systems        Objective     There were no vitals taken for this visit.   BP Readings from Last 4 Encounters:   03/17/25 105/66   12/24/24 107/68   07/06/24 120/82   05/20/24 108/68      There were no vitals filed for this visit.     LAB  Lab Results   Component Value Date    BILITOT 0.7 03/15/2025    CALCIUM 8.1 (L) 03/17/2025    CO2 24 03/17/2025     03/17/2025    CREATININE 0.60 03/17/2025    GLUCOSE 167 (H) 03/17/2025    ALKPHOS 63 03/15/2025    K 3.4 (L) 03/17/2025    PROT 8.3 (H) 03/15/2025     03/17/2025    AST 13 03/15/2025    ALT 11 03/15/2025    BUN 8 03/17/2025    ANIONGAP 12 03/17/2025    MG 1.65 03/17/2025    PHOS 2.2 (L)  Home "03/17/2025     12/13/2019    ALBUMIN 3.2 (L) 03/17/2025    LIPASE 15 12/22/2024    GFRF >90 05/05/2023     Lab Results   Component Value Date    TRIG 87 05/05/2023    CHOL 232 (H) 05/05/2023    HDL 80.1 05/05/2023     Lab Results   Component Value Date    HGBA1C 12.1 (H) 03/15/2025         Current Outpatient Medications   Medication Instructions    alcohol swabs pads, medicated 1 each, topical (top), 4 times daily, Use prior to checking glucose or injecting insulin    glucagon 3 mg/actuation spray,non-aerosol 1 spray, nasal, Every 15 min PRN    insulin lispro (HumaLOG) 100 unit/mL pen Take 2 units per every 50 mg, if sugars > 150 mg.     Take 3 times a day before meals.    lancets 30 gauge misc 1 each, miscellaneous, 4 times daily, Use to check glucose 4 times daily, before meals and at bedtime    Lantus Solostar U-100 Insulin 22 Units, subcutaneous, Nightly    pen needle, diabetic (BD Елена 2nd Gen Pen Needle) 32 gauge x 5/32\" needle Use 4 a day with insulin pens as directed    pen needle, diabetic 32 gauge x 5/32\" needle 1 each, miscellaneous, 4 times daily, Use to inject insulin 4 times daily          Assessment/Plan   Problem List Items Addressed This Visit       Diabetic ketoacidosis without coma associated with type 1 diabetes mellitus     -sugar: 115 (fasting)   -highest sugar she has seen is 280 (post-prandial)   -denies N/V/abdominal symptoms   -has a BP cuff at home but is not at home right now to check   -she is still waiting to be approved for medicaid   -has not yet gone for repeat bloodwork - planning to complete tomorrow     Medications Changes/Concerns:  Type 1 DM  Most recent A1c 12.1% (3/15/25)  Continue Lantus 22 units at bedtime  Continue Humalog sliding scale (2 units if >150, increase by 2 units every 50)  Using 8-10 units per meal per last HHVC note   Has glucagon spray as well in case of hypoglycemic emergency       Refills Needed:  -Humalog --> has refills remaining, advised to call " Chang for refills       Plan/To Do:  -schedule with PCP + order CGM once medicaid active   -cannot schedule ophtho or podiatry until PCP scheduled   -complete bloodwork asap  -continue to log sugars daily and BP's when able   -nursing will continue with calls      [facility] P[facility] Status:  -n/a   -medicaid pending       Time Spent with Patient and [facility] Team - Gerardo Eastman NP and Traci BEY RN via phone call: 10 minutes     Follow Up: 1 week    Continue all meds under the continuation of care with the referring provider and clinical pharmacy team.    Lizabeth Bergeron, T[place]     Verbal consent to manage patient's drug therapy was obtained from the patient. They were informed they may decline to participate or withdraw from participation in pharmacy services at any time.

## 2025-04-08 NOTE — PROGRESS NOTES
Daily Call Note:   1330 - Weekly [facility] completed via PHONE conference with pt, Gerardo HANLEY CNP; Lizabeth Bergeron PharmD, and this RN.  Provider reviewed condition / concerns since last conference:  *Pt states she is doing well.  *AM glucose today 115. No other VS to report - provider requested pt take weight and BP.  *Still waiting on Medicaid to be approved so all appts can be scheduled.  *Refill needed on Humalog insulin - PharmD confirmed $0 copay thru [facility] discount. Sent to [facility].  *Provider requested that pt obtain lab prior to next HHVC and pt states she will get tomorrow.    Next [facility] scheduled for 4/15 at 1330.  No other questions or concerns at this time.    Pt Education: POC  Barriers: uninsured  Topics for Daily Review: Weekly HHVC; glucose; refills; medicaid; labs  Pt demonstrates clear understanding: Yes    Daily Weight:  There were no vitals filed for this visit.   Last 3 Weights:  Wt Readings from Last 7 Encounters:   03/16/25 56.3 kg (124 lb 1.9 oz)   12/22/24 58.1 kg (128 lb)   07/06/24 54.4 kg (120 lb)   05/20/24 54.1 kg (119 lb 4.3 oz)   04/25/24 59 kg (130 lb)   04/09/24 59 kg (130 lb)   03/11/24 55.7 kg (122 lb 14.4 oz)       Masimo Device: No   Masimo Clinical Impression: n/a    Virtual Visits--Scheduled (Most Recent Date at Top)  Follow up Appointments  Recent Visits  No visits were found meeting these conditions.  Showing recent visits within past 30 days and meeting all other requirements  Future Appointments  No visits were found meeting these conditions.  Showing future appointments within next 90 days and meeting all other requirements       Frequency of RN Calls & Virtual Visits per Team Agreement: Healthy at Home Frequency: MWF    Medication issues Addressed (what was done): refills    Follow up appointments scheduled by [facility] Staff: none  Referrals made by [facility] staff: none

## 2025-04-09 ENCOUNTER — PATIENT OUTREACH (OUTPATIENT)
Dept: CARE COORDINATION | Age: 25
End: 2025-04-09

## 2025-04-09 NOTE — PROGRESS NOTES
Daily Call Note:   1520 - Daily call completed with pt.  She states she is doing well.  AM glucose 130.  No questions / concerns / needs.  Next [facility] is 4/15 at 1330.    Pt Education: POC  Barriers: none  Topics for Daily Review: daily call; glucose  Pt demonstrates clear understanding: Yes    Daily Weight:  There were no vitals filed for this visit.   Last 3 Weights:  Wt Readings from Last 7 Encounters:   03/16/25 56.3 kg (124 lb 1.9 oz)   12/22/24 58.1 kg (128 lb)   07/06/24 54.4 kg (120 lb)   05/20/24 54.1 kg (119 lb 4.3 oz)   04/25/24 59 kg (130 lb)   04/09/24 59 kg (130 lb)   03/11/24 55.7 kg (122 lb 14.4 oz)       Masimo Device: No   Masimo Clinical Impression: n/a    Virtual Visits--Scheduled (Most Recent Date at Top)  Follow up Appointments  Recent Visits  No visits were found meeting these conditions.  Showing recent visits within past 30 days and meeting all other requirements  Future Appointments  No visits were found meeting these conditions.  Showing future appointments within next 90 days and meeting all other requirements       Frequency of RN Calls & Virtual Visits per Team Agreement: Healthy at Home Frequency: MWF    Medication issues Addressed (what was done): none    Follow up appointments scheduled by [facility] Staff: none  Referrals made by [facility] staff: none

## 2025-04-11 ENCOUNTER — DOCUMENTATION (OUTPATIENT)
Dept: BEHAVIORAL HEALTH | Facility: CLINIC | Age: 25
End: 2025-04-11
Payer: COMMERCIAL

## 2025-04-11 ENCOUNTER — PATIENT OUTREACH (OUTPATIENT)
Dept: CARE COORDINATION | Age: 25
End: 2025-04-11

## 2025-04-11 DIAGNOSIS — F41.1 GAD (GENERALIZED ANXIETY DISORDER): ICD-10-CM

## 2025-04-11 RX ORDER — INSULIN LISPRO 100 [IU]/ML
INJECTION, SOLUTION INTRAVENOUS; SUBCUTANEOUS
Qty: 15 ML | Refills: 11 | Status: SHIPPED | OUTPATIENT
Start: 2025-04-11 | End: 2025-04-11 | Stop reason: SDUPTHER

## 2025-04-11 RX ORDER — INSULIN LISPRO 100 [IU]/ML
INJECTION, SOLUTION INTRAVENOUS; SUBCUTANEOUS
Qty: 15 ML | Refills: 11 | Status: SHIPPED | OUTPATIENT
Start: 2025-04-11

## 2025-04-11 NOTE — PROGRESS NOTES
Daily Call Note:     Daily call completed with the pt  States she is doing okay  Needs refill on Humalog  Sent info over to Pharmacist who put in a refill  Pt's FBS was around 110  No add'l questions or concerns during the call       Pt Education:   Barriers:   Topics for Daily Review:   Pt demonstrates clear understanding:     Daily Weight:  There were no vitals filed for this visit.   Last 3 Weights:  Wt Readings from Last 7 Encounters:   03/16/25 56.3 kg (124 lb 1.9 oz)   12/22/24 58.1 kg (128 lb)   07/06/24 54.4 kg (120 lb)   05/20/24 54.1 kg (119 lb 4.3 oz)   04/25/24 59 kg (130 lb)   04/09/24 59 kg (130 lb)   03/11/24 55.7 kg (122 lb 14.4 oz)       Masimo Device:   Masimo Clinical Impression:     Virtual Visits--Scheduled (Most Recent Date at Top)  Follow up Appointments  Recent Visits  No visits were found meeting these conditions.  Showing recent visits within past 30 days and meeting all other requirements  Future Appointments  No visits were found meeting these conditions.  Showing future appointments within next 90 days and meeting all other requirements       Frequency of RN Calls & Virtual Visits per Team Agreement:     Medication issues Addressed (what was done):     Follow up appointments scheduled by [facility] Staff:   Referrals made by [facility] staff:

## 2025-04-11 NOTE — PROGRESS NOTES
Maria Isabel Cutler  Age: 25 y.o.  MRN: [id number]  Date: 4/11/2025  Location of service: in community    Program Details  Medicaid Community Clinical Case Management  Status: Enrolled  Effective Dates: 8/7/2024 - present  Responsible Staff: JAIME Miramontes      Goals Reviewed:  Problem: Lack of Community Resources        Goal: Coordination of Services will be Obtained          Goal: Link Patient to services within the community       Priority: High          Problem: Risk of Uncoordinated Care       Goal: Care will be Coordinated and Supported by a Multidisciplinary Team of Providers       Priority: Medium          Summary:  This provider met with patient at the patient's home. This provider gave a lot of positive praise to patient on the patient's new home and the patient's ability to stay resilient while waiting for a new place to live. This provider gave patient a box of dry goods to help off set the gap the patient is feeling from not receiving her SNAP benefits on time. This provider also assisted patient with rescheduling a podiatry appointment for shooting pain in the toes and feet.                      JAIME Miramontes

## 2025-04-14 ENCOUNTER — DOCUMENTATION (OUTPATIENT)
Dept: BEHAVIORAL HEALTH | Facility: CLINIC | Age: 25
End: 2025-04-14
Payer: COMMERCIAL

## 2025-04-14 ENCOUNTER — PATIENT OUTREACH (OUTPATIENT)
Dept: CARE COORDINATION | Age: 25
End: 2025-04-14

## 2025-04-14 NOTE — PROGRESS NOTES
Pt states she is doing good today. Denies any issues or needs.  Fasting Bg 110. She attempted to  her humalog but did not get med due to cost. She was not able to Pay for humalog.  Need assistance with Humalog  Vs not done  No Weight.  Will reach out to pharmacy about acquiring humalog.      Daily Weight:  There were no vitals filed for this visit.   Last 3 Weights:  Wt Readings from Last 7 Encounters:   03/16/25 56.3 kg (124 lb 1.9 oz)   12/22/24 58.1 kg (128 lb)   07/06/24 54.4 kg (120 lb)   05/20/24 54.1 kg (119 lb 4.3 oz)   04/25/24 59 kg (130 lb)   04/09/24 59 kg (130 lb)   03/11/24 55.7 kg (122 lb 14.4 oz)         Virtual Visits--Scheduled (Most Recent Date at Top)  Follow up Appointments  Recent Visits  No visits were found meeting these conditions.  Showing recent visits within past 30 days and meeting all other requirements  Future Appointments  No visits were found meeting these conditions.  Showing future appointments within next 90 days and meeting all other requirements

## 2025-04-15 ENCOUNTER — DOCUMENTATION (OUTPATIENT)
Dept: CARE COORDINATION | Age: 25
End: 2025-04-15

## 2025-04-15 ENCOUNTER — APPOINTMENT (OUTPATIENT)
Dept: PHARMACY | Facility: HOSPITAL | Age: 25
End: 2025-04-15

## 2025-04-15 ENCOUNTER — PATIENT OUTREACH (OUTPATIENT)
Dept: CARE COORDINATION | Age: 25
End: 2025-04-15

## 2025-04-15 ENCOUNTER — APPOINTMENT (OUTPATIENT)
Dept: CARE COORDINATION | Age: 25
End: 2025-04-15

## 2025-04-15 DIAGNOSIS — E10.10 DIABETIC KETOACIDOSIS WITHOUT COMA ASSOCIATED WITH TYPE 1 DIABETES MELLITUS: ICD-10-CM

## 2025-04-15 DIAGNOSIS — E10.65 TYPE 1 DIABETES MELLITUS WITH HYPERGLYCEMIA (MULTI): ICD-10-CM

## 2025-04-15 DIAGNOSIS — E10.65 TYPE 1 DIABETES MELLITUS WITH HYPERGLYCEMIA (MULTI): Primary | ICD-10-CM

## 2025-04-15 RX ORDER — INSULIN LISPRO 100 [IU]/ML
INJECTION, SOLUTION INTRAVENOUS; SUBCUTANEOUS
Qty: 15 ML | Refills: 11 | Status: SHIPPED | OUTPATIENT
Start: 2025-04-15

## 2025-04-15 NOTE — PROGRESS NOTES
Healthy at Home [facility]    Charline Tovar is a 25 y.o. female was referred to Clinical Pharmacy Team to complete a post-discharge medication optimization and monitoring visit.  The patient was referred for diabetes management while in [facility]. Today was our fourth visit.       Referring Provider: Inessa Eastman AP*  PCP: No primary care provider on file. - will establish once medicaid is active      Subjective   No Known Allergies    [id number] [place], [place] - [address] [place] [address]  [address] [place] [address]  [facility] [address]  Phone: [phone] Fax: [phone]    [facility]  [address], [address]  [facility] [address]  Phone: [phone] Fax: [phone]      Medication System Management:  Affordability/Accessibility: medicaid pending; received discharge meds through  internal [facility]  Adherence/Organization: no concern      Social History     Social History Narrative    Not on file          HPI      Review of Systems        Objective     There were no vitals taken for this visit.   BP Readings from Last 4 Encounters:   03/17/25 105/66   12/24/24 107/68   07/06/24 120/82   05/20/24 108/68      There were no vitals filed for this visit.     LAB  Lab Results   Component Value Date    BILITOT 0.7 03/15/2025    CALCIUM 8.1 (L) 03/17/2025    CO2 24 03/17/2025     03/17/2025    CREATININE 0.60 03/17/2025    GLUCOSE 167 (H) 03/17/2025    ALKPHOS 63 03/15/2025    K 3.4 (L) 03/17/2025    PROT 8.3 (H) 03/15/2025     03/17/2025    AST 13 03/15/2025    ALT 11 03/15/2025    BUN 8 03/17/2025    ANIONGAP 12 03/17/2025    MG 1.65 03/17/2025    PHOS 2.2 (L) 03/17/2025     12/13/2019    ALBUMIN 3.2 (L) 03/17/2025    LIPASE 15 12/22/2024    GFRF >90 05/05/2023     Lab Results   Component Value Date    TRIG 87 05/05/2023    CHOL 232 (H) 05/05/2023    HDL 80.1 05/05/2023     Lab Results   Component Value Date    HGBA1C 12.1 (H) 03/15/2025         Current Outpatient Medications  "  Medication Instructions    alcohol swabs pads, medicated 1 each, topical (top), 4 times daily, Use prior to checking glucose or injecting insulin    glucagon 3 mg/actuation spray,non-aerosol 1 spray, nasal, Every 15 min PRN    insulin lispro (HumaLOG) 100 unit/mL pen Take 2 units per every 50 mg, if sugars > 150 mg.   Take 3 times a day before meals. MDD 40 units    lancets 30 gauge misc 1 each, miscellaneous, 4 times daily, Use to check glucose 4 times daily, before meals and at bedtime    Lantus Solostar U-100 Insulin 22 Units, subcutaneous, Nightly    pen needle, diabetic (BD Елена 2nd Gen Pen Needle) 32 gauge x 5/32\" needle Use 4 a day with insulin pens as directed    pen needle, diabetic 32 gauge x 5/32\" needle 1 each, miscellaneous, 4 times daily, Use to inject insulin 4 times daily          Assessment/Plan   Problem List Items Addressed This Visit       Diabetic ketoacidosis without coma associated with type 1 diabetes mellitus     -sugar: 210 (fasting); 145 (while on visit)  -feeling good today   -denies N/V/abdominal symptoms   -highest sugar she has seen since last visit = 230 mg/dL   -still waiting for medicaid approval   -has not yet completed labwork --> will call today after visit    Medications Changes/Concerns:  Type 1 DM  Most recent A1c 12.1% (3/15/25)  Continue Lantus 22 units at bedtime  Continue Humalog sliding scale (2 units if >150, increase by 2 units every 50)  Using 8-10 units per meal per last [facility] note   Has glucagon spray as well in case of hypoglycemic emergency      Refills Needed:  -Humalog --> send to [facility] to fill through Internal Fund since medicaid is still pending       Plan/To Do:  -send Humalog refill to [facility]  -cannot schedule appts until medicaid is active   -continue to log sugars daily and BP's when able   -graduation next week pending she completes labwork       [facility] PAP Status:  -n/a   -medicaid pending       Time Spent with Patient and [facility] Team - Gerardo Eastman NP and " Michelle RN via phone call: 10 minutes     Follow Up: 1 week    Continue all meds under the continuation of care with the referring provider and clinical pharmacy team.    Lizabeth Bergeron PharmD     Verbal consent to manage patient's drug therapy was obtained from the patient. They were informed they may decline to participate or withdraw from participation in pharmacy services at any time.

## 2025-04-15 NOTE — PROGRESS NOTES
Daily Call Note:   Pt called in regards to filling her humalog. [facility] needs to have her medicaid application completed prior to filling presription. Pt called and inform  medicaid phone number given to patient [phone]  Pt states she will call medicaid    Daily Weight:  There were no vitals filed for this visit.   Last 3 Weights:  Wt Readings from Last 7 Encounters:   03/16/25 56.3 kg (124 lb 1.9 oz)   12/22/24 58.1 kg (128 lb)   07/06/24 54.4 kg (120 lb)   05/20/24 54.1 kg (119 lb 4.3 oz)   04/25/24 59 kg (130 lb)   04/09/24 59 kg (130 lb)   03/11/24 55.7 kg (122 lb 14.4 oz)       Virtual Visits--Scheduled (Most Recent Date at Top)  Follow up Appointments  Recent Visits  No visits were found meeting these conditions.  Showing recent visits within past 30 days and meeting all other requirements  Future Appointments  No visits were found meeting these conditions.  Showing future appointments within next 90 days and meeting all other requirements       Daily Call Note:       Daily Weight:  There were no vitals filed for this visit.   Last 3 Weights:  Wt Readings from Last 7 Encounters:   03/16/25 56.3 kg (124 lb 1.9 oz)   12/22/24 58.1 kg (128 lb)   07/06/24 54.4 kg (120 lb)   05/20/24 54.1 kg (119 lb 4.3 oz)   04/25/24 59 kg (130 lb)   04/09/24 59 kg (130 lb)   03/11/24 55.7 kg (122 lb 14.4 oz)         Virtual Visits--Scheduled (Most Recent Date at Top)  Follow up Appointments  Recent Visits  No visits were found meeting these conditions.  Showing recent visits within past 30 days and meeting all other requirements  Future Appointments  No visits were found meeting these conditions.  Showing future appointments within next 90 days and meeting all other requirements

## 2025-04-15 NOTE — PROGRESS NOTES
[facility] 4/15 Lizabeth Burks  PT States she is Feeling good. Denies any n/ v  Bg 210 Fasting bg this morning.  Just woke up. Did take am meds. Pt checking current bg while on phone.145  Highest bg since last week 230.  Denies any edema.  Humalog refill through [facility] sent to [facility], which will be covered.Address provided for patient.  Pt has not had lab work done. Pt will schedule appointment today for lab work .  Pt denies any further questions or needs. Will cont healthy at home for another week pending lab results.    Daily Call Note:     Pt Education: humalog purchase  Barriers: financial   Topics for Daily Review: cont bg monitoring  Pt demonstrates clear understanding: Yes    Daily Weight:  There were no vitals filed for this visit.   Last 3 Weights:  Wt Readings from Last 7 Encounters:   03/16/25 56.3 kg (124 lb 1.9 oz)   12/22/24 58.1 kg (128 lb)   07/06/24 54.4 kg (120 lb)   05/20/24 54.1 kg (119 lb 4.3 oz)   04/25/24 59 kg (130 lb)   04/09/24 59 kg (130 lb)   03/11/24 55.7 kg (122 lb 14.4 oz)         Virtual Visits--Scheduled (Most Recent Date at Top)  Follow up Appointments  Recent Visits  No visits were found meeting these conditions.  Showing recent visits within past 30 days and meeting all other requirements  Future Appointments  No visits were found meeting these conditions.  Showing future appointments within next 90 days and meeting all other requirements           Daily Call Note:       Daily Weight:  There were no vitals filed for this visit.   Last 3 Weights:  Wt Readings from Last 7 Encounters:   03/16/25 56.3 kg (124 lb 1.9 oz)   12/22/24 58.1 kg (128 lb)   07/06/24 54.4 kg (120 lb)   05/20/24 54.1 kg (119 lb 4.3 oz)   04/25/24 59 kg (130 lb)   04/09/24 59 kg (130 lb)   03/11/24 55.7 kg (122 lb 14.4 oz)     Virtual Visits--Scheduled (Most Recent Date at Top)  Follow up Appointments  Recent Visits  No visits were found meeting these conditions.  Showing recent visits within past 30 days  and meeting all other requirements  Future Appointments  No visits were found meeting these conditions.  Showing future appointments within next 90 days and meeting all other requirements

## 2025-04-15 NOTE — PROGRESS NOTES
Maria Isabel Cutler  Age: 25 y.o.  MRN: [id number]  Date: 4/8/2025  Location of service: in community    Program Details  Medicaid Community Clinical Case Management  Status: Enrolled  Effective Dates: 8/7/2024 - present  Responsible Staff: JAIME Miramontes      Goals Reviewed:  Problem: Risk of Uncoordinated Care       Goal: Care will be Coordinated and Supported by a Multidisciplinary Team of Providers       Priority: Medium          Summary:  This writer meets with patient in her home for community visit.    Patient states she missed her podiatry appointment because they changed the day.    Patient states her foot is in a severe amount of pain.  This writer will call and get patient scheduled for another podiatry appointment at a later date d/t offices being closed at this time.    Patient states her diabetes has been well-controlled with the pump now that she started adding her carbs.    Patient is planning to go out to eat with her son this evening.    Appointment start time: 1626  Appointment completion time: 1702  Total time spent with patient (in minutes): 36  Non-Billable Time: 36  Billable Time Total: 0    Viviane Sanford RN

## 2025-04-16 ENCOUNTER — APPOINTMENT (OUTPATIENT)
Dept: OPHTHALMOLOGY | Facility: CLINIC | Age: 25
End: 2025-04-16
Payer: COMMERCIAL

## 2025-04-16 DIAGNOSIS — E11.3591 PROLIFERATIVE DIABETIC RETINOPATHY OF RIGHT EYE WITHOUT MACULAR EDEMA ASSOCIATED WITH TYPE 2 DIABETES MELLITUS: Primary | ICD-10-CM

## 2025-04-16 PROCEDURE — 99213 OFFICE O/P EST LOW 20 MIN: CPT

## 2025-04-16 PROCEDURE — 92134 CPTRZ OPH DX IMG PST SGM RTA: CPT

## 2025-04-16 PROCEDURE — 2022F DILAT RTA XM EVC RTNOPTHY: CPT

## 2025-04-16 ASSESSMENT — VISUAL ACUITY
OS_SC: 20/30
METHOD: SNELLEN - LINEAR
OS_SC+: -2
OD_SC: 20/25

## 2025-04-16 ASSESSMENT — TONOMETRY
OD_IOP_MMHG: 14
IOP_METHOD: GOLDMANN APPLANATION
OS_IOP_MMHG: 15

## 2025-04-16 ASSESSMENT — SLIT LAMP EXAM - LIDS
COMMENTS: NORMAL
COMMENTS: NORMAL

## 2025-04-16 ASSESSMENT — CUP TO DISC RATIO
OS_RATIO: 0.3
OD_RATIO: 0.3

## 2025-04-16 ASSESSMENT — ENCOUNTER SYMPTOMS: EYES NEGATIVE: 1

## 2025-04-16 ASSESSMENT — EXTERNAL EXAM - RIGHT EYE: OD_EXAM: NORMAL

## 2025-04-16 ASSESSMENT — EXTERNAL EXAM - LEFT EYE: OS_EXAM: NORMAL

## 2025-04-16 NOTE — PROGRESS NOTES
#Proliferative Diabetic Retinopathy OD  # Diabetic Papillopathy OU  - Last seen by me 2023  - S/P PRP, right eye (OD)   - Patient is Type 1 diabetic (diagnosed 5 years old)   - last A1c 12.7% (1/15/25)  - previously had extensive infectious/autoimmune/neuroimaging with Dr. Zafar for bilateral disc edema ( OS > OD), workup has been negative, seen in 2024 by Dr. Zafar who thinks she had diabetic papillopathy     OCT-Mac  Macula OCT 04/16/25  Right eye (OD): Normal contour and appearance, no IRF/subretinal fluid (SRF)  Left eye (OS): Normal contour and appearance, no IRF/subretinal fluid (SRF)     Plan:  - Stable  - Observe  - RTC in 6 months or sooner PRN

## 2025-04-18 ENCOUNTER — PATIENT OUTREACH (OUTPATIENT)
Dept: CARE COORDINATION | Age: 25
End: 2025-04-18

## 2025-04-18 ENCOUNTER — DOCUMENTATION (OUTPATIENT)
Dept: BEHAVIORAL HEALTH | Facility: CLINIC | Age: 25
End: 2025-04-18
Payer: COMMERCIAL

## 2025-04-18 NOTE — PROGRESS NOTES
Maria Isabel Cutler  Age: 25 y.o.  MRN: [id number]  Date: 4/14/2025  Location of service: phone call    Program Details  Medicaid Community Clinical Case Management  Status: Enrolled  Effective Dates: 8/7/2024 - present  Responsible Staff: JAIME Miramontes      Goals Reviewed:      Summary:  This writer called the patient to inform her of the podiatry appointment this writer scheduled for her.  Patient states she is out of supplies for her pump.  This writer encourages patient to send a [employer] message to the provider's office and notify this writer if she does not hear back.    Appointment start time: 1308  Appointment completion time: 1313  Total time spent with patient (in minutes): 5  Non-Billable Time: 5  Billable Time Total: 0    Viviane Sanford RN

## 2025-04-18 NOTE — PROGRESS NOTES
Maria Isabel Cutler  Age: 25 y.o.  MRN: [id number]  Date: 4/18/2025  Location of service: in community    Program Details  Medicaid Community Clinical Case Management  Status: Enrolled  Effective Dates: 8/7/2024 - present  Responsible Staff: JAIME Miramontes      Goals Reviewed:  Problem: Lack of Community Resources        Goal: Coordination of Services will be Obtained          Goal: Link Patient to services within the community       Priority: High        Problem: Limited Understanding of Medications       Goal: Patient will Verbalize Understanding of Medications       Priority: High        Problem: Risk of Uncoordinated Care       Goal: Care will be Coordinated and Supported by a Multidisciplinary Team of Providers       Priority: Medium          Summary:  This provider made successful contact with patient this morning to confirm our appointment for today. When this provider arrived for the appointment, the patient was leaving with her sister. This provider inquired about patient's recent medical appointment for her retinopathy and rescheduled the appointment for May 6th with this provider.                      JAIME Miramontes

## 2025-04-18 NOTE — PROGRESS NOTES
Daily Call Note:     1525 - Daily call completed with pt.  She is doing well.  AM Glucose 115.  No questions or concerns - just awaiting approval of her Medicaid.    Next [facility] is 4/22 at 1330.    Pt Education: POC  Barriers: no isnurance  Topics for Daily Review: daily call  Pt demonstrates clear understanding: Yes    Daily Weight:  There were no vitals filed for this visit.   Last 3 Weights:  Wt Readings from Last 7 Encounters:   03/16/25 56.3 kg (124 lb 1.9 oz)   12/22/24 58.1 kg (128 lb)   07/06/24 54.4 kg (120 lb)   05/20/24 54.1 kg (119 lb 4.3 oz)   04/25/24 59 kg (130 lb)   04/09/24 59 kg (130 lb)   03/11/24 55.7 kg (122 lb 14.4 oz)       Masimo Device: No   Masimo Clinical Impression: n/a    Virtual Visits--Scheduled (Most Recent Date at Top)  Follow up Appointments  Recent Visits  No visits were found meeting these conditions.  Showing recent visits within past 30 days and meeting all other requirements  Future Appointments  No visits were found meeting these conditions.  Showing future appointments within next 90 days and meeting all other requirements       Frequency of RN Calls & Virtual Visits per Team Agreement: Healthy at Home Frequency: MWF    Medication issues Addressed (what was done): none    Follow up appointments scheduled by [facility] Staff: none  Referrals made by [facility] staff: none

## 2025-04-21 ENCOUNTER — PATIENT OUTREACH (OUTPATIENT)
Dept: CARE COORDINATION | Age: 25
End: 2025-04-21

## 2025-04-21 NOTE — PROGRESS NOTES
Daily Call Note:     LVM.    Last 3 Weights:  Wt Readings from Last 7 Encounters:   03/16/25 56.3 kg (124 lb 1.9 oz)   12/22/24 58.1 kg (128 lb)   07/06/24 54.4 kg (120 lb)   05/20/24 54.1 kg (119 lb 4.3 oz)   04/25/24 59 kg (130 lb)   04/09/24 59 kg (130 lb)   03/11/24 55.7 kg (122 lb 14.4 oz)

## 2025-04-22 ENCOUNTER — APPOINTMENT (OUTPATIENT)
Dept: PHARMACY | Facility: HOSPITAL | Age: 25
End: 2025-04-22

## 2025-04-22 ENCOUNTER — DOCUMENTATION (OUTPATIENT)
Dept: BEHAVIORAL HEALTH | Facility: CLINIC | Age: 25
End: 2025-04-22
Payer: COMMERCIAL

## 2025-04-22 ENCOUNTER — TELEPHONE (OUTPATIENT)
Dept: BEHAVIORAL HEALTH | Facility: CLINIC | Age: 25
End: 2025-04-22
Payer: COMMERCIAL

## 2025-04-22 ENCOUNTER — APPOINTMENT (OUTPATIENT)
Dept: CARE COORDINATION | Age: 25
End: 2025-04-22

## 2025-04-22 ENCOUNTER — PATIENT OUTREACH (OUTPATIENT)
Dept: CARE COORDINATION | Age: 25
End: 2025-04-22

## 2025-04-22 DIAGNOSIS — E10.65 TYPE 1 DIABETES MELLITUS WITH HYPERGLYCEMIA (MULTI): ICD-10-CM

## 2025-04-22 NOTE — PROGRESS NOTES
Weekly [facility] call completed with Gerardo Eastman NP and Lizabeth from Pharmacy. Patient is doing ok her glucose this am 85 her highest has been 210. Patient states she got a message that her medicaid is still pending. She has a PCP upcoming she will have to schedule specialty once insurance is active. Patient is ok to graduated encouraged to reach out with her needs

## 2025-04-22 NOTE — PROGRESS NOTES
Healthy at Home [facility]    Charline Tovar is a 25 y.o. female was referred to Clinical Pharmacy Team to complete a post-discharge medication optimization and monitoring visit.  The patient was referred for diabetes management while in [facility]. Today was our fifth visit.       Referring Provider: Inessa Eastman AP*  PCP: No primary care provider on file. - will establish once medicaid is active       Subjective   Allergies[1]    [id number] - [place], [place] - [address]  [address]  [facility] [address]  Phone: [phone] Fax: [phone]    [facility]  [address]  [facility] [address]  Phone: [phone] Fax: [phone]      Medication System Management:  Affordability/Accessibility: medicaid pending   Adherence/Organization: no concern      Social History     Social History Narrative    Not on file          HPI      Review of Systems        Objective     There were no vitals taken for this visit.   BP Readings from Last 4 Encounters:   03/17/25 105/66   12/24/24 107/68   07/06/24 120/82   05/20/24 108/68      There were no vitals filed for this visit.     LAB  Lab Results   Component Value Date    BILITOT 0.7 03/15/2025    CALCIUM 8.1 (L) 03/17/2025    CO2 24 03/17/2025     03/17/2025    CREATININE 0.60 03/17/2025    GLUCOSE 167 (H) 03/17/2025    ALKPHOS 63 03/15/2025    K 3.4 (L) 03/17/2025    PROT 8.3 (H) 03/15/2025     03/17/2025    AST 13 03/15/2025    ALT 11 03/15/2025    BUN 8 03/17/2025    ANIONGAP 12 03/17/2025    MG 1.65 03/17/2025    PHOS 2.2 (L) 03/17/2025     12/13/2019    ALBUMIN 3.2 (L) 03/17/2025    LIPASE 15 12/22/2024    GFRF >90 05/05/2023     Lab Results   Component Value Date    TRIG 87 05/05/2023    CHOL 232 (H) 05/05/2023    HDL 80.1 05/05/2023     Lab Results   Component Value Date    HGBA1C 12.1 (H) 03/15/2025         Current Outpatient Medications   Medication Instructions    alcohol swabs pads, medicated  "1 each, topical (top), 4 times daily, Use prior to checking glucose or injecting insulin    glucagon 3 mg/actuation spray,non-aerosol 1 spray, nasal, Every 15 min PRN    insulin lispro (HumaLOG) 100 unit/mL pen Take 2 units per every 50 mg, if sugars > 150 mg.   Take 3 times a day before meals. MDD 40 units    lancets 30 gauge misc 1 each, miscellaneous, 4 times daily, Use to check glucose 4 times daily, before meals and at bedtime    Lantus Solostar U-100 Insulin 22 Units, subcutaneous, Nightly    pen needle, diabetic (BD Елена 2nd Gen Pen Needle) 32 gauge x 5/32\" needle Use 4 a day with insulin pens as directed    pen needle, diabetic 32 gauge x 5/32\" needle 1 each, miscellaneous, 4 times daily, Use to inject insulin 4 times daily          Assessment/Plan   Problem List Items Addressed This Visit    None  Visit Diagnoses         Type 1 diabetes mellitus with hyperglycemia (Multi)              -sugar 85 (fasting)   -feeling good today   -lowest sugar = 85, highest sugar since last week = 250   -denies N/V/abdominal symptoms   -going tomorrow to complete labwork   -reports someone called her the other day and told her her medicaid was still pending         Medications Changes/Concerns:  Type 1 DM  Most recent A1c 12.1% (3/15/25)  Continue Lantus 22 units at bedtime  Continue Humalog sliding scale (2 units if >150, increase by 2 units every 50)  Using 8-10 units per meal per last [facility] note   Has glucagon spray as well in case of hypoglycemic emergency      Refills Needed:  -none needed today      Plan/To Do:  -cannot schedule appts until medicaid is active  -continue to log sugars daily and BP's when able   -patient will graduate from the program today!       PAP Status:  -n/a   -medicaid pending      Time Spent with Patient and [facility] Team - Gerardo Eatsman NP and Jennifer RN via phone call: 10 minutes     Follow Up: n/a     Continue all meds under the continuation of care with the referring provider and clinical pharmacy " team.    Lizabeth Bergeron PharmD     Verbal consent to manage patient's drug therapy was obtained from the patient. They were informed they may decline to participate or withdraw from participation in pharmacy services at any time.           [1] No Known Allergies

## 2025-04-23 ENCOUNTER — TELEPHONE (OUTPATIENT)
Dept: ENDOCRINOLOGY | Facility: CLINIC | Age: 25
End: 2025-04-23
Payer: COMMERCIAL

## 2025-04-24 ENCOUNTER — DOCUMENTATION (OUTPATIENT)
Dept: BEHAVIORAL HEALTH | Facility: CLINIC | Age: 25
End: 2025-04-24
Payer: COMMERCIAL

## 2025-04-25 ENCOUNTER — DOCUMENTATION (OUTPATIENT)
Dept: BEHAVIORAL HEALTH | Facility: CLINIC | Age: 25
End: 2025-04-25
Payer: COMMERCIAL

## 2025-04-28 ENCOUNTER — DOCUMENTATION (OUTPATIENT)
Dept: BEHAVIORAL HEALTH | Facility: CLINIC | Age: 25
End: 2025-04-28
Payer: COMMERCIAL

## 2025-04-28 NOTE — TELEPHONE ENCOUNTER
PO from/for pump supplies received/initiated on 4/28  Form completed by me and signed by Dr. Gonzales   Faxed/emailed to contact at [facility] on 4/28

## 2025-04-28 NOTE — PROGRESS NOTES
Maria Isabel Cutler  Age: 25 y.o.  MRN: [id number]  Date: 4/22/2025  Location of service: in community    Program Details  Medicaid Community Clinical Case Management  Status: Enrolled  Effective Dates: 8/7/2024 - present  Responsible Staff: JAIME Miramontes      Goals Reviewed:  Problem: Risk of Uncoordinated Care       Goal: Care will be Coordinated and Supported by a Multidisciplinary Team of Providers       Priority: Medium          Summary:  This writer met with patient in her home for a community visit.  Patient states she has not received her pump supplies order. This writer messages patient's endocrinologist to see if she is able to help with this.  Patient states she has been injecting herself with the Humalog at this time and states it has been going well. Patient states she sees the good and bad in both the pump and self-injection.  This writer gives patient the phone number for the podiatry office and the patient states she will call and get herself scheduled.  This writer engages in relationship building.    Appointment start time: 1630  Appointment completion time: 1725  Total time spent with patient (in minutes): 55  Non-Billable Time: 55  Billable Time Total: 0    Viviane Sanford RN

## 2025-04-30 ENCOUNTER — DOCUMENTATION (OUTPATIENT)
Dept: BEHAVIORAL HEALTH | Facility: CLINIC | Age: 25
End: 2025-04-30
Payer: COMMERCIAL

## 2025-05-03 NOTE — PROGRESS NOTES
Maria Isabel Cutler  Age: 25 y.o.  MRN: [id number]  Date: 4/24/2025  Location of service: phone call    Program Details  Medicaid Community Clinical Case Management  Status: Enrolled  Effective Dates: 8/7/2024 - present  Responsible Staff: JAIME Miramontes      Goals Reviewed:      Summary:  This writer calls patient to let her know that her endocrinology office is in contact with the medical supplies company regarding her pump supplies.    Appointment start time: 0839  Appointment completion time: 0840  Total time spent with patient (in minutes): 1  Non-Billable Time: 1  Billable Time Total: 0    Viviane Sanford RN

## 2025-05-03 NOTE — PROGRESS NOTES
Maria Isabel Cutler  Age: 25 y.o.  MRN: [id number]  Date: 4/25/2025  Location of service: phone call    Program Details  Medicaid Community Clinical Case Management  Status: Enrolled  Effective Dates: 8/7/2024 - present  Responsible Staff: JAIME Miramontes      Goals Reviewed:      Summary:  Patient calls this writer and states the feels she will go to the hospital soon if her pump supplies don't come. Patient states she is not feeling well and states her blood sugars have been in the high 300 and low 400 range. This writer messages patients endocrinologist to update her on the situation.    Appointment start time: 1412  Appointment completion time: 1418  Total time spent with patient (in minutes): 6  Non-Billable Time: 6  Billable Time Total: 0    Viviane Sanford RN

## 2025-05-03 NOTE — PROGRESS NOTES
Maria Isabel Cutler  Age: 25 y.o.  MRN: [id number]  Date: 4/28/2025  Location of service: phone call and care coordination    Program Details  Medicaid Community Clinical Case Management  Status: Enrolled  Effective Dates: 8/7/2024 - present  Responsible Staff: JAIME Miramontes      Goals Reviewed:  Problem: Risk of Uncoordinated Care       Goal: Care will be Coordinated and Supported by a Multidisciplinary Team of Providers       Priority: Medium          Summary:  This writer called patient to let her know that her endocrinologist office was in touch with [facility]. Patient expresses continued concern over her high blood sugar. This writer updates patients endocrinologist office on the situation. Patient calls this right or back states she should have her pump supplies tomorrow. This writer advises patient per her endocrinologist to go to the emergency room should she feel it's necessary.    Appointment start time: 1130  Appointment completion time: 1145  Total time spent with patient (in minutes): 15  Non-Billable Time: 15  Billable Time Total: 0    Viviane Sanford RN

## 2025-05-06 ENCOUNTER — DOCUMENTATION (OUTPATIENT)
Dept: BEHAVIORAL HEALTH | Facility: CLINIC | Age: 25
End: 2025-05-06
Payer: COMMERCIAL

## 2025-05-07 ENCOUNTER — DOCUMENTATION (OUTPATIENT)
Dept: BEHAVIORAL HEALTH | Facility: CLINIC | Age: 25
End: 2025-05-07
Payer: COMMERCIAL

## 2025-05-08 ENCOUNTER — DOCUMENTATION (OUTPATIENT)
Dept: BEHAVIORAL HEALTH | Facility: CLINIC | Age: 25
End: 2025-05-08
Payer: COMMERCIAL

## 2025-05-08 NOTE — PROGRESS NOTES
Maria Isabel Cutler  Age: 25 y.o.  MRN: [id number]  Date: 5/7/2025  Location of service: phone call    Program Details  Medicaid Community Clinical Case Management  Status: Enrolled  Effective Dates: 8/7/2024 - present  Responsible Staff: JAIME Miramontes      Goals Reviewed:  Problem: Lack of Community Resources        Goal: Coordination of Services will be Obtained          Goal: Link Patient to services within the community       Priority: High        Problem: Limited Understanding of Medications       Goal: Patient will Verbalize Understanding of Medications       Priority: High        Problem: Risk of Uncoordinated Care       Goal: Care will be Coordinated and Supported by a Multidisciplinary Team of Providers       Priority: Medium          Summary:  This provider made contact with patient via telephone call to confirm scheduled appointment for today. Patient reported that she was getting ready to leave the home to get a some things taken care of. Provider inquired to see if there was anything that patient was in need of at this time. Patient reports she is pretty stable at the moment. Patient did report that she is in need of supplies for her insulin pump. This provider agreed to make contact with the company that is responsible for mailing out the supplies. This provider and patient agreed to meet next Wednesday at our normal time.                      JAIME Miramontes

## 2025-05-12 NOTE — PROGRESS NOTES
Maria Isabel Cutler  Age: 25 y.o.  MRN: [id number]  Date: 4/30/2025  Location of service: phone call    Program Details  Medicaid Community Clinical Case Management  Status: Enrolled  Effective Dates: 8/7/2024 - present  Responsible Staff: JAIME Miramontes      Goals Reviewed:      Summary:  This writer calls patient to confirm she has received her equipment for her insulin pump. Patient confirms at this time. This writer begins to discuss planning to get the supplies ordered in advance next time, but patient is unable to talk at this time.    Appointment start time: 1052  Appointment completion time: 1055  Total time spent with patient (in minutes): 3  Non-Billable Time: 3  Billable Time Total: 0    Viviane Sanford RN

## 2025-05-16 ENCOUNTER — TELEPHONE (OUTPATIENT)
Dept: ENDOCRINOLOGY | Facility: CLINIC | Age: 25
End: 2025-05-16

## 2025-05-16 ENCOUNTER — TELEMEDICINE (OUTPATIENT)
Dept: ENDOCRINOLOGY | Facility: CLINIC | Age: 25
End: 2025-05-16
Payer: COMMERCIAL

## 2025-05-16 ENCOUNTER — APPOINTMENT (OUTPATIENT)
Dept: PODIATRY | Facility: CLINIC | Age: 25
End: 2025-05-16
Payer: COMMERCIAL

## 2025-05-16 VITALS — BODY MASS INDEX: 24.03 KG/M2 | HEIGHT: 60 IN

## 2025-05-16 DIAGNOSIS — Z96.41 INSULIN PUMP IN PLACE: ICD-10-CM

## 2025-05-16 DIAGNOSIS — E10.10 DM (DIABETES MELLITUS) TYPE 1, UNCONTROLLED, WITH KETOACIDOSIS: Primary | ICD-10-CM

## 2025-05-16 DIAGNOSIS — O24.019: ICD-10-CM

## 2025-05-16 DIAGNOSIS — E10.319 TYPE 1 DIABETES MELLITUS WITH RETINOPATHY WITHOUT MACULAR EDEMA, UNSPECIFIED LATERALITY, UNSPECIFIED RETINOPATHY SEVERITY: ICD-10-CM

## 2025-05-16 PROCEDURE — 99213 OFFICE O/P EST LOW 20 MIN: CPT | Performed by: INTERNAL MEDICINE

## 2025-05-16 PROCEDURE — 3046F HEMOGLOBIN A1C LEVEL >9.0%: CPT | Performed by: INTERNAL MEDICINE

## 2025-05-16 RX ORDER — INSULIN LISPRO 100 [IU]/ML
INJECTION, SOLUTION INTRAVENOUS; SUBCUTANEOUS
Qty: 90 ML | Refills: 3 | Status: CANCELLED | OUTPATIENT
Start: 2025-05-16

## 2025-05-16 ASSESSMENT — PATIENT HEALTH QUESTIONNAIRE - PHQ9
3. TROUBLE FALLING OR STAYING ASLEEP OR SLEEPING TOO MUCH: NEARLY EVERY DAY
SUM OF ALL RESPONSES TO PHQ QUESTIONS 1-9: 17
SUM OF ALL RESPONSES TO PHQ9 QUESTIONS 1 AND 2: 6
10. IF YOU CHECKED OFF ANY PROBLEMS, HOW DIFFICULT HAVE THESE PROBLEMS MADE IT FOR YOU TO DO YOUR WORK, TAKE CARE OF THINGS AT HOME, OR GET ALONG WITH OTHER PEOPLE: NOT DIFFICULT AT ALL
5. POOR APPETITE OR OVEREATING: MORE THAN HALF THE DAYS
8. MOVING OR SPEAKING SO SLOWLY THAT OTHER PEOPLE COULD HAVE NOTICED. OR THE OPPOSITE, BEING SO FIGETY OR RESTLESS THAT YOU HAVE BEEN MOVING AROUND A LOT MORE THAN USUAL: SEVERAL DAYS
7. TROUBLE CONCENTRATING ON THINGS, SUCH AS READING THE NEWSPAPER OR WATCHING TELEVISION: SEVERAL DAYS
6. FEELING BAD ABOUT YOURSELF - OR THAT YOU ARE A FAILURE OR HAVE LET YOURSELF OR YOUR FAMILY DOWN: SEVERAL DAYS
1. LITTLE INTEREST OR PLEASURE IN DOING THINGS: NEARLY EVERY DAY
4. FEELING TIRED OR HAVING LITTLE ENERGY: NEARLY EVERY DAY
9. THOUGHTS THAT YOU WOULD BE BETTER OFF DEAD, OR OF HURTING YOURSELF: NOT AT ALL
2. FEELING DOWN, DEPRESSED OR HOPELESS: NEARLY EVERY DAY

## 2025-05-16 ASSESSMENT — PAIN SCALES - GENERAL: PAINLEVEL_OUTOF10: 0-NO PAIN

## 2025-05-16 ASSESSMENT — ENCOUNTER SYMPTOMS
DEPRESSION: 1
OCCASIONAL FEELINGS OF UNSTEADINESS: 0
LOSS OF SENSATION IN FEET: 1

## 2025-05-16 NOTE — TELEPHONE ENCOUNTER
"Called the patient back to confirm status of the Tandem pump. Patient stated \"I have a new pump that I have been using.\" This nurse requested the serial number on the new pump to access the tandem pump data for the provider to review.   "

## 2025-05-16 NOTE — PROGRESS NOTES
An interactive audio and video telecommunication system which permits real time communications between the patient (at the originating site) and provider (at the distant site) was utilized to provide this telehealth service.    Verbal consent was requested and obtained from Maria Isabel Cutler on 05/17/25 4:41 PM for a telehealth visit.     FUV for diabetes. LV with Dr Gonzales 05/24/2024    Subjective   Maria Isabel Cutler is a 25 y.o. female with a hx of T1D, asthma, anxiety, seen today for diabetes followup.    DM history:   Year/age at diagnosis: Age 5   Prior medications: Insulin   - On a pump during childhood, off pump during postpartum period, now back on Tandem Insulin Pump since March.  Previous hospitalizations for hyperglycemia: DKA admission during pregnancy. DKA multiple times.  Complications:  PDR  Current Regimen:  Tandem Insulin Pump with Dexcom CGM.  Settings detailed below.    Hypoglycemia: Denies    Hypoglycemia awareness: Yes - dizzy, lightheaded, hot - 70 and lower    Weight changes: Stable since last visit.  Denies any increased thirst, urination. Has blurry vision.     SMBG and pump settings:        <CGM>  Average blood glucose: 230mg/dl.    0 % of time worn spend below 70mg/dL.    50 % of time worn spent at target 70-180mg/dL.    50 % of time worn spent above 180mg/dL.   CGM data was used to influence glucose control treatment plan.    Minimum of 72 hours of data were reviewed.     Average of 0g carbs entered per day, patient relying on basal and micro boluses in addition to correction boluses to maintain BG control, review of full report (on patient's records) showing suboptimal response to correction.    Diet:   Gracing through the day, not bolusing for meals, very busy with her baby.  Non restricted diet.    ROS  General: no fever or chills  CV: no chest pain   Respiratory: no shortness of breath  MSK: no lower extremity edema  Neuro: no headache or dizziness  See HPI for Endocrine  ROS    Medical History[1]    Surgical History[2]    Social History     Socioeconomic History    Marital status: Single     Spouse name: Not on file    Number of children: Not on file    Years of education: Not on file    Highest education level: Not on file   Occupational History    Not on file   Tobacco Use    Smoking status: Former     Types: Cigarettes     Passive exposure: Past    Smokeless tobacco: Never   Vaping Use    Vaping status: Former    Substances: Nicotine   Substance and Sexual Activity    Alcohol use: Not Currently    Drug use: Not Currently     Types: Marijuana    Sexual activity: Yes   Other Topics Concern    Not on file   Social History Narrative    Not on file     Social Drivers of Health     Financial Resource Strain: Low Risk  (1/17/2025)    Overall Financial Resource Strain (CARDIA)     Difficulty of Paying Living Expenses: Not hard at all   Food Insecurity: No Food Insecurity (1/16/2025)    Hunger Vital Sign     Worried About Running Out of Food in the Last Year: Never true     Ran Out of Food in the Last Year: Never true   Transportation Needs: No Transportation Needs (1/17/2025)    PRAPARE - Transportation     Lack of Transportation (Medical): No     Lack of Transportation (Non-Medical): No   Recent Concern: Transportation Needs - Unmet Transportation Needs (1/16/2025)    PRAPARE - Transportation     Lack of Transportation (Medical): Yes     Lack of Transportation (Non-Medical): Yes   Physical Activity: Inactive (7/28/2024)    Exercise Vital Sign     Days of Exercise per Week: 0 days     Minutes of Exercise per Session: 0 min   Stress: Not on file (11/9/2024)   Social Connections: Socially Isolated (7/28/2024)    Social Connection and Isolation Panel [NHANES]     Frequency of Communication with Friends and Family: Never     Frequency of Social Gatherings with Friends and Family: Never     Attends Christianity Services: 1 to 4 times per year     Active Member of Clubs or Organizations: No      Attends Club or Organization Meetings: Never     Marital Status: Never    Intimate Partner Violence: Not At Risk (1/16/2025)    Humiliation, Afraid, Rape, and Kick questionnaire     Fear of Current or Ex-Partner: No     Emotionally Abused: No     Physically Abused: No     Sexually Abused: No   Housing Stability: Low Risk  (1/17/2025)    Housing Stability Vital Sign     Unable to Pay for Housing in the Last Year: No     Number of Times Moved in the Last Year: 1     Homeless in the Last Year: No   Recent Concern: Housing Stability - High Risk (1/16/2025)    Housing Stability Vital Sign     Unable to Pay for Housing in the Last Year: No     Number of Times Moved in the Last Year: 1     Homeless in the Last Year: Yes       Current Medications[3]    No data recorded   No data recorded     Assessment/Plan   Maria Isabel Cutler is a 25 y.o. female with a hx of T1D, asthma, anxiety, here for diabetes.   Poor control at this time, but improving.   Needs to get all her pump supplies delivery resumed.  She has been counseled to resume carb counting and bolusing before meals.    DM Type 1  HbA1c: due (last available:12.7% in Jan 2025)  Current regimen: as above  Eye exam:  PDR, Due, scheduled for 10/2025  Urine microalbumin: due, ordered  Podiatry: due, patient to schedule  Lipids: due, ordered    PLAN:  - Refills on all pump suplies will be provided  - Patient due for Lipid panel, and Urine Alb/Cr, and A1C ordered.  - Due for podiatry, patient to schedule  - Advised to bolus before meals, patient feels comfortable with Carb counting but just hadn't been putting in any carbs for boluses, will be working on this.  - Will intensify Correction Factor from 1:45 to 1:40.  - Patient has follow up visit scheduled with Dr Gonzales in 06/04/2025.                          [1]   Past Medical History:  Diagnosis Date    Asthma     Chlamydia     Chronic hypertension     CSF oligoclonal bands     Depression     Herpes     Type 1  "diabetes mellitus with hyperglycemia, with long-term current use of insulin     Urinary tract infection    [2] No past surgical history on file.  [3]   Current Outpatient Medications:     acetone, urine, test strip, DRIP URINE TO CHECK FOR KETONES IF NAUSEA/VOMITING OR IF CONCERN FOR DKA OR DEHYDRATION, Disp: 100 each, Rfl: 0    albuterol (Ventolin HFA) 90 mcg/actuation inhaler, Inhale 1-2 puffs every 4 hours if needed for wheezing or shortness of breath., Disp: 18 g, Rfl: 11    blood-glucose sensor (Dexcom G7 Sensor) device, Change sensor every 10 days, Disp: 3 each, Rfl: 11    glucagon 1 mg/0.2 mL auto-injector, Inject 1 mg under the skin 1 time if needed (use for low blood sugar if unable to take anything by mouth) for up to 1 dose., Disp: 0.2 mL, Rfl: 12    glucose 4 gram chewable tablet, Chew. USE AS DIRECTED TO TREAT HYPOGLYCEMIA, Disp: , Rfl:     insulin glargine (Lantus Solostar U-100 Insulin) 100 unit/mL (3 mL) pen, Inject 14 Units under the skin once daily in the morning., Disp: 15 mL, Rfl: 11    insulin lispro (HumaLOG KwikPen Insulin) 100 unit/mL injection, Inject 1 unit under the skin for every 10 carbs with meals. May take up to 50 units per day., Disp: 15 mL, Rfl: 11    insulin lispro (HumaLOG) 100 unit/mL injection, Inject 8 Units under the skin 3 times a day before meals. Inject 8 Units under the skin 3 times a day before meals., Disp: 15 mL, Rfl: 0    insulin lispro 100 unit/mL injection, Use via insulin pump for max  units, Disp: 90 mL, Rfl: 3    OneTouch Verio test strips strip, TEST 6-7 TIMES DAILY, Disp: 12 strip, Rfl: 11    pen needle 1/2\" 29G X 12mm needle, Use to inject 1-4 times daily as directed., Disp: 100 each, Rfl: 11    pen needle, diabetic 32 gauge x 5/32\" needle, Use 1 pen needle for insulin injection 4 times a day., Disp: 200 each, Rfl: 11    "

## 2025-05-16 NOTE — TELEPHONE ENCOUNTER
This nurse called the patient back to review pump issues with the available data. Tandem Source is showing data from 03/2025. This nurse provided the patient with -   For help with your Tandem insulin pump, you can call [employer]'s customer service at [phone]. Their Pump Specialists are available Monday-Friday from 6 am to 5 pm.   The patient will call for assistance with the pump data and will send a my chart message when done with customer service.

## 2025-05-16 NOTE — TELEPHONE ENCOUNTER
To confirm the virtual appointment scheduled for today and to conduct the rooming process. The patient verbalized understanding of the instruction provided and was appreciated of the call.

## 2025-05-18 NOTE — PROGRESS NOTES
Maria Isabel Cutler  Age: 25 y.o.  MRN: [id number]  Date: 5/6/2025  Location of service: phone call    Program Details  Medicaid Community Clinical Case Management  Status: Enrolled  Effective Dates: 8/7/2024 - present  Responsible Staff: JAIME Miramontes      Goals Reviewed:      Summary:  This writer calls patient to confirm our appointment for today. Patient states she is unable to meet today. This writer will call patient back at a later date to reschedule.    Appointment start time: 1224  Appointment completion time: 1228  Total time spent with patient (in minutes): 4  Non-Billable Time: 4  Billable Time Total: 0    Viviane Sanford RN

## 2025-05-18 NOTE — PROGRESS NOTES
Maria Isabel Cutler  Age: 25 y.o.  MRN: [id number]  Date: 5/8/2025  Location of service: phone call    Program Details  Medicaid Community Clinical Case Management  Status: Enrolled  Effective Dates: 8/7/2024 - present  Responsible Staff: JAIME Miramontes      Goals Reviewed:      Summary:  This writer speaks with patient over the phone to get her scheduled for an appointment with this writer we scheduled an appointment at this time.  Patient states she has still not received her diabetes supplies from the [employer].  Patient states she had received supplies just to tide her over until her full prescription came.  This writer will follow-up.    Appointment start time: 1532  Appointment completion time: 1537  Total time spent with patient (in minutes): 5  Non-Billable Time: 5  Billable Time Total: 0    Viviane Sanford RN     57.6

## 2025-05-20 ENCOUNTER — APPOINTMENT (OUTPATIENT)
Dept: OPHTHALMOLOGY | Facility: CLINIC | Age: 25
End: 2025-05-20
Payer: COMMERCIAL

## 2025-05-21 ENCOUNTER — DOCUMENTATION (OUTPATIENT)
Dept: BEHAVIORAL HEALTH | Facility: CLINIC | Age: 25
End: 2025-05-21
Payer: COMMERCIAL

## 2025-05-23 ENCOUNTER — OFFICE VISIT (OUTPATIENT)
Dept: PODIATRY | Facility: CLINIC | Age: 25
End: 2025-05-23
Payer: COMMERCIAL

## 2025-05-23 ENCOUNTER — HOSPITAL ENCOUNTER (OUTPATIENT)
Dept: RADIOLOGY | Facility: CLINIC | Age: 25
Discharge: HOME | End: 2025-05-23
Payer: COMMERCIAL

## 2025-05-23 DIAGNOSIS — E10.49 TYPE 1 DIABETES MELLITUS WITH OTHER NEUROLOGIC COMPLICATION: ICD-10-CM

## 2025-05-23 DIAGNOSIS — G57.61 MORTON'S NEUROMA OF RIGHT FOOT: ICD-10-CM

## 2025-05-23 DIAGNOSIS — G57.61 MORTON'S NEUROMA OF RIGHT FOOT: Primary | ICD-10-CM

## 2025-05-23 DIAGNOSIS — M79.671 RIGHT FOOT PAIN: ICD-10-CM

## 2025-05-23 PROCEDURE — 1036F TOBACCO NON-USER: CPT | Performed by: PODIATRIST

## 2025-05-23 PROCEDURE — 73630 X-RAY EXAM OF FOOT: CPT | Mod: RIGHT SIDE | Performed by: RADIOLOGY

## 2025-05-23 PROCEDURE — 73630 X-RAY EXAM OF FOOT: CPT | Mod: RT

## 2025-05-23 PROCEDURE — 3046F HEMOGLOBIN A1C LEVEL >9.0%: CPT | Performed by: PODIATRIST

## 2025-05-23 PROCEDURE — 99203 OFFICE O/P NEW LOW 30 MIN: CPT | Performed by: PODIATRIST

## 2025-05-23 NOTE — PROGRESS NOTES
"Chief Complaint   Patient presents with    DM Foot Care      History Of Present Illness  Maria Isabel Cutler is a 25 y.o. female presenting with chief complaint of right foot pain.  She states this has been present for \"years\".  She states it is sometimes to the left side but mostly on the right.  No injury she can recall.  She also is here for diabetic foot exam.  She is a type I diabetic.  View of her chart shows her last hemoglobin A1c was 12.7     Past Medical History  She has a past medical history of Asthma, Chlamydia, Chronic hypertension, CSF oligoclonal bands, Depression, Herpes, Type 1 diabetes mellitus with hyperglycemia, with long-term current use of insulin, and Urinary tract infection.    She has no past medical history of Abnormal Pap smear of cervix, Anemia, Gonorrhea, or Varicella.    Surgical History  She has no past surgical history on file.     Social History  She reports that she has quit smoking. Her smoking use included cigarettes. She has been exposed to tobacco smoke. She has never used smokeless tobacco. She reports that she does not currently use alcohol. She reports that she does not currently use drugs after having used the following drugs: Marijuana.    Family History  Family History[1]     Allergies  Pollen extracts    Medications  Current Medications[2]    Review of Systems    REVIEW OF SYSTEMS  GENERAL:  Negative for malaise, significant weight loss, fever      Objective:   Vasc: DP and PT pulses are palpable bilateral.  CFT is less than 3 seconds bilateral.  Skin temperature is warm to cool proximal to distal bilateral.      Neuro:  Light touch is intact to the foot bilateral.  Protective sensation is diminished to the foot when tested with the 5.07 SWM bilateral.      Derm: Nails are normal. Skin is supple with normal texture and turgor noted.  Webspaces are clean, dry and intact bilateral.  There are no hyperkeratoses, ulcerations, verruca or other lesions noted.  No acute edema " noted to the right foot    Ortho: Muscle strength is 5/5 for all pedal groups tested.  There is pain with palpation of the right second interspace.  No pain along the metatarsal shafts.  The patient is able to perform active range of motion of the digits.  No pain elicited to the left foot    Assessment/Plan     Diagnoses and all orders for this visit:  Huber's neuroma of right foot  -     XR foot right 3+ views; Future  Right foot pain  Type 1 diabetes mellitus with other neurologic complication      Type 1 diabetes with nerve pain  The patient was educated on proper diabetic footcare.  They understand the importance of controlling their hemoglobin A1c as this prevents the progression of neuropathy.  Review of the chart and the primary care note does show that they are working on this with the primary care.  The patient understands to inspect their feet daily.  If they notice any changes in their exam they are to call the office immediately.  We did discuss her hemoglobin A1c is quite high which does lead her to nerve type issues.  View of her chart shows she has had sciatica as well as burning pain.  Certainly this may be why she is getting nerve pain to the right foot.    2.  Ngo's neuroma right foot  I have ordered a right foot x-ray to evaluate the underlying bony structures of the foot.  She is to get this done today.  She does have symptoms in line with a Ngo's neuroma.  I did dispense her home physical therapy program and recommend a metatarsal pad.  I will see her back in 8 weeks for reevaluation.  If she is no better she may need formal physical therapy to address her sciatica and neuroma at the same time.    The patient was evaluated and examined.  We discussed her diagnosis as well as treatment options.  She is agreeable to the plan.  She understands to call me immediately should problems arise prior to follow-up       Magalie Wolfe DPM       [1]   Family History  Problem Relation Name Age of  "Onset    Hypertension Maternal Grandmother      Asthma Child     [2]   Current Outpatient Medications   Medication Sig Dispense Refill    acetone, urine, test strip DRIP URINE TO CHECK FOR KETONES IF NAUSEA/VOMITING OR IF CONCERN FOR DKA OR DEHYDRATION 100 each 0    albuterol (Ventolin HFA) 90 mcg/actuation inhaler Inhale 1-2 puffs every 4 hours if needed for wheezing or shortness of breath. 18 g 11    blood-glucose sensor (Dexcom G7 Sensor) device Change sensor every 10 days 3 each 11    glucagon 1 mg/0.2 mL auto-injector Inject 1 mg under the skin 1 time if needed (use for low blood sugar if unable to take anything by mouth) for up to 1 dose. 0.2 mL 12    glucose 4 gram chewable tablet Chew. USE AS DIRECTED TO TREAT HYPOGLYCEMIA      insulin glargine (Lantus Solostar U-100 Insulin) 100 unit/mL (3 mL) pen Inject 14 Units under the skin once daily in the morning. 15 mL 11    insulin lispro (HumaLOG KwikPen Insulin) 100 unit/mL injection Inject 1 unit under the skin for every 10 carbs with meals. May take up to 50 units per day. 15 mL 11    insulin lispro (HumaLOG) 100 unit/mL injection Inject 8 Units under the skin 3 times a day before meals. Inject 8 Units under the skin 3 times a day before meals. 15 mL 0    insulin lispro 100 unit/mL injection Use via insulin pump for max  units 90 mL 3    OneTouch Verio test strips strip TEST 6-7 TIMES DAILY 12 strip 11    pen needle 1/2\" 29G X 12mm needle Use to inject 1-4 times daily as directed. 100 each 11    pen needle, diabetic 32 gauge x 5/32\" needle Use 1 pen needle for insulin injection 4 times a day. 200 each 11     No current facility-administered medications for this visit.     "

## 2025-05-26 NOTE — PROGRESS NOTES
Maria Isabel Cutler  Age: 25 y.o.  MRN: [id number]  Date: 5/21/2025  Location of service: in community    Program Details  Medicaid Community Clinical Case Management  Status: Enrolled  Effective Dates: 8/7/2024 - present  Responsible Staff: JAIME Miramontes      Goals Reviewed:  Problem: Risk of Uncoordinated Care       Goal: Care will be Coordinated and Supported by a Multidisciplinary Team of Providers       Priority: Medium          Summary:  This writer meets with patient in her home for a community visit.   She should states she still has not received her diabetes supplies from [employer]. She states she will be running out soon, however she believes she can get more supplies from the person she received supplies from a few weeks ago.   Patient verbalizes concern over missing her podiatry appointment. This writer assists patient in rescheduling that appointment.    Appointment start time: 1632  Appointment completion time: 1726  Total time spent with patient (in minutes): 54  Non-Billable Time: 54  Billable Time Total: 0    Viviane Sanford RN

## 2025-05-28 ENCOUNTER — CLINICAL SUPPORT (OUTPATIENT)
Dept: EMERGENCY MEDICINE | Facility: HOSPITAL | Age: 25
End: 2025-05-28
Payer: COMMERCIAL

## 2025-05-28 ENCOUNTER — HOSPITAL ENCOUNTER (INPATIENT)
Facility: HOSPITAL | Age: 25
LOS: 1 days | Discharge: HOME | End: 2025-05-29
Attending: EMERGENCY MEDICINE | Admitting: STUDENT IN AN ORGANIZED HEALTH CARE EDUCATION/TRAINING PROGRAM
Payer: COMMERCIAL

## 2025-05-28 DIAGNOSIS — E10.65 TYPE 1 DIABETES MELLITUS WITH HYPERGLYCEMIA (MULTI): ICD-10-CM

## 2025-05-28 DIAGNOSIS — E10.10 DIABETIC KETOACIDOSIS WITHOUT COMA ASSOCIATED WITH TYPE 1 DIABETES MELLITUS: Primary | ICD-10-CM

## 2025-05-28 LAB
ALBUMIN SERPL BCP-MCNC: 4.2 G/DL (ref 3.4–5)
ALBUMIN SERPL BCP-MCNC: 4.5 G/DL (ref 3.4–5)
ALP SERPL-CCNC: 87 U/L (ref 33–110)
ALT SERPL W P-5'-P-CCNC: 11 U/L (ref 7–45)
ANION GAP BLDV CALCULATED.4IONS-SCNC: 13 MMOL/L (ref 10–25)
ANION GAP BLDV CALCULATED.4IONS-SCNC: 17 MMOL/L (ref 10–25)
ANION GAP BLDV CALCULATED.4IONS-SCNC: 20 MMOL/L (ref 10–25)
ANION GAP BLDV CALCULATED.4IONS-SCNC: 26 MMOL/L (ref 10–25)
ANION GAP BLDV CALCULATED.4IONS-SCNC: 27 MMOL/L (ref 10–25)
ANION GAP SERPL CALC-SCNC: 14 MMOL/L (ref 10–20)
ANION GAP SERPL CALC-SCNC: 21 MMOL/L (ref 10–20)
ANION GAP SERPL CALC-SCNC: 29 MMOL/L (ref 10–20)
ANION GAP SERPL CALC-SCNC: 32 MMOL/L
APPEARANCE UR: CLEAR
APTT PPP: 25 SECONDS (ref 26–36)
AST SERPL W P-5'-P-CCNC: 13 U/L (ref 9–39)
B-OH-BUTYR SERPL-SCNC: 7.66 MMOL/L (ref 0.02–0.27)
BASE EXCESS BLDV CALC-SCNC: -17.4 MMOL/L (ref -2–3)
BASE EXCESS BLDV CALC-SCNC: -17.6 MMOL/L (ref -2–3)
BASE EXCESS BLDV CALC-SCNC: -3.1 MMOL/L (ref -2–3)
BASE EXCESS BLDV CALC-SCNC: -6.3 MMOL/L (ref -2–3)
BASE EXCESS BLDV CALC-SCNC: -9 MMOL/L (ref -2–3)
BASOPHILS # BLD AUTO: 0.1 X10*3/UL (ref 0–0.1)
BASOPHILS # BLD AUTO: 0.11 X10*3/UL (ref 0–0.1)
BASOPHILS NFR BLD AUTO: 0.5 %
BASOPHILS NFR BLD AUTO: 0.6 %
BILIRUB SERPL-MCNC: 0.5 MG/DL (ref 0–1.2)
BILIRUB UR STRIP.AUTO-MCNC: NEGATIVE MG/DL
BODY TEMPERATURE: 37 DEGREES CELSIUS
BUN SERPL-MCNC: 16 MG/DL (ref 6–23)
BUN SERPL-MCNC: 19 MG/DL (ref 6–23)
BUN SERPL-MCNC: 21 MG/DL (ref 6–23)
BUN SERPL-MCNC: 21 MG/DL (ref 6–23)
CA-I BLDV-SCNC: 1.17 MMOL/L (ref 1.1–1.33)
CA-I BLDV-SCNC: 1.2 MMOL/L (ref 1.1–1.33)
CA-I BLDV-SCNC: 1.23 MMOL/L (ref 1.1–1.33)
CA-I BLDV-SCNC: 1.24 MMOL/L (ref 1.1–1.33)
CA-I BLDV-SCNC: 1.31 MMOL/L (ref 1.1–1.33)
CALCIUM SERPL-MCNC: 10.4 MG/DL (ref 8.6–10.6)
CALCIUM SERPL-MCNC: 9.1 MG/DL (ref 8.6–10.6)
CALCIUM SERPL-MCNC: 9.3 MG/DL (ref 8.6–10.6)
CALCIUM SERPL-MCNC: 9.7 MG/DL (ref 8.6–10.6)
CHLORIDE BLDV-SCNC: 101 MMOL/L (ref 98–107)
CHLORIDE BLDV-SCNC: 101 MMOL/L (ref 98–107)
CHLORIDE BLDV-SCNC: 98 MMOL/L (ref 98–107)
CHLORIDE BLDV-SCNC: 98 MMOL/L (ref 98–107)
CHLORIDE BLDV-SCNC: 99 MMOL/L (ref 98–107)
CHLORIDE SERPL-SCNC: 97 MMOL/L (ref 98–107)
CHLORIDE SERPL-SCNC: 99 MMOL/L (ref 98–107)
CO2 SERPL-SCNC: 10 MMOL/L (ref 21–32)
CO2 SERPL-SCNC: 11 MMOL/L (ref 21–32)
CO2 SERPL-SCNC: 18 MMOL/L (ref 21–32)
CO2 SERPL-SCNC: 23 MMOL/L (ref 21–32)
COLOR UR: COLORLESS
CREAT SERPL-MCNC: 0.76 MG/DL (ref 0.5–1.05)
CREAT SERPL-MCNC: 0.87 MG/DL (ref 0.5–1.05)
CREAT SERPL-MCNC: 0.94 MG/DL (ref 0.5–1.05)
CREAT SERPL-MCNC: 1.04 MG/DL (ref 0.5–1.05)
EGFRCR SERPLBLD CKD-EPI 2021: 77 ML/MIN/1.73M*2
EGFRCR SERPLBLD CKD-EPI 2021: 87 ML/MIN/1.73M*2
EGFRCR SERPLBLD CKD-EPI 2021: >90 ML/MIN/1.73M*2
EGFRCR SERPLBLD CKD-EPI 2021: >90 ML/MIN/1.73M*2
EOSINOPHIL # BLD AUTO: 0.01 X10*3/UL (ref 0–0.7)
EOSINOPHIL # BLD AUTO: 0.01 X10*3/UL (ref 0–0.7)
EOSINOPHIL NFR BLD AUTO: 0.1 %
EOSINOPHIL NFR BLD AUTO: 0.1 %
ERYTHROCYTE [DISTWIDTH] IN BLOOD BY AUTOMATED COUNT: 11.6 % (ref 11.5–14.5)
ERYTHROCYTE [DISTWIDTH] IN BLOOD BY AUTOMATED COUNT: 11.6 % (ref 11.5–14.5)
EST. AVERAGE GLUCOSE BLD GHB EST-MCNC: 269 MG/DL
GLUCOSE BLD MANUAL STRIP-MCNC: 122 MG/DL (ref 74–99)
GLUCOSE BLD MANUAL STRIP-MCNC: 137 MG/DL (ref 74–99)
GLUCOSE BLD MANUAL STRIP-MCNC: 153 MG/DL (ref 74–99)
GLUCOSE BLD MANUAL STRIP-MCNC: 172 MG/DL (ref 74–99)
GLUCOSE BLD MANUAL STRIP-MCNC: 174 MG/DL (ref 74–99)
GLUCOSE BLD MANUAL STRIP-MCNC: 211 MG/DL (ref 74–99)
GLUCOSE BLD MANUAL STRIP-MCNC: 219 MG/DL (ref 74–99)
GLUCOSE BLD MANUAL STRIP-MCNC: 222 MG/DL (ref 74–99)
GLUCOSE BLD MANUAL STRIP-MCNC: 230 MG/DL (ref 74–99)
GLUCOSE BLD MANUAL STRIP-MCNC: 358 MG/DL (ref 74–99)
GLUCOSE BLD MANUAL STRIP-MCNC: 532 MG/DL (ref 74–99)
GLUCOSE BLDV-MCNC: 157 MG/DL (ref 74–99)
GLUCOSE BLDV-MCNC: 258 MG/DL (ref 74–99)
GLUCOSE BLDV-MCNC: 270 MG/DL (ref 74–99)
GLUCOSE BLDV-MCNC: 568 MG/DL (ref 74–99)
GLUCOSE BLDV-MCNC: 651 MG/DL (ref 74–99)
GLUCOSE SERPL-MCNC: 164 MG/DL (ref 74–99)
GLUCOSE SERPL-MCNC: 253 MG/DL (ref 74–99)
GLUCOSE SERPL-MCNC: 489 MG/DL (ref 74–99)
GLUCOSE SERPL-MCNC: 604 MG/DL (ref 74–99)
GLUCOSE UR STRIP.AUTO-MCNC: ABNORMAL MG/DL
HBA1C MFR BLD: 11 % (ref ?–5.7)
HCO3 BLDV-SCNC: 10.6 MMOL/L (ref 22–26)
HCO3 BLDV-SCNC: 11.5 MMOL/L (ref 22–26)
HCO3 BLDV-SCNC: 17.5 MMOL/L (ref 22–26)
HCO3 BLDV-SCNC: 20.2 MMOL/L (ref 22–26)
HCO3 BLDV-SCNC: 22 MMOL/L (ref 22–26)
HCT VFR BLD AUTO: 36.5 % (ref 36–46)
HCT VFR BLD AUTO: 41 % (ref 36–46)
HCT VFR BLD EST: 40 % (ref 36–46)
HCT VFR BLD EST: 40 % (ref 36–46)
HCT VFR BLD EST: 41 % (ref 36–46)
HCT VFR BLD EST: 42 % (ref 36–46)
HCT VFR BLD EST: 45 % (ref 36–46)
HGB BLD-MCNC: 12.5 G/DL (ref 12–16)
HGB BLD-MCNC: 13.4 G/DL (ref 12–16)
HGB BLDV-MCNC: 13.2 G/DL (ref 12–16)
HGB BLDV-MCNC: 13.2 G/DL (ref 12–16)
HGB BLDV-MCNC: 13.8 G/DL (ref 12–16)
HGB BLDV-MCNC: 13.9 G/DL (ref 12–16)
HGB BLDV-MCNC: 14.9 G/DL (ref 12–16)
HOLD SPECIMEN: NORMAL
IMM GRANULOCYTES # BLD AUTO: 0.12 X10*3/UL (ref 0–0.7)
IMM GRANULOCYTES # BLD AUTO: 0.2 X10*3/UL (ref 0–0.7)
IMM GRANULOCYTES NFR BLD AUTO: 0.6 % (ref 0–0.9)
IMM GRANULOCYTES NFR BLD AUTO: 1 % (ref 0–0.9)
INHALED O2 CONCENTRATION: 21 %
INR PPP: 1 (ref 0.9–1.1)
KETONES UR STRIP.AUTO-MCNC: ABNORMAL MG/DL
LACTATE BLDV-SCNC: 1 MMOL/L (ref 0.4–2)
LACTATE BLDV-SCNC: 2 MMOL/L (ref 0.4–2)
LACTATE BLDV-SCNC: 2 MMOL/L (ref 0.4–2)
LACTATE BLDV-SCNC: 2.3 MMOL/L (ref 0.4–2)
LACTATE BLDV-SCNC: 2.8 MMOL/L (ref 0.4–2)
LACTATE SERPL-SCNC: 2.1 MMOL/L (ref 0.4–2)
LEUKOCYTE ESTERASE UR QL STRIP.AUTO: NEGATIVE
LIPASE SERPL-CCNC: 13 U/L (ref 9–82)
LYMPHOCYTES # BLD AUTO: 1.7 X10*3/UL (ref 1.2–4.8)
LYMPHOCYTES # BLD AUTO: 2.57 X10*3/UL (ref 1.2–4.8)
LYMPHOCYTES NFR BLD AUTO: 13 %
LYMPHOCYTES NFR BLD AUTO: 8.5 %
MAGNESIUM SERPL-MCNC: 2.08 MG/DL (ref 1.6–2.4)
MCH RBC QN AUTO: 29.8 PG (ref 26–34)
MCH RBC QN AUTO: 30.9 PG (ref 26–34)
MCHC RBC AUTO-ENTMCNC: 32.7 G/DL (ref 32–36)
MCHC RBC AUTO-ENTMCNC: 34.2 G/DL (ref 32–36)
MCV RBC AUTO: 90 FL (ref 80–100)
MCV RBC AUTO: 91 FL (ref 80–100)
MONOCYTES # BLD AUTO: 0.58 X10*3/UL (ref 0.1–1)
MONOCYTES # BLD AUTO: 0.86 X10*3/UL (ref 0.1–1)
MONOCYTES NFR BLD AUTO: 2.9 %
MONOCYTES NFR BLD AUTO: 4.3 %
NEUTROPHILS # BLD AUTO: 16.15 X10*3/UL (ref 1.2–7.7)
NEUTROPHILS # BLD AUTO: 17.32 X10*3/UL (ref 1.2–7.7)
NEUTROPHILS NFR BLD AUTO: 81.5 %
NEUTROPHILS NFR BLD AUTO: 86.9 %
NITRITE UR QL STRIP.AUTO: NEGATIVE
NRBC BLD-RTO: 0 /100 WBCS (ref 0–0)
NRBC BLD-RTO: 0 /100 WBCS (ref 0–0)
OXYHGB MFR BLDV: 48.9 % (ref 45–75)
OXYHGB MFR BLDV: 61.5 % (ref 45–75)
OXYHGB MFR BLDV: 69.1 % (ref 45–75)
OXYHGB MFR BLDV: 69.5 % (ref 45–75)
OXYHGB MFR BLDV: 85.2 % (ref 45–75)
PCO2 BLDV: 32 MM HG (ref 41–51)
PCO2 BLDV: 38 MM HG (ref 41–51)
PCO2 BLDV: 39 MM HG (ref 41–51)
PCO2 BLDV: 39 MM HG (ref 41–51)
PCO2 BLDV: 43 MM HG (ref 41–51)
PH BLDV: 7.09 PH (ref 7.33–7.43)
PH BLDV: 7.13 PH (ref 7.33–7.43)
PH BLDV: 7.26 PH (ref 7.33–7.43)
PH BLDV: 7.28 PH (ref 7.33–7.43)
PH BLDV: 7.36 PH (ref 7.33–7.43)
PH UR STRIP.AUTO: 5 [PH]
PHOSPHATE SERPL-MCNC: 3 MG/DL (ref 2.5–4.9)
PHOSPHATE SERPL-MCNC: 3.4 MG/DL (ref 2.5–4.9)
PLATELET # BLD AUTO: 475 X10*3/UL (ref 150–450)
PLATELET # BLD AUTO: 480 X10*3/UL (ref 150–450)
PO2 BLDV: 37 MM HG (ref 35–45)
PO2 BLDV: 39 MM HG (ref 35–45)
PO2 BLDV: 43 MM HG (ref 35–45)
PO2 BLDV: 49 MM HG (ref 35–45)
PO2 BLDV: 56 MM HG (ref 35–45)
POTASSIUM BLDV-SCNC: 4.3 MMOL/L (ref 3.5–5.3)
POTASSIUM BLDV-SCNC: 4.5 MMOL/L (ref 3.5–5.3)
POTASSIUM BLDV-SCNC: 4.6 MMOL/L (ref 3.5–5.3)
POTASSIUM BLDV-SCNC: 4.8 MMOL/L (ref 3.5–5.3)
POTASSIUM BLDV-SCNC: 5.1 MMOL/L (ref 3.5–5.3)
POTASSIUM SERPL-SCNC: 4.1 MMOL/L (ref 3.5–5.3)
POTASSIUM SERPL-SCNC: 4.2 MMOL/L (ref 3.5–5.3)
POTASSIUM SERPL-SCNC: 4.7 MMOL/L (ref 3.5–5.3)
POTASSIUM SERPL-SCNC: 4.7 MMOL/L (ref 3.5–5.3)
POTASSIUM SERPL-SCNC: 5 MMOL/L (ref 3.5–5.3)
PREGNANCY TEST URINE, POC: NEGATIVE
PROT SERPL-MCNC: 8 G/DL (ref 6.4–8.2)
PROT UR STRIP.AUTO-MCNC: NEGATIVE MG/DL
PROTHROMBIN TIME: 11 SECONDS (ref 9.8–12.4)
RBC # BLD AUTO: 4.05 X10*6/UL (ref 4–5.2)
RBC # BLD AUTO: 4.5 X10*6/UL (ref 4–5.2)
RBC # UR STRIP.AUTO: NEGATIVE MG/DL
SAO2 % BLDV: 49 % (ref 45–75)
SAO2 % BLDV: 63 % (ref 45–75)
SAO2 % BLDV: 71 % (ref 45–75)
SAO2 % BLDV: 71 % (ref 45–75)
SAO2 % BLDV: 88 % (ref 45–75)
SODIUM BLDV-SCNC: 129 MMOL/L (ref 136–145)
SODIUM BLDV-SCNC: 131 MMOL/L (ref 136–145)
SODIUM BLDV-SCNC: 131 MMOL/L (ref 136–145)
SODIUM BLDV-SCNC: 133 MMOL/L (ref 136–145)
SODIUM BLDV-SCNC: 134 MMOL/L (ref 136–145)
SODIUM SERPL-SCNC: 132 MMOL/L (ref 136–145)
SODIUM SERPL-SCNC: 132 MMOL/L (ref 136–145)
SODIUM SERPL-SCNC: 134 MMOL/L (ref 136–145)
SODIUM SERPL-SCNC: 136 MMOL/L (ref 136–145)
SP GR UR STRIP.AUTO: 1.01
UROBILINOGEN UR STRIP.AUTO-MCNC: NORMAL MG/DL
WBC # BLD AUTO: 19.8 X10*3/UL (ref 4.4–11.3)
WBC # BLD AUTO: 19.9 X10*3/UL (ref 4.4–11.3)

## 2025-05-28 PROCEDURE — 80069 RENAL FUNCTION PANEL: CPT | Mod: CCI

## 2025-05-28 PROCEDURE — 81003 URINALYSIS AUTO W/O SCOPE: CPT

## 2025-05-28 PROCEDURE — G0378 HOSPITAL OBSERVATION PER HR: HCPCS

## 2025-05-28 PROCEDURE — 2500000004 HC RX 250 GENERAL PHARMACY W/ HCPCS (ALT 636 FOR OP/ED): Mod: JZ,SE

## 2025-05-28 PROCEDURE — 82947 ASSAY GLUCOSE BLOOD QUANT: CPT

## 2025-05-28 PROCEDURE — 99285 EMERGENCY DEPT VISIT HI MDM: CPT

## 2025-05-28 PROCEDURE — 85025 COMPLETE CBC W/AUTO DIFF WBC: CPT

## 2025-05-28 PROCEDURE — 81025 URINE PREGNANCY TEST: CPT

## 2025-05-28 PROCEDURE — 93005 ELECTROCARDIOGRAM TRACING: CPT

## 2025-05-28 PROCEDURE — 84132 ASSAY OF SERUM POTASSIUM: CPT | Performed by: EMERGENCY MEDICINE

## 2025-05-28 PROCEDURE — 82947 ASSAY GLUCOSE BLOOD QUANT: CPT | Mod: 59

## 2025-05-28 PROCEDURE — 80048 BASIC METABOLIC PNL TOTAL CA: CPT | Mod: CCI

## 2025-05-28 PROCEDURE — 84132 ASSAY OF SERUM POTASSIUM: CPT | Mod: 59

## 2025-05-28 PROCEDURE — 85610 PROTHROMBIN TIME: CPT

## 2025-05-28 PROCEDURE — 2500000001 HC RX 250 WO HCPCS SELF ADMINISTERED DRUGS (ALT 637 FOR MEDICARE OP): Mod: SE

## 2025-05-28 PROCEDURE — 83605 ASSAY OF LACTIC ACID: CPT

## 2025-05-28 PROCEDURE — 96374 THER/PROPH/DIAG INJ IV PUSH: CPT

## 2025-05-28 PROCEDURE — 83735 ASSAY OF MAGNESIUM: CPT

## 2025-05-28 PROCEDURE — 93010 ELECTROCARDIOGRAM REPORT: CPT

## 2025-05-28 PROCEDURE — 99285 EMERGENCY DEPT VISIT HI MDM: CPT | Mod: 25 | Performed by: EMERGENCY MEDICINE

## 2025-05-28 PROCEDURE — 2500000004 HC RX 250 GENERAL PHARMACY W/ HCPCS (ALT 636 FOR OP/ED): Mod: SE

## 2025-05-28 PROCEDURE — 83690 ASSAY OF LIPASE: CPT

## 2025-05-28 PROCEDURE — 96375 TX/PRO/DX INJ NEW DRUG ADDON: CPT

## 2025-05-28 PROCEDURE — 2500000004 HC RX 250 GENERAL PHARMACY W/ HCPCS (ALT 636 FOR OP/ED): Mod: SE | Performed by: EMERGENCY MEDICINE

## 2025-05-28 PROCEDURE — 84132 ASSAY OF SERUM POTASSIUM: CPT | Performed by: NURSE PRACTITIONER

## 2025-05-28 PROCEDURE — 36415 COLL VENOUS BLD VENIPUNCTURE: CPT

## 2025-05-28 PROCEDURE — 2060000001 HC INTERMEDIATE ICU ROOM DAILY

## 2025-05-28 PROCEDURE — 83735 ASSAY OF MAGNESIUM: CPT | Performed by: NURSE PRACTITIONER

## 2025-05-28 PROCEDURE — 82010 KETONE BODYS QUAN: CPT

## 2025-05-28 PROCEDURE — 84132 ASSAY OF SERUM POTASSIUM: CPT

## 2025-05-28 PROCEDURE — 80053 COMPREHEN METABOLIC PANEL: CPT

## 2025-05-28 PROCEDURE — 96361 HYDRATE IV INFUSION ADD-ON: CPT

## 2025-05-28 PROCEDURE — 83036 HEMOGLOBIN GLYCOSYLATED A1C: CPT

## 2025-05-28 PROCEDURE — 85018 HEMOGLOBIN: CPT | Mod: 59

## 2025-05-28 PROCEDURE — 84100 ASSAY OF PHOSPHORUS: CPT

## 2025-05-28 PROCEDURE — 2500000005 HC RX 250 GENERAL PHARMACY W/O HCPCS: Mod: SE

## 2025-05-28 RX ORDER — SODIUM CHLORIDE 450 MG/100ML
250 INJECTION, SOLUTION INTRAVENOUS CONTINUOUS
Status: DISCONTINUED | OUTPATIENT
Start: 2025-05-28 | End: 2025-05-28

## 2025-05-28 RX ORDER — SODIUM CHLORIDE, SODIUM LACTATE, POTASSIUM CHLORIDE, CALCIUM CHLORIDE 600; 310; 30; 20 MG/100ML; MG/100ML; MG/100ML; MG/100ML
250 INJECTION, SOLUTION INTRAVENOUS CONTINUOUS
Status: DISCONTINUED | OUTPATIENT
Start: 2025-05-28 | End: 2025-05-28

## 2025-05-28 RX ORDER — DEXTROSE 50 % IN WATER (D50W) INTRAVENOUS SYRINGE
50
Status: DISCONTINUED | OUTPATIENT
Start: 2025-05-28 | End: 2025-05-28

## 2025-05-28 RX ORDER — POTASSIUM CHLORIDE 14.9 MG/ML
20 INJECTION INTRAVENOUS ONCE
Status: COMPLETED | OUTPATIENT
Start: 2025-05-28 | End: 2025-05-28

## 2025-05-28 RX ORDER — DEXTROSE 50 % IN WATER (D50W) INTRAVENOUS SYRINGE
50
Status: DISCONTINUED | OUTPATIENT
Start: 2025-05-28 | End: 2025-05-29

## 2025-05-28 RX ORDER — ACETAMINOPHEN 160 MG/5ML
650 SOLUTION ORAL EVERY 4 HOURS PRN
Status: DISCONTINUED | OUTPATIENT
Start: 2025-05-28 | End: 2025-05-28

## 2025-05-28 RX ORDER — ONDANSETRON HYDROCHLORIDE 2 MG/ML
4 INJECTION, SOLUTION INTRAVENOUS ONCE
Status: COMPLETED | OUTPATIENT
Start: 2025-05-28 | End: 2025-05-28

## 2025-05-28 RX ORDER — ACETAMINOPHEN 650 MG/1
650 SUPPOSITORY RECTAL EVERY 4 HOURS PRN
Status: DISCONTINUED | OUTPATIENT
Start: 2025-05-28 | End: 2025-05-28

## 2025-05-28 RX ORDER — FAMOTIDINE 10 MG/ML
20 INJECTION, SOLUTION INTRAVENOUS ONCE
Status: COMPLETED | OUTPATIENT
Start: 2025-05-28 | End: 2025-05-28

## 2025-05-28 RX ORDER — POTASSIUM CHLORIDE 14.9 MG/ML
20 INJECTION INTRAVENOUS ONCE
Status: DISCONTINUED | OUTPATIENT
Start: 2025-05-28 | End: 2025-05-28

## 2025-05-28 RX ORDER — ENOXAPARIN SODIUM 100 MG/ML
40 INJECTION SUBCUTANEOUS EVERY 24 HOURS
Status: DISCONTINUED | OUTPATIENT
Start: 2025-05-28 | End: 2025-05-29 | Stop reason: HOSPADM

## 2025-05-28 RX ORDER — DEXTROSE MONOHYDRATE 100 MG/ML
150 INJECTION, SOLUTION INTRAVENOUS CONTINUOUS PRN
Status: DISCONTINUED | OUTPATIENT
Start: 2025-05-28 | End: 2025-05-28

## 2025-05-28 RX ORDER — ALBUTEROL SULFATE 90 UG/1
2 INHALANT RESPIRATORY (INHALATION) EVERY 6 HOURS PRN
Status: DISCONTINUED | OUTPATIENT
Start: 2025-05-28 | End: 2025-05-29 | Stop reason: HOSPADM

## 2025-05-28 RX ORDER — DEXTROSE MONOHYDRATE 100 MG/ML
150 INJECTION, SOLUTION INTRAVENOUS CONTINUOUS PRN
Status: DISCONTINUED | OUTPATIENT
Start: 2025-05-28 | End: 2025-05-29

## 2025-05-28 RX ORDER — INSULIN LISPRO 100 [IU]/ML
0-10 INJECTION, SOLUTION INTRAVENOUS; SUBCUTANEOUS
Status: DISCONTINUED | OUTPATIENT
Start: 2025-05-28 | End: 2025-05-28

## 2025-05-28 RX ORDER — ACETAMINOPHEN 325 MG/1
650 TABLET ORAL EVERY 4 HOURS PRN
Status: DISCONTINUED | OUTPATIENT
Start: 2025-05-28 | End: 2025-05-29 | Stop reason: HOSPADM

## 2025-05-28 RX ORDER — DEXTROSE MONOHYDRATE AND SODIUM CHLORIDE 5; .9 G/100ML; G/100ML
150 INJECTION, SOLUTION INTRAVENOUS CONTINUOUS PRN
Status: DISCONTINUED | OUTPATIENT
Start: 2025-05-28 | End: 2025-05-28

## 2025-05-28 RX ORDER — DEXTROSE MONOHYDRATE AND SODIUM CHLORIDE 5; .45 G/100ML; G/100ML
200 INJECTION, SOLUTION INTRAVENOUS CONTINUOUS PRN
Status: DISCONTINUED | OUTPATIENT
Start: 2025-05-28 | End: 2025-05-29

## 2025-05-28 RX ORDER — ACETAMINOPHEN 325 MG/1
650 TABLET ORAL ONCE
Status: COMPLETED | OUTPATIENT
Start: 2025-05-28 | End: 2025-05-28

## 2025-05-28 RX ORDER — DEXTROSE MONOHYDRATE AND SODIUM CHLORIDE 5; .45 G/100ML; G/100ML
150 INJECTION, SOLUTION INTRAVENOUS CONTINUOUS PRN
Status: DISCONTINUED | OUTPATIENT
Start: 2025-05-28 | End: 2025-05-28

## 2025-05-28 RX ADMIN — POTASSIUM CHLORIDE 20 MEQ: 14.9 INJECTION, SOLUTION INTRAVENOUS at 20:00

## 2025-05-28 RX ADMIN — SODIUM CHLORIDE, POTASSIUM CHLORIDE, SODIUM LACTATE AND CALCIUM CHLORIDE 250 ML/HR: 600; 310; 30; 20 INJECTION, SOLUTION INTRAVENOUS at 10:15

## 2025-05-28 RX ADMIN — SODIUM CHLORIDE 1000 ML: 9 INJECTION, SOLUTION INTRAVENOUS at 10:21

## 2025-05-28 RX ADMIN — DEXTROSE 150 ML/HR: 10 SOLUTION INTRAVENOUS at 21:12

## 2025-05-28 RX ADMIN — FAMOTIDINE 20 MG: 10 INJECTION INTRAVENOUS at 10:15

## 2025-05-28 RX ADMIN — SODIUM BICARBONATE 100 ML/HR: 84 INJECTION INTRAVENOUS at 13:05

## 2025-05-28 RX ADMIN — DEXTROSE AND SODIUM CHLORIDE 200 ML/HR: 5; 450 INJECTION, SOLUTION INTRAVENOUS at 17:32

## 2025-05-28 RX ADMIN — ONDANSETRON 4 MG: 2 INJECTION INTRAMUSCULAR; INTRAVENOUS at 10:12

## 2025-05-28 RX ADMIN — ACETAMINOPHEN 650 MG: 325 TABLET ORAL at 10:15

## 2025-05-28 RX ADMIN — INSULIN HUMAN 8 UNITS/HR: 1 INJECTION, SOLUTION INTRAVENOUS at 10:30

## 2025-05-28 RX ADMIN — DEXTROSE AND SODIUM CHLORIDE 150 ML/HR: 5; .45 INJECTION, SOLUTION INTRAVENOUS at 15:45

## 2025-05-28 RX ADMIN — ACETAMINOPHEN 650 MG: 325 TABLET ORAL at 20:26

## 2025-05-28 RX ADMIN — POTASSIUM CHLORIDE 20 MEQ: 14.9 INJECTION, SOLUTION INTRAVENOUS at 15:45

## 2025-05-28 RX ADMIN — INSULIN HUMAN 8 UNITS/HR: 1 INJECTION, SOLUTION INTRAVENOUS at 20:47

## 2025-05-28 SDOH — SOCIAL STABILITY: SOCIAL INSECURITY: DO YOU FEEL UNSAFE GOING BACK TO THE PLACE WHERE YOU ARE LIVING?: NO

## 2025-05-28 SDOH — ECONOMIC STABILITY: TRANSPORTATION INSECURITY
IN THE PAST 12 MONTHS, HAS LACK OF TRANSPORTATION KEPT YOU FROM MEDICAL APPOINTMENTS OR FROM GETTING MEDICATIONS?: PATIENT DECLINED

## 2025-05-28 SDOH — ECONOMIC STABILITY: HOUSING INSECURITY: IN THE LAST 12 MONTHS, WAS THERE A TIME WHEN YOU WERE NOT ABLE TO PAY THE MORTGAGE OR RENT ON TIME?: PATIENT DECLINED

## 2025-05-28 SDOH — ECONOMIC STABILITY: INCOME INSECURITY: IN THE PAST 12 MONTHS HAS THE ELECTRIC, GAS, OIL, OR WATER COMPANY THREATENED TO SHUT OFF SERVICES IN YOUR HOME?: NO

## 2025-05-28 SDOH — ECONOMIC STABILITY: HOUSING INSECURITY: AT ANY TIME IN THE PAST 12 MONTHS, WERE YOU HOMELESS OR LIVING IN A SHELTER (INCLUDING NOW)?: PATIENT DECLINED

## 2025-05-28 SDOH — ECONOMIC STABILITY: FOOD INSECURITY
WITHIN THE PAST 12 MONTHS, YOU WORRIED THAT YOUR FOOD WOULD RUN OUT BEFORE YOU GOT THE MONEY TO BUY MORE.: PATIENT DECLINED

## 2025-05-28 SDOH — ECONOMIC STABILITY: FOOD INSECURITY: WITHIN THE PAST 12 MONTHS, THE FOOD YOU BOUGHT JUST DIDN'T LAST AND YOU DIDN'T HAVE MONEY TO GET MORE.: PATIENT DECLINED

## 2025-05-28 SDOH — SOCIAL STABILITY: SOCIAL INSECURITY: HAVE YOU HAD THOUGHTS OF HARMING ANYONE ELSE?: NO

## 2025-05-28 SDOH — ECONOMIC STABILITY: FOOD INSECURITY: HOW HARD IS IT FOR YOU TO PAY FOR THE VERY BASICS LIKE FOOD, HOUSING, MEDICAL CARE, AND HEATING?: PATIENT DECLINED

## 2025-05-28 SDOH — SOCIAL STABILITY: SOCIAL INSECURITY: WITHIN THE LAST YEAR, HAVE YOU BEEN HUMILIATED OR EMOTIONALLY ABUSED IN OTHER WAYS BY YOUR PARTNER OR EX-PARTNER?: NO

## 2025-05-28 SDOH — SOCIAL STABILITY: SOCIAL INSECURITY: WITHIN THE LAST YEAR, HAVE YOU BEEN AFRAID OF YOUR PARTNER OR EX-PARTNER?: NO

## 2025-05-28 SDOH — SOCIAL STABILITY: SOCIAL INSECURITY: DOES ANYONE TRY TO KEEP YOU FROM HAVING/CONTACTING OTHER FRIENDS OR DOING THINGS OUTSIDE YOUR HOME?: NO

## 2025-05-28 SDOH — SOCIAL STABILITY: SOCIAL INSECURITY: DO YOU FEEL ANYONE HAS EXPLOITED OR TAKEN ADVANTAGE OF YOU FINANCIALLY OR OF YOUR PERSONAL PROPERTY?: NO

## 2025-05-28 SDOH — SOCIAL STABILITY: SOCIAL INSECURITY: HAS ANYONE EVER THREATENED TO HURT YOUR FAMILY OR YOUR PETS?: NO

## 2025-05-28 SDOH — ECONOMIC STABILITY: HOUSING INSECURITY: IN THE PAST 12 MONTHS, HOW MANY TIMES HAVE YOU MOVED WHERE YOU WERE LIVING?: 1

## 2025-05-28 SDOH — SOCIAL STABILITY: SOCIAL INSECURITY: ARE THERE ANY APPARENT SIGNS OF INJURIES/BEHAVIORS THAT COULD BE RELATED TO ABUSE/NEGLECT?: NO

## 2025-05-28 SDOH — SOCIAL STABILITY: SOCIAL INSECURITY: ABUSE: ADULT

## 2025-05-28 SDOH — SOCIAL STABILITY: SOCIAL INSECURITY: HAVE YOU HAD ANY THOUGHTS OF HARMING ANYONE ELSE?: NO

## 2025-05-28 SDOH — SOCIAL STABILITY: SOCIAL INSECURITY: ARE YOU OR HAVE YOU BEEN THREATENED OR ABUSED PHYSICALLY, EMOTIONALLY, OR SEXUALLY BY ANYONE?: NO

## 2025-05-28 SDOH — SOCIAL STABILITY: SOCIAL INSECURITY: WERE YOU ABLE TO COMPLETE ALL THE BEHAVIORAL HEALTH SCREENINGS?: YES

## 2025-05-28 ASSESSMENT — PAIN - FUNCTIONAL ASSESSMENT
PAIN_FUNCTIONAL_ASSESSMENT: 0-10

## 2025-05-28 ASSESSMENT — ACTIVITIES OF DAILY LIVING (ADL)
TOILETING: INDEPENDENT
PATIENT'S MEMORY ADEQUATE TO SAFELY COMPLETE DAILY ACTIVITIES?: YES
HEARING - RIGHT EAR: FUNCTIONAL
DRESSING YOURSELF: INDEPENDENT
LACK_OF_TRANSPORTATION: PATIENT DECLINED
HEARING - LEFT EAR: FUNCTIONAL
LACK_OF_TRANSPORTATION: PATIENT DECLINED
FEEDING YOURSELF: INDEPENDENT
WALKS IN HOME: INDEPENDENT
BATHING: INDEPENDENT
GROOMING: INDEPENDENT
JUDGMENT_ADEQUATE_SAFELY_COMPLETE_DAILY_ACTIVITIES: YES
ADEQUATE_TO_COMPLETE_ADL: YES

## 2025-05-28 ASSESSMENT — COGNITIVE AND FUNCTIONAL STATUS - GENERAL
STANDING UP FROM CHAIR USING ARMS: A LITTLE
TOILETING: A LITTLE
DRESSING REGULAR UPPER BODY CLOTHING: A LITTLE
MOBILITY SCORE: 24
HELP NEEDED FOR BATHING: A LITTLE
PATIENT BASELINE BEDBOUND: NO
MOVING TO AND FROM BED TO CHAIR: A LITTLE
DAILY ACTIVITIY SCORE: 24
CLIMB 3 TO 5 STEPS WITH RAILING: A LITTLE
MOVING FROM LYING ON BACK TO SITTING ON SIDE OF FLAT BED WITH BEDRAILS: A LITTLE
PERSONAL GROOMING: A LITTLE
WALKING IN HOSPITAL ROOM: A LITTLE
DAILY ACTIVITIY SCORE: 18
DRESSING REGULAR LOWER BODY CLOTHING: A LITTLE
EATING MEALS: A LITTLE
MOBILITY SCORE: 18
TURNING FROM BACK TO SIDE WHILE IN FLAT BAD: A LITTLE

## 2025-05-28 ASSESSMENT — PATIENT HEALTH QUESTIONNAIRE - PHQ9
SUM OF ALL RESPONSES TO PHQ9 QUESTIONS 1 & 2: 0
2. FEELING DOWN, DEPRESSED OR HOPELESS: NOT AT ALL
1. LITTLE INTEREST OR PLEASURE IN DOING THINGS: NOT AT ALL

## 2025-05-28 ASSESSMENT — LIFESTYLE VARIABLES
HOW OFTEN DO YOU HAVE A DRINK CONTAINING ALCOHOL: PATIENT DECLINED
AUDIT-C TOTAL SCORE: -1
AUDIT-C TOTAL SCORE: -1
HOW OFTEN DO YOU HAVE 6 OR MORE DRINKS ON ONE OCCASION: PATIENT DECLINED
SKIP TO QUESTIONS 9-10: 0
HOW MANY STANDARD DRINKS CONTAINING ALCOHOL DO YOU HAVE ON A TYPICAL DAY: PATIENT DECLINED

## 2025-05-28 ASSESSMENT — PAIN DESCRIPTION - PROGRESSION: CLINICAL_PROGRESSION: NOT CHANGED

## 2025-05-28 ASSESSMENT — PAIN DESCRIPTION - PAIN TYPE: TYPE: ACUTE PAIN

## 2025-05-28 ASSESSMENT — PAIN DESCRIPTION - FREQUENCY: FREQUENCY: CONSTANT/CONTINUOUS

## 2025-05-28 ASSESSMENT — PAIN SCALES - GENERAL
PAINLEVEL_OUTOF10: 5 - MODERATE PAIN
PAINLEVEL_OUTOF10: 0 - NO PAIN
PAINLEVEL_OUTOF10: 0 - NO PAIN
PAINLEVEL_OUTOF10: 6

## 2025-05-28 ASSESSMENT — PAIN DESCRIPTION - ONSET: ONSET: ONGOING

## 2025-05-28 ASSESSMENT — PAIN DESCRIPTION - LOCATION: LOCATION: ABDOMEN

## 2025-05-28 ASSESSMENT — PAIN DESCRIPTION - DESCRIPTORS: DESCRIPTORS: ACHING;CRAMPING

## 2025-05-28 NOTE — H&P
Medical Intensive Care - History and Physical   Subjective    Maria Isabel Cutler is a 25 y.o. year old female patient admitted on 5/28/2025 with following ICU needs: insulin gtt, DKA management.      HPI:  Ms. Cutler is a 25 year old female with a history of T1 DM and asthma who presents with hyperglycemia after finding her blood glucose to be over 500 at home.      Ms. Cutler states that she experienced nausea and vomiting prior to presentation to the ED.  Has previous admission for DKA, most recently in January of 2025. She notes that prior to this presentation she was recently started back on an insulin pump, but has had issues with her insurance and obtaining her medications/supplies.     On arrival to the ED, she tachycardic in the low 100's, normotensive, afebrile, SpO2 in the high 90's on room air. RFP was significant for blood glucose of 604, bicarbonate of 11, anion gap of 24. VBG with pH of 7.13, pCO2 of 32, SO2 of 71. Beta-Hydroxybutyrate was 7.66. She was initated on an insulin drip, LR infusion (250 ml/hr) and sodium bicarbonate infusion (100 ml/hr). She was also given 1L of NS bolus.     Upon arrival to the MICU patient is well appearing, in no acute distress, and has no complaints at this time. Denies abdominal pain, nausea/vomiting. Patient on D5, Bicarb, insulin drips.       Review of Systems:  Review of Systems       Meds    Home medications:  Current Outpatient Medications   Medication Instructions    acetone, urine, test strip DRIP URINE TO CHECK FOR KETONES IF NAUSEA/VOMITING OR IF CONCERN FOR DKA OR DEHYDRATION    albuterol (Ventolin HFA) 90 mcg/actuation inhaler 1-2 puffs, inhalation, Every 4 hours PRN    blood-glucose sensor (Dexcom G7 Sensor) device Change sensor every 10 days    glucagon 1 mg, subcutaneous, Once as needed    glucose 4 gram chewable tablet Chew. USE AS DIRECTED TO TREAT HYPOGLYCEMIA    insulin lispro (HumaLOG KwikPen Insulin) 100 unit/mL injection Inject 1 unit under  "the skin for every 10 carbs with meals. May take up to 50 units per day.    insulin lispro (HUMALOG) 8 Units, subcutaneous, 3 times daily before meals, Inject 8 Units under the skin 3 times a day before meals.    insulin lispro 100 unit/mL injection Use via insulin pump for max  units    Lantus Solostar U-100 Insulin 14 Units, subcutaneous, Every morning    OneTouch Verio test strips strip TEST 6-7 TIMES DAILY    pen needle 1/2\" 29G X 12mm needle Use to inject 1-4 times daily as directed.    pen needle, diabetic 32 gauge x 5/32\" needle Use 1 pen needle for insulin injection 4 times a day.        Inpatient medications:  Scheduled medications  Scheduled Medications[1]  Continuous medications  Continuous Medications[2]  PRN medications  PRN Medications[3]     Objective    Blood pressure 104/68, pulse 96, temperature 36 °C (96.8 °F), temperature source Temporal, resp. rate 16, height 1.524 m (5'), weight 59 kg (130 lb), last menstrual period 04/17/2025, SpO2 100%, currently breastfeeding.     Physical Exam  Constitutional:       General: She is not in acute distress.     Appearance: Normal appearance. She is not ill-appearing, toxic-appearing or diaphoretic.   HENT:      Head: Normocephalic and atraumatic.      Right Ear: External ear normal.      Left Ear: External ear normal.      Nose: Nose normal.      Mouth/Throat:      Mouth: Mucous membranes are moist.      Pharynx: Oropharynx is clear.   Eyes:      Extraocular Movements: Extraocular movements intact.      Conjunctiva/sclera: Conjunctivae normal.      Pupils: Pupils are equal, round, and reactive to light.   Cardiovascular:      Rate and Rhythm: Normal rate and regular rhythm.      Pulses: Normal pulses.      Heart sounds: Normal heart sounds.   Pulmonary:      Effort: Pulmonary effort is normal. No respiratory distress.      Breath sounds: Normal breath sounds. No wheezing, rhonchi or rales.   Abdominal:      General: Abdomen is flat. There is no " distension.      Palpations: Abdomen is soft.      Tenderness: There is no abdominal tenderness. There is no guarding or rebound.   Musculoskeletal:         General: No swelling or deformity. Normal range of motion.      Cervical back: Normal range of motion and neck supple.   Skin:     General: Skin is warm and dry.      Capillary Refill: Capillary refill takes less than 2 seconds.   Neurological:      General: No focal deficit present.      Mental Status: She is alert and oriented to person, place, and time.            Intake/Output Summary (Last 24 hours) at 5/28/2025 1629  Last data filed at 5/28/2025 1121  Gross per 24 hour   Intake 999 ml   Output --   Net 999 ml     Labs:   Results from last 72 hours   Lab Units 05/28/25  1528 05/28/25  1405 05/28/25  1205 05/28/25  0953   SODIUM mmol/L 134*  --  136 132*   POTASSIUM mmol/L 4.1  --  4.7  4.7 5.0   CHLORIDE mmol/L 99  --  99 97*   CO2 mmol/L 18*  --  10* 11*   BUN mg/dL 21  --  21 19   CREATININE mg/dL 0.87  --  1.04 0.94   GLUCOSE mg/dL 253*  --  489* 604*   CALCIUM mg/dL 9.7  --  10.4 9.3   ANION GAP mmol/L 21*  --  32 29*   EGFR mL/min/1.73m*2 >90  --  77 87   PHOSPHORUS mg/dL  --  3.4  --   --       Results from last 72 hours   Lab Units 05/28/25  1300   WBC AUTO x10*3/uL 19.9*   HEMOGLOBIN g/dL 13.4   HEMATOCRIT % 41.0   PLATELETS AUTO x10*3/uL 480*   NEUTROS PCT AUTO % 86.9   LYMPHS PCT AUTO % 8.5   MONOS PCT AUTO % 2.9   EOS PCT AUTO % 0.1          Results from last 72 hours   Lab Units 05/28/25  1526 05/28/25  1205 05/28/25  0953   POCT PH, VENOUS pH 7.26* 7.09* 7.13*   POCT PCO2, VENOUS mm Hg 39* 38* 32*   POCT PO2, VENOUS mm Hg 43 37 49*        Micro/ID:     Lab Results   Component Value Date    URINECULTURE NO SIGNIFICANT GROWTH. 07/06/2023       Summary of Key Imaging Results  No imaging    Assessment and Plan     Assessment:  Maria Isabel Cutler is a 25 y.o. year old female patient admitted to the MICU on 05/28/25 for DKA requiring insulin drip.      Plan:  NEUROLOGY/PSYCH:  - No active issues    CARDIOVASCULAR:  - No active issues    PULMONARY:  #Hx of asthma  - Patient saturating 100% on room air  - monitor for respiratory distress    RENAL/GENITOURINARY:  #Acidosis 2/2 DKA  ::DKA protocol started in ED  :: Anion gap  - Currently on insulin gtt, maintenance LR  - Continue DKA protocol    GASTROENTEROLOGY:  - No active issues    ENDOCRINOLOGY:  #DKA iso T1DM with medication nonadherance  - DKA protocol    HEMATOLOGY:  - No active issues    SKIN:  -No active issues    MUSCULOSKELETAL:  - No active issues    INFECTIOUS DISEASE:  - No active issues    ICU Check List     FEN  Fluids: Per DKA protocol  Electrolytes: replete Potassium per DKA protocol  Nutrition: NPO  Prophylaxis:  DVT ppx: Lovenox  GI ppx: n/a  Bowel care: n/a  Hardware:                   Social:  Code: Full Code    HPOA: Angela Ng [phone]  [facility]    Sj Obando, DO   05/28/25 at 4:29 PM     Disclaimer: Documentation completed with the information available at the time of input. The times in the chart may not be reflective of actual patient care times, interventions, or procedures. Documentation occurs after the physical care of the patient.          [1] enoxaparin, 40 mg, subcutaneous, Daily  potassium chloride, 20 mEq, intravenous, Once  sodium chloride, 1,000 mL, intravenous, Once  [2] dextrose 10 % in water (D10W), 150 mL/hr  dextrose 10 % in water (D10W), 150 mL/hr  dextrose 5%-0.45 % sodium chloride, 150 mL/hr  sodium chloride, 250 mL/hr  [3] PRN medications: albuterol, dextrose 10 % in water (D10W), dextrose 10 % in water (D10W), dextrose 5%-0.45 % sodium chloride, dextrose

## 2025-05-28 NOTE — ED TRIAGE NOTES
Patient BIB [facility] for hyperglycemia. Per squad blood glucose >500 upon arrival blood glucose 532. Patient states she hasn't had her Dexcon on in about a week and has been giving herself 10 units daily. Patient is type I diabetic, has been in DKA before. Patient also complaining of abdominal and head pain. Patient awake in bed A&Ox3, respirations even and unlabored.

## 2025-05-28 NOTE — ED PROVIDER NOTES
HPI   Chief Complaint   Patient presents with    Hyperglycemia       25-year-old female PMH T1DM presents emergency department chief complaints of nausea, vomiting, hyperglycemia.  Patient reports that they have a Dexcom blood glucose monitor but states they have been out of this for about a week now.  Patient reports that they still taking about 10 units of insulin per day but have not been able to check the sugars.  Patient states that they are Dexcom is ready to get picked up at the pharmacy but they have not done this yet.  Reports a couple of episodes of emesis shortly prior to arrival.  Patient does report that they have been DKA in the past and states that they do feel similar to that today.    Denies flulike symptoms, changes in bowel movements, chest pain, shortness of breath.              Patient History   Medical History[1]  Surgical History[2]  Family History[3]  Social History[4]    Physical Exam   ED Triage Vitals [05/28/25 0933]   Temperature Heart Rate Respirations BP   37.2 °C (98.9 °F) (!) 108 16 115/73      Pulse Ox Temp Source Heart Rate Source Patient Position   99 % Oral Monitor Lying      BP Location FiO2 (%)     Right arm --       Physical Exam  Vitals and nursing note reviewed.   Constitutional:       General: She is not in acute distress.     Appearance: Normal appearance. She is normal weight. She is not ill-appearing, toxic-appearing or diaphoretic.   HENT:      Head: Normocephalic and atraumatic.      Mouth/Throat:      Mouth: Mucous membranes are dry.      Pharynx: Oropharynx is clear. No oropharyngeal exudate or posterior oropharyngeal erythema.   Eyes:      General:         Right eye: No discharge.         Left eye: No discharge.      Extraocular Movements: Extraocular movements intact.      Conjunctiva/sclera: Conjunctivae normal.      Pupils: Pupils are equal, round, and reactive to light.   Cardiovascular:      Rate and Rhythm: Regular rhythm. Tachycardia present.      Heart  sounds: Normal heart sounds. No murmur heard.     No friction rub. No gallop.   Pulmonary:      Effort: Pulmonary effort is normal. No respiratory distress.      Breath sounds: Normal breath sounds. No stridor. No wheezing, rhonchi or rales.   Abdominal:      General: Abdomen is flat. There is no distension.      Palpations: Abdomen is soft. There is no mass.      Tenderness: There is no abdominal tenderness. There is no guarding or rebound.      Hernia: No hernia is present.   Musculoskeletal:      Right lower leg: No edema.      Left lower leg: No edema.   Neurological:      Mental Status: She is alert.           ED Course & MDM   Diagnoses as of 05/28/25 1427   Diabetic ketoacidosis without coma associated with type 1 diabetes mellitus                 No data recorded     Juan Carlos Coma Scale Score: 15 (05/28/25 0935 : Kajal Gastelum RN)                           Medical Decision Making  25-year-old female type I diabetic insulin-dependent presents emergency department chief complaints of hyperglycemia, nausea, vomiting.  States that they have a Dexcom blood glucose monitor but reports they have been out of it for about a week.  Patient reports that they are still taking their insulin at this time but have not been able to check the sugars.  Patient on exam mildly tachycardic at 108.  Mucous membranes do appear dry at this time.  Blood glucose also initially elevated here in the ED at 532.  With this in mind we will obtain labs as well as administer fluids here in the emergency department and reevaluate.    10:44 AM Venous blood gas significant acidosis 7.13.  Lactate 2.0.  Anion gap of 26.    2:27 PM Patient's workup shows evidence of DKA.  Initial blood gas showed blood glucose of 604.  Beta hydroxybutyrate elevated 7.66.  Leukocytosis at 19.9.  pH also shows acidosis at 7.13 initially and with repeat showing 7.09.  Anion gap also elevated at 26 and 27 respectively.  UA negative for UTI.  Urine pregnancy test  negative.  With this in mind patient was started on DKA protocol down here in the ED.  Patient started on IV fluids including LR.  Also started on insulin drip.  Patient did have repeat VBG after initial fluid bolus and still remained acidotic.  Therefore, decision was made to start bicarb drip on this patient.  No other acute abnormalities noted here in the ED requiring emergent intervention.  With this in mind did have opportunity to speak with MICU provider who did accept this patient for further management and monitoring.  Patient does report symptomatic improvement at this time and that they feel much better than their initial presentation however, given that this patient is significantly DKA and acidotic they do require ICU level care at this time.  Did have opportunity to speak with this patient regarding her findings here today and the showed understanding of this.  Patient did have opportunity to ask questions and have them answered and no further complaints at this time and was amenable to plan moving forward.  Patient admitted to MICU service.    3:19 PM patient signed out to resident physician pending transfer to admission floor.  Patient states that they do still feel much better at this time than their initial presentation.        Procedure  Procedures         [1]   Past Medical History:  Diagnosis Date    Asthma     Chlamydia     Chronic hypertension     CSF oligoclonal bands     Depression     Herpes     Type 1 diabetes mellitus with hyperglycemia, with long-term current use of insulin     Urinary tract infection    [2] No past surgical history on file.  [3]   Family History  Problem Relation Name Age of Onset    Hypertension Maternal Grandmother      Asthma Child     [4]   Social History  Tobacco Use    Smoking status: Former     Types: Cigarettes     Passive exposure: Past    Smokeless tobacco: Never   Vaping Use    Vaping status: Former    Substances: Nicotine   Substance Use Topics    Alcohol  use: Not Currently    Drug use: Not Currently     Types: Marijuana        Yordy Bautista PA-C  05/28/25 1522

## 2025-05-28 NOTE — H&P
Medical Intensive Care - History and Physical   Subjective    Maria Isabel Cutler is a 25 y.o. year old female patient admitted on 5/28/2025 with following ICU needs: insulin gtt, DKA management.     HPI:  Ms. Cutler is a 25 year old female with a history of T1 DM and asthma who presents with hyperglycemia after finding her blood glucose to be over 500 at home.     Ms. Cutler states that she   States that experienced nausea and vomiting prior to presentation to the ED.  Has previous admission for DKA, most recently in January of 2025.     On arrival to the ED, she tachycardic in the low 100's, normotensive, afebrile, SpO2 in the high 90's on room air.   RFP was significant for blood glucose of 604, bicarbonate of 11, anion gap of 24. VBG with pH of 7.13, pCO2 of 32, SO2 of 71. Beta-Hydroxybutyrate was 7.66. She was initated on an insulin drip, LR infusion (250 ml/hr) and sodium bicarbonate infusion (100 ml/hr). She was also given 1L of NS bolus.     Review of Systems:  Review of Systems       Meds    Home medications:  Current Outpatient Medications   Medication Instructions    acetone, urine, test strip DRIP URINE TO CHECK FOR KETONES IF NAUSEA/VOMITING OR IF CONCERN FOR DKA OR DEHYDRATION    albuterol (Ventolin HFA) 90 mcg/actuation inhaler 1-2 puffs, inhalation, Every 4 hours PRN    blood-glucose sensor (Dexcom G7 Sensor) device Change sensor every 10 days    glucagon 1 mg, subcutaneous, Once as needed    glucose 4 gram chewable tablet Chew. USE AS DIRECTED TO TREAT HYPOGLYCEMIA    insulin lispro (HumaLOG KwikPen Insulin) 100 unit/mL injection Inject 1 unit under the skin for every 10 carbs with meals. May take up to 50 units per day.    insulin lispro (HUMALOG) 8 Units, subcutaneous, 3 times daily before meals, Inject 8 Units under the skin 3 times a day before meals.    insulin lispro 100 unit/mL injection Use via insulin pump for max  units    Lantus Solostar U-100 Insulin 14 Units, subcutaneous,  "Every morning    OneTouch Verio test strips strip TEST 6-7 TIMES DAILY    pen needle 1/2\" 29G X 12mm needle Use to inject 1-4 times daily as directed.    pen needle, diabetic 32 gauge x 5/32\" needle Use 1 pen needle for insulin injection 4 times a day.        Inpatient medications:  Scheduled medications  Scheduled Medications[1]  Continuous medications  Continuous Medications[2]  PRN medications  PRN Medications[3]     Objective    Blood pressure 115/73, pulse (!) 108, temperature 37.2 °C (98.9 °F), temperature source Oral, resp. rate 16, height 1.524 m (5'), weight 59 kg (130 lb), last menstrual period 04/17/2025, SpO2 99%, currently breastfeeding.         Intake/Output Summary (Last 24 hours) at 5/28/2025 1316  Last data filed at 5/28/2025 1121  Gross per 24 hour   Intake 999 ml   Output --   Net 999 ml       Labs:   Results from last 72 hours   Lab Units 05/28/25  1205 05/28/25  0953   SODIUM mmol/L  --  132*   POTASSIUM mmol/L 4.7 5.0   CHLORIDE mmol/L  --  97*   CO2 mmol/L  --  11*   BUN mg/dL  --  19   CREATININE mg/dL  --  0.94   GLUCOSE mg/dL  --  604*   CALCIUM mg/dL  --  9.3   ANION GAP mmol/L  --  29*   EGFR mL/min/1.73m*2  --  87               Results from last 72 hours   Lab Units 05/28/25  1205 05/28/25  0953   POCT PH, VENOUS pH 7.09* 7.13*   POCT PCO2, VENOUS mm Hg 38* 32*   POCT PO2, VENOUS mm Hg 37 49*        Micro/ID:     Lab Results   Component Value Date    URINECULTURE NO SIGNIFICANT GROWTH. 07/06/2023       Summary of Key Imaging Results  ***    Physical Exam: ***  General: Patient is awake, alert, conversant. Not acutely distressed.   HEENT: Pupils are equal and round, no scleral icterus or conjunctivitis  Chest: Breathing appears comfortable on room air. Chest movement symmetric. Normal respiratory effort. No adventitious lung sounds on auscultation.   Cardiac: Normal rate, regular rhythm. Normal S1, S2. No murmur appreciated. Radial pulses 2+, pedal pulses 2+.   Volume: Mucous membranes " moist. No lower extremity edema.   Abdomen: Bowel sounds are active. Abdomen is soft and non tender to palpation. No masses, hepatomegaly, or splenomegaly noted.   : No flank pain to percussion.   EXT: Upper and lower extremities are atraumatic in appearance without tenderness or deformity. No swelling or erythema.   MSK: No joint swelling appreciated. Grossly full active ROM  Skin: No lesions or rashes noted to exposed skin  Neuro: The patient is awake, alert and oriented to person, place, and time. Motor function is normal with muscle strength 5/5 bilaterally to upper and lower extremities. Sensation to light touch is intact bilaterally. Cranial nerves II-XII are intact. No gait abnormalities noted.  Psych: Appropriate mood and affect. Appropriate judgement and insight.      Assessment and Plan     Assessment:  Maria Isabel Cutler is a 25 y.o. year old female patient with a history of T1 DM who was admitted to the MICU on 05/28/25 for management of DKA in the setting of ***    Mechanical Ventilation: none  Sedation/Analgesia:  none  Restraints: no    Plan:  NEUROLOGY/PSYCH:  #Dx:  ***  Plan:  ***    CARDIOVASCULAR:  #Dx:  ***  Plan:  ***    PULMONARY:  #Dx:  ***  Plan:  ***    RENAL/GENITOURINARY:  #Dx:   ***  Plan:  ***    GASTROENTEROLOGY:  #Dx:  ***  Plan:  ***    ENDOCRINOLOGY:  #T1 DM  #DKA  -Etiology of DKA ->    Plan:  ***    HEMATOLOGY:  #Dx:  ***  Plan:  ***    SKIN:  #Dx:  Skin Failure {skin failure:97275}  ***  Plan:  ***    MUSCULOSKELETAL:  #Dx:  ***  Plan:  ***    INFECTIOUS DISEASE:  #Dx:  ***  Plan:  ***    ICU Check List     ICU Liberation: Intervention:   Assess, Prevent, Manage Pain 6    Both SAT and SBT [] SAT  [] SBT 30-60 min [] Extubate to NC  [] Extubate to NIV  [] Discuss Trach   Choice of analgesia and sedation   none     Delirium: Assess, prevent and manage  [facility] ICU      Early Mobility and Exercise   [] PT /OT consult   Family Engagement and Empowerment []Family updated []SW consult      FEN  Fluids: ***  Electrolytes: ***  Nutrition: ***  Prophylaxis:  DVT ppx: ***  GI ppx: ***  Bowel care: ***  Hardware:                   Social:  Code: Full Code    HPOA: ***  Disposition: ***                 Sanjuanita Bautista MD   05/28/25 at 1:16 PM     Disclaimer: Documentation completed with the information available at the time of input. The times in the chart may not be reflective of actual patient care times, interventions, or procedures. Documentation occurs after the physical care of the patient.           [1]    [2] D5 % and 0.9 % sodium chloride, 150 mL/hr  dextrose 10 % in water (D10W), 150 mL/hr  dextrose 10 % in water (D10W), 150 mL/hr  insulin regular, 0-50 Units/hr, Last Rate: 8 Units/hr (05/28/25 1245)  lactated Ringer's, 250 mL/hr, Last Rate: 250 mL/hr (05/28/25 1245)  sodium bicarbonate 8.4 % (1 mEq/mL) 150 mEq in dextrose 5% 1,150 mL infusion, 100 mL/hr, Last Rate: 100 mL/hr (05/28/25 1305)  [3] PRN medications: D5 % and 0.9 % sodium chloride, dextrose 10 % in water (D10W), dextrose 10 % in water (D10W), dextrose, insulin regular

## 2025-05-29 ENCOUNTER — PHARMACY VISIT (OUTPATIENT)
Dept: PHARMACY | Facility: CLINIC | Age: 25
End: 2025-05-29
Payer: MEDICAID

## 2025-05-29 VITALS
SYSTOLIC BLOOD PRESSURE: 96 MMHG | HEIGHT: 60 IN | RESPIRATION RATE: 21 BRPM | OXYGEN SATURATION: 100 % | HEART RATE: 103 BPM | TEMPERATURE: 98.1 F | BODY MASS INDEX: 23.11 KG/M2 | DIASTOLIC BLOOD PRESSURE: 61 MMHG | WEIGHT: 117.73 LBS

## 2025-05-29 LAB
ALBUMIN SERPL BCP-MCNC: 3.7 G/DL (ref 3.4–5)
ALBUMIN SERPL BCP-MCNC: 3.8 G/DL (ref 3.4–5)
ALBUMIN SERPL BCP-MCNC: 3.9 G/DL (ref 3.4–5)
ANION GAP SERPL CALC-SCNC: 12 MMOL/L (ref 10–20)
ANION GAP SERPL CALC-SCNC: 13 MMOL/L (ref 10–20)
ANION GAP SERPL CALC-SCNC: 18 MMOL/L (ref 10–20)
ATRIAL RATE: 106 BPM
BUN SERPL-MCNC: 10 MG/DL (ref 6–23)
BUN SERPL-MCNC: 14 MG/DL (ref 6–23)
BUN SERPL-MCNC: 15 MG/DL (ref 6–23)
CALCIUM SERPL-MCNC: 8.1 MG/DL (ref 8.6–10.6)
CALCIUM SERPL-MCNC: 8.4 MG/DL (ref 8.6–10.6)
CALCIUM SERPL-MCNC: 8.6 MG/DL (ref 8.6–10.6)
CHLORIDE SERPL-SCNC: 100 MMOL/L (ref 98–107)
CHLORIDE SERPL-SCNC: 101 MMOL/L (ref 98–107)
CHLORIDE SERPL-SCNC: 104 MMOL/L (ref 98–107)
CO2 SERPL-SCNC: 19 MMOL/L (ref 21–32)
CO2 SERPL-SCNC: 20 MMOL/L (ref 21–32)
CO2 SERPL-SCNC: 22 MMOL/L (ref 21–32)
CREAT SERPL-MCNC: 0.65 MG/DL (ref 0.5–1.05)
CREAT SERPL-MCNC: 0.65 MG/DL (ref 0.5–1.05)
CREAT SERPL-MCNC: 0.69 MG/DL (ref 0.5–1.05)
EGFRCR SERPLBLD CKD-EPI 2021: >90 ML/MIN/1.73M*2
ERYTHROCYTE [DISTWIDTH] IN BLOOD BY AUTOMATED COUNT: 11.8 % (ref 11.5–14.5)
GLUCOSE BLD MANUAL STRIP-MCNC: 124 MG/DL (ref 74–99)
GLUCOSE BLD MANUAL STRIP-MCNC: 133 MG/DL (ref 74–99)
GLUCOSE BLD MANUAL STRIP-MCNC: 175 MG/DL (ref 74–99)
GLUCOSE BLD MANUAL STRIP-MCNC: 191 MG/DL (ref 74–99)
GLUCOSE BLD MANUAL STRIP-MCNC: 219 MG/DL (ref 74–99)
GLUCOSE BLD MANUAL STRIP-MCNC: 228 MG/DL (ref 74–99)
GLUCOSE BLD MANUAL STRIP-MCNC: 246 MG/DL (ref 74–99)
GLUCOSE SERPL-MCNC: 109 MG/DL (ref 74–99)
GLUCOSE SERPL-MCNC: 142 MG/DL (ref 74–99)
GLUCOSE SERPL-MCNC: 251 MG/DL (ref 74–99)
HCT VFR BLD AUTO: 36.4 % (ref 36–46)
HGB BLD-MCNC: 12.1 G/DL (ref 12–16)
HOLD SPECIMEN: NORMAL
MAGNESIUM SERPL-MCNC: 1.89 MG/DL (ref 1.6–2.4)
MAGNESIUM SERPL-MCNC: 1.91 MG/DL (ref 1.6–2.4)
MCH RBC QN AUTO: 30.6 PG (ref 26–34)
MCHC RBC AUTO-ENTMCNC: 33.2 G/DL (ref 32–36)
MCV RBC AUTO: 92 FL (ref 80–100)
NRBC BLD-RTO: 0 /100 WBCS (ref 0–0)
P AXIS: 74 DEGREES
P OFFSET: 195 MS
P ONSET: 144 MS
PHOSPHATE SERPL-MCNC: 3 MG/DL (ref 2.5–4.9)
PHOSPHATE SERPL-MCNC: 3.2 MG/DL (ref 2.5–4.9)
PHOSPHATE SERPL-MCNC: 3.4 MG/DL (ref 2.5–4.9)
PLATELET # BLD AUTO: 448 X10*3/UL (ref 150–450)
POTASSIUM SERPL-SCNC: 3.3 MMOL/L (ref 3.5–5.3)
POTASSIUM SERPL-SCNC: 4.1 MMOL/L (ref 3.5–5.3)
POTASSIUM SERPL-SCNC: 4.8 MMOL/L (ref 3.5–5.3)
PR INTERVAL: 150 MS
Q ONSET: 219 MS
QRS COUNT: 18 BEATS
QRS DURATION: 72 MS
QT INTERVAL: 318 MS
QTC CALCULATION(BAZETT): 422 MS
QTC FREDERICIA: 384 MS
R AXIS: 24 DEGREES
RBC # BLD AUTO: 3.95 X10*6/UL (ref 4–5.2)
SODIUM SERPL-SCNC: 130 MMOL/L (ref 136–145)
SODIUM SERPL-SCNC: 133 MMOL/L (ref 136–145)
SODIUM SERPL-SCNC: 134 MMOL/L (ref 136–145)
T AXIS: 31 DEGREES
T OFFSET: 378 MS
VENTRICULAR RATE: 106 BPM
WBC # BLD AUTO: 13.9 X10*3/UL (ref 4.4–11.3)

## 2025-05-29 PROCEDURE — RXMED WILLOW AMBULATORY MEDICATION CHARGE

## 2025-05-29 PROCEDURE — 36415 COLL VENOUS BLD VENIPUNCTURE: CPT | Performed by: NURSE PRACTITIONER

## 2025-05-29 PROCEDURE — 83735 ASSAY OF MAGNESIUM: CPT | Performed by: NURSE PRACTITIONER

## 2025-05-29 PROCEDURE — 85027 COMPLETE CBC AUTOMATED: CPT | Performed by: NURSE PRACTITIONER

## 2025-05-29 PROCEDURE — 82947 ASSAY GLUCOSE BLOOD QUANT: CPT

## 2025-05-29 PROCEDURE — 80069 RENAL FUNCTION PANEL: CPT | Performed by: NURSE PRACTITIONER

## 2025-05-29 PROCEDURE — G0378 HOSPITAL OBSERVATION PER HR: HCPCS

## 2025-05-29 PROCEDURE — 99255 IP/OBS CONSLTJ NEW/EST HI 80: CPT

## 2025-05-29 PROCEDURE — 2500000002 HC RX 250 W HCPCS SELF ADMINISTERED DRUGS (ALT 637 FOR MEDICARE OP, ALT 636 FOR OP/ED): Mod: SE | Performed by: NURSE PRACTITIONER

## 2025-05-29 PROCEDURE — 2500000004 HC RX 250 GENERAL PHARMACY W/ HCPCS (ALT 636 FOR OP/ED): Mod: SE | Performed by: NURSE PRACTITIONER

## 2025-05-29 RX ORDER — PEN NEEDLE, DIABETIC 30 GX3/16"
NEEDLE, DISPOSABLE MISCELLANEOUS
Qty: 400 EACH | Refills: 2 | Status: SHIPPED | OUTPATIENT
Start: 2025-05-29

## 2025-05-29 RX ORDER — INSULIN LISPRO 100 [IU]/ML
INJECTION, SOLUTION INTRAVENOUS; SUBCUTANEOUS
Qty: 15 ML | Refills: 11 | Status: SHIPPED | OUTPATIENT
Start: 2025-05-29

## 2025-05-29 RX ORDER — DEXTROSE 50 % IN WATER (D50W) INTRAVENOUS SYRINGE
25
Status: DISCONTINUED | OUTPATIENT
Start: 2025-05-29 | End: 2025-05-29 | Stop reason: HOSPADM

## 2025-05-29 RX ORDER — INSULIN LISPRO 100 [IU]/ML
4 INJECTION, SOLUTION INTRAVENOUS; SUBCUTANEOUS
Status: DISCONTINUED | OUTPATIENT
Start: 2025-05-29 | End: 2025-05-29 | Stop reason: HOSPADM

## 2025-05-29 RX ORDER — INSULIN GLARGINE 100 [IU]/ML
14 INJECTION, SOLUTION SUBCUTANEOUS EVERY 24 HOURS
Status: DISCONTINUED | OUTPATIENT
Start: 2025-05-30 | End: 2025-05-29 | Stop reason: HOSPADM

## 2025-05-29 RX ORDER — POTASSIUM CHLORIDE 20 MEQ/1
40 TABLET, EXTENDED RELEASE ORAL ONCE
Status: COMPLETED | OUTPATIENT
Start: 2025-05-29 | End: 2025-05-29

## 2025-05-29 RX ORDER — DEXTROSE 50 % IN WATER (D50W) INTRAVENOUS SYRINGE
12.5
Status: DISCONTINUED | OUTPATIENT
Start: 2025-05-29 | End: 2025-05-29 | Stop reason: HOSPADM

## 2025-05-29 RX ORDER — INSULIN GLARGINE 100 [IU]/ML
14 INJECTION, SOLUTION SUBCUTANEOUS ONCE
Status: COMPLETED | OUTPATIENT
Start: 2025-05-29 | End: 2025-05-29

## 2025-05-29 RX ORDER — INSULIN LISPRO 100 [IU]/ML
0-5 INJECTION, SOLUTION INTRAVENOUS; SUBCUTANEOUS
Status: DISCONTINUED | OUTPATIENT
Start: 2025-05-29 | End: 2025-05-29 | Stop reason: HOSPADM

## 2025-05-29 RX ORDER — INSULIN GLARGINE 100 [IU]/ML
INJECTION, SOLUTION SUBCUTANEOUS
Qty: 15 ML | Refills: 11 | Status: SHIPPED | OUTPATIENT
Start: 2025-05-29

## 2025-05-29 RX ADMIN — POTASSIUM CHLORIDE 40 MEQ: 1500 TABLET, EXTENDED RELEASE ORAL at 08:15

## 2025-05-29 RX ADMIN — INSULIN GLARGINE 14 UNITS: 100 INJECTION, SOLUTION SUBCUTANEOUS at 02:35

## 2025-05-29 RX ADMIN — INSULIN LISPRO 4 UNITS: 100 INJECTION, SOLUTION INTRAVENOUS; SUBCUTANEOUS at 12:40

## 2025-05-29 RX ADMIN — DEXTROSE AND SODIUM CHLORIDE 200 ML/HR: 5; 450 INJECTION, SOLUTION INTRAVENOUS at 00:41

## 2025-05-29 RX ADMIN — INSULIN LISPRO 2 UNITS: 100 INJECTION, SOLUTION INTRAVENOUS; SUBCUTANEOUS at 12:40

## 2025-05-29 RX ADMIN — INSULIN LISPRO 4 UNITS: 100 INJECTION, SOLUTION INTRAVENOUS; SUBCUTANEOUS at 08:20

## 2025-05-29 ASSESSMENT — ENCOUNTER SYMPTOMS
COUGH: 0
POLYDIPSIA: 0
FREQUENCY: 0
ACTIVITY CHANGE: 0
DIARRHEA: 0
TROUBLE SWALLOWING: 0
DYSURIA: 0
CHEST TIGHTNESS: 0
SORE THROAT: 0
APPETITE CHANGE: 0
EYE PAIN: 0
PALPITATIONS: 0
UNEXPECTED WEIGHT CHANGE: 0
CONFUSION: 0
LIGHT-HEADEDNESS: 0
HEADACHES: 0
POLYPHAGIA: 0
VOMITING: 0
DIZZINESS: 0
FATIGUE: 1
DIAPHORESIS: 0
AGITATION: 0
ABDOMINAL PAIN: 0
BRUISES/BLEEDS EASILY: 0
NUMBNESS: 0
NAUSEA: 0
JOINT SWELLING: 0
EYE REDNESS: 0
SHORTNESS OF BREATH: 0

## 2025-05-29 ASSESSMENT — COGNITIVE AND FUNCTIONAL STATUS - GENERAL
MOBILITY SCORE: 24
DAILY ACTIVITIY SCORE: 24

## 2025-05-29 ASSESSMENT — PAIN SCALES - GENERAL
PAINLEVEL_OUTOF10: 0 - NO PAIN
PAINLEVEL_OUTOF10: 0 - NO PAIN

## 2025-05-29 ASSESSMENT — PAIN - FUNCTIONAL ASSESSMENT
PAIN_FUNCTIONAL_ASSESSMENT: 0-10
PAIN_FUNCTIONAL_ASSESSMENT: 0-10

## 2025-05-29 NOTE — CARE PLAN
Problem: Pain - Adult  Goal: Verbalizes/displays adequate comfort level or baseline comfort level  Outcome: Adequate for Discharge     Problem: Safety - Adult  Goal: Free from fall injury  5/29/2025 1543 by Liv Power RN  Outcome: Adequate for Discharge  5/29/2025 1038 by Liv Power RN  Outcome: Progressing     Problem: Discharge Planning  Goal: Discharge to home or other facility with appropriate resources  5/29/2025 1543 by Liv Power RN  Outcome: Adequate for Discharge  5/29/2025 1038 by Lvi Power RN  Outcome: Progressing     Problem: Chronic Conditions and Co-morbidities  Goal: Patient's chronic conditions and co-morbidity symptoms are monitored and maintained or improved  5/29/2025 1543 by Liv Power RN  Outcome: Adequate for Discharge  5/29/2025 1038 by Liv Power RN  Outcome: Progressing     Problem: Nutrition  Goal: Nutrient intake appropriate for maintaining nutritional needs  Outcome: Adequate for Discharge     Problem: Skin  Goal: Decreased wound size/increased tissue granulation at next dressing change  Outcome: Adequate for Discharge  Goal: Participates in plan/prevention/treatment measures  Outcome: Adequate for Discharge  Goal: Prevent/manage excess moisture  Outcome: Adequate for Discharge  Goal: Prevent/minimize sheer/friction injuries  Outcome: Adequate for Discharge  Goal: Promote/optimize nutrition  Outcome: Adequate for Discharge  Goal: Promote skin healing  Outcome: Adequate for Discharge

## 2025-05-29 NOTE — NURSING NOTE
Patient discharged home with belongings, med to bed medications and discharge paperwork. This RN reviewed with patient and she had no questions. Patient taken to her ride home outside [facility] lobby by wheelchair and transporter.

## 2025-05-29 NOTE — CARE PLAN
Problem: Pain - Adult  Goal: Verbalizes/displays adequate comfort level or baseline comfort level  Outcome: Progressing     Problem: Safety - Adult  Goal: Free from fall injury  Outcome: Progressing     Problem: Discharge Planning  Goal: Discharge to home or other facility with appropriate resources  Outcome: Progressing     Problem: Chronic Conditions and Co-morbidities  Goal: Patient's chronic conditions and co-morbidity symptoms are monitored and maintained or improved  Outcome: Progressing     Problem: Nutrition  Goal: Nutrient intake appropriate for maintaining nutritional needs  Outcome: Progressing     Problem: Skin  Goal: Decreased wound size/increased tissue granulation at next dressing change  Outcome: Progressing  Flowsheets (Taken 5/28/2025 2305)  Decreased wound size/increased tissue granulation at next dressing change: Protective dressings over bony prominences  Goal: Participates in plan/prevention/treatment measures  Outcome: Progressing  Flowsheets (Taken 5/28/2025 2305)  Participates in plan/prevention/treatment measures: Elevate heels  Goal: Prevent/manage excess moisture  Outcome: Progressing  Flowsheets (Taken 5/28/2025 2305)  Prevent/manage excess moisture: Monitor for/manage infection if present  Goal: Prevent/minimize sheer/friction injuries  Outcome: Progressing  Flowsheets (Taken 5/28/2025 2305)  Prevent/minimize sheer/friction injuries: Increase activity/out of bed for meals  Goal: Promote/optimize nutrition  Outcome: Progressing  Flowsheets (Taken 5/28/2025 2305)  Promote/optimize nutrition: Monitor/record intake including meals  Goal: Promote skin healing  Outcome: Progressing  Flowsheets (Taken 5/28/2025 2305)  Promote skin healing: Rotate device position/do not position patient on device

## 2025-05-29 NOTE — PROGRESS NOTES
"Pharmacy Medication History Review    Maria Isabel Cutler is a 25 y.o. female admitted for Diabetic ketoacidosis without coma associated with type 1 diabetes mellitus. Pharmacy reviewed the patient's qcmng-pb-wpocpctop medications and allergies for accuracy.    Medications ADDED:  None  Medications CHANGED:  None  Medications REMOVED/NOT TAKING:   Du[plicate insulin lispro     The list below reflects the updated PTA list.   Prior to Admission Medications   Prescriptions Last Dose Informant   OneTouch Verio test strips strip     Sig: TEST 6-7 TIMES DAILY   acetone, urine, test strip     Sig: DRIP URINE TO CHECK FOR KETONES IF NAUSEA/VOMITING OR IF CONCERN FOR DKA OR DEHYDRATION   albuterol (Ventolin HFA) 90 mcg/actuation inhaler  Self   Sig: Inhale 1-2 puffs every 4 hours if needed for wheezing or shortness of breath.   blood-glucose sensor (Dexcom G7 Sensor) device     Sig: Change sensor every 10 days   glucagon 1 mg/0.2 mL auto-injector  Self   Sig: Inject 1 mg under the skin 1 time if needed (use for low blood sugar if unable to take anything by mouth) for up to 1 dose.   glucose 4 gram chewable tablet Past Month Self   Sig: Chew. USE AS DIRECTED TO TREAT HYPOGLYCEMIA   insulin glargine (Lantus Solostar U-100 Insulin) 100 unit/mL (3 mL) pen     Sig: Inject 14 Units under the skin once daily in the morning.   insulin lispro (HumaLOG KwikPen Insulin) 100 unit/mL injection     Sig: Inject 1 unit under the skin for every 10 carbs with meals. May take up to 50 units per day.   insulin lispro 100 unit/mL injection  Self   Sig: Use via insulin pump for max  units   pen needle 1/2\" 29G X 12mm needle     Sig: Use to inject 1-4 times daily as directed.   pen needle, diabetic 32 gauge x 5/32\" needle     Sig: Use 1 pen needle for insulin injection 4 times a day.      Facility-Administered Medications: None        The list below reflects the updated allergy list. Please review each documented allergy for additional " "clarification and justification.  Allergies  Reviewed by Kajal Gastelum, RN on 5/28/2025        Severity Reactions Comments    Pollen Extracts Not Specified Other Runny nose            Patient accepts M2B at discharge.     Sources:   Patient interview (good historian)  [facility]  Care Everywhere  Chart Review  Medication Dispense History    Additional Comments:  None to note    Judie Harper [facility]  Transitions of Care Pharmacist  05/29/25     Secure Chat preferred   If no response call [phone] or Vocera \"Med Rec\"    "

## 2025-05-29 NOTE — PROGRESS NOTES
SDU to Floor Transfer Summary & Floor Readiness Note     I, personally, evaluated Maria Isabel Cutler prior to transfer to the floor, including reviewing all current laboratory and imaging studies. The patient remains appropriate for transfer to the floor. Bedside nurse and respiratory therapy are also in agreement of patient's readiness for the floor.        I:  Hospital Course   Ms. Cutler is a 25 year old female with a history of T1 DM and asthma who presents with hyperglycemia after finding her blood glucose to be over 500 at home.      Ms. Cutler states that she experienced nausea and vomiting prior to presentation to the ED.  Has previous admission for DKA, most recently in January of 2025. She notes that prior to this presentation she was recently started back on an insulin pump, but has had issues with her insurance and obtaining her medications/supplies.     On arrival to the ED, she tachycardic in the low 100's, normotensive, afebrile, SpO2 in the high 90's on room air. RFP was significant for blood glucose of 604, bicarbonate of 11, anion gap of 24. VBG with pH of 7.13, pCO2 of 32, SO2 of 71. Beta-Hydroxybutyrate was 7.66. She was initated on an insulin drip, LR infusion (250 ml/hr) and sodium bicarbonate infusion (100 ml/hr). She was also given 1L of NS bolus.      Upon arrival to the MICU patient is well appearing, in no acute distress, and has no complaints at this time. Denies abdominal pain, nausea/vomiting. Patient on D5, Bicarb, insulin drips.  AG closed and transferred to MICU SDU on 5/28, tolerated PO diet and transitioned off insulin drip after 14 units Lantus given around 0230 on 5/29.  Endo consulted.        C: Code Status/DPOA Info/Goals of Care/ACP Note    Full Code  DPOA/Contact Number: Angela Ng [phone]     U: Unprescribing & Pertinent High-Risk Medications    Changes to home meds: Home Regimen: Lantus 14 units QAM, Humalog SSI, Lispro 8 units pre-meal.   Currently  getting 4 units Lispro pre-meal     Anticoagulation:     Antibiotics:   [x] N/A - no current planned antimicrobioals    P: Pending Tests at the Time of Transfer   none      A: Active consultants, including Rehab:   [x]  Subspecialty Consultants: endocrinology  []  PT  []  OT  []  SLP  []  Wound Care    U: Uncertainty Measure/Diagnostic Pause:    Working diagnosis at the time of transfer DKA     Diagnosis Degree of Certainty: 1. High degree of certainty about the clinical diagnosis.     S: Summary of Major Problems and To-Dos:   #type I DM- Endo consult pending  #DKA (resolved)       E: Exam, including Lines/Drains/Airways & Data Review:   Constitutional: pt in NAD, alert and cooperative  Eyes: PERRL, EOMI, no icterus   ENMT: mucous membranes moist, no apparent injury, no lesions seen  Head/Neck: Neck supple, no apparent injury  Respiratory/Thorax: Lungs CTA bilaterally, non-labored breathing, no cough, on RA  Cardiovascular: Regular, rate and rhythm, no murmurs, normal S1 and S2  Gastrointestinal: Nondistended, soft, non-tender, BS present x 4  Musculoskeletal: ROM intact, no joint swelling, normal strength  Extremities: normal extremities, no edema, contusions or wounds  Neurological: alert and oriented x 3, speech clear, follows commands appropriately, cr. n. II-XII intact, sensation grossly intact, motor 5/5 throughout  Skin: Warm and dry, no lesions, no rashes    Current Vital Signs:  Heart Rate: 108 (05/29/25 0824 : Liv Power RN)  BP: 94/75 (05/29/25 0824 : Liv Power RN)  Temp: 36.6 °C (97.9 °F) (05/29/25 0824 : Liv Power RN)  Resp: 21 (05/29/25 0824 : Liv Power RN)  SpO2: 99 % (05/29/25 0824 : Liv Power RN)        Talia Barnett, APRN-CNP

## 2025-05-29 NOTE — HOSPITAL COURSE
Ms. Cutler is a 25 year old female with a history of T1 DM and asthma who presents with hyperglycemia after finding her blood glucose to be over 500 at home.      Ms. Cutler states that she experienced nausea and vomiting prior to presentation to the ED.  Has previous admission for DKA, most recently in January of 2025. She notes that prior to this presentation she was recently started back on an insulin pump, but has had issues with her insurance and obtaining her medications/supplies.     On arrival to the ED, she tachycardic in the low 100's, normotensive, afebrile, SpO2 in the high 90's on room air. RFP was significant for blood glucose of 604, bicarbonate of 11, anion gap of 24. VBG with pH of 7.13, pCO2 of 32, SO2 of 71. Beta-Hydroxybutyrate was 7.66. She was initated on an insulin drip, LR infusion (250 ml/hr) and sodium bicarbonate infusion (100 ml/hr). She was also given 1L of NS bolus.      Upon arrival to the MICU patient is well appearing, in no acute distress, and has no complaints at this time. Denies abdominal pain, nausea/vomiting. Patient on D5, Bicarb, insulin drips.  AG closed and transferred to MICU SDU on 5/28, tolerated PO diet and transitioned off insulin drip after 14 units Lantus given around 0230 on 5/29.  Endo consulted.

## 2025-05-29 NOTE — CONSULTS
Nutrition Initial Assessment:   Nutrition Assessment    Reason for Assessment: Admission nursing screening    Patient is a 25 y.o. female presenting with DKA.      Nutrition History:  Food and Nutrient History: Met with patient.  She was on the phone with her mom and her one-year old daughter.  She reports a good appetite and intake PTA-- says she eats a lot of food.  She cooks and does not eat out.  She has been losing weight, she thinks about 14# in the last year.  Discussed with her that her weight may be dropping if her sugars are high.  Agrees that she thinks she started losing when she started having issues with DKA (unclear how long ago this was).  She says she has had DM since age 5.  No questions for RDN about diet but does say she wants to try to eat healthier overall.  Left her RDN card with outpatient scheduling number to call for an appointment after discharge.  She is open to seeing someone at discharge.       Anthropometrics:  Height: 152.4 cm (5')   Weight: 53.4 kg (117 lb 11.6 oz)   BMI (Calculated): 22.99  IBW/kg (Dietitian Calculated): 45.5 kg  Percent of IBW: 117 %     Weight History:     Wt Readings from Last 8 Encounters:   05/29/25 53.4 kg (117 lb 11.6 oz)   01/16/25 55.8 kg (123 lb 0.3 oz)   08/30/24 53.5 kg (118 lb)   07/25/24 51.5 kg (113 lb 8.6 oz)   05/24/24 51.3 kg (113 lb)   04/05/24 47.6 kg (105 lb)   01/23/24 47.8 kg (105 lb 6.4 oz)   11/27/23 60.4 kg (133 lb 2.5 oz)     Pt reports a usual body weight of 59kg-- weighed this sometime in 2024.  EMR shows that she has been stable with her weight for over one year.      Weight Change %:       Nutrition Focused Physical Exam Findings:  Defer    Subcutaneous Fat Loss:      Muscle Wasting:     Edema:  Edema: none  Physical Findings:  Skin: Negative    Nutrition Significant Labs:  A1C:  Lab Results   Component Value Date    HGBA1C 11.0 (H) 05/28/2025   , BG POCT trend:   Results from last 7 days   Lab Units 05/29/25  1217 05/29/25  0812  05/29/25  0432 05/29/25  0331 05/29/25  0232   POCT GLUCOSE mg/dL 246* 133* 124* 191* 219*    , Renal Lab Trend:   Results from last 7 days   Lab Units 05/29/25 0444 05/28/25  2343 05/28/25 2005 05/28/25  1528   POTASSIUM mmol/L 3.3* 4.1 4.2 4.1   PHOSPHORUS mg/dL 3.2 3.4 3.0  --    SODIUM mmol/L 134* 130* 132* 134*   MAGNESIUM mg/dL 1.89 1.91  --   --    EGFR mL/min/1.73m*2 >90 >90 >90 >90   BUN mg/dL 10 14 16 21   CREATININE mg/dL 0.65 0.65 0.76 0.87    , Vit D:   Lab Results   Component Value Date    VITD25 12 (A) 01/04/2018        Nutrition Specific Medications:  Scheduled medications  enoxaparin, 40 mg, subcutaneous, q24h  [START ON 5/30/2025] insulin glargine, 14 Units, subcutaneous, q24h  insulin lispro, 0-5 Units, subcutaneous, TID AC  insulin lispro, 4 Units, subcutaneous, TID AC      Continuous medications  Continuous Medications[1]  PRN medications  PRN Medications[2]     I/O:    ;        Dietary Orders (From admission, onward)       Start     Ordered    05/29/25 0109  May Participate in Room Service  Once        Question:  .  Answer:  Yes    05/29/25 0108 05/29/25 0108  Adult diet Consistent Carb; CCD 60 gm/meal  Diet effective now        Question Answer Comment   Diet type Consistent Carb    Carb diet selection: CCD 60 gm/meal        05/29/25 0107                     Estimated Needs:   Total Energy Estimated Needs in 24 hours (kCal): 1600 kCal  Method for Estimating Needs: 30kcals/kg  Total Protein Estimated Needs in 24 Hours (g): 60 g  Method for Estimating 24 Hour Protein Needs: 1.3 x 45.5kg            Nutrition Diagnosis        Nutrition Diagnosis  Patient has Nutrition Diagnosis: Yes  Diagnosis Status (1): New  Nutrition Diagnosis 1: Altered nutrition related to laboratory values  Related to (1): DKA  As Evidenced by (1): pt with elevated A1c for the last year       Pt is losing weight and unable to gain in light of uncontrolled blood sugars.  Appetite is good; suspect good control of blood  sugars will help with weight issues.     Nutrition Interventions/Recommendations         Nutrition Prescription:   Continue PO diet as tolerated  Check new Vitamin D level  Refer patient to outpatient RDN for follow-up once she is home.          Nutrition Interventions:    None at this time.       Nutrition Education:   Appropriate for outpatient education       Nutrition Monitoring and Evaluation           Time Spent (min): 45 minutes                 [1]    [2] PRN medications: acetaminophen **OR** [DISCONTINUED] acetaminophen **OR** [DISCONTINUED] acetaminophen, albuterol, dextrose, dextrose, glucagon, glucagon, insulin regular

## 2025-05-29 NOTE — PROGRESS NOTES
Medical Intensive Care Stepdown Unit - Daily Progress Note   Subjective    Maria Isabel Cutler is a 25 y.o. year old female patient admitted on 5/28/2025 with DKA    Interval History:  Transferred to SDU overnight. AG closed, tolerating PO diet.  Given 14 units Lantus @0230, started on SSI with QA coverage.  No complaints this am. Tolerating PO diet, denies pain, N/V/D    Meds    Scheduled medications  Scheduled Medications[1]  Continuous medications  Continuous Medications[2]  PRN medications  PRN Medications[3]     Objective    Blood pressure 87/58, pulse 92, temperature 36.9 °C (98.5 °F), temperature source Temporal, resp. rate 15, height 1.524 m (5'), weight 53.4 kg (117 lb 11.6 oz), last menstrual period 04/17/2025, SpO2 99%, currently breastfeeding.     Constitutional: pt in NAD, alert and cooperative  Eyes: PERRL, EOMI, no icterus   ENMT: mucous membranes moist, no apparent injury, no lesions seen  Head/Neck: Neck supple, no apparent injury  Respiratory/Thorax: Lungs CTA bilaterally, non-labored breathing, no cough, on RA  Cardiovascular: Regular, rate and rhythm, no murmurs, normal S1 and S2  Gastrointestinal: Nondistended, soft, non-tender, BS present x 4  Musculoskeletal: ROM intact, no joint swelling, normal strength  Extremities: normal extremities, no edema, contusions or wounds  Neurological: alert and oriented x 3, speech clear, follows commands appropriately, cr. n. II-XII intact, sensation grossly intact, motor 5/5 throughout  Skin: Warm and dry, no lesions, no rashes      Intake/Output Summary (Last 24 hours) at 5/29/2025 0722  Last data filed at 5/29/2025 0439  Gross per 24 hour   Intake 2823.93 ml   Output 600 ml   Net 2223.93 ml     Labs:   Results from last 72 hours   Lab Units 05/29/25  0444 05/28/25  2343 05/28/25 2005   SODIUM mmol/L 134* 130* 132*   POTASSIUM mmol/L 3.3* 4.1 4.2   CHLORIDE mmol/L 104 100 99   CO2 mmol/L 20* 22 23   BUN mg/dL 10 14 16   CREATININE mg/dL 0.65 0.65 0.76    GLUCOSE mg/dL 109* 142* 164*   CALCIUM mg/dL 8.1* 8.6 9.1   ANION GAP mmol/L 13 12 14   EGFR mL/min/1.73m*2 >90 >90 >90   PHOSPHORUS mg/dL 3.2 3.4 3.0      Results from last 72 hours   Lab Units 05/29/25  0444 05/28/25  1651 05/28/25  1300   WBC AUTO x10*3/uL 13.9* 19.8* 19.9*   HEMOGLOBIN g/dL 12.1 12.5 13.4   HEMATOCRIT % 36.4 36.5 41.0   PLATELETS AUTO x10*3/uL 448 475* 480*   NEUTROS PCT AUTO %  --  81.5 86.9   LYMPHS PCT AUTO %  --  13.0 8.5   MONOS PCT AUTO %  --  4.3 2.9   EOS PCT AUTO %  --  0.1 0.1        Micro/ID:     Lab Results   Component Value Date    URINECULTURE NO SIGNIFICANT GROWTH. 07/06/2023     Summary of key imaging results from the last 24 hours  N/A    Assessment and Plan     Assessment: Maria Isabel Cutler is a 25 y.o. year old female patient with medical history of type I DM and Asthma admitted on 5/28/2025 with DKA    Restraints: no    Summary for 05/29/25  :  Stable for transfer to [facility]    Plan:  NEUROLOGY/PSYCH:  Dx: NO acute issues  Management:  PRN Tylenol for pain  OOB daily    CARDIOVASCULAR:  Dx: Sinus tachy (resolved)  Management:  HDS, BP soft, SBP 's  Tele while in SDU    PULMONARY:  Dx: Hx of asthma  Management:  Stable on RA    RENAL/GENITOURINARY:  Dx: no acute issues  Management:  voiding    GASTROENTEROLOGY:  Dx: HypoK  Management:  Bmx1  Diet: Diabetic  Bowel Regimen: N/A    ENDOCRINOLOGY:  Dx: Type I DM, DKA (resolved)  Management:  A1C: 11.0 (5/28/25)  Home Regimen: Lantus 14 units QAM, Humalog SSI, Lispro 8 units pre-meal  Insulin drip stopped last evening and transitioned to SSI and Glargine  Consulted Endo    HEMATOLOGY:  Dx: No acute issues  Management:  Lovenox for DVT ppx    INFECTIOUS DISEASE:  Dx: No acute issues    ICU/SDU Check List                  FEN  Fluids: PRN  Electrolytes: PRN  Nutrition: Diabetic  Prophylaxis:  DVT ppx: Lovenox  GI ppx: N/A  Hardware:  Catheter: none  Drains: none  Lines: PIV  Social:  Code: Full Code    HPOA:  Sister, Angela  He [phone]     Disposition: stable for transfer to [facility]  Discharge Planning: home    Talia Barnett, APRN-CNP   05/29/25 at 7:22 AM     Pt discussed with Dr. Ibanez, seen and examined. All labs, VS and previous plan of care reviewed.  I spent 45 minutes in the professional and overall care of this patient.      Disclaimer: Documentation completed with the information available at the time of input. The times in the chart may not be reflective of actual patient care times, interventions, or procedures. Documentation occurs after the physical care of the patient.            [1] enoxaparin, 40 mg, subcutaneous, q24h  [START ON 5/30/2025] insulin glargine, 14 Units, subcutaneous, q24h  insulin lispro, 0-5 Units, subcutaneous, TID AC  insulin lispro, 4 Units, subcutaneous, TID AC  [2]    [3] PRN medications: acetaminophen **OR** [DISCONTINUED] acetaminophen **OR** [DISCONTINUED] acetaminophen, albuterol, dextrose, dextrose, glucagon, glucagon, insulin regular

## 2025-05-29 NOTE — CARE PLAN
Problem: Safety - Adult  Goal: Free from fall injury  Outcome: Progressing     Problem: Discharge Planning  Goal: Discharge to home or other facility with appropriate resources  Outcome: Progressing     Problem: Chronic Conditions and Co-morbidities  Goal: Patient's chronic conditions and co-morbidity symptoms are monitored and maintained or improved  Outcome: Progressing

## 2025-05-29 NOTE — PROGRESS NOTES
05/29/25 1400   Discharge Planning   Living Arrangements Children   Support Systems Children   Assistance Needed none   Type of Residence Private residence   Who is requesting discharge planning? Provider   Home or Post Acute Services None   Expected Discharge Disposition Home     Ms. Cutler is a 25 year old female with a history of T1 DM and asthma who presents with hyperglycemia after finding her blood glucose to be over 500 at home. ADOD 5/29. HNN.     This SW/TCC met with pt to introduce self and role.  Per the medical team, this patient has no anticipated discharge needs; full assessment deferred at this time.  Please advise SW/TCC if discharge planning needs arise. Angeline Obando RN TCC

## 2025-05-29 NOTE — SIGNIFICANT EVENT
ICU to [facility] Transfer Summary     I:  ICU Admission Reason & Brief ICU Course:    Ms. Cutler is a 25 year old female with a history of T1 DM and asthma who presents with hyperglycemia after finding her blood glucose to be over 500 at home.      Ms. Cutler states that she experienced nausea and vomiting prior to presentation to the ED.  Has previous admission for DKA, most recently in January of 2025. She notes that prior to this presentation she was recently started back on an insulin pump, but has had issues with her insurance and obtaining her medications/supplies.     On arrival to the ED, she tachycardic in the low 100's, normotensive, afebrile, SpO2 in the high 90's on room air. RFP was significant for blood glucose of 604, bicarbonate of 11, anion gap of 24. VBG with pH of 7.13, pCO2 of 32, SO2 of 71. Beta-Hydroxybutyrate was 7.66. She was initated on an insulin drip, LR infusion (250 ml/hr) and sodium bicarbonate infusion (100 ml/hr). She was also given 1L of NS bolus.      Upon arrival to the MICU patient is tolerating the dka protocol well, AG still elevated, now transferring to stepdown unit.        C: Code Status/DPOA Info/Goals of Care/ACP Note    Full Code  DPOA/Contact Number: Angela Ng [phone]     U: Unprescribing & Pertinent High-Risk Medications    Changes to home meds: n/a     Anticoagulation: ppx    Antibiotics:   none    P: Pending Tests at the Time of Transfer   RFP      A: Active consultants, including Rehab:   []  Subspecialty Consultants:   [x]  PT  [x]  OT  []  SLP  []  Wound Care    U: Uncertainty Measure/Diagnostic Pause:    Working diagnosis at the time of transfer DKA,   Diagnosis Degree of Certainty: 1. High degree of certainty about the clinical diagnosis.     S: Summary of Major Problems and To-Dos:   Maria Isabel Cutler is a 25 y.o. year old female patient admitted to the MICU on 05/28/25 for DKA requiring insulin drip.      Plan:  NEUROLOGY/PSYCH:  - No active  issues     CARDIOVASCULAR:  - No active issues     PULMONARY:  #Hx of asthma  - Patient saturating 100% on room air  - monitor for respiratory distress     RENAL/GENITOURINARY:  #Acidosis 2/2 DKA  ::DKA protocol started in ED  :: Anion gap  - Currently on insulin gtt, maintenance LR  - Continue DKA protocol     GASTROENTEROLOGY:  - No active issues     ENDOCRINOLOGY:  #DKA iso T1DM with medication nonadherance  - DKA protocol     HEMATOLOGY:  - No active issues     SKIN:  -No active issues     MUSCULOSKELETAL:  - No active issues     INFECTIOUS DISEASE:  - No active issues     E: Exam, including Lines/Drains/Airways & Data Review:   Physical Exam  Constitutional:       General: She is not in acute distress.     Appearance: Normal appearance. She is not ill-appearing, toxic-appearing or diaphoretic.   HENT:      Head: Normocephalic and atraumatic.      Right Ear: External ear normal.      Left Ear: External ear normal.      Nose: Nose normal.      Mouth/Throat:      Mouth: Mucous membranes are moist.      Pharynx: Oropharynx is clear.   Eyes:      Extraocular Movements: Extraocular movements intact.      Conjunctiva/sclera: Conjunctivae normal.      Pupils: Pupils are equal, round, and reactive to light.   Cardiovascular:      Rate and Rhythm: Normal rate and regular rhythm.      Pulses: Normal pulses.      Heart sounds: Normal heart sounds.   Pulmonary:      Effort: Pulmonary effort is normal. No respiratory distress.      Breath sounds: Normal breath sounds. No wheezing, rhonchi or rales.   Abdominal:      General: Abdomen is flat. There is no distension.      Palpations: Abdomen is soft.      Tenderness: There is no abdominal tenderness. There is no guarding or rebound.   Musculoskeletal:         General: No swelling or deformity. Normal range of motion.      Cervical back: Normal range of motion and neck supple.   Skin:     General: Skin is warm and dry.      Capillary Refill: Capillary refill takes less than 2  seconds.   Neurological:      General: No focal deficit present.      Mental Status: She is alert and oriented to person, place, and time.      Difficult airway? no  Lines/drains assessed for removal? No lines    Within 30 minutes of the patient physically leaving the floor, a Floor Readiness Note needs to be placed with updated vitals.

## 2025-05-29 NOTE — DISCHARGE SUMMARY
Discharge Diagnosis  Diabetic ketoacidosis without coma associated with type 1 diabetes mellitus    Issues Requiring Follow-Up  DM follow up    Test Results Pending At Discharge  Pending Labs       Order Current Status    Renal function panel In process            Hospital Course  Ms. Cutler is a 25 year old female with a history of T1 DM and asthma who presents with hyperglycemia after finding her blood glucose to be over 500 at home.      Ms. Cutler states that she experienced nausea and vomiting prior to presentation to the ED.  Has previous admission for DKA, most recently in January of 2025. She notes that prior to this presentation she was recently started back on an insulin pump, but has had issues with her insurance and obtaining her medications/supplies.     On arrival to the ED, she tachycardic in the low 100's, normotensive, afebrile, SpO2 in the high 90's on room air. RFP was significant for blood glucose of 604, bicarbonate of 11, anion gap of 24. VBG with pH of 7.13, pCO2 of 32, SO2 of 71. Beta-Hydroxybutyrate was 7.66. She was initated on an insulin drip, LR infusion (250 ml/hr) and sodium bicarbonate infusion (100 ml/hr). She was also given 1L of NS bolus.      Upon arrival to the MICU patient is well appearing, in no acute distress, and has no complaints at this time. Denies abdominal pain, nausea/vomiting. Patient on D5, Bicarb, insulin drips.  AG closed and transferred to MICU SDU on 5/28, tolerated PO diet and transitioned off insulin drip after 14 units Lantus given around 0230 on 5/29.  Endo consulted.  Home regimen adjusted by Endo and scripts sent for meds to beds.  Discharging home in stable condition    Pertinent Physical Exam At Time of Discharge  Constitutional: pt in NAD, alert and cooperative  Eyes: PERRL, EOMI, no icterus   ENMT: mucous membranes moist, no apparent injury, no lesions seen  Head/Neck: Neck supple, no apparent injury  Respiratory/Thorax: Lungs CTA bilaterally,  non-labored breathing, no cough, on RA  Cardiovascular: Regular, rate and rhythm, no murmurs, normal S1 and S2  Gastrointestinal: Nondistended, soft, non-tender, BS present x 4  Musculoskeletal: ROM intact, no joint swelling, normal strength  Extremities: normal extremities, no edema, contusions or wounds  Neurological: alert and oriented x 3, speech clear, follows commands appropriately, cr. n. II-XII intact, sensation grossly intact, motor 5/5 throughout  Skin: Warm and dry, no lesions, no rashes       Home Medications     Medication List      CHANGE how you take these medications     * insulin lispro 100 unit/mL pen; Commonly known as: HumaLOG; Inject 8   Units under the skin 3 times a day before meals. Inject 8 Units under the   skin 3 times a day before meals.; What changed: Another medication with   the same name was added. Make sure you understand how and when to take   each., Another medication with the same name was changed. Make sure you   understand how and when to take each.   * insulin lispro 100 unit/mL injection; Use via insulin pump for max TDD   100 units; What changed: Another medication with the same name was added.   Make sure you understand how and when to take each., Another medication   with the same name was changed. Make sure you understand how and when to   take each.   * insulin lispro 100 unit/mL pen; Commonly known as: HumaLOG KwikPen   Insulin; Inject 1 unit under the skin for every 12 grams of carbs plus   scale with meals. Use up to 50 units daily if pump fails.; What changed:   You were already taking a medication with the same name, and this   prescription was added. Make sure you understand how and when to take   each.   * insulin lispro 100 unit/mL pen; Commonly known as: HumaLOG KwikPen   Insulin; Inject 1 unit under the skin for every 12g carbs with meals. May   take up to 50 units per day.; What changed: additional instructions   Lantus Solostar U-100 Insulin 100 unit/mL (3 mL)  "pen; Generic drug:   insulin glargine; Take 14 units once every morning in the setting of pump   failure; What changed: how much to take, how to take this, when to take   this, additional instructions   * TRUEplus Pen Needle 32 gauge x 5/32\" needle; Generic drug: pen needle,   diabetic; Use 1 pen needle for insulin injection 4 times a day.; What   changed: Another medication with the same name was added. Make sure you   understand how and when to take each.   * TRUEplus Pen Needle 29 gauge x 1/2\" needle; Generic drug: pen needle   1/2\"; Use to inject 1-4 times daily as directed.; What changed: Another   medication with the same name was added. Make sure you understand how and   when to take each.   * TRUEplus Pen Needle 32 gauge x 5/32\" needle; Generic drug: pen needle,   diabetic; Use with insulin pens 4 times daily; What changed: You were   already taking a medication with the same name, and this prescription was   added. Make sure you understand how and when to take each.  * This list has 7 medication(s) that are the same as other medications   prescribed for you. Read the directions carefully, and ask your doctor or   other care provider to review them with you.     CONTINUE taking these medications     albuterol 90 mcg/actuation inhaler; Commonly known as: Ventolin HFA;   Inhale 1-2 puffs every 4 hours if needed for wheezing or shortness of   breath.   Dexcom G7 Sensor device; Generic drug: blood-glucose sensor; Change   sensor every 10 days   glucagon 1 mg/0.2 mL auto-injector; Inject 1 mg under the skin 1 time if   needed (use for low blood sugar if unable to take anything by mouth) for   up to 1 dose.   glucose 4 gram chewable tablet   Ketostix strip; Generic drug: acetone (urine) test; DRIP URINE TO CHECK   FOR KETONES IF NAUSEA/VOMITING OR IF CONCERN FOR DKA OR DEHYDRATION   OneTouch Verio test strips; Generic drug: blood sugar diagnostic; TEST   6-7 TIMES DAILY       Outpatient Follow-Up  Future " Appointments   Date Time Provider Department [place]   6/3/2025 11:00 AM Neela Clarke MD [id number] [facility]   6/4/2025 11:00 AM Charlee Gonzales DO [id number] [facility]   7/18/2025  8:15 AM Magalie Wolfe DPM [id number] [facility]   10/17/2025  9:00 AM Allegra Doyle MD PhD [id number] [facility]       Talia Barnett, APRN-CNP

## 2025-05-29 NOTE — CONSULTS
Inpatient consult to Endocrinology  Consult performed by: Gabi Dozier PA-C  Consult ordered by: MACKENZIE Lucio-CNP      Reason For Consult  Admitted with DKA     History Of Present Illness  Ms. Cutler is a 25 year old female with a history of T1 DM and asthma who presents with hyperglycemia after finding her blood glucose to be over 500 at home.      Ms. Cutler states that she experienced nausea and vomiting prior to presentation to the ED.  Has previous admission for DKA, most recently in January of 2025. She notes that prior to this presentation she was recently started back on an insulin pump, but has had issues with her insurance and obtaining her medications/supplies.     On arrival to the ED, she tachycardic in the low 100's, normotensive, afebrile, SpO2 in the high 90's on room air. RFP was significant for blood glucose of 604, bicarbonate of 11, anion gap of 24. VBG with pH of 7.13, pCO2 of 32, SO2 of 71. Beta-Hydroxybutyrate was 7.66. She was initated on an insulin drip, LR infusion (250 ml/hr) and sodium bicarbonate infusion (100 ml/hr). She was also given 1L of NS bolus.      Upon arrival to the MICU patient is well appearing, in no acute distress, and has no complaints at this time. Denies abdominal pain, nausea/vomiting. Patient on D5, Bicarb, insulin drips.     Endo consulted for DM1 with DKA.  Patient restarted her insulin pump in March, and was alternating between using insulin pump and giving herself injections from the lispro vial, as she was rationing her pump supplies.  In order to get more pump supplies, patient needed an appointment so that paperwork could be submitted to her insurance company.  Patient completed appointment on 5- and insulin pump supplies were then shipped and delivered yesterday.  However, patient has been without her pump on for the past week while waiting for supplies to be delivered.  She did not realize that she had Lantus pens available for her  at the pharmacy.  We discussed extensively DKA and safe insulin regimen using insulin pens as backup in the event of no pump use.      DIABETES HISTORY  Year/age at diagnosis: Age 5   Prior medications: Insulin   - On a pump during childhood, off pump during postpartum period, now back on Tandem Insulin Pump since March.  Previous hospitalizations for hyperglycemia: DKA admission during pregnancy. DKA multiple times.  Complications:  PDR  Current Regimen:  Tandem Insulin Pump with Dexcom CGM.  Settings detailed below.     Hypoglycemia: Denies    Hypoglycemia awareness: Yes - dizzy, lightheaded, hot - 70 and lower       SMBG and pump settings from 3/2025:             Results from Most Recent A1C  Hemoglobin A1C   Date/Time Value Ref Range Status   05/28/2025 04:51 PM 11.0 (H) See comment % Final        Diabetes Problem List Entries with Dates  Problem List:  2025-01: Diabetic ketoacidosis without coma associated with type 1   diabetes mellitus  2024-07: Diabetic ketoacidosis without coma associated with other   specified diabetes mellitus  2023-11: DM (diabetes mellitus) type 1, uncontrolled, with   ketoacidosis  2023-10: Pre-existing type 1 diabetes mellitus during pregnancy,   postpartum ([facility])      Past Medical History  She has a past medical history of Asthma, Chlamydia, Chronic hypertension, CSF oligoclonal bands, Depression, Herpes, Type 1 diabetes mellitus with hyperglycemia, with long-term current use of insulin, and Urinary tract infection.    She has no past medical history of Abnormal Pap smear of cervix, Anemia, Gonorrhea, or Varicella.    Surgical History  She has no past surgical history on file.     Social History  She reports that she has quit smoking. Her smoking use included cigarettes. She has been exposed to tobacco smoke. She has never used smokeless tobacco. She reports that she does not currently use alcohol. She reports that she does not currently use drugs after having used the following  drugs: Marijuana.    Family History  Family History[1]     Allergies  Pollen extracts    Review of Systems   Constitutional:  Positive for fatigue. Negative for activity change, appetite change, diaphoresis and unexpected weight change.   HENT:  Negative for congestion, sore throat and trouble swallowing.    Eyes:  Negative for pain, redness and visual disturbance.   Respiratory:  Negative for cough, chest tightness and shortness of breath.    Cardiovascular:  Negative for chest pain, palpitations and leg swelling.   Gastrointestinal:  Negative for abdominal pain, diarrhea, nausea and vomiting.   Endocrine: Negative for cold intolerance, heat intolerance, polydipsia, polyphagia and polyuria.   Genitourinary:  Negative for dysuria, frequency and urgency.   Musculoskeletal:  Negative for gait problem and joint swelling.   Skin:  Negative for pallor and rash.   Allergic/Immunologic: Negative for immunocompromised state.   Neurological:  Negative for dizziness, light-headedness, numbness and headaches.   Hematological:  Does not bruise/bleed easily.   Psychiatric/Behavioral:  Negative for agitation, behavioral problems and confusion.    All other systems reviewed and are negative.       Physical Exam  Vitals reviewed.   Constitutional:       General: She is not in acute distress.     Appearance: Normal appearance.   HENT:      Head: Normocephalic and atraumatic.      Nose: Nose normal.      Mouth/Throat:      Mouth: Mucous membranes are moist.   Eyes:      Extraocular Movements: Extraocular movements intact.      Conjunctiva/sclera: Conjunctivae normal.      Pupils: Pupils are equal, round, and reactive to light.   Cardiovascular:      Pulses: Normal pulses.   Pulmonary:      Effort: Pulmonary effort is normal. No respiratory distress.   Abdominal:      General: Abdomen is flat. There is no distension.   Musculoskeletal:         General: Normal range of motion.   Skin:     General: Skin is warm and dry.      Findings:  No rash.   Neurological:      Mental Status: She is alert and oriented to person, place, and time.   Psychiatric:         Mood and Affect: Mood normal.         Behavior: Behavior normal.          ROS, PMH, FH/SH, surgical history and allergies have been reviewed.    Last Recorded Vitals  Blood pressure 94/75, pulse 108, temperature 36.6 °C (97.9 °F), temperature source Temporal, resp. rate 21, height 1.524 m (5'), weight 53.4 kg (117 lb 11.6 oz), last menstrual period 04/17/2025, SpO2 99%, currently breastfeeding.    Relevant Results  Results from last 7 days   Lab Units 05/29/25  0812 05/29/25  0444 05/29/25  0432 05/29/25  0331 05/29/25  0232 05/29/25  0138 05/29/25  0034 05/28/25  2343 05/28/25 2010 05/28/25  2005 05/28/25  1620 05/28/25  1528 05/28/25  1408 05/28/25  1205   POCT GLUCOSE mg/dL 133*  --  124* 191* 219* 228*   < >  --    < >  --    < >  --    < >  --    GLUCOSE mg/dL  --  109*  --   --   --   --   --  142*  --  164*  --  253*  --  489*    < > = values in this interval not displayed.     Lab Review  Lab Results   Component Value Date    BILITOT 0.5 05/28/2025    CALCIUM 8.4 (L) 05/29/2025    CO2 19 (L) 05/29/2025     05/29/2025    CREATININE 0.69 05/29/2025    GLUCOSE 251 (H) 05/29/2025    ALKPHOS 87 05/28/2025    K 4.8 05/29/2025    PROT 8.0 05/28/2025     (L) 05/29/2025    AST 13 05/28/2025    ALT 11 05/28/2025    BUN 15 05/29/2025    ANIONGAP 18 05/29/2025    MG 1.89 05/29/2025    PHOS 3.0 05/29/2025     (H) 11/20/2023    ALBUMIN 3.7 05/29/2025    AMYLASE 50 11/25/2017    LIPASE 13 05/28/2025    GFRF >90 09/29/2023    GFRMALE CANCELED 06/13/2023     Lab Results   Component Value Date    TRIG 56 03/20/2018    CHOL 199 01/20/2022    HDL 65.3 01/20/2022     Lab Results   Component Value Date    HGBA1C 11.0 (H) 05/28/2025    HGBA1C 12.7 (H) 01/15/2025    HGBA1C 13.1 (H) 07/23/2024     The ASCVD Risk score (Lora DK, et al., 2019) failed to calculate for the following reasons:     The 2019 ASCVD risk score is only valid for ages 40 to 79    Scheduled medications  Scheduled Medications[2]  Continuous medications  Continuous Medications[3]  PRN medications  PRN Medications[4]       Assessment/Plan   Assessment & Plan  Diabetic ketoacidosis without coma associated with type 1 diabetes mellitus    DM (diabetes mellitus) type 1, uncontrolled, with ketoacidosis      Pt transitioned off insulin drip and onto subcutaneous insulin injections per ICU protocol for DKA management. Pt to discharge today, homegoing dc regimen below:     -patient is to resume her insulin pump at home this afternoon. Last glargine dose was at 0235 so we discussed extensively that she is not to go to bed tonight without her pump restarted    -discussed importance of keeping endo appts to ensure she can continue to get supplies shipped from [facility]    -provided printout below of subcutaneous insulin injection regimen for pt to follow in the event of pump malfunction/out of supplies    -glargine and lispro insulin pens delivered m2b in the event of pump malfunction, notified pt that she has refills available x 1 year for insulin pens    -pt agreeable to plan        INSULIN INSTRUCTIONS   Breakfast time Lunch time Dinner time  Bedtime   Lantus/Glargine = 14 units      Humalog/Lispro = 1u for every 12 grams of carbs plus scale Humalog/Lispro = 1u for every 12 grams of carbs plus scale Humalog/Lispro = 1u for every 12 grams of carbs plus scale      CORRECTIVE SCALE  GLUCOSE READING   HUMALOG/LISPRO SCALE DOSE    70 - 149 0   150 - 200 1   201 - 250 2   251 - 300 3   301 - 350 4   351 - 400+ 5     If glucose remains over 400, call your diabetes provider, repeat 5 units every 4 hours and drink sugar free   liquids (water, Gatorade zero, chicken broth) until you glucose improves or you hear back from medical   professional. If your glucose remains over 400 and you develop symptoms such as nausea/vomiting or   confusion, go to the  emergency room as soon as possible.        I spent 80 minutes in the professional and overall care of this patient.      Gabi Dozier PA-C         [1]   Family History  Problem Relation Name Age of Onset    Hypertension Maternal Grandmother      Asthma Child     [2] enoxaparin, 40 mg, subcutaneous, q24h  [START ON 5/30/2025] insulin glargine, 14 Units, subcutaneous, q24h  insulin lispro, 0-5 Units, subcutaneous, TID AC  insulin lispro, 4 Units, subcutaneous, TID AC  [3]    [4] PRN medications: acetaminophen **OR** [DISCONTINUED] acetaminophen **OR** [DISCONTINUED] acetaminophen, albuterol, dextrose, dextrose, glucagon, glucagon, insulin regular

## 2025-05-30 ENCOUNTER — DOCUMENTATION (OUTPATIENT)
Dept: BEHAVIORAL HEALTH | Facility: CLINIC | Age: 25
End: 2025-05-30
Payer: COMMERCIAL

## 2025-05-30 DIAGNOSIS — F41.1 GAD (GENERALIZED ANXIETY DISORDER): ICD-10-CM

## 2025-06-02 NOTE — PROGRESS NOTES
Maria Isabel Cutler  Age: 25 y.o.  MRN: [id number]  Date: 5/30/2025  Location of service: in community    Program Details  Medicaid Community Clinical Case Management  Status: Enrolled  Effective Dates: 8/7/2024 - present  Responsible Staff: JAIME Miramontes      Goals Reviewed:    Problem: Risk of Uncoordinated Care       Goal: Care will be Coordinated and Supported by a Multidisciplinary Team of Providers       Priority: Medium          Summary:  This provider met with patient at the patient's home. This provider listened with empathy as patient explained the reason for her sugar levels to get so high and cause an admission to the hospital. This provider gained clarification from the patient that the patient how has all of her supplies for her insulin pump. This provider also did a check in to see if patient is in need of any community resources, food or clothing for the patient's son. This provider gave patient a lot of positive praise for being so resilient and working to maintain a stable life. Provider also reviewed all of patient's upcoming appointments with the patient. This provider offered information that patient was seeking in terms of getting paperwork filled out for a service animal.                      JAIME Miramontes

## 2025-06-03 ENCOUNTER — OFFICE VISIT (OUTPATIENT)
Dept: OBSTETRICS AND GYNECOLOGY | Facility: CLINIC | Age: 25
End: 2025-06-03
Payer: COMMERCIAL

## 2025-06-03 ENCOUNTER — DOCUMENTATION (OUTPATIENT)
Dept: BEHAVIORAL HEALTH | Facility: CLINIC | Age: 25
End: 2025-06-03

## 2025-06-03 VITALS
WEIGHT: 121.6 LBS | SYSTOLIC BLOOD PRESSURE: 118 MMHG | DIASTOLIC BLOOD PRESSURE: 75 MMHG | HEART RATE: 96 BPM | BODY MASS INDEX: 23.75 KG/M2

## 2025-06-03 DIAGNOSIS — N91.5 OLIGOMENORRHEA, UNSPECIFIED TYPE: ICD-10-CM

## 2025-06-03 DIAGNOSIS — R83.8 OLIGOCLONAL BANDS IN CEREBROSPINAL FLUID: ICD-10-CM

## 2025-06-03 DIAGNOSIS — Z01.419 WELL WOMAN EXAM WITH ROUTINE GYNECOLOGICAL EXAM: Primary | ICD-10-CM

## 2025-06-03 PROBLEM — R52 BURNING PAIN: Status: RESOLVED | Noted: 2023-10-09 | Resolved: 2025-06-03

## 2025-06-03 PROCEDURE — 87591 N.GONORRHOEAE DNA AMP PROB: CPT

## 2025-06-03 PROCEDURE — 1036F TOBACCO NON-USER: CPT

## 2025-06-03 PROCEDURE — 3046F HEMOGLOBIN A1C LEVEL >9.0%: CPT

## 2025-06-03 PROCEDURE — 99395 PREV VISIT EST AGE 18-39: CPT

## 2025-06-03 PROCEDURE — 99214 OFFICE O/P EST MOD 30 MIN: CPT | Mod: 25,GC

## 2025-06-03 PROCEDURE — 2500000004 HC RX 250 GENERAL PHARMACY W/ HCPCS (ALT 636 FOR OP/ED): Mod: JZ,SE

## 2025-06-03 PROCEDURE — 3074F SYST BP LT 130 MM HG: CPT

## 2025-06-03 PROCEDURE — 87661 TRICHOMONAS VAGINALIS AMPLIF: CPT

## 2025-06-03 PROCEDURE — 96372 THER/PROPH/DIAG INJ SC/IM: CPT

## 2025-06-03 PROCEDURE — 3078F DIAST BP <80 MM HG: CPT

## 2025-06-03 RX ORDER — MEDROXYPROGESTERONE ACETATE 150 MG/ML
150 INJECTION, SUSPENSION INTRAMUSCULAR ONCE
Status: COMPLETED | OUTPATIENT
Start: 2025-06-03 | End: 2025-06-03

## 2025-06-03 RX ADMIN — MEDROXYPROGESTERONE ACETATE 150 MG: 150 INJECTION, SUSPENSION INTRAMUSCULAR at 14:39

## 2025-06-03 ASSESSMENT — PATIENT HEALTH QUESTIONNAIRE - PHQ9
SUM OF ALL RESPONSES TO PHQ9 QUESTIONS 1 AND 2: 0
1. LITTLE INTEREST OR PLEASURE IN DOING THINGS: NOT AT ALL
2. FEELING DOWN, DEPRESSED OR HOPELESS: NOT AT ALL

## 2025-06-03 ASSESSMENT — ENCOUNTER SYMPTOMS
CARDIOVASCULAR NEGATIVE: 0
MUSCULOSKELETAL NEGATIVE: 0
HEMATOLOGIC/LYMPHATIC NEGATIVE: 0
GASTROINTESTINAL NEGATIVE: 0
EYES NEGATIVE: 0
CONSTITUTIONAL NEGATIVE: 0
NEUROLOGICAL NEGATIVE: 0
RESPIRATORY NEGATIVE: 0
ENDOCRINE NEGATIVE: 0
ALLERGIC/IMMUNOLOGIC NEGATIVE: 0
PSYCHIATRIC NEGATIVE: 0

## 2025-06-03 ASSESSMENT — PAIN SCALES - GENERAL: PAINLEVEL_OUTOF10: 0-NO PAIN

## 2025-06-03 NOTE — ASSESSMENT & PLAN NOTE
- pap performed  - STI screening sent   - discussed options for contraception, pt interested in depo. First dose given today.

## 2025-06-03 NOTE — PROGRESS NOTES
Maria Isabel Cutler is a 25 y.o.  presenting for annual    HPI:   Pt with hx T1DM, recently admitted to MICU for hyperglycemia/DKA. Feeling well since discharge, has endocrine visit scheduled for later this week.     Today, interested in contraception, STI testing. Also c/o irregular periods her whole life, one period since January (2025). Non-heavy, non-painful. Denies excessive hair growth, weight changes.     OBHx:    34wk pLTCS c/b siPEC w SF, T1DM   Past med hx and past surg hx reviewed and notable for: T1DM c/b macular edema (follows with ophtho, improving), cHTN (well-controlled, no meds) , asthma (albuterol PRN), anxiety   GynHx:  Menses: menarche age 16, irregular, skipping months, reports only 1 period this year, LMP 2025. Medium flow, non-painful.   Sexual activity: occ, 1 partner in last year, no barrier method, does not desire pregnancy   Current contraception: None  STIs: remote hx chlamydia, trich. Hx HSV   Social History: occ MJ use, no tobacco, EtOH use . Lives alone with son. Working at dog .       Objective   Visit Vitals  /75   Pulse 96       General: no acute distress  HEENT: normocephalic, atraumatic  Heart: warm and well perfused  Lungs: no increased WOB  Abd: nondistended  Extremities: moving all extremities  Neuro: awake and conversant  Psych: appropriate mood and affect    Assessment and Plan:  26yo  presenting for annual exam  Well woman exam with routine gynecological exam  - pap performed  - STI screening sent   - discussed options for contraception, pt interested in depo. First dose given today.     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   - no residual symptoms, no f/u done.   - neurology referral placed    Oligomenorrhea  - may also be related to poorly controlled T1DM, will rule out alternate etiologies   - DHEAS, 17 OHP, T, Prolactin, TSH  ordered    Orders Placed This Encounter   Procedures    HIV 1/2 Antigen/Antibody Screen with Reflex to Confirmation    Syphilis Screen with Reflex    Hepatitis B Surface Antigen    Hepatitis C Antibody    TSH with reflex to Free T4 if abnormal    Prolactin    Testosterone,Total, LC-MS/MS    DHEA-Sulfate    17-Hydroxyprogesterone    Referral to Neurology      Seen and d/w Dr. Levy Clarke MD  PGY3, Obstetrics and Gynecology

## 2025-06-03 NOTE — PROGRESS NOTES
I saw and evaluated the patient. I personally obtained the key and critical portions of the history and physical exam or was physically present for key and critical portions performed by the resident/fellow. I reviewed the resident/fellow's documentation and discussed the patient with the resident/fellow. I agree with the resident/fellow's medical decision making as documented in the note.    Sherlyn Lockett MD

## 2025-06-03 NOTE — ASSESSMENT & PLAN NOTE
- diagnosed in prior pregnancy in s/o blurry vision, LP performed with oligoclonal bands, MRI neg.   - no residual symptoms, no f/u done.   - neurology referral placed

## 2025-06-03 NOTE — ASSESSMENT & PLAN NOTE
- may also be related to poorly controlled T1DM, will rule out alternate etiologies   - DHEAS, 17 OHP, T, Prolactin, TSH ordered

## 2025-06-04 ENCOUNTER — APPOINTMENT (OUTPATIENT)
Dept: ENDOCRINOLOGY | Facility: CLINIC | Age: 25
End: 2025-06-04
Payer: COMMERCIAL

## 2025-06-05 LAB
C TRACH RRNA SPEC QL NAA+PROBE: NEGATIVE
N GONORRHOEA DNA SPEC QL PROBE+SIG AMP: NEGATIVE
T VAGINALIS RRNA SPEC QL NAA+PROBE: NEGATIVE

## 2025-06-06 ENCOUNTER — APPOINTMENT (OUTPATIENT)
Dept: ENDOCRINOLOGY | Facility: CLINIC | Age: 25
End: 2025-06-06
Payer: COMMERCIAL

## 2025-06-07 NOTE — DOCUMENTATION CLARIFICATION NOTE
"    PATIENT:               KARL CASTANEDA  ACCT #:                  [id number]  MRN:                       [id number]  :                       2000  ADMIT DATE:       2025 9:27 AM  DISCH DATE:        2025 3:28 PM  RESPONDING PROVIDER #:        [id number]          PROVIDER RESPONSE TEXT:    Non-infectious SIRS without acute organ dysfunction    CDI QUERY TEXT:    Clarification    Instruction:    Based on your assessment of the patient and the clinical information, please provide the requested documentation by clicking on the appropriate radio button and enter any additional information if prompted.    Question: Please further clarify if there is a diagnosis related to the clinical information    When answering this query, please exercise your independent professional judgment. The fact that a question is being asked, does not imply that any particular answer is desired or expected.    The patient's clinical indicators include:  Clinical Information: Admit with DKA    Clinical Indicators:    DC summary states \"Diabetic ketoacidosis without coma associated with type 1 diabetes mellitus\" and \"On arrival to the ED, she tachycardic in the low 100's\"    Last progress note 25 states \"INFECTIOUS DISEASE: Dx: No acute issues\"    WBC 19.9 on admit, 13.9 on DC    VS range, first 6 hrs from admit, temp 36-37.2, hr , rr 16, bp 104//80    Treatment: lab monitoring, IVF bolus x1L and continuous at 250ml/hr, bicarb, insulin    Risk Factors: tachycardia, elevated WBC in setting of DKA without infection  Options provided:  -- Non-infectious SIRS without acute organ dysfunction  -- Other - I will add my own diagnosis  -- Refer to Clinical Documentation Reviewer    Query created by: Sabino Vickers on 2025 7:34 AM      Electronically signed by:  ANTHONY MANN-CNP 2025 6:05 AM          "

## 2025-06-10 NOTE — PROGRESS NOTES
Maria Isabel Cutler  Age: 25 y.o.  MRN: [id number]  Date: 6/3/2025  Location of service: phone call    Program Details  Medicaid Community Clinical Case Management  Status: Enrolled  Effective Dates: 8/7/2024 - present  Responsible Staff: JAIME Miramontes      Goals Reviewed:      Summary:  This writer calls patient to confirm our appointment for today. Patient states she is unable to meet and requests we reschedule.    Appointment start time: 1029  Appointment completion time: 1031  Total time spent with patient (in minutes): 2  Non-Billable Time: 2  Billable Time Total: 0    Viviane Sanford RN

## 2025-06-19 ENCOUNTER — CLINICAL SUPPORT (OUTPATIENT)
Dept: EMERGENCY MEDICINE | Facility: HOSPITAL | Age: 25
End: 2025-06-19
Payer: COMMERCIAL

## 2025-06-19 ENCOUNTER — HOSPITAL ENCOUNTER (OUTPATIENT)
Facility: HOSPITAL | Age: 25
Setting detail: OBSERVATION
Discharge: HOME | End: 2025-06-20
Attending: EMERGENCY MEDICINE | Admitting: STUDENT IN AN ORGANIZED HEALTH CARE EDUCATION/TRAINING PROGRAM
Payer: COMMERCIAL

## 2025-06-19 ENCOUNTER — APPOINTMENT (OUTPATIENT)
Dept: RADIOLOGY | Facility: HOSPITAL | Age: 25
End: 2025-06-19
Payer: COMMERCIAL

## 2025-06-19 DIAGNOSIS — R11.2 NAUSEA AND VOMITING, UNSPECIFIED VOMITING TYPE: ICD-10-CM

## 2025-06-19 DIAGNOSIS — R73.9 HYPERGLYCEMIA: ICD-10-CM

## 2025-06-19 DIAGNOSIS — K52.9 ENTEROCOLITIS: Primary | ICD-10-CM

## 2025-06-19 DIAGNOSIS — R07.9 CHEST PAIN, UNSPECIFIED TYPE: ICD-10-CM

## 2025-06-19 LAB
ALBUMIN SERPL BCP-MCNC: 4 G/DL (ref 3.4–5)
ALP SERPL-CCNC: 52 U/L (ref 33–110)
ALT SERPL W P-5'-P-CCNC: 11 U/L (ref 7–45)
ANION GAP BLDV CALCULATED.4IONS-SCNC: 11 MMOL/L (ref 10–25)
ANION GAP BLDV CALCULATED.4IONS-SCNC: 17 MMOL/L (ref 10–25)
ANION GAP SERPL CALC-SCNC: 16 MMOL/L (ref 10–20)
ANION GAP SERPL CALC-SCNC: 19 MMOL/L (ref 10–20)
APPEARANCE UR: CLEAR
AST SERPL W P-5'-P-CCNC: 19 U/L (ref 9–39)
ATRIAL RATE: 78 BPM
B-HCG SERPL-ACNC: <3 MIU/ML
B-OH-BUTYR SERPL-SCNC: 2.72 MMOL/L (ref 0.02–0.27)
BASE EXCESS BLDV CALC-SCNC: -5.5 MMOL/L (ref -2–3)
BASE EXCESS BLDV CALC-SCNC: -9 MMOL/L (ref -2–3)
BASOPHILS # BLD AUTO: 0.03 X10*3/UL (ref 0–0.1)
BASOPHILS # BLD AUTO: 0.07 X10*3/UL (ref 0–0.1)
BASOPHILS NFR BLD AUTO: 0.2 %
BASOPHILS NFR BLD AUTO: 0.4 %
BILIRUB SERPL-MCNC: 0.3 MG/DL (ref 0–1.2)
BILIRUB UR STRIP.AUTO-MCNC: NEGATIVE MG/DL
BODY TEMPERATURE: 37 DEGREES CELSIUS
BODY TEMPERATURE: 37 DEGREES CELSIUS
BUN SERPL-MCNC: 11 MG/DL (ref 6–23)
BUN SERPL-MCNC: 9 MG/DL (ref 6–23)
CA-I BLDV-SCNC: 1.11 MMOL/L (ref 1.1–1.33)
CA-I BLDV-SCNC: 1.17 MMOL/L (ref 1.1–1.33)
CALCIUM SERPL-MCNC: 8.5 MG/DL (ref 8.6–10.6)
CALCIUM SERPL-MCNC: 8.9 MG/DL (ref 8.6–10.6)
CARDIAC TROPONIN I PNL SERPL HS: <3 NG/L (ref 0–34)
CARDIAC TROPONIN I PNL SERPL HS: <3 NG/L (ref 0–34)
CHLORIDE BLDV-SCNC: 105 MMOL/L (ref 98–107)
CHLORIDE BLDV-SCNC: 107 MMOL/L (ref 98–107)
CHLORIDE SERPL-SCNC: 106 MMOL/L (ref 98–107)
CHLORIDE SERPL-SCNC: 107 MMOL/L (ref 98–107)
CO2 SERPL-SCNC: 16 MMOL/L (ref 21–32)
CO2 SERPL-SCNC: 17 MMOL/L (ref 21–32)
COLOR UR: COLORLESS
CREAT SERPL-MCNC: 0.64 MG/DL (ref 0.5–1.05)
CREAT SERPL-MCNC: 0.65 MG/DL (ref 0.5–1.05)
EGFRCR SERPLBLD CKD-EPI 2021: >90 ML/MIN/1.73M*2
EGFRCR SERPLBLD CKD-EPI 2021: >90 ML/MIN/1.73M*2
EOSINOPHIL # BLD AUTO: 0 X10*3/UL (ref 0–0.7)
EOSINOPHIL # BLD AUTO: 0.09 X10*3/UL (ref 0–0.7)
EOSINOPHIL NFR BLD AUTO: 0 %
EOSINOPHIL NFR BLD AUTO: 0.5 %
ERYTHROCYTE [DISTWIDTH] IN BLOOD BY AUTOMATED COUNT: 11.8 % (ref 11.5–14.5)
ERYTHROCYTE [DISTWIDTH] IN BLOOD BY AUTOMATED COUNT: 11.9 % (ref 11.5–14.5)
EST. AVERAGE GLUCOSE BLD GHB EST-MCNC: 286 MG/DL
GLUCOSE BLD MANUAL STRIP-MCNC: 238 MG/DL (ref 74–99)
GLUCOSE BLD MANUAL STRIP-MCNC: 335 MG/DL (ref 74–99)
GLUCOSE BLDV-MCNC: 255 MG/DL (ref 74–99)
GLUCOSE BLDV-MCNC: 374 MG/DL (ref 74–99)
GLUCOSE SERPL-MCNC: 244 MG/DL (ref 74–99)
GLUCOSE SERPL-MCNC: 361 MG/DL (ref 74–99)
GLUCOSE UR STRIP.AUTO-MCNC: ABNORMAL MG/DL
HBA1C MFR BLD: 11.6 % (ref ?–5.7)
HCO3 BLDV-SCNC: 16.9 MMOL/L (ref 22–26)
HCO3 BLDV-SCNC: 20.7 MMOL/L (ref 22–26)
HCT VFR BLD AUTO: 35.6 % (ref 36–46)
HCT VFR BLD AUTO: 37.2 % (ref 36–46)
HCT VFR BLD EST: 38 % (ref 36–46)
HCT VFR BLD EST: 39 % (ref 36–46)
HGB BLD-MCNC: 12.2 G/DL (ref 12–16)
HGB BLD-MCNC: 12.3 G/DL (ref 12–16)
HGB BLDV-MCNC: 12.8 G/DL (ref 12–16)
HGB BLDV-MCNC: 13 G/DL (ref 12–16)
IMM GRANULOCYTES # BLD AUTO: 0.04 X10*3/UL (ref 0–0.7)
IMM GRANULOCYTES # BLD AUTO: 0.12 X10*3/UL (ref 0–0.7)
IMM GRANULOCYTES NFR BLD AUTO: 0.3 % (ref 0–0.9)
IMM GRANULOCYTES NFR BLD AUTO: 0.6 % (ref 0–0.9)
INHALED O2 CONCENTRATION: 21 %
INHALED O2 CONCENTRATION: 21 %
KETONES UR STRIP.AUTO-MCNC: ABNORMAL MG/DL
LACTATE BLDV-SCNC: 1.4 MMOL/L (ref 0.4–2)
LACTATE BLDV-SCNC: 2.3 MMOL/L (ref 0.4–2)
LEUKOCYTE ESTERASE UR QL STRIP.AUTO: NEGATIVE
LYMPHOCYTES # BLD AUTO: 0.65 X10*3/UL (ref 1.2–4.8)
LYMPHOCYTES # BLD AUTO: 2.26 X10*3/UL (ref 1.2–4.8)
LYMPHOCYTES NFR BLD AUTO: 11.8 %
LYMPHOCYTES NFR BLD AUTO: 4.4 %
MAGNESIUM SERPL-MCNC: 1.9 MG/DL (ref 1.6–2.4)
MCH RBC QN AUTO: 29.9 PG (ref 26–34)
MCH RBC QN AUTO: 30.6 PG (ref 26–34)
MCHC RBC AUTO-ENTMCNC: 33.1 G/DL (ref 32–36)
MCHC RBC AUTO-ENTMCNC: 34.3 G/DL (ref 32–36)
MCV RBC AUTO: 89 FL (ref 80–100)
MCV RBC AUTO: 91 FL (ref 80–100)
MONOCYTES # BLD AUTO: 0.12 X10*3/UL (ref 0.1–1)
MONOCYTES # BLD AUTO: 1.11 X10*3/UL (ref 0.1–1)
MONOCYTES NFR BLD AUTO: 0.8 %
MONOCYTES NFR BLD AUTO: 5.8 %
NEUTROPHILS # BLD AUTO: 13.99 X10*3/UL (ref 1.2–7.7)
NEUTROPHILS # BLD AUTO: 15.58 X10*3/UL (ref 1.2–7.7)
NEUTROPHILS NFR BLD AUTO: 80.9 %
NEUTROPHILS NFR BLD AUTO: 94.3 %
NITRITE UR QL STRIP.AUTO: NEGATIVE
NRBC BLD-RTO: 0 /100 WBCS (ref 0–0)
NRBC BLD-RTO: 0 /100 WBCS (ref 0–0)
OXYHGB MFR BLDV: 56.3 % (ref 45–75)
OXYHGB MFR BLDV: 59.7 % (ref 45–75)
P AXIS: 75 DEGREES
P OFFSET: 194 MS
P ONSET: 141 MS
PCO2 BLDV: 36 MM HG (ref 41–51)
PCO2 BLDV: 42 MM HG (ref 41–51)
PH BLDV: 7.28 PH (ref 7.33–7.43)
PH BLDV: 7.3 PH (ref 7.33–7.43)
PH UR STRIP.AUTO: 5.5 [PH]
PLATELET # BLD AUTO: 426 X10*3/UL (ref 150–450)
PLATELET # BLD AUTO: 436 X10*3/UL (ref 150–450)
PO2 BLDV: 39 MM HG (ref 35–45)
PO2 BLDV: 40 MM HG (ref 35–45)
POTASSIUM BLDV-SCNC: 3.9 MMOL/L (ref 3.5–5.3)
POTASSIUM BLDV-SCNC: 4.5 MMOL/L (ref 3.5–5.3)
POTASSIUM SERPL-SCNC: 3.9 MMOL/L (ref 3.5–5.3)
POTASSIUM SERPL-SCNC: 4.4 MMOL/L (ref 3.5–5.3)
PR INTERVAL: 164 MS
PREGNANCY TEST URINE, POC: NEGATIVE
PROT SERPL-MCNC: 6.9 G/DL (ref 6.4–8.2)
PROT UR STRIP.AUTO-MCNC: NEGATIVE MG/DL
Q ONSET: 223 MS
QRS COUNT: 13 BEATS
QRS DURATION: 74 MS
QT INTERVAL: 372 MS
QTC CALCULATION(BAZETT): 424 MS
QTC FREDERICIA: 406 MS
R AXIS: 6 DEGREES
RBC # BLD AUTO: 3.99 X10*6/UL (ref 4–5.2)
RBC # BLD AUTO: 4.11 X10*6/UL (ref 4–5.2)
RBC # UR STRIP.AUTO: NEGATIVE MG/DL
SAO2 % BLDV: 57 % (ref 45–75)
SAO2 % BLDV: 61 % (ref 45–75)
SODIUM BLDV-SCNC: 133 MMOL/L (ref 136–145)
SODIUM BLDV-SCNC: 136 MMOL/L (ref 136–145)
SODIUM SERPL-SCNC: 135 MMOL/L (ref 136–145)
SODIUM SERPL-SCNC: 138 MMOL/L (ref 136–145)
SP GR UR STRIP.AUTO: 1.04
T AXIS: 6 DEGREES
T OFFSET: 409 MS
UROBILINOGEN UR STRIP.AUTO-MCNC: NORMAL MG/DL
VENTRICULAR RATE: 78 BPM
WBC # BLD AUTO: 14.8 X10*3/UL (ref 4.4–11.3)
WBC # BLD AUTO: 19.2 X10*3/UL (ref 4.4–11.3)

## 2025-06-19 PROCEDURE — 81025 URINE PREGNANCY TEST: CPT | Performed by: STUDENT IN AN ORGANIZED HEALTH CARE EDUCATION/TRAINING PROGRAM

## 2025-06-19 PROCEDURE — 96374 THER/PROPH/DIAG INJ IV PUSH: CPT | Mod: 59

## 2025-06-19 PROCEDURE — 74177 CT ABD & PELVIS W/CONTRAST: CPT | Mod: FOREIGN READ | Performed by: RADIOLOGY

## 2025-06-19 PROCEDURE — 82947 ASSAY GLUCOSE BLOOD QUANT: CPT

## 2025-06-19 PROCEDURE — 84132 ASSAY OF SERUM POTASSIUM: CPT | Mod: 59 | Performed by: NURSE PRACTITIONER

## 2025-06-19 PROCEDURE — G0378 HOSPITAL OBSERVATION PER HR: HCPCS

## 2025-06-19 PROCEDURE — 93005 ELECTROCARDIOGRAM TRACING: CPT | Mod: 59

## 2025-06-19 PROCEDURE — 81003 URINALYSIS AUTO W/O SCOPE: CPT

## 2025-06-19 PROCEDURE — 2500000002 HC RX 250 W HCPCS SELF ADMINISTERED DRUGS (ALT 637 FOR MEDICARE OP, ALT 636 FOR OP/ED): Mod: SE

## 2025-06-19 PROCEDURE — 36415 COLL VENOUS BLD VENIPUNCTURE: CPT | Performed by: EMERGENCY MEDICINE

## 2025-06-19 PROCEDURE — 93005 ELECTROCARDIOGRAM TRACING: CPT

## 2025-06-19 PROCEDURE — 71046 X-RAY EXAM CHEST 2 VIEWS: CPT

## 2025-06-19 PROCEDURE — 85025 COMPLETE CBC W/AUTO DIFF WBC: CPT | Performed by: EMERGENCY MEDICINE

## 2025-06-19 PROCEDURE — 2500000001 HC RX 250 WO HCPCS SELF ADMINISTERED DRUGS (ALT 637 FOR MEDICARE OP): Mod: SE | Performed by: NURSE PRACTITIONER

## 2025-06-19 PROCEDURE — 2500000002 HC RX 250 W HCPCS SELF ADMINISTERED DRUGS (ALT 637 FOR MEDICARE OP, ALT 636 FOR OP/ED): Mod: SE | Performed by: NURSE PRACTITIONER

## 2025-06-19 PROCEDURE — 83735 ASSAY OF MAGNESIUM: CPT | Performed by: EMERGENCY MEDICINE

## 2025-06-19 PROCEDURE — 82947 ASSAY GLUCOSE BLOOD QUANT: CPT | Mod: 59

## 2025-06-19 PROCEDURE — 71046 X-RAY EXAM CHEST 2 VIEWS: CPT | Mod: FOREIGN READ | Performed by: RADIOLOGY

## 2025-06-19 PROCEDURE — 2500000004 HC RX 250 GENERAL PHARMACY W/ HCPCS (ALT 636 FOR OP/ED): Mod: SE

## 2025-06-19 PROCEDURE — 96375 TX/PRO/DX INJ NEW DRUG ADDON: CPT

## 2025-06-19 PROCEDURE — 82010 KETONE BODYS QUAN: CPT | Performed by: NURSE PRACTITIONER

## 2025-06-19 PROCEDURE — 80053 COMPREHEN METABOLIC PANEL: CPT | Performed by: EMERGENCY MEDICINE

## 2025-06-19 PROCEDURE — 96376 TX/PRO/DX INJ SAME DRUG ADON: CPT

## 2025-06-19 PROCEDURE — 99285 EMERGENCY DEPT VISIT HI MDM: CPT | Mod: 25 | Performed by: EMERGENCY MEDICINE

## 2025-06-19 PROCEDURE — 2500000001 HC RX 250 WO HCPCS SELF ADMINISTERED DRUGS (ALT 637 FOR MEDICARE OP): Mod: SE

## 2025-06-19 PROCEDURE — 84702 CHORIONIC GONADOTROPIN TEST: CPT | Performed by: EMERGENCY MEDICINE

## 2025-06-19 PROCEDURE — 2500000004 HC RX 250 GENERAL PHARMACY W/ HCPCS (ALT 636 FOR OP/ED): Mod: JZ,SE | Performed by: NURSE PRACTITIONER

## 2025-06-19 PROCEDURE — 93010 ELECTROCARDIOGRAM REPORT: CPT | Performed by: EMERGENCY MEDICINE

## 2025-06-19 PROCEDURE — 99223 1ST HOSP IP/OBS HIGH 75: CPT | Performed by: NURSE PRACTITIONER

## 2025-06-19 PROCEDURE — 2550000001 HC RX 255 CONTRASTS: Mod: SE

## 2025-06-19 PROCEDURE — 83036 HEMOGLOBIN GLYCOSYLATED A1C: CPT | Performed by: NURSE PRACTITIONER

## 2025-06-19 PROCEDURE — 74177 CT ABD & PELVIS W/CONTRAST: CPT

## 2025-06-19 PROCEDURE — 84484 ASSAY OF TROPONIN QUANT: CPT | Performed by: EMERGENCY MEDICINE

## 2025-06-19 PROCEDURE — 96361 HYDRATE IV INFUSION ADD-ON: CPT

## 2025-06-19 PROCEDURE — 85025 COMPLETE CBC W/AUTO DIFF WBC: CPT | Performed by: NURSE PRACTITIONER

## 2025-06-19 PROCEDURE — 82947 ASSAY GLUCOSE BLOOD QUANT: CPT | Mod: 59 | Performed by: NURSE PRACTITIONER

## 2025-06-19 PROCEDURE — 80048 BASIC METABOLIC PNL TOTAL CA: CPT | Mod: CCI | Performed by: EMERGENCY MEDICINE

## 2025-06-19 RX ORDER — KETOROLAC TROMETHAMINE 15 MG/ML
15 INJECTION, SOLUTION INTRAMUSCULAR; INTRAVENOUS ONCE
Status: COMPLETED | OUTPATIENT
Start: 2025-06-19 | End: 2025-06-19

## 2025-06-19 RX ORDER — METOCLOPRAMIDE HYDROCHLORIDE 5 MG/ML
10 INJECTION INTRAMUSCULAR; INTRAVENOUS ONCE
Status: COMPLETED | OUTPATIENT
Start: 2025-06-19 | End: 2025-06-19

## 2025-06-19 RX ORDER — INSULIN LISPRO 100 [IU]/ML
0-10 INJECTION, SOLUTION INTRAVENOUS; SUBCUTANEOUS EVERY 4 HOURS
Status: DISCONTINUED | OUTPATIENT
Start: 2025-06-19 | End: 2025-06-20 | Stop reason: HOSPADM

## 2025-06-19 RX ORDER — INSULIN GLARGINE 100 [IU]/ML
14 INJECTION, SOLUTION SUBCUTANEOUS EVERY MORNING
Status: DISCONTINUED | OUTPATIENT
Start: 2025-06-20 | End: 2025-06-19

## 2025-06-19 RX ORDER — NAPROXEN SODIUM 220 MG/1
324 TABLET, FILM COATED ORAL ONCE
Status: COMPLETED | OUTPATIENT
Start: 2025-06-19 | End: 2025-06-19

## 2025-06-19 RX ORDER — INSULIN LISPRO 100 [IU]/ML
5 INJECTION, SOLUTION INTRAVENOUS; SUBCUTANEOUS ONCE
Status: COMPLETED | OUTPATIENT
Start: 2025-06-19 | End: 2025-06-19

## 2025-06-19 RX ORDER — ONDANSETRON HYDROCHLORIDE 2 MG/ML
4 INJECTION, SOLUTION INTRAVENOUS EVERY 4 HOURS PRN
Status: DISCONTINUED | OUTPATIENT
Start: 2025-06-19 | End: 2025-06-20 | Stop reason: HOSPADM

## 2025-06-19 RX ORDER — DEXTROSE 50 % IN WATER (D50W) INTRAVENOUS SYRINGE
25
Status: DISCONTINUED | OUTPATIENT
Start: 2025-06-19 | End: 2025-06-20 | Stop reason: HOSPADM

## 2025-06-19 RX ORDER — SODIUM CHLORIDE 9 MG/ML
125 INJECTION, SOLUTION INTRAVENOUS CONTINUOUS
Status: DISCONTINUED | OUTPATIENT
Start: 2025-06-19 | End: 2025-06-20 | Stop reason: HOSPADM

## 2025-06-19 RX ORDER — DICYCLOMINE HYDROCHLORIDE 10 MG/1
20 CAPSULE ORAL EVERY 6 HOURS PRN
Status: DISCONTINUED | OUTPATIENT
Start: 2025-06-19 | End: 2025-06-20 | Stop reason: HOSPADM

## 2025-06-19 RX ORDER — INSULIN GLARGINE 100 [IU]/ML
14 INJECTION, SOLUTION SUBCUTANEOUS NIGHTLY
Status: DISCONTINUED | OUTPATIENT
Start: 2025-06-19 | End: 2025-06-20 | Stop reason: HOSPADM

## 2025-06-19 RX ORDER — DEXTROSE 50 % IN WATER (D50W) INTRAVENOUS SYRINGE
12.5
Status: DISCONTINUED | OUTPATIENT
Start: 2025-06-19 | End: 2025-06-20 | Stop reason: HOSPADM

## 2025-06-19 RX ORDER — INSULIN LISPRO 100 [IU]/ML
4 INJECTION, SOLUTION INTRAVENOUS; SUBCUTANEOUS ONCE
Status: COMPLETED | OUTPATIENT
Start: 2025-06-19 | End: 2025-06-19

## 2025-06-19 RX ORDER — FAMOTIDINE 10 MG/ML
20 INJECTION, SOLUTION INTRAVENOUS ONCE
Status: COMPLETED | OUTPATIENT
Start: 2025-06-19 | End: 2025-06-19

## 2025-06-19 RX ADMIN — SODIUM CHLORIDE 1000 ML: 0.9 INJECTION, SOLUTION INTRAVENOUS at 19:10

## 2025-06-19 RX ADMIN — KETOROLAC TROMETHAMINE 15 MG: 15 INJECTION, SOLUTION INTRAMUSCULAR; INTRAVENOUS at 18:17

## 2025-06-19 RX ADMIN — INSULIN GLARGINE 14 UNITS: 100 INJECTION, SOLUTION SUBCUTANEOUS at 21:07

## 2025-06-19 RX ADMIN — INSULIN LISPRO 5 UNITS: 100 INJECTION, SOLUTION INTRAVENOUS; SUBCUTANEOUS at 16:38

## 2025-06-19 RX ADMIN — ASPIRIN 324 MG: 81 TABLET, CHEWABLE ORAL at 13:25

## 2025-06-19 RX ADMIN — SODIUM CHLORIDE 1000 ML: 0.9 INJECTION, SOLUTION INTRAVENOUS at 22:06

## 2025-06-19 RX ADMIN — METOCLOPRAMIDE 10 MG: 5 INJECTION, SOLUTION INTRAMUSCULAR; INTRAVENOUS at 14:00

## 2025-06-19 RX ADMIN — SODIUM CHLORIDE 125 ML/HR: 0.9 INJECTION, SOLUTION INTRAVENOUS at 18:15

## 2025-06-19 RX ADMIN — DICYCLOMINE HYDROCHLORIDE 20 MG: 10 CAPSULE ORAL at 21:08

## 2025-06-19 RX ADMIN — INSULIN LISPRO 4 UNITS: 100 INJECTION, SOLUTION INTRAVENOUS; SUBCUTANEOUS at 22:06

## 2025-06-19 RX ADMIN — ONDANSETRON 4 MG: 2 INJECTION INTRAMUSCULAR; INTRAVENOUS at 18:19

## 2025-06-19 RX ADMIN — INSULIN LISPRO 4 UNITS: 100 INJECTION, SOLUTION INTRAVENOUS; SUBCUTANEOUS at 21:08

## 2025-06-19 RX ADMIN — HYDROMORPHONE HYDROCHLORIDE 0.4 MG: 0.5 INJECTION, SOLUTION INTRAMUSCULAR; INTRAVENOUS; SUBCUTANEOUS at 14:32

## 2025-06-19 RX ADMIN — FAMOTIDINE 20 MG: 10 INJECTION INTRAVENOUS at 13:25

## 2025-06-19 RX ADMIN — IOHEXOL 75 ML: 350 INJECTION, SOLUTION INTRAVENOUS at 15:05

## 2025-06-19 RX ADMIN — METOCLOPRAMIDE 10 MG: 5 INJECTION, SOLUTION INTRAMUSCULAR; INTRAVENOUS at 19:09

## 2025-06-19 ASSESSMENT — ENCOUNTER SYMPTOMS
PALPITATIONS: 0
DIARRHEA: 1
VOMITING: 1
DIZZINESS: 0
CONFUSION: 0
HEADACHES: 0
FEVER: 0
CHILLS: 0
DYSURIA: 0
NUMBNESS: 0
NAUSEA: 1
SHORTNESS OF BREATH: 0
COUGH: 0
FREQUENCY: 0
ABDOMINAL PAIN: 1
WHEEZING: 0
WEAKNESS: 1

## 2025-06-19 ASSESSMENT — COGNITIVE AND FUNCTIONAL STATUS - GENERAL
MOBILITY SCORE: 24
DAILY ACTIVITIY SCORE: 24

## 2025-06-19 ASSESSMENT — HEART SCORE
ECG: NON-SPECIFIC REPOLARIZATION DISTURBANCE
AGE: <45
TROPONIN: LESS THAN OR EQUAL TO NORMAL LIMIT
HEART SCORE: 2
RISK FACTORS: 1-2 RISK FACTORS
HISTORY: SLIGHTLY SUSPICIOUS

## 2025-06-19 ASSESSMENT — PAIN DESCRIPTION - LOCATION: LOCATION: ABDOMEN

## 2025-06-19 ASSESSMENT — LIFESTYLE VARIABLES
HAVE PEOPLE ANNOYED YOU BY CRITICIZING YOUR DRINKING: NO
EVER HAD A DRINK FIRST THING IN THE MORNING TO STEADY YOUR NERVES TO GET RID OF A HANGOVER: NO
EVER FELT BAD OR GUILTY ABOUT YOUR DRINKING: NO
TOTAL SCORE: 0
HAVE YOU EVER FELT YOU SHOULD CUT DOWN ON YOUR DRINKING: NO

## 2025-06-19 ASSESSMENT — PAIN SCALES - GENERAL
PAINLEVEL_OUTOF10: 8
PAINLEVEL_OUTOF10: 4
PAINLEVEL_OUTOF10: 2

## 2025-06-19 ASSESSMENT — PAIN - FUNCTIONAL ASSESSMENT
PAIN_FUNCTIONAL_ASSESSMENT: 0-10

## 2025-06-19 ASSESSMENT — PAIN DESCRIPTION - PAIN TYPE: TYPE: ACUTE PAIN

## 2025-06-19 NOTE — ED PROVIDER NOTES
History of Present Illness     History provided by: Patient  Limitations to History: None  External Records Reviewed with Brief Summary: None    HPI:  Maria Isabel Cutler is a 25 y.o. female with PMH T1DM presenting for hyperglycemia, N/V, and abdominal pain. Patient has been out of supplies for her insulin pump and has therefore been giving herself injections of insulin. She thinks that she has been under dosing how much she needs as her blood sugar has been increasing. Today she started having abdominal pain in the midepigastric region, N/V, loose stools, and CP. She describes the CP as midsternal and sharp, but it improved soon after arrival. The abdominal pain is sharp and exacerbated with vomiting. Prior to this, patient had been able to keep food and liquids down. She denies recent illness, fever, cough, or other symptoms.  She did receive Zofran via EMS prior to arrival.      Physical Exam   Triage vitals:  T 36.9 °C (98.5 °F)  HR 90  /80  RR 15  O2 100 % None (Room air)    General: Awake, alert, vomiting  Eyes: Gaze conjugate. EOMI. No scleral icterus or injection.  HENT: Normo-cephalic, atraumatic. No stridor.  Dry mucous membranes.  CV: Regular rate, regular rhythm. Radial pulses 2+ bilaterally. DP pulses 2+ bilaterally. No murmurs.  Resp: Breathing non-labored, speaking in full sentences.  Clear to auscultation bilaterally. No wheezing, rales, rhonchi.   GI: Soft, non-distended, tender to palpation in mid-epigastric region. No rebound or guarding.   MSK/Extremities: No gross bony deformities. Moving all extremities. No edema.   Skin: Warm. Appropriate color.   Neuro: Alert and oriented x3. Face symmetric. Speech is fluent. Following commands. Gross strength and sensation intact in b/l UE and Les.  Psych: Appropriate mood and affect      Medical Decision Making & ED Course   Medical Decision Makin y.o. female with type 1 diabetes presenting with nausea, vomiting, abdominal pain, hyperglycemia  and chest pain.  She is mildly tachycardic on arrival but otherwise hemodynamically stable.  Patient appears uncomfortable on exam, she has tenderness to palpation of the mid epigastric region.  Differential diagnosis includes but is not limited to viral infection such as gastroenteritis, DKA, hyperglycemia due to inadequate insulin dosing, intra-abdominal infection.  VBG obtained on arrival noted pH of 7.3, glucose 374, lactate 1.4.  Will obtain CBC, CMP, UA, troponin, EKG and chest x-ray to further assess given her symptoms and chest pain.  Patient receiving liter of fluid from EMS, still in process.    Given EKG changes, this could be due to demand ischemia versus obstructive ischemia.  Will give aspirin in addition to pain control with Dilaudid and Pepcid.    Her CBC was notable for leukocytosis of 19.2 with elevated neutrophils, concerning for infectious process.  She was mildly tachycardic on arrival but her heart rate has improved.  She is SIRS positive, and receiving fluids.  Will hold off on antibiotics and obtain CT abdomen pelvis to assess for infectious process.  Her CMP is notable for glucose of 361 but electrolytes within normal limits and anion gap is not elevated.  Will give patient subcutaneous insulin.  She will likely require admission for further management and possible diabetes education and replenishment of her insulin pump supplies.  Currently pending CT, UA and pregnancy test.      ED Course:  ED Course as of 06/19/25 1651   Thu Jun 19, 2025   1327 26 yo F with h/o DM type I coming in with n and v with dull chest pain. Mild tachycardia. Mild TTP on abd exam. C/f DKA with labs pending. EKG shows TWI also. Possibly demand, trop pending.  [DM]   1329 BLOOD GAS VENOUS FULL PANEL(!)  pH decreased at 7.30.  Glucose 374.  Lactate 1.4.  Possibly component of acidosis based on VBG.  Will await CMP before determining if we need to start an insulin drip for possible DKA. [AC]   1330 EKG obtained at 12:  52 independently interpreted by me.  It demonstrates normal sinus rhythm with a rate of 84.  Intervals appropriate with RI interval 162, QRS 76, QTc 437.  Possible right axis deviation.  No ST elevations.  T wave inversions in leads II, III, aVF and leads V2, V3, V4, V5. When compared to EKG from 5/28/2025, these T wave inversions are new in leads, aVF, V4, V5. [AC]   1334 EKG changes could be from demand ischemia secondary to dehydration or DKA.  Will order and repeat EKG to assess for changes.  Will also order troponins as patient is reporting chest pain, which is now resolved.    Repeat EKG at 13: 37 shows normal sinus rhythm with rate of 78.  Intervals appropriate.  Redemonstrated T wave inversions in the same leads but less depressed than on initial EKG. [AC]   1430 Comprehensive metabolic panel(!)  Hyperglycemia with glucose of 361.  Potassium within normal limits.  No elevated anion gap. [AC]   1431 Patient is technically SIRS positive with a white count of 19 and a heart rate in the 90s.  No obvious source of bacterial infection.  She is getting IV fluids and we are holding off on antibiotics for now.  We are going to obtain a CT of her abdomen.  Pregnancy test is pending. [DM]   1550 Beta-hCG less than 3 negative for pregnancy.  Troponin less than 3 negative for acute ACS/MI.  CBC shows new leukocytosis, WBC of 19.2 [KIAN]   1551 CT abdomen pelvis shows mild enterocolitis, small heterogenous area of enhancement in the interpolar region of the right kidney, correlate with UA for possible pyelonephritis.  At this time pending UA. [KIAN]   1628 CMP shows hyperglycemia, 361, however no anion gap, pH show to be slightly acidotic at 7.3.  Plan to admit patient to CDU for completion of cardiac workup, symptomatic improvement of nausea vomiting abdominal pain, endocrinology follow-up in the morning. [KIAN]      ED Course User Index  [AC] Kranthi Asencio DO  [DM] Akbar Peters MD  [KIAN] Scott Orr, DO          Diagnoses as of 06/19/25 1651   Enterocolitis   Hyperglycemia   Nausea and vomiting, unspecified vomiting type   Chest pain, unspecified type       EKG Independent Interpretation: EKG interpreted by myself. Please see ED Course for full interpretation.    ----  Scoring Tools Utilized:   HEART Score: 2          Independent Result Review and Interpretation: Relevant laboratory and radiographic results were reviewed and independently interpreted by myself.  As necessary, they are commented on in the ED Course.    Chronic conditions affecting the patient's care: As documented above in MDM    Care Considerations: As documented above in MDM      Disposition   Patient was signed out to Dr. Orr at 1500 pending completion of their work-up.  Please see the next provider's transition of care note for the remainder of the patient's care.     Procedures   Procedures        Kranthi Asencio DO  Emergency Medicine PGY1     Kranthi Asencio DO  Resident  06/19/25 1645       Kranthi Asencio,   Resident  06/19/25 1651    I saw and evaluated the patient. I personally obtained the key and critical portions of the history and physical exam or was physically present for key and critical portions performed by the resident/fellow/MARLENY. I reviewed the resident/fellow/MARLENY's documentation and discussed the patient with the resident/fellow/MARLENY. I agree with the resident/fellow/MARLENY's medical decision making as documented in the note.    ** Please excuse any errors in grammar or translation related to this dictation. Voice recognition software was utilized to prepare this document. **       Akbar Peters MD  [facility]        Akbar Peters MD  06/20/25 0305

## 2025-06-19 NOTE — H&P
History and Physical  [facility] CLINICAL DECISION  Patient: Maria Isabel Cutler  MRN: [id number]  : 2000  Date of Evaluation: 2025  ED Provider: LELE Gonzalez      Limitations to history: none  Independent Historian: yes  External Records Reviewed: n/a      Patient History:  Maria Isabel Cutler is a 25 y.o. female with hx of T1DM who presents to the emergency department complaining of nausea, vomiting and abdominal discomfort which began yesterday. Assoc symptoms include diarrhea and dull midsternal chest discomfort. No associated SOB, headache, dizziness, vision changes, fever/chills, palpitations, urinary symptoms, paresthesias, weakness. EKG performed in ED did reveal mild tachycardia with T wave inversions which appear unchanged from prior, troponin negative. CBC with elevated WBC of 19, glucose 361, potassium WNL, Venous blood gas with pH 7.3, bicarb 17, anion gap is not elevated. No concern for DKA. She was given a 1L NS bolus in the ED along with 5 units lispro, IV Pepcid, Toradol and Zofran. CXR did not reveal any acute findings. CT abd/pelvis did reveal concern for mild enterocolitis, small heterogenous area of enhancement in the interpolar region of the right kidney, correlate with UA for possible pyelonephritis. Urinalysis pending at this time. Patient states that her insulin pump has not been working properly/has been out of her supplies and she has not been checking her glucose regularly. Patient was admitted to the CDU for further management of hyperglycemia, nausea/vomiting and endocrine consult.     PMH: T1DM, asthma, anxiety     PSH:  c/section    NKDA    Review of Systems   Constitutional:  Negative for chills and fever.   HENT:  Negative for congestion.    Eyes:  Negative for visual disturbance.   Respiratory:  Negative for cough, shortness of breath and wheezing.    Cardiovascular:  Positive for chest pain. Negative for palpitations.   Gastrointestinal:   Positive for abdominal pain, diarrhea, nausea and vomiting.   Genitourinary:  Negative for dysuria and frequency.   Skin:  Negative for rash.   Neurological:  Positive for weakness. Negative for dizziness, numbness and headaches.   Psychiatric/Behavioral:  Negative for confusion.        Orders Placed This Encounter   Procedures    XR chest 2 views    CT abdomen pelvis w IV contrast    CBC and Auto Differential    Comprehensive metabolic panel    Magnesium    BLOOD GAS VENOUS FULL PANEL    Troponin I, High Sensitivity    Troponin I, High Sensitivity    hCG, quantitative, pregnancy    Urinalysis with Reflex Culture and Microscopic    Urinalysis with Reflex Culture and Microscopic    Extra Urine Gray Tube    Hemoglobin A1c    BLOOD GAS VENOUS FULL PANEL    Beta Hydroxybutyrate    Basic metabolic panel    CBC and Auto Differential    Adult diet Consistent Carb; CCD 60 gm/meal    NPO Diet; Effective midnight    Notify provider (specify parameters)    Notify provider (specify parameters)    Notify provider (specify parameters)    Glucose 10-70 mg/dL & CONSCIOUS- Give 15 Grams of Carbohydrates and repeat until blood glucose level reaches 100 mg/dL or greater.    Notify provider (specify parameters)    Notify provider (specify parameters)    Notify provider (specify parameters)    Inpatient consult to Endocrinology    Inpatient consult to Diabetes educator    POCT GLUCOSE    POCT pregnancy, urine    ECG 12 lead    ECG 12 lead    Initiate Observation Send to CDU       Upon admission to the Clinical Decision Unit, patient reports nausea and epigastric discomfort along with generalized weakness. Was given an additional 1L NS bolus. Will repeat venous full panel and labs. Urine collected and pending. Endocinology and diabetic educator consult placed. I did speak with endocrine physician who recommended beginning insulin drip due to DKA concern after reviewing labs from earlier today and patient's persistent nausea with  "vomiting. After speaking with ED physician Dr Smart, discussed repeating labs prior to initiating drip; given that patient has received fluids and insulin since labs were last drawn several hours ago.         Past History   Medical History[1]  Surgical History[2]  Social History[3]      Medications/Allergies     Previous Medications    ACETONE, URINE, TEST STRIP    DRIP URINE TO CHECK FOR KETONES IF NAUSEA/VOMITING OR IF CONCERN FOR DKA OR DEHYDRATION    ALBUTEROL (VENTOLIN HFA) 90 MCG/ACTUATION INHALER    Inhale 1-2 puffs every 4 hours if needed for wheezing or shortness of breath.    BLOOD-GLUCOSE SENSOR (DEXCOM G7 SENSOR) DEVICE    Change sensor every 10 days    GLUCAGON 1 MG/0.2 ML AUTO-INJECTOR    Inject 1 mg under the skin 1 time if needed (use for low blood sugar if unable to take anything by mouth) for up to 1 dose.    GLUCOSE 4 GRAM CHEWABLE TABLET    Chew. USE AS DIRECTED TO TREAT HYPOGLYCEMIA    INSULIN GLARGINE (LANTUS SOLOSTAR U-100 INSULIN) 100 UNIT/ML (3 ML) PEN    Take 14 units once every morning in the setting of pump failure    INSULIN LISPRO (HUMALOG KWIKPEN INSULIN) 100 UNIT/ML PEN    Inject 1 unit under the skin for every 12 grams of carbs plus scale with meals. Use up to 50 units daily if pump fails.    INSULIN LISPRO (HUMALOG KWIKPEN INSULIN) 100 UNIT/ML PEN    Inject 1 unit under the skin for every 12g carbs with meals. May take up to 50 units per day.    INSULIN LISPRO 100 UNIT/ML INJECTION    Use via insulin pump for max  units    ONETOUCH VERIO TEST STRIPS STRIP    TEST 6-7 TIMES DAILY    PEN NEEDLE 1/2\" 29G X 12MM NEEDLE    Use to inject 1-4 times daily as directed.    PEN NEEDLE, DIABETIC 32 GAUGE X 5/32\" NEEDLE    Use 1 pen needle for insulin injection 4 times a day.    PEN NEEDLE, DIABETIC 32 GAUGE X 5/32\" NEEDLE    Use with insulin pens 4 times daily     Allergies[4]      Review of Systems  All systems reviewed and otherwise negative, except as stated above in " HPI.      Physical Exam     Visit Vitals  /78 (BP Location: Right arm, Patient Position: Lying)   Pulse 95   Temp 37.6 °C (99.6 °F) (Temporal)   Resp 14   Ht 1.524 m (5')   Wt 55.2 kg (121 lb 11.1 oz)   SpO2 99%   BMI 23.77 kg/m²   OB Status Recent pregnancy   Smoking Status Former   BSA 1.53 m²       GENERAL:  The patient appears nourished and normally developed. Vital signs as documented.     HEENT:  Head normocephalic, atraumatic, EOMs intact, PERRLA, Mucous membranes moist. Nares patent without copious rhinorrhea.  No lymphadenopathy.    PULMONARY:  Lungs are clear to auscultation, without any respiratory distress. Able to speak full sentences, no accessory muscle use    CARDIAC:   Normal rate. No murmurs, rubs or gallops    ABDOMEN:  Soft, non-distended, non-tender, BS positive x 4 quadrants, No rebound or guarding, no peritoneal signs, no CVA tenderness, no masses or organomegaly      MUSCULOSKELETAL:   Able to ambulate, Non edematous, with no obvious deformities. Pulses intact distal    SKIN:   Good color, with no significant rashes.  No pallor.    NEURO:  No obvious neurological deficits, normal sensation and strength bilaterally.  Able to follow commands, NIH 0, CN 2-12 intact.    Psych: Appropriate mood and affect    Consultants  1) Endocrinology      Impression and Plan  In summary, Maria Isabel Cutler is admitted to the [facility] Clinical Decision Unit for hyperglycemia. Dr. Smart is the CDU admission attending.    This patient has been risk-stratified based on available history, physical exam, and related study findings. Admission to the observation status for further diagnosis/treatment/monitoring of hyperglycemia is warranted clinically. This extended period of observation is specifically required to determine the need for hospitalization.     The goals of this admission based on the patient's clinical problem list are:  1) Patient will report improvement of  nausea/vomiting- IV Reglan PRN, IV maintenance fluids NS @ 125cc/hr.   2) Patient will tolerate PO  3) Optimal management of hyperglycemia- glucose POC TID with meals and at bedtime, resume home lantus 14 units in am, sliding scale, endocrinology consult    We will observe the patient for the following endpoints:   1) Hyperglycemia- will repeat venous full panel and BMP to assess for improvement  2) Urinalysis results  3) Appreciate endocrinology recommendations    When met, appropriate disposition will be arranged.          [1]   Past Medical History:  Diagnosis Date    Asthma     Burning pain 10/09/2023    Chlamydia     Chronic hypertension     CSF oligoclonal bands     Depression     Herpes     Type 1 diabetes mellitus with hyperglycemia, with long-term current use of insulin     Urinary tract infection    [2] History reviewed. No pertinent surgical history.  [3]   Social History  Socioeconomic History    Marital status: Single   Tobacco Use    Smoking status: Former     Types: Cigarettes     Passive exposure: Past    Smokeless tobacco: Never   Vaping Use    Vaping status: Former    Substances: Nicotine   Substance and Sexual Activity    Alcohol use: Not Currently    Drug use: Not Currently     Types: Marijuana    Sexual activity: Yes     Social Drivers of Health     Financial Resource Strain: Patient Declined (5/28/2025)    Overall Financial Resource Strain (CARDIA)     Difficulty of Paying Living Expenses: Patient declined   Food Insecurity: Patient Declined (5/28/2025)    Hunger Vital Sign     Worried About Running Out of Food in the Last Year: Patient declined     Ran Out of Food in the Last Year: Patient declined   Transportation Needs: Patient Declined (5/28/2025)    PRAPARE - Transportation     Lack of Transportation (Medical): Patient declined     Lack of Transportation (Non-Medical): Patient declined   Physical Activity: Inactive (7/28/2024)    Exercise Vital Sign     Days of Exercise per Week: 0 days      Minutes of Exercise per Session: 0 min   Social Connections: Socially Isolated (7/28/2024)    Social Connection and Isolation Panel [NHANES]     Frequency of Communication with Friends and Family: Never     Frequency of Social Gatherings with Friends and Family: Never     Attends Voodoo Services: 1 to 4 times per year     Active Member of Clubs or Organizations: No     Attends Club or Organization Meetings: Never     Marital Status: Never    Intimate Partner Violence: Not At Risk (5/28/2025)    Humiliation, Afraid, Rape, and Kick questionnaire     Fear of Current or Ex-Partner: No     Emotionally Abused: No     Physically Abused: No     Sexually Abused: No   Housing Stability: Patient Declined (5/28/2025)    Housing Stability Vital Sign     Unable to Pay for Housing in the Last Year: Patient declined     Number of Times Moved in the Last Year: 1     Homeless in the Last Year: Patient declined   [4]   Allergies  Allergen Reactions    Pollen Extracts Other     Runny nose

## 2025-06-19 NOTE — ED TRIAGE NOTES
Pt presented to ED to N/V since this AM. Pt glucose was reading in the 200s at home. On arrival to ED pt glucose 335. PMHx T1DM. Pt given 4mg zofran and 1L NS en route to ED. NS continued on arrival.

## 2025-06-19 NOTE — PROGRESS NOTES
Emergency Department Transition of Care Note       Signout   I received Maria Isabel Cutler in signout from Dr. Asencio.  Please see the ED Provider Note for all HPI, PE and MDM up to the time of signout at 1500.  This is in addition to the primary record.    In brief Maria Isabel Cutler is an 25 y.o. female presenting with PMHx of type 1 diabetes, presenting for nausea vomiting, abdominal pain and chest pain.  Chest pain described as dull, on arrival noted be slightly tachycardic.  EKG shows some new T wave inversions, however troponin negative for acute ischemia.    At the time of signout we were awaiting:  Completion of workup, and disposition.    ED Course & Medical Decision Making   Medical Decision Making:  Under my care, please see ED course below.    ED Course:  ED Course as of 06/19/25 1636   Thu Jun 19, 2025   1327 24 yo F with h/o DM type I coming in with n and v with dull chest pain. Mild tachycardia. Mild TTP on abd exam. C/f DKA with labs pending. EKG shows TWI also. Possibly demand, trop pending.  [DM]   1329 BLOOD GAS VENOUS FULL PANEL(!)  pH decreased at 7.30.  Glucose 374.  Lactate 1.4.  Possibly component of acidosis based on VBG.  Will await CMP before determining if we need to start an insulin drip for possible DKA. [AC]   1330 EKG obtained at 12: 52 independently interpreted by me.  It demonstrates normal sinus rhythm with a rate of 84.  Intervals appropriate with IL interval 162, QRS 76, QTc 437.  Possible right axis deviation.  No ST elevations.  T wave inversions in leads II, III, aVF and leads V2, V3, V4, V5. When compared to EKG from 5/28/2025, these T wave inversions are new in leads, aVF, V4, V5. [AC]   1334 EKG changes could be from demand ischemia secondary to dehydration or DKA.  Will order and repeat EKG to assess for changes.  Will also order troponins as patient is reporting chest pain, which is now resolved.    Repeat EKG at 13: 37 shows normal sinus rhythm with rate of 78.   Intervals appropriate.  Redemonstrated T wave inversions in the same leads but less depressed than on initial EKG. [AC]   1430 Comprehensive metabolic panel(!)  Hyperglycemia with glucose of 361.  Potassium within normal limits.  No elevated anion gap. [AC]   1431 Patient is technically SIRS positive with a white count of 19 and a heart rate in the 90s.  No obvious source of bacterial infection.  She is getting IV fluids and we are holding off on antibiotics for now.  We are going to obtain a CT of her abdomen.  Pregnancy test is pending. [DM]   1550 Beta-hCG less than 3 negative for pregnancy.  Troponin less than 3 negative for acute ACS/MI.  CBC shows new leukocytosis, WBC of 19.2 [KIAN]   1551 CT abdomen pelvis shows mild enterocolitis, small heterogenous area of enhancement in the interpolar region of the right kidney, correlate with UA for possible pyelonephritis.  At this time pending UA. [KIAN]   1628 CMP shows hyperglycemia, 361, however no anion gap, pH show to be slightly acidotic at 7.3.  Plan to admit patient to CDU for completion of cardiac workup, symptomatic improvement of nausea vomiting abdominal pain, endocrinology follow-up in the morning. [KIAN]      ED Course User Index  [AC] Kranthi Asencio DO  [DM] Akbar Peters MD  [KIAN] Scott Orr DO       Disposition   As a result of their workup, the patient will require admission to the CDU.  The patient was informed of her diagnosis.  The patient was given the opportunity to ask questions and I answered them. The patient agreed to be admitted to the CDU.    Procedures   Procedures    Patient seen and discussed with ED attending physician.    Scott Orr DO  Emergency Medicine

## 2025-06-20 ENCOUNTER — DOCUMENTATION (OUTPATIENT)
Dept: BEHAVIORAL HEALTH | Facility: CLINIC | Age: 25
End: 2025-06-20
Payer: COMMERCIAL

## 2025-06-20 VITALS
RESPIRATION RATE: 18 BRPM | SYSTOLIC BLOOD PRESSURE: 114 MMHG | BODY MASS INDEX: 23.89 KG/M2 | HEIGHT: 60 IN | OXYGEN SATURATION: 100 % | TEMPERATURE: 97.5 F | HEART RATE: 105 BPM | DIASTOLIC BLOOD PRESSURE: 79 MMHG | WEIGHT: 121.69 LBS

## 2025-06-20 LAB
ALBUMIN SERPL BCP-MCNC: 3.7 G/DL (ref 3.4–5)
ANION GAP BLDV CALCULATED.4IONS-SCNC: 12 MMOL/L (ref 10–25)
ANION GAP SERPL CALC-SCNC: 14 MMOL/L (ref 10–20)
BASE EXCESS BLDV CALC-SCNC: -4.2 MMOL/L (ref -2–3)
BODY TEMPERATURE: 37 DEGREES CELSIUS
BUN SERPL-MCNC: 8 MG/DL (ref 6–23)
CA-I BLDV-SCNC: 1.13 MMOL/L (ref 1.1–1.33)
CALCIUM SERPL-MCNC: 8.1 MG/DL (ref 8.6–10.6)
CHLORIDE BLDV-SCNC: 110 MMOL/L (ref 98–107)
CHLORIDE SERPL-SCNC: 110 MMOL/L (ref 98–107)
CO2 SERPL-SCNC: 19 MMOL/L (ref 21–32)
CREAT SERPL-MCNC: 0.52 MG/DL (ref 0.5–1.05)
EGFRCR SERPLBLD CKD-EPI 2021: >90 ML/MIN/1.73M*2
GLUCOSE BLD MANUAL STRIP-MCNC: 133 MG/DL (ref 74–99)
GLUCOSE BLD MANUAL STRIP-MCNC: 134 MG/DL (ref 74–99)
GLUCOSE BLD MANUAL STRIP-MCNC: 159 MG/DL (ref 74–99)
GLUCOSE BLD MANUAL STRIP-MCNC: 215 MG/DL (ref 74–99)
GLUCOSE BLDV-MCNC: 116 MG/DL (ref 74–99)
GLUCOSE SERPL-MCNC: 120 MG/DL (ref 74–99)
HCO3 BLDV-SCNC: 20 MMOL/L (ref 22–26)
HCT VFR BLD EST: 35 % (ref 36–46)
HGB BLDV-MCNC: 11.8 G/DL (ref 12–16)
HOLD SPECIMEN: 293
INHALED O2 CONCENTRATION: 21 %
LACTATE BLDV-SCNC: 0.9 MMOL/L (ref 0.4–2)
LACTATE BLDV-SCNC: 0.9 MMOL/L (ref 0.4–2)
OXYHGB MFR BLDV: 83.4 % (ref 45–75)
PCO2 BLDV: 33 MM HG (ref 41–51)
PH BLDV: 7.39 PH (ref 7.33–7.43)
PHOSPHATE SERPL-MCNC: 2.9 MG/DL (ref 2.5–4.9)
PO2 BLDV: 53 MM HG (ref 35–45)
POTASSIUM BLDV-SCNC: 4 MMOL/L (ref 3.5–5.3)
POTASSIUM SERPL-SCNC: 4.3 MMOL/L (ref 3.5–5.3)
SAO2 % BLDV: 85 % (ref 45–75)
SODIUM BLDV-SCNC: 138 MMOL/L (ref 136–145)
SODIUM SERPL-SCNC: 139 MMOL/L (ref 136–145)

## 2025-06-20 PROCEDURE — 82947 ASSAY GLUCOSE BLOOD QUANT: CPT | Mod: 59

## 2025-06-20 PROCEDURE — 2500000002 HC RX 250 W HCPCS SELF ADMINISTERED DRUGS (ALT 637 FOR MEDICARE OP, ALT 636 FOR OP/ED): Mod: SE | Performed by: NURSE PRACTITIONER

## 2025-06-20 PROCEDURE — 82805 BLOOD GASES W/O2 SATURATION: CPT | Performed by: EMERGENCY MEDICINE

## 2025-06-20 PROCEDURE — 99244 OFF/OP CNSLTJ NEW/EST MOD 40: CPT | Performed by: STUDENT IN AN ORGANIZED HEALTH CARE EDUCATION/TRAINING PROGRAM

## 2025-06-20 PROCEDURE — 82435 ASSAY OF BLOOD CHLORIDE: CPT | Performed by: EMERGENCY MEDICINE

## 2025-06-20 PROCEDURE — 36415 COLL VENOUS BLD VENIPUNCTURE: CPT | Performed by: NURSE PRACTITIONER

## 2025-06-20 PROCEDURE — 99238 HOSP IP/OBS DSCHRG MGMT 30/<: CPT | Performed by: NURSE PRACTITIONER

## 2025-06-20 PROCEDURE — 83605 ASSAY OF LACTIC ACID: CPT | Performed by: NURSE PRACTITIONER

## 2025-06-20 PROCEDURE — 96361 HYDRATE IV INFUSION ADD-ON: CPT

## 2025-06-20 PROCEDURE — 82947 ASSAY GLUCOSE BLOOD QUANT: CPT

## 2025-06-20 PROCEDURE — 2500000004 HC RX 250 GENERAL PHARMACY W/ HCPCS (ALT 636 FOR OP/ED): Mod: SE | Performed by: NURSE PRACTITIONER

## 2025-06-20 PROCEDURE — G0378 HOSPITAL OBSERVATION PER HR: HCPCS

## 2025-06-20 RX ADMIN — INSULIN LISPRO 4 UNITS: 100 INJECTION, SOLUTION INTRAVENOUS; SUBCUTANEOUS at 11:12

## 2025-06-20 RX ADMIN — INSULIN LISPRO 2 UNITS: 100 INJECTION, SOLUTION INTRAVENOUS; SUBCUTANEOUS at 09:37

## 2025-06-20 RX ADMIN — SODIUM CHLORIDE 125 ML/HR: 0.9 INJECTION, SOLUTION INTRAVENOUS at 04:35

## 2025-06-20 NOTE — CONSULTS
Inpatient consult to Endocrinology  Consult performed by: Benita Vernon MD  Consult ordered by: MACKENZIE Gonzalez-CNP  Reason for consult: DM1, on tandem pump, pump malfunction      History Of Present Illness  Maria Isabel Cutler is a 25 y.o. female with PMHx of type 1 diabetes (on tandem insulin pump) presenting to ED with complaints of nausea vomiting and abdominal pain since 1 day.  Patient reported associated diarrhea and chest discomfort.  Patient reports that she has not been using her insulin pump since approximately 5/23/2025 due to concerns of pump malfunction.  She has been doing insulin injections, insulin glargine 14 units daily (last dose 6/18 at 10 AM) with sliding scale.  She reports that she missed her dose of insulin glargine on 6/19 because of feeling sick.    She reports compliance with her insulin injections.  Reports that she is not using insulin pump because of change in infusion sets.  Patient states that she prefers trusteel infusion set but received Auto soft from her mail-in pharmacy.      In ED: She had leukocytosis with WBC 19, , venous pH 7.3, bicarb 17, AG 12.  She received 1 L NS and symptomatic treatment for nausea vomiting with clinical improvement.  Patient received insulin glargine 14 units at 9 PM plus sliding scale #2.  FBS today 159.    Today, patient reports that she will feels well, denies nausea, vomiting, abdominal pain, reports stable appetite.  She did eat her breakfast.    Diabetes History  Initial diagnosis: Age 5  A1c: 11.6 (6/19/2025)  Home meds: Tandem pump, not using since end of May, no data available on tandem source after 5/23/2025  Current settings: Basal 0.60, ICR 1: 13, ISF 1: 45, target 110.  3/2025: TDD 31, basal 13, bolus 12  Hypoglycemia: Not recently, has Dexcom CGM at home (using since 3 days), aware of symptoms and management  Eye exam: Upcoming appointment on 10/17/2025  Foot exam: Upcoming appointment on 7/18/2020    Problem List:  2025-01:  Diabetic ketoacidosis without coma associated with type 1   diabetes mellitus  2024-07: Diabetic ketoacidosis without coma associated with other   specified diabetes mellitus  2023-11: DM (diabetes mellitus) type 1, uncontrolled, with   ketoacidosis  2023-10: Pre-existing type 1 diabetes mellitus during pregnancy,   postpartum ([facility])        Past Medical History  She has a past medical history of Asthma, Burning pain (10/09/2023), Chlamydia, Chronic hypertension, CSF oligoclonal bands, Depression, Herpes, Type 1 diabetes mellitus with hyperglycemia, with long-term current use of insulin, and Urinary tract infection.    She has no past medical history of Abnormal Pap smear of cervix, Anemia, Gonorrhea, or Varicella.    Surgical History  She has no past surgical history on file.     Social History  She reports that she has quit smoking. Her smoking use included cigarettes. She has been exposed to tobacco smoke. She has never used smokeless tobacco. She reports that she does not currently use alcohol. She reports that she does not currently use drugs after having used the following drugs: Marijuana.    Family History  Family History[1]     Allergies  Pollen extracts    Last Recorded Vitals  Blood pressure 114/79, pulse (!) 105, temperature 36.4 °C (97.5 °F), resp. rate 18, height 1.524 m (5'), weight 55.2 kg (121 lb 11.1 oz), SpO2 100%, currently breastfeeding.  Review of Systems  All systems reviewed with the patient and they were all negative, unless noted above.     Physical Exam   General: patient in NAD  HEENT: AT/NC  Neck: trachea in midline, no thyromegaly or nodules  Resp: CTA B/L  CVS: normal s1 and s2  Abdomen: soft and non tender to palpation, BS+  Skin: warm, dry and intact  Neuro: AAO x3, DTR 2+  Psych: cooperative     ROS, PMH, FH/SH, surgical history and allergies have been reviewed.   Relevant Results  Results from last 7 days   Lab Units 06/20/25  1057 06/20/25  0733 06/20/25  0428 06/20/25  0057  06/20/25  0006 06/19/25 2053 06/19/25 2051 06/19/25  1245   POCT GLUCOSE mg/dL 215* 159* 133*  --  134* 238*  --   --    GLUCOSE mg/dL  --   --   --  120*  --   --  244* 361*     Lab Results   Component Value Date    HGBA1C 11.6 (H) 06/19/2025    HGBA1C 11.0 (H) 05/28/2025    HGBA1C 12.7 (H) 01/15/2025     06/20/2025    K 4.3 06/20/2025     (H) 06/20/2025    CO2 19 (L) 06/20/2025    BUN 8 06/20/2025    CREATININE 0.52 06/20/2025    CALCIUM 8.1 (L) 06/20/2025    ALBUMIN 3.7 06/20/2025    PROT 6.9 06/19/2025    BILITOT 0.3 06/19/2025    ALKPHOS 52 06/19/2025    ALT 11 06/19/2025    AST 19 06/19/2025    GLUCOSE 120 (H) 06/20/2025    CHOL 199 01/20/2022    TRIG 56 03/20/2018    HDL 65.3 01/20/2022    BHYDRXBUT 2.72 (H) 06/19/2025        Assessment/Plan   Assessment & Plan  Enterocolitis      Maria Isabel Cutler is a 25 y.o. female with PMHx of type 1 diabetes (on tandem insulin pump) presenting to ED with complaints of nausea vomiting and abdominal pain since 1 day.  Patient reported associated diarrhea and chest discomfort.  Patient reports that she has not been using her insulin pump since approximately 5/23/2025 due to concerns of pump malfunction.  She has been doing insulin injections, insulin glargine 14 units daily (last dose 6/18 at 10 AM) with sliding scale.  She reports that she missed her dose of insulin glargine on 6/19 because of feeling sick.    She reports compliance with her insulin injections.  Reports that she is not using insulin pump because of change in infusion sets.  Patient states that she prefers trusteal infusion set but received Accu soft from her mail-in pharmacy.      In ED: She had leukocytosis with WBC 19, , venous pH 7.3, bicarb 17, AG 12.  She received 1 L NS and symptomatic treatment for nausea vomiting with clinical improvement.  Patient received insulin glargine 14 units at 9 PM plus sliding scale #2.  FBS today 159.    Diabetes History  Initial diagnosis: Age  5  A1c: 11.6 (6/19/2025)  Home meds: Tandem pump, not using since end of May, no data available on tandem source after 5/23/2025  Current settings: Basal 0.60, ICR 1: 13, ISF 1: 45, target 110.  3/2025: TDD 31, basal 13, bolus 12  Hypoglycemia: Not recently, has Dexcom CGM at home (using since 3 days)    Endocrinology service is consulted for management of hyperglycemia in setting of type 1 diabetes.    Patient doing clinically better, tolerating diet, denies any nausea, vomiting, abdominal pain, difficulty breathing.  She did eat her breakfast and tolerated it well.  Patient is okay to discharge from endocrine standpoint.    Discharge recommendations:   Patient is agreeable to using Auto soft infusion set while she does not have access to Trusteel infusion set.  Patient advised to start using insulin pump at previous settings.     In case of pump malfunction:   Patient advised to take Insulin Glargine 14 units daily.  Patient educated about the importance of insulin glargine/basal insulin given her history of type 1 diabetes.  She was advised to always take insulin glargine to avoid DKA  Patient to use insulin sliding scale as follows in case of insulin pump malfunction, printed copy of insulin regimen chart provided to patient upon discharge.        Encouraged to follow consistent diabetic diet and exercise as tolerated   Patient advised to continue using Dexcom CGM with insulin pump  Patient to follow-up with endocrinologist Dr. Gonzales upon discharge, [employer] message sent requesting patient preferred infusion set and follow-up appointment.  Patient to follow-up with PCP in 1 to 2 weeks of discharge.      Recommendations communicated to primary team. Please reach out incase you have any questions or concerns.    The patient was seen and discussed with attending Dr. Earl Vernon MD  Endocrinology fellow             [1]   Family History  Problem Relation Name Age of Onset    Hypertension Maternal  Grandmother      Asthma Child

## 2025-06-20 NOTE — DISCHARGE SUMMARY
Disposition Note  [facility] CLINICAL DECISION  Patient: Maria Isabel Cutler  MRN: [id number]  : 2000  Date of Evaluation: 2025  ED Provider: LELE Mckinney, SÁNCHEZ      Limitations to history: None   Independent Historian: Yes  External Records Reviewed: EMR       Subjective:    Maria Isabel Cutler is a 25 y.o. female has undergone comprehensive diagnostic evaluation and therapeutic management in accordance with the CDU guidelines for nausea & vomiting, hyperglycemia.  Based on the patient's clinical response and diagnostic information during this period of observation, it has been determined that the patient will be discharged    The acute evaluation included:  Orders Placed This Encounter   Procedures    XR chest 2 views    CT abdomen pelvis w IV contrast    CBC and Auto Differential    Comprehensive metabolic panel    Magnesium    BLOOD GAS VENOUS FULL PANEL    Troponin I, High Sensitivity    Troponin I, High Sensitivity    hCG, quantitative, pregnancy    Urinalysis with Reflex Culture and Microscopic    Urinalysis with Reflex Culture and Microscopic    Extra Urine Gray Tube    Hemoglobin A1c    BLOOD GAS VENOUS FULL PANEL    Beta Hydroxybutyrate    Basic metabolic panel    CBC and Auto Differential    Blood Gas Lactic Acid, Venous    Renal function panel    BLOOD GAS VENOUS FULL PANEL    Adult diet Consistent Carb; CCD 60 gm/meal    Notify provider (specify parameters)    Notify provider (specify parameters)    Notify provider (specify parameters)    Glucose 10-70 mg/dL & CONSCIOUS- Give 15 Grams of Carbohydrates and repeat until blood glucose level reaches 100 mg/dL or greater.    Notify provider (specify parameters)    Notify provider (specify parameters)    Notify provider (specify parameters)    Inpatient consult to Endocrinology    POCT GLUCOSE    POCT pregnancy, urine    POCT GLUCOSE    POCT GLUCOSE    POCT GLUCOSE    POCT GLUCOSE    POCT GLUCOSE    ECG 12 lead    ECG 12  "lead    Initiate Observation Send to CDU         Placed in observation at: 6/19/25       Past History   Medical History[1]  Surgical History[2]  Social History[3]      Medications/Allergies     Previous Medications    ACETONE, URINE, TEST STRIP    DRIP URINE TO CHECK FOR KETONES IF NAUSEA/VOMITING OR IF CONCERN FOR DKA OR DEHYDRATION    ALBUTEROL (VENTOLIN HFA) 90 MCG/ACTUATION INHALER    Inhale 1-2 puffs every 4 hours if needed for wheezing or shortness of breath.    BLOOD-GLUCOSE SENSOR (DEXCOM G7 SENSOR) DEVICE    Change sensor every 10 days    GLUCAGON 1 MG/0.2 ML AUTO-INJECTOR    Inject 1 mg under the skin 1 time if needed (use for low blood sugar if unable to take anything by mouth) for up to 1 dose.    GLUCOSE 4 GRAM CHEWABLE TABLET    Chew. USE AS DIRECTED TO TREAT HYPOGLYCEMIA    INSULIN GLARGINE (LANTUS SOLOSTAR U-100 INSULIN) 100 UNIT/ML (3 ML) PEN    Take 14 units once every morning in the setting of pump failure    INSULIN LISPRO (HUMALOG KWIKPEN INSULIN) 100 UNIT/ML PEN    Inject 1 unit under the skin for every 12 grams of carbs plus scale with meals. Use up to 50 units daily if pump fails.    INSULIN LISPRO (HUMALOG KWIKPEN INSULIN) 100 UNIT/ML PEN    Inject 1 unit under the skin for every 12g carbs with meals. May take up to 50 units per day.    INSULIN LISPRO 100 UNIT/ML INJECTION    Use via insulin pump for max  units    ONETOUCH VERIO TEST STRIPS STRIP    TEST 6-7 TIMES DAILY    PEN NEEDLE 1/2\" 29G X 12MM NEEDLE    Use to inject 1-4 times daily as directed.    PEN NEEDLE, DIABETIC 32 GAUGE X 5/32\" NEEDLE    Use 1 pen needle for insulin injection 4 times a day.    PEN NEEDLE, DIABETIC 32 GAUGE X 5/32\" NEEDLE    Use with insulin pens 4 times daily     Allergies[4]      Review of Systems  All systems reviewed and otherwise negative, except as stated above in HPI.    Diagnostics reviewed by MACKENZIE Mckinney-CNP, DNP     Labs:  Results for orders placed or performed during the hospital " encounter of 06/19/25   POCT GLUCOSE    Collection Time: 06/19/25 12:44 PM   Result Value Ref Range    POCT Glucose 335 (H) 74 - 99 mg/dL   CBC and Auto Differential    Collection Time: 06/19/25 12:45 PM   Result Value Ref Range    WBC 19.2 (H) 4.4 - 11.3 x10*3/uL    nRBC 0.0 0.0 - 0.0 /100 WBCs    RBC 3.99 (L) 4.00 - 5.20 x10*6/uL    Hemoglobin 12.2 12.0 - 16.0 g/dL    Hematocrit 35.6 (L) 36.0 - 46.0 %    MCV 89 80 - 100 fL    MCH 30.6 26.0 - 34.0 pg    MCHC 34.3 32.0 - 36.0 g/dL    RDW 11.8 11.5 - 14.5 %    Platelets 426 150 - 450 x10*3/uL    Neutrophils % 80.9 40.0 - 80.0 %    Immature Granulocytes %, Automated 0.6 0.0 - 0.9 %    Lymphocytes % 11.8 13.0 - 44.0 %    Monocytes % 5.8 2.0 - 10.0 %    Eosinophils % 0.5 0.0 - 6.0 %    Basophils % 0.4 0.0 - 2.0 %    Neutrophils Absolute 15.58 (H) 1.20 - 7.70 x10*3/uL    Immature Granulocytes Absolute, Automated 0.12 0.00 - 0.70 x10*3/uL    Lymphocytes Absolute 2.26 1.20 - 4.80 x10*3/uL    Monocytes Absolute 1.11 (H) 0.10 - 1.00 x10*3/uL    Eosinophils Absolute 0.09 0.00 - 0.70 x10*3/uL    Basophils Absolute 0.07 0.00 - 0.10 x10*3/uL   Comprehensive metabolic panel    Collection Time: 06/19/25 12:45 PM   Result Value Ref Range    Glucose 361 (H) 74 - 99 mg/dL    Sodium 135 (L) 136 - 145 mmol/L    Potassium 3.9 3.5 - 5.3 mmol/L    Chloride 106 98 - 107 mmol/L    Bicarbonate 17 (L) 21 - 32 mmol/L    Anion Gap 16 10 - 20 mmol/L    Urea Nitrogen 11 6 - 23 mg/dL    Creatinine 0.65 0.50 - 1.05 mg/dL    eGFR >90 >60 mL/min/1.73m*2    Calcium 8.5 (L) 8.6 - 10.6 mg/dL    Albumin 4.0 3.4 - 5.0 g/dL    Alkaline Phosphatase 52 33 - 110 U/L    Total Protein 6.9 6.4 - 8.2 g/dL    AST 19 9 - 39 U/L    Bilirubin, Total 0.3 0.0 - 1.2 mg/dL    ALT 11 7 - 45 U/L   Magnesium    Collection Time: 06/19/25 12:45 PM   Result Value Ref Range    Magnesium 1.90 1.60 - 2.40 mg/dL   BLOOD GAS VENOUS FULL PANEL    Collection Time: 06/19/25 12:45 PM   Result Value Ref Range    POCT pH, Venous 7.30 (L)  7.33 - 7.43 pH    POCT pCO2, Venous 42 41 - 51 mm Hg    POCT pO2, Venous 39 35 - 45 mm Hg    POCT SO2, Venous 57 45 - 75 %    POCT Oxy Hemoglobin, Venous 56.3 45.0 - 75.0 %    POCT Hematocrit Calculated, Venous 38.0 36.0 - 46.0 %    POCT Sodium, Venous 133 (L) 136 - 145 mmol/L    POCT Potassium, Venous 3.9 3.5 - 5.3 mmol/L    POCT Chloride, Venous 105 98 - 107 mmol/L    POCT Ionized Calicum, Venous 1.17 1.10 - 1.33 mmol/L    POCT Glucose, Venous 374 (H) 74 - 99 mg/dL    POCT Lactate, Venous 1.4 0.4 - 2.0 mmol/L    POCT Base Excess, Venous -5.5 (L) -2.0 - 3.0 mmol/L    POCT HCO3 Calculated, Venous 20.7 (L) 22.0 - 26.0 mmol/L    POCT Hemoglobin, Venous 12.8 12.0 - 16.0 g/dL    POCT Anion Gap, Venous 11.0 10.0 - 25.0 mmol/L    Patient Temperature 37.0 degrees Celsius    FiO2 21 %   Troponin I, High Sensitivity    Collection Time: 06/19/25 12:45 PM   Result Value Ref Range    Troponin I, High Sensitivity (CMC) <3 0 - 34 ng/L   Hemoglobin A1c    Collection Time: 06/19/25 12:45 PM   Result Value Ref Range    Hemoglobin A1C 11.6 (H) See comment %    Estimated Average Glucose 286 Not Established mg/dL   ECG 12 lead    Collection Time: 06/19/25  1:48 PM   Result Value Ref Range    Ventricular Rate 78 BPM    Atrial Rate 78 BPM    CA Interval 164 ms    QRS Duration 74 ms    QT Interval 372 ms    QTC Calculation(Bazett) 424 ms    P Axis 75 degrees    R Axis 6 degrees    T Axis 6 degrees    QRS Count 13 beats    Q Onset 223 ms    P Onset 141 ms    P Offset 194 ms    T Offset 409 ms    QTC Fredericia 406 ms   Troponin I, High Sensitivity    Collection Time: 06/19/25  2:04 PM   Result Value Ref Range    Troponin I, High Sensitivity (CMC) <3 0 - 34 ng/L   hCG, quantitative, pregnancy    Collection Time: 06/19/25  2:29 PM   Result Value Ref Range    HCG, Beta-Quantitative <3 <5 mIU/mL   Beta Hydroxybutyrate    Collection Time: 06/19/25  2:29 PM   Result Value Ref Range    Beta-Hydroxybutyrate 2.72 (H) 0.02 - 0.27 mmol/L   Urinalysis  with Reflex Culture and Microscopic    Collection Time: 06/19/25  6:02 PM   Result Value Ref Range    Color, Urine Colorless (N) Light-Yellow, Yellow, Dark-Yellow    Appearance, Urine Clear Clear    Specific Gravity, Urine 1.040 (N) 1.005 - 1.035    pH, Urine 5.5 5.0, 5.5, 6.0, 6.5, 7.0, 7.5, 8.0    Protein, Urine NEGATIVE NEGATIVE, 10 (TRACE), 20 (TRACE) mg/dL    Glucose, Urine 1000 (4+) (A) NEGATIVE mg/dL    Blood, Urine NEGATIVE NEGATIVE mg/dL    Ketones, Urine 80 (3+) (A) NEGATIVE mg/dL    Bilirubin, Urine NEGATIVE NEGATIVE mg/dL    Urobilinogen, Urine Normal Normal mg/dL    Nitrite, Urine NEGATIVE NEGATIVE    Leukocyte Esterase, Urine NEGATIVE NEGATIVE   Extra Urine Gray Tube    Collection Time: 06/19/25  6:02 PM   Result Value Ref Range    Extra Tube 293    POCT pregnancy, urine    Collection Time: 06/19/25  6:12 PM   Result Value Ref Range    Preg Test, Ur Negative    BLOOD GAS VENOUS FULL PANEL    Collection Time: 06/19/25  8:51 PM   Result Value Ref Range    POCT pH, Venous 7.28 (L) 7.33 - 7.43 pH    POCT pCO2, Venous 36 (L) 41 - 51 mm Hg    POCT pO2, Venous 40 35 - 45 mm Hg    POCT SO2, Venous 61 45 - 75 %    POCT Oxy Hemoglobin, Venous 59.7 45.0 - 75.0 %    POCT Hematocrit Calculated, Venous 39.0 36.0 - 46.0 %    POCT Sodium, Venous 136 136 - 145 mmol/L    POCT Potassium, Venous 4.5 3.5 - 5.3 mmol/L    POCT Chloride, Venous 107 98 - 107 mmol/L    POCT Ionized Calicum, Venous 1.11 1.10 - 1.33 mmol/L    POCT Glucose, Venous 255 (H) 74 - 99 mg/dL    POCT Lactate, Venous 2.3 (H) 0.4 - 2.0 mmol/L    POCT Base Excess, Venous -9.0 (L) -2.0 - 3.0 mmol/L    POCT HCO3 Calculated, Venous 16.9 (L) 22.0 - 26.0 mmol/L    POCT Hemoglobin, Venous 13.0 12.0 - 16.0 g/dL    POCT Anion Gap, Venous 17.0 10.0 - 25.0 mmol/L    Patient Temperature 37.0 degrees Celsius    FiO2 21 %   Basic metabolic panel    Collection Time: 06/19/25  8:51 PM   Result Value Ref Range    Glucose 244 (H) 74 - 99 mg/dL    Sodium 138 136 - 145 mmol/L     Potassium 4.4 3.5 - 5.3 mmol/L    Chloride 107 98 - 107 mmol/L    Bicarbonate 16 (L) 21 - 32 mmol/L    Anion Gap 19 10 - 20 mmol/L    Urea Nitrogen 9 6 - 23 mg/dL    Creatinine 0.64 0.50 - 1.05 mg/dL    eGFR >90 >60 mL/min/1.73m*2    Calcium 8.9 8.6 - 10.6 mg/dL   CBC and Auto Differential    Collection Time: 06/19/25  8:51 PM   Result Value Ref Range    WBC 14.8 (H) 4.4 - 11.3 x10*3/uL    nRBC 0.0 0.0 - 0.0 /100 WBCs    RBC 4.11 4.00 - 5.20 x10*6/uL    Hemoglobin 12.3 12.0 - 16.0 g/dL    Hematocrit 37.2 36.0 - 46.0 %    MCV 91 80 - 100 fL    MCH 29.9 26.0 - 34.0 pg    MCHC 33.1 32.0 - 36.0 g/dL    RDW 11.9 11.5 - 14.5 %    Platelets 436 150 - 450 x10*3/uL    Neutrophils % 94.3 40.0 - 80.0 %    Immature Granulocytes %, Automated 0.3 0.0 - 0.9 %    Lymphocytes % 4.4 13.0 - 44.0 %    Monocytes % 0.8 2.0 - 10.0 %    Eosinophils % 0.0 0.0 - 6.0 %    Basophils % 0.2 0.0 - 2.0 %    Neutrophils Absolute 13.99 (H) 1.20 - 7.70 x10*3/uL    Immature Granulocytes Absolute, Automated 0.04 0.00 - 0.70 x10*3/uL    Lymphocytes Absolute 0.65 (L) 1.20 - 4.80 x10*3/uL    Monocytes Absolute 0.12 0.10 - 1.00 x10*3/uL    Eosinophils Absolute 0.00 0.00 - 0.70 x10*3/uL    Basophils Absolute 0.03 0.00 - 0.10 x10*3/uL   POCT GLUCOSE    Collection Time: 06/19/25  8:53 PM   Result Value Ref Range    POCT Glucose 238 (H) 74 - 99 mg/dL   POCT GLUCOSE    Collection Time: 06/20/25 12:06 AM   Result Value Ref Range    POCT Glucose 134 (H) 74 - 99 mg/dL   Blood Gas Lactic Acid, Venous    Collection Time: 06/20/25 12:54 AM   Result Value Ref Range    POCT Lactate, Venous 0.9 0.4 - 2.0 mmol/L   Renal function panel    Collection Time: 06/20/25 12:54 AM   Result Value Ref Range    Glucose 120 (H) 74 - 99 mg/dL    Sodium 139 136 - 145 mmol/L    Potassium 4.3 3.5 - 5.3 mmol/L    Chloride 110 (H) 98 - 107 mmol/L    Bicarbonate 19 (L) 21 - 32 mmol/L    Anion Gap 14 10 - 20 mmol/L    Urea Nitrogen 8 6 - 23 mg/dL    Creatinine 0.52 0.50 - 1.05 mg/dL     eGFR >90 >60 mL/min/1.73m*2    Calcium 8.1 (L) 8.6 - 10.6 mg/dL    Phosphorus 2.9 2.5 - 4.9 mg/dL    Albumin 3.7 3.4 - 5.0 g/dL   BLOOD GAS VENOUS FULL PANEL    Collection Time: 06/20/25 12:54 AM   Result Value Ref Range    POCT pH, Venous 7.39 7.33 - 7.43 pH    POCT pCO2, Venous 33 (L) 41 - 51 mm Hg    POCT pO2, Venous 53 (H) 35 - 45 mm Hg    POCT SO2, Venous 85 (H) 45 - 75 %    POCT Oxy Hemoglobin, Venous 83.4 (H) 45.0 - 75.0 %    POCT Hematocrit Calculated, Venous 35.0 (L) 36.0 - 46.0 %    POCT Sodium, Venous 138 136 - 145 mmol/L    POCT Potassium, Venous 4.0 3.5 - 5.3 mmol/L    POCT Chloride, Venous 110 (H) 98 - 107 mmol/L    POCT Ionized Calicum, Venous 1.13 1.10 - 1.33 mmol/L    POCT Glucose, Venous 116 (H) 74 - 99 mg/dL    POCT Lactate, Venous 0.9 0.4 - 2.0 mmol/L    POCT Base Excess, Venous -4.2 (L) -2.0 - 3.0 mmol/L    POCT HCO3 Calculated, Venous 20.0 (L) 22.0 - 26.0 mmol/L    POCT Hemoglobin, Venous 11.8 (L) 12.0 - 16.0 g/dL    POCT Anion Gap, Venous 12.0 10.0 - 25.0 mmol/L    Patient Temperature 37.0 degrees Celsius    FiO2 21 %   POCT GLUCOSE    Collection Time: 06/20/25  4:28 AM   Result Value Ref Range    POCT Glucose 133 (H) 74 - 99 mg/dL   POCT GLUCOSE    Collection Time: 06/20/25  7:33 AM   Result Value Ref Range    POCT Glucose 159 (H) 74 - 99 mg/dL   POCT GLUCOSE    Collection Time: 06/20/25 10:57 AM   Result Value Ref Range    POCT Glucose 215 (H) 74 - 99 mg/dL     *Note: Due to a large number of results and/or encounters for the requested time period, some results have not been displayed. A complete set of results can be found in Results Review.     Radiographs:  CT abdomen pelvis w IV contrast   Final Result   1.Findings suggestive of a mild enterocolitis.   2.Small heterogeneous area of enhancement in the interpolar   region of the right kidney. Correlate with urinalysis for possible   pyelonephritis.   3.Cholelithiasis.   Signed by Travis Wiggins MD      XR chest 2 views   Final Result    Normal PA and lateral chest.   Signed by Oren Smith MD              Physical Exam     Visit Vitals  /79 (BP Location: Left arm, Patient Position: Lying)   Pulse (!) 105   Temp 36.4 °C (97.5 °F)   Resp 18   Ht 1.524 m (5')   Wt 55.2 kg (121 lb 11.1 oz)   SpO2 100%   BMI 23.77 kg/m²   OB Status Recent pregnancy   Smoking Status Former   BSA 1.53 m²       GENERAL:  The patient appears nourished and normally developed. Vital signs as documented.     HEENT:  Head normocephalic, atraumatic, EOMs intact, PERRLA, Mucous membranes moist. Nares patent without copious rhinorrhea.  No lymphadenopathy.    PULMONARY:  Lungs are clear to auscultation, without any respiratory distress. Able to speak full sentences, no accessory muscle use    CARDIAC:   Normal rate. No murmurs, rubs or gallops    ABDOMEN:  Soft, non-distended, non-tender, BS positive x 4 quadrants, No rebound or guarding, no peritoneal signs, no CVA tenderness, no masses or organomegaly    MUSCULOSKELETAL:   Able to ambulate, Non edematous, with no obvious deformities. Pulses intact distal    SKIN:   Good color, with no significant rashes.  No pallor.    NEURO:  No obvious neurological deficits, normal sensation and strength bilaterally.  Able to follow commands, NIH 0, CN 2-12 intact.    Psych: Appropriate mood and affect    Consultants  1) Endocrinology      Impression and Plan    In summary, Maria Isabel Cutler has been cared for according to the standard [facility] Clinical Decision Unit observation protocol for Enterocolitis. This extended period of observation was specifically required to determine the need for hospitalization. Prior to discharge from observation, the final physical exam is documented above.     Significant events during the course of observation based on the goals of the clinical problem list include:   1) Hyperglycemia   Much improvement   Endo discussed and reviewed her home Insulin regimen   She will  "follow up with her endocrinologist regarding her pump   Tolerating PO very well     Based on the patient's condition and test results, the patient will be discharged    Total length of observation was 27 hours. Dr. Arzate is the CDU disposition attending.      Discharge Diagnosis  Enterocolitis   hyperglycemia     Issues Requiring Follow-Up  Type one diabetes     Discharge Meds     Your medication list        CONTINUE taking these medications        Instructions Last Dose Given Next Dose Due   Dexcom G7 Sensor device  Generic drug: blood-glucose sensor      Change sensor every 10 days       TRUEplus Pen Needle 32 gauge x 5/32\" needle  Generic drug: pen needle, diabetic      Use 1 pen needle for insulin injection 4 times a day.       TRUEplus Pen Needle 29 gauge x 1/2\" needle  Generic drug: pen needle 1/2\"      Use to inject 1-4 times daily as directed.       TRUEplus Pen Needle 32 gauge x 5/32\" needle  Generic drug: pen needle, diabetic      Use with insulin pens 4 times daily              ASK your doctor about these medications        Instructions Last Dose Given Next Dose Due   albuterol 90 mcg/actuation inhaler  Commonly known as: Ventolin HFA      Inhale 1-2 puffs every 4 hours if needed for wheezing or shortness of breath.       glucagon 1 mg/0.2 mL auto-injector      Inject 1 mg under the skin 1 time if needed (use for low blood sugar if unable to take anything by mouth) for up to 1 dose.       glucose 4 gram chewable tablet           insulin lispro 100 unit/mL injection      Use via insulin pump for max  units       HumaLOG KwikPen Insulin 100 unit/mL pen  Generic drug: insulin lispro      Inject 1 unit under the skin for every 12 grams of carbs plus scale with meals. Use up to 50 units daily if pump fails.       insulin lispro 100 unit/mL pen  Commonly known as: HumaLOG KwikPen Insulin      Inject 1 unit under the skin for every 12g carbs with meals. May take up to 50 units per day.       Ketostix " strip  Generic drug: acetone (urine) test      DRIP URINE TO CHECK FOR KETONES IF NAUSEA/VOMITING OR IF CONCERN FOR DKA OR DEHYDRATION       Lantus Solostar U-100 Insulin 100 unit/mL (3 mL) pen  Generic drug: insulin glargine      Take 14 units once every morning in the setting of pump failure       OneTouch Verio test strips  Generic drug: blood sugar diagnostic      TEST 6-7 TIMES DAILY                Test Results Pending At Discharge  Pending Labs       No current pending labs.            CDU COURSE:  The patient was admitted to the CDU for symptom management (nausea & vomiting) and hyperglycemia secondary to type 1 diabetes with her Insulin pump malfunctioning.   She was seen by endocrinology and her Insulin regimen was discussed. She will continue with 14 units of Lantus and a sliding scale was reviewed with her by endo.     Outpatient Follow-Up  Future Appointments   Date Time Provider Department Center   7/16/2025 11:00 AM Claudia Monique RD, LD [id number] [facility]   7/18/2025  8:15 AM Magalie Wolfe DPM [id number] [place]   10/17/2025  9:00 AM Allegra Doyle MD PhD [id number] [facility]         Asmita Pollock APRN-CNP, DNP                 [1]   Past Medical History:  Diagnosis Date    Asthma     Burning pain 10/09/2023    Chlamydia     Chronic hypertension     CSF oligoclonal bands     Depression     Herpes     Type 1 diabetes mellitus with hyperglycemia, with long-term current use of insulin     Urinary tract infection    [2] History reviewed. No pertinent surgical history.  [3]   Social History  Socioeconomic History    Marital status: Single   Tobacco Use    Smoking status: Former     Types: Cigarettes     Passive exposure: Past    Smokeless tobacco: Never   Vaping Use    Vaping status: Former    Substances: Nicotine   Substance and Sexual Activity    Alcohol use: Not Currently    Drug use: Not Currently     Types: Marijuana    Sexual activity: Yes     Social Drivers of Health     Financial Resource  Strain: Patient Declined (5/28/2025)    Overall Financial Resource Strain (CARDIA)     Difficulty of Paying Living Expenses: Patient declined   Food Insecurity: Patient Declined (5/28/2025)    Hunger Vital Sign     Worried About Running Out of Food in the Last Year: Patient declined     Ran Out of Food in the Last Year: Patient declined   Transportation Needs: Patient Declined (5/28/2025)    PRAPARE - Transportation     Lack of Transportation (Medical): Patient declined     Lack of Transportation (Non-Medical): Patient declined   Physical Activity: Inactive (7/28/2024)    Exercise Vital Sign     Days of Exercise per Week: 0 days     Minutes of Exercise per Session: 0 min   Social Connections: Socially Isolated (7/28/2024)    Social Connection and Isolation Panel [NHANES]     Frequency of Communication with Friends and Family: Never     Frequency of Social Gatherings with Friends and Family: Never     Attends [employer] Services: 1 to 4 times per year     Active Member of Clubs or Organizations: No     Attends Club or Organization Meetings: Never     Marital Status: Never    Intimate Partner Violence: Not At Risk (5/28/2025)    Humiliation, Afraid, Rape, and Kick questionnaire     Fear of Current or Ex-Partner: No     Emotionally Abused: No     Physically Abused: No     Sexually Abused: No   Housing Stability: Patient Declined (5/28/2025)    Housing Stability Vital Sign     Unable to Pay for Housing in the Last Year: Patient declined     Number of Times Moved in the Last Year: 1     Homeless in the Last Year: Patient declined   [4]   Allergies  Allergen Reactions    Pollen Extracts Other     Runny nose

## 2025-06-20 NOTE — PROGRESS NOTES
Daily Progress Note  Clinical Decision Unit  Patient: Maria Isabel Cutler  MRN: [id number]  : 2000  Date of Evaluation: 25  CDU Provider: Zoraida Lopez PA-C    Subjective   Maria Isabel Cutler is a 25 y.o. female on day 0 of admission presenting with hyperglycemia.  Serial assessments of Maria Isabel Cutler's clinical progress include:  1) Mild tachycardia.  All other vital signs stable and within normal limits.  2) Resolution of nausea and vomiting.  Patient was able to eat a few crackers.  3) Patient responded to hyperglycemia treatment with fluids and insulin.  Most recent POCT glucose 133.  VBG reveals improved pH 7.39.  No anion gap.  No longer concern for DKA.      Objective   Physical Exam     Appearance: Alert, oriented , cooperative,  in no acute distress.      Skin: Intact and dry skin. No lesions, rash, petechiae or purpura.      Eyes: PERRLA, EOMs intact.     ENT: Hearing grossly intact.  Nares patent, mucus membranes moist.     Neck: Supple, without meningismus.      Pulmonary: Clear bilaterally with good chest wall excursion. No rales, rhonchi or wheezing. No accessory muscle use or stridor.     Cardiac: Normal S1, S2 without murmur.      Abdomen: Soft and nontender.  No palpable organomegaly.  No rebound or guarding.       Musculoskeletal: Full range of motion. No pain, edema, or deformity. Brisk capillary refill <2 seconds. Pulses full and equal. No cyanosis, clubbing, or edema.     Neurological:  Normal sensation, no weakness, no focal findings identified. Ambulating without assistance with a non-ataxic gait.     Psychiatric: Appropriate mood and affect.     Last Recorded Vitals  Blood pressure 98/58, pulse (!) 102, temperature 36.9 °C (98.4 °F), resp. rate 18, height 1.524 m (5'), weight 55.2 kg (121 lb 11.1 oz), SpO2 97%, currently breastfeeding.    Relevant Results  Imaging  CT abdomen pelvis w IV contrast  Result Date: 2025  1.Findings suggestive of a mild enterocolitis. 2.Small  heterogeneous area of enhancement in the interpolar region of the right kidney. Correlate with urinalysis for possible pyelonephritis. 3.Cholelithiasis. Signed by Travis Wiggins MD    XR chest 2 views  Result Date: 6/19/2025  Normal PA and lateral chest. Signed by Oren Smith MD      Cardiology, Vascular, and Other Imaging  ECG 12 lead  Result Date: 6/19/2025  Normal sinus rhythm with sinus arrhythmia Low voltage QRS T wave abnormality, consider anterior ischemia Abnormal ECG When compared with ECG of 19-JUN-2025 12:52, Questionable change in QRS axis T wave inversion less evident in Inferior leads Nonspecific T wave abnormality has replaced inverted T waves in Lateral leads See ED provider note for full interpretation and clinical correlation Confirmed by Zoraida Lopez ([id number]) on 6/19/2025 8:43:38 PM      Results for orders placed or performed during the hospital encounter of 06/19/25 (from the past 24 hours)   POCT GLUCOSE   Result Value Ref Range    POCT Glucose 335 (H) 74 - 99 mg/dL   CBC and Auto Differential   Result Value Ref Range    WBC 19.2 (H) 4.4 - 11.3 x10*3/uL    nRBC 0.0 0.0 - 0.0 /100 WBCs    RBC 3.99 (L) 4.00 - 5.20 x10*6/uL    Hemoglobin 12.2 12.0 - 16.0 g/dL    Hematocrit 35.6 (L) 36.0 - 46.0 %    MCV 89 80 - 100 fL    MCH 30.6 26.0 - 34.0 pg    MCHC 34.3 32.0 - 36.0 g/dL    RDW 11.8 11.5 - 14.5 %    Platelets 426 150 - 450 x10*3/uL    Neutrophils % 80.9 40.0 - 80.0 %    Immature Granulocytes %, Automated 0.6 0.0 - 0.9 %    Lymphocytes % 11.8 13.0 - 44.0 %    Monocytes % 5.8 2.0 - 10.0 %    Eosinophils % 0.5 0.0 - 6.0 %    Basophils % 0.4 0.0 - 2.0 %    Neutrophils Absolute 15.58 (H) 1.20 - 7.70 x10*3/uL    Immature Granulocytes Absolute, Automated 0.12 0.00 - 0.70 x10*3/uL    Lymphocytes Absolute 2.26 1.20 - 4.80 x10*3/uL    Monocytes Absolute 1.11 (H) 0.10 - 1.00 x10*3/uL    Eosinophils Absolute 0.09 0.00 - 0.70 x10*3/uL    Basophils Absolute 0.07 0.00 - 0.10 x10*3/uL   Comprehensive  metabolic panel   Result Value Ref Range    Glucose 361 (H) 74 - 99 mg/dL    Sodium 135 (L) 136 - 145 mmol/L    Potassium 3.9 3.5 - 5.3 mmol/L    Chloride 106 98 - 107 mmol/L    Bicarbonate 17 (L) 21 - 32 mmol/L    Anion Gap 16 10 - 20 mmol/L    Urea Nitrogen 11 6 - 23 mg/dL    Creatinine 0.65 0.50 - 1.05 mg/dL    eGFR >90 >60 mL/min/1.73m*2    Calcium 8.5 (L) 8.6 - 10.6 mg/dL    Albumin 4.0 3.4 - 5.0 g/dL    Alkaline Phosphatase 52 33 - 110 U/L    Total Protein 6.9 6.4 - 8.2 g/dL    AST 19 9 - 39 U/L    Bilirubin, Total 0.3 0.0 - 1.2 mg/dL    ALT 11 7 - 45 U/L   Magnesium   Result Value Ref Range    Magnesium 1.90 1.60 - 2.40 mg/dL   BLOOD GAS VENOUS FULL PANEL   Result Value Ref Range    POCT pH, Venous 7.30 (L) 7.33 - 7.43 pH    POCT pCO2, Venous 42 41 - 51 mm Hg    POCT pO2, Venous 39 35 - 45 mm Hg    POCT SO2, Venous 57 45 - 75 %    POCT Oxy Hemoglobin, Venous 56.3 45.0 - 75.0 %    POCT Hematocrit Calculated, Venous 38.0 36.0 - 46.0 %    POCT Sodium, Venous 133 (L) 136 - 145 mmol/L    POCT Potassium, Venous 3.9 3.5 - 5.3 mmol/L    POCT Chloride, Venous 105 98 - 107 mmol/L    POCT Ionized Calicum, Venous 1.17 1.10 - 1.33 mmol/L    POCT Glucose, Venous 374 (H) 74 - 99 mg/dL    POCT Lactate, Venous 1.4 0.4 - 2.0 mmol/L    POCT Base Excess, Venous -5.5 (L) -2.0 - 3.0 mmol/L    POCT HCO3 Calculated, Venous 20.7 (L) 22.0 - 26.0 mmol/L    POCT Hemoglobin, Venous 12.8 12.0 - 16.0 g/dL    POCT Anion Gap, Venous 11.0 10.0 - 25.0 mmol/L    Patient Temperature 37.0 degrees Celsius    FiO2 21 %   Troponin I, High Sensitivity   Result Value Ref Range    Troponin I, High Sensitivity (CMC) <3 0 - 34 ng/L   Hemoglobin A1c   Result Value Ref Range    Hemoglobin A1C 11.6 (H) See comment %    Estimated Average Glucose 286 Not Established mg/dL   ECG 12 lead   Result Value Ref Range    Ventricular Rate 78 BPM    Atrial Rate 78 BPM    TN Interval 164 ms    QRS Duration 74 ms    QT Interval 372 ms    QTC Calculation(Bazett) 424 ms     P Axis 75 degrees    R Axis 6 degrees    T Axis 6 degrees    QRS Count 13 beats    Q Onset 223 ms    P Onset 141 ms    P Offset 194 ms    T Offset 409 ms    QTC Fredericia 406 ms   Troponin I, High Sensitivity   Result Value Ref Range    Troponin I, High Sensitivity (CMC) <3 0 - 34 ng/L   hCG, quantitative, pregnancy   Result Value Ref Range    HCG, Beta-Quantitative <3 <5 mIU/mL   Beta Hydroxybutyrate   Result Value Ref Range    Beta-Hydroxybutyrate 2.72 (H) 0.02 - 0.27 mmol/L   Urinalysis with Reflex Culture and Microscopic   Result Value Ref Range    Color, Urine Colorless (N) Light-Yellow, Yellow, Dark-Yellow    Appearance, Urine Clear Clear    Specific Gravity, Urine 1.040 (N) 1.005 - 1.035    pH, Urine 5.5 5.0, 5.5, 6.0, 6.5, 7.0, 7.5, 8.0    Protein, Urine NEGATIVE NEGATIVE, 10 (TRACE), 20 (TRACE) mg/dL    Glucose, Urine 1000 (4+) (A) NEGATIVE mg/dL    Blood, Urine NEGATIVE NEGATIVE mg/dL    Ketones, Urine 80 (3+) (A) NEGATIVE mg/dL    Bilirubin, Urine NEGATIVE NEGATIVE mg/dL    Urobilinogen, Urine Normal Normal mg/dL    Nitrite, Urine NEGATIVE NEGATIVE    Leukocyte Esterase, Urine NEGATIVE NEGATIVE   POCT pregnancy, urine   Result Value Ref Range    Preg Test, Ur Negative    BLOOD GAS VENOUS FULL PANEL   Result Value Ref Range    POCT pH, Venous 7.28 (L) 7.33 - 7.43 pH    POCT pCO2, Venous 36 (L) 41 - 51 mm Hg    POCT pO2, Venous 40 35 - 45 mm Hg    POCT SO2, Venous 61 45 - 75 %    POCT Oxy Hemoglobin, Venous 59.7 45.0 - 75.0 %    POCT Hematocrit Calculated, Venous 39.0 36.0 - 46.0 %    POCT Sodium, Venous 136 136 - 145 mmol/L    POCT Potassium, Venous 4.5 3.5 - 5.3 mmol/L    POCT Chloride, Venous 107 98 - 107 mmol/L    POCT Ionized Calicum, Venous 1.11 1.10 - 1.33 mmol/L    POCT Glucose, Venous 255 (H) 74 - 99 mg/dL    POCT Lactate, Venous 2.3 (H) 0.4 - 2.0 mmol/L    POCT Base Excess, Venous -9.0 (L) -2.0 - 3.0 mmol/L    POCT HCO3 Calculated, Venous 16.9 (L) 22.0 - 26.0 mmol/L    POCT Hemoglobin, Venous  13.0 12.0 - 16.0 g/dL    POCT Anion Gap, Venous 17.0 10.0 - 25.0 mmol/L    Patient Temperature 37.0 degrees Celsius    FiO2 21 %   Basic metabolic panel   Result Value Ref Range    Glucose 244 (H) 74 - 99 mg/dL    Sodium 138 136 - 145 mmol/L    Potassium 4.4 3.5 - 5.3 mmol/L    Chloride 107 98 - 107 mmol/L    Bicarbonate 16 (L) 21 - 32 mmol/L    Anion Gap 19 10 - 20 mmol/L    Urea Nitrogen 9 6 - 23 mg/dL    Creatinine 0.64 0.50 - 1.05 mg/dL    eGFR >90 >60 mL/min/1.73m*2    Calcium 8.9 8.6 - 10.6 mg/dL   CBC and Auto Differential   Result Value Ref Range    WBC 14.8 (H) 4.4 - 11.3 x10*3/uL    nRBC 0.0 0.0 - 0.0 /100 WBCs    RBC 4.11 4.00 - 5.20 x10*6/uL    Hemoglobin 12.3 12.0 - 16.0 g/dL    Hematocrit 37.2 36.0 - 46.0 %    MCV 91 80 - 100 fL    MCH 29.9 26.0 - 34.0 pg    MCHC 33.1 32.0 - 36.0 g/dL    RDW 11.9 11.5 - 14.5 %    Platelets 436 150 - 450 x10*3/uL    Neutrophils % 94.3 40.0 - 80.0 %    Immature Granulocytes %, Automated 0.3 0.0 - 0.9 %    Lymphocytes % 4.4 13.0 - 44.0 %    Monocytes % 0.8 2.0 - 10.0 %    Eosinophils % 0.0 0.0 - 6.0 %    Basophils % 0.2 0.0 - 2.0 %    Neutrophils Absolute 13.99 (H) 1.20 - 7.70 x10*3/uL    Immature Granulocytes Absolute, Automated 0.04 0.00 - 0.70 x10*3/uL    Lymphocytes Absolute 0.65 (L) 1.20 - 4.80 x10*3/uL    Monocytes Absolute 0.12 0.10 - 1.00 x10*3/uL    Eosinophils Absolute 0.00 0.00 - 0.70 x10*3/uL    Basophils Absolute 0.03 0.00 - 0.10 x10*3/uL   POCT GLUCOSE   Result Value Ref Range    POCT Glucose 238 (H) 74 - 99 mg/dL   POCT GLUCOSE   Result Value Ref Range    POCT Glucose 134 (H) 74 - 99 mg/dL   Blood Gas Lactic Acid, Venous   Result Value Ref Range    POCT Lactate, Venous 0.9 0.4 - 2.0 mmol/L   Renal function panel   Result Value Ref Range    Glucose 120 (H) 74 - 99 mg/dL    Sodium 139 136 - 145 mmol/L    Potassium 4.3 3.5 - 5.3 mmol/L    Chloride 110 (H) 98 - 107 mmol/L    Bicarbonate 19 (L) 21 - 32 mmol/L    Anion Gap 14 10 - 20 mmol/L    Urea Nitrogen 8 6 -  23 mg/dL    Creatinine 0.52 0.50 - 1.05 mg/dL    eGFR >90 >60 mL/min/1.73m*2    Calcium 8.1 (L) 8.6 - 10.6 mg/dL    Phosphorus 2.9 2.5 - 4.9 mg/dL    Albumin 3.7 3.4 - 5.0 g/dL   BLOOD GAS VENOUS FULL PANEL   Result Value Ref Range    POCT pH, Venous 7.39 7.33 - 7.43 pH    POCT pCO2, Venous 33 (L) 41 - 51 mm Hg    POCT pO2, Venous 53 (H) 35 - 45 mm Hg    POCT SO2, Venous 85 (H) 45 - 75 %    POCT Oxy Hemoglobin, Venous 83.4 (H) 45.0 - 75.0 %    POCT Hematocrit Calculated, Venous 35.0 (L) 36.0 - 46.0 %    POCT Sodium, Venous 138 136 - 145 mmol/L    POCT Potassium, Venous 4.0 3.5 - 5.3 mmol/L    POCT Chloride, Venous 110 (H) 98 - 107 mmol/L    POCT Ionized Calicum, Venous 1.13 1.10 - 1.33 mmol/L    POCT Glucose, Venous 116 (H) 74 - 99 mg/dL    POCT Lactate, Venous 0.9 0.4 - 2.0 mmol/L    POCT Base Excess, Venous -4.2 (L) -2.0 - 3.0 mmol/L    POCT HCO3 Calculated, Venous 20.0 (L) 22.0 - 26.0 mmol/L    POCT Hemoglobin, Venous 11.8 (L) 12.0 - 16.0 g/dL    POCT Anion Gap, Venous 12.0 10.0 - 25.0 mmol/L    Patient Temperature 37.0 degrees Celsius    FiO2 21 %   POCT GLUCOSE   Result Value Ref Range    POCT Glucose 133 (H) 74 - 99 mg/dL     *Note: Due to a large number of results and/or encounters for the requested time period, some results have not been displayed. A complete set of results can be found in Results Review.       Scheduled medications  Scheduled Medications[1]  Continuous medications  Continuous Medications[2]  PRN medications  PRN Medications[3]    Assessment & Plan  Enterocolitis    Hyperglycemia    Nausea and vomiting, unspecified vomiting type    Chest pain, unspecified type      Assessment/Plan:   Plan:  Hyperglycemia  - Monitor vital signs per unit standard  - Continuous telemetry monitoring  - Continuous IV maintenance fluid NS at 125 cc/h  - POCT glucose checks every 4 hours and with meals  - Patient received home dose of Lantus 14 units Thursday evening per endocrine recommendations along with lispro  sliding scale  - Total of 3 IV fluid bolus given  - IV Reglan as needed for nausea  - Endocrinology and diabetic educator to see patient later today    Zoraida Lopez PA-C  Available via Secure Chat.        [1] insulin glargine, 14 Units, subcutaneous, Nightly  insulin lispro, 0-10 Units, subcutaneous, q4h  [2] sodium chloride 0.9%, 125 mL/hr, Last Rate: 125 mL/hr (06/20/25 0435)  [3] PRN medications: dextrose, dextrose, dicyclomine, glucagon, glucagon, ondansetron

## 2025-06-23 NOTE — PROGRESS NOTES
Maria Isabel Cutler  Age: 25 y.o.  MRN: [id number]  Date: 6/20/2025  Location of service: in community    Program Details  Medicaid Community Clinical Case Management  Status: Enrolled  Effective Dates: 8/7/2024 - present  Responsible Staff: JAIME Miramontes      Goals Reviewed:  Problem: Lack of Community Resources        Goal: Coordination of Services will be Obtained          Goal: Link Patient to services within the community       Priority: High        Problem: Limited Understanding of Medications       Goal: Patient will Verbalize Understanding of Medications       Priority: High        Problem: Risk of Uncoordinated Care       Goal: Care will be Coordinated and Supported by a Multidisciplinary Team of Providers       Priority: Medium          Summary:  This provider met with patient at [facility] where patient was admitted the night before for possible DKA. This provider listened as patient explained how she ended up in the ED. This provider confirmed that patient has all of the insulin pump supplies. This provider was present when the In House Endocrinologist came in to due a medical evaluation of the patient. This provider was able to ask follow up question to gain clarity about the patient's medical need. This provider and patient talked a lot about the patient's SDOH needs. Provider and patient agreed to meet next Friday, June 27th, 2025 to review patient's resume, childcare voucher application and the community collaboration services that can be offered.                      JAIME Miramontes

## 2025-06-26 ENCOUNTER — DOCUMENTATION (OUTPATIENT)
Dept: BEHAVIORAL HEALTH | Facility: CLINIC | Age: 25
End: 2025-06-26
Payer: COMMERCIAL

## 2025-06-27 ENCOUNTER — DOCUMENTATION (OUTPATIENT)
Dept: BEHAVIORAL HEALTH | Facility: CLINIC | Age: 25
End: 2025-06-27
Payer: COMMERCIAL

## 2025-06-27 DIAGNOSIS — F32.1 CURRENT MODERATE EPISODE OF MAJOR DEPRESSIVE DISORDER, UNSPECIFIED WHETHER RECURRENT (MULTI): ICD-10-CM

## 2025-06-27 LAB
CYTOLOGY CMNT CVX/VAG CYTO-IMP: NORMAL
LAB AP HPV HR: NORMAL
LAB AP PAP ADDITIONAL TESTS: NORMAL
LABORATORY COMMENT REPORT: NORMAL
LMP START DATE: NORMAL
PATH REPORT.TOTAL CANCER: NORMAL

## 2025-06-27 NOTE — PROGRESS NOTES
Maria Isabel Cutler  Age: 25 y.o.  MRN: [id number]  Date: 6/26/2025  Location of service: phone call    Program Details  Medicaid Community Clinical Case Management  Status: Enrolled  Effective Dates: 8/7/2024 - present  Responsible Staff: JAIME Miramontes      Goals Reviewed:  Problem: Lack of Community Resources        Goal: Coordination of Services will be Obtained          Goal: Link Patient to services within the community       Priority: High        Problem: Limited Understanding of Medications       Goal: Patient will Verbalize Understanding of Medications       Priority: High        Problem: Risk of Uncoordinated Care       Goal: Care will be Coordinated and Supported by a Multidisciplinary Team of Providers       Priority: Medium          Summary:  This provider attempted to make contact with patient via telephone call to remind patient of our appointment for tomorrow. Provider got patient's voicemail box but was unable to leave a message.                      JAIME Miramontes

## 2025-06-29 ENCOUNTER — HOSPITAL ENCOUNTER (EMERGENCY)
Facility: HOSPITAL | Age: 25
Discharge: HOME | End: 2025-06-29
Payer: MEDICAID

## 2025-06-29 VITALS
SYSTOLIC BLOOD PRESSURE: 112 MMHG | DIASTOLIC BLOOD PRESSURE: 73 MMHG | HEART RATE: 94 BPM | OXYGEN SATURATION: 97 % | BODY MASS INDEX: 23 KG/M2 | RESPIRATION RATE: 16 BRPM | HEIGHT: 62 IN | WEIGHT: 125 LBS | TEMPERATURE: 98.2 F

## 2025-06-29 DIAGNOSIS — L03.317 CELLULITIS OF BUTTOCK: Primary | ICD-10-CM

## 2025-06-29 PROCEDURE — 2500000002 HC RX 250 W HCPCS SELF ADMINISTERED DRUGS (ALT 637 FOR MEDICARE OP, ALT 636 FOR OP/ED): Performed by: PHYSICIAN ASSISTANT

## 2025-06-29 PROCEDURE — 2500000001 HC RX 250 WO HCPCS SELF ADMINISTERED DRUGS (ALT 637 FOR MEDICARE OP): Performed by: PHYSICIAN ASSISTANT

## 2025-06-29 PROCEDURE — 99282 EMERGENCY DEPT VISIT SF MDM: CPT

## 2025-06-29 PROCEDURE — 99284 EMERGENCY DEPT VISIT MOD MDM: CPT | Performed by: PHYSICIAN ASSISTANT

## 2025-06-29 PROCEDURE — 99283 EMERGENCY DEPT VISIT LOW MDM: CPT

## 2025-06-29 RX ORDER — CEFTRIAXONE 500 MG/1
500 INJECTION, POWDER, FOR SOLUTION INTRAMUSCULAR; INTRAVENOUS ONCE
Status: DISCONTINUED | OUTPATIENT
Start: 2025-06-29 | End: 2025-06-29

## 2025-06-29 RX ORDER — SULFAMETHOXAZOLE AND TRIMETHOPRIM 800; 160 MG/1; MG/1
1 TABLET ORAL 2 TIMES DAILY
Qty: 14 TABLET | Refills: 0 | Status: SHIPPED | OUTPATIENT
Start: 2025-06-29 | End: 2025-07-06

## 2025-06-29 RX ORDER — METRONIDAZOLE 500 MG/1
2000 TABLET ORAL ONCE
Status: DISCONTINUED | OUTPATIENT
Start: 2025-06-29 | End: 2025-06-29

## 2025-06-29 RX ORDER — NAPROXEN 500 MG/1
500 TABLET ORAL ONCE
Status: COMPLETED | OUTPATIENT
Start: 2025-06-29 | End: 2025-06-29

## 2025-06-29 RX ORDER — ACETAMINOPHEN 325 MG/1
650 TABLET ORAL EVERY 6 HOURS PRN
Qty: 30 TABLET | Refills: 0 | Status: SHIPPED | OUTPATIENT
Start: 2025-06-29

## 2025-06-29 RX ORDER — DOXYCYCLINE 100 MG/1
100 TABLET ORAL 2 TIMES DAILY
Qty: 14 TABLET | Refills: 0 | Status: SHIPPED | OUTPATIENT
Start: 2025-06-29 | End: 2025-06-29 | Stop reason: WASHOUT

## 2025-06-29 RX ORDER — SULFAMETHOXAZOLE AND TRIMETHOPRIM 800; 160 MG/1; MG/1
1 TABLET ORAL ONCE
Status: COMPLETED | OUTPATIENT
Start: 2025-06-29 | End: 2025-06-29

## 2025-06-29 RX ORDER — NAPROXEN 500 MG/1
500 TABLET ORAL 2 TIMES DAILY
Qty: 20 TABLET | Refills: 0 | Status: SHIPPED | OUTPATIENT
Start: 2025-06-29

## 2025-06-29 RX ORDER — DOXYCYCLINE HYCLATE 100 MG
100 TABLET ORAL ONCE
Status: DISCONTINUED | OUTPATIENT
Start: 2025-06-29 | End: 2025-06-29

## 2025-06-29 RX ADMIN — SULFAMETHOXAZOLE AND TRIMETHOPRIM 1 TABLET: 800; 160 TABLET ORAL at 09:23

## 2025-06-29 RX ADMIN — NAPROXEN 500 MG: 500 TABLET ORAL at 09:23

## 2025-06-29 ASSESSMENT — PAIN SCALES - GENERAL: PAINLEVEL_OUTOF10: 5 - MODERATE PAIN

## 2025-06-29 ASSESSMENT — PAIN - FUNCTIONAL ASSESSMENT: PAIN_FUNCTIONAL_ASSESSMENT: 0-10

## 2025-06-29 NOTE — Clinical Note
Charline Tovar was seen and treated in our emergency department on 6/29/2025.  She may return to work on 07/01/2025.       If you have any questions or concerns, please don't hesitate to call.      Asa Chou PA-C

## 2025-06-29 NOTE — ED PROVIDER NOTES
Emergency Department Provider Note        History of Present Illness     25-year-old female with no medical history presents complaining of pain to her left buttocks.  States been hurting for about a week now.  He was worried it developed into an abscess.  Denies any fevers chills night sweats or rigors.  Denies any known inciting injury.    Medical History[1]  Surgical History[2]  Social History[3]  Allergies[4]      External Records Reviewed including ED notes, H&P, Discharge Summary, outpatient PCP/specialist notes.  Physical Exam       Triage Vitals: T 36.8 °C (98.2 °F)  HR 94  /73  RR 16  O2 97 %    GEN: NAD  EYES:  EOMs grossly intact, anicteric sclera  YOLANDA: Mucosa moist.  NECK: Supple.  CARD: RRR  PULMONARY: Moving air well. Clear all lung fields.  ABDOMEN: Soft, no guarding, no rigidity. Nontender. NABS  EXTREMITIES: Full ROM, no pitting edema,   SKIN: Intact, warm and dry.  Left upper buttocks with small lesion approximately 5 mm round with area approximately 8 cm of fluctuance/induration tender to palpation with erythema without warmth, no purulence at the lesion, no streaking  NEURO: Alert and oriented x 3, speech is clear, no obvious deficits noted.         Medical Decision Making & ED Course     25-year-old female presenting for buttocks pain with concern for infection/abscess.  On exam she is well-appearing ambulating comfortably.  VSS.  Does have an open lesion with signs of cellulitis.  Bedside point-of-care ultrasound performed showed no abscess.  It is her stated.  Will start her on Bactrim for cellulitis of the buttock.  Without systemic symptoms no indication for parenteral antibiotics.  She is recommended follow-up with PCP for persistent concerns, return precautions reviewed including new concerning worsening symptoms.  She verbalized understanding discharge plan she agreed with plan all questions were answered.    Diagnoses as of 06/29/25 0922   Cellulitis of buttock     Point of  Care Ultrasound    (Results Pending)     Labs Reviewed - No data to display    ----------------------------------------------------------------------------------------------------------------------------    This note was dictated using a speech recognition program.  While an attempt was made at proof reading to minimize errors, minor errors in transcription may be present call for questions.       [1]   Past Medical History:  Diagnosis Date    21 weeks gestation of pregnancy ([facility]) 09/13/2019    21 weeks gestation of pregnancy    Diabetes (Multi)    [2] No past surgical history on file.  [3]   Social History  Socioeconomic History    Marital status: Single   Tobacco Use    Smoking status: Never    Smokeless tobacco: Never   Vaping Use    Vaping status: Never Used   Substance and Sexual Activity    Alcohol use: Never    Drug use: Never     Social Drivers of Health     Financial Resource Strain: High Risk (3/26/2025)    Overall Financial Resource Strain (CARDIA)     Difficulty of Paying Living Expenses: Very hard   Food Insecurity: No Food Insecurity (3/19/2025)    Hunger Vital Sign     Worried About Running Out of Food in the Last Year: Never true     Ran Out of Food in the Last Year: Never true   Transportation Needs: No Transportation Needs (3/26/2025)    PRAPARE - Transportation     Lack of Transportation (Medical): No     Lack of Transportation (Non-Medical): No   Physical Activity: Sufficiently Active (3/26/2025)    Exercise Vital Sign     Days of Exercise per Week: 3 days     Minutes of Exercise per Session: 60 min   Recent Concern: Physical Activity - Inactive (3/19/2025)    Exercise Vital Sign     Days of Exercise per Week: 0 days     Minutes of Exercise per Session: 0 min   Stress: No Stress Concern Present (3/26/2025)    Citizen of [place] of Occupational Health - Occupational Stress Questionnaire     Feeling of Stress : Not at all   Social Connections: Moderately Isolated (3/26/2025)    Social Connection  and Isolation Panel [NHANES]     Frequency of Communication with Friends and Family: More than three times a week     Frequency of Social Gatherings with Friends and Family: More than three times a week     Attends Sikhism Services: More than 4 times per year     Active Member of Clubs or Organizations: No     Attends Club or Organization Meetings: Never     Marital Status: Never    Intimate Partner Violence: Not At Risk (3/26/2025)    Humiliation, Afraid, Rape, and Kick questionnaire     Fear of Current or Ex-Partner: No     Emotionally Abused: No     Physically Abused: No     Sexually Abused: No   Housing Stability: Low Risk  (3/26/2025)    Housing Stability Vital Sign     Unable to Pay for Housing in the Last Year: No     Number of Times Moved in the Last Year: 0     Homeless in the Last Year: No   [4] No Known Allergies       Asa Chou PA-C  06/29/25 0982

## 2025-06-29 NOTE — DISCHARGE INSTRUCTIONS
Take the full course of the antibiotics.  Take the Tylenol/naproxen for pain relief.  Soak with Epsom salts for 20 to 30 minutes twice daily.  For persistent symptoms follow-up with your PCP.  For new concerning or worsening symptoms return to ED for reevaluation

## 2025-06-30 NOTE — PROGRESS NOTES
Maria Isabel Cutler  Age: 25 y.o.  MRN: [id number]  Date: 6/27/2025  Location of service: in community    Program Details  Medicaid Community Clinical Case Management  Status: Enrolled  Effective Dates: 8/7/2024 - present  Responsible Staff: JAIME Miramontes      Goals Reviewed:  Problem: Lack of Community Resources        Goal: Coordination of Services will be Obtained          Goal: Link Patient to services within the community       Priority: High        Summary:  This provider met with patient at the patient's mom's home. This provider listened as patient talked about having to complete some mandatory workshops in order to get cash assistance through [place] [employer] ([employer] This provider assisted patient with reviewing the patient's [employer] portal for the application to apply for Childcare Vouchers for her son while she is in these workshops. This provider also reached out to the Community Collab through the [employer] that services the area where the patient lives to gain information on the referral process for addition community support.                      JAIME Miramontes

## 2025-07-08 ENCOUNTER — PATIENT OUTREACH (OUTPATIENT)
Dept: CARE COORDINATION | Facility: CLINIC | Age: 25
End: 2025-07-08
Payer: COMMERCIAL

## 2025-07-08 NOTE — PROGRESS NOTES
Outreach to the patient following their recent visit to the ED to assess their needs and provide any necessary follow-up support. The attempt to reach the patient was unsuccessful, unable to make contact at this time.      Asmita Blackwood RN, Care Manager  [facility], [facility]  [phone]

## 2025-07-15 ENCOUNTER — DOCUMENTATION (OUTPATIENT)
Dept: BEHAVIORAL HEALTH | Facility: CLINIC | Age: 25
End: 2025-07-15
Payer: COMMERCIAL

## 2025-07-15 NOTE — PROGRESS NOTES
Maria Isabel Cutler  Age: 25 y.o.  MRN: [id number]  Date: 7/15/2025  Location of service: phone call    Program Details  Medicaid Community Clinical Case Management  Status: Enrolled  Effective Dates: 8/7/2024 - present  Responsible Staff: JAIME Miramontes      Goals Reviewed:      Summary:  This writer calls patient to confirm our appointment for today. Patient is unable to answer at this time. This writer leaves a voicemail.    Appointment start time: 1006  Appointment completion time: 1007  Total time spent with patient (in minutes): 1  Non-Billable Time: 1  Billable Time Total: 0    Viviane Sanford RN

## 2025-07-16 ENCOUNTER — APPOINTMENT (OUTPATIENT)
Dept: ENDOCRINOLOGY | Facility: CLINIC | Age: 25
End: 2025-07-16
Payer: COMMERCIAL

## 2025-07-16 VITALS — BODY MASS INDEX: 22.78 KG/M2 | WEIGHT: 116 LBS | HEIGHT: 60 IN

## 2025-07-16 DIAGNOSIS — E10.10 DIABETIC KETOACIDOSIS WITHOUT COMA ASSOCIATED WITH TYPE 1 DIABETES MELLITUS: Primary | ICD-10-CM

## 2025-07-16 DIAGNOSIS — E10.10 DM (DIABETES MELLITUS) TYPE 1, UNCONTROLLED, WITH KETOACIDOSIS: ICD-10-CM

## 2025-07-16 DIAGNOSIS — E10.65 TYPE 1 DIABETES MELLITUS WITH HYPERGLYCEMIA (MULTI): ICD-10-CM

## 2025-07-16 PROCEDURE — 97802 MEDICAL NUTRITION INDIV IN: CPT

## 2025-07-16 NOTE — PROGRESS NOTES
Nutrition Initial Assessment:     Patient Maria Isabel Cutler is a 25 y.o. female being seen at [facility] who was referred by LELE Gusman on 5/29/25 for   1. Diabetic ketoacidosis without coma associated with type 1 diabetes mellitus        2. Type 1 diabetes mellitus with hyperglycemia (Multi)            Nutrition Assessment    Problem List[1]    Nutrition History:  Food & Nutrition History: Maria Isabel Cutler presents in office for nutrition counseling. She reports that her DM management has been improving and is on a better routine. Not currently wearing Dexcom as she needs a ride to [facility] pharmacy in order to pick it up. Does not have meter readings with her today. Has been using MDI instead of pump. She has an upcoming endo appt. She states that she scheduled with dietitian to work on healthier eating and gain muscle. She reports that she feels comfortable with carb counting and has been using it to dose her meal time insulin.     Allergies: None  Intolerance: None  Appetite: Good  Intake: >75%  GI Symptoms : None  Swallowing Difficulty: No problems with swallowing  Dentition : own  Food Preparation: Patient  Cooking Skills/Barriers: None reported  Grocery Shopping: Patient  Supplements: Denies   Food Insecurity: Denies     24 Hour Diet Recall  Meal 1: sausage with eggs, oatmeal with berries + peanuts, 1 slice toast   Meal 2: salad with chicken and ranch   Snack: chips or cookies   Meal 3: chicken, rice, cornbread     Beverages: water, zero sugar beverages, shakira sun   Eating out: minimal   Alcohol Intake: none     Physical Activity:   Core workout in the mornings 4 days per week     Diabetes Nutrition History:  Diagnosed at 5 years old   Sees Dr. Gonzales   Admission for DKA in May 2025 (was rationing her supplies)   Prior Nutrition Education: Yes, feels good about carb counting   SMBG: using meter 4-6x per day, need to  Dexcom from pharmacy   Did not bring readings today    Hypoglycemia: denies any recent; typically treats lows with juice   Current DM Medications/Insulin Regimen:  Lantus 14u daily  Lispro 1u for every 12 grams of CHO + SS     Anthropometrics:  Ht Readings from Last 1 Encounters:   07/16/25 (!) 1.524 m (5')     BMI Readings from Last 1 Encounters:   07/16/25 22.65 kg/m²     Weight History:   Daily Weight  07/16/25 : 52.6 kg (116 lb)  06/19/25 : 55.2 kg (121 lb 11.1 oz)  06/03/25 : 55.2 kg (121 lb 9.6 oz)  05/29/25 : 53.4 kg (117 lb 11.6 oz)  01/16/25 : 55.8 kg (123 lb 0.3 oz)  08/30/24 : 53.5 kg (118 lb)  07/25/24 : 51.5 kg (113 lb 8.6 oz)  05/24/24 : 51.3 kg (113 lb)  04/05/24 : 47.6 kg (105 lb)  01/23/24 : 47.8 kg (105 lb 6.4 oz)    Weight Change %:  Weight History / % Weight Change: Unintentional wt loss of 5 lbs (4%) x 1 month from 6/19/25 to 7/16/25 which pt attributes to GI illness within the last few weeks  Significant Weight Loss: No    Nutrition Focused Physical Exam Findings:  Subcutaneous Fat Loss:   Defer Subcutaneous Fat Loss Assessment: Defer all  Defer All Reason: Visually appears well nourished with no signs of noticeable wasting    Muscle Wasting:  Defer Muscle Wasting Assessment: Defer all  Defer All Reason: Visually appears well nourished with no signs of noticeable wasting    Nutrition Significant Labs:  CMP Trend:    Recent Labs     06/20/25  0054 06/19/25  2051 06/19/25  1245 05/29/25  1246 05/28/25  1205 05/28/25  0953 01/15/25  1800 01/15/25  1614   GLUCOSE 120* 244* 361* 251*   < > 604*   < > 453*    138 135* 133*   < > 132*   < > 132*   K 4.3 4.4 3.9 4.8   < > 5.0   < > 4.8   * 107 106 101   < > 97*   < > 95*   CO2 19* 16* 17* 19*   < > 11*   < > 14*   ANIONGAP 14 19 16 18   < > 29*   < > 28*   BUN 8 9 11 15   < > 19   < > 16   CREATININE 0.52 0.64 0.65 0.69   < > 0.94   < > 0.84   EGFR >90 >90 >90 >90   < > 87   < > >90   CALCIUM 8.1* 8.9 8.5* 8.4*   < > 9.3   < > 10.0   ALBUMIN 3.7  --  4.0 3.7   < > 4.5   < > 4.8   ALKPHOS  --    --  52  --   --  87  --  68   PROT  --   --  6.9  --   --  8.0  --  8.1   AST  --   --  19  --   --  13  --  13   BILITOT  --   --  0.3  --   --  0.5  --  0.6   ALT  --   --  11  --   --  11  --  14    < > = values in this interval not displayed.     CBC Trend:   Recent Labs     06/19/25  2051 06/19/25  1245 05/29/25  0444   WBC 14.8* 19.2* 13.9*   NRBC 0.0 0.0 0.0   RBC 4.11 3.99* 3.95*   HGB 12.3 12.2 12.1   HCT 37.2 35.6* 36.4   MCV 91 89 92   MCH 29.9 30.6 30.6   MCHC 33.1 34.3 33.2   RDW 11.9 11.8 11.8    426 448     DM Specific Labs Trend (Includes HgbA1C, antibodies & fasting insulin):   Recent Labs     06/19/25  1245 05/28/25  1651 01/15/25  1614   HGBA1C 11.6* 11.0* 12.7*     Lipid Panel Trend:    Recent Labs     01/20/22  1147 08/13/19  1142 03/20/18  0645   CHOL 199 204* 202*   HDL 65.3 69.2 73.7   LDLF  --   --  117*   VLDL  --   --  11   TRIG  --   --  56     Medications:  Current Outpatient Medications   Medication Instructions    acetone, urine, test strip DRIP URINE TO CHECK FOR KETONES IF NAUSEA/VOMITING OR IF CONCERN FOR DKA OR DEHYDRATION    albuterol (Ventolin HFA) 90 mcg/actuation inhaler 1-2 puffs, inhalation, Every 4 hours PRN    blood-glucose sensor (Dexcom G7 Sensor) device Change sensor every 10 days    glucagon 1 mg, subcutaneous, Once as needed    glucose 4 gram chewable tablet Chew. USE AS DIRECTED TO TREAT HYPOGLYCEMIA    insulin glargine (Lantus Solostar U-100 Insulin) 100 unit/mL (3 mL) pen Take 14 units once every morning in the setting of pump failure    insulin lispro (HumaLOG KwikPen Insulin) 100 unit/mL pen Inject 1 unit under the skin for every 12 grams of carbs plus scale with meals. Use up to 50 units daily if pump fails.    insulin lispro (HumaLOG KwikPen Insulin) 100 unit/mL pen Inject 1 unit under the skin for every 12g carbs with meals. May take up to 50 units per day.    insulin lispro 100 unit/mL injection Use via insulin pump for max  units    OneTouch  "Verio test strips strip TEST 6-7 TIMES DAILY    pen needle 1/2\" 29G X 12mm needle Use to inject 1-4 times daily as directed.    pen needle, diabetic 32 gauge x 5/32\" needle Use 1 pen needle for insulin injection 4 times a day.    pen needle, diabetic 32 gauge x 5/32\" needle Use with insulin pens 4 times daily        Estimated Needs:  Total Energy Estimated Needs in 24 hours (kCal):  (0933-1669); Method for Estimating Needs: MSJ 1198 x 1.4  Total Protein Estimated Needs in 24 Hours (g): 70 g; Method for Estimating 24 Hour Protein Needs: 1.3 g/kg   ;     ;                    Nutrition Diagnosis   Malnutrition Diagnosis  Patient has Malnutrition Diagnosis: No    Nutrition Diagnosis  Patient has Nutrition Diagnosis: Yes  Diagnosis Status (1): New  Nutrition Diagnosis 1: Altered nutrition related to laboratory values  Related to (1): T1DM  As Evidenced by (1): A1c = 11.6% (6/19/25)  Additional Nutrition Diagnosis: Diagnosis 2  Diagnosis Status (2): New  Nutrition Diagnosis 2: Food and nutrition related knowledge deficit  Related to (2): limited prior nutrition education  As Evidenced by (2): patient has questions about changing her diet       Nutrition Interventions/Recommendations   Nutrition Prescription: Oral nutrition carb counting for DM management, use of the Plate Method for preparing balanced meals, and increased protein to support building muscle mass    Nutrition Interventions:   Food and Nutrient Delivery: Meals & Snacks: Carbohydrate-modified diet, Protein-modified diet  Goals: 1) Patient will utilize carb counting to bolus her meal time insulin  2) Patient will utilize Plate Method to prepare balanced meals for better glucose management  3) Patient will focus on adding more protein sources to meals and snacks to help with building muscle     Coordination of Care: Goals: N/A     Nutrition Education:   Content related nutrition education  Provided education on Plate Method as a tool for preparing balanced " meals. Discussed the following:  Fill 1/2 plate with non-starchy vegetables (broccoli, carrots, cauliflower, salad greens, cucumbers, tomatoes). These foods contribute very few carbohydrates, and they add fiber to the meal.  Fill 1/4 plate with lean protein (meat, turkey, fish, seafood, eggs, nuts, cheese, cottage cheese, nut butter, tofu, edamame).  Fill 1/4 plate with carbohydrates/starches (grains, fruits, starchy vegetables, beans, yogurt, milk). Aim for at least half of daily grains as whole grains.   Reviewed label reading for total carbohydrates and discussed that patient does not need to count net carbs.   Reviewed tools for carb counting. Recommended an carlos eduardo and website, 100Plus, for accurate carb counting.   Encouraged patient to add protein sources to meals and snacks in combination with her workouts to help with building muscle. Reviewed protein sources.     Educational Handouts Provided: ADA Plate Method, Example Meal Plan      Nutrition Counseling:   Nutrition Counseling Strategies : Nutrition counseling based on motivational interviewing strategy, Nutrition counseling based on goal setting strategy    Patient Goals:    1) Patient will utilize carb counting to bolus her meal time insulin  2) Patient will utilize Plate Method to prepare balanced meals for better glucose management  3) Patient will focus on adding more protein sources to meals and snacks to help with building muscle     Readiness to Change : Good  Level of Understanding : Good  Anticipated Compliant : Good         Nutrition Monitoring and Evaluation   Food and Nutrient Intake  Monitoring and Evaluation Plan: Meal/snack pattern  Meal/Snack Pattern: Estimated meal and snack pattern  Criteria: Monitor patient's progress towards meal and snack goal(s)         Anthropometric measurements  Monitoring and Evaluation Plan: Weight  Body Weight: Body weight  Criteria: Halt unintentional weight loss    Biochemical Data, Medical Tests and  Procedures  Monitoring and Evaluation Plan: Glucose/endocrine profile  Glucose/Endocrine Profile: Hemoglobin A1c (HgbA1c)  Criteria: A1c trending toward < 7.0%         Goal Status: Goal Status: New goal identified         Follow Up: 3 months               [1]   Patient Active Problem List  Diagnosis    Group beta Strep positive    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

## 2025-07-18 ENCOUNTER — APPOINTMENT (OUTPATIENT)
Dept: PODIATRY | Facility: CLINIC | Age: 25
End: 2025-07-18
Payer: COMMERCIAL

## 2025-07-18 DIAGNOSIS — M54.31 BILATERAL SCIATICA: Primary | ICD-10-CM

## 2025-07-18 DIAGNOSIS — E10.49 TYPE 1 DIABETES MELLITUS WITH OTHER NEUROLOGIC COMPLICATION: ICD-10-CM

## 2025-07-18 DIAGNOSIS — G57.61 MORTON'S NEUROMA OF RIGHT FOOT: ICD-10-CM

## 2025-07-18 DIAGNOSIS — M54.32 BILATERAL SCIATICA: Primary | ICD-10-CM

## 2025-07-18 DIAGNOSIS — M79.671 RIGHT FOOT PAIN: ICD-10-CM

## 2025-07-18 PROCEDURE — 64455 NJX AA&/STRD PLTR COM DG NRV: CPT | Performed by: PODIATRIST

## 2025-07-18 PROCEDURE — 99213 OFFICE O/P EST LOW 20 MIN: CPT | Performed by: PODIATRIST

## 2025-07-18 RX ORDER — TRIAMCINOLONE ACETONIDE 40 MG/ML
40 INJECTION, SUSPENSION INTRA-ARTICULAR; INTRAMUSCULAR ONCE
Status: SHIPPED | OUTPATIENT
Start: 2025-07-18

## 2025-07-18 NOTE — PROGRESS NOTES
"Chief Complaint   Patient presents with    Foot Pain      History Of Present Illness  Maria Isabel Cutler is a 25 y.o. female presenting with continued right foot pain.  She states this has been present for \"years\".  She states it is sometimes to the left side but mostly on the right.  No injury she can recall.  She states the pain is to the ball of the foot at this time.  She is a type I diabetic.  Review of her chart shows her last hemoglobin A1c was 12.7     Past Medical History  She has a past medical history of Asthma, Burning pain (10/09/2023), Chlamydia, Chronic hypertension, CSF oligoclonal bands, Depression, Herpes, Type 1 diabetes mellitus with hyperglycemia, with long-term current use of insulin, and Urinary tract infection.    She has no past medical history of Abnormal Pap smear of cervix, Anemia, Gonorrhea, or Varicella.    Surgical History  She has no past surgical history on file.     Social History  She reports that she has quit smoking. Her smoking use included cigarettes. She has been exposed to tobacco smoke. She has never used smokeless tobacco. She reports that she does not currently use alcohol. She reports that she does not currently use drugs after having used the following drugs: Marijuana.    Family History  Family History[1]     Allergies  Pollen extracts    Medications  Current Medications[2]    Review of Systems    REVIEW OF SYSTEMS  GENERAL:  Negative for malaise, significant weight loss, fever      Objective:   Vasc: DP and PT pulses are palpable bilateral.  CFT is less than 3 seconds bilateral.  Skin temperature is warm to cool proximal to distal bilateral.      Neuro:  Light touch is intact to the foot bilateral.  Protective sensation is diminished to the foot when tested with the 5.07 SWM bilateral.      Derm: Nails are normal. Skin is supple with normal texture and turgor noted.  Webspaces are clean, dry and intact bilateral.  There are no hyperkeratoses, ulcerations, verruca or " other lesions noted.  No acute edema noted to the right foot    Ortho: Muscle strength is 5/5 for all pedal groups tested.  There is pain with palpation of the right second interspace.  Positive Jaciel's click is noted no pain along the metatarsal shafts.  The patient is able to perform active range of motion of the digits.  No pain elicited to the left foot    3 views right foot independently interpreted 7/18/2025.  Impression: The joints and metatarsals are normal.  No evidence of fracture or stress fracture.  No acute osseous findings    Patient ID: Maria Isabel Cutler is a 25 y.o. female.    Nerve Block    Date/Time: 7/18/2025 11:06 AM    Performed by: Magalie Wolfe DPM  Authorized by: Magalie Wolfe DPM    Consent:     Consent obtained:  Verbal    Consent given by:  Patient    Risks, benefits, and alternatives were discussed: yes      Risks discussed:  Nerve damage, swelling, unsuccessful block and pain    Alternatives discussed:  No treatment and alternative treatment  Universal protocol:     Procedure explained and questions answered to patient or proxy's satisfaction: yes      Test results available: yes      Patient identity confirmed:  Verbally with patient  Indications:     Indications:  Pain relief  Location:     Body area:  Lower extremity    Laterality:  Right  Pre-procedure details:     Skin preparation:  Alcohol    Preparation: Patient was prepped and draped in usual sterile fashion    Skin anesthesia:     Skin anesthesia method:  None  Procedure details:     Block needle gauge:  27 G    Steroid injected:  Triamcinolone    Injection procedure:  Anatomic landmarks identified  Post-procedure details:     Procedure completion:  Tolerated    Assessment/Plan     Diagnoses and all orders for this visit:  Bilateral sciatica  -     Referral to Physical Therapy; Future  Ngo's neuroma of right foot  Right foot pain  Type 1 diabetes mellitus with other neurologic complication      Type 1 diabetes with  nerve pain  We did discuss in detail her elevated hemoglobin A1c.  Certainly this can contribute to overall nerve pain and may be contributing to her foot pain.  Understands the importance of blood glucose control.  Review of her chart shows she was recently admitted with ketoacidosis.    2.  Ngo's neuroma right foot  Her x-rays were reviewed and there are no underlying osseous issues noted.  Her pain is in line with neuroma.  We did discuss cortisone injection for the neuroma.  Injection given today without complication.  Understands her risk of steroid flare.  She understands the importance of supportive shoes and inserts    3.  Bilateral sciatica  Patient also complains of bilateral sciatica which is longstanding in nature.  She is never had treatment for this.  We did discuss referral to physical therapy.  Referral placed today.  She understands if she is no better with injection therapy and physical therapy we can consider MRI of the foot.    The patient was evaluated and examined.  We discussed her diagnosis as well as treatment options.  She is agreeable to the plan.  She understands to call me immediately should problems arise prior to follow-up       Magalie Wolfe DPM         [1]   Family History  Problem Relation Name Age of Onset    Hypertension Maternal Grandmother      Asthma Child     [2]   Current Outpatient Medications   Medication Sig Dispense Refill    acetone, urine, test strip DRIP URINE TO CHECK FOR KETONES IF NAUSEA/VOMITING OR IF CONCERN FOR DKA OR DEHYDRATION 100 each 0    albuterol (Ventolin HFA) 90 mcg/actuation inhaler Inhale 1-2 puffs every 4 hours if needed for wheezing or shortness of breath. 18 g 11    blood-glucose sensor (Dexcom G7 Sensor) device Change sensor every 10 days 3 each 11    glucagon 1 mg/0.2 mL auto-injector Inject 1 mg under the skin 1 time if needed (use for low blood sugar if unable to take anything by mouth) for up to 1 dose. 0.2 mL 12    glucose 4 gram  "chewable tablet Chew. USE AS DIRECTED TO TREAT HYPOGLYCEMIA      insulin glargine (Lantus Solostar U-100 Insulin) 100 unit/mL (3 mL) pen Take 14 units once every morning in the setting of pump failure 15 mL 11    insulin lispro (HumaLOG KwikPen Insulin) 100 unit/mL pen Inject 1 unit under the skin for every 12 grams of carbs plus scale with meals. Use up to 50 units daily if pump fails. 15 mL 11    insulin lispro (HumaLOG KwikPen Insulin) 100 unit/mL pen Inject 1 unit under the skin for every 12g carbs with meals. May take up to 50 units per day. 15 mL 11    insulin lispro 100 unit/mL injection Use via insulin pump for max  units 90 mL 3    OneTouch Verio test strips strip TEST 6-7 TIMES DAILY 12 strip 11    pen needle 1/2\" 29G X 12mm needle Use to inject 1-4 times daily as directed. 100 each 11    pen needle, diabetic 32 gauge x 5/32\" needle Use 1 pen needle for insulin injection 4 times a day. 200 each 11    pen needle, diabetic 32 gauge x 5/32\" needle Use with insulin pens 4 times daily 400 each 2     No current facility-administered medications for this visit.     "

## 2025-08-07 ENCOUNTER — DOCUMENTATION (OUTPATIENT)
Dept: BEHAVIORAL HEALTH | Facility: CLINIC | Age: 25
End: 2025-08-07
Payer: COMMERCIAL

## 2025-08-07 NOTE — PROGRESS NOTES
Maria Isabel Cutler  Age: 25 y.o.  MRN: [id number]  Date: 8/7/2025  Location of service: phone call    Program Details  Medicaid Community Clinical Case Management  Status: Enrolled  Effective Dates: 8/7/2024 - present  Responsible Staff: JAIME Miramontes      Goals Reviewed:  Problem: Lack of Community Resources        Goal: Coordination of Services will be Obtained          Goal: Link Patient to services within the community       Priority: High        Problem: Limited Understanding of Medications       Goal: Patient will Verbalize Understanding of Medications       Priority: High        Problem: Risk of Uncoordinated Care       Goal: Care will be Coordinated and Supported by a Multidisciplinary Team of Providers       Priority: Medium          Summary:  This provider attempted to make contact with patient via telephone call. This provider had to leave a voicemail requesting a return call at [phone].                      JAIME Miramontes

## 2025-08-26 ENCOUNTER — PATIENT OUTREACH (OUTPATIENT)
Dept: BEHAVIORAL HEALTH | Facility: CLINIC | Age: 25
End: 2025-08-26
Payer: COMMERCIAL

## 2025-10-17 ENCOUNTER — APPOINTMENT (OUTPATIENT)
Dept: OPHTHALMOLOGY | Facility: CLINIC | Age: 25
End: 2025-10-17
Payer: COMMERCIAL

## (undated) DEVICE — PREP TRAY, VAGINAL

## (undated) DEVICE — SUTURE, VICRYL, 4-0, 27 IN, KS, UNDYED

## (undated) DEVICE — Device

## (undated) DEVICE — BOWL, BASIN, 32 OZ, STERILE

## (undated) DEVICE — SUTURE, PDS II, 0 36 IN, CT-1, VIOLET

## (undated) DEVICE — SPONGE, LAP, XRAY DECT, 18IN X 18IN, W/MASTER DMT, STERILE

## (undated) DEVICE — GOWN, SURGICAL, SMARTGOWN, XLARGE, STERILE

## (undated) DEVICE — TIP, SUCTION, YANKAUER, BULB, ADULT

## (undated) DEVICE — DRESSING, NON-ADHERENT, TELFA, OUCHLESS, 3 X 8 IN, STERILE

## (undated) DEVICE — PAD, GROUNDING, ELECTROSURGICAL, W/9 FT CABLE, POLYHESIVE II, ADULT, LF

## (undated) DEVICE — TOWEL PACK, STERILE, 4/PACK, BLUE

## (undated) DEVICE — SUTURE, MONOCRYL, 4-0, 27 IN, PS-2, UNDYED

## (undated) DEVICE — SUTURE, VICRYL, 0, 36 IN, CT, UNDYED

## (undated) DEVICE — DRESSING, ADHESIVE, ISLAND, TELFA, 4 X 10 IN

## (undated) DEVICE — DRESSING, ABDOMINAL, WET PRUF, TENDERSORB, 5 X 9 IN, STERILE

## (undated) DEVICE — TUBING, SUCTION, NON-CONDUCTIVE,W/MALE CONNECT, 6MM X 12 FT, STERILE